# Patient Record
Sex: MALE | Race: BLACK OR AFRICAN AMERICAN | NOT HISPANIC OR LATINO | Employment: OTHER | URBAN - METROPOLITAN AREA
[De-identification: names, ages, dates, MRNs, and addresses within clinical notes are randomized per-mention and may not be internally consistent; named-entity substitution may affect disease eponyms.]

---

## 2017-01-18 ENCOUNTER — APPOINTMENT (OUTPATIENT)
Dept: LAB | Facility: CLINIC | Age: 67
End: 2017-01-18
Payer: MEDICARE

## 2017-01-18 ENCOUNTER — ALLSCRIPTS OFFICE VISIT (OUTPATIENT)
Dept: OTHER | Facility: OTHER | Age: 67
End: 2017-01-18

## 2017-01-18 ENCOUNTER — TRANSCRIBE ORDERS (OUTPATIENT)
Dept: LAB | Facility: CLINIC | Age: 67
End: 2017-01-18

## 2017-01-18 DIAGNOSIS — E11.9 TYPE 2 DIABETES MELLITUS WITHOUT COMPLICATIONS (HCC): ICD-10-CM

## 2017-01-18 DIAGNOSIS — Z72.0 TOBACCO USE: ICD-10-CM

## 2017-01-18 DIAGNOSIS — I70.0 ATHEROSCLEROSIS OF AORTA (HCC): ICD-10-CM

## 2017-01-18 LAB
ALBUMIN SERPL BCP-MCNC: 3.7 G/DL (ref 3.5–5)
ALP SERPL-CCNC: 56 U/L (ref 46–116)
ALT SERPL W P-5'-P-CCNC: 21 U/L (ref 12–78)
ANION GAP SERPL CALCULATED.3IONS-SCNC: 4 MMOL/L (ref 4–13)
AST SERPL W P-5'-P-CCNC: 13 U/L (ref 5–45)
BACTERIA UR QL AUTO: NORMAL /HPF
BASOPHILS # BLD AUTO: 0.01 THOUSANDS/ΜL (ref 0–0.1)
BASOPHILS NFR BLD AUTO: 0 % (ref 0–1)
BILIRUB SERPL-MCNC: 0.47 MG/DL (ref 0.2–1)
BILIRUB UR QL STRIP: NEGATIVE
BUN SERPL-MCNC: 11 MG/DL (ref 5–25)
CALCIUM SERPL-MCNC: 9.7 MG/DL (ref 8.3–10.1)
CHLORIDE SERPL-SCNC: 102 MMOL/L (ref 100–108)
CLARITY UR: CLEAR
CO2 SERPL-SCNC: 30 MMOL/L (ref 21–32)
COLOR UR: YELLOW
CREAT SERPL-MCNC: 0.72 MG/DL (ref 0.6–1.3)
CREAT UR-MCNC: 71.4 MG/DL
EOSINOPHIL # BLD AUTO: 0.1 THOUSAND/ΜL (ref 0–0.61)
EOSINOPHIL NFR BLD AUTO: 2 % (ref 0–6)
ERYTHROCYTE [DISTWIDTH] IN BLOOD BY AUTOMATED COUNT: 16.2 % (ref 11.6–15.1)
EST. AVERAGE GLUCOSE BLD GHB EST-MCNC: 134 MG/DL
GFR SERPL CREATININE-BSD FRML MDRD: >60 ML/MIN/1.73SQ M
GLUCOSE SERPL-MCNC: 116 MG/DL (ref 65–140)
GLUCOSE UR STRIP-MCNC: NEGATIVE MG/DL
HBA1C MFR BLD: 6.3 % (ref 4.2–6.3)
HCT VFR BLD AUTO: 40.7 % (ref 36.5–49.3)
HGB BLD-MCNC: 13.2 G/DL (ref 12–17)
HGB UR QL STRIP.AUTO: NEGATIVE
KETONES UR STRIP-MCNC: NEGATIVE MG/DL
LDLC SERPL DIRECT ASSAY-MCNC: 102 MG/DL (ref 0–100)
LEUKOCYTE ESTERASE UR QL STRIP: NEGATIVE
LYMPHOCYTES # BLD AUTO: 2.46 THOUSANDS/ΜL (ref 0.6–4.47)
LYMPHOCYTES NFR BLD AUTO: 40 % (ref 14–44)
MCH RBC QN AUTO: 26.8 PG (ref 26.8–34.3)
MCHC RBC AUTO-ENTMCNC: 32.4 G/DL (ref 31.4–37.4)
MCV RBC AUTO: 83 FL (ref 82–98)
MICROALBUMIN UR-MCNC: 145 MG/L (ref 0–20)
MICROALBUMIN/CREAT 24H UR: 203 MG/G CREATININE (ref 0–30)
MONOCYTES # BLD AUTO: 0.53 THOUSAND/ΜL (ref 0.17–1.22)
MONOCYTES NFR BLD AUTO: 9 % (ref 4–12)
NEUTROPHILS # BLD AUTO: 3.08 THOUSANDS/ΜL (ref 1.85–7.62)
NEUTS SEG NFR BLD AUTO: 49 % (ref 43–75)
NITRITE UR QL STRIP: NEGATIVE
NON-SQ EPI CELLS URNS QL MICRO: NORMAL /HPF
NRBC BLD AUTO-RTO: 0 /100 WBCS
PH UR STRIP.AUTO: 6 [PH] (ref 4.5–8)
PLATELET # BLD AUTO: 363 THOUSANDS/UL (ref 149–390)
PMV BLD AUTO: 9.1 FL (ref 8.9–12.7)
POTASSIUM SERPL-SCNC: 3.7 MMOL/L (ref 3.5–5.3)
PROT SERPL-MCNC: 7.5 G/DL (ref 6.4–8.2)
PROT UR STRIP-MCNC: ABNORMAL MG/DL
RBC # BLD AUTO: 4.93 MILLION/UL (ref 3.88–5.62)
RBC #/AREA URNS AUTO: NORMAL /HPF
SODIUM SERPL-SCNC: 136 MMOL/L (ref 136–145)
SP GR UR STRIP.AUTO: 1.01 (ref 1–1.03)
TRIGL SERPL-MCNC: 73 MG/DL
TSH SERPL DL<=0.05 MIU/L-ACNC: 0.98 UIU/ML (ref 0.36–3.74)
UROBILINOGEN UR QL STRIP.AUTO: 0.2 E.U./DL
WBC # BLD AUTO: 6.19 THOUSAND/UL (ref 4.31–10.16)
WBC #/AREA URNS AUTO: NORMAL /HPF

## 2017-01-18 PROCEDURE — 85025 COMPLETE CBC W/AUTO DIFF WBC: CPT

## 2017-01-18 PROCEDURE — 84478 ASSAY OF TRIGLYCERIDES: CPT

## 2017-01-18 PROCEDURE — 82043 UR ALBUMIN QUANTITATIVE: CPT

## 2017-01-18 PROCEDURE — 82570 ASSAY OF URINE CREATININE: CPT

## 2017-01-18 PROCEDURE — 84443 ASSAY THYROID STIM HORMONE: CPT

## 2017-01-18 PROCEDURE — 83721 ASSAY OF BLOOD LIPOPROTEIN: CPT

## 2017-01-18 PROCEDURE — 80053 COMPREHEN METABOLIC PANEL: CPT

## 2017-01-18 PROCEDURE — 81001 URINALYSIS AUTO W/SCOPE: CPT

## 2017-01-18 PROCEDURE — 36415 COLL VENOUS BLD VENIPUNCTURE: CPT

## 2017-01-18 PROCEDURE — 83036 HEMOGLOBIN GLYCOSYLATED A1C: CPT

## 2017-01-24 ENCOUNTER — HOSPITAL ENCOUNTER (OUTPATIENT)
Dept: ULTRASOUND IMAGING | Facility: HOSPITAL | Age: 67
Discharge: HOME | End: 2017-01-24
Payer: MEDICARE

## 2017-01-24 ENCOUNTER — HOSPITAL ENCOUNTER (OUTPATIENT)
Dept: CT IMAGING | Facility: HOSPITAL | Age: 67
Discharge: HOME | End: 2017-01-24
Payer: COMMERCIAL

## 2017-01-24 DIAGNOSIS — I70.0 ATHEROSCLEROSIS OF AORTA (HCC): ICD-10-CM

## 2017-01-24 DIAGNOSIS — Z72.0 TOBACCO USE: ICD-10-CM

## 2017-01-24 PROCEDURE — 76775 US EXAM ABDO BACK WALL LIM: CPT

## 2017-02-02 ENCOUNTER — HOSPITAL ENCOUNTER (OUTPATIENT)
Dept: NON INVASIVE DIAGNOSTICS | Facility: HOSPITAL | Age: 67
Discharge: HOME | End: 2017-02-02
Payer: MEDICARE

## 2017-02-02 DIAGNOSIS — I70.0 ATHEROSCLEROSIS OF AORTA (HCC): ICD-10-CM

## 2017-02-02 PROCEDURE — 93306 TTE W/DOPPLER COMPLETE: CPT

## 2017-02-06 ENCOUNTER — GENERIC CONVERSION - ENCOUNTER (OUTPATIENT)
Dept: OTHER | Facility: OTHER | Age: 67
End: 2017-02-06

## 2017-06-13 ENCOUNTER — ALLSCRIPTS OFFICE VISIT (OUTPATIENT)
Dept: OTHER | Facility: OTHER | Age: 67
End: 2017-06-13

## 2017-07-24 ENCOUNTER — APPOINTMENT (OUTPATIENT)
Dept: LAB | Facility: CLINIC | Age: 67
End: 2017-07-24
Payer: MEDICARE

## 2017-07-24 ENCOUNTER — ALLSCRIPTS OFFICE VISIT (OUTPATIENT)
Dept: OTHER | Facility: OTHER | Age: 67
End: 2017-07-24

## 2017-07-24 DIAGNOSIS — E11.9 TYPE 2 DIABETES MELLITUS WITHOUT COMPLICATIONS (HCC): ICD-10-CM

## 2017-07-24 DIAGNOSIS — I25.10 ATHEROSCLEROTIC HEART DISEASE OF NATIVE CORONARY ARTERY WITHOUT ANGINA PECTORIS: ICD-10-CM

## 2017-07-24 DIAGNOSIS — Z01.810 ENCOUNTER FOR PREPROCEDURAL CARDIOVASCULAR EXAMINATION: ICD-10-CM

## 2017-07-24 DIAGNOSIS — K40.90 UNILATERAL INGUINAL HERNIA WITHOUT OBSTRUCTION OR GANGRENE: ICD-10-CM

## 2017-07-24 LAB
ANION GAP SERPL CALCULATED.3IONS-SCNC: 6 MMOL/L (ref 4–13)
BUN SERPL-MCNC: 16 MG/DL (ref 5–25)
CALCIUM SERPL-MCNC: 9.9 MG/DL (ref 8.3–10.1)
CHLORIDE SERPL-SCNC: 102 MMOL/L (ref 100–108)
CO2 SERPL-SCNC: 28 MMOL/L (ref 21–32)
CREAT SERPL-MCNC: 0.76 MG/DL (ref 0.6–1.3)
EST. AVERAGE GLUCOSE BLD GHB EST-MCNC: 140 MG/DL
GFR SERPL CREATININE-BSD FRML MDRD: 108 ML/MIN/1.73SQ M
GLUCOSE P FAST SERPL-MCNC: 139 MG/DL (ref 65–99)
HBA1C MFR BLD: 6.5 % (ref 4.2–6.3)
POTASSIUM SERPL-SCNC: 3.6 MMOL/L (ref 3.5–5.3)
SODIUM SERPL-SCNC: 136 MMOL/L (ref 136–145)

## 2017-07-24 PROCEDURE — 83036 HEMOGLOBIN GLYCOSYLATED A1C: CPT

## 2017-07-24 PROCEDURE — 36415 COLL VENOUS BLD VENIPUNCTURE: CPT

## 2017-07-24 PROCEDURE — 80048 BASIC METABOLIC PNL TOTAL CA: CPT

## 2017-08-01 ENCOUNTER — OFFICE VISIT (OUTPATIENT)
Dept: LAB | Facility: HOSPITAL | Age: 67
End: 2017-08-01
Attending: SURGERY
Payer: MEDICARE

## 2017-08-01 ENCOUNTER — APPOINTMENT (OUTPATIENT)
Dept: LAB | Facility: HOSPITAL | Age: 67
End: 2017-08-01
Attending: SURGERY
Payer: MEDICARE

## 2017-08-01 ENCOUNTER — TRANSCRIBE ORDERS (OUTPATIENT)
Dept: ADMINISTRATIVE | Facility: HOSPITAL | Age: 67
End: 2017-08-01

## 2017-08-01 ENCOUNTER — ALLSCRIPTS OFFICE VISIT (OUTPATIENT)
Dept: OTHER | Facility: OTHER | Age: 67
End: 2017-08-01

## 2017-08-01 DIAGNOSIS — K40.90 INGUINAL HERNIA WITHOUT OBSTRUCTION OR GANGRENE, RECURRENCE NOT SPECIFIED, UNSPECIFIED LATERALITY: ICD-10-CM

## 2017-08-01 DIAGNOSIS — K40.90 INGUINAL HERNIA WITHOUT OBSTRUCTION OR GANGRENE, RECURRENCE NOT SPECIFIED, UNSPECIFIED LATERALITY: Primary | ICD-10-CM

## 2017-08-01 DIAGNOSIS — K40.90 UNILATERAL INGUINAL HERNIA WITHOUT OBSTRUCTION OR GANGRENE: ICD-10-CM

## 2017-08-01 LAB
ERYTHROCYTE [DISTWIDTH] IN BLOOD BY AUTOMATED COUNT: 16.9 % (ref 11.6–15.1)
HCT VFR BLD AUTO: 38.9 % (ref 36.5–49.3)
HGB BLD-MCNC: 12.6 G/DL (ref 12–17)
MCH RBC QN AUTO: 26.8 PG (ref 26.8–34.3)
MCHC RBC AUTO-ENTMCNC: 32.4 G/DL (ref 31.4–37.4)
MCV RBC AUTO: 83 FL (ref 82–98)
PLATELET # BLD AUTO: 331 THOUSANDS/UL (ref 149–390)
PMV BLD AUTO: 8.9 FL (ref 8.9–12.7)
RBC # BLD AUTO: 4.71 MILLION/UL (ref 3.88–5.62)
WBC # BLD AUTO: 4.73 THOUSAND/UL (ref 4.31–10.16)

## 2017-08-01 PROCEDURE — 93005 ELECTROCARDIOGRAM TRACING: CPT

## 2017-08-01 PROCEDURE — 36415 COLL VENOUS BLD VENIPUNCTURE: CPT

## 2017-08-01 PROCEDURE — 85027 COMPLETE CBC AUTOMATED: CPT

## 2017-08-01 RX ORDER — ASPIRIN 81 MG/1
81 TABLET, CHEWABLE ORAL DAILY
COMMUNITY
End: 2018-01-21 | Stop reason: ALTCHOICE

## 2017-08-01 RX ORDER — CLONIDINE HYDROCHLORIDE 0.3 MG/1
0.3 TABLET ORAL 2 TIMES DAILY
COMMUNITY
End: 2018-01-21 | Stop reason: SDUPTHER

## 2017-08-01 RX ORDER — HYDROCHLOROTHIAZIDE 25 MG/1
25 TABLET ORAL DAILY
COMMUNITY
End: 2018-01-21 | Stop reason: SDUPTHER

## 2017-08-01 RX ORDER — LISINOPRIL 40 MG/1
40 TABLET ORAL DAILY
COMMUNITY
End: 2018-01-21 | Stop reason: SDUPTHER

## 2017-08-01 RX ORDER — ATENOLOL 50 MG/1
50 TABLET ORAL DAILY
COMMUNITY
End: 2018-01-21 | Stop reason: SDUPTHER

## 2017-08-01 RX ORDER — ATORVASTATIN CALCIUM 80 MG/1
80 TABLET, FILM COATED ORAL DAILY
COMMUNITY
End: 2018-01-21 | Stop reason: SDUPTHER

## 2017-08-02 LAB
ATRIAL RATE: 63 BPM
P AXIS: 58 DEGREES
PR INTERVAL: 168 MS
QRS AXIS: 50 DEGREES
QRSD INTERVAL: 98 MS
QT INTERVAL: 416 MS
QTC INTERVAL: 425 MS
T WAVE AXIS: 176 DEGREES
VENTRICULAR RATE: 63 BPM

## 2017-08-07 ENCOUNTER — HOSPITAL ENCOUNTER (OUTPATIENT)
Dept: NON INVASIVE DIAGNOSTICS | Facility: CLINIC | Age: 67
Discharge: HOME | End: 2017-08-07
Payer: MEDICARE

## 2017-08-07 ENCOUNTER — GENERIC CONVERSION - ENCOUNTER (OUTPATIENT)
Dept: OTHER | Facility: OTHER | Age: 67
End: 2017-08-07

## 2017-08-07 DIAGNOSIS — I25.10 ATHEROSCLEROTIC HEART DISEASE OF NATIVE CORONARY ARTERY WITHOUT ANGINA PECTORIS: ICD-10-CM

## 2017-08-07 DIAGNOSIS — Z01.810 ENCOUNTER FOR PREPROCEDURAL CARDIOVASCULAR EXAMINATION: ICD-10-CM

## 2017-08-07 LAB
ARRHY DURING EX: NORMAL
CHEST PAIN STATEMENT: NORMAL
MAX DIASTOLIC BP: 70 MMHG
MAX HEART RATE: 94 BPM
MAX PREDICTED HEART RATE: 151 BPM
MAX. SYSTOLIC BP: 154 MMHG
PROTOCOL NAME: NORMAL
REASON FOR TERMINATION: NORMAL
TARGET HR FORMULA: NORMAL
TEST INDICATION: NORMAL
TIME IN EXERCISE PHASE: 180 S

## 2017-08-07 PROCEDURE — A9502 TC99M TETROFOSMIN: HCPCS

## 2017-08-07 PROCEDURE — 78452 HT MUSCLE IMAGE SPECT MULT: CPT

## 2017-08-07 PROCEDURE — 93017 CV STRESS TEST TRACING ONLY: CPT

## 2017-08-07 RX ADMIN — REGADENOSON 0.4 MG: 0.08 INJECTION, SOLUTION INTRAVENOUS at 09:19

## 2017-08-14 ENCOUNTER — ALLSCRIPTS OFFICE VISIT (OUTPATIENT)
Dept: OTHER | Facility: OTHER | Age: 67
End: 2017-08-14

## 2017-08-17 ENCOUNTER — ANESTHESIA EVENT (OUTPATIENT)
Dept: PERIOP | Facility: HOSPITAL | Age: 67
End: 2017-08-17
Payer: MEDICARE

## 2017-08-18 ENCOUNTER — HOSPITAL ENCOUNTER (OUTPATIENT)
Facility: HOSPITAL | Age: 67
Setting detail: OUTPATIENT SURGERY
Discharge: HOME | End: 2017-08-18
Attending: SURGERY | Admitting: SURGERY
Payer: MEDICARE

## 2017-08-18 ENCOUNTER — ANESTHESIA (OUTPATIENT)
Dept: PERIOP | Facility: HOSPITAL | Age: 67
End: 2017-08-18
Payer: MEDICARE

## 2017-08-18 VITALS
RESPIRATION RATE: 20 BRPM | BODY MASS INDEX: 29.7 KG/M2 | HEIGHT: 68 IN | DIASTOLIC BLOOD PRESSURE: 65 MMHG | OXYGEN SATURATION: 96 % | HEART RATE: 70 BPM | TEMPERATURE: 98.3 F | WEIGHT: 196 LBS | SYSTOLIC BLOOD PRESSURE: 137 MMHG

## 2017-08-18 PROBLEM — K40.20 NON-RECURRENT BILATERAL INGUINAL HERNIA WITHOUT OBSTRUCTION OR GANGRENE: Chronic | Status: ACTIVE | Noted: 2017-08-18

## 2017-08-18 LAB
GLUCOSE SERPL-MCNC: 108 MG/DL (ref 65–140)
GLUCOSE SERPL-MCNC: 162 MG/DL (ref 65–140)

## 2017-08-18 PROCEDURE — 82948 REAGENT STRIP/BLOOD GLUCOSE: CPT

## 2017-08-18 PROCEDURE — C1781 MESH (IMPLANTABLE): HCPCS | Performed by: SURGERY

## 2017-08-18 DEVICE — BARD SOFT MESH
Type: IMPLANTABLE DEVICE | Site: INGUINAL | Status: FUNCTIONAL
Brand: BARD SOFT MESH

## 2017-08-18 RX ORDER — ONDANSETRON 2 MG/ML
4 INJECTION INTRAMUSCULAR; INTRAVENOUS EVERY 6 HOURS PRN
Status: CANCELLED | OUTPATIENT
Start: 2017-08-18

## 2017-08-18 RX ORDER — LIDOCAINE HYDROCHLORIDE 10 MG/ML
INJECTION, SOLUTION INFILTRATION; PERINEURAL AS NEEDED
Status: DISCONTINUED | OUTPATIENT
Start: 2017-08-18 | End: 2017-08-18 | Stop reason: SURG

## 2017-08-18 RX ORDER — CHLORAL HYDRATE 500 MG
CAPSULE ORAL 3 TIMES DAILY
COMMUNITY
End: 2021-09-03 | Stop reason: HOSPADM

## 2017-08-18 RX ORDER — METOPROLOL TARTRATE 5 MG/5ML
INJECTION INTRAVENOUS AS NEEDED
Status: DISCONTINUED | OUTPATIENT
Start: 2017-08-18 | End: 2017-08-18 | Stop reason: SURG

## 2017-08-18 RX ORDER — PROPOFOL 10 MG/ML
INJECTION, EMULSION INTRAVENOUS AS NEEDED
Status: DISCONTINUED | OUTPATIENT
Start: 2017-08-18 | End: 2017-08-18 | Stop reason: SURG

## 2017-08-18 RX ORDER — MAGNESIUM HYDROXIDE 1200 MG/15ML
LIQUID ORAL AS NEEDED
Status: DISCONTINUED | OUTPATIENT
Start: 2017-08-18 | End: 2017-08-18 | Stop reason: HOSPADM

## 2017-08-18 RX ORDER — FENTANYL CITRATE/PF 50 MCG/ML
25 SYRINGE (ML) INJECTION
Status: DISCONTINUED | OUTPATIENT
Start: 2017-08-18 | End: 2017-08-18 | Stop reason: HOSPADM

## 2017-08-18 RX ORDER — OXYCODONE HYDROCHLORIDE AND ACETAMINOPHEN 5; 325 MG/1; MG/1
1 TABLET ORAL EVERY 4 HOURS PRN
Qty: 30 TABLET | Refills: 0 | Status: SHIPPED | OUTPATIENT
Start: 2017-08-18 | End: 2017-08-28

## 2017-08-18 RX ORDER — DEXMEDETOMIDINE HYDROCHLORIDE 100 UG/ML
INJECTION, SOLUTION INTRAVENOUS AS NEEDED
Status: DISCONTINUED | OUTPATIENT
Start: 2017-08-18 | End: 2017-08-18 | Stop reason: SURG

## 2017-08-18 RX ORDER — SODIUM CHLORIDE, SODIUM LACTATE, POTASSIUM CHLORIDE, CALCIUM CHLORIDE 600; 310; 30; 20 MG/100ML; MG/100ML; MG/100ML; MG/100ML
125 INJECTION, SOLUTION INTRAVENOUS CONTINUOUS
Status: DISCONTINUED | OUTPATIENT
Start: 2017-08-18 | End: 2017-08-18 | Stop reason: HOSPADM

## 2017-08-18 RX ORDER — ONDANSETRON 2 MG/ML
4 INJECTION INTRAMUSCULAR; INTRAVENOUS ONCE AS NEEDED
Status: DISCONTINUED | OUTPATIENT
Start: 2017-08-18 | End: 2017-08-18 | Stop reason: HOSPADM

## 2017-08-18 RX ORDER — MORPHINE SULFATE 2 MG/ML
2 INJECTION, SOLUTION INTRAMUSCULAR; INTRAVENOUS
Status: CANCELLED | OUTPATIENT
Start: 2017-08-18

## 2017-08-18 RX ORDER — LABETALOL HYDROCHLORIDE 5 MG/ML
10 INJECTION, SOLUTION INTRAVENOUS AS NEEDED
Status: DISCONTINUED | OUTPATIENT
Start: 2017-08-18 | End: 2017-08-18 | Stop reason: HOSPADM

## 2017-08-18 RX ORDER — OXYCODONE HYDROCHLORIDE AND ACETAMINOPHEN 5; 325 MG/1; MG/1
1 TABLET ORAL EVERY 4 HOURS PRN
Status: CANCELLED | OUTPATIENT
Start: 2017-08-18

## 2017-08-18 RX ORDER — FENTANYL CITRATE 50 UG/ML
INJECTION, SOLUTION INTRAMUSCULAR; INTRAVENOUS AS NEEDED
Status: DISCONTINUED | OUTPATIENT
Start: 2017-08-18 | End: 2017-08-18 | Stop reason: SURG

## 2017-08-18 RX ORDER — KETOROLAC TROMETHAMINE 30 MG/ML
INJECTION, SOLUTION INTRAMUSCULAR; INTRAVENOUS AS NEEDED
Status: DISCONTINUED | OUTPATIENT
Start: 2017-08-18 | End: 2017-08-18 | Stop reason: SURG

## 2017-08-18 RX ORDER — ROCURONIUM BROMIDE 10 MG/ML
INJECTION, SOLUTION INTRAVENOUS AS NEEDED
Status: DISCONTINUED | OUTPATIENT
Start: 2017-08-18 | End: 2017-08-18 | Stop reason: SURG

## 2017-08-18 RX ORDER — HYDRALAZINE HYDROCHLORIDE 20 MG/ML
10 INJECTION INTRAMUSCULAR; INTRAVENOUS AS NEEDED
Status: COMPLETED | OUTPATIENT
Start: 2017-08-18 | End: 2017-08-18

## 2017-08-18 RX ORDER — MIDAZOLAM HYDROCHLORIDE 1 MG/ML
INJECTION INTRAMUSCULAR; INTRAVENOUS AS NEEDED
Status: DISCONTINUED | OUTPATIENT
Start: 2017-08-18 | End: 2017-08-18 | Stop reason: SURG

## 2017-08-18 RX ORDER — GLYCOPYRROLATE 0.2 MG/ML
INJECTION INTRAMUSCULAR; INTRAVENOUS AS NEEDED
Status: DISCONTINUED | OUTPATIENT
Start: 2017-08-18 | End: 2017-08-18 | Stop reason: SURG

## 2017-08-18 RX ORDER — SODIUM CHLORIDE, SODIUM LACTATE, POTASSIUM CHLORIDE, CALCIUM CHLORIDE 600; 310; 30; 20 MG/100ML; MG/100ML; MG/100ML; MG/100ML
100 INJECTION, SOLUTION INTRAVENOUS
Status: DISCONTINUED | OUTPATIENT
Start: 2017-08-18 | End: 2017-08-18 | Stop reason: HOSPADM

## 2017-08-18 RX ORDER — ONDANSETRON 2 MG/ML
INJECTION INTRAMUSCULAR; INTRAVENOUS AS NEEDED
Status: DISCONTINUED | OUTPATIENT
Start: 2017-08-18 | End: 2017-08-18 | Stop reason: SURG

## 2017-08-18 RX ORDER — BUPIVACAINE HYDROCHLORIDE 2.5 MG/ML
INJECTION, SOLUTION EPIDURAL; INFILTRATION; INTRACAUDAL AS NEEDED
Status: DISCONTINUED | OUTPATIENT
Start: 2017-08-18 | End: 2017-08-18 | Stop reason: HOSPADM

## 2017-08-18 RX ADMIN — FENTANYL CITRATE 50 MCG: 50 INJECTION, SOLUTION INTRAMUSCULAR; INTRAVENOUS at 08:15

## 2017-08-18 RX ADMIN — DEXAMETHASONE SODIUM PHOSPHATE 10 MG: 10 INJECTION INTRAMUSCULAR; INTRAVENOUS at 08:02

## 2017-08-18 RX ADMIN — SODIUM CHLORIDE, SODIUM LACTATE, POTASSIUM CHLORIDE, AND CALCIUM CHLORIDE: .6; .31; .03; .02 INJECTION, SOLUTION INTRAVENOUS at 07:46

## 2017-08-18 RX ADMIN — GLYCOPYRROLATE 0.2 MG: 0.2 INJECTION, SOLUTION INTRAMUSCULAR; INTRAVENOUS at 09:10

## 2017-08-18 RX ADMIN — ROCURONIUM BROMIDE 40 MG: 10 INJECTION, SOLUTION INTRAVENOUS at 08:02

## 2017-08-18 RX ADMIN — PROPOFOL 50 MG: 10 INJECTION, EMULSION INTRAVENOUS at 08:05

## 2017-08-18 RX ADMIN — DEXMEDETOMIDINE 8 MCG: 100 INJECTION, SOLUTION, CONCENTRATE INTRAVENOUS at 08:22

## 2017-08-18 RX ADMIN — HYDROMORPHONE HYDROCHLORIDE 0.5 MG: 1 INJECTION, SOLUTION INTRAMUSCULAR; INTRAVENOUS; SUBCUTANEOUS at 09:15

## 2017-08-18 RX ADMIN — ONDANSETRON 4 MG: 2 INJECTION INTRAMUSCULAR; INTRAVENOUS at 08:02

## 2017-08-18 RX ADMIN — KETOROLAC TROMETHAMINE 15 MG: 30 INJECTION, SOLUTION INTRAMUSCULAR at 08:40

## 2017-08-18 RX ADMIN — CEFAZOLIN SODIUM 2000 MG: 2 SOLUTION INTRAVENOUS at 08:00

## 2017-08-18 RX ADMIN — METOPROLOL TARTRATE 2.5 MG: 5 INJECTION, SOLUTION INTRAVENOUS at 08:07

## 2017-08-18 RX ADMIN — MIDAZOLAM HYDROCHLORIDE 1 MG: 1 INJECTION, SOLUTION INTRAMUSCULAR; INTRAVENOUS at 07:50

## 2017-08-18 RX ADMIN — FENTANYL CITRATE 50 MCG: 50 INJECTION, SOLUTION INTRAMUSCULAR; INTRAVENOUS at 08:02

## 2017-08-18 RX ADMIN — PROPOFOL 150 MG: 10 INJECTION, EMULSION INTRAVENOUS at 08:02

## 2017-08-18 RX ADMIN — ENOXAPARIN SODIUM 40 MG: 40 INJECTION SUBCUTANEOUS at 07:00

## 2017-08-18 RX ADMIN — LIDOCAINE HYDROCHLORIDE 50 MG: 10 INJECTION, SOLUTION INFILTRATION; PERINEURAL at 08:02

## 2017-08-18 RX ADMIN — NEOSTIGMINE METHYLSULFATE 3 MG: 1 INJECTION, SOLUTION INTRAMUSCULAR; INTRAVENOUS; SUBCUTANEOUS at 09:10

## 2017-08-18 RX ADMIN — HYDRALAZINE HYDROCHLORIDE 10 MG: 20 INJECTION INTRAMUSCULAR; INTRAVENOUS at 09:53

## 2017-08-18 RX ADMIN — SODIUM CHLORIDE, SODIUM LACTATE, POTASSIUM CHLORIDE, AND CALCIUM CHLORIDE: .6; .31; .03; .02 INJECTION, SOLUTION INTRAVENOUS at 08:20

## 2017-08-18 RX ADMIN — DEXMEDETOMIDINE 8 MCG: 100 INJECTION, SOLUTION, CONCENTRATE INTRAVENOUS at 08:50

## 2017-08-18 RX ADMIN — METOPROLOL TARTRATE 2.5 MG: 5 INJECTION, SOLUTION INTRAVENOUS at 08:13

## 2017-08-18 RX ADMIN — MIDAZOLAM HYDROCHLORIDE 1 MG: 1 INJECTION, SOLUTION INTRAMUSCULAR; INTRAVENOUS at 08:00

## 2017-09-01 ENCOUNTER — ALLSCRIPTS OFFICE VISIT (OUTPATIENT)
Dept: OTHER | Facility: OTHER | Age: 67
End: 2017-09-01

## 2017-10-03 ENCOUNTER — ALLSCRIPTS OFFICE VISIT (OUTPATIENT)
Dept: OTHER | Facility: OTHER | Age: 67
End: 2017-10-03

## 2017-10-04 NOTE — PROGRESS NOTES
Assessment  1  Postoperative state (V45 89) (Z98 890)    Plan  Postoperative state    · Follow-up PRN Evaluation and Treatment  Follow-up  Status: Complete  Done:  33SKL4384 08:58AM   Ordered; For: Postoperative state; Ordered By: Kenzie Du Performed:  Due: 89UYH9967    Discussion/Summary  Discussion Summary:   The patient did well after laparoscopic bilateral inguinal hernia repair with mesh  He is discharged from my care and I will be glad to see him if any problem arises in the future  Chief Complaint  Chief Complaint Free Text Note Form: Recheck hernia      History of Present Illness  HPI: I had the pleasure of seeing Toro Pettit in the office today for follow-up after laparoscopic bilateral inguinal hernia repair with mesh  He offers no complaints at this time  Active Problems  1  Abdominal aortic aneurysm (AAA) without rupture (441 4) (I71 4)   2  Aortic sclerosis   3  Aortic valve disorder (424 1) (I35 9)   4  AV block (426 10) (I44 30)   5  Bilateral recurrent inguinal hernia without obstruction or gangrene (550 93) (K40 21)   6  Cardiomyopathy, hypertrophic (425 18) (I42 2)   7  Coronary artery disease (414 00) (I25 10)   8  DM type 2 (diabetes mellitus, type 2) (250 00) (E11 9)   9  Dyspnea (786 09) (R06 00)   10  Generalized osteoarthritis (715 00) (M15 9)   11  History of adenomatous polyp of colon (V12 72) (Z86 010)   12  Hyperlipidemia (272 4) (E78 5)   13  Hypertension (401 9) (I10)   14  Obesity (278 00) (E66 9)   15  Organic impotence (607 84) (N52 9)   16  Postoperative state (V45 89) (Z98 890)   17  Preoperative cardiovascular examination (V72 81) (Z01 810)   18  Right inguinal hernia (550 90) (K40 90)   19  Screening for genitourinary condition (V81 6) (Z13 89)   20  Special screening examination for neoplasm of prostate (V76 44) (Z12 5)   21  Tobacco use (305 1) (Z72 0)    Past Medical History  1  History of Aortic regurgitation (424 1) (I35 1)   2   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)   3  History of Colon, diverticulosis (562 10) (K57 30)   4  History of DJD (degenerative joint disease) (715 90) (M19 90)   5  History of constipation (V12 79) (Z87 19)   6  History of sebaceous cyst (V13 3) (Z87 2)   7  History of shortness of breath (V13 89) (Z87 898)   8  History of urinary frequency (V13 09) (Z87 898)   9  Hyperlipidemia (272 4) (E78 5)   10  Hypertension (401 9) (I10)   11  History of Nocturia (788 43) (R35 1)   12  Obesity (278 00) (E66 9)   13  History of Polyposis Coli Of The Large Intestine (V12 72)   14  History of Type 2 diabetes mellitus (250 00) (E11 9)    Surgical History  1  History of Complete Colonoscopy   2  History of Hernia Repair    Family History  Mother    1  Family history of Family Health Status Of Mother -    2  Family history of Heart Disease (V17 49)  Father    3  Family history of Accidental Poisoning   4  Family history of Family Health Status Of Father -     Social History   · Being A Social Drinker   · Cigarette smoker (305 1) (F17 210)   · Current every day smoker (305 1) (F17 200)   · Educational Level - Completed Master's Degree   · Occupation:   · Smoking Cigarettes - Heavy (20-25 / Day)   · Tobacco use (305 1) (Z72 0)    Current Meds   1  Aspirin 81 MG Oral Tablet Chewable; CHEW AND SWALLOW 1 TABLET DAILY; Therapy: 33Rqd4446 to Recorded   2  Atenolol 50 MG Oral Tablet; Take 1 tablet daily; Therapy: 47Fob9313 to (Last Rx:13Hhs2599)  Requested for: 88Zke0818 Ordered   3  Atorvastatin Calcium 80 MG Oral Tablet; Take 1 tablet daily; Therapy: 49Wtd9701 to (Last Rx:03Rha0077)  Requested for: 68Kxx9350 Ordered   4  CloNIDine HCl - 0 3 MG Oral Tablet; TAKE 1 TABLET THREE TIMES A DAY; Therapy: 91LXE1998 to (Last Dick Grills)  Requested for: 19Fdt5871 Ordered   5  HydroCHLOROthiazide 25 MG Oral Tablet; Take 1 tablet daily; Therapy: 27Fxa7293 to (Last Efrain Knife)  Requested for: 18Xsf2349 Ordered   6   Lisinopril 40 MG Oral Tablet; Take 1 tablet daily; Therapy: 25Apr2014 to (Last Rx:25Mar2017)  Requested for: 25Mar2017 Ordered   7  MetFORMIN HCl  MG Oral Tablet Extended Release 24 Hour; TAKE 2 TABLETS   BEFORE THE EVENING MEAL; Therapy: 08Apr2014 to (Last Rx:28Mar2017)  Requested for: 28Mar2017 Ordered   8  Omega 3 1000 MG Oral Capsule; TAKE 1 CAPSULE 3 times daily; Therapy: 25Apr2014 to Recorded    Allergies  1  No Known Drug Allergies    Vitals  Vital Signs    Recorded: 09NQD4268 08:41AM   Temperature 64 2 F, Oral   Systolic 156, LUE, Sitting   Diastolic 72, LUE, Sitting   Height 5 ft 7 in   Weight 196 lb    BMI Calculated 30 7   BSA Calculated 2     Physical Exam    Abdomen The abdomen is soft, nondistended and nontender  Surgical wounds are well-healed  There is no evidence of recurrence of the hernias  Health Management  History of adenomatous polyp of colon   COLONOSCOPY; every 5 years; Last 59DVB1261; Next Due: 19Twu8035; Active  Health Maintenance   DIGITAL RECTAL EXAM; every 2 years; Next Due: 95LXQ5840; Overdue    Future Appointments    Date/Time Provider Specialty Site   01/25/2018 08:00 AM TADEO Wilkinson   Internal Medicine [facility] Replaced by [facility]     Signatures   Electronically signed by : Jaylon Mackay MD; Oct  3 2017  8:59AM EST                       (Author)

## 2017-10-21 ENCOUNTER — GENERIC CONVERSION - ENCOUNTER (OUTPATIENT)
Dept: OTHER | Facility: OTHER | Age: 67
End: 2017-10-21

## 2018-01-02 ENCOUNTER — ALLSCRIPTS OFFICE VISIT (OUTPATIENT)
Dept: OTHER | Facility: OTHER | Age: 68
End: 2018-01-02

## 2018-01-03 NOTE — PROGRESS NOTES
Assessment   Assessed    1  Cardiomyopathy, hypertrophic (425 18) (I42 2)   2  Coronary artery disease (414 00) (I25 10)   3  Hypertension (401 9) (I10)   4  Hyperlipidemia (272 4) (E78 5)   5  Obesity (278 00) (E66 9)   6  Tobacco use (305 1) (Z72 0)    Discussion/Summary   Cardiology Discussion Summary Free Text Note Form [facility]:    Patient with multiple medical problems who seems to be doing reasonably well from cardiac standpoint  Previous studies reviewed with patient  Medications reviewed and possible side effects discussed  concepts of cardiovascular disease , signs and symptoms of heart disease  Dietary and risk factor modification reinforced  All questions answered  Safety measures reviewed  Patient advised to report any problems prompting medical attention  Symptoms to watch out from cardiac standpoint discussed at length with patient  Patient counseled to quit smoking to prevent future cardiovascular events  Previous cardiovascular studies were reviewed with the patient  Prescriptions reviewed and refilled  Patient will continue to follow up with primary care physician  Goals and Barriers: The patient has the current Goals: Quit smoking  The patent has the current Barriers: None  Patient's Capacity to Self-Care: Patient is able to Self-Care  Patient Education: Educational resources provided: Please see discussion summary  Medication SE Review and Pt Understands Tx: Possible side effects of new medications were reviewed with the patient/guardian today  Counseling Documentation With Imm: The patient was counseled regarding diagnostic results,-- instructions for management,-- risk factor reductions,-- impressions,-- risks and benefits of treatment options,-- importance of compliance with treatment  Transitional Care: Follow up with PCP/Referring Provider        Chief Complaint   Chief Complaint Chronic Condition [facility]: Patient is here today for follow up of chronic conditions described in HPI       History of Present Illness   Cardiology [facility] Free Text Note Form [facility]: Patient presents for follow-up visit  Patient denies any history of chest pain  No shortness of breath out of the ordinary  He has lost some weight  He also states that he has cut down smoking  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[employer]  He states that he has been compliant with all his present medications  No history of dizziness lightheadedness  No history of palpitations or syncope  He had room hernia surgery recently      Review of Systems   Cardiology Male ROS:         Cardiac: as noted in HPI  Skin: No complaints of nonhealing sores or skin rash  Genitourinary: No complaints of recurrent urinary tract infections, frequent urination at night, difficult urination, blood in urine, kidney stones, loss of bladder control, no kidney or prostate problems, no erectile dysfunction  Psychological: anxiety  General: as noted in HPI--   Lost weight  Respiratory: shortness of breath  HEENT: No complaints of serious problems, hearing problems, nose problems, throat problems, or snoring  Gastrointestinal: No complaints of liver problems, nausea, vomiting, heartburn, constipation, bloody stools, diarrhea, problems swallowing, adbominal pain, or rectal bleeding  Hematologic: No complaints of bleeding disorders, anemia, blood clots, or excessive brusing  Neurological: No complaints of numbness, tingling, dizziness, weakness, seizures, headaches, syncope or fainting, AM fatigue, daytime sleepiness, no witnessed apnea episodes  Musculoskeletal: arthritis    ROS Reviewed:    ROS reviewed  Active Problems   Problems    1  Abdominal aortic aneurysm (AAA) without rupture (441 4) (I71 4)   2  Aortic sclerosis   3  Aortic valve disorder (424 1) (I35 9)   4  AV block (426 10) (I44 30)   5  Bilateral recurrent inguinal hernia without obstruction or gangrene (550 93) (K40 21)   6   Cardiomyopathy, hypertrophic (425 18) (I42 2)   7  Coronary artery disease (414 00) (I25 10)   8  DM type 2 (diabetes mellitus, type 2) (250 00) (E11 9)   9  Dyspnea (786 09) (R06 00)   10  Generalized osteoarthritis (715 00) (M15 9)   11  History of adenomatous polyp of colon (V12 72) (Z86 010)   12  Hyperlipidemia (272 4) (E78 5)   13  Hypertension (401 9) (I10)   14  Obesity (278 00) (E66 9)   15  Organic impotence (607 84) (N52 9)   16  Postoperative state (V45 89) (Z98 890)   17  Preoperative cardiovascular examination (V72 81) (Z01 810)   18  Right inguinal hernia (550 90) (K40 90)   19  Screening for genitourinary condition (V81 6) (Z13 89)   20  Special screening examination for neoplasm of prostate (V76 44) (Z12 5)   21  Tobacco use (305 1) (Z72 0)    Past Medical History   Problems    1  History of Aortic regurgitation (424 1) (I35 1)   2  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)   3  History of Colon, diverticulosis (562 10) (K57 30)   4  History of DJD (degenerative joint disease) (715 90) (M19 90)   5  History of constipation (V12 79) (Z87 19)   6  History of sebaceous cyst (V13 3) (Z87 2)   7  History of shortness of breath (V13 89) (Z87 898)   8  History of urinary frequency (V13 09) (Z87 898)   9  Hyperlipidemia (272 4) (E78 5)   10  Hypertension (401 9) (I10)   11  History of Nocturia (788 43) (R35 1)   12  Obesity (278 00) (E66 9)   13  History of Polyposis Coli Of The Large Intestine (V12 72)   14  History of Type 2 diabetes mellitus (250 00) (E11 9)  Active Problems And Past Medical History Reviewed: The active problems and past medical history were reviewed and updated today  Surgical History   Problems    1  History of Complete Colonoscopy   2  History of Hernia Repair  Surgical History Reviewed: The surgical history was reviewed and updated today  Family History   Mother    1  Family history of Family Health Status Of Mother -    2   Family history of Heart Disease (V17 49)  Father    3  Family history of Accidental Poisoning   4  Family history of Family Health Status Of Father -   Family History Reviewed: The family history was reviewed and updated today  Social History   Problems    · Being A Social Drinker   · Cigarette smoker (305 1) (F17 210)   · Current every day smoker (305 1) (F17 200)   · Educational Level - Completed Master's Degree   · Occupation:   · Smoking Cigarettes - Heavy (20-25 / Day)   · Tobacco use (305 1) (Z72 0)  Social History Reviewed: The social history was reviewed and updated today  Current Meds    1  Aspirin 81 MG Oral Tablet Chewable; CHEW AND SWALLOW 1 TABLET DAILY; Therapy: 2014 to Recorded   2  Atenolol 50 MG Oral Tablet; Take 1 tablet daily; Therapy: 2014 to (Last Rx:37Ydy9169)  Requested for: 02Uhw1282 Ordered   3  Atorvastatin Calcium 80 MG Oral Tablet; Take 1 tablet daily; Therapy: 2014 to (Last Rx:31Qto3290)  Requested for: 98Uap2092 Ordered   4  CloNIDine HCl - 0 3 MG Oral Tablet; TAKE 1 TABLET THREE TIMES A DAY; Therapy: 14YXO4130 to (Last Cristhian Gallardo)  Requested for: 90Epu8692 Ordered   5  HydroCHLOROthiazide 25 MG Oral Tablet; Take 1 tablet daily; Therapy: 2014 to (Last 550 3308)  Requested for: [phone] Ordered   6  Lisinopril 40 MG Oral Tablet; Take 1 tablet daily; Therapy: 2014 to (Last Rx:2017)  Requested for: 2017 Ordered   7  MetFORMIN HCl  MG Oral Tablet Extended Release 24 Hour; TAKE 2 TABLETS     BEFORE THE EVENING MEAL; Therapy: 2014 to (Last Rx:2017)  Requested for: 2017 Ordered   8  Omega 3 1000 MG Oral Capsule; TAKE 1 CAPSULE 3 times daily; Therapy: 2014 to Recorded  Medication List Reviewed: The medication list was reviewed and updated today  Allergies   Medication    1   No Known Drug Allergies    Vitals   Vital Signs    Recorded: 43TMF1895 08:18AM   Heart Rate 60   Systolic 550   Diastolic 78 Height 5 ft 7 in   Weight 191 lb 2 08 oz   BMI Calculated 29 93   BSA Calculated 1 98   O2 Saturation 99     Physical Exam        Constitutional      General appearance: No acute distress, well appearing and well nourished  Eyes      Conjunctiva and Sclera examination: Conjunctiva pink, sclera anicteric  Ears, Nose, Mouth, and Throat - Oropharynx: Clear, nares are clear, mucous membranes are moist       Neck      Neck and thyroid: Normal, supple, trachea midline, no thyromegaly  Pulmonary      Auscultation of lungs: Abnormal  -- Decreased breath sounds  Cardiovascular      Auscultation of heart: Abnormal  -- 2/6 systolic ejection murmur right sternal border  Carotid pulses: Normal, 2+ bilaterally  Peripheral vascular exam: Normal pulses throughout, no tenderness, erythema or swelling  Pedal pulses: Normal, 2+ bilaterally  Examination of extremities for edema and/or varicosities: Normal        Abdomen      Abdomen: Non-tender and no distention  Liver and spleen: No hepatomegaly or splenomegaly  Musculoskeletal Gait and station: Normal gait  -- Digits and nails: Normal without clubbing or cyanosis  -- Inspection/palpation of joints, bones, and muscles: Normal, ROM normal        Skin - Skin and subcutaneous tissue: Normal without rashes or lesions  Skin is warm and well perfused, normal turgor  Neurologic - Cranial nerves: II - XII intact  -- Speech: Normal        Psychiatric - Orientation to person, place, and time: Normal -- Mood and affect: Normal       Health Management   History of adenomatous polyp of colon   COLONOSCOPY; every 5 years; Last 05JAY9522; Next Due: 33Ibn0359; Active  Health Maintenance   DIGITAL RECTAL EXAM; every 2 years; Next Due: 95WNQ4489; Overdue    Future Appointments      Date/Time Provider Specialty Site   01/25/2018 08:00 AM TADEO Mills   [facility]     Signatures    Electronically signed by : TADEO Delgadillo ; Jan 2 2018  3:39PM EST                       (Author)

## 2018-01-12 VITALS
BODY MASS INDEX: 31.08 KG/M2 | WEIGHT: 198 LBS | SYSTOLIC BLOOD PRESSURE: 136 MMHG | TEMPERATURE: 98.6 F | DIASTOLIC BLOOD PRESSURE: 78 MMHG | HEIGHT: 67 IN

## 2018-01-13 VITALS
WEIGHT: 192.25 LBS | HEART RATE: 65 BPM | OXYGEN SATURATION: 96 % | HEIGHT: 67 IN | SYSTOLIC BLOOD PRESSURE: 142 MMHG | DIASTOLIC BLOOD PRESSURE: 76 MMHG | BODY MASS INDEX: 30.17 KG/M2

## 2018-01-13 NOTE — MISCELLANEOUS
Message  Return to work or school:   Jorgitoanibal Medrano is under my professional care  He was seen in my office on 10/03/2017       Patient was discharged from my care, can return to work after physical    Everrett Yolanda        Signatures   Electronically signed by : Jigna Garcia, ; Oct  3 2017 11:59AM EST                       (Author)

## 2018-01-13 NOTE — MISCELLANEOUS
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

## 2018-01-14 VITALS
HEIGHT: 67 IN | BODY MASS INDEX: 30.92 KG/M2 | WEIGHT: 197 LBS | DIASTOLIC BLOOD PRESSURE: 78 MMHG | SYSTOLIC BLOOD PRESSURE: 136 MMHG | HEART RATE: 60 BPM

## 2018-01-14 VITALS
HEIGHT: 67 IN | DIASTOLIC BLOOD PRESSURE: 76 MMHG | HEART RATE: 72 BPM | BODY MASS INDEX: 31.23 KG/M2 | SYSTOLIC BLOOD PRESSURE: 134 MMHG | RESPIRATION RATE: 12 BRPM | WEIGHT: 199 LBS

## 2018-01-14 VITALS
TEMPERATURE: 98.9 F | DIASTOLIC BLOOD PRESSURE: 82 MMHG | WEIGHT: 194 LBS | HEIGHT: 67 IN | BODY MASS INDEX: 30.45 KG/M2 | SYSTOLIC BLOOD PRESSURE: 140 MMHG

## 2018-01-15 VITALS
WEIGHT: 196 LBS | TEMPERATURE: 98.1 F | HEIGHT: 67 IN | BODY MASS INDEX: 30.76 KG/M2 | SYSTOLIC BLOOD PRESSURE: 124 MMHG | DIASTOLIC BLOOD PRESSURE: 72 MMHG

## 2018-01-15 VITALS
OXYGEN SATURATION: 94 % | WEIGHT: 198 LBS | HEART RATE: 79 BPM | BODY MASS INDEX: 31.08 KG/M2 | HEIGHT: 67 IN | DIASTOLIC BLOOD PRESSURE: 74 MMHG | SYSTOLIC BLOOD PRESSURE: 140 MMHG

## 2018-01-16 NOTE — RESULT NOTES
Verified Results  NM MYOCARDIAL PERFUSION SPECT (RX STRESS AND/OR REST) 88Lbc8478 07:32AM [place] Order Number: [id number]   Performing Comments: ***PLEASE DO NEXT WEEK  SURGERY SCHEDULED FOR 2017***   - Patient Instructions: To schedule this appointment, please contact Central Scheduling at [phone]  Test Name Result Flag Reference   NM MYOCARDIAL PERFUSION SPECT (RX STRESS AND/OR REST) (Report)     [address]   [phone]     Rest/Stress Gated SPECT Myocardial Perfusion Imaging After Regadenoson     Patient: Jeannette Jackson   MR number: [id number]   Account number: [de-identified]   : 1950   Age: 69 years   Gender: Male   Status: Outpatient   Location: [facility]   Height: 67 in   Weight: 198 lb   BP: 120/ 62 mmHg     Allergies: NO KNOWN ALLERGIES     Diagnosis: I25 10 - Atherosclerotic heart disease of native coronary artery without angina pectoris, Z01 810 - Encounter for preprocedural cardiovascular examination     Primary Physician: Caity Dunlap MD   Interpreting Physician: Shay Briggs MD   Referring Physician: Shay Briggs MD   Technician: Niurka Rizvi BS, BA, AART(N)   Group: [facility]   Other: Fredonia Gowers, MS, CCT     INDICATIONS: CAd, Pre-OP     HISTORY: The patient is a 69year old  male  Chest pain status: no chest pain  Coronary artery disease risk factors: dyslipidemia, hypertension, smoking, family history of premature coronary artery disease, and diabetes   mellitus  Cardiovascular history: coronary artery disease and aortic valve disease  Co-morbidity: obesity  Medications: a beta blocker, an ACE inhibitor/ARB, a diuretic, aspirin, a lipid lowering agent, and diabetic medications  REST ECG: Normal sinus rhythm  Sinus bradycardia  Voltage criteria for left ventricular hypertrophy were present with strain pattern  Secondary ST/T wave abnormalities were present  PROCEDURE: The study was performed at [address] [place] [address]  A regadenoson infusion pharmacologic stress test was performed  Gated SPECT myocardial perfusion imaging was performed after stress and at rest  Systolic blood pressure   was 120 mmHg, at the start of the study  Diastolic blood pressure was 62 mmHg, at the start of the study  The heart rate was 54 bpm, at the start of the study  Regadenoson protocol:   HR bpm SBP mmHg DBP mmHg Rhythm/conduct   Baseline 54 120 62 rare PVC's   Immediate 90 142 64 --   1 min 78 -- -- --   2 min 73 154 70 --   No medications or fluids given  STRESS SUMMARY: Duration of pharmacologic stress was 3 min  Maximal heart rate during stress was 90 bpm  The rate-pressure product for the peak heart rate and blood pressure was 04543  There was no chest pain during stress  The stress test   was terminated due to protocol completion  There were no stress arrhythmias or conduction abnormalities  The stress ECG was non-diagnostic due to resting ST/T wave abnormality  MYOCARDIAL PERFUSION IMAGING:   The image quality was good  The left ventricle was mildly dilated  The TID ratio was 1 17  PERFUSION DEFECTS:   - There were no perfusion defects  GATED SPECT:   Left ventricular ejection fraction was within normal limits by visual estimate  LVEF 50 to 55%  There was no left ventricular regional abnormality  SUMMARY:   - Stress results: There was no chest pain during stress  - ECG conclusions: The stress ECG was non-diagnostic due to resting ST/T wave abnormality    - Perfusion imaging: There were no perfusion defects    - Gated SPECT: Left ventricular ejection fraction was within normal limits by visual estimate  LVEF 50 to 55%  There was no left ventricular regional abnormality  IMPRESSIONS: Normal study  Myocardial perfusion imaging was normal at rest and with stress   Left ventricular systolic function was normal      Prepared and signed by     Carmen Auguste MD   Signed 08/07/2017 12:58:30

## 2018-01-21 ENCOUNTER — APPOINTMENT (EMERGENCY)
Dept: RADIOLOGY | Facility: HOSPITAL | Age: 68
DRG: 377 | End: 2018-01-21
Payer: MEDICARE

## 2018-01-21 ENCOUNTER — HOSPITAL ENCOUNTER (INPATIENT)
Facility: HOSPITAL | Age: 68
LOS: 4 days | Discharge: HOME | DRG: 377 | End: 2018-01-25
Attending: EMERGENCY MEDICINE | Admitting: INTERNAL MEDICINE
Payer: MEDICARE

## 2018-01-21 DIAGNOSIS — K92.2 GI BLEED: Primary | ICD-10-CM

## 2018-01-21 DIAGNOSIS — L03.114 CELLULITIS OF HAND, LEFT: ICD-10-CM

## 2018-01-21 DIAGNOSIS — I21.A1 NON-ST ELEVATION MYOCARDIAL INFARCTION (NSTEMI), TYPE 2: ICD-10-CM

## 2018-01-21 PROBLEM — I21.4 NSTEMI (NON-ST ELEVATED MYOCARDIAL INFARCTION) (HCC): Status: ACTIVE | Noted: 2018-01-21

## 2018-01-21 PROBLEM — IMO0002 DIABETES TYPE 2, UNCONTROLLED: Status: ACTIVE | Noted: 2018-01-21

## 2018-01-21 PROBLEM — I10 HTN (HYPERTENSION): Status: ACTIVE | Noted: 2018-01-21

## 2018-01-21 PROBLEM — E78.5 HYPERLIPIDEMIA: Status: ACTIVE | Noted: 2018-01-21

## 2018-01-21 LAB
ABO GROUP BLD: NORMAL
ALBUMIN SERPL BCP-MCNC: 3 G/DL (ref 3.5–5)
ALP SERPL-CCNC: 60 U/L (ref 46–116)
ALT SERPL W P-5'-P-CCNC: 18 U/L (ref 12–78)
ANION GAP SERPL CALCULATED.3IONS-SCNC: 9 MMOL/L (ref 4–13)
APTT PPP: 25 SECONDS (ref 23–35)
AST SERPL W P-5'-P-CCNC: 15 U/L (ref 5–45)
ATRIAL RATE: 119 BPM
BASOPHILS # BLD AUTO: 0.02 THOUSANDS/ΜL (ref 0–0.1)
BASOPHILS NFR BLD AUTO: 0 % (ref 0–1)
BILIRUB DIRECT SERPL-MCNC: 0.15 MG/DL (ref 0–0.2)
BILIRUB SERPL-MCNC: 0.4 MG/DL (ref 0.2–1)
BLD GP AB SCN SERPL QL: NEGATIVE
BUN SERPL-MCNC: 30 MG/DL (ref 5–25)
CALCIUM SERPL-MCNC: 9 MG/DL (ref 8.3–10.1)
CHLORIDE SERPL-SCNC: 95 MMOL/L (ref 100–108)
CO2 SERPL-SCNC: 28 MMOL/L (ref 21–32)
CREAT SERPL-MCNC: 0.92 MG/DL (ref 0.6–1.3)
EOSINOPHIL # BLD AUTO: 0.01 THOUSAND/ΜL (ref 0–0.61)
EOSINOPHIL NFR BLD AUTO: 0 % (ref 0–6)
ERYTHROCYTE [DISTWIDTH] IN BLOOD BY AUTOMATED COUNT: 17.2 % (ref 11.6–15.1)
GFR SERPL CREATININE-BSD FRML MDRD: 98 ML/MIN/1.73SQ M
GLUCOSE SERPL-MCNC: 181 MG/DL (ref 65–140)
HCT VFR BLD AUTO: 18.5 % (ref 36.5–49.3)
HCT VFR BLD AUTO: 21.6 % (ref 36.5–49.3)
HCT VFR BLD AUTO: 22.7 % (ref 36.5–49.3)
HCT VFR BLD AUTO: 25.6 % (ref 36.5–49.3)
HGB BLD-MCNC: 5.9 G/DL (ref 12–17)
HGB BLD-MCNC: 6.9 G/DL (ref 12–17)
HGB BLD-MCNC: 7.4 G/DL (ref 12–17)
HGB BLD-MCNC: 8.4 G/DL (ref 12–17)
INR PPP: 0.97 (ref 0.86–1.16)
LYMPHOCYTES # BLD AUTO: 0.72 THOUSANDS/ΜL (ref 0.6–4.47)
LYMPHOCYTES NFR BLD AUTO: 7 % (ref 14–44)
MAGNESIUM SERPL-MCNC: 1.9 MG/DL (ref 1.6–2.6)
MCH RBC QN AUTO: 26.5 PG (ref 26.8–34.3)
MCHC RBC AUTO-ENTMCNC: 31.9 G/DL (ref 31.4–37.4)
MCV RBC AUTO: 83 FL (ref 82–98)
MONOCYTES # BLD AUTO: 0.45 THOUSAND/ΜL (ref 0.17–1.22)
MONOCYTES NFR BLD AUTO: 4 % (ref 4–12)
NEUTROPHILS # BLD AUTO: 9.1 THOUSANDS/ΜL (ref 1.85–7.62)
NEUTS SEG NFR BLD AUTO: 88 % (ref 43–75)
NRBC BLD AUTO-RTO: 0 /100 WBCS
P AXIS: 72 DEGREES
PLATELET # BLD AUTO: 334 THOUSANDS/UL (ref 149–390)
PMV BLD AUTO: 9.4 FL (ref 8.9–12.7)
POTASSIUM SERPL-SCNC: 3.4 MMOL/L (ref 3.5–5.3)
PR INTERVAL: 132 MS
PROT SERPL-MCNC: 6.5 G/DL (ref 6.4–8.2)
PROTHROMBIN TIME: 13.1 SECONDS (ref 12.1–14.4)
QRS AXIS: 73 DEGREES
QRSD INTERVAL: 104 MS
QT INTERVAL: 340 MS
QTC INTERVAL: 478 MS
RBC # BLD AUTO: 2.6 MILLION/UL (ref 3.88–5.62)
RH BLD: POSITIVE
SODIUM SERPL-SCNC: 132 MMOL/L (ref 136–145)
SPECIMEN EXPIRATION DATE: NORMAL
SPECIMEN SOURCE: NORMAL
T WAVE AXIS: -87 DEGREES
TROPONIN I BLD-MCNC: 0.06 NG/ML (ref 0–0.08)
TROPONIN I SERPL-MCNC: 0.2 NG/ML
TSH SERPL DL<=0.05 MIU/L-ACNC: 0.41 UIU/ML (ref 0.36–3.74)
VENTRICULAR RATE: 119 BPM
WBC # BLD AUTO: 10.35 THOUSAND/UL (ref 4.31–10.16)

## 2018-01-21 PROCEDURE — 80048 BASIC METABOLIC PNL TOTAL CA: CPT | Performed by: PHYSICIAN ASSISTANT

## 2018-01-21 PROCEDURE — 30233N1 TRANSFUSION OF NONAUTOLOGOUS RED BLOOD CELLS INTO PERIPHERAL VEIN, PERCUTANEOUS APPROACH: ICD-10-PCS | Performed by: FAMILY MEDICINE

## 2018-01-21 PROCEDURE — 86850 RBC ANTIBODY SCREEN: CPT | Performed by: EMERGENCY MEDICINE

## 2018-01-21 PROCEDURE — 71045 X-RAY EXAM CHEST 1 VIEW: CPT

## 2018-01-21 PROCEDURE — 85018 HEMOGLOBIN: CPT

## 2018-01-21 PROCEDURE — 96365 THER/PROPH/DIAG IV INF INIT: CPT

## 2018-01-21 PROCEDURE — 36415 COLL VENOUS BLD VENIPUNCTURE: CPT | Performed by: PHYSICIAN ASSISTANT

## 2018-01-21 PROCEDURE — C9113 INJ PANTOPRAZOLE SODIUM, VIA: HCPCS | Performed by: PHYSICIAN ASSISTANT

## 2018-01-21 PROCEDURE — 93005 ELECTROCARDIOGRAM TRACING: CPT

## 2018-01-21 PROCEDURE — 86920 COMPATIBILITY TEST SPIN: CPT

## 2018-01-21 PROCEDURE — 86901 BLOOD TYPING SEROLOGIC RH(D): CPT | Performed by: EMERGENCY MEDICINE

## 2018-01-21 PROCEDURE — 96360 HYDRATION IV INFUSION INIT: CPT

## 2018-01-21 PROCEDURE — 85014 HEMATOCRIT: CPT

## 2018-01-21 PROCEDURE — 85014 HEMATOCRIT: CPT | Performed by: PHYSICIAN ASSISTANT

## 2018-01-21 PROCEDURE — 85730 THROMBOPLASTIN TIME PARTIAL: CPT | Performed by: PHYSICIAN ASSISTANT

## 2018-01-21 PROCEDURE — 80076 HEPATIC FUNCTION PANEL: CPT | Performed by: PHYSICIAN ASSISTANT

## 2018-01-21 PROCEDURE — 85610 PROTHROMBIN TIME: CPT | Performed by: PHYSICIAN ASSISTANT

## 2018-01-21 PROCEDURE — 86900 BLOOD TYPING SEROLOGIC ABO: CPT | Performed by: EMERGENCY MEDICINE

## 2018-01-21 PROCEDURE — 84443 ASSAY THYROID STIM HORMONE: CPT | Performed by: PHYSICIAN ASSISTANT

## 2018-01-21 PROCEDURE — 84484 ASSAY OF TROPONIN QUANT: CPT

## 2018-01-21 PROCEDURE — 83735 ASSAY OF MAGNESIUM: CPT | Performed by: PHYSICIAN ASSISTANT

## 2018-01-21 PROCEDURE — 85018 HEMOGLOBIN: CPT | Performed by: PHYSICIAN ASSISTANT

## 2018-01-21 PROCEDURE — 85025 COMPLETE CBC W/AUTO DIFF WBC: CPT | Performed by: PHYSICIAN ASSISTANT

## 2018-01-21 PROCEDURE — 84484 ASSAY OF TROPONIN QUANT: CPT | Performed by: PHYSICIAN ASSISTANT

## 2018-01-21 PROCEDURE — P9016 RBC LEUKOCYTES REDUCED: HCPCS

## 2018-01-21 RX ORDER — ATORVASTATIN CALCIUM 80 MG/1
1 TABLET, FILM COATED ORAL DAILY
Status: ON HOLD | COMMUNITY
Start: 2014-04-25 | End: 2018-07-21 | Stop reason: SDUPTHER

## 2018-01-21 RX ORDER — LISINOPRIL 40 MG/1
1 TABLET ORAL DAILY
COMMUNITY
Start: 2014-04-25 | End: 2018-01-26 | Stop reason: SDUPTHER

## 2018-01-21 RX ORDER — METFORMIN HYDROCHLORIDE 500 MG/1
TABLET, EXTENDED RELEASE ORAL
COMMUNITY
Start: 2014-04-08 | End: 2018-01-26 | Stop reason: SDUPTHER

## 2018-01-21 RX ORDER — ATENOLOL 50 MG/1
1 TABLET ORAL DAILY
COMMUNITY
Start: 2014-04-25 | End: 2018-01-26 | Stop reason: SDUPTHER

## 2018-01-21 RX ORDER — ASPIRIN 81 MG/1
242 TABLET, CHEWABLE ORAL ONCE
Status: COMPLETED | OUTPATIENT
Start: 2018-01-21 | End: 2018-01-21

## 2018-01-21 RX ORDER — HYDROCHLOROTHIAZIDE 25 MG/1
1 TABLET ORAL DAILY
COMMUNITY
Start: 2014-04-25 | End: 2018-01-25 | Stop reason: HOSPADM

## 2018-01-21 RX ORDER — ASPIRIN 81 MG/1
1 TABLET, CHEWABLE ORAL DAILY
COMMUNITY
Start: 2014-04-25 | End: 2021-09-03 | Stop reason: HOSPADM

## 2018-01-21 RX ORDER — CLONIDINE HYDROCHLORIDE 0.3 MG/1
1 TABLET ORAL 3 TIMES DAILY
COMMUNITY
Start: 2014-06-16 | End: 2018-09-16 | Stop reason: SDUPTHER

## 2018-01-21 RX ORDER — ASPIRIN 81 MG/1
1 TABLET, CHEWABLE ORAL 3 TIMES DAILY
COMMUNITY
Start: 2014-04-25 | End: 2018-01-21 | Stop reason: SDUPTHER

## 2018-01-21 RX ORDER — ACETAMINOPHEN 325 MG/1
650 TABLET ORAL EVERY 6 HOURS PRN
Status: DISCONTINUED | OUTPATIENT
Start: 2018-01-21 | End: 2018-01-25 | Stop reason: HOSPADM

## 2018-01-21 RX ADMIN — SODIUM CHLORIDE 1000 ML: 0.9 INJECTION, SOLUTION INTRAVENOUS at 09:03

## 2018-01-21 RX ADMIN — SODIUM CHLORIDE 8 MG/HR: 9 INJECTION, SOLUTION INTRAVENOUS at 10:10

## 2018-01-21 RX ADMIN — ASPIRIN 81 MG 242 MG: 81 TABLET ORAL at 08:45

## 2018-01-21 RX ADMIN — SODIUM CHLORIDE 1000 ML: 0.9 INJECTION, SOLUTION INTRAVENOUS at 08:44

## 2018-01-21 RX ADMIN — SODIUM CHLORIDE 8 MG/HR: 9 INJECTION, SOLUTION INTRAVENOUS at 18:43

## 2018-01-21 RX ADMIN — SODIUM CHLORIDE 80 MG: 9 INJECTION, SOLUTION INTRAVENOUS at 09:48

## 2018-01-21 NOTE — ASSESSMENT & PLAN NOTE
Patient reports about a weeks worth of belching , no appetite , hot and cold sweats  And palpitations  He reported no black stools and no vomiting of blood  patient takes 81 mg aspirin daily    · Agree with protonix drip  · Holding aspirin  · NPO  · GI Consult  · Transfuse PRBCS

## 2018-01-21 NOTE — CONSULTS
Consultation - [facility]) [facility]  Nadeen Hutchinson 69 y o  male MRN: [id number]  Unit/Bed#: ED 17 Encounter: [id number]        Inpatient consult to gastroenterology  Consult performed by: Toro Sims  Consult ordered by: Quynh Carmen          Reason for Consult / Principal Problem: symptomatic anemia      ASSESSMENT AND PLAN:    68-year-old male with history of hypertension, diabetes, dyslipidemia presenting with    1  Symptomatic anemia  -this can be due to upper GI bleed or lower GI bleed, given his BUN is slightly elevated upper GI bleed suspected with differential being PUD, esophagitis, gastritis vs malignancy given his weight loss   -will need to give packed red blood cell transfusion as he is having some demand ischemia, also IV fluid hydration as well as Protonix drip with a bolus  -he can have clear liquid diet today and will plan EGD tomorrow  -can continue ASA    2  Type II NSTEMI  -likely due to demand ischemia      3  Colon polyps  -last colonoscopy 3 years ago  -if EGD negative for a source of his anemia may need colonoscopy      _____________________________________________________________________    HPI:  69year old male with history of HTN, DM and maintained on metformin, as well as dyslipidemia  Came to the ER due to feeling fatigued as well as decreased appetite  Denies any nausea, vomiting, diarrhea, or abdominal pain  Also felt heart palpitations and hot sweats  Multiple episodes of pre syncope but has not had syncope  He was on his way to [place] to [facility] but came here as he felt like he was going to have syncope  He reports he has never had this before  Denies any melena or hematochezia  But he admits to not really looking at the stool  He reports some constipation that he thinks is due to not eating as much recently  Denies ever having CAD or prior CVA  He does report chronic GERD since his hernia repair surgery in August   Denies any NSAIDs  He reports his last colonoscopy was about 3 years ago and tells me they took out polyps  He was given a 5 year follow up  He doesn't drink alcohol  He is current smoker and has smoked since age 25  Here he was found to have anemia with a hemoglobin of 6 9 which was normocytic in nature  REVIEW OF SYSTEMS:    CONSTITUTIONAL: He reports weight loss of 30 lbs over the past years   HEENT: No earache or tinnitus  Denies hearing loss or visual disturbances  Wears glasses  CARDIOVASCULAR: positive for palpitations  RESPIRATORY: Denies any cough, hemoptysis, shortness of breath or dyspnea on exertion  GASTROINTESTINAL: As noted in the History of Present Illness  GENITOURINARY: No problems with urination  Denies any hematuria or dysuria  NEUROLOGIC: No dizziness or vertigo, denies headaches  MUSCULOSKELETAL: Denies any muscle or joint pain  SKIN: Denies skin rashes or itching  ENDOCRINE: Denies excessive thirst  Denies intolerance to heat or cold  PSYCHOSOCIAL: Denies depression or anxiety  Denies any recent memory loss         Historical Information   Past Medical History:   Diagnosis Date    Diabetes mellitus ([address] )     Heart murmur     Hyperlipidemia     Hypertension      Past Surgical History:   Procedure Laterality Date    ACHILLES TENDON SURGERY Left     COLONOSCOPY      HERNIA REPAIR      HERNIA REPAIR Bilateral 8/18/2017    Procedure: LAPAROSCOPIC INGUINAL HERNIA REPAIR WITH MESH;  Surgeon: Jhonny Barlow MD;  Location: [facility];  Service: General    TIBIA FRACTURE SURGERY Left      Social History   History   Alcohol Use    Yes     Comment: rare     History   Drug Use No     History   Smoking Status    Current Every Day Smoker    Packs/day: 1 00   Smokeless Tobacco    Current User     Family History   Problem Relation Age of Onset    Rheumatic fever Father        Meds/Allergies       (Not in a hospital admission)  Current Facility-Administered Medications   Medication Dose Route Frequency    pantoprazole (PROTONIX) 80 mg in sodium chloride 0 9 % 100 mL infusion  8 mg/hr Intravenous Continuous       No Known Allergies    Objective     Blood pressure 129/74, pulse (!) 108, temperature 99 °F (37 2 °C), temperature source Oral, resp  rate (!) 24, height 5' 8" (1 727 m), weight 82 6 kg (182 lb 1 6 oz), SpO2 100 %      No intake or output data in the 24 hours ending 01/21/18 1040      PHYSICAL EXAM:      General Appearance:   Alert, cooperative, no distress   HEENT:   Normocephalic, atraumatic, anicteric      Neck:  Supple, symmetrical, trachea midline   Lungs:   Clear to auscultation bilaterally; no rales, rhonchi or wheezing; respirations unlabored    Heart[de-identified]   Tachycardic   Abdomen:   Soft, non-tender, non-distended; normal bowel sounds; no masses, no organomegaly    Genitalia:   Deferred    Rectal:   Deferred    Extremities:  No cyanosis, clubbing or edema    Pulses:  2+ and symmetric all extremities    Skin:  No jaundice, rashes, or lesions    Lymph nodes:  No palpable cervical lymphadenopathy        Lab Results:   Admission on 01/21/2018   Component Date Value    Ventricular Rate 01/21/2018 119     Atrial Rate 01/21/2018 119     WV Interval 01/21/2018 132     QRSD Interval 01/21/2018 104     QT Interval 01/21/2018 340     QTC Interval 01/21/2018 478     P Axis 01/21/2018 72     QRS Axis 01/21/2018 73     T Wave [place] 01/21/2018 -87     WBC 01/21/2018 10 35*    RBC 01/21/2018 2 60*    Hemoglobin 01/21/2018 6 9*    Hematocrit 01/21/2018 21 6*    MCV 01/21/2018 83     MCH 01/21/2018 26 5*    MCHC 01/21/2018 31 9     RDW 01/21/2018 17 2*    MPV 01/21/2018 9 4     Platelets 73/35/2822 334     nRBC 01/21/2018 0     Neutrophils Relative 01/21/2018 88*    Lymphocytes Relative 01/21/2018 7*    Monocytes Relative 01/21/2018 4     Eosinophils Relative 01/21/2018 0     Basophils Relative 01/21/2018 0     Neutrophils Absolute 01/21/2018 9 10*    Lymphocytes Absolute 01/21/2018 0 72     Monocytes Absolute 01/21/2018 0 45     Eosinophils Absolute 01/21/2018 0 01     Basophils Absolute 01/21/2018 0 02     Protime 01/21/2018 13 1     INR 01/21/2018 0 97     PTT 01/21/2018 25     Sodium 01/21/2018 132*    Potassium 01/21/2018 3 4*    Chloride 01/21/2018 95*    CO2 01/21/2018 28     Anion Gap 01/21/2018 9     BUN 01/21/2018 30*    Creatinine 01/21/2018 0 92     Glucose 01/21/2018 181*    Calcium 01/21/2018 9 0     eGFR 01/21/2018 98     Total Bilirubin 01/21/2018 0 40     Bilirubin, Direct 01/21/2018 0 15     Alkaline Phosphatase 01/21/2018 60     AST 01/21/2018 15     ALT 01/21/2018 18     Total Protein 01/21/2018 6 5     Albumin 01/21/2018 3 0*    Troponin I 01/21/2018 0 20*    Magnesium 01/21/2018 1 9     POC Troponin I 01/21/2018 0 06     Specimen Type 01/21/2018 VENOUS     Hemoglobin 01/21/2018 5 9*    Hematocrit 01/21/2018 18 5*       Imaging Studies: I have personally reviewed pertinent imaging studies    US abdominal aorta:  Mild aneursymal dilation of distal aorta  CT:  Mild emphysema, coronary artery calcifications

## 2018-01-21 NOTE — ED PROVIDER NOTES
History  Chief Complaint   Patient presents with    Palpitations     Patient c/o  chest palpitations, weakness in the legs, chills, and loss of appetite  Patient denies chest pain  68-year-old male patient here for palpitations  Onset was yesterday afternoon  Began spontaneously with no precipitating factor that he can recall  Began at rest  Feels like his heart is pounding and racing  Has been breaking out in sweats intermittently over the past 1 day  Feels intermittently nauseous  No chest pain or tightness  No SOB  Feels lightheaded  Multiple near syncopal episodes with this  More severe this morning  No recent travels, trauma, or surgeries  No h/o PE or DVT  H/o HTN, HLD, DM  Does not take any anticoagulants or antiplatelets  No prior cardiac workup  No new medications  Has not missed any meds  Takes 81mg ASA daily               History provided by:  Patient   used: No    Palpitations   Palpitations quality:  Fast  Onset quality:  Sudden  Duration:  20 hours  Timing:  Constant  Progression:  Unchanged  Chronicity:  New  Context: not anxiety, not appetite suppressants, not blood loss, not bronchodilators, not caffeine, not dehydration, not exercise, not hyperventilation, not illicit drugs, not nicotine and not stimulant use    Relieved by:  Nothing  Worsened by:  Nothing  Ineffective treatments:  None tried  Associated symptoms: near-syncope    Associated symptoms: no back pain, no chest pain, no chest pressure, no cough, no diaphoresis, no dizziness, no hemoptysis, no leg pain, no lower extremity edema, no malaise/fatigue, no nausea, no numbness, no orthopnea, no PND, no shortness of breath, no syncope, no vomiting and no weakness    Risk factors: diabetes mellitus    Risk factors: no heart disease, no hx of atrial fibrillation, no hx of DVT, no hx of PE, no hx of thyroid disease, no hypercoagulable state, no hyperthyroidism, no OTC sinus medications and no stress        Prior to Admission Medications   Prescriptions Last Dose Informant Patient Reported? Taking? Omega-3 1000 MG CAPS   Yes No   Sig: Take by mouth   aspirin 81 mg chewable tablet   Yes Yes   Sig: Chew 1 tablet daily   atenolol (TENORMIN) 50 mg tablet   Yes Yes   Sig: Take 1 tablet by mouth daily   atorvastatin (LIPITOR) 80 mg tablet   Yes Yes   Sig: Take 1 tablet by mouth daily   cloNIDine (CATAPRES) 0 3 mg tablet   Yes Yes   Sig: Take 1 tablet by mouth 3 (three) times a day   hydrochlorothiazide (HYDRODIURIL) 25 mg tablet   Yes Yes   Sig: Take 1 tablet by mouth daily   lisinopril (ZESTRIL) 40 mg tablet   Yes Yes   Sig: Take 1 tablet by mouth daily   metFORMIN (GLUCOPHAGE-XR) 500 mg 24 hr tablet   Yes Yes   Sig: Take by mouth      Facility-Administered Medications: None       Past Medical History:   Diagnosis Date    Diabetes mellitus ([address] )     Heart murmur     Hyperlipidemia     Hypertension        Past Surgical History:   Procedure Laterality Date    ACHILLES TENDON SURGERY Left     COLONOSCOPY      HERNIA REPAIR      HERNIA REPAIR Bilateral 8/18/2017    Procedure: LAPAROSCOPIC INGUINAL HERNIA REPAIR WITH MESH;  Surgeon: Carmen Coleman MD;  Location: [facility] OR;  Service: General    TIBIA FRACTURE SURGERY Left        Family History   Problem Relation Age of Onset    Rheumatic fever Father      I have reviewed and agree with the history as documented  Social History   Substance Use Topics    Smoking status: Current Every Day Smoker     Packs/day: 1 00    Smokeless tobacco: Current User    Alcohol use Yes      Comment: rare        Review of Systems   Constitutional: Positive for chills  Negative for activity change, appetite change, diaphoresis, fatigue, fever, malaise/fatigue and unexpected weight change  HENT: Negative for congestion, rhinorrhea, sinus pressure, sore throat and trouble swallowing  Eyes: Negative for photophobia and visual disturbance     Respiratory: Negative for apnea, cough, hemoptysis, choking, chest tightness, shortness of breath, wheezing and stridor  Cardiovascular: Positive for palpitations and near-syncope  Negative for chest pain, orthopnea, leg swelling, syncope and PND  Gastrointestinal: Negative for abdominal distention, abdominal pain, blood in stool, constipation, diarrhea, nausea and vomiting  Genitourinary: Negative for decreased urine volume, difficulty urinating, dysuria, enuresis, flank pain, frequency, hematuria and urgency  Musculoskeletal: Negative for arthralgias, back pain, myalgias, neck pain and neck stiffness  Skin: Negative for color change, pallor, rash and wound  Allergic/Immunologic: Negative  Neurological: Positive for light-headedness  Negative for dizziness, tremors, syncope, weakness, numbness and headaches  Near-syncope   Hematological: Negative  Psychiatric/Behavioral: Negative  All other systems reviewed and are negative  Physical Exam  ED Triage Vitals [01/21/18 0820]   Temperature Pulse Respirations Blood Pressure SpO2   99 °F (37 2 °C) (!) 118 15 148/78 100 %      Temp Source Heart Rate Source Patient Position - Orthostatic VS BP Location FiO2 (%)   Oral Monitor Lying Right arm --      Pain Score       --           Orthostatic Vital Signs  Vitals:    01/21/18 1247 01/21/18 1401 01/21/18 1430 01/21/18 1445   BP: 140/83 161/79 141/74 137/71   Pulse: (!) 110 (!) 109 (!) 113 (!) 111   Patient Position - Orthostatic VS:           Physical Exam   Constitutional: He is oriented to person, place, and time  He appears well-developed and well-nourished  Non-toxic appearance  He does not have a sickly appearance  He does not appear ill  No distress  HENT:   Head: Normocephalic and atraumatic  Eyes: EOM and lids are normal  Pupils are equal, round, and reactive to light  Neck: Normal range of motion  Neck supple     Cardiovascular: Regular rhythm, S1 normal, S2 normal, normal heart sounds, intact distal pulses and normal pulses  Exam reveals no gallop, no distant heart sounds, no friction rub and no decreased pulses  No murmur heard  Pulses:       Radial pulses are 2+ on the right side, and 2+ on the left side  tachycardic   Pulmonary/Chest: Effort normal and breath sounds normal  No accessory muscle usage  No apnea, no tachypnea and no bradypnea  No respiratory distress  He has no decreased breath sounds  He has no wheezes  He has no rhonchi  He has no rales  Abdominal: Soft  Normal appearance and bowel sounds are normal  He exhibits no distension and no mass  There is no tenderness  There is no rigidity, no rebound and no guarding  No hernia  Genitourinary:   Genitourinary Comments: Heme positive stool  Stool appeared dark and melanotic   Musculoskeletal: Normal range of motion  He exhibits no edema, tenderness or deformity  Neurological: He is alert and oriented to person, place, and time  No cranial nerve deficit  GCS eye subscore is 4  GCS verbal subscore is 5  GCS motor subscore is 6  GCS 15  AAOx3  Ambulating in department without difficulty  CN II-XII grossly intact  No focal neuro deficits  Skin: Skin is warm, dry and intact  No rash noted  He is not diaphoretic  No erythema  There is pallor  Psychiatric: His speech is normal    Nursing note and vitals reviewed        ED Medications  Medications   pantoprazole (PROTONIX) 80 mg in sodium chloride 0 9 % 100 mL infusion (8 mg/hr Intravenous New Bag 1/21/18 1010)   acetaminophen (TYLENOL) tablet 650 mg (not administered)   sodium chloride 0 9 % bolus 1,000 mL (0 mL Intravenous Stopped 1/21/18 0946)   sodium chloride 0 9 % bolus 1,000 mL (0 mL Intravenous Stopped 1/21/18 1009)   aspirin chewable tablet 242 mg (242 mg Oral Given 1/21/18 0845)   pantoprazole (PROTONIX) 80 mg in sodium chloride 0 9 % 100 mL IVPB (0 mg Intravenous Stopped 1/21/18 1008)       Diagnostic Studies  Results Reviewed     Procedure Component Value Units Date/Time    Hemoglobin and hematocrit, blood [[id number]]     Lab Status:  No result Specimen:  Blood     Hemoglobin and hematocrit, blood [[id number]]  (Abnormal) Collected:  01/21/18 1425    Lab Status:  Final result Specimen:  Blood from Arm, Left Updated:  01/21/18 1430     Hemoglobin 7 4 (L) g/dL      Hematocrit 22 7 (L) %     TSH [[id number]]  (Normal) Collected:  01/21/18 0844    Lab Status:  Final result Specimen:  Blood from Arm, Right Updated:  01/21/18 1112     TSH 3RD GENERATON 0 406 uIU/mL     Narrative:         Patients undergoing fluorescein dye angiography may retain small amounts of fluorescein in the body for 48-72 hours post procedure  Samples containing fluorescein can produce falsely depressed TSH values  If the patient had this procedure,a specimen should be resubmitted post fluorescein clearance  Hemoglobin and hematocrit, blood [[id number]]  (Abnormal) Collected:  01/21/18 0929    Lab Status:  Final result Specimen:  Blood from Arm, Right Updated:  01/21/18 0944     Hemoglobin 5 9 (LL) g/dL      Hematocrit 18 5 (L) %     Troponin I [[id number]]  (Abnormal) Collected:  01/21/18 0844    Lab Status:  Final result Specimen:  Blood from Arm, Right Updated:  01/21/18 0911     Troponin I 0 20 (H) ng/mL     Narrative:         [employer] Chemistry analyzer 99% cutoff is > 0 04 ng/mL in network labs    o cTnI 99% cutoff is useful only when applied to patients in the clinical setting of myocardial ischemia  o cTnI 99% cutoff should be interpreted in the context of clinical history, ECG findings and possibly cardiac imaging to establish correct diagnosis  o cTnI 99% cutoff may be suggestive but clearly not indicative of a coronary event without the clinical setting of myocardial ischemia      Basic metabolic panel [[id number]]  (Abnormal) Collected:  01/21/18 0844    Lab Status:  Final result Specimen:  Blood from Arm, Right Updated:  01/21/18 0905     Sodium 132 (L) mmol/L      Potassium 3 4 (L) mmol/L      Chloride 95 (L) mmol/L      CO2 28 mmol/L      Anion Gap 9 mmol/L      BUN 30 (H) mg/dL      Creatinine 0 92 mg/dL      Glucose 181 (H) mg/dL      Calcium 9 0 mg/dL      eGFR 98 ml/min/1 73sq m     Narrative:         National Kidney Disease Education Program recommendations are as follows:  GFR calculation is accurate only with a steady state creatinine  Chronic Kidney disease less than 60 ml/min/1 73 sq  meters  Kidney failure less than 15 ml/min/1 73 sq  meters  Hepatic function panel [[id number]]  (Abnormal) Collected:  01/21/18 0844    Lab Status:  Final result Specimen:  Blood from Arm, Right Updated:  01/21/18 0905     Total Bilirubin 0 40 mg/dL      Bilirubin, Direct 0 15 mg/dL      Alkaline Phosphatase 60 U/L      AST 15 U/L      ALT 18 U/L      Total Protein 6 5 g/dL      Albumin 3 0 (L) g/dL     Magnesium [[id number]]  (Normal) Collected:  01/21/18 0844    Lab Status:  Final result Specimen:  Blood from Arm, Right Updated:  01/21/18 0905     Magnesium 1 9 mg/dL     Protime-INR [[id number]]  (Normal) Collected:  01/21/18 0844    Lab Status:  Final result Specimen:  Blood from Arm, Right Updated:  01/21/18 0902     Protime 13 1 seconds      INR 0 97    APTT [[id number]]  (Normal) Collected:  01/21/18 0844    Lab Status:  Final result Specimen:  Blood from Arm, Right Updated:  01/21/18 0902     PTT 25 seconds     Narrative:          Therapeutic Heparin Range = 60-90 seconds    CBC and differential [[id number]]  (Abnormal) Collected:  01/21/18 0844    Lab Status:  Final result Specimen:  Blood from Arm, Right Updated:  01/21/18 0856     WBC 10 35 (H) Thousand/uL      RBC 2 60 (L) Million/uL      Hemoglobin 6 9 (LL) g/dL      Hematocrit 21 6 (L) %      MCV 83 fL      MCH 26 5 (L) pg      MCHC 31 9 g/dL      RDW 17 2 (H) %      MPV 9 4 fL      Platelets 270 Thousands/uL      nRBC 0 /100 WBCs      Neutrophils Relative 88 (H) %      Lymphocytes Relative 7 (L) %      Monocytes Relative 4 %      Eosinophils Relative 0 %      Basophils Relative 0 % Neutrophils Absolute 9 10 (H) Thousands/µL      Lymphocytes Absolute 0 72 Thousands/µL      Monocytes Absolute 0 45 Thousand/µL      Eosinophils Absolute 0 01 Thousand/µL      Basophils Absolute 0 02 Thousands/µL     POCT troponin [[id number]]  (Normal) Collected:  01/21/18 0840    Lab Status:  Final result Updated:  01/21/18 0853     POC Troponin I 0 06 ng/ml      Specimen Type VENOUS    Narrative:         [employer] i-Stat handheld analyzer 99% cutoff is > 0 08ng/mL in network Emergency Departments    o cTnI 99% cutoff is useful only when applied to patients in the clinical setting of myocardial ischemia  o cTnI 99% cutoff should be interpreted in the context of clinical history, ECG findings and possibly cardiac imaging to establish correct diagnosis  o cTnI 99% cutoff may be suggestive but clearly not indicative of a coronary event without the clinical setting of myocardial ischemia  XR chest 1 view portable   ED Interpretation by Romero Cooper PA-C (01/21 2886)   No acute pulmonary abnormalities  Final Result by Sudeep Fenton MD (01/21 8938)      No active pulmonary disease           Workstation performed: [id number]                    Procedures  ECG 12 Lead Documentation  Date/Time: 1/21/2018 8:42 AM  Performed by: Lynn Ugalde by: Juan Carlos Logan     Indications / Diagnosis:  Palpitations  ECG reviewed by me, the ED Provider: yes    Patient location:  ED  Previous ECG:     Previous ECG:  Compared to current    Comparison ECG info:  August 1 2017    Similarity:  Changes noted    Comparison to cardiac monitor: Yes    Interpretation:     Interpretation: abnormal    Quality:     Tracing quality:  Limited by artifact  Rate:     ECG rate:  119    ECG rate assessment: tachycardic    Rhythm:     Rhythm: sinus tachycardia    Ectopy:     Ectopy: PAC    QRS:     QRS axis:  Normal    QRS intervals:  Normal  Conduction:     Conduction: normal    ST segments:     ST segments:  Normal  T waves:     T waves: inverted      Inverted:  V6, V5, V4, II, III, aVF and V3    CriticalCare Time  Performed by: Lyndsey Lowe  Authorized by: Danika Lobato     Critical care provider statement:     Critical care time (minutes):  37    Critical care was necessary to treat or prevent imminent or life-threatening deterioration of the following conditions:  Circulatory failure and cardiac failure    Critical care was time spent personally by me on the following activities:  Examination of patient, evaluation of patient's response to treatment, discussions with primary provider, discussions with consultants, development of treatment plan with patient or surrogate, obtaining history from patient or surrogate, blood draw for specimens, ordering and performing treatments and interventions, ordering and review of laboratory studies, re-evaluation of patient's condition and review of old charts  Comments:      Critical care time for acute blood-loss anemia secondary to GI bleed requiring transfusion of blood products  Phone Contacts  ED Phone Contact    ED Course  ED Course as of Jan 21 1558   Juany Due Jan 21, 2018   1156 ASA was given prior to Hgb for concern of ischemia given his EKG findings and medical history    0930 In regards to low Hgb, He does note increased "gas" over the past 1 month  When asked, he does state his stool is intermittently dark depending on what he eats  Heme positive stool  Appears dark/melanotic     0930 Spoke with cardiology and they are aware of findings  Agree, likely type II NSTEMI     8869 Paged GI     1000 Discussed with GI, Dr Anabel Buerger   Will see patient at bedside                                MDM  Number of Diagnoses or Management Options  GI bleed: new and requires workup  Non-ST elevation myocardial infarction (NSTEMI), type 2 [facility]): new and requires workup  Diagnosis management comments: DDx including but not limited to: metabolic abnormality, cardiac arrhythmia, ACS, MI, thyroid disease, PE, acute blood loss anemia, anxiety, adverse reaction  Plan: Cardiac workup  Fluids  dispo pending  Amount and/or Complexity of Data Reviewed  Clinical lab tests: ordered and reviewed  Tests in the radiology section of CPT®: ordered and reviewed  Review and summarize past medical records: yes  Discuss the patient with other providers: yes    Risk of Complications, Morbidity, and/or Mortality  Presenting problems: moderate  Management options: moderate  General comments: 52-year-old male with palpitations  EKG shows sinus tachycardia  He was given fluid resuscitation and tachycardia improved  Found to have Hgb 6 9  Heme (+) stool  Consent obtained by myself and ED attending for blood transfusion  Hgb improved to 7 4 post transfusion  Persistent tachycardia  Second unit added  Spoke with cardiology who agrees with likely type 2 NSTEMI  Spoke with GI who saw pt at bedside  Will take pt for endoscopy tomorrow  No emergent endoscopy unless change in clinical course  Pt agrees with plan  Wife updated  Stable at time of admission       Patient Progress  Patient progress: stable    CritCare Time    Disposition  Final diagnoses:   Non-ST elevation myocardial infarction (NSTEMI), type 2 ([facility] [address] )   GI bleed     Time reflects when diagnosis was documented in both MDM as applicable and the Disposition within this note     Time User Action Codes Description Comment    1/21/2018  9:37 AM Kiki August L Add [I21 4] Non-ST elevation myocardial infarction (NSTEMI), type 2 ([facility] [address] )     1/21/2018  9:38 AM Kiki August L Add [K92 2] GI bleed     1/21/2018  9:38 AM Tor Mais Modify [I21 4] Non-ST elevation myocardial infarction (NSTEMI), type 2 ([facility] [address] )     1/21/2018  9:38 AM Tor Mais Modify [K92 2] GI bleed       ED Disposition     ED Disposition Condition Comment    Admit  Case was discussed with Dr Reena Sherman and the patient's admission status was agreed to be Admission Status: inpatient status to the service of Dr Nell Gosselin  Follow-up Information    None       Patient's Medications   Discharge Prescriptions    No medications on file     No discharge procedures on file      ED Provider  Electronically Signed by           Riya De Leon PA-C  01/21/18 9364

## 2018-01-21 NOTE — H&P
H&P- Jonathan Grajeda 1950, 69 y o  male MRN: [id number]    Unit/Bed#: ED 17 Encounter: [id number]    Primary Care Provider: Kamar Banerjee MD   Date and time admitted to hospital: 1/21/2018  8:12 AM        Diabetes type 2, uncontrolled ([facility] [address] )   Assessment & Plan    Hold metformin for no use , SSI  · Check hgb A1c  · SSI        HTN (hypertension)   Assessment & Plan    Patient on Tenormin, clonidine, hctz, and ace  Hold for now will use IV meds        NSTEMI (non-ST elevated myocardial infarction) [facility])   Assessment & Plan    Likely secondary to acute blood loss anemia  · Transfuse  · Cardiology consult        Hyperlipidemia   Assessment & Plan    Resume statin when taking PO        * GI bleed   Assessment & Plan    Patient reports about a weeks worth of belching , no appetite , hot and cold sweats  And palpitations  He reported no black stools and no vomiting of blood  patient takes 81 mg aspirin daily    · Agree with protonix drip  · Holding aspirin  · NPO  · GI Consult  · Transfuse PRBCS              VTE Prophylaxis: Pharmacologic VTE Prophylaxis contraindicated due to gi bleed  / sequential compression device   Code Status: Full  POLST: POLST form is not discussed and not completed at this time  Anticipated Length of Stay:  Patient will be admitted on an Inpatient basis with an anticipated length of stay of  greater 2 midnights  Justification for Hospital Stay: needs workup for gi bleed and cardiac ischemia workup    Total Time for Visit, including Counseling / Coordination of Care: 1 hour  Chief Complaint:   Palpitations    History of Present Illness:    Jonathan Grajeda is a 69 y o  male who presents with several days of escalating palpitations  Patient reports that over the last week that he has had palpitations and hot and cold sweats  He has had no appetite and he has been belching a lot  He reported no abdominal pain and no recent medication changes    In the emergency room the patient was found to be tachycardic with heme-positive stool and a hemoglobin of 6 9  He was noted to have elevated troponin consistent with non ST elevation myocardial infarction  Patient was transfused unit packed red blood cells and started on a Protonix drip  GI and Cardiology were consulted  Within estimate for further evaluation of the above  Review of Systems:    Review of Systems   Constitutional: Negative for diaphoresis, fatigue, fever (reports hot then cold) and unexpected weight change  Appetite change: decreased  HENT: Negative for dental problem, ear discharge, ear pain, facial swelling, hearing loss, mouth sores, nosebleeds and rhinorrhea  Eyes: Negative for pain, discharge, redness and visual disturbance  Respiratory: Negative for cough, chest tightness, shortness of breath and wheezing  Cardiovascular: Positive for palpitations  Gastrointestinal: Negative for abdominal distention, abdominal pain, anal bleeding, blood in stool, constipation, diarrhea and vomiting  Endocrine: Negative for cold intolerance, heat intolerance, polydipsia, polyphagia and polyuria  Genitourinary: Negative for dysuria, frequency, hematuria and urgency  Musculoskeletal: Negative for arthralgias and back pain  Skin: Negative for rash and wound  Neurological: Negative for dizziness, weakness, light-headedness, numbness and headaches  Hematological: Negative for adenopathy  Does not bruise/bleed easily  Psychiatric/Behavioral: Negative for confusion and dysphoric mood         Past Medical and Surgical History:     Past Medical History:   Diagnosis Date    Diabetes mellitus ([address] )     Heart murmur     Hyperlipidemia     Hypertension        Past Surgical History:   Procedure Laterality Date    ACHILLES TENDON SURGERY Left     COLONOSCOPY      HERNIA REPAIR      HERNIA REPAIR Bilateral 8/18/2017    Procedure: LAPAROSCOPIC INGUINAL HERNIA REPAIR WITH MESH;  Surgeon: Caio Mosley MD; Location: [facility];  Service: General    TIBIA FRACTURE SURGERY Left        Meds/Allergies:    Prior to Admission medications    Medication Sig Start Date End Date Taking? Authorizing Provider   aspirin 81 mg chewable tablet Chew 1 tablet daily 4/25/14  Yes Historical Provider, MD   atenolol (TENORMIN) 50 mg tablet Take 1 tablet by mouth daily 4/25/14  Yes Historical Provider, MD   atorvastatin (LIPITOR) 80 mg tablet Take 1 tablet by mouth daily 4/25/14  Yes Historical Provider, MD   cloNIDine (CATAPRES) 0 3 mg tablet Take 1 tablet by mouth 3 (three) times a day 6/16/14  Yes Historical Provider, MD   hydrochlorothiazide (HYDRODIURIL) 25 mg tablet Take 1 tablet by mouth daily 4/25/14  Yes Historical Provider, MD   lisinopril (ZESTRIL) 40 mg tablet Take 1 tablet by mouth daily 4/25/14  Yes Historical Provider, MD   metFORMIN (GLUCOPHAGE-XR) 500 mg 24 hr tablet Take by mouth 4/8/14  Yes Historical Provider, MD   aspirin 81 mg chewable tablet Chew 1 capsule 3 (three) times a day 4/25/14 1/21/18 Yes Historical Provider, MD   [place]-3 1000 MG CAPS Take by mouth    Historical Provider, MD   aspirin 81 mg chewable tablet Chew 81 mg daily  1/21/18  Historical Provider, MD   atenolol (TENORMIN) 50 mg tablet Take 50 mg by mouth daily  1/21/18  Historical Provider, MD   atorvastatin (LIPITOR) 80 mg tablet Take 80 mg by mouth daily  1/21/18  Historical Provider, MD   cloNIDine (CATAPRES) 0 3 mg tablet Take 0 3 mg by mouth 2 (two) times a day  1/21/18  Historical Provider, MD   hydrochlorothiazide (HYDRODIURIL) 25 mg tablet Take 25 mg by mouth daily  1/21/18  Historical Provider, MD   lisinopril (ZESTRIL) 40 mg tablet Take 40 mg by mouth daily  1/21/18  Historical Provider, MD   metFORMIN (GLUCOPHAGE) 500 mg tablet Take 500 mg by mouth 2 (two) times a day with meals  1/21/18  Historical Provider, MD     I have reviewed home medications with patient personally      Allergies: No Known Allergies    Social History:     Marital Status: /Civil Union   Occupation:  Has had multiple jobs over the years 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  Patient Pre-hospital Living Situation:  Lives with his spouse Stefan Hernández  Patient Pre-hospital Level of Mobility:  NO RESTRICTIONS  Patient Pre-hospital Diet Restrictions:  NO RESTRICTION  Substance Use History:   History   Alcohol Use    Yes     Comment: rare     History   Smoking Status    Current Every Day Smoker    Packs/day: 1 00   Smokeless Tobacco    Current User     History   Drug Use No     Family History:    Family History   Problem Relation Age of Onset    Rheumatic fever Father        Physical Exam:     Vitals:   Blood Pressure: 137/71 (01/21/18 1445)  Pulse: (!) 111 (01/21/18 1445)  Temperature: 99 °F (37 2 °C) (01/21/18 1401)  Temp Source: Oral (01/21/18 1401)  Respirations: (!) 25 (01/21/18 1445)  Height: 5' 8" (172 7 cm) (01/21/18 0820)  Weight - Scale: 82 6 kg (182 lb 1 6 oz) (01/21/18 0820)  SpO2: 99 % (01/21/18 1445)    Physical Exam   Constitutional: He is oriented to person, place, and time  He appears well-developed  No distress  HENT:   Head: Normocephalic and atraumatic  Right Ear: External ear normal    Left Ear: External ear normal    Nose: Nose normal    Mouth/Throat: Oropharynx is clear and moist  No oropharyngeal exudate  Eyes: Conjunctivae and EOM are normal  Pupils are equal, round, and reactive to light  Right eye exhibits no discharge  Left eye exhibits no discharge  No scleral icterus  Neck: Normal range of motion  Neck supple  No JVD present  No thyromegaly present  Cardiovascular: Normal rate, regular rhythm and intact distal pulses  Exam reveals no gallop and no friction rub  Murmur: 1-2/6 lianna  Pulmonary/Chest: Breath sounds normal  No respiratory distress  He has no wheezes  He has no rales  Abdominal: Soft  Bowel sounds are normal  He exhibits no distension   There is no tenderness  There is no rebound and no guarding  Musculoskeletal: Normal range of motion  He exhibits no edema or deformity  Lymphadenopathy:     He has no cervical adenopathy  Neurological: He is alert and oriented to person, place, and time  He has normal reflexes  No cranial nerve deficit  He exhibits normal muscle tone  Skin: Skin is warm and dry  No rash noted  He is not diaphoretic  No erythema  Psychiatric: He has a normal mood and affect  Vitals reviewed  Additional Data:     Lab Results: I have personally reviewed pertinent reports  Results from last 7 days  Lab Units 18  1425  18  0844   WBC Thousand/uL  --   --  10 35*   HEMOGLOBIN g/dL 7 4*  < > 6 9*   HEMATOCRIT % 22 7*  < > 21 6*   PLATELETS Thousands/uL  --   --  334   NEUTROS PCT %  --   --  88*   LYMPHS PCT %  --   --  7*   MONOS PCT %  --   --  4   EOS PCT %  --   --  0   < > = values in this interval not displayed  Results from last 7 days  Lab Units 18  0844   SODIUM mmol/L 132*   POTASSIUM mmol/L 3 4*   CHLORIDE mmol/L 95*   CO2 mmol/L 28   BUN mg/dL 30*   CREATININE mg/dL 0 92   CALCIUM mg/dL 9 0   TOTAL PROTEIN g/dL 6 5   BILIRUBIN TOTAL mg/dL 0 40   ALK PHOS U/L 60   ALT U/L 18   AST U/L 15   GLUCOSE RANDOM mg/dL 181*       Results from last 7 days  Lab Units 18  0844   INR  0 97       Imaging: I have personally reviewed pertinent reports  Xr Chest 1 View Portable    Result Date: 2018  Narrative: CHEST INDICATION:  Dyspnea COMPARISON:  None VIEWS:   AP frontal;  1 image FINDINGS:     Cardiomediastinal silhouette appears unremarkable  The lungs are clear  No pneumothorax or pleural effusion  Visualized osseous structures appear within normal limits for the patient's age  Impression: No active pulmonary disease   Workstation performed: [id number]       EKG, Pathology, and Other Studies Reviewed on Admission:   · EK sinus tachycardia, left ventricular hypertrophy noted ST depressions in the inferior leads,    Allscripts / Epic Records Reviewed: Yes     Negrita Medrano MD Pager : [phone]    ** Please Note: This note has been constructed using a voice recognition system   **

## 2018-01-22 ENCOUNTER — APPOINTMENT (INPATIENT)
Dept: NON INVASIVE DIAGNOSTICS | Facility: HOSPITAL | Age: 68
DRG: 377 | End: 2018-01-22
Payer: MEDICARE

## 2018-01-22 LAB
ABO GROUP BLD BPU: NORMAL
ABO GROUP BLD BPU: NORMAL
ANION GAP SERPL CALCULATED.3IONS-SCNC: 11 MMOL/L (ref 4–13)
BPU ID: NORMAL
BPU ID: NORMAL
BUN SERPL-MCNC: 12 MG/DL (ref 5–25)
CALCIUM SERPL-MCNC: 8.9 MG/DL (ref 8.3–10.1)
CHLORIDE SERPL-SCNC: 105 MMOL/L (ref 100–108)
CO2 SERPL-SCNC: 26 MMOL/L (ref 21–32)
CREAT SERPL-MCNC: 0.66 MG/DL (ref 0.6–1.3)
CROSSMATCH: NORMAL
CROSSMATCH: NORMAL
ERYTHROCYTE [DISTWIDTH] IN BLOOD BY AUTOMATED COUNT: 16.8 % (ref 11.6–15.1)
GFR SERPL CREATININE-BSD FRML MDRD: 114 ML/MIN/1.73SQ M
GLUCOSE SERPL-MCNC: 110 MG/DL (ref 65–140)
HCT VFR BLD AUTO: 24.8 % (ref 36.5–49.3)
HCT VFR BLD AUTO: 26 % (ref 36.5–49.3)
HGB BLD-MCNC: 8.2 G/DL (ref 12–17)
HGB BLD-MCNC: 8.3 G/DL (ref 12–17)
MCH RBC QN AUTO: 27.2 PG (ref 26.8–34.3)
MCHC RBC AUTO-ENTMCNC: 33.1 G/DL (ref 31.4–37.4)
MCV RBC AUTO: 82 FL (ref 82–98)
PLATELET # BLD AUTO: 302 THOUSANDS/UL (ref 149–390)
PMV BLD AUTO: 8.8 FL (ref 8.9–12.7)
POTASSIUM SERPL-SCNC: 3.2 MMOL/L (ref 3.5–5.3)
RBC # BLD AUTO: 3.01 MILLION/UL (ref 3.88–5.62)
SODIUM SERPL-SCNC: 142 MMOL/L (ref 136–145)
TROPONIN I SERPL-MCNC: 3.68 NG/ML
TROPONIN I SERPL-MCNC: 4.5 NG/ML
TROPONIN I SERPL-MCNC: 4.8 NG/ML
TROPONIN I SERPL-MCNC: 5.04 NG/ML
UNIT DISPENSE STATUS: NORMAL
UNIT DISPENSE STATUS: NORMAL
UNIT PRODUCT CODE: NORMAL
UNIT PRODUCT CODE: NORMAL
UNIT RH: NORMAL
UNIT RH: NORMAL
WBC # BLD AUTO: 9.32 THOUSAND/UL (ref 4.31–10.16)

## 2018-01-22 PROCEDURE — C9113 INJ PANTOPRAZOLE SODIUM, VIA: HCPCS | Performed by: PHYSICIAN ASSISTANT

## 2018-01-22 PROCEDURE — 84484 ASSAY OF TROPONIN QUANT: CPT | Performed by: PHYSICIAN ASSISTANT

## 2018-01-22 PROCEDURE — 85027 COMPLETE CBC AUTOMATED: CPT | Performed by: PHYSICIAN ASSISTANT

## 2018-01-22 PROCEDURE — 85018 HEMOGLOBIN: CPT | Performed by: PHYSICIAN ASSISTANT

## 2018-01-22 PROCEDURE — 93306 TTE W/DOPPLER COMPLETE: CPT

## 2018-01-22 PROCEDURE — 80048 BASIC METABOLIC PNL TOTAL CA: CPT | Performed by: PHYSICIAN ASSISTANT

## 2018-01-22 PROCEDURE — 36415 COLL VENOUS BLD VENIPUNCTURE: CPT | Performed by: PHYSICIAN ASSISTANT

## 2018-01-22 PROCEDURE — 99285 EMERGENCY DEPT VISIT HI MDM: CPT

## 2018-01-22 PROCEDURE — 85014 HEMATOCRIT: CPT | Performed by: PHYSICIAN ASSISTANT

## 2018-01-22 RX ORDER — ATENOLOL 50 MG/1
50 TABLET ORAL DAILY
Status: DISCONTINUED | OUTPATIENT
Start: 2018-01-22 | End: 2018-01-25 | Stop reason: HOSPADM

## 2018-01-22 RX ORDER — CLONIDINE HYDROCHLORIDE 0.1 MG/1
0.3 TABLET ORAL EVERY 8 HOURS SCHEDULED
Status: DISCONTINUED | OUTPATIENT
Start: 2018-01-22 | End: 2018-01-25 | Stop reason: HOSPADM

## 2018-01-22 RX ADMIN — SODIUM CHLORIDE 8 MG/HR: 9 INJECTION, SOLUTION INTRAVENOUS at 08:15

## 2018-01-22 RX ADMIN — CLONIDINE HYDROCHLORIDE 0.3 MG: 0.1 TABLET ORAL at 15:52

## 2018-01-22 RX ADMIN — ATENOLOL 50 MG: 50 TABLET ORAL at 13:23

## 2018-01-22 RX ADMIN — SODIUM CHLORIDE 8 MG/HR: 9 INJECTION, SOLUTION INTRAVENOUS at 22:01

## 2018-01-22 NOTE — SOCIAL WORK
Spoke with patient in his room in the ER  Patient lives with his wife in a house with a full set of stairs which he uses without trouble  He is independent of ADL's and ambulation  He has no DME nor has used homecare  He ahs never used a STR  He purchases his meds at [facility] in [facility] and can afford allof his medications  There is no history of mental illness or substance abuse  His wife is his POA  Pt drives and wife will drive him home at [place]  CM reviewed discharge planning process including the following: identifying help at home, patient preference for discharge planning needs, pharmacy preference, and availability of treatment team to discuss questions or concerns patient and/or family may have regarding understanding medications and recognizing signs and symptoms once discharged  CM also encouraged patient to follow up with all recommended appointments after discharge  Patient advised of importance for patient and family to participate in managing patients medical well being

## 2018-01-22 NOTE — PLAN OF CARE
Problem: DISCHARGE PLANNING - CARE MANAGEMENT  Goal: Discharge to post-acute care or home with appropriate resources  INTERVENTIONS:  - Conduct assessment to determine patient/family and health care team treatment goals, and need for post-acute services based on payer coverage, community resources, and patient preferences, and barriers to discharge  - Address psychosocial, clinical, and financial barriers to discharge as identified in assessment in conjunction with the patient/family and health care team  - Arrange appropriate level of post-acute services according to patients   needs and preference and payer coverage in collaboration with the physician and health care team  - Communicate with and update the patient/family, physician, and health care team regarding progress on the discharge plan  - Arrange appropriate transportation to post-acute venues  Outcome: Progressing  Spoke with patient in his room in the ER  Patient lives with his wife in a house with a full set of stairs which he uses without trouble  He is independent of ADL's and ambulation  He has no DME nor has used homecare  He ahs never used a STR  He purchases his meds at [place] in [facility] and can afford allof his medications  There is no history of mental illness or substance abuse  His wife is his POA  Pt drives and wife will drive him home at [place]  CM reviewed discharge planning process including the following: identifying help at home, patient preference for discharge planning needs, pharmacy preference, and availability of treatment team to discuss questions or concerns patient and/or family may have regarding understanding medications and recognizing signs and symptoms once discharged  CM also encouraged patient to follow up with all recommended appointments after discharge  Patient advised of importance for patient and family to participate in managing patients medical well being

## 2018-01-22 NOTE — CONSULTS
Consultation - Cardiology Team One  Curtis Higgins 69 y o  male MRN: [id number]  Unit/Bed#: ED 17 Encounter: [id number]    Inpatient consult to Cardiology  Consult performed by: Veronika Mcclellan ordered by: Maura Burns          Physician Requesting Consult: Rosalva De La Torre MD  Reason for Consult / Principal Problem: nstemi    HPI: Cardiologist Dr Flaco Narayan is a 69y o  year old male who has a history of hypertrophic cardiomyopathy, cad, htn, hpl, tobacco abuse  Pt came to the ED with a 3 days history of palpitations  He states that they started on Friday and never really got better  While at [employer] yesterday the palpitations were very strong and therefore he came to the ED to get checked out  He also admits to feeling weak and feeling like his legs were going to give out anytime  He denies feeling short of breath or having chest pain  He admits to dark black stools at home but thought it was related to the food he was eating  He also notes a lot of acid reflux symptoms lately       REVIEW OF SYSTEMS:  Constitutional: +weak Denies fever or chills   Eyes:  Denies change in visual acuity   HENT:  Denies nasal congestion or sore throat   Respiratory:  Denies cough or shortness of breath   Cardiovascular: +palpitations Denies chest pain or edema   GI:  Denies abdominal pain, nausea, vomiting, bloody stools or diarrhea   :  Denies dysuria, frequency, difficulty in micturition and nocturia  Musculoskeletal:  Denies back pain or joint pain   Neurologic:  Denies headache, focal weakness or sensory changes   Endocrine:  Denies polyuria or polydipsia   Lymphatic:  Denies swollen glands   Psychiatric:  Denies depression or anxiety     Historical Information   Past Medical History:   Diagnosis Date    Diabetes mellitus ([address] )     Heart murmur     Hyperlipidemia     Hypertension      Past Surgical History:   Procedure Laterality Date    ACHILLES TENDON SURGERY Left     COLONOSCOPY      HERNIA REPAIR      HERNIA REPAIR Bilateral 8/18/2017    Procedure: LAPAROSCOPIC INGUINAL HERNIA REPAIR WITH MESH;  Surgeon: Alex Perkins MD;  Location: [facility];  Service: General    TIBIA FRACTURE SURGERY Left      History   Alcohol Use    Yes     Comment: rare     History   Drug Use No     History   Smoking Status    Current Every Day Smoker    Packs/day: 1 00   Smokeless Tobacco    Current User       Family History:   Family History   Problem Relation Age of Onset    Rheumatic fever Father        MEDS & ALLERGIES:  all current active meds have been reviewed and current meds: Current Facility-Administered Medications   Medication Dose Route Frequency    acetaminophen (TYLENOL) tablet 650 mg  650 mg Oral Q6H PRN    pantoprazole (PROTONIX) 80 mg in sodium chloride 0 9 % 100 mL infusion  8 mg/hr Intravenous Continuous       pantoprozole (PROTONIX) infusion (Continuous) 8 mg/hr Last Rate: 8 mg/hr (01/22/18 0815)     No Known Allergies    OBJECTIVE:  Vitals:   Vitals:    01/22/18 0705   BP: 158/79   Pulse: (!) 122   Resp: 16   Temp:    SpO2: 100%     Body mass index is 27 69 kg/m²  Systolic (68AVE), CQI:288 , Min:111 , CNO:055     Diastolic (70ZXC), ROJ:90, Min:59, Max:87      Intake/Output Summary (Last 24 hours) at 01/22/18 0859  Last data filed at 01/22/18 0815   Gross per 24 hour   Intake           1062 5 ml   Output             1000 ml   Net             62 5 ml     Weight (last 2 days)     Date/Time   Weight    01/21/18 0820  82 6 (182 1)            Invasive Devices     Peripheral Intravenous Line            Peripheral IV 01/21/18 Right Antecubital 1 day    Peripheral IV 01/21/18 Left Hand less than 1 day          Airway            ETT  Cuffed;Oral 8 mm 157 days                PHYSICAL EXAMS:  General: +pallor Patient is not in acute distress, laying in the bed comfortably, awake, alert responding to commands  Head: Normocephalic, Atraumatic     HEENT: White sclera, pink conjunctiva, PERRLA,pharynx benign  Neck:  Supple, no neck vein distention, carotids+2/+2 no bruits, thyromegaly, adenopathy  Respiratory: clear to P/A  Cardiovascular:  PMI normal, S1-S2 [id number],9/9 systolic murmur noted at RUSB Regular rhythm  GI:  Abdomen soft nontender   No hepatosplenomegaly, adenopathy, ascites,or rebound tenderness  Extremities: No edema, normal pulses, no calf tenderness, no joint deformities, no venous disease   Integument:  No skin rashes or ulceration  Lymphatic:  No cervical or inguinal lymphadenopathy  Neurologic:  Patient is awake alert, responding to command, well-oriented to time and place and person moving all extremities    LABORATORY RESULTS:    Results from last 7 days  Lab Units 01/21/18  0844   TROPONIN I ng/mL 0 20*     CBC with diff:   Results from last 7 days  Lab Units 01/21/18  1917 01/21/18  1425 01/21/18  0929 01/21/18  0844   WBC Thousand/uL  --   --   --  10 35*   HEMOGLOBIN g/dL 8 4* 7 4* 5 9* 6 9*   HEMATOCRIT % 25 6* 22 7* 18 5* 21 6*   MCV fL  --   --   --  83   PLATELETS Thousands/uL  --   --   --  334   MCH pg  --   --   --  26 5*   MCHC g/dL  --   --   --  31 9   RDW %  --   --   --  17 2*   MPV fL  --   --   --  9 4   NRBC AUTO /100 WBCs  --   --   --  0       CMP:  Results from last 7 days  Lab Units 01/21/18  0844   SODIUM mmol/L 132*   POTASSIUM mmol/L 3 4*   CHLORIDE mmol/L 95*   CO2 mmol/L 28   ANION GAP mmol/L 9   BUN mg/dL 30*   CREATININE mg/dL 0 92   GLUCOSE RANDOM mg/dL 181*   CALCIUM mg/dL 9 0   AST U/L 15   ALT U/L 18   ALK PHOS U/L 60   TOTAL PROTEIN g/dL 6 5   ALBUMIN g/dL 3 0*   BILIRUBIN TOTAL mg/dL 0 40   EGFR ml/min/1 73sq m 98       BMP:  Results from last 7 days  Lab Units 01/21/18  0844   SODIUM mmol/L 132*   POTASSIUM mmol/L 3 4*   CHLORIDE mmol/L 95*   CO2 mmol/L 28   BUN mg/dL 30*   CREATININE mg/dL 0 92   GLUCOSE RANDOM mg/dL 181*   CALCIUM mg/dL 9 0              Results from last 7 days  Lab Units 01/21/18  0844   MAGNESIUM mg/dL 1 9 Results from last 7 days  Lab Units 18  0844   TSH 3RD GENERATON uIU/mL 0 406       Results from last 7 days  Lab Units 18  0844   INR  0 97       Lipid Profile:   Lab Results   Component Value Date    CHOL 125 2016    CHOL 136 2015    CHOL 135 2015     Lab Results   Component Value Date    HDL 47 2016    HDL 43 2015    HDL 48 2015     Lab Results   Component Value Date    LDLCALC 64 2016    LDLCALC 82 2015    LDLCALC 78 2015     Lab Results   Component Value Date    TRIG 73 2017    TRIG 68 2016    TRIG 57 2015       Cardiac testing:   Results for orders placed during the hospital encounter of 17   Echo complete with contrast if indicated    Narrative [address] [place] [id number]  [phone]    Transthoracic Echocardiogram  2D, M-mode, and Color Doppler    Study date:  2017    Patient: Reji Franz  MR number: [id number]  Account number: [de-identified]  : 1950  Age: 74 years  Gender: Male  Status: Outpatient  Location: Echo lab  Height: 67 in  Weight: 199 lb  BP: 134/ 76 mmHg    Indications: Atherosclerosis of Aorta  Diagnoses: I70 0 - Atherosclerosis of aorta    Sonographer:  Olivo RCS  Interpreting Physician:  Morgan Castillo MD  Referring Physician:  Ann Marie Crespo MD  Group:  [facility]    SUMMARY    LEFT VENTRICLE:  The cavity was small  Ejection fraction was estimated to be 70 %  There were no regional wall motion abnormalities  There was moderate assymetrical hypertrophy of the septum  LVOT gradient at rest is 65 mm Hg (peak)  When compared to previous echo in Dec 2015, the resting LVOT gradient has increased  No clear cut ROCCO is seen  LEFT ATRIUM:  The atrium was mildly dilated  MITRAL VALVE:  There was mild annular calcification  There was mild regurgitation      AORTIC VALVE:  There was mild regurgitation  AORTA:  The root exhibited mild dilatation  4 cm  HISTORY: PRIOR HISTORY: Shortness of breath  CAD  Hypertrophic cardiomyopathy  Risk factors: hypertension, diabetes, and a history of current cigarette use (within the last month)  History of mild regurgitation of the aortic valve  PROCEDURE: The procedure was performed in the echo lab  This was a routine study  The transthoracic approach was used  The study included complete 2D imaging, M-mode, and color Doppler  The heart rate was 58 bpm, at the start of the study  Images were obtained from the parasternal, apical, subcostal, and suprasternal notch acoustic windows  Image quality was adequate  LEFT VENTRICLE: The cavity was small  Ejection fraction was estimated to be 70 %  There were no regional wall motion abnormalities  There was moderate assymetrical hypertrophy of the septum  LVOT gradient at rest is 65 mm Hg (peak)  When  compared to previous echo in Dec 2015, the resting LVOT gradient has increased  No clear cut ROCCO is seen  DOPPLER: There was an increased relative contribution of atrial contraction to ventricular filling  RIGHT VENTRICLE: The size was normal  Systolic function was normal  Wall thickness was normal     LEFT ATRIUM: The atrium was mildly dilated  RIGHT ATRIUM: Size was normal     MITRAL VALVE: There was mild annular calcification  DOPPLER: There was mild regurgitation  AORTIC VALVE: Leaflets exhibited mild calcification  The valve was not well visualized  DOPPLER: There was mild regurgitation  TRICUSPID VALVE: The valve structure was normal  There was normal leaflet separation  DOPPLER: The transtricuspid velocity was within the normal range  There was no evidence for stenosis  There was no regurgitation  PULMONIC VALVE: Leaflets exhibited normal thickness, no calcification, and normal cuspal separation  DOPPLER: The transpulmonic velocity was within the normal range   There was no regurgitation  PERICARDIUM: There was no pericardial effusion  The pericardium was normal in appearance  AORTA: The root exhibited mild dilatation  4 cm  SYSTEM MEASUREMENT TABLES    2D  %FS: 49 7 %  Ao Diam: 3 7 cm  EDV(Teich): 72 7 ml  EF(Teich): 81 5 %  ESV(Teich): 13 5 ml  IVSd: 2 2 cm  LA Area: 16 3 cm2  LA Diam: 4 1 cm  LVEDV MOD A4C: 211 2 ml  LVEF MOD A4C: 81 5 %  LVESV MOD A4C: 39 1 ml  LVIDd: 4 1 cm  LVIDs: 2 cm  LVLd A4C: 9 8 cm  LVLs A4C: 7 7 cm  LVOT Diam: 2 2 cm  LVPWd: 1 5 cm  RA Area: 13 cm2  RVIDd: 3 2 cm  SV MOD A4C: 172 1 ml  SV(Teich): 59 2 ml    CW  AR Dec Montmorency: 2 4 m/s2  AR Dec Time: 1670 1 ms  AR PHT: 484 3 ms  AR Vmax: 4 m/s  AR maxP 7 mmHg    MM  TAPSE: 2 5 cm    PW  E': 0 m/s  E/E': 18 7  LVOT Env  Ti: 285 4 ms  LVOT VTI: 45 8 cm  LVOT Vmax: 1 8 m/s  LVOT Vmean: 1 6 m/s  LVOT maxP 4 mmHg  LVOT meanPG: 10 5 mmHg  LVSV Dopp: 172 7 ml  MV A Shadi: 0 9 m/s  MV Dec Montmorency: 2 4 m/s2  MV DecT: 313 6 ms  MV E Shadi: 0 8 m/s  MV E/A Ratio: 0 9  MV PHT: 90 9 ms  MVA By PHT: 2 4 cm2    [employer] Accredited Echocardiography Laboratory    Prepared and electronically signed by    Lorne Romano MD  Signed 2017 10:06:06       No results found for this or any previous visit  No procedure found  No results found for this or any previous visit  Imaging:   I have personally reviewed pertinent reports  EKG reviewed personally:  SR    Telemetry reviewed personally:   SR    Assessment/Plan:  1  NSTEMI type 2; likely secondary to Anemia/GI bleed; trop 0 06/0 20; continue to trend trop  restart home meds -- atenolol and clonidine  Check echo to assess lvef  Monitor tele  Monitor labs    2  Anemia/GI bleed -- H & H on admission 5 9/18 5, now 8  6 s/p blood transfusion  Monitor labs  Pt to have EGD/colon later today  Mgmt per slim and Gi      3  Hx of Hypertrophic cardiomyopathy -- continue all meds  Check echo  Monitor tele    4   Hx of CAD -- continue all meds  Last stress test 8/2017; no ischemia  No need for repeat stress test      Code Status: Level 1 - Full Code    Counseling / Coordination of Care  Total floor / unit time spent today 35 minutes  Greater than 50% of total time was spent with the patient and / or family counseling and / or coordination of care  A description of the counseling / coordination of care: Review of history, current assessment, development of a plan      Altagracia Schmitz PA-C  1/22/2018,8:59 AM

## 2018-01-22 NOTE — PROGRESS NOTES
GI Progress Note - Curtis Higgins 69 y o  male MRN: [id number]    Unit/Bed#: ED 17 Encounter: [id number]    Subjective: Mr Mara Lopez has not had a BM in over 24 hours  He denies abdominal pain, nausea, or vomiting  Objective:     Vitals: Blood pressure 147/78, pulse 90, temperature 99 °F (37 2 °C), temperature source Oral, resp  rate 20, height 5' 8" (1 727 m), weight 82 6 kg (182 lb 1 6 oz), SpO2 100 %  ,Body mass index is 27 69 kg/m²  Intake/Output Summary (Last 24 hours) at 01/22/18 1404  Last data filed at 01/22/18 0815   Gross per 24 hour   Intake            562 5 ml   Output             1000 ml   Net           -437 5 ml       Physical Exam:     General Appearance: Alert, oriented x3, no acute distress  Lungs: CTA bilaterally, no respiratory distress  Heart: RRR, (+) mild systolic murmur noted  Abdomen: Non-distended, soft, BS active, NTTP  Extremities: No cyanosis or edema    Invasive Devices     Peripheral Intravenous Line            Peripheral IV 01/21/18 Left Hand 1 day    Peripheral IV 01/21/18 Right Antecubital 1 day          Airway            ETT  Cuffed;Oral 8 mm 157 days                Lab Results:    Results from last 7 days  Lab Units 01/22/18  1118  01/21/18  0844   WBC Thousand/uL 9 32  --  10 35*   HEMOGLOBIN g/dL 8 2*  < > 6 9*   HEMATOCRIT % 24 8*  < > 21 6*   PLATELETS Thousands/uL 302  --  334   NEUTROS PCT %  --   --  88*   LYMPHS PCT %  --   --  7*   MONOS PCT %  --   --  4   EOS PCT %  --   --  0   < > = values in this interval not displayed      Results from last 7 days  Lab Units 01/22/18  1118 01/21/18  0844   SODIUM mmol/L 142 132*   POTASSIUM mmol/L 3 2* 3 4*   CHLORIDE mmol/L 105 95*   CO2 mmol/L 26 28   BUN mg/dL 12 30*   CREATININE mg/dL 0 66 0 92   CALCIUM mg/dL 8 9 9 0   TOTAL PROTEIN g/dL  --  6 5   BILIRUBIN TOTAL mg/dL  --  0 40   ALK PHOS U/L  --  60   ALT U/L  --  18   AST U/L  --  15   GLUCOSE RANDOM mg/dL 110 181*       Results from last 7 days  Lab Units 01/21/18  0844   INR  0 97           Imaging Studies: I have personally reviewed pertinent imaging studies  Xr Chest 1 View Portable  Result Date: 1/21/2018  Impression: No active pulmonary disease  Assessment and Plan:     Symptomatic Anemia  Melena  - EGD postponed due to elevated troponin of 5 04 this AM; discussed with cardiology, continue to trend troponin/management per cardiology  - Nonulcerogenic diet for now, NPO after midnight tentatively for EGD if troponin is trending down  - Differential includes PUD v esophagitis/gastritis v AVM v malignancy given his reported 30 lb weight loss over the past year  - Keep Hb above 8 0, continue to trend Hb closely  - Cardiology following due to NSTEMI  - Continue PPI drip, can transition to BID dosing if Hb remains relatively stable      The patient will be seen by Dr Sandra Aguilar

## 2018-01-22 NOTE — PHYSICIAN ADVISOR
This patient is a 69 y o  y/o male who is admitted to the hospital for Palpitations (Patient c/o  chest palpitations, weakness in the legs, chills, and loss of appetite  Patient denies chest pain )       The patient presented to the ED on 1/21/18 at 0810 and was admitted to the hospital on 1/21/2018 0812  History of Present Illness includes: Theresa Goldstein is a 69 y o  male who presents with several days of escalating palpitations  Patient reports that over the last week that he has had palpitations and hot and cold sweats  He has had no appetite and he has been belching a lot  He reported no abdominal pain and no recent medication changes  In the emergency room the patient was found to be tachycardic with heme-positive stool and a hemoglobin of 6 9  He was noted to have elevated troponin consistent with non ST elevation myocardial infarction  Patient was transfused unit packed red blood cells and started on a Protonix drip  GI and Cardiology were consulted  Within estimate for further evaluation of the above  Vital signs in the ER are as follows ED Triage Vitals   Temperature Pulse Respirations Blood Pressure SpO2   01/21/18 0820 01/21/18 0820 01/21/18 0820 01/21/18 0820 01/21/18 0820   99 °F (37 2 °C) (!) 118 15 148/78 100 %      Temp Source Heart Rate Source Patient Position - Orthostatic VS BP Location FiO2 (%)   01/21/18 0820 01/21/18 0820 01/21/18 0820 01/21/18 0820 --   Oral Monitor Lying Right arm       Pain Score       01/21/18 1835       No Pain             Treatment in the ER included: basic labs, imaging, IVF and IV PPI  CXR: no acute disease  EKG: sinus tach    The patient is admitted as INPATIENT  and has remained hospitalized for 1 day(s)  Last vital signs were Blood pressure 147/78, pulse 90, temperature 99 °F (37 2 °C), temperature source Oral, resp  rate 20, height 5' 8" (1 727 m), weight 82 6 kg (182 lb 1 6 oz), SpO2 100 %       Treatment includes: tele, trend cardiac enzymes, cardiology and GI consult, IV PPI gtt, NPO, IVF and transfuse PRBCs  Results include:       0  Lab Value Date/Time   TROPONINI 5 04 (H) 01/22/2018 1118   TROPONINI 0 20 (H) 01/21/2018 0844                 Results from last 7 days  Lab Units 01/22/18  1118 01/21/18  1917 01/21/18  1425  01/21/18  0844   WBC Thousand/uL 9 32  --   --   --  10 35*   HEMOGLOBIN g/dL 8 2* 8 4* 7 4*  < > 6 9*   HEMATOCRIT % 24 8* 25 6* 22 7*  < > 21 6*   PLATELETS Thousands/uL 302  --   --   --  334   < > = values in this interval not displayed  Results from last 7 days  Lab Units 01/22/18  1118 01/21/18  0844   SODIUM mmol/L 142 132*   POTASSIUM mmol/L 3 2* 3 4*   CHLORIDE mmol/L 105 95*   CO2 mmol/L 26 28   BUN mg/dL 12 30*   CREATININE mg/dL 0 66 0 92   GLUCOSE RANDOM mg/dL 110 181*   CALCIUM mg/dL 8 9 9 0       Recent Cultures: The patient is appropriate for  Inpatient Admission  The rationale is as follows: The patient is a 70 y/o male who presented with palpitations and found to have anemia/GI bleed and elevated troponin  He required  tele, trend cardiac enzymes, cardiology and GI consult, IV PPI gtt, NPO, IVF and transfuse PRBCs  Plan for EGD when stable from cardiac standpoint  There is documentation by the admitting physician that the patient would require greater than two midnight stay based on medical necessity  Hospitalization is necessary to prevent further deterioration of his clinical condition  After review of the medical chart, labs, imaging, and notes - I agree that the patient meets criteria for INPATIENT ADMISSION

## 2018-01-22 NOTE — CASE MANAGEMENT
Initial Clinical Review    Admission: Date/Time/Statement: 1/21/18 @ 1031     Orders Placed This Encounter   Procedures    Inpatient Admission (expected length of stay for this patient is greater than two midnights)     Standing Status:   Standing     Number of Occurrences:   1     Order Specific Question:   Admitting Physician     Answer:   Aziza Cano [[id number]]     Order Specific Question:   Level of Care     Answer:   Med Surg [16]     Order Specific Question:   Estimated length of stay     Answer:   More than 2 Midnights     Order Specific Question:   Certification     Answer:   I certify that inpatient services are medically necessary for this patient for a duration of greater than two midnights  See H&P and MD Progress Notes for additional information about the patient's course of treatment  ED: Date/Time/Mode of Arrival:   ED Arrival Information     Expected Arrival Acuity Means of Arrival Escorted By Service Admission Type    - 1/21/2018 08:10 Urgent Walk-In Family Member General Medicine Urgent    Arrival Complaint    chest pain          Chief Complaint:   Chief Complaint   Patient presents with    Palpitations     Patient c/o  chest palpitations, weakness in the legs, chills, and loss of appetite  Patient denies chest pain  History of Illness: Hussein Piper is a 69 y o  male who presents with several days of escalating palpitations  Patient reports that over the last week that he has had palpitations and hot and cold sweats  He has had no appetite and he has been belching a lot  He reported no abdominal pain and no recent medication changes  In the emergency room the patient was found to be tachycardic with heme-positive stool and a hemoglobin of 6 9  He was noted to have elevated troponin consistent with non ST elevation myocardial infarction  Patient was transfused unit packed red blood cells and started on a Protonix drip  GI and Cardiology were consulted    Within estimate for further evaluation of the above        ED Vital Signs:   ED Triage Vitals   Temperature Pulse Respirations Blood Pressure SpO2   01/21/18 0820 01/21/18 0820 01/21/18 0820 01/21/18 0820 01/21/18 0820   99 °F (37 2 °C) (!) 118 15 148/78 100 %      Temp Source Heart Rate Source Patient Position - Orthostatic VS BP Location FiO2 (%)   01/21/18 0820 01/21/18 0820 01/21/18 0820 01/21/18 0820 --   Oral Monitor Lying Right arm       Pain Score       01/21/18 1835       No Pain        Wt Readings from Last 1 Encounters:   01/21/18 82 6 kg (182 lb 1 6 oz)       Vital Signs (abnormal): remained tachy 100-120's until 1/21 @2030  Then one HR result 89  1/22 @0705, , then back down to 88, 90    Abnormal Labs/Diagnostic Test Results:   1/21: na 132  k 3 4   Cl 95   Bun 30   Gluc 181   Alb 3   Trop   20  Wbc 10 35     hgb 6 9, 5 9, 7 4, 8 4    hct 21 6, 18 5, 22 7, 25 6     EKG: Sinus tachycardia with Premature atrial complexes with Aberrant conduction  Left ventricular hypertrophy with repolarization abnormality  CXR: no active dz    1/22: k 3 2   Trop 5 04   hgb 8 2   hct 24 8       ED Treatment:   Medication Administration from 01/21/2018 0810 to 01/22/2018 1305       Date/Time Order Dose Route Action Action by Comments     01/21/2018 0903 sodium chloride 0 9 % bolus 1,000 mL 1,000 mL Intravenous [address] [facility]      01/21/2018 0844 sodium chloride 0 9 % bolus 1,000 mL 1,000 mL Intravenous [address] [facility]      01/21/2018 0845 aspirin chewable tablet 242 mg 242 mg Oral Given Janice Castañeda RN      01/21/2018 0948 pantoprazole (PROTONIX) 80 mg in sodium chloride 0 9 % 100 mL IVPB 80 mg Intravenous New Bag Janice Castañeda RN           Past Medical/Surgical History:    Active Ambulatory Problems     Diagnosis Date Noted    Non-recurrent bilateral inguinal hernia without obstruction or gangrene 08/18/2017     Resolved Ambulatory Problems     Diagnosis Date Noted    No Resolved Ambulatory Problems Past Medical History:   Diagnosis Date    Diabetes mellitus ([facility] [address] )     Heart murmur     Hyperlipidemia     Hypertension        Admitting Diagnosis: Palpitations [R00 2]    Age/Sex: 69 y o  male    Assessment/Plan:   Diabetes type 2, uncontrolled (HCC)   Assessment & Plan     Hold metformin for no use , SSI  · Check hgb A1c  · SSI       HTN (hypertension)   Assessment & Plan     Patient on Tenormin, clonidine, hctz, and ace  Hold for now will use IV meds       NSTEMI (non-ST elevated myocardial infarction) [facility])   Assessment & Plan     Likely secondary to acute blood loss anemia  · Transfuse  · Cardiology consult       Hyperlipidemia   Assessment & Plan     Resume statin when taking PO       * GI bleed   Assessment & Plan     Patient reports about a weeks worth of belching , no appetite , hot and cold sweats  And palpitations  He reported no black stools and no vomiting of blood  patient takes 81 mg aspirin daily     · Agree with protonix drip  · Holding aspirin  · NPO  · GI Consult  · Transfuse PRBCS                VTE Prophylaxis: Pharmacologic VTE Prophylaxis contraindicated due to gi bleed  / sequential compression device   Code Status: Full  POLST: POLST form is not discussed and not completed at this time      Anticipated Length of Stay:  Patient will be admitted on an Inpatient basis with an anticipated length of stay of  greater 2 midnights     Justification for Hospital Stay: needs workup for gi bleed and cardiac ischemia workup      Admission Orders:  Scheduled Meds:   atenolol 50 mg Oral Daily   cloNIDine 0 3 mg Oral Q8H [address]     Continuous Infusions:   pantoprozole (PROTONIX) infusion (Continuous) 8 mg/hr Last Rate: 8 mg/hr (01/22/18 0815)     PRN Meds:   acetaminophen     Up w/assist  NPO  Echo  Cons GI re: gi bleed  Tele  SCD's  Transfuse 2u prbc's    PER GI 1/21:  ASSESSMENT AND PLAN:    69year old male with history of HTN, DM and maintained on metformin, as well as dyslipidemia    Came to the ER due to feeling fatigued as well as decreased appetite  Denies any nausea, vomiting, diarrhea, or abdominal pain  Also felt heart palpitations and hot sweats  Multiple episodes of pre syncope but has not had syncope  He was on his way to [facility] but came here as he felt like he was going to have syncope  He reports he has never had this before  Denies any melena or hematochezia  But he admits to not really looking at the stool  He reports some constipation that he thinks is due to not eating as much recently     Schmidt Gregory ever having CAD or prior CVA  He does report chronic GERD since his hernia repair surgery in August   Denies any NSAIDs  He reports his last colonoscopy was about 3 years ago and tells me they took out polyps  He was given a 5 year follow up     [facility] doesn't drink alcohol  He is current smoker and has smoked since age 25     Morgan Prows he was found to have anemia with a hemoglobin of 6 9 which was normocytic in nature  78-year-old male with history of hypertension, diabetes, dyslipidemia presenting with   1   Symptomatic anemia  -this can be due to upper GI bleed or lower GI bleed, given his BUN is slightly elevated upper GI bleed suspected with differential being PUD, esophagitis, gastritis vs malignancy given his weight loss   -will need to give packed red blood cell transfusion as he is having some demand ischemia, also IV fluid hydration as well as Protonix drip with a bolus  -he can have clear liquid diet today and will plan EGD tomorrow  -can continue ASA   2  Type II NSTEMI  -likely due to demand ischemia     3  Colon polyps  -last colonoscopy 3 years ago  -if EGD negative for a source of his anemia may need colonoscopy    PER CARDIO 1/22:  1  NSTEMI type 2; likely secondary to Anemia/GI bleed; trop 0 06/0 20; continue to trend trop  restart home meds -- atenolol and clonidine  Check echo to assess lvef  Monitor tele  Monitor labs   2   Anemia/GI bleed -- H & H on admission 5 9/18 5, now 8 4/25 6 s/p blood transfusion  Monitor labs  Pt to have EGD/colon later today  Mgmt per slim and Gi     3  Hx of Hypertrophic cardiomyopathy -- continue all meds  Check echo  Monitor tele   4  Hx of CAD -- continue all meds  Last stress test 8/2017; no ischemia   No need for repeat stress test

## 2018-01-23 ENCOUNTER — ANESTHESIA (INPATIENT)
Dept: PERIOP | Facility: HOSPITAL | Age: 68
DRG: 377 | End: 2018-01-23
Payer: MEDICARE

## 2018-01-23 ENCOUNTER — ANESTHESIA EVENT (INPATIENT)
Dept: PERIOP | Facility: HOSPITAL | Age: 68
DRG: 377 | End: 2018-01-23
Payer: MEDICARE

## 2018-01-23 VITALS
DIASTOLIC BLOOD PRESSURE: 78 MMHG | SYSTOLIC BLOOD PRESSURE: 130 MMHG | OXYGEN SATURATION: 99 % | WEIGHT: 191.13 LBS | HEIGHT: 67 IN | BODY MASS INDEX: 30 KG/M2 | HEART RATE: 60 BPM

## 2018-01-23 LAB
ERYTHROCYTE [DISTWIDTH] IN BLOOD BY AUTOMATED COUNT: 17 % (ref 11.6–15.1)
GLUCOSE SERPL-MCNC: 113 MG/DL (ref 65–140)
HCT VFR BLD AUTO: 23.8 % (ref 36.5–49.3)
HGB BLD-MCNC: 7.8 G/DL (ref 12–17)
HIV 1+2 AB+HIV1 P24 AG SERPL QL IA: NORMAL
HIV1 P24 AG SER QL: NORMAL
MCH RBC QN AUTO: 27.5 PG (ref 26.8–34.3)
MCHC RBC AUTO-ENTMCNC: 32.8 G/DL (ref 31.4–37.4)
MCV RBC AUTO: 84 FL (ref 82–98)
PLATELET # BLD AUTO: 291 THOUSANDS/UL (ref 149–390)
PMV BLD AUTO: 8.5 FL (ref 8.9–12.7)
RBC # BLD AUTO: 2.84 MILLION/UL (ref 3.88–5.62)
TROPONIN I SERPL-MCNC: 2.15 NG/ML
TROPONIN I SERPL-MCNC: 2.57 NG/ML
TROPONIN I SERPL-MCNC: 2.99 NG/ML
TROPONIN I SERPL-MCNC: 3.29 NG/ML
TROPONIN I SERPL-MCNC: 3.31 NG/ML
WBC # BLD AUTO: 8.39 THOUSAND/UL (ref 4.31–10.16)

## 2018-01-23 PROCEDURE — 88313 SPECIAL STAINS GROUP 2: CPT | Performed by: INTERNAL MEDICINE

## 2018-01-23 PROCEDURE — 88305 TISSUE EXAM BY PATHOLOGIST: CPT | Performed by: INTERNAL MEDICINE

## 2018-01-23 PROCEDURE — 85027 COMPLETE CBC AUTOMATED: CPT | Performed by: PHYSICIAN ASSISTANT

## 2018-01-23 PROCEDURE — 87806 HIV AG W/HIV1&2 ANTB W/OPTIC: CPT | Performed by: FAMILY MEDICINE

## 2018-01-23 PROCEDURE — 0DB78ZX EXCISION OF STOMACH, PYLORUS, VIA NATURAL OR ARTIFICIAL OPENING ENDOSCOPIC, DIAGNOSTIC: ICD-10-PCS | Performed by: INTERNAL MEDICINE

## 2018-01-23 PROCEDURE — 84484 ASSAY OF TROPONIN QUANT: CPT | Performed by: PHYSICIAN ASSISTANT

## 2018-01-23 PROCEDURE — 82948 REAGENT STRIP/BLOOD GLUCOSE: CPT

## 2018-01-23 PROCEDURE — C9113 INJ PANTOPRAZOLE SODIUM, VIA: HCPCS | Performed by: PHYSICIAN ASSISTANT

## 2018-01-23 PROCEDURE — 88342 IMHCHEM/IMCYTCHM 1ST ANTB: CPT | Performed by: INTERNAL MEDICINE

## 2018-01-23 PROCEDURE — 0DB68ZX EXCISION OF STOMACH, VIA NATURAL OR ARTIFICIAL OPENING ENDOSCOPIC, DIAGNOSTIC: ICD-10-PCS | Performed by: INTERNAL MEDICINE

## 2018-01-23 PROCEDURE — P9021 RED BLOOD CELLS UNIT: HCPCS

## 2018-01-23 RX ORDER — ESMOLOL HYDROCHLORIDE 10 MG/ML
INJECTION INTRAVENOUS AS NEEDED
Status: DISCONTINUED | OUTPATIENT
Start: 2018-01-23 | End: 2018-01-23 | Stop reason: SURG

## 2018-01-23 RX ORDER — PROPOFOL 10 MG/ML
INJECTION, EMULSION INTRAVENOUS AS NEEDED
Status: DISCONTINUED | OUTPATIENT
Start: 2018-01-23 | End: 2018-01-23 | Stop reason: SURG

## 2018-01-23 RX ORDER — LIDOCAINE HYDROCHLORIDE 10 MG/ML
INJECTION, SOLUTION INFILTRATION; PERINEURAL AS NEEDED
Status: DISCONTINUED | OUTPATIENT
Start: 2018-01-23 | End: 2018-01-23 | Stop reason: SURG

## 2018-01-23 RX ORDER — SODIUM CHLORIDE, SODIUM LACTATE, POTASSIUM CHLORIDE, CALCIUM CHLORIDE 600; 310; 30; 20 MG/100ML; MG/100ML; MG/100ML; MG/100ML
INJECTION, SOLUTION INTRAVENOUS CONTINUOUS PRN
Status: DISCONTINUED | OUTPATIENT
Start: 2018-01-23 | End: 2018-01-23 | Stop reason: SURG

## 2018-01-23 RX ADMIN — LIDOCAINE HYDROCHLORIDE 50 MG: 10 INJECTION, SOLUTION INFILTRATION; PERINEURAL at 14:33

## 2018-01-23 RX ADMIN — CLONIDINE HYDROCHLORIDE 0.3 MG: 0.1 TABLET ORAL at 06:03

## 2018-01-23 RX ADMIN — CLONIDINE HYDROCHLORIDE 0.3 MG: 0.1 TABLET ORAL at 15:21

## 2018-01-23 RX ADMIN — SODIUM CHLORIDE, SODIUM LACTATE, POTASSIUM CHLORIDE, AND CALCIUM CHLORIDE: .6; .31; .03; .02 INJECTION, SOLUTION INTRAVENOUS at 14:02

## 2018-01-23 RX ADMIN — CLONIDINE HYDROCHLORIDE 0.3 MG: 0.1 TABLET ORAL at 22:04

## 2018-01-23 RX ADMIN — PROPOFOL 50 MG: 10 INJECTION, EMULSION INTRAVENOUS at 14:36

## 2018-01-23 RX ADMIN — SODIUM CHLORIDE 8 MG/HR: 9 INJECTION, SOLUTION INTRAVENOUS at 19:56

## 2018-01-23 RX ADMIN — PROPOFOL 100 MG: 10 INJECTION, EMULSION INTRAVENOUS at 14:33

## 2018-01-23 RX ADMIN — ESMOLOL HYDROCHLORIDE 10 MG: 10 INJECTION, SOLUTION INTRAVENOUS at 14:30

## 2018-01-23 RX ADMIN — SODIUM CHLORIDE 8 MG/HR: 9 INJECTION, SOLUTION INTRAVENOUS at 08:40

## 2018-01-23 NOTE — ANESTHESIA PREPROCEDURE EVALUATION
Review of Systems/Medical History  Patient summary reviewed  Chart reviewed  No history of anesthetic complications     Cardiovascular  EKG reviewed, Exercise tolerance: poor,  Hyperlipidemia, Hypertension controlled, Valvular heart disease , aortic regurgitation, Valve replacement , Past MI (NSTEMI type 2 this admission, related to demand ischemia in setting of anemia (eval by cardiology 1/22)) 0-3 months, CAD (did not get atenolol this AM 2/2 borderline BP), , MORGAN,   Comment: HGB upon admission= 6 9, 2 units PRBCs transfused  HGB this AM= 7 8/23 8  1 unit PRBC transfused today  Total of 3 units PRBCs transfused this admission  Trop peaked at 4 50 on 1/22/18 at 17:27- down to 2 57 at 9:23 today    TTE 1/22/18:  LVEF 60%, no RWMA, grade 1 diastolic dysfunction, mild MR, mild AI      ,  Pulmonary  COPD mild- PRN medicaiton , No recent URI ,   Comment: Mild emphysema per ct scan 1/2017       GI/Hepatic    GI bleeding ,   Comment: Possible GI bleed- amenia     Negative  ROS        Endo/Other  Diabetes type 2 Oral agent,      GYN       Hematology  Anemia (has received 3 units PRBCs this admission, 1 unit this AM) acute blood loss anemia,     Musculoskeletal  Osteoarthritis (DJD),        Neurology  Negative neurology ROS   No diabetic neuropathy,    Psychology   Negative psychology ROS              Physical Exam    Airway    Mallampati score: II  TM Distance: >3 FB  Neck ROM: full     Dental   No notable dental hx     Cardiovascular  Rhythm: regular, Rate: normal, Murmur,     Pulmonary  Pulmonary exam normal Breath sounds clear to auscultation,     Other Findings        Anesthesia Plan  ASA Score- 3     Anesthesia Type- IV sedation with anesthesia with ASA Monitors  Additional Monitors:   Airway Plan:     Comment: I discussed the risks and benefits of IV sedation anesthesia including the possibility of the need to convert to general anesthesia and the potential risk of awareness    The patient was given the opportunity to ask questions, which were answered        Plan Factors-    Induction- intravenous  Postoperative Plan-     Informed Consent- Anesthetic plan and risks discussed with patient and spouse  Lab Results   Component Value Date    WBC 8 39 01/23/2018    HGB 7 8 (L) 01/23/2018    HCT 23 8 (L) 01/23/2018    MCV 84 01/23/2018     01/23/2018     Lab Results   Component Value Date    GLUCOSE 110 01/22/2018    CALCIUM 8 9 01/22/2018     01/22/2018    K 3 2 (L) 01/22/2018    CO2 26 01/22/2018     01/22/2018    BUN 12 01/22/2018    CREATININE 0 66 01/22/2018     Lab Results   Component Value Date    ALT 18 01/21/2018    AST 15 01/21/2018    ALKPHOS 60 01/21/2018    BILITOT 0 40 01/21/2018     Lab Results   Component Value Date    HGBA1C 6 5 (H) 07/24/2017     Lab Results   Component Value Date    INR 0 97 01/21/2018    INR 0 94 01/08/2015    PROTIME 13 1 01/21/2018    PROTIME 12 4 01/08/2015 01/21/18 0852 XR chest 1 view portable    Impression:     No active pulmonary disease

## 2018-01-23 NOTE — PROGRESS NOTES
[facility] Internal Medicine Progress Note  Patient: Robin Olvera 69 y o  male   MRN: [id number]  PCP: Min Solares MD  Unit/Bed#: -01 Encounter: [id number]  Date Of Visit: 18    Assessment:    Principal Problem:    GI bleed  Active Problems:    Hyperlipidemia    [facility]non[facility] )    HTN (hypertension)    Diabetes type 2, uncontrolled ([facility] )      Plan:    · Probable upper GI bleed:  Patient is going for an EGD today  Hemoglobin ideally should be greater than 8  Will write for 1 unit of packed red blood cells  Communicated with GI  · Elevated troponin:  This is likely a type 2 non STEMI secondary to probable upper GI bleed:  Cardiology consult is appreciated  Echocardiogram shows grade 1 diastolic dysfunction  · Diabetes mellitus type 2:  Blood sugars appear acceptable  Continue to monitor  · Hyperlipidemia:  Patient is on a statin      VTE Pharmacologic Prophylaxis:   Pharmacologic: Pharmacologic VTE Prophylaxis contraindicated due to Potential GI bleed  Mechanical VTE Prophylaxis in Place: Yes    Patient Centered Rounds: I have performed bedside rounds with nursing staff today  Discussions with Specialists or Other Care Team Provider: GI    Education and Discussions with Family / Patient:  Patient    Time Spent for Care: 20 minutes  More than 50% of total time spent on counseling and coordination of care as described above  Current Length of Stay: 2 day(s)    Current Patient Status: Inpatient   Certification Statement: The patient will continue to require additional inpatient hospital stay due to Needing an EGD with elevated troponin needing monitoring and also a blood transfuse    Discharge Plan:  Hopeful discharge tomorrow    Code Status: Level 1 - Full Code      Subjective:   Patient seen  And examined  He has no complaints today  Denies any chest pain or shortness of breath      Objective:     Vitals:   Temp (24hrs), Av 3 °F (36 8 °C), Min:97 5 °F (36 4 °C), Max:98 9 °F (37 2 °C)    HR:  [64-90] 74  Resp:  [16-21] 18  BP: ()/(52-79) 99/52  SpO2:  [98 %-100 %] 98 %  Body mass index is 27 69 kg/m²  Input and Output Summary (last 24 hours):     No intake or output data in the 24 hours ending 01/23/18 0911    Physical Exam:     General Appearance:    Alert, cooperative, no distress, appears stated age                               Lungs:     Clear to auscultation bilaterally, respirations unlabored       Heart:    Regular rate and rhythm, S1 and S2 normal, no murmur, rub    or gallop   Abdomen:     Soft, non-tender, bowel sounds active all four quadrants,     no masses, no organomegaly           Extremities:   Extremities normal, atraumatic, no cyanosis or edema                       Additional Data:     Labs:      Results from last 7 days  Lab Units 01/23/18  0625  01/21/18  0844   WBC Thousand/uL 8 39  < > 10 35*   HEMOGLOBIN g/dL 7 8*  < > 6 9*   HEMATOCRIT % 23 8*  < > 21 6*   PLATELETS Thousands/uL 291  < > 334   NEUTROS PCT %  --   --  88*   LYMPHS PCT %  --   --  7*   MONOS PCT %  --   --  4   EOS PCT %  --   --  0   < > = values in this interval not displayed  Results from last 7 days  Lab Units 01/22/18  1118 01/21/18  0844   SODIUM mmol/L 142 132*   POTASSIUM mmol/L 3 2* 3 4*   CHLORIDE mmol/L 105 95*   CO2 mmol/L 26 28   BUN mg/dL 12 30*   CREATININE mg/dL 0 66 0 92   CALCIUM mg/dL 8 9 9 0   TOTAL PROTEIN g/dL  --  6 5   BILIRUBIN TOTAL mg/dL  --  0 40   ALK PHOS U/L  --  60   ALT U/L  --  18   AST U/L  --  15   GLUCOSE RANDOM mg/dL 110 181*       Results from last 7 days  Lab Units 01/21/18  0844   INR  0 97       * I Have Reviewed All Lab Data Listed Above  * Additional Pertinent Lab Tests Reviewed:  All [place] Admission Reviewed        Recent Cultures (last 7 days):           Last 24 Hours Medication List:     atenolol 50 mg Oral Daily   cloNIDine 0 3 mg Oral [facility]        Today, Patient Was Seen By: Ilia Grimes MD    ** Please Note: Dragon 360 Dictation voice to text software may have been used in the creation of this document   **

## 2018-01-23 NOTE — PROGRESS NOTES
Progress Note - Sakina Pepper 1950, 69 y o  male MRN: [id number]    Unit/Bed#: -01 Encounter: [id number]    Primary Care Provider: Lupillo Guerin MD   Date and time admitted to hospital: 1/21/2018  8:12 AM        Diabetes type 2, uncontrolled ([facility] [address] )   Assessment & Plan    Hold metformin for no use , SSI  · Check hgb A1c  · SSI        HTN (hypertension)   Assessment & Plan    Patient on Tenormin, clonidine, hctz, and ace  Hold for now will use IV meds  Periods of low bp noted  Caridiology resumed tenormin and clonidine  Monitor  NSTEMI (non-ST elevated myocardial infarction) [facility])   Assessment & Plan    Likely secondary to acute blood loss anemia  · Transfuse  · Cardiology consulted  Trending troponins  Can't heparinize  Hyperlipidemia   Assessment & Plan    Resume statin when taking PO        * GI bleed   Assessment & Plan    Patient reports about a weeks worth of belching , no appetite , hot and cold sweats  And palpitations  He reported no black stools and no vomiting of blood  patient takes 81 mg aspirin daily    · Agree with protonix drip  · Holding aspirin  · NPO  · GI Consult  · Transfuse PRBCS  · Hemoglobin holding stable in Mid 8's, on protonix drip  · EGD held due to increasing troponins, possible for am             VTE Pharmacologic Prophylaxis:   Pharmacologic: Pharmacologic VTE Prophylaxis contraindicated due to gi bleed  Mechanical: Mechanical VTE prophylaxis in place  Patient Centered Rounds: I have evaluated patient without nursing staff present due to not able, will update  Discussions with Specialists or Other Care Team Provider: gi  Education and Discussions with Family / Patient: spouse not present  Time Spent for Care: 20 minutes  More than 50% of total time spent on counseling and coordination of care as described above      Current Length of Stay: 2 day(s)  Current Patient Status: Inpatient   Certification Statement: The patient will continue to require additional inpatient hospital stay due to nstemi, needs egd    Discharge Plan: tbd  Code Status: Level 1 - Full Code    Subjective:   Patient states feeling much better  No chest pain, n, V    Objective:   Vitals:   Temp (24hrs), Av 4 °F (36 9 °C), Min:98 °F (36 7 °C), Max:98 9 °F (37 2 °C)    HR:  [] 78  Resp:  [16-21] 16  BP: ()/(56-79) 122/68  SpO2:  [98 %-100 %] 98 %  Body mass index is 27 69 kg/m²  Input and Output Summary (last 24 hours): Intake/Output Summary (Last 24 hours) at 18 4088  Last data filed at 18 0815   Gross per 24 hour   Intake                0 ml   Output             1000 ml   Net            -1000 ml       Physical Exam:     Physical Exam   Constitutional: He is oriented to person, place, and time  He appears well-developed  No distress  HENT:   Head: Normocephalic and atraumatic  Right Ear: External ear normal    Left Ear: External ear normal    Nose: Nose normal    Mouth/Throat: Oropharynx is clear and moist  No oropharyngeal exudate  Eyes: Conjunctivae and EOM are normal  Pupils are equal, round, and reactive to light  Right eye exhibits no discharge  Left eye exhibits no discharge  No scleral icterus  Neck: Normal range of motion  Neck supple  No JVD present  No thyromegaly present  Cardiovascular: Normal rate, regular rhythm and intact distal pulses  Exam reveals no gallop and no friction rub  Murmur (2/6 lianna) heard  Pulmonary/Chest: Breath sounds normal  No respiratory distress  He has no wheezes  He has no rales  Abdominal: Soft  Bowel sounds are normal  He exhibits no distension  There is no tenderness  There is no rebound and no guarding  Musculoskeletal: Normal range of motion  He exhibits no edema or deformity  Lymphadenopathy:     He has no cervical adenopathy  Neurological: He is alert and oriented to person, place, and time  He has normal reflexes  No cranial nerve deficit  He exhibits normal muscle tone     Skin: Skin is warm and dry  No rash noted  He is not diaphoretic  No erythema  Psychiatric: He has a normal mood and affect  Vitals reviewed  Additional Data:   Labs:    Results from last 7 days  Lab Units 01/22/18  1722 01/22/18  1118  01/21/18  0844   WBC Thousand/uL  --  9 32  --  10 35*   HEMOGLOBIN g/dL 8 3* 8 2*  < > 6 9*   HEMATOCRIT % 26 0* 24 8*  < > 21 6*   PLATELETS Thousands/uL  --  302  --  334   NEUTROS PCT %  --   --   --  88*   LYMPHS PCT %  --   --   --  7*   MONOS PCT %  --   --   --  4   EOS PCT %  --   --   --  0   < > = values in this interval not displayed  Results from last 7 days  Lab Units 01/22/18  1118 01/21/18  0844   SODIUM mmol/L 142 132*   POTASSIUM mmol/L 3 2* 3 4*   CHLORIDE mmol/L 105 95*   CO2 mmol/L 26 28   BUN mg/dL 12 30*   CREATININE mg/dL 0 66 0 92   CALCIUM mg/dL 8 9 9 0   TOTAL PROTEIN g/dL  --  6 5   BILIRUBIN TOTAL mg/dL  --  0 40   ALK PHOS U/L  --  60   ALT U/L  --  18   AST U/L  --  15   GLUCOSE RANDOM mg/dL 110 181*       Results from last 7 days  Lab Units 01/21/18  0844   INR  0 97       * I Have Reviewed All Lab Data Listed Above  * Additional Pertinent Lab Tests Reviewed: All Labs Within Last 24 Hours Reviewed    Imaging:    Imaging Reports Reviewed Today Include: none    Cultures:   Blood Culture: No results found for: BLOODCX  Urine Culture: No results found for: URINECX  Sputum Culture: No components found for: SPUTUMCX  Wound Culture: No results found for: WOUNDCULT    Last 24 Hours Medication List:     atenolol 50 mg Oral Daily   cloNIDine 0 3 mg Oral Q8H [facility]        Today, Patient Was Seen By: Genaro Schaffer MD    ** Please Note: Dragon 360 Dictation voice to text software may have been used in the creation of this document   **

## 2018-01-23 NOTE — PROGRESS NOTES
General Cardiology   Progress Note   Artur Helton 69 y o  male MRN: [id number]  Unit/Bed#: -01 Encounter: [id number]      SUBJECTIVE:   No significant events overnight  Patient denies any chest pain  Patient has severe anemia  Patient for EGD  Patient getting blood transfusion  REVIEW OF SYSTEMS:  Constitutional:  Denies fever or chills   Eyes:  Denies change in visual acuity   HENT:  Denies nasal congestion or sore throat   Respiratory:  No dyspnea  Cardiovascular:  Denies chest pain or edema     :  Denies dysuria, frequency, difficulty in micturition and nocturia  Musculoskeletal:  Denies back pain or joint pain   Neurologic:  Denies headache, focal weakness or sensory changes   Endocrine:  Denies polyuria or polydipsia   Lymphatic:  Denies swollen glands   Psychiatric:  Denies depression or anxiety     OBJECTIVE:   Vitals:  Vitals:    01/23/18 1015   BP: 100/58   Pulse: 75   Resp: 18   Temp: 98 8 °F (37 1 °C)   SpO2: 99%     Body mass index is 27 69 kg/m²  Systolic (23OSM), EZP:423 , Min:86 , LYL:220     Diastolic (56EYW), EOM:35, Min:52, Max:79    No intake or output data in the 24 hours ending 01/23/18 1024  Weight (last 2 days)     Date/Time   Weight    01/22/18 1642  82 6 (182 1)    01/21/18 0820  82 6 (182 1)                  PHYSICAL EXAMS:  General:  Patient is not in acute distress, laying in the bed comfortably, awake, alert responding to commands  Head: Normocephalic, Atraumatic  HEENT:  Both pupils normal-size atraumatic, normocephalic, nonicteric pallor  Neck:  JVP not raised  Trachea central  Respiratory:  Bronchovascular breathing all over the chest without any accompaniment  Cardiovascular:  Regular   Systolic murmur In the left sternal border  GI:  Abdomen soft nontender   Liver and spleen normal size  Lymphatic:  No cervical or inguinal lymphadenopathy  Neurologic:  Patient is awake alert, responding to command, well-oriented to time and place and person moving LABORATORY RESULTS:    Results from last 7 days  Lab Units 01/23/18  0938 01/23/18  0625 01/23/18  0340   TROPONIN I ng/mL 2 57* 2 99* 3 31*       CBC with diff:   Results from last 7 days  Lab Units 01/23/18  0625 01/22/18  1722 01/22/18  1118  01/21/18  0844   WBC Thousand/uL 8 39  --  9 32  --  10 35*   HEMOGLOBIN g/dL 7 8* 8 3* 8 2*  < > 6 9*   HEMATOCRIT % 23 8* 26 0* 24 8*  < > 21 6*   MCV fL 84  --  82  --  83   PLATELETS Thousands/uL 291  --  302  --  334   MCH pg 27 5  --  27 2  --  26 5*   MCHC g/dL 32 8  --  33 1  --  31 9   RDW % 17 0*  --  16 8*  --  17 2*   MPV fL 8 5*  --  8 8*  --  9 4   NRBC AUTO /100 WBCs  --   --   --   --  0   < > = values in this interval not displayed      CMP:  Results from last 7 days  Lab Units 01/22/18  1118 01/21/18  0844   SODIUM mmol/L 142 132*   POTASSIUM mmol/L 3 2* 3 4*   CHLORIDE mmol/L 105 95*   CO2 mmol/L 26 28   ANION GAP mmol/L 11 9   BUN mg/dL 12 30*   CREATININE mg/dL 0 66 0 92   GLUCOSE RANDOM mg/dL 110 181*   CALCIUM mg/dL 8 9 9 0   AST U/L  --  15   ALT U/L  --  18   ALK PHOS U/L  --  60   TOTAL PROTEIN g/dL  --  6 5   ALBUMIN g/dL  --  3 0*   BILIRUBIN TOTAL mg/dL  --  0 40   EGFR ml/min/1 73sq m 114 98       BMP:  Results from last 7 days  Lab Units 01/22/18  1118 01/21/18  0844   SODIUM mmol/L 142 132*   POTASSIUM mmol/L 3 2* 3 4*   CHLORIDE mmol/L 105 95*   CO2 mmol/L 26 28   BUN mg/dL 12 30*   CREATININE mg/dL 0 66 0 92   GLUCOSE RANDOM mg/dL 110 181*   CALCIUM mg/dL 8 9 9 0              Results from last 7 days  Lab Units 01/21/18  0844   MAGNESIUM mg/dL 1 9           Results from last 7 days  Lab Units 01/21/18  0844   TSH 3RD GENERATON uIU/mL 0 406       Results from last 7 days  Lab Units 01/21/18  0844   INR  0 97       Lipid Profile:   Lab Results   Component Value Date    CHOL 125 03/17/2016    CHOL 136 06/12/2015    CHOL 135 02/19/2015     Lab Results   Component Value Date    HDL 47 03/17/2016    HDL 43 06/12/2015    HDL 48 02/19/2015 Lab Results   Component Value Date    LDLCALC 64 2016    LDLCALC 82 2015    LDLCALC 78 2015     Lab Results   Component Value Date    TRIG 73 2017    TRIG 68 2016    TRIG 57 2015       Cardiac testing:  Results for orders placed during the hospital encounter of 18   Echo complete with contrast if indicated    Narrative [facility] [address]  [phone]    Transthoracic Echocardiogram  2D, M-mode, Doppler, and Color Doppler    Study date:  2018    Patient: Laureen Dozier  MR number: [id number]  Account number: [de-identified]  : 1950  Age: 69 years  Gender: Male  Status: Inpatient  Location: Emergency room  Height: 68 in  Weight: 181 9 lb  BP: 158/ 79 mmHg    Indications: Cardiomyopathy, Assess left ventricular function  Diagnoses: I42 9 - Cardiomyopathy, unspecified    Sonographer:  Lizeth Moreland  Interpreting Physician:  Ashanti Mary MD  Referring Physician:  Janina Hill PA-C  Group:  [facility]    SUMMARY    PROCEDURE INFORMATION:  This was a technically difficult study  Echocardiographic views were limited due to low windows, decreased penetration, and lung interference  LEFT VENTRICLE:  The cavity was small  Systolic function was normal  Ejection fraction was estimated to be 60 %  There were no regional wall motion abnormalities  There was moderate concentric hypertrophy  Doppler parameters were consistent with abnormal left ventricular relaxation (grade 1 diastolic dysfunction)  VENTRICULAR SEPTUM:  There was moderate asymmetrical hypertrophy of the septum  RIGHT VENTRICLE:  The size was normal   Systolic function was normal     LEFT ATRIUM:  The atrium was mildly dilated  MITRAL VALVE:  There was mild annular calcification  There was mild regurgitation  AORTIC VALVE:  There was mild regurgitation      TRICUSPID VALVE:  There was trace regurgitation  Estimated peak PA pressure was 42 mmHg  HISTORY: PRIOR HISTORY: NSTEMI, murmur, palpitations, Risk factors: hypertension, diabetes, and hypercholesterolemia  PROCEDURE: The procedure was performed in the emergency room  This was a routine study  The transthoracic approach was used  The study included complete 2D imaging, M-mode, complete spectral Doppler, and color Doppler  The heart rate was  90 bpm, at the start of the study  Images were obtained from the parasternal, apical, subcostal, and suprasternal notch acoustic windows  Echocardiographic views were limited due to low windows, decreased penetration, and lung  interference  This was a technically difficult study  LEFT VENTRICLE: The cavity was small  Systolic function was normal  Ejection fraction was estimated to be 60 %  There were no regional wall motion abnormalities  There was moderate concentric hypertrophy  No evidence of apical thrombus  DOPPLER: Doppler parameters were consistent with abnormal left ventricular relaxation (grade 1 diastolic dysfunction)  VENTRICULAR SEPTUM: There was moderate asymmetrical hypertrophy of the septum  RIGHT VENTRICLE: The size was normal  Systolic function was normal  Wall thickness was normal     LEFT ATRIUM: The atrium was mildly dilated  RIGHT ATRIUM: Size was normal     MITRAL VALVE: There was mild annular calcification  There was normal leaflet separation  DOPPLER: The transmitral velocity was within the normal range  There was no evidence for stenosis  There was mild regurgitation  AORTIC VALVE: The valve was trileaflet  Leaflets exhibited normal thickness and normal cuspal separation  DOPPLER: Transaortic velocity was within the normal range  There was no evidence for stenosis  There was mild regurgitation  TRICUSPID VALVE: The valve structure was normal  There was normal leaflet separation  DOPPLER: The transtricuspid velocity was within the normal range   There was no evidence for stenosis  There was trace regurgitation  Estimated peak PA  pressure was 42 mmHg  PULMONIC VALVE: Leaflets exhibited normal thickness, no calcification, and normal cuspal separation  DOPPLER: The transpulmonic velocity was within the normal range  There was no significant regurgitation  PERICARDIUM: There was no pericardial effusion  The pericardium was normal in appearance  AORTA: The root exhibited normal size  SYSTEMIC VEINS: IVC: The inferior vena cava was normal in size  Respirophasic changes were normal     SYSTEM MEASUREMENT TABLES    2D  LA Area: 20 5 cm2  LVEDV MOD A4C: 248 9 ml  LVEF MOD A4C: 37 2 %  LVESV MOD A4C: 156 3 ml  LVLd A4C: 10 2 cm  LVLs A4C: 9 7 cm  RA Area: 15 8 cm2  RVIDd: 3 3 cm  SV MOD A4C: 92 7 ml    CW  AR Dec George: 2 8 m/s2  AR Dec Time: 1670 9 ms  AR PHT: 484 6 ms  AR Vmax: 4 7 m/s  AR maxP 8 mmHg  TR Vmax: 3 3 m/s  TR maxP 5 mmHg    MM  TAPSE: 2 4 cm    PW  E': 0 1 m/s  E/E': 13 7  MV A Shadi: 0 9 m/s  MV Dec George: 3 7 m/s2  MV DecT: 217 6 ms  MV E Shadi: 0 8 m/s  MV E/A Ratio: 0 8  MV PHT: 63 1 ms  MVA By PHT: 3 5 cm2    [employer] Accredited Echocardiography Laboratory    Prepared and electronically signed by    Ugo Reddy MD  Signed 2018 15:33:20         Meds/Allergies   all current active meds have been reviewed  Prescriptions Prior to Admission   Medication    aspirin 81 mg chewable tablet    atenolol (TENORMIN) 50 mg tablet    atorvastatin (LIPITOR) 80 mg tablet    cloNIDine (CATAPRES) 0 3 mg tablet    hydrochlorothiazide (HYDRODIURIL) 25 mg tablet    lisinopril (ZESTRIL) 40 mg tablet    metFORMIN (GLUCOPHAGE-XR) 500 mg 24 hr tablet    Omega-3 1000 MG CAPS       pantoprozole (PROTONIX) infusion (Continuous) 8 mg/hr Last Rate: 8 mg/hr (18 0840)       ASSESSMENT & PLAN     Patient with known history of hypertension, hypertrophic cardiomyopathy with severe anemia secondary to likely GI bleed    Positive troponins likely secondary to type 2 non STEMI from GI bleed  Patient has no cardiovascular symptoms presently  Will continue to monitor closely  Patient will need GI evaluation as well as blood transfusion as needed  Patient counseled about the hazards of smoking  Previous cardiovascular studies done as an outpatient reviewed with patient and family  Reilly Card MD  1/23/2018,10:24 AM    Portions of the record may have been created with voice recognition software   Occasional wrong word or "sound a like" substitutions may have occurred due to the inherent limitations of voice recognition software   Read the chart carefully and recognize, using context, where substitutions have occurred

## 2018-01-23 NOTE — ASSESSMENT & PLAN NOTE
Likely secondary to acute blood loss anemia  · Transfuse  · Cardiology consulted  Trending troponins  Can't heparinize

## 2018-01-23 NOTE — OP NOTE
**** GI/ENDOSCOPY REPORT ****     PATIENT NAME: Castillo SNOWDEN - VISIT ID:  Patient ID: [id number]   YOB: 1950     INTRODUCTION: Esophagogastroduodenoscopy - A 69 male patient presents for   an inpatient Esophagogastroduodenoscopy at [facility]  INDICATIONS: Low hematocrit anemia  Recent melena  CONSENT: The benefits, risks, and alternatives to the procedure were   discussed and informed consent was obtained from the patient  PREPARATION:  EKG, pulse, pulse oximetry and blood pressure were monitored   throughout the procedure  ASA Classification: Class 3 - Patient has severe   systemic disturbance that may or may not be related to the disorder   requiring surgery  MEDICATIONS: Anesthesia administered by anesthesiologist      PROCEDURE:  The endoscope was passed without difficulty through the mouth   under direct visualization and advanced to the 2nd portion of the   duodenum  The scope was withdrawn and the mucosa was carefully examined  FINDINGS:  Oral cavity: Within the oral cavity, tongue appeared to be   black - suggestive of hairy leukoplakia  Esophagus: The esophagus appeared   to be normal   Stomach: There was a single large, cratered ulcer with   black spot which measured 1 5x2 cm in size, which did not cause an   obstruction in the incisura  It was not bleeding and showed no bleeding   stigmata  Biopsies were taken from the edge of the ulcer  Gastritis was   found in the antrum and fundus  A biopsy was taken  On retroflexed view,   the stomach appeared to be normal   Duodenum: There was evidence of   duodenitis in the duodenal bulb  The 1st portion of the duodenum and 2nd   portion of the duodenum appeared to be normal      COMPLICATIONS: There were no complications  IMPRESSIONS: Normal esophagus  A large cratered ulcer found in the   incisura  Biopsy taken  Gastritis found in the antrum and fundus  Biopsy   taken  Normal stomach    Duodenitis in the duodenal bulb  Normal 1st   portion of the duodenum and 2nd portion of the duodenum  Within the oral   cavity, 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  RECOMMENDATIONS: Return to floor  Avoid all non-steroidal   anti-inflammatory drugs (NSAID's) including but not limited to Ibuprofen,   Advil, Motrin, and Nuprin  Anti-reflux measures: Raise the head of the   bed 4 to 6 inches  Avoid smoking  Avoid excess coffee, tea or other   caffeinated beverages  Avoid garments that fit tightly through the   abdomen  Avoid eating before bed  Start ulcer free diet  Follow-up on   the results of the biopsy specimens in 2 weeks  EGD recommended in a 2-3   month  Protonix 40mg BID  Resume anticoagulation while monitoring H/H  Follow up with ENT to evaluate the tongue  ESTIMATED BLOOD LOSS: Insignificant  PATHOLOGY SPECIMENS: Biopsy taken  Associated finding: Ulcer  Biopsy   taken  Associated finding: Gastritis  Yes     PROCEDURE CODES: 63866 - EGD flexible; with biopsy     ICD-9 Codes: 280 0 Iron deficiency anemia secondary to blood loss   (chronic) 578 1 Blood in stool 531 90 Gastric ulcer, unspecified as acute   or chronic, without mention of hemorrhage or perforation, without mention   of obstruc 535 50 Unspecified gastritides and gastroduodenitis, without   mention of hemorrhage 535 60 Duodenitis, without mention of hemorrhage     ICD-10 Codes: D64 9 Anemia, unspecified K92 1 Melena K25 Gastric ulcer K29   Gastritis and duodenitis K29 Gastritis and duodenitis     PERFORMED BY: TADEO López  on 01/23/2018  Version 1, electronically signed by TADEO Dominguez  on 01/23/2018   at 14:53

## 2018-01-23 NOTE — ASSESSMENT & PLAN NOTE
Patient on Tenormin, clonidine, hctz, and ace  Hold for now will use IV meds  Periods of low bp noted  Caridiology resumed tenormin and clonidine  Monitor

## 2018-01-23 NOTE — ANESTHESIA POSTPROCEDURE EVALUATION
Post-Op Assessment Note      CV Status:  Stable    Mental Status:  Alert and awake    Hydration Status:  Euvolemic    PONV Controlled:  Controlled    Airway Patency:  Patent    Post Op Vitals Reviewed: Yes          Staff: CRNA           BP 96/50 (01/23/18 1450)    Temp 98 3 °F (36 8 °C) (01/23/18 1450)    Pulse 78 (01/23/18 1450)   Resp 12 (01/23/18 1450)    SpO2 98 % (01/23/18 1450)

## 2018-01-23 NOTE — ASSESSMENT & PLAN NOTE
Patient reports about a weeks worth of belching , no appetite , hot and cold sweats  And palpitations  He reported no black stools and no vomiting of blood  patient takes 81 mg aspirin daily    · Agree with protonix drip  · Holding aspirin  · NPO  · GI Consult  · Transfuse PRBCS  · Hemoglobin holding stable in Mid 8's, on protonix drip  · EGD held due to increasing troponins, possible for am

## 2018-01-24 PROBLEM — I21.A1 NON-ST ELEVATION MYOCARDIAL INFARCTION (NSTEMI), TYPE 2: Status: ACTIVE | Noted: 2018-01-21

## 2018-01-24 PROBLEM — L03.114 CELLULITIS OF HAND, LEFT: Status: ACTIVE | Noted: 2018-01-24

## 2018-01-24 LAB
ABO GROUP BLD BPU: NORMAL
ANION GAP SERPL CALCULATED.3IONS-SCNC: 9 MMOL/L (ref 4–13)
BPU ID: NORMAL
BUN SERPL-MCNC: 13 MG/DL (ref 5–25)
CALCIUM SERPL-MCNC: 8.7 MG/DL (ref 8.3–10.1)
CHLORIDE SERPL-SCNC: 105 MMOL/L (ref 100–108)
CO2 SERPL-SCNC: 27 MMOL/L (ref 21–32)
CREAT SERPL-MCNC: 0.81 MG/DL (ref 0.6–1.3)
CROSSMATCH: NORMAL
ERYTHROCYTE [DISTWIDTH] IN BLOOD BY AUTOMATED COUNT: 16.8 % (ref 11.6–15.1)
GFR SERPL CREATININE-BSD FRML MDRD: 105 ML/MIN/1.73SQ M
GLUCOSE SERPL-MCNC: 174 MG/DL (ref 65–140)
HCT VFR BLD AUTO: 26.3 % (ref 36.5–49.3)
HGB BLD-MCNC: 8.7 G/DL (ref 12–17)
MCH RBC QN AUTO: 28.1 PG (ref 26.8–34.3)
MCHC RBC AUTO-ENTMCNC: 33.1 G/DL (ref 31.4–37.4)
MCV RBC AUTO: 85 FL (ref 82–98)
PLATELET # BLD AUTO: 357 THOUSANDS/UL (ref 149–390)
POTASSIUM SERPL-SCNC: 3.7 MMOL/L (ref 3.5–5.3)
RBC # BLD AUTO: 3.1 MILLION/UL (ref 3.88–5.62)
SODIUM SERPL-SCNC: 141 MMOL/L (ref 136–145)
UNIT DISPENSE STATUS: NORMAL
UNIT PRODUCT CODE: NORMAL
UNIT RH: NORMAL
WBC # BLD AUTO: 9.31 THOUSAND/UL (ref 4.31–10.16)

## 2018-01-24 PROCEDURE — 80048 BASIC METABOLIC PNL TOTAL CA: CPT | Performed by: PHYSICIAN ASSISTANT

## 2018-01-24 PROCEDURE — 99232 SBSQ HOSP IP/OBS MODERATE 35: CPT | Performed by: FAMILY MEDICINE

## 2018-01-24 PROCEDURE — 99232 SBSQ HOSP IP/OBS MODERATE 35: CPT | Performed by: INTERNAL MEDICINE

## 2018-01-24 PROCEDURE — C9113 INJ PANTOPRAZOLE SODIUM, VIA: HCPCS | Performed by: PHYSICIAN ASSISTANT

## 2018-01-24 PROCEDURE — 85027 COMPLETE CBC AUTOMATED: CPT | Performed by: PHYSICIAN ASSISTANT

## 2018-01-24 RX ORDER — PANTOPRAZOLE SODIUM 40 MG/1
40 TABLET, DELAYED RELEASE ORAL
Status: DISCONTINUED | OUTPATIENT
Start: 2018-01-24 | End: 2018-01-25 | Stop reason: HOSPADM

## 2018-01-24 RX ADMIN — CLONIDINE HYDROCHLORIDE 0.3 MG: 0.1 TABLET ORAL at 06:06

## 2018-01-24 RX ADMIN — PANTOPRAZOLE SODIUM 40 MG: 40 TABLET, DELAYED RELEASE ORAL at 09:47

## 2018-01-24 RX ADMIN — CEFAZOLIN SODIUM 1000 MG: 1 SOLUTION INTRAVENOUS at 12:07

## 2018-01-24 RX ADMIN — CLONIDINE HYDROCHLORIDE 0.3 MG: 0.1 TABLET ORAL at 21:12

## 2018-01-24 RX ADMIN — CEFAZOLIN SODIUM 1000 MG: 1 SOLUTION INTRAVENOUS at 19:33

## 2018-01-24 RX ADMIN — ATENOLOL 50 MG: 50 TABLET ORAL at 09:46

## 2018-01-24 RX ADMIN — SODIUM CHLORIDE 8 MG/HR: 9 INJECTION, SOLUTION INTRAVENOUS at 06:03

## 2018-01-24 RX ADMIN — PANTOPRAZOLE SODIUM 40 MG: 40 TABLET, DELAYED RELEASE ORAL at 16:12

## 2018-01-24 RX ADMIN — CLONIDINE HYDROCHLORIDE 0.3 MG: 0.1 TABLET ORAL at 13:53

## 2018-01-24 NOTE — PROGRESS NOTES
GI Progress Note - Renea Harrison 69 y o  male MRN: [id number]    Unit/Bed#: -01 Encounter: [id number]    Subjective: He feels well today, no abdominal pain/nausea/vomiting  No further BMs  Tolerating regular diet  Ambulated the halls today without chest pain or SOB  Objective:     Vitals: Blood pressure 124/67, pulse 65, temperature 97 9 °F (36 6 °C), temperature source Oral, resp  rate 20, height 5' 8" (1 727 m), weight 82 6 kg (182 lb 1 6 oz), SpO2 98 %  ,Body mass index is 27 69 kg/m²  Intake/Output Summary (Last 24 hours) at 01/24/18 1439  Last data filed at 01/24/18 0800   Gross per 24 hour   Intake              610 ml   Output                0 ml   Net              610 ml       Physical Exam:     General Appearance: Alert, oriented x3, no acute distress  Lungs: Clear to auscultation bilaterally, no respiratory distress  Heart: RRR, no murmur  Abdomen: Non-distended, soft, BS active, NTTP  Extremities: No cyanosis or edema    Invasive Devices     Peripheral Intravenous Line            Peripheral IV 01/24/18 Right Forearm less than 1 day          Airway            ETT  Cuffed;Oral 8 mm 159 days                Lab Results:    Results from last 7 days  Lab Units 01/24/18  0859  01/21/18  0844   WBC Thousand/uL 9 31  < > 10 35*   HEMOGLOBIN g/dL 8 7*  < > 6 9*   HEMATOCRIT % 26 3*  < > 21 6*   PLATELETS Thousands/uL 357  < > 334   NEUTROS PCT %  --   --  88*   LYMPHS PCT %  --   --  7*   MONOS PCT %  --   --  4   EOS PCT %  --   --  0   < > = values in this interval not displayed      Results from last 7 days  Lab Units 01/24/18  0859  01/21/18  0844   SODIUM mmol/L 141  < > 132*   POTASSIUM mmol/L 3 7  < > 3 4*   CHLORIDE mmol/L 105  < > 95*   CO2 mmol/L 27  < > 28   BUN mg/dL 13  < > 30*   CREATININE mg/dL 0 81  < > 0 92   CALCIUM mg/dL 8 7  < > 9 0   TOTAL PROTEIN g/dL  --   --  6 5   BILIRUBIN TOTAL mg/dL  --   --  0 40   ALK PHOS U/L  --   --  60   ALT U/L  --   --  18   AST U/L  --   --  15 GLUCOSE RANDOM mg/dL 174*  < > 181*   < > = values in this interval not displayed  Results from last 7 days  Lab Units 01/21/18  0844   INR  0 97           Imaging Studies: I have personally reviewed pertinent imaging studies  Xr Chest 1 View Portable  Result Date: 1/21/2018  Impression: No active pulmonary disease  Assessment and Plan:     Symptomatic Anemia  Melena  - S/P EGD yesterday with a normal esophagus, single large cratered ulcer measuring 1 5 x 2 cm in the incisura with black spot, nonbleeding  - F/U biopsies in 1-2 weeks  - Continue nonulcerogenic diet as tolerated  - Continue protonix 40 mg BID for at least 2-3 months  - Will arrange 1 month follow up and will schedule repeat EGD in 2-3 months  - Hb has remained stable, continue to monitor for any further signs of bleeding      The patient will be seen by Dr Sterling Jamil  Pt is stable for discharge from GI standpoint   GI will sign off, call with questions

## 2018-01-24 NOTE — PLAN OF CARE
CARDIOVASCULAR - ADULT     Maintains optimal cardiac output and hemodynamic stability Progressing     Absence of cardiac dysrhythmias or at baseline rhythm Progressing        DISCHARGE PLANNING - CARE MANAGEMENT     Discharge to post-acute care or home with appropriate resources Progressing        HEMATOLOGIC - ADULT     Maintains hematologic stability Progressing        Potential for Falls     Patient will remain free of falls Progressing

## 2018-01-24 NOTE — PROGRESS NOTES
General Cardiology   Progress Note   Kati Ovalles 69 y o  male MRN: [id number]  Unit/Bed#: -01 Encounter: [id number]      SUBJECTIVE:   No significant events overnight  Results of endoscopy reviewed with patient  Patient appears to have cellulitis of the left hand  Patient is being treated with IV antibiotics  REVIEW OF SYSTEMS:  Constitutional:  Denies fever or chills   Eyes:  Denies change in visual acuity   HENT:  Denies nasal congestion or sore throat   Respiratory:  Denies cough or shortness of breath   Cardiovascular:  Denies chest pain or edema     :  Denies dysuria, frequency, difficulty in micturition and nocturia  Musculoskeletal:  Denies back pain or joint pain   Neurologic:  Denies headache, focal weakness or sensory changes   Endocrine:  Denies polyuria or polydipsia   Lymphatic:  Denies swollen glands   Psychiatric:  Denies depression or anxiety     OBJECTIVE:   Vitals:  Vitals:    01/24/18 0700   BP: 115/68   Pulse: 86   Resp: 18   Temp: 98 5 °F (36 9 °C)   SpO2: 98%     Body mass index is 27 69 kg/m²  Systolic (14KCW), LHK:620 , Min:96 , GYC:250     Diastolic (07CAX), EQU:87, Min:50, Max:74      Intake/Output Summary (Last 24 hours) at 01/24/18 1117  Last data filed at 01/23/18 1446   Gross per 24 hour   Intake              600 ml   Output                0 ml   Net              600 ml     Weight (last 2 days)     Date/Time   Weight    01/22/18 1642  82 6 (182 1)                  PHYSICAL EXAMS:  General:  Patient is not in acute distress, laying in the bed comfortably, awake, alert responding to commands  Head: Normocephalic, Atraumatic  HEENT:  Both pupils normal-size atraumatic, normocephalic, nonicteric  pallor  Neck:  JVP not raised  Trachea central  Respiratory:  Bronchovascular breathing all over the chest without any accompaniment  Cardiovascular:  Regular rate and rhythm with a 2/6 systolic murmur in the right sternal border  GI:  Abdomen soft nontender   Liver and spleen normal size  Lymphatic:  No cervical or inguinal lymphadenopathy  Neurologic:  Patient is awake alert, responding to command, well-oriented to time and place and person moving     LABORATORY RESULTS:    Results from last 7 days  Lab Units 01/23/18  1243 01/23/18  0938 01/23/18  0625   TROPONIN I ng/mL 2 15* 2 57* 2 99*       CBC with diff:   Results from last 7 days  Lab Units 01/24/18  0859 01/23/18  0625 01/22/18  1722 01/22/18  1118  01/21/18  0844   WBC Thousand/uL 9 31 8 39  --  9 32  --  10 35*   HEMOGLOBIN g/dL 8 7* 7 8* 8 3* 8 2*  < > 6 9*   HEMATOCRIT % 26 3* 23 8* 26 0* 24 8*  < > 21 6*   MCV fL 85 84  --  82  --  83   PLATELETS Thousands/uL 357 291  --  302  --  334   MCH pg 28 1 27 5  --  27 2  --  26 5*   MCHC g/dL 33 1 32 8  --  33 1  --  31 9   RDW % 16 8* 17 0*  --  16 8*  --  17 2*   MPV fL  --  8 5*  --  8 8*  --  9 4   NRBC AUTO /100 WBCs  --   --   --   --   --  0   < > = values in this interval not displayed      CMP:  Results from last 7 days  Lab Units 01/24/18  0859 01/22/18  1118 01/21/18  0844   SODIUM mmol/L 141 142 132*   POTASSIUM mmol/L 3 7 3 2* 3 4*   CHLORIDE mmol/L 105 105 95*   CO2 mmol/L 27 26 28   ANION GAP mmol/L 9 11 9   BUN mg/dL 13 12 30*   CREATININE mg/dL 0 81 0 66 0 92   GLUCOSE RANDOM mg/dL 174* 110 181*   CALCIUM mg/dL 8 7 8 9 9 0   AST U/L  --   --  15   ALT U/L  --   --  18   ALK PHOS U/L  --   --  60   TOTAL PROTEIN g/dL  --   --  6 5   BILIRUBIN TOTAL mg/dL  --   --  0 40   EGFR ml/min/1 73sq m 105 114 98       BMP:  Results from last 7 days  Lab Units 01/24/18  0859 01/22/18  1118 01/21/18  0844   SODIUM mmol/L 141 142 132*   POTASSIUM mmol/L 3 7 3 2* 3 4*   CHLORIDE mmol/L 105 105 95*   CO2 mmol/L 27 26 28   BUN mg/dL 13 12 30*   CREATININE mg/dL 0 81 0 66 0 92   GLUCOSE RANDOM mg/dL 174* 110 181*   CALCIUM mg/dL 8 7 8 9 9 0              Results from last 7 days  Lab Units 01/21/18  0844   MAGNESIUM mg/dL 1 9           Results from last 7 days  Lab Units 18  0844   TSH 3RD GENERATON uIU/mL 0 406       Results from last 7 days  Lab Units 18  0844   INR  0 97       Lipid Profile:   Lab Results   Component Value Date    CHOL 125 2016    CHOL 136 2015    CHOL 135 2015     Lab Results   Component Value Date    HDL 47 2016    HDL 43 2015    HDL 48 2015     Lab Results   Component Value Date    LDLCALC 64 2016    LDLCALC 82 2015    LDLCALC 78 2015     Lab Results   Component Value Date    TRIG 73 2017    TRIG 68 2016    TRIG 57 2015       Cardiac testing:  Results for orders placed during the hospital encounter of 18   Echo complete with contrast if indicated    Narrative [address] [place] [address]  [phone]    Transthoracic Echocardiogram  2D, M-mode, Doppler, and Color Doppler    Study date:  2018    Patient: Jody Trevizo  MR number: [id number]  Account number: [de-identified]  : 1950  Age: 69 years  Gender: Male  Status: Inpatient  Location: Emergency room  Height: 68 in  Weight: 181 9 lb  BP: 158/ 79 mmHg    Indications: Cardiomyopathy, Assess left ventricular function  Diagnoses: I42 9 - Cardiomyopathy, unspecified    Sonographer:  Lizeth Vasquez  Interpreting Physician:  Lisa Maldonado MD  Referring Physician:  Xochitl Santamaria PA-C  Group:  [facility]    SUMMARY    PROCEDURE INFORMATION:  This was a technically difficult study  Echocardiographic views were limited due to low windows, decreased penetration, and lung interference  LEFT VENTRICLE:  The cavity was small  Systolic function was normal  Ejection fraction was estimated to be 60 %  There were no regional wall motion abnormalities  There was moderate concentric hypertrophy  Doppler parameters were consistent with abnormal left ventricular relaxation (grade 1 diastolic dysfunction)      VENTRICULAR SEPTUM:  There was moderate asymmetrical hypertrophy of the septum  RIGHT VENTRICLE:  The size was normal   Systolic function was normal     LEFT ATRIUM:  The atrium was mildly dilated  MITRAL VALVE:  There was mild annular calcification  There was mild regurgitation  AORTIC VALVE:  There was mild regurgitation  TRICUSPID VALVE:  There was trace regurgitation  Estimated peak PA pressure was 42 mmHg  HISTORY: PRIOR HISTORY: NSTEMI, murmur, palpitations, Risk factors: hypertension, diabetes, and hypercholesterolemia  PROCEDURE: The procedure was performed in the emergency room  This was a routine study  The transthoracic approach was used  The study included complete 2D imaging, M-mode, complete spectral Doppler, and color Doppler  The heart rate was  90 bpm, at the start of the study  Images were obtained from the parasternal, apical, subcostal, and suprasternal notch acoustic windows  Echocardiographic views were limited due to low windows, decreased penetration, and lung  interference  This was a technically difficult study  LEFT VENTRICLE: The cavity was small  Systolic function was normal  Ejection fraction was estimated to be 60 %  There were no regional wall motion abnormalities  There was moderate concentric hypertrophy  No evidence of apical thrombus  DOPPLER: Doppler parameters were consistent with abnormal left ventricular relaxation (grade 1 diastolic dysfunction)  VENTRICULAR SEPTUM: There was moderate asymmetrical hypertrophy of the septum  RIGHT VENTRICLE: The size was normal  Systolic function was normal  Wall thickness was normal     LEFT ATRIUM: The atrium was mildly dilated  RIGHT ATRIUM: Size was normal     MITRAL VALVE: There was mild annular calcification  There was normal leaflet separation  DOPPLER: The transmitral velocity was within the normal range  There was no evidence for stenosis  There was mild regurgitation  AORTIC VALVE: The valve was trileaflet   Leaflets exhibited normal thickness and normal cuspal separation  DOPPLER: Transaortic velocity was within the normal range  There was no evidence for stenosis  There was mild regurgitation  TRICUSPID VALVE: The valve structure was normal  There was normal leaflet separation  DOPPLER: The transtricuspid velocity was within the normal range  There was no evidence for stenosis  There was trace regurgitation  Estimated peak PA  pressure was 42 mmHg  PULMONIC VALVE: Leaflets exhibited normal thickness, no calcification, and normal cuspal separation  DOPPLER: The transpulmonic velocity was within the normal range  There was no significant regurgitation  PERICARDIUM: There was no pericardial effusion  The pericardium was normal in appearance  AORTA: The root exhibited normal size  SYSTEMIC VEINS: IVC: The inferior vena cava was normal in size  Respirophasic changes were normal     SYSTEM MEASUREMENT TABLES    2D  LA Area: 20 5 cm2  LVEDV MOD A4C: 248 9 ml  LVEF MOD A4C: 37 2 %  LVESV MOD A4C: 156 3 ml  LVLd A4C: 10 2 cm  LVLs A4C: 9 7 cm  RA Area: 15 8 cm2  RVIDd: 3 3 cm  SV MOD A4C: 92 7 ml    CW  AR Dec [place]: 2 8 m/s2  AR Dec Time: 1670 9 ms  AR PHT: 484 6 ms  AR Vmax: 4 7 m/s  AR maxP 8 mmHg  TR Vmax: 3 3 m/s  TR maxP 5 mmHg    MM  TAPSE: 2 4 cm    PW  E': 0 1 m/s  E/E': 13 7  MV A Shadi: 0 9 m/s  MV Dec [place]: 3 7 m/s2  MV DecT: 217 6 ms  MV E Shadi: 0 8 m/s  MV E/A Ratio: 0 8  MV PHT: 63 1 ms  MVA By PHT: 3 5 cm2    [employer] Accredited Echocardiography Laboratory    Prepared and electronically signed by    Loc Gifford MD  Signed 2018 15:33:20       No results found for this or any previous visit  No results found for this or any previous visit  No procedure found  No results found for this or any previous visit      Meds/Allergies   all current active meds have been reviewed  Prescriptions Prior to Admission   Medication    aspirin 81 mg chewable tablet    atenolol (TENORMIN) 50 mg tablet    atorvastatin (LIPITOR) 80 mg tablet    cloNIDine (CATAPRES) 0 3 mg tablet    hydrochlorothiazide (HYDRODIURIL) 25 mg tablet    lisinopril (ZESTRIL) 40 mg tablet    metFORMIN (GLUCOPHAGE-XR) 500 mg 24 hr tablet    Omega-3 1000 MG CAPS          ASSESSMENT & PLAN   Patient with known history of hypertension, hypertrophic cardiomyopathy with history of moderate coronary artery disease by cardiac catheterization in the past with the significant GI bleed likely secondary to gastric ulcer  Patient had blood transfusion  Positive troponins likely secondary to type 2 non STEMI from demand ischemia  Patient has no cardiovascular symptoms presently  Patient counseled to quit smoking to prevent future cardiovascular events  Patient will probably be discharged tomorrow  On antibiotics for cellulitis in the left hand  Medications reviewed  Discussed with patient family as well as hospitalist service  Tia Greenberg MD  1/24/2018,11:17 AM    Portions of the record may have been created with voice recognition software   Occasional wrong word or "sound a like" substitutions may have occurred due to the inherent limitations of voice recognition software   Read the chart carefully and recognize, using context, where substitutions have occurred

## 2018-01-24 NOTE — PLAN OF CARE
Problem: Potential for Falls  Goal: Patient will remain free of falls  INTERVENTIONS:  - Assess patient frequently for physical needs  -  Identify cognitive and physical deficits and behaviors that affect risk of falls  -  [place] fall precautions as indicated by assessment   - Educate patient/family on patient safety including physical limitations  - Instruct patient to call for assistance with activity based on assessment  - Modify environment to reduce risk of injury  - Consider OT/PT consult to assist with strengthening/mobility   Outcome: Progressing      Problem: CARDIOVASCULAR - ADULT  Goal: Maintains optimal cardiac output and hemodynamic stability  INTERVENTIONS:  - Monitor I/O, vital signs and rhythm  - Monitor for S/S and trends of decreased cardiac output i e  bleeding, hypotension  - Administer and titrate ordered vasoactive medications to optimize hemodynamic stability  - Assess quality of pulses, skin color and temperature  - Assess for signs of decreased coronary artery perfusion - ex   Angina  - Instruct patient to report change in severity of symptoms   Outcome: Progressing    Goal: Absence of cardiac dysrhythmias or at baseline rhythm  INTERVENTIONS:  - Continuous cardiac monitoring, monitor vital signs, obtain 12 lead EKG if indicated  - Administer antiarrhythmic and heart rate control medications as ordered  - Monitor electrolytes and administer replacement therapy as ordered   Outcome: Progressing      Problem: HEMATOLOGIC - ADULT  Goal: Maintains hematologic stability  INTERVENTIONS  - Assess for signs and symptoms of bleeding or hemorrhage  - Monitor labs  - Administer supportive blood products/factors as ordered and appropriate   Outcome: Progressing

## 2018-01-24 NOTE — PROGRESS NOTES
[facility] Internal Medicine Progress Note  Patient: Kati Ovalles 69 y o  male   MRN: [id number]  PCP: Colletta Lolling, MD  Unit/Bed#: -01 Encounter: [id number]  Date Of Visit: 01/24/18    Assessment:    Principal Problem:    GI bleed  Active Problems:    Hyperlipidemia    Non-ST elevation myocardial infarction (NSTEMI), type 2 ([facility] )    HTN (hypertension)    Diabetes type 2, uncontrolled ([facility] )    Cellulitis of hand, left      Plan:    · GI bleed: This could be secondary to ulcer that is found  Patient was transfused 1 unit of PRBCs  From that standpoint he is stable  Hemoglobin has remained stable  · Left wrist cellulitis:  The patient did have an IV in that arm which may have infiltrated  It is red and warm  I will put him on 24 hours of IV antibiotics and put some compresses on the wrist   · Non STEMI type 2 secondary to GI bleed  Echocardiogram was reviewed  He does have some hypertrophic cardiomyopathy  However nothing further from the cardiology standpoint to be done at this time  · Diabetes mellitus type 2:  Continue sliding scale for now  · Hyperlipidemia:  Continue statin      VTE Pharmacologic Prophylaxis:   Pharmacologic: Pharmacologic VTE Prophylaxis contraindicated due to GI bleed  Mechanical VTE Prophylaxis in Place: Yes    Patient Centered Rounds: I have performed bedside rounds with nursing staff today  Discussions with Specialists or Other Care Team Provider:  Discussed with Cardiology    Education and Discussions with Family / Patient:  Wife at the bedside    Time Spent for Care: 20 minutes  More than 50% of total time spent on counseling and coordination of care as described above      Current Length of Stay: 3 day(s)    Current Patient Status: Inpatient   Certification Statement: The patient will continue to require additional inpatient hospital stay due to Having a hand cellulitis needing IV antibiotics    Discharge Plan:  Anticipate discharge tomorrow    Code Status: Level 1 - Full Code      Subjective:   Patient seen examined  Complaining of wrist pain and swelling  Objective:     Vitals:   Temp (24hrs), Av 2 °F (36 8 °C), Min:97 5 °F (36 4 °C), Max:98 5 °F (36 9 °C)    HR:  [63-86] 86  Resp:  [12-18] 18  BP: ()/(50-74) 115/68  SpO2:  [97 %-99 %] 98 %  Body mass index is 27 69 kg/m²  Input and Output Summary (last 24 hours): Intake/Output Summary (Last 24 hours) at 18 1127  Last data filed at 18 1446   Gross per 24 hour   Intake              600 ml   Output                0 ml   Net              600 ml       Physical Exam:     General Appearance:    Alert, cooperative, no distress, appears stated age                               Lungs:     Clear to auscultation bilaterally, respirations unlabored       Heart:    Regular rate and rhythm, S1 and S2 normal, no murmur, rub    or gallop   Abdomen:     Soft, non-tender, bowel sounds active all four quadrants,     no masses, no organomegaly           Extremities:   Extremities normal, atraumatic, no cyanosis or edema   Pulses:   2+ and symmetric all extremities   Skin: Redness and erythema of the left wrist with warm to touch               Additional Data:     Labs:      Results from last 7 days  Lab Units 18  0859  18  0844   WBC Thousand/uL 9 31  < > 10 35*   HEMOGLOBIN g/dL 8 7*  < > 6 9*   HEMATOCRIT % 26 3*  < > 21 6*   PLATELETS Thousands/uL 357  < > 334   NEUTROS PCT %  --   --  88*   LYMPHS PCT %  --   --  7*   MONOS PCT %  --   --  4   EOS PCT %  --   --  0   < > = values in this interval not displayed      Results from last 7 days  Lab Units 18  0859  18  0844   SODIUM mmol/L 141  < > 132*   POTASSIUM mmol/L 3 7  < > 3 4*   CHLORIDE mmol/L 105  < > 95*   CO2 mmol/L 27  < > 28   BUN mg/dL 13  < > 30*   CREATININE mg/dL 0 81  < > 0 92   CALCIUM mg/dL 8 7  < > 9 0   TOTAL PROTEIN g/dL  --   --  6 5   BILIRUBIN TOTAL mg/dL  --   --  0 40   ALK PHOS U/L  --   --  60   ALT U/L --   --  18   AST U/L  --   --  15   GLUCOSE RANDOM mg/dL 174*  < > 181*   < > = values in this interval not displayed  Results from last 7 days  Lab Units 01/21/18  0844   INR  0 97       * I Have Reviewed All Lab Data Listed Above  * Additional Pertinent Lab Tests Reviewed: [facility] Admission Reviewed        Recent Cultures (last 7 days):           Last 24 Hours Medication List:     atenolol 50 mg Oral Daily   cefazolin 1,000 mg Intravenous Q8H   cloNIDine 0 3 mg Oral Q8H [facility]   pantoprazole 40 mg Oral BID AC        Today, Patient Was Seen By: Simon Escobar MD    ** Please Note: Dragon 360 Dictation voice to text software may have been used in the creation of this document   **

## 2018-01-25 VITALS
OXYGEN SATURATION: 99 % | HEART RATE: 59 BPM | BODY MASS INDEX: 27.6 KG/M2 | RESPIRATION RATE: 18 BRPM | DIASTOLIC BLOOD PRESSURE: 70 MMHG | TEMPERATURE: 97.6 F | HEIGHT: 68 IN | SYSTOLIC BLOOD PRESSURE: 152 MMHG | WEIGHT: 182.1 LBS

## 2018-01-25 PROBLEM — K92.2 GI BLEED: Status: RESOLVED | Noted: 2018-01-21 | Resolved: 2018-01-25

## 2018-01-25 PROCEDURE — 99238 HOSP IP/OBS DSCHRG MGMT 30/<: CPT | Performed by: FAMILY MEDICINE

## 2018-01-25 RX ORDER — PANTOPRAZOLE SODIUM 40 MG/1
40 TABLET, DELAYED RELEASE ORAL
Qty: 60 TABLET | Refills: 0 | Status: ON HOLD | OUTPATIENT
Start: 2018-01-25 | End: 2018-05-14 | Stop reason: SDUPTHER

## 2018-01-25 RX ORDER — CEPHALEXIN 500 MG/1
500 CAPSULE ORAL 4 TIMES DAILY
Qty: 24 CAPSULE | Refills: 0 | Status: SHIPPED | OUTPATIENT
Start: 2018-01-25 | End: 2018-02-02

## 2018-01-25 RX ADMIN — ATENOLOL 50 MG: 50 TABLET ORAL at 10:33

## 2018-01-25 RX ADMIN — CLONIDINE HYDROCHLORIDE 0.3 MG: 0.1 TABLET ORAL at 06:13

## 2018-01-25 RX ADMIN — CEFAZOLIN SODIUM 1000 MG: 1 SOLUTION INTRAVENOUS at 03:36

## 2018-01-25 RX ADMIN — PANTOPRAZOLE SODIUM 40 MG: 40 TABLET, DELAYED RELEASE ORAL at 06:13

## 2018-01-25 NOTE — PROGRESS NOTES
General Cardiology   Progress Note   Shereen Beverly 69 y o  male MRN: [id number]  Unit/Bed#: -01 Encounter: [id number]      SUBJECTIVE:   No significant events overnight  Patient denies any chest pain or shortness of breath  Cellulitis in the left and appears to have improved  Patient is on antibiotics  REVIEW OF SYSTEMS:  Constitutional:  Denies fever or chills   Eyes:  Denies change in visual acuity   HENT:  Denies nasal congestion or sore throat   Respiratory:  Denies cough or shortness of breath   Cardiovascular:  Denies chest pain or edema     :  Denies dysuria, frequency, difficulty in micturition and nocturia  Musculoskeletal:  Denies back pain or joint pain   Neurologic:  Denies headache, focal weakness or sensory changes   Endocrine:  Denies polyuria or polydipsia   Lymphatic:  Denies swollen glands   Psychiatric:  Denies depression or anxiety     OBJECTIVE:   Vitals:  Vitals:    01/25/18 0700   BP: 130/70   Pulse: 62   Resp: 17   Temp: 98 1 °F (36 7 °C)   SpO2: 99%     Body mass index is 27 69 kg/m²  Systolic (78HLE), YAK:305 , Min:112 , UQP:040     Diastolic (56RWQ), AQN:64, Min:61, Max:70      Intake/Output Summary (Last 24 hours) at 01/25/18 0948  Last data filed at 01/25/18 7919   Gross per 24 hour   Intake             1180 ml   Output                0 ml   Net             1180 ml     Weight (last 2 days)     None              PHYSICAL EXAMS:  General:  Patient is not in acute distress, laying in the bed comfortably, awake, alert responding to commands  Head: Normocephalic, Atraumatic  HEENT:  Both pupils normal-size atraumatic, normocephalic, nonicteric  Pallor  Neck:  JVP not raised  Trachea central  Respiratory:  Bronchovascular breathing all over the chest without any accompaniment  Cardiovascular:  2/6 systolic murmur in the right sternal border  GI:  Abdomen soft nontender   Liver and spleen normal size  Lymphatic:  No cervical or inguinal lymphadenopathy  Neurologic:  Patient is awake alert, responding to command, well-oriented to time and place and person moving     LABORATORY RESULTS:    Results from last 7 days  Lab Units 01/23/18  1243 01/23/18  0938 01/23/18  0625   TROPONIN I ng/mL 2 15* 2 57* 2 99*       CBC with diff:   Results from last 7 days  Lab Units 01/24/18  0859 01/23/18  0625 01/22/18  1722 01/22/18  1118  01/21/18  0844   WBC Thousand/uL 9 31 8 39  --  9 32  --  10 35*   HEMOGLOBIN g/dL 8 7* 7 8* 8 3* 8 2*  < > 6 9*   HEMATOCRIT % 26 3* 23 8* 26 0* 24 8*  < > 21 6*   MCV fL 85 84  --  82  --  83   PLATELETS Thousands/uL 357 291  --  302  --  334   MCH pg 28 1 27 5  --  27 2  --  26 5*   MCHC g/dL 33 1 32 8  --  33 1  --  31 9   RDW % 16 8* 17 0*  --  16 8*  --  17 2*   MPV fL  --  8 5*  --  8 8*  --  9 4   NRBC AUTO /100 WBCs  --   --   --   --   --  0   < > = values in this interval not displayed      CMP:  Results from last 7 days  Lab Units 01/24/18  0859 01/22/18  1118 01/21/18  0844   SODIUM mmol/L 141 142 132*   POTASSIUM mmol/L 3 7 3 2* 3 4*   CHLORIDE mmol/L 105 105 95*   CO2 mmol/L 27 26 28   ANION GAP mmol/L 9 11 9   BUN mg/dL 13 12 30*   CREATININE mg/dL 0 81 0 66 0 92   GLUCOSE RANDOM mg/dL 174* 110 181*   CALCIUM mg/dL 8 7 8 9 9 0   AST U/L  --   --  15   ALT U/L  --   --  18   ALK PHOS U/L  --   --  60   TOTAL PROTEIN g/dL  --   --  6 5   BILIRUBIN TOTAL mg/dL  --   --  0 40   EGFR ml/min/1 73sq m 105 114 98       BMP:  Results from last 7 days  Lab Units 01/24/18  0859 01/22/18  1118 01/21/18  0844   SODIUM mmol/L 141 142 132*   POTASSIUM mmol/L 3 7 3 2* 3 4*   CHLORIDE mmol/L 105 105 95*   CO2 mmol/L 27 26 28   BUN mg/dL 13 12 30*   CREATININE mg/dL 0 81 0 66 0 92   GLUCOSE RANDOM mg/dL 174* 110 181*   CALCIUM mg/dL 8 7 8 9 9 0              Results from last 7 days  Lab Units 01/21/18  0844   MAGNESIUM mg/dL 1 9           Results from last 7 days  Lab Units 01/21/18  0844   TSH 3RD GENERATON uIU/mL 0 406       Results from last 7 days  Lab Units 18  0844   INR  0 97       Lipid Profile:   Lab Results   Component Value Date    CHOL 125 2016    CHOL 136 2015    CHOL 135 2015     Lab Results   Component Value Date    HDL 47 2016    HDL 43 2015    HDL 48 2015     Lab Results   Component Value Date    LDLCALC 64 2016    LDLCALC 82 2015    LDLCALC 78 2015     Lab Results   Component Value Date    TRIG 73 2017    TRIG 68 2016    TRIG 57 2015       Cardiac testing:  Results for orders placed during the hospital encounter of 18   Echo complete with contrast if indicated    Narrative [address] [place] [address]  [phone]    Transthoracic Echocardiogram  2D, M-mode, Doppler, and Color Doppler    Study date:  2018    Patient: Imani Blunt  MR number: [id number]  Account number: [de-identified]  : 1950  Age: 69 years  Gender: Male  Status: Inpatient  Location: Emergency room  Height: 68 in  Weight: 181 9 lb  BP: 158/ 79 mmHg    Indications: Cardiomyopathy, Assess left ventricular function  Diagnoses: I42 9 - Cardiomyopathy, unspecified    Sonographer:  Lizeth Palacio  Interpreting Physician:  Nadine Crowder MD  Referring Physician:  Kaci Aponte PA-C  Group:  [facility]    SUMMARY    PROCEDURE INFORMATION:  This was a technically difficult study  Echocardiographic views were limited due to low windows, decreased penetration, and lung interference  LEFT VENTRICLE:  The cavity was small  Systolic function was normal  Ejection fraction was estimated to be 60 %  There were no regional wall motion abnormalities  There was moderate concentric hypertrophy  Doppler parameters were consistent with abnormal left ventricular relaxation (grade 1 diastolic dysfunction)  VENTRICULAR SEPTUM:  There was moderate asymmetrical hypertrophy of the septum      RIGHT VENTRICLE:  The size was normal   Systolic function was normal     LEFT ATRIUM:  The atrium was mildly dilated  MITRAL VALVE:  There was mild annular calcification  There was mild regurgitation  AORTIC VALVE:  There was mild regurgitation  TRICUSPID VALVE:  There was trace regurgitation  Estimated peak PA pressure was 42 mmHg  HISTORY: PRIOR HISTORY: NSTEMI, murmur, palpitations, Risk factors: hypertension, diabetes, and hypercholesterolemia  PROCEDURE: The procedure was performed in the emergency room  This was a routine study  The transthoracic approach was used  The study included complete 2D imaging, M-mode, complete spectral Doppler, and color Doppler  The heart rate was  90 bpm, at the start of the study  Images were obtained from the parasternal, apical, subcostal, and suprasternal notch acoustic windows  Echocardiographic views were limited due to low windows, decreased penetration, and lung  interference  This was a technically difficult study  LEFT VENTRICLE: The cavity was small  Systolic function was normal  Ejection fraction was estimated to be 60 %  There were no regional wall motion abnormalities  There was moderate concentric hypertrophy  No evidence of apical thrombus  DOPPLER: Doppler parameters were consistent with abnormal left ventricular relaxation (grade 1 diastolic dysfunction)  VENTRICULAR SEPTUM: There was moderate asymmetrical hypertrophy of the septum  RIGHT VENTRICLE: The size was normal  Systolic function was normal  Wall thickness was normal     LEFT ATRIUM: The atrium was mildly dilated  RIGHT ATRIUM: Size was normal     MITRAL VALVE: There was mild annular calcification  There was normal leaflet separation  DOPPLER: The transmitral velocity was within the normal range  There was no evidence for stenosis  There was mild regurgitation  AORTIC VALVE: The valve was trileaflet  Leaflets exhibited normal thickness and normal cuspal separation   DOPPLER: Transaortic velocity was within the normal range  There was no evidence for stenosis  There was mild regurgitation  TRICUSPID VALVE: The valve structure was normal  There was normal leaflet separation  DOPPLER: The transtricuspid velocity was within the normal range  There was no evidence for stenosis  There was trace regurgitation  Estimated peak PA  pressure was 42 mmHg  PULMONIC VALVE: Leaflets exhibited normal thickness, no calcification, and normal cuspal separation  DOPPLER: The transpulmonic velocity was within the normal range  There was no significant regurgitation  PERICARDIUM: There was no pericardial effusion  The pericardium was normal in appearance  AORTA: The root exhibited normal size  SYSTEMIC VEINS: IVC: The inferior vena cava was normal in size  Respirophasic changes were normal     SYSTEM MEASUREMENT TABLES    2D  LA Area: 20 5 cm2  LVEDV MOD A4C: 248 9 ml  LVEF MOD A4C: 37 2 %  LVESV MOD A4C: 156 3 ml  LVLd A4C: 10 2 cm  LVLs A4C: 9 7 cm  RA Area: 15 8 cm2  RVIDd: 3 3 cm  SV MOD A4C: 92 7 ml    CW  AR Dec Monroe: 2 8 m/s2  AR Dec Time: 1670 9 ms  AR PHT: 484 6 ms  AR Vmax: 4 7 m/s  AR maxP 8 mmHg  TR Vmax: 3 3 m/s  TR maxP 5 mmHg    MM  TAPSE: 2 4 cm    PW  E': 0 1 m/s  E/E': 13 7  MV A Shadi: 0 9 m/s  MV Dec Monroe: 3 7 m/s2  MV DecT: 217 6 ms  MV E Shadi: 0 8 m/s  MV E/A Ratio: 0 8  MV PHT: 63 1 ms  MVA By PHT: 3 5 cm2    [facility] Accredited [facility]chocardi[facility]    Prepared and electronically signed by    Lisa Maldonado MD  Signed 2018 15:33:20       No results found for this or any previous visit  No results found for this or any previous visit  No procedure found  No results found for this or any previous visit      Meds/Allergies   all current active meds have been reviewed  Prescriptions Prior to Admission   Medication    aspirin 81 mg chewable tablet    atenolol (TENORMIN) 50 mg tablet    atorvastatin (LIPITOR) 80 mg tablet    cloNIDine (CATAPRES) 0 3 mg tablet    hydrochlorothiazide (HYDRODIURIL) 25 mg tablet    lisinopril (ZESTRIL) 40 mg tablet    metFORMIN (GLUCOPHAGE-XR) 500 mg 24 hr tablet    Omega-3 1000 MG CAPS          ASSESSMENT & PLAN   Patient with known history of hypertension, hypertrophic cardiomyopathy and a history of moderate LAD disease as well as diabetes with the admission for GI bleed  Patient did have elevated troponins likely secondary to type 2 non STEMI from GI bleed  Patient does not have any symptoms of chest pain or shortness of breath  Continue medical management for his hypertension as well as coronary artery disease  Patient is strongly counseled to quit smoking to prevent future cardiovascular events  Patient will likely be discharged today with follow-up me in the next 2 to 3 weeks  Patient will also need  appointment with primary care physician in the next week for so  Jerzy Romero MD  1/25/2018,9:48 AM    Portions of the record may have been created with voice recognition software   Occasional wrong word or "sound a like" substitutions may have occurred due to the inherent limitations of voice recognition software   Read the chart carefully and recognize, using context, where substitutions have occurred

## 2018-01-25 NOTE — DISCHARGE INSTRUCTIONS
Diet for Stomach Ulcers and Gastritis   WHAT YOU NEED TO KNOW:   A diet for stomach ulcers and gastritis is a meal plan that limits foods that irritate your stomach  Certain foods may worsen symptoms such as stomach pain, bloating, heartburn, or indigestion  DISCHARGE INSTRUCTIONS:   Foods to limit or avoid:  You may need to avoid acidic, spicy, or high-fat foods  Not all foods affect everyone the same way  You will need to learn which foods worsen your symptoms and limit those foods  The following are some foods that may worsen ulcer or gastritis symptoms:  · Beverages:      ¨ Whole milk and chocolate milk    ¨ Hot cocoa and cola    ¨ Any beverage with caffeine    ¨ Regular and decaffeinated coffee    ¨ Peppermint and spearmint tea    ¨ Green and black tea, with or without caffeine    ¨ Orange and grapefruit juices    ¨ Drinks that contain alcohol    · Spices and seasonings:      ¨ Black and red pepper    ¨ Chili powder    ¨ Mustard seed and nutmeg    · Other foods:      ¨ Dairy foods made from whole milk or cream    ¨ Chocolate    ¨ Spicy or strongly flavored cheeses, such as jalapeno or black pepper    ¨ Highly seasoned, high-fat meats, such as sausage, salami, brothers, ham, and cold cuts    ¨ Hot chiles and peppers    ¨ Tomato products, such as tomato paste, tomato sauce, or tomato juice  Foods to include:  Eat a variety of healthy foods from all the food groups  Eat fruits, vegetables, whole grains, and fat-free or low-fat dairy foods  Whole grains include whole-wheat breads, cereals, pasta, and brown rice  Choose lean meats, poultry (chicken and turkey), fish, beans, eggs, and nuts  A healthy meal plan is low in unhealthy fats, salt, and added sugar  Healthy fats include olive oil and canola oil  Ask your dietitian for more information about a healthy diet  Other helpful guidelines:   · Do not eat right before bedtime  Stop eating at least 2 hours before bedtime  · Eat small, frequent meals    Your stomach may tolerate small, frequent meals better than large meals  © 2017 [employer]  Information is for End User's use only and may not be sold, redistributed or otherwise used for commercial purposes  All illustrations and images included in CareNotes® are the copyrighted property of [employer]  or [employer]  The above information is an  only  It is not intended as medical advice for individual conditions or treatments  Talk to your doctor, nurse or pharmacist before following any medical regimen to see if it is safe and effective for you

## 2018-01-25 NOTE — DISCHARGE SUMMARY
Discharge Summary - [facility]    Patient Information: Hussein Piper 69 y o  male MRN: [id number]  Unit/Bed#: -01 Encounter: [id number]    Discharging Physician / Practitioner: Darrius Husain MD  PCP: Kalani Ugalde MD  Admission Date: 1/21/2018  Discharge Date: 01/25/18    Disposition:     Home    Reason for Admission:  GI bleed    Discharge Diagnoses:     Principal Problem (Resolved):    GI bleed  Active Problems:    Hyperlipidemia    Non-ST elevation myocardial infarction (NSTEMI), type 2 ([facility] [address] )    HTN (hypertension)    Diabetes type 2, uncontrolled ([facility] )    Cellulitis of hand, left      Consultations During Hospital Stay:  · Gastroenterology  · [facility] Cardiology    Procedures Performed:     EGD:Normal esophagus  A large cratered ulcer found in the incisura  Biopsy taken  Gastritis found in the antrum and fundus  Biopsy taken  Normal stomach  Duodenitis in the duodenal bulb  Normal 1st portion of the duodenum and 2nd portion of the duodenum  Within the oral cavity, 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     2D echocardiogram:  Echocardiogram shows an ejection fraction of 60% with no regional wall motion abnormalities  There is some grade 1 diastolic dysfunction noted  Significant Findings / Test Results:     · See above    Incidental Findings:   · None    Test Results Pending at Discharge (will require follow up):   · H pylori     Outpatient Tests Requested:  · Repeat EGD in 8 weeks    Complications:  Hand cellulitis secondary to IV infiltration    Hospital Course:     Hussein Piper is a 69 y o  male patient who originally presented to the hospital on 1/21/2018 due to the GI bleed  History of presenting illness:Dany Castañedaasuncion Perez is a 69 y o  male who presents with several days of escalating palpitations  Patient reports that over the last week that he has had palpitations and hot and cold sweats    He has had no appetite and he has been belching a lot  He reported no abdominal pain and no recent medication changes  In the emergency room the patient was found to be tachycardic with heme-positive stool and a hemoglobin of 6 9  He was noted to have elevated troponin consistent with non ST elevation myocardial infarction  Patient was transfused unit packed red blood cells and started on a Protonix drip  GI and Cardiology were consulted  Hospital course: The patient was admitted for the above reasons  While he was here he did have any active bleeding but his hemoglobin was below 8 and secondary to his cardiac issues the patient was transfused 1 unit of packed red blood cells and did better  The patient was taken for an EGD after it was cleared by Cardiology and the patient was found to have the above findings and needs to be on Protonix twice a day for least 8 weeks and have a repeat EGD done as an outpatient  He had no active bleeding seen  He also had a black tongue which 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 seen for which an HIV was negative  The patient could follow up with GI in that regard  The patient also had an elevated troponin for which cardiology did evaluate the patient and also ordered the echocardiogram as above  There was nothing from the cardiac standpoint to be done  The patient likely had a non STEMI type 2 secondary to GI bleed  The patient sees Dr Marcial Vaughn as an outpatient and will follow up with him  He is a 1 seeing the patient here in the hospital as well  While the patient was here as IV infiltrated and went up with redness and erythema of the left upper extremity  He has good capillary refill and everything else was fine neurovascularly  It has improved today  I did put him on 24 hours of Ancef here and will switch him over to Keflex as an outpatient  Patient is otherwise doing okay and medically stable for discharge   He can resume his anti thrombotic    Condition at Discharge: good       Discharge Day Visit / Exam:     Subjective:  Patient seen examined  He feels significantly better  Vitals: Blood Pressure: 130/70 (01/25/18 0700)  Pulse: 62 (01/25/18 0700)  Temperature: 98 1 °F (36 7 °C) (01/25/18 0700)  Temp Source: Oral (01/25/18 0700)  Respirations: 17 (01/25/18 0700)  Height: 5' 8" (172 7 cm) (01/22/18 1642)  Weight - Scale: 82 6 kg (182 lb 1 6 oz) (01/22/18 1642)  SpO2: 99 % (01/25/18 0700)  Exam:   Physical Exam  (   General Appearance:    Alert, cooperative, no distress, appears stated age                               Lungs:     Clear to auscultation bilaterally, respirations unlabored       Heart:    Regular rate and rhythm, S1 and S2 normal, no murmur, rub    or gallop   Abdomen:     Soft, non-tender, bowel sounds active all four quadrants,     no masses, no organomegaly           Extremities:   Extremities normal, atraumatic, no cyanosis or edema       Skin: Redness and erythema of the left upper extremity with good capillary refill and pulses intact             Discussion with Family:  Patient and the wife at the bedside    Discharge instructions/Information to patient and family:   See after visit summary for information provided to patient and family  Provisions for Follow-Up Care:  See after visit summary for information related to follow-up care and any pertinent home health orders  Planned Readmission:  None anticipated     Discharge Statement:  I spent 28 minutes discharging the patient  This time was spent on the day of discharge  I had direct contact with the patient on the day of discharge  Greater than 50% of the total time was spent examining patient, answering all patient questions, arranging and discussing plan of care with patient as well as directly providing post-discharge instructions  Additional time then spent on discharge activities  Discharge Medications:  See after visit summary for reconciled discharge medications provided to patient and family        ** Please Note: This note has been constructed using a voice recognition system **

## 2018-01-26 ENCOUNTER — TELEPHONE (OUTPATIENT)
Dept: INTERNAL MEDICINE CLINIC | Facility: CLINIC | Age: 68
End: 2018-01-26

## 2018-01-26 ENCOUNTER — TRANSITIONAL CARE MANAGEMENT (OUTPATIENT)
Dept: INTERNAL MEDICINE CLINIC | Facility: CLINIC | Age: 68
End: 2018-01-26

## 2018-01-26 DIAGNOSIS — E11.69 TYPE 2 DIABETES MELLITUS WITH OTHER SPECIFIED COMPLICATION, WITHOUT LONG-TERM CURRENT USE OF INSULIN (HCC): ICD-10-CM

## 2018-01-26 DIAGNOSIS — I10 HYPERTENSION: Primary | ICD-10-CM

## 2018-01-26 DIAGNOSIS — E11.69 TYPE 2 DIABETES MELLITUS WITH OTHER SPECIFIED COMPLICATION, WITHOUT LONG-TERM CURRENT USE OF INSULIN (HCC): Primary | ICD-10-CM

## 2018-01-26 RX ORDER — METFORMIN HYDROCHLORIDE 500 MG/1
TABLET, EXTENDED RELEASE ORAL
Qty: 180 TABLET | Refills: 2 | Status: SHIPPED | OUTPATIENT
Start: 2018-01-26 | End: 2018-01-26 | Stop reason: SDUPTHER

## 2018-01-26 RX ORDER — LISINOPRIL 40 MG/1
TABLET ORAL
Qty: 90 TABLET | Refills: 2 | Status: SHIPPED | OUTPATIENT
Start: 2018-01-26 | End: 2018-01-26 | Stop reason: SDUPTHER

## 2018-01-29 RX ORDER — ATENOLOL 50 MG/1
50 TABLET ORAL DAILY
Qty: 30 TABLET | Refills: 11 | OUTPATIENT
Start: 2018-01-29 | End: 2018-02-08 | Stop reason: SDUPTHER

## 2018-02-08 ENCOUNTER — OFFICE VISIT (OUTPATIENT)
Dept: INTERNAL MEDICINE CLINIC | Facility: CLINIC | Age: 68
End: 2018-02-08
Payer: MEDICARE

## 2018-02-08 ENCOUNTER — OFFICE VISIT (OUTPATIENT)
Dept: GASTROENTEROLOGY | Facility: CLINIC | Age: 68
End: 2018-02-08
Payer: MEDICARE

## 2018-02-08 ENCOUNTER — APPOINTMENT (OUTPATIENT)
Dept: LAB | Facility: CLINIC | Age: 68
End: 2018-02-08
Payer: MEDICARE

## 2018-02-08 VITALS
SYSTOLIC BLOOD PRESSURE: 120 MMHG | WEIGHT: 190.25 LBS | BODY MASS INDEX: 28.93 KG/M2 | DIASTOLIC BLOOD PRESSURE: 74 MMHG | HEART RATE: 65 BPM

## 2018-02-08 VITALS
OXYGEN SATURATION: 99 % | SYSTOLIC BLOOD PRESSURE: 140 MMHG | DIASTOLIC BLOOD PRESSURE: 76 MMHG | HEIGHT: 68 IN | BODY MASS INDEX: 28.79 KG/M2 | HEART RATE: 72 BPM | WEIGHT: 190 LBS

## 2018-02-08 DIAGNOSIS — E78.2 MIXED HYPERLIPIDEMIA: Primary | ICD-10-CM

## 2018-02-08 DIAGNOSIS — K25.0 ACUTE GASTRIC ULCER WITH HEMORRHAGE: ICD-10-CM

## 2018-02-08 DIAGNOSIS — B96.81 H PYLORI ULCER: ICD-10-CM

## 2018-02-08 DIAGNOSIS — I10 HYPERTENSION, UNSPECIFIED TYPE: ICD-10-CM

## 2018-02-08 DIAGNOSIS — D62 ACUTE BLOOD LOSS ANEMIA: ICD-10-CM

## 2018-02-08 DIAGNOSIS — K27.9 H PYLORI ULCER: ICD-10-CM

## 2018-02-08 DIAGNOSIS — D62 ACUTE BLOOD LOSS ANEMIA: Primary | ICD-10-CM

## 2018-02-08 PROBLEM — I21.A1 NON-ST ELEVATION MYOCARDIAL INFARCTION (NSTEMI), TYPE 2: Status: RESOLVED | Noted: 2018-01-21 | Resolved: 2018-02-08

## 2018-02-08 PROBLEM — E11.9 DIABETES MELLITUS (HCC): Status: ACTIVE | Noted: 2018-02-08

## 2018-02-08 PROBLEM — L03.114 CELLULITIS OF HAND, LEFT: Status: RESOLVED | Noted: 2018-01-24 | Resolved: 2018-02-08

## 2018-02-08 LAB
ERYTHROCYTE [DISTWIDTH] IN BLOOD BY AUTOMATED COUNT: 16.9 % (ref 11.6–15.1)
HCT VFR BLD AUTO: 30.5 % (ref 36.5–49.3)
HGB BLD-MCNC: 9.4 G/DL (ref 12–17)
MCH RBC QN AUTO: 26 PG (ref 26.8–34.3)
MCHC RBC AUTO-ENTMCNC: 30.8 G/DL (ref 31.4–37.4)
MCV RBC AUTO: 85 FL (ref 82–98)
PLATELET # BLD AUTO: 637 THOUSANDS/UL (ref 149–390)
PMV BLD AUTO: 8.4 FL (ref 8.9–12.7)
RBC # BLD AUTO: 3.61 MILLION/UL (ref 3.88–5.62)
WBC # BLD AUTO: 8.16 THOUSAND/UL (ref 4.31–10.16)

## 2018-02-08 PROCEDURE — 36415 COLL VENOUS BLD VENIPUNCTURE: CPT

## 2018-02-08 PROCEDURE — 99213 OFFICE O/P EST LOW 20 MIN: CPT | Performed by: PHYSICIAN ASSISTANT

## 2018-02-08 PROCEDURE — 85027 COMPLETE CBC AUTOMATED: CPT

## 2018-02-08 PROCEDURE — 99495 TRANSJ CARE MGMT MOD F2F 14D: CPT | Performed by: INTERNAL MEDICINE

## 2018-02-08 RX ORDER — ATENOLOL 50 MG/1
50 TABLET ORAL DAILY
Qty: 90 TABLET | Refills: 3 | OUTPATIENT
Start: 2018-02-08 | End: 2018-02-26 | Stop reason: SDUPTHER

## 2018-02-08 RX ORDER — CLARITHROMYCIN 500 MG/1
500 TABLET, COATED ORAL EVERY 12 HOURS SCHEDULED
Qty: 28 TABLET | Refills: 0 | Status: SHIPPED | OUTPATIENT
Start: 2018-02-08 | End: 2018-02-22

## 2018-02-08 RX ORDER — AMOXICILLIN 500 MG/1
1000 CAPSULE ORAL EVERY 12 HOURS SCHEDULED
Qty: 56 CAPSULE | Refills: 0 | Status: SHIPPED | OUTPATIENT
Start: 2018-02-08 | End: 2018-02-22

## 2018-02-08 RX ORDER — HYDROCHLOROTHIAZIDE 25 MG/1
25 TABLET ORAL DAILY
COMMUNITY
End: 2018-09-08 | Stop reason: SDUPTHER

## 2018-02-08 NOTE — PATIENT INSTRUCTIONS
Stable following acute upper GI bleed secondary to peptic ulcer disease  He will follow with GI today for biopsy report  Continues to take PPI  Other parameters under control  Type 2 non STEMI secondary to demand ischemia with no sequelae at this time  Continue a Tylenol, aspirin

## 2018-02-08 NOTE — LETTER
February 8, 2018     Jordy Plata MD  [address] [place] [address]    Patient: Curtis Higgins   YOB: 1950   Date of Visit: 2/8/2018       Dear Dr Anahi Weber: Thank you for referring Nasra Berman to me for evaluation  Below are my notes for this consultation  If you have questions, please do not hesitate to call me  I look forward to following your patient along with you  Sincerely,        Bailey Fontenot PA-C        CC: No Recipients  Cj Danielsumada  2/8/2018 10:02 AM  Sign at close encounter  [facility] - Outpatient Consultation  Curtis Higgins 69 y o  male MRN: [id number]  Encounter: [id number]          ASSESSMENT AND PLAN:      1  Acute Gastric Ulcer with Hemorrhage due to H  Pylori  - Biopsies from around the gastric ulcer showed focal intestinal metaplasia and chronic active gastritis without dysplasia or carcinoma identified; H  Pylori organisms identified  - Will do triple therapy with amoxicillin, clarithromycin, and protonix for 14 days total; pt knows to continue protonix 40 mg BID for 3 months total pending repeat EGD  - Schedule for repeat EGD in 2-3 months to ensure healing of ulcer and rebiopsy for H  Pylori to confirm eradication  - Hold ASA 7 days prior to procedure unless otherwise instructed by cardiology, NPO after midnight the day prior      2  Acute Blood Loss Anemia  - Recheck CBC; last known Hb was 8 7 and patient denies any further GI bleeding      3  Colon cancer screening-  - Last colonoscopy 3 years ago, his recall is for 5 years so he will be due in 2020  - No alarm symptoms or lower GI issues  ______________________________________________________________________    HPI:  Mr Mara Lopez is a 68 yo M with a PMH of HTN, DM2, HLD, presenting for hospital follow up due to an upper GI bleed from a large gastric ulcer   He had acute blood loss anemia due to the ulcer with an initial Hb of 6 9 as well as a NSTEMI type 2  He was discharged on 1/24 with last Hb of 8 7  Overall he feels well  He is having normal brown stools  He denies any hematochezia, melena, abdominal pain, nausea, or vomiting  He is gaining weight  He has a good appetite  His last colonoscopy was 3 years ago with either Dr Alexander Riley or Dr Doc Yepez  He is due in 2020 for a 5 year recall  He denies any alarm symptoms      REVIEW OF SYSTEMS:    CONSTITUTIONAL: Denies any fever, chills, rigors, and weight loss  HEENT: No earache or tinnitus  Denies hearing loss or visual disturbances  CARDIOVASCULAR: No chest pain or palpitations  RESPIRATORY: Denies any cough, hemoptysis, shortness of breath or dyspnea on exertion  GASTROINTESTINAL: As noted in the History of Present Illness  GENITOURINARY: No problems with urination  Denies any hematuria or dysuria  NEUROLOGIC: No dizziness or vertigo, denies headaches  MUSCULOSKELETAL: Denies any muscle or joint pain  SKIN: Denies skin rashes or itching  ENDOCRINE: Denies excessive thirst  Denies intolerance to heat or cold  PSYCHOSOCIAL: Denies depression or anxiety  Denies any recent memory loss  Historical Information   Past Medical History:   Diagnosis Date    Benign neoplasm of large intestine     Diabetes mellitus (HCC)     Heart murmur     History of aortic regurgitation     History of constipation     History of degenerative joint disease     History of nocturia     History of obesity     History of sebaceous cyst     History of shortness of breath     History of urinary frequency     Hyperlipidemia     Polyposis coli     of the large intestine     Past Surgical History:   Procedure Laterality Date    ACHILLES TENDON SURGERY Left     COLONOSCOPY      hyperplastic polyp    ESOPHAGOGASTRODUODENOSCOPY N/A 1/23/2018    Procedure: ESOPHAGOGASTRODUODENOSCOPY (EGD); Surgeon: Yury Tan MD;  Location[facility];   Service: Gastroenterology    HERNIA REPAIR      HERNIA REPAIR Bilateral 8/18/2017    Procedure: LAPAROSCOPIC INGUINAL HERNIA REPAIR WITH MESH;  Surgeon: Lilo Greene MD;  [facility];  Service: General    TIBIA FRACTURE SURGERY Left      Social History   History   Alcohol Use    Yes     Comment: rare     History   Drug Use No     History   Smoking Status    Current Every Day Smoker    Packs/day: 1 00    Types: Cigarettes   Smokeless Tobacco    Current User     Family History   Problem Relation Age of Onset    Rheumatic fever Father     Other Father      accidental poisoning    Heart disease Mother        Meds/Allergies       Current Outpatient Prescriptions:     aspirin 81 mg chewable tablet    atenolol (TENORMIN) 50 mg tablet    atorvastatin (LIPITOR) 80 mg tablet    cloNIDine (CATAPRES) 0 3 mg tablet    hydrochlorothiazide (HYDRODIURIL) 25 mg tablet    lisinopril (ZESTRIL) 40 mg tablet    metFORMIN (GLUCOPHAGE-XR) 500 mg 24 hr tablet    Omega-3 1000 MG CAPS    pantoprazole (PROTONIX) 40 mg tablet    No Known Allergies        Objective     Blood pressure 120/74, pulse 65, weight 86 3 kg (190 lb 4 oz)  PHYSICAL EXAM:      General Appearance:   Alert, oriented x3, no acute distress   HEENT:   Normocephalic, atraumatic, anicteric      Neck:  Supple, symmetrical, trachea midline   Lungs:   Clear to auscultation bilaterally, no respiratory distress   Heart[de-identified]   RRR, no murmur   Abdomen:   Non-distended, soft, BS active, NTTP   Rectal:   Deferred    Extremities:  No cyanosis, clubbing or edema    Pulses:  2+ and symmetric    Skin:  No jaundice, rashes, or lesions    Lymph nodes:  No palpable cervical lymphadenopathy        Lab Results:   No visits with results within 1 Day(s) from this visit     Latest known visit with results is:   Admission on 01/21/2018, Discharged on 01/25/2018   Component Date Value    Ventricular Rate 01/21/2018 119     Atrial Rate 01/21/2018 119     HI Interval 01/21/2018 132     QRSD Interval 01/21/2018 104     QT Interval 01/21/2018 340     QTC Interval 01/21/2018 478     P Axis 01/21/2018 72     QRS Axis 01/21/2018 73     T Wave [place] 01/21/2018 -87     WBC 01/21/2018 10 35*    RBC 01/21/2018 2 60*    Hemoglobin 01/21/2018 6 9*    Hematocrit 01/21/2018 21 6*    MCV 01/21/2018 83     MCH 01/21/2018 26 5*    MCHC 01/21/2018 31 9     RDW 01/21/2018 17 2*    MPV 01/21/2018 9 4     Platelets 87/33/9373 334     nRBC 01/21/2018 0     Neutrophils Relative 01/21/2018 88*    Lymphocytes Relative 01/21/2018 7*    Monocytes Relative 01/21/2018 4     Eosinophils Relative 01/21/2018 0     Basophils Relative 01/21/2018 0     Neutrophils Absolute 01/21/2018 9 10*    Lymphocytes Absolute 01/21/2018 0 72     Monocytes Absolute 01/21/2018 0 45     Eosinophils Absolute 01/21/2018 0 01     Basophils Absolute 01/21/2018 0 02     Protime 01/21/2018 13 1     INR 01/21/2018 0 97     PTT 01/21/2018 25     Sodium 01/21/2018 132*    Potassium 01/21/2018 3 4*    Chloride 01/21/2018 95*    CO2 01/21/2018 28     Anion Gap 01/21/2018 9     BUN 01/21/2018 30*    Creatinine 01/21/2018 0 92     Glucose 01/21/2018 181*    Calcium 01/21/2018 9 0     eGFR 01/21/2018 98     Total Bilirubin 01/21/2018 0 40     Bilirubin, Direct 01/21/2018 0 15     Alkaline Phosphatase 01/21/2018 60     AST 01/21/2018 15     ALT 01/21/2018 18     Total Protein 01/21/2018 6 5     Albumin 01/21/2018 3 0*    Troponin I 01/21/2018 0 20*    Magnesium 01/21/2018 1 9     TSH 3RD GENERATON 01/21/2018 0 406     POC Troponin I 01/21/2018 0 06     Specimen Type 01/21/2018 VENOUS     Hemoglobin 01/21/2018 5 9*    Hematocrit 01/21/2018 18 5*    Unit Product Code 01/22/2018 [id number]     Unit Number 01/22/2018 [id number]     Unit ABO 01/22/2018 O     Unit RH 01/22/2018 NEG     Crossmatch 01/22/2018 Compatible     Unit Dispense Status 01/22/2018 Presumed Trans     ABO Grouping 01/21/2018 O     Rh Factor 01/21/2018 Positive     Antibody Screen 01/21/2018 Negative     Specimen Expiration Date 01/21/2018 86351741     Hemoglobin 01/21/2018 7 4*    Hematocrit 01/21/2018 22 7*    Hemoglobin 01/21/2018 8 4*    Hematocrit 01/21/2018 25 6*    Unit Product Code 01/22/2018 [id number]     Unit Number 01/22/2018 [id number]     Unit ABO 01/22/2018 O     Unit RH 01/22/2018 NEG     Crossmatch 01/22/2018 Compatible     Unit Dispense Status 01/22/2018 Presumed Trans     WBC 01/22/2018 9 32     RBC 01/22/2018 3 01*    Hemoglobin 01/22/2018 8 2*    Hematocrit 01/22/2018 24 8*    MCV 01/22/2018 82     MCH 01/22/2018 27 2     MCHC 01/22/2018 33 1     RDW 01/22/2018 16 8*    Platelets 68/51/4998 302     MPV 01/22/2018 8 8*    Sodium 01/22/2018 142     Potassium 01/22/2018 3 2*    Chloride 01/22/2018 105     CO2 01/22/2018 26     Anion Gap 01/22/2018 11     BUN 01/22/2018 12     Creatinine 01/22/2018 0 66     Glucose 01/22/2018 110     Calcium 01/22/2018 8 9     eGFR 01/22/2018 114     Troponin I 01/22/2018 5 04*    Troponin I 01/22/2018 4 80*    Troponin I 01/22/2018 4 50*    Hemoglobin 01/22/2018 8 3*    Hematocrit 01/22/2018 26 0*    Troponin I 01/22/2018 3 68*    Troponin I 01/23/2018 3 29*    Troponin I 01/23/2018 3 31*    Troponin I 01/23/2018 2 99*    WBC 01/23/2018 8 39     RBC 01/23/2018 2 84*    Hemoglobin 01/23/2018 7 8*    Hematocrit 01/23/2018 23 8*    MCV 01/23/2018 84     MCH 01/23/2018 27 5     MCHC 01/23/2018 32 8     RDW 01/23/2018 17 0*    Platelets 14/82/8119 291     MPV 01/23/2018 8 5*    Troponin I 01/23/2018 2 57*    Troponin I 01/23/2018 2 15*    Unit Product Code 01/24/2018 [id number]     Unit Number 01/24/2018 [id number]     Unit ABO 01/24/2018 O     Unit RH 01/24/2018 POS     Crossmatch 01/24/2018 Compatible     Unit Dispense Status 01/24/2018 Presumed Trans     POC Glucose 01/23/2018 113     Case Report 01/23/2018                      Value:Surgical Pathology Report Case: [id number]                                   Authorizing Provider:  Xavi Hill MD       Collected:           01/23/2018 1438              Ordering Location:     [address] [place] [address] Received:            01/23/2018 1520                                     Operating Room                                                               Pathologist:           Sveta Crawford MD                                                        Specimens:   A) - Stomach, edge of stomach ulcer                                                                 B) - Stomach, stomach bx                                                                   Final Diagnosis 01/23/2018                      Value: This result contains rich text formatting which cannot be displayed here   Additional Information 01/23/2018                      Value: This result contains rich text formatting which cannot be displayed here  Rosetta Hendrix Gross Description 01/23/2018                      Value: This result contains rich text formatting which cannot be displayed here      Clinical Information 01/23/2018                      Value:Cold bx    Rapid HIV 1 AND 2 01/23/2018 Non-Reactive     HIV-1 P24 Ag Screen 01/23/2018 Non-Reactive     WBC 01/24/2018 9 31     RBC 01/24/2018 3 10*    Hemoglobin 01/24/2018 8 7*    Hematocrit 01/24/2018 26 3*    MCV 01/24/2018 85     MCH 01/24/2018 28 1     MCHC 01/24/2018 33 1     RDW 01/24/2018 16 8*    Platelets 25/29/9428 357     Sodium 01/24/2018 141     Potassium 01/24/2018 3 7     Chloride 01/24/2018 105     CO2 01/24/2018 27     Anion Gap 01/24/2018 9     BUN 01/24/2018 13     Creatinine 01/24/2018 0 81     Glucose 01/24/2018 174*    Calcium 01/24/2018 8 7     eGFR 01/24/2018 105

## 2018-02-08 NOTE — PROGRESS NOTES
[facility] - Outpatient Consultation  Jennifer Gregory 69 y o  male MRN: [id number]  Encounter: [id number]          ASSESSMENT AND PLAN:      1  Acute Gastric Ulcer with Hemorrhage due to H  Pylori  - Biopsies from around the gastric ulcer showed focal intestinal metaplasia and chronic active gastritis without dysplasia or carcinoma identified; H  Pylori organisms identified  - Will do triple therapy with amoxicillin, clarithromycin, and protonix for 14 days total; pt knows to continue protonix 40 mg BID for 3 months total pending repeat EGD  - Schedule for repeat EGD in 2-3 months to ensure healing of ulcer and rebiopsy for H  Pylori to confirm eradication  - Hold ASA 7 days prior to procedure unless otherwise instructed by cardiology, NPO after midnight the day prior      2  Acute Blood Loss Anemia  - Recheck CBC; last known Hb was 8 7 and patient denies any further GI bleeding      3  Colon cancer screening-  - Last colonoscopy 3 years ago, his recall is for 5 years so he will be due in 2020  - No alarm symptoms or lower GI issues  ______________________________________________________________________    HPI:  Mr Elodia Last is a 68 yo M with a PMH of HTN, DM2, HLD, presenting for hospital follow up due to an upper GI bleed from a large gastric ulcer  He had acute blood loss anemia due to the ulcer with an initial Hb of 6 9 as well as a NSTEMI type 2  He was discharged on 1/24 with last Hb of 8 7  Overall he feels well  He is having normal brown stools  He denies any hematochezia, melena, abdominal pain, nausea, or vomiting  He is gaining weight  He has a good appetite  His last colonoscopy was 3 years ago with either Dr Nisha Hoyt or [facility]  He is due in 2020 for a 5 year recall  He denies any alarm symptoms      REVIEW OF SYSTEMS:    CONSTITUTIONAL: Denies any fever, chills, rigors, and weight loss  HEENT: No earache or tinnitus   Denies hearing loss or visual disturbances  CARDIOVASCULAR: No chest pain or palpitations  RESPIRATORY: Denies any cough, hemoptysis, shortness of breath or dyspnea on exertion  GASTROINTESTINAL: As noted in the History of Present Illness  GENITOURINARY: No problems with urination  Denies any hematuria or dysuria  NEUROLOGIC: No dizziness or vertigo, denies headaches  MUSCULOSKELETAL: Denies any muscle or joint pain  SKIN: Denies skin rashes or itching  ENDOCRINE: Denies excessive thirst  Denies intolerance to heat or cold  PSYCHOSOCIAL: Denies depression or anxiety  Denies any recent memory loss  Historical Information   Past Medical History:   Diagnosis Date    Benign neoplasm of large intestine     Diabetes mellitus (HCC)     Heart murmur     History of aortic regurgitation     History of constipation     History of degenerative joint disease     History of nocturia     History of obesity     History of sebaceous cyst     History of shortness of breath     History of urinary frequency     Hyperlipidemia     Polyposis coli     of the large intestine     Past Surgical History:   Procedure Laterality Date    ACHILLES TENDON SURGERY Left     COLONOSCOPY      hyperplastic polyp    ESOPHAGOGASTRODUODENOSCOPY N/A 1/23/2018    Procedure: ESOPHAGOGASTRODUODENOSCOPY (EGD); Surgeon: Arleth Petersen MD;  [facility];   Service: Gastroenterology    HERNIA REPAIR      HERNIA REPAIR Bilateral 8/18/2017    Procedure: LAPAROSCOPIC INGUINAL HERNIA REPAIR WITH MESH;  Surgeon: Flor Fraser MD;  [facility];  Service: General    TIBIA FRACTURE SURGERY Left      Social History   History   Alcohol Use    Yes     Comment: rare     History   Drug Use No     History   Smoking Status    Current Every Day Smoker    Packs/day: 1 00    Types: Cigarettes   Smokeless Tobacco    Current User     Family History   Problem Relation Age of Onset    Rheumatic fever Father     Other Father      accidental poisoning    Heart disease Mother        Meds/Allergies       Current Outpatient Prescriptions:     aspirin 81 mg chewable tablet    atenolol (TENORMIN) 50 mg tablet    atorvastatin (LIPITOR) 80 mg tablet    cloNIDine (CATAPRES) 0 3 mg tablet    hydrochlorothiazide (HYDRODIURIL) 25 mg tablet    lisinopril (ZESTRIL) 40 mg tablet    metFORMIN (GLUCOPHAGE-XR) 500 mg 24 hr tablet    Omega-3 1000 MG CAPS    pantoprazole (PROTONIX) 40 mg tablet    No Known Allergies        Objective     Blood pressure 120/74, pulse 65, weight 86 3 kg (190 lb 4 oz)  PHYSICAL EXAM:      General Appearance:   Alert, oriented x3, no acute distress   HEENT:   Normocephalic, atraumatic, anicteric      Neck:  Supple, symmetrical, trachea midline   Lungs:   Clear to auscultation bilaterally, no respiratory distress   Heart[de-identified]   RRR, no murmur   Abdomen:   Non-distended, soft, BS active, NTTP   Rectal:   Deferred    Extremities:  No cyanosis, clubbing or edema    Pulses:  2+ and symmetric    Skin:  No jaundice, rashes, or lesions    Lymph nodes:  No palpable cervical lymphadenopathy        Lab Results:   No visits with results within 1 Day(s) from this visit     Latest known visit with results is:   Admission on 01/21/2018, Discharged on 01/25/2018   Component Date Value    Ventricular Rate 01/21/2018 119     Atrial Rate 01/21/2018 119     AK Interval 01/21/2018 132     QRSD Interval 01/21/2018 104     QT Interval 01/21/2018 340     QTC Interval 01/21/2018 478     P Axis 01/21/2018 72     QRS Axis 01/21/2018 73     T Wave [place] 01/21/2018 -87     WBC 01/21/2018 10 35*    RBC 01/21/2018 2 60*    Hemoglobin 01/21/2018 6 9*    Hematocrit 01/21/2018 21 6*    MCV 01/21/2018 83     MCH 01/21/2018 26 5*    MCHC 01/21/2018 31 9     RDW 01/21/2018 17 2*    MPV 01/21/2018 9 4     Platelets 45/76/5779 334     nRBC 01/21/2018 0     Neutrophils Relative 01/21/2018 88*    Lymphocytes Relative 01/21/2018 7*    Monocytes Relative 01/21/2018 4     Eosinophils Relative 01/21/2018 0     Basophils Relative 01/21/2018 0     Neutrophils Absolute 01/21/2018 9 10*    Lymphocytes Absolute 01/21/2018 0 72     Monocytes Absolute 01/21/2018 0 45     Eosinophils Absolute 01/21/2018 0 01     Basophils Absolute 01/21/2018 0 02     Protime 01/21/2018 13 1     INR 01/21/2018 0 97     PTT 01/21/2018 25     Sodium 01/21/2018 132*    Potassium 01/21/2018 3 4*    Chloride 01/21/2018 95*    CO2 01/21/2018 28     Anion Gap 01/21/2018 9     BUN 01/21/2018 30*    Creatinine 01/21/2018 0 92     Glucose 01/21/2018 181*    Calcium 01/21/2018 9 0     eGFR 01/21/2018 98     Total Bilirubin 01/21/2018 0 40     Bilirubin, Direct 01/21/2018 0 15     Alkaline Phosphatase 01/21/2018 60     AST 01/21/2018 15     ALT 01/21/2018 18     Total Protein 01/21/2018 6 5     Albumin 01/21/2018 3 0*    Troponin I 01/21/2018 0 20*    Magnesium 01/21/2018 1 9     TSH 3RD GENERATON 01/21/2018 0 406     POC Troponin I 01/21/2018 0 06     Specimen Type 01/21/2018 VENOUS     Hemoglobin 01/21/2018 5 9*    Hematocrit 01/21/2018 18 5*    Unit Product Code 01/22/2018 [id number]     Unit Number 01/22/2018 [id number]     Unit ABO 01/22/2018 O     Unit RH 01/22/2018 NEG     Crossmatch 01/22/2018 Compatible     Unit Dispense Status 01/22/2018 Presumed Trans     ABO Grouping 01/21/2018 O     Rh Factor 01/21/2018 Positive     Antibody Screen 01/21/2018 Negative     Specimen Expiration Date 01/21/2018 [id number]     Hemoglobin 01/21/2018 7 4*    Hematocrit 01/21/2018 22 7*    Hemoglobin 01/21/2018 8 4*    Hematocrit 01/21/2018 25 6*    Unit Product Code 01/22/2018 [id number]     Unit Number 01/22/2018 [id number]     Unit ABO 01/22/2018 O     Unit RH 01/22/2018 NEG     Crossmatch 01/22/2018 Compatible     Unit Dispense Status 01/22/2018 Presumed Trans     WBC 01/22/2018 9 32     RBC 01/22/2018 3 01*    Hemoglobin 01/22/2018 8 2*    Hematocrit 01/22/2018 24 8*    MCV 01/22/2018 82     MCH 01/22/2018 27 2     MCHC 01/22/2018 33 1     RDW 01/22/2018 16 8*    Platelets 61/95/3993 302     MPV 01/22/2018 8 8*    Sodium 01/22/2018 142     Potassium 01/22/2018 3 2*    Chloride 01/22/2018 105     CO2 01/22/2018 26     Anion Gap 01/22/2018 11     BUN 01/22/2018 12     Creatinine 01/22/2018 0 66     Glucose 01/22/2018 110     Calcium 01/22/2018 8 9     eGFR 01/22/2018 114     Troponin I 01/22/2018 5 04*    Troponin I 01/22/2018 4 80*    Troponin I 01/22/2018 4 50*    Hemoglobin 01/22/2018 8 3*    Hematocrit 01/22/2018 26 0*    Troponin I 01/22/2018 3 68*    Troponin I 01/23/2018 3 29*    Troponin I 01/23/2018 3 31*    Troponin I 01/23/2018 2 99*    WBC 01/23/2018 8 39     RBC 01/23/2018 2 84*    Hemoglobin 01/23/2018 7 8*    Hematocrit 01/23/2018 23 8*    MCV 01/23/2018 84     MCH 01/23/2018 27 5     MCHC 01/23/2018 32 8     RDW 01/23/2018 17 0*    Platelets 28/22/4167 291     MPV 01/23/2018 8 5*    Troponin I 01/23/2018 2 57*    Troponin I 01/23/2018 2 15*    Unit Product Code 01/24/2018 [id number]     Unit Number 01/24/2018 [id number]     Unit ABO 01/24/2018 O     Unit RH 01/24/2018 POS     Crossmatch 01/24/2018 Compatible     Unit Dispense Status 01/24/2018 Presumed Trans     POC Glucose 01/23/2018 113     Case Report 01/23/2018                      Value:Surgical Pathology Report                         Case: [id number]                                   Authorizing Provider:  Pam Patel MD       Collected:           01/23/2018 1438              Ordering Location[facility]     [facility] Received:            01/23/2018 [address]                                     Operating Room                                                               Pathologist:           Trniity Cordoba MD                                                        Specimens:   A) - Stomach, edge of stomach ulcer B) - Stomach, stomach bx                                                                   Final Diagnosis 01/23/2018                      Value: This result contains rich text formatting which cannot be displayed here   Additional Information 01/23/2018                      Value: This result contains rich text formatting which cannot be displayed here  [facility] Gross Description 01/23/2018                      Value: This result contains rich text formatting which cannot be displayed here      Clinical Information 01/23/2018                      Value:Cold bx    Rapid HIV 1 AND 2 01/23/2018 Non-Reactive     HIV-1 P24 Ag Screen 01/23/2018 Non-Reactive     WBC 01/24/2018 9 31     RBC 01/24/2018 3 10*    Hemoglobin 01/24/2018 8 7*    Hematocrit 01/24/2018 26 3*    MCV 01/24/2018 85     MCH 01/24/2018 28 1     MCHC 01/24/2018 33 1     RDW 01/24/2018 16 8*    Platelets 41/29/8100 357     Sodium 01/24/2018 141     Potassium 01/24/2018 3 7     Chloride 01/24/2018 105     CO2 01/24/2018 27     Anion Gap 01/24/2018 9     BUN 01/24/2018 13     Creatinine 01/24/2018 0 81     Glucose 01/24/2018 174*    Calcium 01/24/2018 8 7     eGFR 01/24/2018 105

## 2018-02-08 NOTE — PROGRESS NOTES
Assessment/Plan:    No problem-specific Assessment & Plan notes found for this encounter  Diagnoses and all orders for this visit:    Mixed hyperlipidemia    Hypertension, unspecified type  -     atenolol (TENORMIN) 50 mg tablet; Take 1 tablet (50 mg total) by mouth daily          Subjective:      Patient ID: Ila Kirkland is a 69 y o  male  Admitted to the hospital with very low hemoglobin and hematocrit  Hemoglobin 5 9  Required transfusion  Diagnostic E EGD:     EGD:Normal esophagus   A large cratered ulcer found in the incisura  Biopsy taken   Gastritis found in the antrum and fundus  Biopsy taken   Normal stomach   Duodenitis in the duodenal bulb   Normal 1st portion of the duodenum and 2nd portion of the duodenum   Within the oral cavity, 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     Discharge diagnoses:     Principal Problem (Resolved):    GI bleed  Active Problems:    Hyperlipidemia    Non-ST elevation myocardial infarction (NSTEMI), type 2 (HCC)    HTN (hypertension)    Diabetes type 2, uncontrolled (HCC)    Cellulitis of hand, left    Presents for follow-up  Please note hemoglobin A1c was 6 5% so I do not agree with the discharge diagnosis of diabetes type 2 uncontrolled        The following portions of the patient's history were reviewed and updated as appropriate: allergies, current medications, past family history, past medical history, past social history, past surgical history and problem list     Review of Systems   Constitutional: Negative for activity change, appetite change and fever  HENT: Negative for sinus pressure  Eyes: Negative for pain and visual disturbance  Respiratory: Negative for cough  Cardiovascular: Negative for chest pain and palpitations  Gastrointestinal: Negative for abdominal pain, blood in stool, constipation, diarrhea, nausea and vomiting  Endocrine: Negative for polydipsia and polyphagia     Genitourinary: Negative for difficulty urinating  Musculoskeletal: Positive for arthralgias  Negative for joint swelling  Skin: Negative for color change  Allergic/Immunologic: Negative for immunocompromised state  Neurological: Negative for dizziness, seizures, syncope and headaches  Hematological: Negative for adenopathy  Does not bruise/bleed easily  Psychiatric/Behavioral: Negative for agitation, confusion and suicidal ideas  Objective:     Physical Exam   Constitutional: He is oriented to person, place, and time  He appears well-developed and well-nourished  HENT:   Head: Normocephalic and atraumatic  Eyes: Conjunctivae are normal  Pupils are equal, round, and reactive to light  Neck: Normal range of motion  No thyromegaly present  Cardiovascular: Normal rate, regular rhythm and normal heart sounds  No murmur heard  Pulmonary/Chest: Effort normal  No respiratory distress  He has no wheezes  He has no rales  Abdominal: Soft  There is no tenderness  Genitourinary: Penis normal  Right testis shows no mass and no tenderness  Left testis shows no mass and no tenderness  Musculoskeletal: Normal range of motion  He exhibits no deformity  Lymphadenopathy:     He has no cervical adenopathy  Neurological: He is alert and oriented to person, place, and time  Skin: Skin is warm and dry  Psychiatric: He has a normal mood and affect   His behavior is normal  Judgment and thought content normal

## 2018-02-12 ENCOUNTER — OFFICE VISIT (OUTPATIENT)
Dept: CARDIOLOGY CLINIC | Facility: CLINIC | Age: 68
End: 2018-02-12
Payer: MEDICARE

## 2018-02-12 VITALS
WEIGHT: 192 LBS | DIASTOLIC BLOOD PRESSURE: 64 MMHG | OXYGEN SATURATION: 100 % | BODY MASS INDEX: 29.1 KG/M2 | HEIGHT: 68 IN | SYSTOLIC BLOOD PRESSURE: 142 MMHG | HEART RATE: 74 BPM

## 2018-02-12 DIAGNOSIS — D62 ACUTE BLOOD LOSS ANEMIA: Primary | ICD-10-CM

## 2018-02-12 DIAGNOSIS — Z72.0 TOBACCO ABUSE: ICD-10-CM

## 2018-02-12 DIAGNOSIS — I42.2 HYPERTROPHIC CARDIOMYOPATHY (HCC): Primary | ICD-10-CM

## 2018-02-12 DIAGNOSIS — I25.10 CORONARY ARTERY DISEASE INVOLVING NATIVE HEART WITHOUT ANGINA PECTORIS, UNSPECIFIED VESSEL OR LESION TYPE: ICD-10-CM

## 2018-02-12 DIAGNOSIS — I10 ESSENTIAL HYPERTENSION: ICD-10-CM

## 2018-02-12 PROCEDURE — 99214 OFFICE O/P EST MOD 30 MIN: CPT | Performed by: INTERNAL MEDICINE

## 2018-02-12 NOTE — PATIENT INSTRUCTIONS
Smoking cessation advised  Follow with PCP and gastroenterologist  Report any symptoms  Return in 4 months

## 2018-02-12 NOTE — PROGRESS NOTES
Cardiology Follow Up    Susana Odell  1950  [id number]  [address] [place] [address]    1  Hypertrophic cardiomyopathy ([facility] [address] )     2  Coronary artery disease involving native heart without angina pectoris, unspecified vessel or lesion type     3  Essential hypertension     4  Tobacco abuse          Chief Complaint   Patient presents with    Follow-up       Interval History:  Patient presents for follow-up visit after recent hospitalization  Patient had gone to the hospital for complaints of palpitations  He was noted to be quite anemic with hemoglobin at 5 9  Patient underwent EGD during the hospitalization and had a biopsy performed on large gastric ulcer thought to be the cause of his anemia  Patient states since being out of the hospital he has been feeling well denies any chest pain, chest pressure, chest heaviness, shortness of breath  He denies any dizziness, syncope, palpitations  Patient had lab work done a couple of days ago hemoglobin was 9 4  He is being followed closely by primary care physician and gastroenterologist   He is due for EGD in about 6 weeks       Patient Active Problem List   Diagnosis    Non-recurrent bilateral inguinal hernia without obstruction or gangrene    Hyperlipidemia    HTN (hypertension)    Diabetes mellitus ([facility] [address] )    Acute gastric ulcer with hemorrhage    H pylori ulcer    Acute blood loss anemia    Tobacco abuse     Past Medical History:   Diagnosis Date    Benign neoplasm of large intestine     Diabetes mellitus (HCC)     Heart murmur     History of aortic regurgitation     History of constipation     History of degenerative joint disease     History of nocturia     History of obesity     History of sebaceous cyst     History of shortness of breath     History of urinary frequency     Hyperlipidemia     Polyposis coli of the large intestine     Social History     Social History    Marital status: /Civil Union     Spouse name: N/A    Number of children: N/A    Years of education: N/A     Occupational History          Social History Main Topics    Smoking status: Current Every Day Smoker     Packs/day: 1 00     Types: Cigarettes    Smokeless tobacco: Current User    Alcohol use Yes      Comment: rare    Drug use: No    Sexual activity: Not Currently     Other Topics Concern    Not on file     Social History Narrative    No narrative on file      Family History   Problem Relation Age of Onset    Rheumatic fever Father     Other Father      accidental poisoning   [place] Heart disease Mother      Past Surgical History:   Procedure Laterality Date    ACHILLES TENDON SURGERY Left     COLONOSCOPY      hyperplastic polyp    ESOPHAGOGASTRODUODENOSCOPY N/A 1/23/2018    Procedure: ESOPHAGOGASTRODUODENOSCOPY (EGD); Surgeon: Johnny Sanchez MD;  Location: [facility];   Service: Gastroenterology    HERNIA REPAIR      HERNIA REPAIR Bilateral 8/18/2017    Procedure: LAPAROSCOPIC INGUINAL HERNIA REPAIR WITH MESH;  Surgeon: Niharika Alejandra MD;  Location: [facility];  Service: General    TIBIA FRACTURE SURGERY Left        Current Outpatient Prescriptions:     amoxicillin (AMOXIL) 500 mg capsule, Take 2 capsules (1,000 mg total) by mouth every 12 (twelve) hours for 14 days, Disp: 56 capsule, Rfl: 0    aspirin 81 mg chewable tablet, Chew 1 tablet daily, Disp: , Rfl:     atenolol (TENORMIN) 50 mg tablet, Take 1 tablet (50 mg total) by mouth daily, Disp: 90 tablet, Rfl: 3    atorvastatin (LIPITOR) 80 mg tablet, Take 1 tablet by mouth daily, Disp: , Rfl:     clarithromycin (BIAXIN) 500 mg tablet, Take 1 tablet (500 mg total) by mouth every 12 (twelve) hours for 14 days, Disp: 28 tablet, Rfl: 0    cloNIDine (CATAPRES) 0 3 mg tablet, Take 1 tablet by mouth 3 (three) times a day, Disp: , Rfl:     hydrochlorothiazide (HYDRODIURIL) 25 mg tablet, Take 25 mg by mouth daily, Disp: , Rfl:     lisinopril (ZESTRIL) 40 mg tablet, TAKE 1 TABLET DAILY, Disp: 90 tablet, Rfl: 2    metFORMIN (GLUCOPHAGE-XR) 500 mg 24 hr tablet, TAKE 2 TABLETS BEFORE THE EVENING MEAL, Disp: 180 tablet, Rfl: 2    Omega-3 1000 MG CAPS, Take by mouth, Disp: , Rfl:     pantoprazole (PROTONIX) 40 mg tablet, Take 1 tablet by mouth 2 (two) times a day before meals, Disp: 60 tablet, Rfl: 0  No Known Allergies      Imaging: Xr Chest 1 View Portable    Result Date: 1/21/2018  Narrative: CHEST INDICATION:  Dyspnea COMPARISON:  None VIEWS:   AP frontal;  1 image FINDINGS:     Cardiomediastinal silhouette appears unremarkable  The lungs are clear  No pneumothorax or pleural effusion  Visualized osseous structures appear within normal limits for the patient's age  Impression: No active pulmonary disease  Workstation performed: [id number]       Review of Systems:  Review of Systems   Constitutional: Negative  HENT: Negative  Respiratory: Negative  Cardiovascular: Negative  Musculoskeletal: Negative  Neurological: Negative  Hematological: Negative  All other systems reviewed and are negative  Physical Exam:  Physical Exam   Constitutional: He is oriented to person, place, and time  He appears well-developed and well-nourished  HENT:   Head: Normocephalic and atraumatic  Neck: Normal range of motion  Cardiovascular: Normal rate, regular rhythm and normal heart sounds  Pulmonary/Chest: Effort normal and breath sounds normal    Neurological: He is alert and oriented to person, place, and time  He has normal reflexes  Skin: Skin is warm and dry  Psychiatric: He has a normal mood and affect  His behavior is normal  Judgment and thought content normal    Nursing note and vitals reviewed        Discussion/Summary:  1- hypertrophic cardiomyopathy; ECHO done during recent hospitalization January 2018, EF 26%, grade 1 diastolic dysfunction, moderate concentric hypertrophy, moderate asymmetrical hypertrophy of the septum, mild MR, mild AR, PA pressure at 42mmhg  Continue all medications    2- CAD; stable  Continue all medications    3- hypertension; stable  Continue all medications    4- tobacco abuse; smoking cessation highly advised  Patient states he has cut down but has not quit yet

## 2018-02-14 RX ORDER — LISINOPRIL 40 MG/1
40 TABLET ORAL DAILY
Qty: 90 TABLET | Refills: 0 | Status: SHIPPED | OUTPATIENT
Start: 2018-02-14 | End: 2018-02-26 | Stop reason: SDUPTHER

## 2018-02-14 RX ORDER — METFORMIN HYDROCHLORIDE 500 MG/1
500 TABLET, EXTENDED RELEASE ORAL 2 TIMES DAILY WITH MEALS
Qty: 180 TABLET | Refills: 0 | Status: SHIPPED | OUTPATIENT
Start: 2018-02-14 | End: 2018-02-26 | Stop reason: SDUPTHER

## 2018-02-23 ENCOUNTER — TELEPHONE (OUTPATIENT)
Dept: INTERNAL MEDICINE CLINIC | Facility: CLINIC | Age: 68
End: 2018-02-23

## 2018-02-23 ENCOUNTER — TELEPHONE (OUTPATIENT)
Dept: CARDIOLOGY CLINIC | Facility: CLINIC | Age: 68
End: 2018-02-23

## 2018-02-23 NOTE — TELEPHONE ENCOUNTER
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

## 2018-02-23 NOTE — TELEPHONE ENCOUNTER
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

## 2018-02-26 ENCOUNTER — TELEPHONE (OUTPATIENT)
Dept: INTERNAL MEDICINE CLINIC | Facility: CLINIC | Age: 68
End: 2018-02-26

## 2018-02-26 ENCOUNTER — APPOINTMENT (OUTPATIENT)
Dept: LAB | Facility: CLINIC | Age: 68
End: 2018-02-26
Payer: MEDICARE

## 2018-02-26 DIAGNOSIS — E11.69 TYPE 2 DIABETES MELLITUS WITH OTHER SPECIFIED COMPLICATION, WITHOUT LONG-TERM CURRENT USE OF INSULIN (HCC): ICD-10-CM

## 2018-02-26 DIAGNOSIS — I10 HYPERTENSION, UNSPECIFIED TYPE: ICD-10-CM

## 2018-02-26 DIAGNOSIS — D62 ACUTE BLOOD LOSS ANEMIA: ICD-10-CM

## 2018-02-26 LAB
ERYTHROCYTE [DISTWIDTH] IN BLOOD BY AUTOMATED COUNT: 17.5 % (ref 11.6–15.1)
HCT VFR BLD AUTO: 30.5 % (ref 36.5–49.3)
HGB BLD-MCNC: 9.4 G/DL (ref 12–17)
MCH RBC QN AUTO: 24.4 PG (ref 26.8–34.3)
MCHC RBC AUTO-ENTMCNC: 30.8 G/DL (ref 31.4–37.4)
MCV RBC AUTO: 79 FL (ref 82–98)
PLATELET # BLD AUTO: 481 THOUSANDS/UL (ref 149–390)
PMV BLD AUTO: 8.5 FL (ref 8.9–12.7)
RBC # BLD AUTO: 3.85 MILLION/UL (ref 3.88–5.62)
WBC # BLD AUTO: 5.82 THOUSAND/UL (ref 4.31–10.16)

## 2018-02-26 PROCEDURE — 36415 COLL VENOUS BLD VENIPUNCTURE: CPT

## 2018-02-26 PROCEDURE — 85027 COMPLETE CBC AUTOMATED: CPT

## 2018-02-26 RX ORDER — LISINOPRIL 40 MG/1
40 TABLET ORAL DAILY
Qty: 90 TABLET | Refills: 0 | Status: CANCELLED | OUTPATIENT
Start: 2018-02-26

## 2018-02-26 RX ORDER — ATENOLOL 50 MG/1
50 TABLET ORAL DAILY
Qty: 90 TABLET | Refills: 0 | OUTPATIENT
Start: 2018-02-26 | End: 2018-04-10 | Stop reason: SDUPTHER

## 2018-02-26 RX ORDER — METFORMIN HYDROCHLORIDE 500 MG/1
500 TABLET, EXTENDED RELEASE ORAL 2 TIMES DAILY WITH MEALS
Qty: 180 TABLET | Refills: 0 | Status: SHIPPED | OUTPATIENT
Start: 2018-02-26 | End: 2018-07-13 | Stop reason: SDUPTHER

## 2018-02-26 RX ORDER — LISINOPRIL 40 MG/1
40 TABLET ORAL DAILY
Qty: 90 TABLET | Refills: 0 | Status: SHIPPED | OUTPATIENT
Start: 2018-02-26 | End: 2018-07-13 | Stop reason: SDUPTHER

## 2018-02-26 NOTE — TELEPHONE ENCOUNTER
Pt left papers in bin - today - from [facility]    alenolol tabs 50mg  Metformin hcl er tabs  500 mg  Lisinopril tabs 40 mg    [facility]    Any probs call pt

## 2018-03-07 NOTE — PROGRESS NOTES
History of Present Illness    Revaccination   Vaccine Information: Vaccine(s) Given (names): pneumovax [id number]  Unable to reach by phone  Action(s): Pt will be revaccinated  Appointment scheduled: [id number]   Pt called (attempt 1): [id number] sd  Other Information: due after 7/18/17  Revaccination Completed: [id number]  Active Problems    1  Abnormal stress test (794 39) (R94 39)   2  Aortic sclerosis   3  Aortic valve disorder (424 1) (I35 9)   4  AV block (426 10) (I44 30)   5  Coronary artery disease (414 00) (I25 10)   6  Dyspnea (786 09) (R06 00)   7  Generalized osteoarthritis (715 00) (M15 9)   8  Need for pneumococcal vaccination (V03 82) (Z23)   9  Need for revaccination (V05 9) (Z23)   10  Obesity (278 00) (E66 9)   11  Organic impotence (607 84) (N52 9)   12  Special screening examination for neoplasm of prostate (V76 44) (Z12 5)    Immunizations  Influenza --- Ariella Grow: never; Havery Sicilian: Temporarily Deferred: Pt refuses, As of: [id number], Defer for  1 Years   PCV --- Series1: 18-Jan-2017   PPSV --- Ariella Grow: 17-Mar-2016; [facility]   Tdap --- Ariella Grow: Unknown   Zoster --- Series1: 17-Mar-2016     Current Meds   1  Aspirin 81 MG Oral Tablet Chewable; CHEW AND SWALLOW 1 TABLET DAILY   2  Atenolol 50 MG Oral Tablet; Take 1 tablet daily   3  Atorvastatin Calcium 80 MG Oral Tablet; Take 1 tablet daily   4  CloNIDine HCl - 0 3 MG Oral Tablet; TAKE 1 TABLET THREE TIMES A DAY   5  HydroCHLOROthiazide 25 MG Oral Tablet; Take 1 tablet daily   6  Lisinopril 40 MG Oral Tablet; Take 1 tablet daily   7  MetFORMIN HCl  MG Oral Tablet Extended Release 24 Hour; TAKE 2 TABLETS   BEFORE THE EVENING MEAL   8  Omega 3 1000 MG Oral Capsule; TAKE 1 CAPSULE 3 times daily    Allergies    1   No Known Drug Allergies    Future Appointments    Date/Time Provider Specialty Site   08/31/2017 09:30 AM Alexandru Douglas MD General Surgery [facility]   01/25/2018 08:00 AM Idania TADEO Braga   Internal [address] [facility]     Signatures   Electronically signed by : TADEO Strong ; Aug  8 2017  7:16PM EST

## 2018-03-25 ENCOUNTER — ANESTHESIA EVENT (OUTPATIENT)
Dept: PERIOP | Facility: HOSPITAL | Age: 68
End: 2018-03-25
Payer: MEDICARE

## 2018-03-25 RX ORDER — SODIUM CHLORIDE, SODIUM LACTATE, POTASSIUM CHLORIDE, CALCIUM CHLORIDE 600; 310; 30; 20 MG/100ML; MG/100ML; MG/100ML; MG/100ML
125 INJECTION, SOLUTION INTRAVENOUS CONTINUOUS
Status: CANCELLED | OUTPATIENT
Start: 2018-03-25

## 2018-03-25 NOTE — ANESTHESIA PREPROCEDURE EVALUATION
Review of Systems/Medical History  Patient summary reviewed        Cardiovascular  EKG reviewed, Hyperlipidemia, Hypertension , Past MI (nstemi ii) 0-3 months,   Comment: LEFT VENTRICLE:  The cavity was small  Systolic function was normal  Ejection fraction was estimated to be 60 %  There were no regional wall motion abnormalities  There was moderate concentric hypertrophy  Doppler parameters were consistent with abnormal left ventricular relaxation (grade 1 diastolic dysfunction)      VENTRICULAR SEPTUM:  There was moderate asymmetrical hypertrophy of the septum      RIGHT VENTRICLE:  The size was normal   Systolic function was normal      LEFT ATRIUM:  The atrium was mildly dilated      MITRAL VALVE:  There was mild annular calcification  There was mild regurgitation      AORTIC VALVE:  There was mild regurgitation      TRICUSPID VALVE:  There was trace regurgitation  Estimated peak PA pressure was 42 mmHg    ,  Pulmonary  Smoker cigarette smoker  , Tobacco cessation counseling given ,        GI/Hepatic    GI bleeding (h/o) , PUD,        Negative  ROS        Endo/Other  Diabetes ,      GYN  Negative gynecology ROS          Hematology  Anemia ,     Musculoskeletal  Negative musculoskeletal ROS        Neurology  Negative neurology ROS      Psychology   Negative psychology ROS              Physical Exam    Airway    Mallampati score: II  TM Distance: >3 FB  Neck ROM: full     Dental       Cardiovascular  Cardiovascular exam normal    Pulmonary  Pulmonary exam normal     Other Findings        Anesthesia Plan  ASA Score- 3     Anesthesia Type- IV sedation with anesthesia with ASA Monitors  Additional Monitors:   Airway Plan:         Plan Factors-    Induction- intravenous  Postoperative Plan-     Informed Consent- Anesthetic plan and risks discussed with patient  I personally reviewed this patient with the CRNA  Discussed and agreed on the Anesthesia Plan with the CRNA  Enid Sherman

## 2018-03-26 ENCOUNTER — ANESTHESIA (OUTPATIENT)
Dept: PERIOP | Facility: HOSPITAL | Age: 68
End: 2018-03-26
Payer: MEDICARE

## 2018-03-26 ENCOUNTER — HOSPITAL ENCOUNTER (OUTPATIENT)
Facility: HOSPITAL | Age: 68
Setting detail: OUTPATIENT SURGERY
Discharge: HOME | End: 2018-03-26
Attending: INTERNAL MEDICINE | Admitting: INTERNAL MEDICINE
Payer: MEDICARE

## 2018-03-26 VITALS
DIASTOLIC BLOOD PRESSURE: 79 MMHG | HEART RATE: 66 BPM | TEMPERATURE: 97.1 F | WEIGHT: 192 LBS | OXYGEN SATURATION: 65 % | HEIGHT: 68 IN | SYSTOLIC BLOOD PRESSURE: 134 MMHG | RESPIRATION RATE: 20 BRPM | BODY MASS INDEX: 29.1 KG/M2

## 2018-03-26 DIAGNOSIS — K25.0 ACUTE GASTRIC ULCER WITH HEMORRHAGE: ICD-10-CM

## 2018-03-26 DIAGNOSIS — K27.9 H PYLORI ULCER: ICD-10-CM

## 2018-03-26 DIAGNOSIS — B96.81 H PYLORI ULCER: ICD-10-CM

## 2018-03-26 DIAGNOSIS — D62 ACUTE BLOOD LOSS ANEMIA: ICD-10-CM

## 2018-03-26 LAB — GLUCOSE SERPL-MCNC: 120 MG/DL (ref 65–140)

## 2018-03-26 PROCEDURE — 43239 EGD BIOPSY SINGLE/MULTIPLE: CPT | Performed by: INTERNAL MEDICINE

## 2018-03-26 PROCEDURE — 82948 REAGENT STRIP/BLOOD GLUCOSE: CPT

## 2018-03-26 PROCEDURE — 88305 TISSUE EXAM BY PATHOLOGIST: CPT | Performed by: PATHOLOGY

## 2018-03-26 PROCEDURE — 88342 IMHCHEM/IMCYTCHM 1ST ANTB: CPT | Performed by: PATHOLOGY

## 2018-03-26 PROCEDURE — 88313 SPECIAL STAINS GROUP 2: CPT | Performed by: PATHOLOGY

## 2018-03-26 RX ORDER — SODIUM CHLORIDE, SODIUM LACTATE, POTASSIUM CHLORIDE, CALCIUM CHLORIDE 600; 310; 30; 20 MG/100ML; MG/100ML; MG/100ML; MG/100ML
INJECTION, SOLUTION INTRAVENOUS CONTINUOUS PRN
Status: DISCONTINUED | OUTPATIENT
Start: 2018-03-26 | End: 2018-03-26 | Stop reason: SURG

## 2018-03-26 RX ORDER — PANTOPRAZOLE SODIUM 40 MG/1
40 TABLET, DELAYED RELEASE ORAL 2 TIMES DAILY
Qty: 60 TABLET | Refills: 3 | Status: SHIPPED | OUTPATIENT
Start: 2018-03-26 | End: 2018-10-08 | Stop reason: SDUPTHER

## 2018-03-26 RX ORDER — PROPOFOL 10 MG/ML
INJECTION, EMULSION INTRAVENOUS AS NEEDED
Status: DISCONTINUED | OUTPATIENT
Start: 2018-03-26 | End: 2018-03-26 | Stop reason: SURG

## 2018-03-26 RX ADMIN — PROPOFOL 30 MG: 10 INJECTION, EMULSION INTRAVENOUS at 09:00

## 2018-03-26 RX ADMIN — PROPOFOL 100 MG: 10 INJECTION, EMULSION INTRAVENOUS at 08:56

## 2018-03-26 RX ADMIN — SODIUM CHLORIDE, SODIUM LACTATE, POTASSIUM CHLORIDE, AND CALCIUM CHLORIDE: .6; .31; .03; .02 INJECTION, SOLUTION INTRAVENOUS at 08:10

## 2018-03-26 NOTE — OP NOTE
ESOPHAGOGASTRODUODENOSCOPY    PROCEDURE: EGD/ Biopsy    INDICATIONS: GI bleeding; Gastric ulcer on prior exam with IM    POST-OP DIAGNOSIS: See the impression below    SEDATION: Monitored anesthesia care, check anesthesia records    PHYSICAL EXAM:  Vitals:    03/26/18 0749   BP: 140/87   Pulse: 69   Resp: (!) 23   Temp: 98 3 °F (36 8 °C)   SpO2: 100%     Body mass index is 29 19 kg/m²  General: NAD  Heart: S1 & S2 normal, RRR  Lungs: CTA, No rales or rhonchi  Abdomen: Soft, nontender, nondistended, good bowel sounds    CONSENT:  Informed consent was obtained for the procedure, including sedation after explaining the risks and benefits of the procedure  Risks including but not limited to bleeding, perforation, infection, aspiration were discussed in detail  Also explained about less than 100% sensitivity with the exam and other alternatives  PREPARATION:   EKG tracing, pulse oximetry, blood pressure were monitored throughout the procedure  Patient was identified by myself both verbally and by visual inspection of ID band  DESCRIPTION:   Patient was placed in the left lateral decubitus position and was sedated with the above medication  The gastroscope was introduced in to the oropharynx and the esophagus was intubated under direct visualization  Scope was passed down the esophagus up to 2nd part of the duodenum  A careful inspection was made as the gastroscope was withdrawn, including a retroflexed view of the stomach; findings and interventions are described below  FINDINGS:    #1  Esophagus and GEJ- there was LA grade a distal reflux esophagitis and a minute hiatal hernia noted on retroflexion    #2  Stomach-  there was a mosaic pattern to the mucosa throughout  Likely consistent with intestinal metaplasia  A clean based 1 cm ulceration was noted on the angularis  It is about 85% healed from the prior exam   There was no bleeding stigmata    Multiple biopsies of the EG ulcer were obtained for pathologic examination    #3  Duodenum- the duodenal bulb and 2nd portion of the duodenum appeared normal         IMPRESSIONS:    The gastric ulceration is about 85% healed with no bleeding stigmata    RECOMMENDATIONS:   Continue PPI b i d  Follow-up pathology  I will schedule a follow-up exam for 8 weeks from now    COMPLICATIONS:  None; patient tolerated the procedure well    DISPOSITION: PACU  CONDITION: Stable    Coleta Gaucher, MD  3/26/2018,9:04 AM

## 2018-03-26 NOTE — ANESTHESIA POSTPROCEDURE EVALUATION
Post-Op Assessment Note      CV Status:  Stable    Mental Status:  Alert and awake    Hydration Status:  Stable    PONV Controlled:  None    Airway Patency:  Patent and adequate    Post Op Vitals Reviewed: Yes          Staff: Anesthesiologist, CRNA           BP   185/92   Temp      Pulse  68   Resp  18   SpO2   99%

## 2018-03-26 NOTE — DISCHARGE INSTRUCTIONS
IMPRESSIONS:    The gastric ulceration is about 85% healed with no bleeding stigmata     RECOMMENDATIONS:   Continue PPI b i d  Follow-up pathology  I will schedule a follow-up exam for 8 weeks from now       Upper Endoscopy   WHAT YOU NEED TO KNOW:   What do I need to know about an upper endoscopy? An upper endoscopy is also called an upper gastrointestinal (GI) endoscopy, or an esophagogastroduodenoscopy (EGD)  A scope (thin, flexible tube with a light and camera) is used to examine the walls of your upper intestines  The upper intestines include the esophagus, stomach, and duodenum (first part of the small intestine)  An upper endoscopy is used to look for problems, such as bleeding, polyps, ulcers, or infection  How do I prepare for an upper endoscopy? Your healthcare provider will talk to you about how to prepare for your procedure  You may need to not eat or drink anything except water for 6 to 12 hours before the procedure  He will tell you what medicines to take or not take on the day of your procedure  Arrange to have someone drive you home  What will happen during an upper endoscopy? · You will be given medicine through your IV to help you relax and make you drowsy  You will also be given medicine to numb your throat  You may need to wear a plastic mouthpiece to help hold your mouth open and protect your teeth and tongue  Your healthcare provider will gently insert the endoscope through your mouth and down into your throat  You may be asked to swallow once to help move the scope into your upper intestines  You may feel pressure in your throat but you should not feel pain  The endoscope does not restrict your breathing  · Your healthcare provider will watch the scope on a monitor  He will take pictures with the scope  He may gently inject air so he can see your digestive tract clearly  Your healthcare provider may take tissue samples and send them to the lab for tests   He may remove foreign objects, tumors, or polyps that may be blocking your upper intestines  Your healthcare provider may also insert tools with the scope to treat bleeding or place a stent (tube)  When the procedure is finished, the endoscope will be slowly removed  What will happen after an upper endoscopy? You may feel bloated, gassy, or have some abdominal discomfort  Your throat may be sore for 24 to 36 hours after the procedure  You may burp or pass gas from air that is still inside your body after your procedure  You may need to take short walks to help move the gas out  Eat small meals, if you feel bloated  Do not drive or make important decisions until the day after your procedure  What are the risks of an upper endoscopy? Your esophagus, stomach, or duodenum may be punctured or torn during the procedure  This is because of increased pressure as the scope and air are passing through  You may bleed more than expected or get an infection  You may have a slow or irregular heartbeat, or low blood pressure  This can cause sweating and fainting  Fluid may enter your lungs and you may have trouble breathing  These problems can be life-threatening  CARE AGREEMENT:   You have the right to help plan your care  Learn about your health condition and how it may be treated  Discuss treatment options with your caregivers to decide what care you want to receive  You always have the right to refuse treatment  The above information is an  only  It is not intended as medical advice for individual conditions or treatments  Talk to your doctor, nurse or pharmacist before following any medical regimen to see if it is safe and effective for you  © 2017 [employer]  Information is for End User's use only and may not be sold, redistributed or otherwise used for commercial purposes  All illustrations and images included in CareNotes® are the copyrighted property of A D A M , Inc  or [employer] Chavez

## 2018-04-10 ENCOUNTER — TELEPHONE (OUTPATIENT)
Dept: GASTROENTEROLOGY | Facility: CLINIC | Age: 68
End: 2018-04-10

## 2018-04-10 DIAGNOSIS — I10 HYPERTENSION, UNSPECIFIED TYPE: ICD-10-CM

## 2018-04-10 RX ORDER — ATENOLOL 50 MG/1
50 TABLET ORAL DAILY
Qty: 30 TABLET | Refills: 5 | Status: SHIPPED | OUTPATIENT
Start: 2018-04-10 | End: 2019-07-18 | Stop reason: SDUPTHER

## 2018-04-10 NOTE — TELEPHONE ENCOUNTER
PT IS CURRENTLY OUT OF ATENOLOL 50MG TABS   USUALLY GOES THRU MAIL ORDER - PT CANNOT WAIT THAT LONG - CAN RX BE CALLED INTO [facility] FOR 30 DAY    ANY PROBS CALL PT  [phone]

## 2018-04-10 NOTE — TELEPHONE ENCOUNTER
Usually the side effect profile is similar with all the PPIs so I'm not sure it will help to switch him  If it is really bothersome, he can try using imodium or if he really prefers, we can try omeprazole 40 mg BID  It may just be because he is at a high dose right now with trying to heal the ulcer and the loose stool may continue despite switching

## 2018-04-10 NOTE — TELEPHONE ENCOUNTER
Spoke with patient  H/Oacute gastric uler with hemorrhage due to h pylori, acute blood loss anemia    Patient states since he started the protonix BID on 3/26/17 he has been having 3-5 soft diarrhea muddy brown BM daily  No other medication changes have been made  Patient continues to eat and drink  Advised patient to try BRAT diet, increase fluids  Would you like to switch pt  PPI to omeprazole?

## 2018-04-20 PROBLEM — D50.0 IRON DEFICIENCY ANEMIA DUE TO CHRONIC BLOOD LOSS: Status: ACTIVE | Noted: 2018-04-20

## 2018-04-20 PROBLEM — R10.13 EPIGASTRIC PAIN: Status: ACTIVE | Noted: 2018-04-20

## 2018-04-20 PROBLEM — K25.0 ACUTE GASTRIC ULCER WITH HEMORRHAGE AND OBSTRUCTION: Status: ACTIVE | Noted: 2018-04-20

## 2018-04-26 LAB
LEFT EYE DIABETIC RETINOPATHY: NORMAL
RIGHT EYE DIABETIC RETINOPATHY: NORMAL

## 2018-05-13 ENCOUNTER — ANESTHESIA EVENT (OUTPATIENT)
Dept: PERIOP | Facility: HOSPITAL | Age: 68
End: 2018-05-13
Payer: MEDICARE

## 2018-05-14 ENCOUNTER — ANESTHESIA (OUTPATIENT)
Dept: PERIOP | Facility: HOSPITAL | Age: 68
End: 2018-05-14
Payer: MEDICARE

## 2018-05-14 ENCOUNTER — HOSPITAL ENCOUNTER (OUTPATIENT)
Facility: HOSPITAL | Age: 68
Setting detail: OUTPATIENT SURGERY
Discharge: HOME | End: 2018-05-14
Attending: INTERNAL MEDICINE | Admitting: INTERNAL MEDICINE
Payer: MEDICARE

## 2018-05-14 VITALS
TEMPERATURE: 97.5 F | HEIGHT: 68 IN | SYSTOLIC BLOOD PRESSURE: 139 MMHG | WEIGHT: 193.38 LBS | RESPIRATION RATE: 20 BRPM | BODY MASS INDEX: 29.31 KG/M2 | HEART RATE: 64 BPM | OXYGEN SATURATION: 99 % | DIASTOLIC BLOOD PRESSURE: 76 MMHG

## 2018-05-14 DIAGNOSIS — K25.0 ACUTE GASTRIC ULCER WITH HEMORRHAGE AND OBSTRUCTION: ICD-10-CM

## 2018-05-14 DIAGNOSIS — R10.13 EPIGASTRIC PAIN: ICD-10-CM

## 2018-05-14 DIAGNOSIS — D50.0 IRON DEFICIENCY ANEMIA DUE TO CHRONIC BLOOD LOSS: ICD-10-CM

## 2018-05-14 LAB — GLUCOSE SERPL-MCNC: 116 MG/DL (ref 65–140)

## 2018-05-14 PROCEDURE — 88313 SPECIAL STAINS GROUP 2: CPT | Performed by: PATHOLOGY

## 2018-05-14 PROCEDURE — 88342 IMHCHEM/IMCYTCHM 1ST ANTB: CPT | Performed by: PATHOLOGY

## 2018-05-14 PROCEDURE — 88305 TISSUE EXAM BY PATHOLOGIST: CPT | Performed by: PATHOLOGY

## 2018-05-14 PROCEDURE — 43239 EGD BIOPSY SINGLE/MULTIPLE: CPT | Performed by: INTERNAL MEDICINE

## 2018-05-14 PROCEDURE — 82948 REAGENT STRIP/BLOOD GLUCOSE: CPT

## 2018-05-14 PROCEDURE — 88341 IMHCHEM/IMCYTCHM EA ADD ANTB: CPT | Performed by: PATHOLOGY

## 2018-05-14 RX ORDER — SODIUM CHLORIDE, SODIUM LACTATE, POTASSIUM CHLORIDE, CALCIUM CHLORIDE 600; 310; 30; 20 MG/100ML; MG/100ML; MG/100ML; MG/100ML
75 INJECTION, SOLUTION INTRAVENOUS CONTINUOUS
Status: DISCONTINUED | OUTPATIENT
Start: 2018-05-14 | End: 2018-05-14 | Stop reason: HOSPADM

## 2018-05-14 RX ORDER — LIDOCAINE HYDROCHLORIDE 10 MG/ML
INJECTION, SOLUTION INFILTRATION; PERINEURAL AS NEEDED
Status: DISCONTINUED | OUTPATIENT
Start: 2018-05-14 | End: 2018-05-14 | Stop reason: SURG

## 2018-05-14 RX ORDER — PROPOFOL 10 MG/ML
INJECTION, EMULSION INTRAVENOUS AS NEEDED
Status: DISCONTINUED | OUTPATIENT
Start: 2018-05-14 | End: 2018-05-14 | Stop reason: SURG

## 2018-05-14 RX ADMIN — SODIUM CHLORIDE, SODIUM LACTATE, POTASSIUM CHLORIDE, AND CALCIUM CHLORIDE: .6; .31; .03; .02 INJECTION, SOLUTION INTRAVENOUS at 07:43

## 2018-05-14 RX ADMIN — SODIUM CHLORIDE, SODIUM LACTATE, POTASSIUM CHLORIDE, AND CALCIUM CHLORIDE 75 ML/HR: .6; .31; .03; .02 INJECTION, SOLUTION INTRAVENOUS at 07:02

## 2018-05-14 RX ADMIN — LIDOCAINE HYDROCHLORIDE 50 MG: 10 INJECTION, SOLUTION INFILTRATION; PERINEURAL at 07:48

## 2018-05-14 RX ADMIN — PROPOFOL 150 MG: 10 INJECTION, EMULSION INTRAVENOUS at 07:48

## 2018-05-14 NOTE — OP NOTE
ESOPHAGOGASTRODUODENOSCOPY    PROCEDURE: EGD/ Biopsy    INDICATIONS: Abdominal pain, Epigastric; Gastric ulcer on prior exam    POST-OP DIAGNOSIS: See the impression below    SEDATION: Monitored anesthesia care, check anesthesia records    PHYSICAL EXAM:  Vitals:    05/14/18 0656   BP: 166/77   Pulse: 63   Resp: 18   Temp: 98 5 °F (36 9 °C)   SpO2: 100%     Body mass index is 29 84 kg/m²  General: NAD  Heart: S1 & S2 normal, RRR  Lungs: CTA, No rales or rhonchi  Abdomen: Soft, nontender, nondistended, good bowel sounds    CONSENT:  Informed consent was obtained for the procedure, including sedation after explaining the risks and benefits of the procedure  Risks including but not limited to bleeding, perforation, infection, aspiration were discussed in detail  Also explained about less than 100% sensitivity with the exam and other alternatives  PREPARATION:   EKG tracing, pulse oximetry, blood pressure were monitored throughout the procedure  Patient was identified by myself both verbally and by visual inspection of ID band  DESCRIPTION:   Patient was placed in the left lateral decubitus position and was sedated with the above medication  The gastroscope was introduced in to the oropharynx and the esophagus was intubated under direct visualization  Scope was passed down the esophagus up to 2nd part of the duodenum  A careful inspection was made as the gastroscope was withdrawn, including a retroflexed view of the stomach; findings and interventions are described below  FINDINGS:    #1  Esophagus and GEJ- the esophageal body appeared normal  A small 2 cm salmon colored irregularity was noted the GE junction possible for Floyd's esophagus  It was biopsied extensively  #2  Stomach- the prior ulcer has healed 95%  A small scar was noted on the angularis  It was biopsied  Patchy irregularities with erythema were noted within the antrum consistent with intestinal metaplasia    Multiple biopsies were obtained    #3  Duodenum- the bulb and 2nd portion of the duodenum appeared normal         IMPRESSIONS:    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

## 2018-05-14 NOTE — DISCHARGE INSTRUCTIONS
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  Healed ulcer scar  Intestinal metaplasia nonerosive gastritis     RECOMMENDATIONS:   Continue Protonix daily  Follow-up biopsy  Surveillance per pathology   WE will call you tomorrow to see how you did            WHAT YOU NEED TO KNOW:   An upper endoscopy is also called an upper gastrointestinal (GI) endoscopy, or an esophagogastroduodenoscopy (EGD)  You may feel bloated, gassy, or have some abdominal discomfort after your procedure  Your throat may be sore for 24 to 36 hours  You may burp or pass gas from air that is still inside your body  DISCHARGE INSTRUCTIONS:   Call 911 if:   · You have sudden chest pain or trouble breathing  Seek care immediately if:   · You feel dizzy or faint  · You have trouble swallowing  · You have severe throat pain  · Your bowel movements are very dark or black  · Your abdomen is hard and firm and you have severe pain  · You vomit blood  Contact your healthcare provider if:   · You feel full or bloated and cannot burp or pass gas  · You have not had a bowel movement for 3 days after your procedure  · You have neck pain  · You have a fever or chills  · You have nausea or are vomiting  · You have a rash or hives  · You have questions or concerns about your endoscopy  Relieve a sore throat:  Suck on throat lozenges or crushed ice  Gargle with a small amount of warm salt water  Mix 1 teaspoon of salt and 1 cup of warm water to make salt water  Relieve gas and discomfort from bloating:  Lie on your right side with a heating pad on your abdomen  Take short walks to help pass gas  Eat small meals until bloating is relieved  Rest after your procedure:  Do not drive or make important decisions until the day after your procedure  Return to your normal activity as directed  You can usually return to work the day after your procedure    Follow up with your healthcare provider as directed:  Write down your questions so you remember to ask them during your visits  © 2017 [address] [employer] Information is for End User's use only and may not be sold, redistributed or otherwise used for commercial purposes  All illustrations and images included in CareNotes® are the copyrighted property of [employer] , [employer]  or [employer] Chavez  The above information is an  only  It is not intended as medical advice for individual conditions or treatments  Talk to your doctor, nurse or pharmacist before following any medical regimen to see if it is safe and effective for you  Floyd Esophagus   WHAT YOU NEED TO KNOW:   Floyd esophagus is a condition in which the cells that line your esophagus are damaged  The damage can cause abnormal changes in the cells  These abnormal changes increase your risk of esophageal cancer  DISCHARGE INSTRUCTIONS:   Medicines:   · Anti-reflux medicines  may be needed to help decrease the stomach acid that can irritate your esophagus and stomach  These medicines may include proton pump inhibitors (PPI) and histamine type-2 receptor (H2) blockers  You may also be given medicines to stop vomiting  · Take your medicine as directed  Contact your healthcare provider if you think your medicine is not helping or if you have side effects  Tell him if you are allergic to any medicine  Keep a list of the medicines, vitamins, and herbs you take  Include the amounts, and when and why you take them  Bring the list or the pill bottles to follow-up visits  Carry your medicine list with you in case of an emergency  Follow up with your healthcare provider as directed: Your healthcare provider may need to repeat your endoscopy and biopsy  These tests help look for early signs of esophageal cancer  Write down your questions so you remember to ask them during your visits  Nutrition:  Do not eat foods that make your symptoms worse   Examples are chocolate, garlic, onions, spicy or fatty foods, citrus fruits (oranges), and tomato-based foods (spaghetti sauce)  Do not drink alcohol, drinks that contain caffeine, or carbonated drinks, such as soda  Ask your healthcare provider if there are other foods and drinks you should not have  Maintain a healthy weight: If you are overweight, weight loss may help relieve symptoms  Ask your healthcare provider about safe ways to lose weight  Do not smoke: If you smoke, it is never too late to quit  Smoking may worsen acid reflux  Ask your healthcare provider for information if you need help quitting  Contact your healthcare provider if:   · Your symptoms do not improve with treatment  · You have questions or concerns about your condition or care  Seek care immediately or call 911 if:   · You have severe chest pain and shortness of breath  · Your bowel movements are black, bloody, or tarry  · Your vomit looks like coffee grounds or has blood in it  © 2017 [address]  Information is for End User's use only and may not be sold, redistributed or otherwise used for commercial purposes  All illustrations and images included in CareNotes® are the copyrighted property of [employer]  or [employer]  The above information is an  only  It is not intended as medical advice for individual conditions or treatments  Talk to your doctor, nurse or pharmacist before following any medical regimen to see if it is safe and effective for you

## 2018-05-14 NOTE — ANESTHESIA PREPROCEDURE EVALUATION
Review of Systems/Medical History  Patient summary reviewed  Chart reviewed  No history of anesthetic complications     Cardiovascular  EKG reviewed, Exercise tolerance: poor,  Hyperlipidemia, Hypertension controlled, Valvular heart disease , aortic regurgitation, Valve replacement , Past MI (NSTEMI type 2 1/2018 related to demand ischemia from anemia) 0-3 months, CAD (did not get atenolol this AM 2/2 borderline BP) , MORGAN,   Comment: TTE 1/22/18:  LVEF 60%, no RWMA, grade 1 diastolic dysfunction, mild MR, mild AI      ,  Pulmonary  Smoker cigarette smoker  , Tobacco cessation counseling given , COPD mild- PRN medicaiton , No recent URI ,   Comment: Mild emphysema per ct scan 1/2017       GI/Hepatic    GI bleeding , history of, PUD,   Comment: Confirmed NPO appropriate     Negative  ROS        Endo/Other  Diabetes type 2 Oral agent,      GYN       Hematology  Anemia (has received 3 units PRBCs this admission, 1 unit this AM) acute blood loss anemia,     Musculoskeletal  Osteoarthritis (DJD),        Neurology  Negative neurology ROS   No diabetic neuropathy,    Psychology   Negative psychology ROS              Physical Exam    Airway    Mallampati score: II  TM Distance: >3 FB  Neck ROM: full     Dental   No notable dental hx     Cardiovascular  Rhythm: regular, Rate: normal, Murmur,     Pulmonary  Pulmonary exam normal Breath sounds clear to auscultation,     Other Findings        Anesthesia Plan  ASA Score- 3     Anesthesia Type- IV sedation with anesthesia with ASA Monitors  Additional Monitors:   Airway Plan:     Comment: I discussed the risks and benefits of IV sedation anesthesia including the possibility of the need to convert to general anesthesia and the potential risk of awareness  The patient was given the opportunity to ask questions, which were answered        Plan Factors-    Induction- intravenous      Postoperative Plan-     Informed Consent- Anesthetic plan and risks discussed with patient and spouse  Lab Results   Component Value Date    WBC 5 82 02/26/2018    HGB 9 4 (L) 02/26/2018    HCT 30 5 (L) 02/26/2018    MCV 79 (L) 02/26/2018     (H) 02/26/2018     Lab Results   Component Value Date    GLUCOSE 174 (H) 01/24/2018    CALCIUM 8 7 01/24/2018     01/24/2018    K 3 7 01/24/2018    CO2 27 01/24/2018     01/24/2018    BUN 13 01/24/2018    CREATININE 0 81 01/24/2018 01/21/18 0852 XR chest 1 view portable    Impression:     No active pulmonary disease

## 2018-05-14 NOTE — ANESTHESIA POSTPROCEDURE EVALUATION
Post-Op Assessment Note      CV Status:  Stable    Mental Status:  Alert and awake    Hydration Status:  Euvolemic    PONV Controlled:  Controlled    Airway Patency:  Patent    Post Op Vitals Reviewed: Yes          Staff: CRNA           BP   173/87   Temp      Pulse  87   Resp   16   SpO2   99%

## 2018-05-22 ENCOUNTER — TELEPHONE (OUTPATIENT)
Dept: GASTROENTEROLOGY | Facility: CLINIC | Age: 68
End: 2018-05-22

## 2018-05-23 NOTE — TELEPHONE ENCOUNTER
His endoscopy showed the ulcer was healed and there was a scar from it  He has abnormal tissue from having the bacteria H  Pylori that we treated him for so we want him to have another EGD in 3 years to look at the tissue and biopsy it again

## 2018-06-14 RX ORDER — SODIUM FLUORIDE 1.1 G/100G
GEL ORAL
Refills: 5 | COMMUNITY
Start: 2018-05-18 | End: 2021-03-03 | Stop reason: HOSPADM

## 2018-06-15 ENCOUNTER — OFFICE VISIT (OUTPATIENT)
Dept: INTERNAL MEDICINE CLINIC | Facility: CLINIC | Age: 68
End: 2018-06-15
Payer: MEDICARE

## 2018-06-15 ENCOUNTER — APPOINTMENT (OUTPATIENT)
Dept: LAB | Facility: CLINIC | Age: 68
End: 2018-06-15
Payer: MEDICARE

## 2018-06-15 VITALS
SYSTOLIC BLOOD PRESSURE: 138 MMHG | WEIGHT: 194.2 LBS | HEART RATE: 68 BPM | HEIGHT: 68 IN | BODY MASS INDEX: 29.43 KG/M2 | DIASTOLIC BLOOD PRESSURE: 84 MMHG | OXYGEN SATURATION: 96 %

## 2018-06-15 DIAGNOSIS — K25.0 ACUTE GASTRIC ULCER WITH HEMORRHAGE: ICD-10-CM

## 2018-06-15 DIAGNOSIS — E11.8 TYPE 2 DIABETES MELLITUS WITH COMPLICATION, WITHOUT LONG-TERM CURRENT USE OF INSULIN (HCC): ICD-10-CM

## 2018-06-15 DIAGNOSIS — K25.0 ACUTE GASTRIC ULCER WITH HEMORRHAGE: Primary | ICD-10-CM

## 2018-06-15 DIAGNOSIS — E78.2 MIXED HYPERLIPIDEMIA: ICD-10-CM

## 2018-06-15 DIAGNOSIS — Z12.5 PROSTATE CANCER SCREENING: ICD-10-CM

## 2018-06-15 DIAGNOSIS — I10 ESSENTIAL HYPERTENSION: ICD-10-CM

## 2018-06-15 PROBLEM — D62 ACUTE BLOOD LOSS ANEMIA: Status: RESOLVED | Noted: 2018-02-08 | Resolved: 2018-06-15

## 2018-06-15 LAB
ALBUMIN SERPL BCP-MCNC: 3.7 G/DL (ref 3.5–5)
ALP SERPL-CCNC: 56 U/L (ref 46–116)
ALT SERPL W P-5'-P-CCNC: 18 U/L (ref 12–78)
ANION GAP SERPL CALCULATED.3IONS-SCNC: 6 MMOL/L (ref 4–13)
AST SERPL W P-5'-P-CCNC: 12 U/L (ref 5–45)
BACTERIA UR QL AUTO: NORMAL /HPF
BASOPHILS # BLD AUTO: 0.04 THOUSANDS/ΜL (ref 0–0.1)
BASOPHILS NFR BLD AUTO: 1 % (ref 0–1)
BILIRUB SERPL-MCNC: 0.46 MG/DL (ref 0.2–1)
BILIRUB UR QL STRIP: NEGATIVE
BUN SERPL-MCNC: 15 MG/DL (ref 5–25)
CALCIUM SERPL-MCNC: 9.6 MG/DL (ref 8.3–10.1)
CHLORIDE SERPL-SCNC: 103 MMOL/L (ref 100–108)
CHOLEST SERPL-MCNC: 145 MG/DL (ref 50–200)
CLARITY UR: CLEAR
CO2 SERPL-SCNC: 29 MMOL/L (ref 21–32)
COLOR UR: YELLOW
CREAT SERPL-MCNC: 0.76 MG/DL (ref 0.6–1.3)
CREAT UR-MCNC: 89.2 MG/DL
EOSINOPHIL # BLD AUTO: 0.13 THOUSAND/ΜL (ref 0–0.61)
EOSINOPHIL NFR BLD AUTO: 3 % (ref 0–6)
ERYTHROCYTE [DISTWIDTH] IN BLOOD BY AUTOMATED COUNT: 24 % (ref 11.6–15.1)
EST. AVERAGE GLUCOSE BLD GHB EST-MCNC: 140 MG/DL
GFR SERPL CREATININE-BSD FRML MDRD: 108 ML/MIN/1.73SQ M
GLUCOSE P FAST SERPL-MCNC: 107 MG/DL (ref 65–99)
GLUCOSE UR STRIP-MCNC: NEGATIVE MG/DL
HBA1C MFR BLD: 6.5 % (ref 4.2–6.3)
HCT VFR BLD AUTO: 35.4 % (ref 36.5–49.3)
HDLC SERPL-MCNC: 58 MG/DL (ref 40–60)
HGB BLD-MCNC: 10.5 G/DL (ref 12–17)
HGB UR QL STRIP.AUTO: NEGATIVE
HYALINE CASTS #/AREA URNS LPF: NORMAL /LPF
IMM GRANULOCYTES # BLD AUTO: 0.01 THOUSAND/UL (ref 0–0.2)
IMM GRANULOCYTES NFR BLD AUTO: 0 % (ref 0–2)
KETONES UR STRIP-MCNC: NEGATIVE MG/DL
LDLC SERPL CALC-MCNC: 78 MG/DL (ref 0–100)
LEUKOCYTE ESTERASE UR QL STRIP: NEGATIVE
LYMPHOCYTES # BLD AUTO: 1.78 THOUSANDS/ΜL (ref 0.6–4.47)
LYMPHOCYTES NFR BLD AUTO: 37 % (ref 14–44)
MCH RBC QN AUTO: 21.6 PG (ref 26.8–34.3)
MCHC RBC AUTO-ENTMCNC: 29.7 G/DL (ref 31.4–37.4)
MCV RBC AUTO: 73 FL (ref 82–98)
MICROALBUMIN UR-MCNC: 135 MG/L (ref 0–20)
MICROALBUMIN/CREAT 24H UR: 151 MG/G CREATININE (ref 0–30)
MONOCYTES # BLD AUTO: 0.53 THOUSAND/ΜL (ref 0.17–1.22)
MONOCYTES NFR BLD AUTO: 11 % (ref 4–12)
NEUTROPHILS # BLD AUTO: 2.37 THOUSANDS/ΜL (ref 1.85–7.62)
NEUTS SEG NFR BLD AUTO: 48 % (ref 43–75)
NITRITE UR QL STRIP: NEGATIVE
NON-SQ EPI CELLS URNS QL MICRO: NORMAL /HPF
NRBC BLD AUTO-RTO: 0 /100 WBCS
PH UR STRIP.AUTO: 6 [PH] (ref 4.5–8)
PLATELET # BLD AUTO: 509 THOUSANDS/UL (ref 149–390)
PMV BLD AUTO: 9 FL (ref 8.9–12.7)
POTASSIUM SERPL-SCNC: 3.7 MMOL/L (ref 3.5–5.3)
PROT SERPL-MCNC: 7.6 G/DL (ref 6.4–8.2)
PROT UR STRIP-MCNC: ABNORMAL MG/DL
PSA SERPL-MCNC: 0.5 NG/ML (ref 0–4)
RBC # BLD AUTO: 4.86 MILLION/UL (ref 3.88–5.62)
RBC #/AREA URNS AUTO: NORMAL /HPF
SODIUM SERPL-SCNC: 138 MMOL/L (ref 136–145)
SP GR UR STRIP.AUTO: 1.02 (ref 1–1.03)
TRIGL SERPL-MCNC: 43 MG/DL
TSH SERPL DL<=0.05 MIU/L-ACNC: 0.77 UIU/ML (ref 0.36–3.74)
UROBILINOGEN UR QL STRIP.AUTO: 0.2 E.U./DL
WBC # BLD AUTO: 4.86 THOUSAND/UL (ref 4.31–10.16)
WBC #/AREA URNS AUTO: NORMAL /HPF

## 2018-06-15 PROCEDURE — 85025 COMPLETE CBC W/AUTO DIFF WBC: CPT

## 2018-06-15 PROCEDURE — 81001 URINALYSIS AUTO W/SCOPE: CPT | Performed by: INTERNAL MEDICINE

## 2018-06-15 PROCEDURE — 99214 OFFICE O/P EST MOD 30 MIN: CPT | Performed by: INTERNAL MEDICINE

## 2018-06-15 PROCEDURE — G0103 PSA SCREENING: HCPCS

## 2018-06-15 PROCEDURE — 84443 ASSAY THYROID STIM HORMONE: CPT

## 2018-06-15 PROCEDURE — 80061 LIPID PANEL: CPT

## 2018-06-15 PROCEDURE — 80053 COMPREHEN METABOLIC PANEL: CPT

## 2018-06-15 PROCEDURE — 36415 COLL VENOUS BLD VENIPUNCTURE: CPT

## 2018-06-15 PROCEDURE — 83036 HEMOGLOBIN GLYCOSYLATED A1C: CPT

## 2018-06-15 PROCEDURE — 82043 UR ALBUMIN QUANTITATIVE: CPT | Performed by: INTERNAL MEDICINE

## 2018-06-15 PROCEDURE — 82570 ASSAY OF URINE CREATININE: CPT | Performed by: INTERNAL MEDICINE

## 2018-06-15 NOTE — PATIENT INSTRUCTIONS
Diagnoses at a level of Internal Medicine appear to be stable  Laboratory assessment required today  Follow-up in 6 months  Continue to follow with GI for gastric ulceration which appears to be in resolution  Influenza vaccine in the fall  Call if problems develop

## 2018-06-15 NOTE — PROGRESS NOTES
Diabetic Foot Exam    Patient's shoes and socks removed  Right Foot/Ankle   Right Foot Inspection  Skin Exam: skin intact                          Toe Exam: right toe deformity  Sensory       Monofilament testing: intact  Vascular    The right DP pulse is 1+  The right PT pulse is 0  Left Foot/Ankle  Left Foot Inspection  Skin Exam: skin intact                         Toe Exam: left toe deformity                   Sensory       Monofilament: intact  Vascular    The left DP pulse is 1+  The left PT pulse is 0  Assign Risk Category:  Deformity present;  No loss of protective sensation; Weak pulses       Risk: 1

## 2018-06-15 NOTE — PROGRESS NOTES
Assessment/Plan:       Diagnoses and all orders for this visit:    Acute gastric ulcer with hemorrhage    Type 2 diabetes mellitus with complication, without long-term current use of insulin ([address] )    Essential hypertension    Mixed hyperlipidemia    Other orders  -     DENTAGEL 1 1 % GEL; USE ONCE DAILY AFTER BRUSHING  BEFORE BEDTIME              Subjective:      Patient ID: Kota Bonilla is a 69 y o  male  [place] Walker Hypertensive cardiomyopathy with grade 1 diastolic dysfunction but without heart failure    For 6 months, the patient has been treated for gastric ulceration  Presented to the hospital in January with melena  Found to have a large gastric ulcer  Serial upper endoscopies done by Dr Toño Marcelino seem to document healing  No evidence for malignancy  The following portions of the patient's history were reviewed and updated as appropriate:   He has a past medical history of Benign neoplasm of large intestine; Diabetes mellitus ([address] ); Heart murmur; History of aortic regurgitation; History of constipation; History of degenerative joint disease; History of nocturia; History of obesity; History of sebaceous cyst; History of shortness of breath; History of urinary frequency; Hyperlipidemia; and Polyposis coli ,   does not have any pertinent problems on file  ,   has a past surgical history that includes Hernia repair; Tibia fracture surgery (Left); Achilles tendon surgery (Left); Colonoscopy; Hernia repair (Bilateral, 8/18/2017); Esophagogastroduodenoscopy (N/A, 1/23/2018); Esophagogastroduodenoscopy; pr esophagogastroduodenoscopy transoral diagnostic (N/A, 3/26/2018); and pr esophagogastroduodenoscopy transoral diagnostic (N/A, 5/14/2018)  ,  family history includes Heart disease in his mother; Other in his father; Rheumatic fever in his father  ,   reports that he has been smoking Cigarettes  He has a 35 00 pack-year smoking history   He has never used smokeless tobacco  He reports that he drinks alcohol  He reports that he does not use drugs  ,  has No Known Allergies     Current Outpatient Prescriptions   Medication Sig Dispense Refill    aspirin 81 mg chewable tablet Chew 1 tablet daily      atenolol (TENORMIN) 50 mg tablet Take 1 tablet (50 mg total) by mouth daily 30 tablet 5    atorvastatin (LIPITOR) 80 mg tablet Take 1 tablet by mouth daily      cloNIDine (CATAPRES) 0 3 mg tablet Take 1 tablet by mouth 3 (three) times a day      DENTAGEL 1 1 % GEL USE ONCE DAILY AFTER BRUSHING  BEFORE BEDTIME  5    hydrochlorothiazide (HYDRODIURIL) 25 mg tablet Take 25 mg by mouth daily      lisinopril (ZESTRIL) 40 mg tablet Take 1 tablet (40 mg total) by mouth daily 90 tablet 0    metFORMIN (GLUCOPHAGE-XR) 500 mg 24 hr tablet Take 1 tablet (500 mg total) by mouth 2 (two) times a day with meals 180 tablet 0    Omega-3 1000 MG CAPS Take by mouth      pantoprazole (PROTONIX) 40 mg tablet Take 1 tablet (40 mg total) by mouth 2 (two) times a day 60 tablet 3     No current facility-administered medications for this visit  Review of Systems   Constitutional: Negative for chills and fever  HENT: Negative for sore throat and trouble swallowing  Eyes: Negative for pain  Respiratory: Negative for cough and shortness of breath  Cardiovascular: Negative for chest pain and palpitations  Gastrointestinal: Negative for abdominal pain, blood in stool, diarrhea, nausea and vomiting  Endocrine: Negative for cold intolerance and heat intolerance  Genitourinary: Negative for dysuria, frequency and testicular pain  Musculoskeletal: Negative for joint swelling  Allergic/Immunologic: Negative for immunocompromised state  Neurological: Negative for dizziness, syncope and headaches  Hematological: Negative for adenopathy  Does not bruise/bleed easily  Psychiatric/Behavioral: Negative for dysphoric mood  The patient is not nervous/anxious            Objective:  Vitals:    06/15/18 0742   BP: 138/84 Pulse: 68   SpO2: 96%      Physical Exam   Constitutional: He is oriented to person, place, and time  He appears well-developed and well-nourished  A 42-year-old male seated quietly in no distress  Appears stated age  HENT:   Head: Normocephalic and atraumatic  Eyes: Pupils are equal, round, and reactive to light  Neck: Normal range of motion  Neck supple  No tracheal deviation present  No thyromegaly present  Cardiovascular: Normal rate, regular rhythm and normal heart sounds  Exam reveals no gallop  No murmur heard  Pulmonary/Chest: Effort normal  No respiratory distress  He has no wheezes  He has no rales  Abdominal: Soft  Bowel sounds are normal  There is no tenderness  Musculoskeletal: Normal range of motion  He exhibits deformity  He exhibits no tenderness  Mild osteoarthrosis deformities  Flat feet  Neurological: He is alert and oriented to person, place, and time  He has normal reflexes  Coordination normal    Skin: Skin is warm and dry  Mycosis of toenails noted  Psychiatric: He has a normal mood and affect

## 2018-07-13 DIAGNOSIS — E11.69 TYPE 2 DIABETES MELLITUS WITH OTHER SPECIFIED COMPLICATION, WITHOUT LONG-TERM CURRENT USE OF INSULIN (HCC): ICD-10-CM

## 2018-07-13 RX ORDER — METFORMIN HYDROCHLORIDE 500 MG/1
TABLET, EXTENDED RELEASE ORAL
Qty: 180 TABLET | Refills: 0 | Status: SHIPPED | OUTPATIENT
Start: 2018-07-13 | End: 2018-10-11 | Stop reason: SDUPTHER

## 2018-07-13 RX ORDER — LISINOPRIL 40 MG/1
TABLET ORAL
Qty: 90 TABLET | Refills: 0 | Status: SHIPPED | OUTPATIENT
Start: 2018-07-13 | End: 2018-10-11 | Stop reason: SDUPTHER

## 2018-07-16 ENCOUNTER — OFFICE VISIT (OUTPATIENT)
Dept: CARDIOLOGY CLINIC | Facility: CLINIC | Age: 68
End: 2018-07-16
Payer: MEDICARE

## 2018-07-16 ENCOUNTER — TELEPHONE (OUTPATIENT)
Dept: CARDIOLOGY CLINIC | Facility: CLINIC | Age: 68
End: 2018-07-16

## 2018-07-16 ENCOUNTER — APPOINTMENT (OUTPATIENT)
Dept: LAB | Facility: CLINIC | Age: 68
End: 2018-07-16
Payer: MEDICARE

## 2018-07-16 VITALS
OXYGEN SATURATION: 98 % | HEART RATE: 82 BPM | HEIGHT: 68 IN | SYSTOLIC BLOOD PRESSURE: 138 MMHG | WEIGHT: 197.6 LBS | DIASTOLIC BLOOD PRESSURE: 82 MMHG | BODY MASS INDEX: 29.95 KG/M2

## 2018-07-16 DIAGNOSIS — I42.1 HOCM (HYPERTROPHIC OBSTRUCTIVE CARDIOMYOPATHY) (HCC): ICD-10-CM

## 2018-07-16 DIAGNOSIS — I25.10 CORONARY ARTERY DISEASE INVOLVING NATIVE CORONARY ARTERY OF NATIVE HEART, ANGINA PRESENCE UNSPECIFIED: ICD-10-CM

## 2018-07-16 DIAGNOSIS — I10 ESSENTIAL HYPERTENSION: Primary | ICD-10-CM

## 2018-07-16 PROBLEM — D50.0 IRON DEFICIENCY ANEMIA DUE TO CHRONIC BLOOD LOSS: Status: RESOLVED | Noted: 2018-04-20 | Resolved: 2018-07-16

## 2018-07-16 PROBLEM — K25.0 ACUTE GASTRIC ULCER WITH HEMORRHAGE: Status: RESOLVED | Noted: 2018-02-08 | Resolved: 2018-07-16

## 2018-07-16 PROBLEM — K25.0 ACUTE GASTRIC ULCER WITH HEMORRHAGE AND OBSTRUCTION: Status: RESOLVED | Noted: 2018-04-20 | Resolved: 2018-07-16

## 2018-07-16 LAB
ANION GAP SERPL CALCULATED.3IONS-SCNC: 4 MMOL/L (ref 4–13)
BASOPHILS # BLD AUTO: 0.04 THOUSANDS/ΜL (ref 0–0.1)
BASOPHILS NFR BLD AUTO: 1 % (ref 0–1)
BUN SERPL-MCNC: 13 MG/DL (ref 5–25)
CALCIUM SERPL-MCNC: 9.7 MG/DL (ref 8.3–10.1)
CHLORIDE SERPL-SCNC: 102 MMOL/L (ref 100–108)
CO2 SERPL-SCNC: 29 MMOL/L (ref 21–32)
CREAT SERPL-MCNC: 0.71 MG/DL (ref 0.6–1.3)
EOSINOPHIL # BLD AUTO: 0.19 THOUSAND/ΜL (ref 0–0.61)
EOSINOPHIL NFR BLD AUTO: 4 % (ref 0–6)
ERYTHROCYTE [DISTWIDTH] IN BLOOD BY AUTOMATED COUNT: 23.2 % (ref 11.6–15.1)
GFR SERPL CREATININE-BSD FRML MDRD: 110 ML/MIN/1.73SQ M
GLUCOSE SERPL-MCNC: 104 MG/DL (ref 65–140)
HCT VFR BLD AUTO: 35.2 % (ref 36.5–49.3)
HGB BLD-MCNC: 10.2 G/DL (ref 12–17)
IMM GRANULOCYTES # BLD AUTO: 0.01 THOUSAND/UL (ref 0–0.2)
IMM GRANULOCYTES NFR BLD AUTO: 0 % (ref 0–2)
INR PPP: 0.93 (ref 0.86–1.17)
LYMPHOCYTES # BLD AUTO: 1.97 THOUSANDS/ΜL (ref 0.6–4.47)
LYMPHOCYTES NFR BLD AUTO: 37 % (ref 14–44)
MCH RBC QN AUTO: 21.7 PG (ref 26.8–34.3)
MCHC RBC AUTO-ENTMCNC: 29 G/DL (ref 31.4–37.4)
MCV RBC AUTO: 75 FL (ref 82–98)
MONOCYTES # BLD AUTO: 0.56 THOUSAND/ΜL (ref 0.17–1.22)
MONOCYTES NFR BLD AUTO: 10 % (ref 4–12)
NEUTROPHILS # BLD AUTO: 2.59 THOUSANDS/ΜL (ref 1.85–7.62)
NEUTS SEG NFR BLD AUTO: 48 % (ref 43–75)
NRBC BLD AUTO-RTO: 0 /100 WBCS
PLATELET # BLD AUTO: 445 THOUSANDS/UL (ref 149–390)
PMV BLD AUTO: 9.3 FL (ref 8.9–12.7)
POTASSIUM SERPL-SCNC: 3.9 MMOL/L (ref 3.5–5.3)
PROTHROMBIN TIME: 12.6 SECONDS (ref 11.8–14.2)
RBC # BLD AUTO: 4.69 MILLION/UL (ref 3.88–5.62)
SODIUM SERPL-SCNC: 135 MMOL/L (ref 136–145)
WBC # BLD AUTO: 5.36 THOUSAND/UL (ref 4.31–10.16)

## 2018-07-16 PROCEDURE — 36415 COLL VENOUS BLD VENIPUNCTURE: CPT | Performed by: INTERNAL MEDICINE

## 2018-07-16 PROCEDURE — 99214 OFFICE O/P EST MOD 30 MIN: CPT | Performed by: INTERNAL MEDICINE

## 2018-07-16 PROCEDURE — 85610 PROTHROMBIN TIME: CPT | Performed by: INTERNAL MEDICINE

## 2018-07-16 PROCEDURE — 85025 COMPLETE CBC W/AUTO DIFF WBC: CPT | Performed by: INTERNAL MEDICINE

## 2018-07-16 PROCEDURE — 80048 BASIC METABOLIC PNL TOTAL CA: CPT | Performed by: INTERNAL MEDICINE

## 2018-07-16 NOTE — TELEPHONE ENCOUNTER
Nora Peoples, Dr Kacie Sharpe would like pt scheduled for cardiac cath next week in [facility] please, thank you-MS

## 2018-07-16 NOTE — PROGRESS NOTES
[facility] [place]  [address]  [id number]  Cardiology Follow Up    Wai Medellin  1950  [id number]      1  Essential hypertension     2  HOCM (hypertrophic obstructive cardiomyopathy) ([facility] [place] [facility] )     3  Coronary artery disease involving native coronary artery of native heart, angina presence unspecified  CBC and differential    Basic metabolic panel    Protime-INR    Cardiac catheterization       Chief Complaint   Patient presents with    Follow-up       Interval History:  Patient presents for follow-up visit  Patient does have history of coronary artery disease with 60% lad stenosis a few years ago  Patient recently had non ST elevation myocardial infarction which was thought to be secondary to type 2 non STEMI from GI bleed  Patient seems to have recovered from his GI bleed  Patient does have shortness of breath with exertion  Patient continues to smoke in spite of repeated counseling  Patient states that he has been compliant with all his present medications  Patient also has history of hypertrophic cardiomyopathy      Patient Active Problem List   Diagnosis    Non-recurrent bilateral inguinal hernia without obstruction or gangrene    Hyperlipidemia    HTN (hypertension)    Diabetes mellitus ([facility] [place] [facility] )    H pylori ulcer    Tobacco abuse    Epigastric pain    HOCM (hypertrophic obstructive cardiomyopathy) ([facility] [place] [facility] )    Coronary artery disease involving native coronary artery of native heart     Past Medical History:   Diagnosis Date    Benign neoplasm of large intestine     [facility]     Heart murmur     History of aortic regurgitation     History of constipation     History of degenerative joint disease     History of nocturia     History of obesity     History of sebaceous cyst     History of shortness of breath     History of urinary frequency     Hyperlipidemia     Polyposis coli     of the large intestine     Social History     Social History    Marital status: /Civil Union     Spouse name: N/A    Number of children: N/A    Years of education: N/A     Occupational History          Social History Main Topics    Smoking status: Current Every Day Smoker     Packs/day: 1 00     Years: 35 00     Types: Cigarettes    Smokeless tobacco: Never Used    Alcohol use Yes      Comment: rare    Drug use: No    Sexual activity: Not Currently     Other Topics Concern    Not on file     Social History Narrative    No narrative on file      Family History   Problem Relation Age of Onset    Rheumatic fever Father     Other Father         accidental poisoning   [place] Heart disease Mother      Past Surgical History:   Procedure Laterality Date    ACHILLES TENDON SURGERY Left     COLONOSCOPY      hyperplastic polyp    ESOPHAGOGASTRODUODENOSCOPY N/A 1/23/2018    Procedure: ESOPHAGOGASTRODUODENOSCOPY (EGD); Surgeon: Luis A Jaquez MD;  Location: [facility]; Service: Gastroenterology    ESOPHAGOGASTRODUODENOSCOPY      HERNIA REPAIR      HERNIA REPAIR Bilateral 8/18/2017    Procedure: LAPAROSCOPIC INGUINAL HERNIA REPAIR WITH MESH;  Surgeon: Shraddha Hernandez MD;  Location: [facility];  Service: General    KS ESOPHAGOGASTRODUODENOSCOPY TRANSORAL DIAGNOSTIC N/A 3/26/2018    Procedure: ESOPHAGOGASTRODUODENOSCOPY (EGD); Surgeon: Primo Ko MD;  [facility]; Service: Gastroenterology    KS ESOPHAGOGASTRODUODENOSCOPY TRANSORAL DIAGNOSTIC N/A 5/14/2018    Procedure: ESOPHAGOGASTRODUODENOSCOPY (EGD); Surgeon: Primo Ko MD;  [facility];   Service: Gastroenterology    TIBIA FRACTURE SURGERY Left        Current Outpatient Prescriptions:     aspirin 81 mg chewable tablet, Chew 1 tablet daily, Disp: , Rfl:     atenolol (TENORMIN) 50 mg tablet, Take 1 tablet (50 mg total) by mouth daily, Disp: 30 tablet, Rfl: 5    atorvastatin (LIPITOR) 80 mg tablet, Take 1 tablet by mouth daily, Disp: , Rfl:     cloNIDine (CATAPRES) 0 3 mg tablet, Take 1 tablet by mouth 3 (three) times a day, Disp: , Rfl:     DENTAGEL 1 1 % GEL, USE ONCE DAILY AFTER BRUSHING   BEFORE BEDTIME, Disp: , Rfl: 5    hydrochlorothiazide (HYDRODIURIL) 25 mg tablet, Take 25 mg by mouth daily, Disp: , Rfl:     lisinopril (ZESTRIL) 40 mg tablet, TAKE 1 TABLET DAILY, Disp: 90 tablet, Rfl: 0    metFORMIN (GLUCOPHAGE-XR) 500 mg 24 hr tablet, TAKE 1 TABLET TWICE A DAY WITH MEALS, Disp: 180 tablet, Rfl: 0    Omega-3 1000 MG CAPS, Take by mouth, Disp: , Rfl:     pantoprazole (PROTONIX) 40 mg tablet, Take 1 tablet (40 mg total) by mouth 2 (two) times a day, Disp: 60 tablet, Rfl: 3  No Known Allergies    Labs:  Appointment on 06/15/2018   Component Date Value    WBC 06/15/2018 4 86     RBC 06/15/2018 4 86     Hemoglobin 06/15/2018 10 5*    Hematocrit 06/15/2018 35 4*    MCV 06/15/2018 73*    MCH 06/15/2018 21 6*    MCHC 06/15/2018 29 7*    RDW 06/15/2018 24 0*    MPV 06/15/2018 9 0     Platelets 87/66/9218 509*    nRBC 06/15/2018 0     Neutrophils Relative 06/15/2018 48     Immat GRANS % 06/15/2018 0     Lymphocytes Relative 06/15/2018 37     Monocytes Relative 06/15/2018 11     Eosinophils Relative 06/15/2018 3     Basophils Relative 06/15/2018 1     Neutrophils Absolute 06/15/2018 2 37     Immature Grans Absolute 06/15/2018 0 01     Lymphocytes Absolute 06/15/2018 1 78     Monocytes Absolute 06/15/2018 0 53     Eosinophils Absolute 06/15/2018 0 13     Basophils Absolute 06/15/2018 0 04     Cholesterol 06/15/2018 145     Triglycerides 06/15/2018 43     HDL, Direct 06/15/2018 58     LDL Calculated 06/15/2018 78     PSA 06/15/2018 0 5     Sodium 06/15/2018 138     Potassium 06/15/2018 3 7     Chloride 06/15/2018 103     CO2 06/15/2018 29     Anion Gap 06/15/2018 6     BUN 06/15/2018 15     Creatinine 06/15/2018 0 76     Glucose, Fasting 06/15/2018 107*    Calcium 06/15/2018 9 6     AST 06/15/2018 12     ALT 06/15/2018 18     Alkaline Phosphatase 06/15/2018 56     Total Protein 06/15/2018 7 6     Albumin 06/15/2018 3 7     Total Bilirubin 06/15/2018 0 46     eGFR 06/15/2018 108     TSH 3RD GENERATON 06/15/2018 0 765     Hemoglobin A1C 06/15/2018 6 5*    EAG 06/15/2018 140    Office Visit on 06/15/2018   Component Date Value    Color, UA 06/15/2018 Yellow     Clarity, UA 06/15/2018 Clear     Specific Gravity, UA 06/15/2018 1 016     pH, UA 06/15/2018 6 0     Leukocytes, UA 06/15/2018 Negative     Nitrite, UA 06/15/2018 Negative     Protein, UA 06/15/2018 30 (1+)*    Glucose, UA 06/15/2018 Negative     Ketones, UA 06/15/2018 Negative     Urobilinogen, UA 06/15/2018 0 2     Bilirubin, UA 06/15/2018 Negative     Blood, UA 06/15/2018 Negative     Creatinine, Ur 06/15/2018 89 2     Microalbum  ,U,Random 06/15/2018 135 0*    Microalb Creat Ratio 06/15/2018 151*    RBC, UA 06/15/2018 None Seen     WBC, UA 06/15/2018 None Seen     Epithelial Cells 06/15/2018 None Seen     Bacteria, UA 06/15/2018 None Seen     Hyaline Casts, UA 06/15/2018 None Seen      Imaging: No results found      Review of Systems:  Review of Systems   REVIEW OF SYSTEMS:  Constitutional:  Denies fever or chills   Eyes:  Denies change in visual acuity   HENT:  Denies nasal congestion or sore throat   Respiratory:   shortness of breath   Cardiovascular:  Denies chest pain or edema   GI:  Denies abdominal pain, nausea, vomiting, bloody stools or diarrhea   :  Denies dysuria, frequency, difficulty in micturition and nocturia  Musculoskeletal:  Denies back pain or joint pain   Neurologic:  Denies headache, focal weakness or sensory changes   Endocrine:  Denies polyuria or polydipsia   Lymphatic:  Denies swollen glands   Psychiatric:  Denies depression or anxiety     Physical Exam:    /82   Pulse 82   Ht 5' 7 5" (1 715 m)   Wt 89 6 kg (197 lb 9 6 oz)   SpO2 98%   BMI 30 49 kg/m²     Physical Exam   PHYSICAL EXAM:  General:  Patient is not in acute distress   Head: Normocephalic, Atraumatic  HEENT:  Both pupils normal-size atraumatic, normocephalic, nonicteric  Neck:  JVP not raised  Trachea central  No carotid bruit  Respiratory:  normal breath sounds no crackles  no rhonchi  Cardiovascular:  Regular rate and rhythm no S3 2/6 systolic ejection murmur in the right sternal border   GI:  Abdomen soft nontender  No organomegaly  Lymphatic:  No cervical or inguinal lymphadenopathy  Neurologic:  Patient is awake alert, oriented   Grossly nonfocal    Discussion/Summary patient with known history of moderate coronary artery disease with 60% lad stenosis a few years ago with multiple risk factors for coronary artery disease with recent non ST elevation myocardial infarction  Patient also has symptoms of shortness of breath  Lengthy discussion with patient regarding options of cardiac catheterization now that patient has recovered from his GI bleed  Risks and benefits of cardiac catheterization and possible revascularization options including but not limited to risk of infection, bleeding, risk of myocardial infarction, stroke, and risk of death discussed at length with patient  Patient understands the risks and benefits and wishes to proceed  Cardiac catheterization will be scheduled   Preprocedure workup will be done  Further cardiac evaluation and management after the results of cardiac catheterization  Patient counseled to quit smoking to prevent future cardiovascular events  Follow-up in 4 to 6 weeks or earlier as needed

## 2018-07-21 DIAGNOSIS — E78.5 HYPERLIPIDEMIA, UNSPECIFIED HYPERLIPIDEMIA TYPE: Primary | ICD-10-CM

## 2018-07-23 ENCOUNTER — TELEPHONE (OUTPATIENT)
Dept: NON INVASIVE DIAGNOSTICS | Facility: HOSPITAL | Age: 68
End: 2018-07-23

## 2018-07-23 RX ORDER — SODIUM CHLORIDE 9 MG/ML
75 INJECTION, SOLUTION INTRAVENOUS CONTINUOUS
Status: CANCELLED | OUTPATIENT
Start: 2018-07-23 | End: 2019-07-23

## 2018-07-25 ENCOUNTER — HOSPITAL ENCOUNTER (OUTPATIENT)
Dept: INTERVENTIONAL RADIOLOGY/VASCULAR | Facility: HOSPITAL | Age: 68
Discharge: HOME | End: 2018-07-26
Attending: INTERNAL MEDICINE | Admitting: INTERNAL MEDICINE
Payer: MEDICARE

## 2018-07-25 DIAGNOSIS — Z98.61 CAD S/P PERCUTANEOUS CORONARY ANGIOPLASTY: Primary | ICD-10-CM

## 2018-07-25 DIAGNOSIS — I25.10 CAD S/P PERCUTANEOUS CORONARY ANGIOPLASTY: Primary | ICD-10-CM

## 2018-07-25 DIAGNOSIS — I25.10 CORONARY ARTERY DISEASE INVOLVING NATIVE CORONARY ARTERY OF NATIVE HEART, ANGINA PRESENCE UNSPECIFIED: ICD-10-CM

## 2018-07-25 LAB
ATRIAL RATE: 52 BPM
GLUCOSE SERPL-MCNC: 133 MG/DL (ref 65–140)
GLUCOSE SERPL-MCNC: 135 MG/DL (ref 65–140)
GLUCOSE SERPL-MCNC: 142 MG/DL (ref 65–140)
GLUCOSE SERPL-MCNC: 171 MG/DL (ref 65–140)
P AXIS: 60 DEGREES
PR INTERVAL: 162 MS
QRS AXIS: 65 DEGREES
QRSD INTERVAL: 106 MS
QT INTERVAL: 456 MS
QTC INTERVAL: 424 MS
T WAVE AXIS: 254 DEGREES
VENTRICULAR RATE: 52 BPM

## 2018-07-25 PROCEDURE — 82948 REAGENT STRIP/BLOOD GLUCOSE: CPT

## 2018-07-25 PROCEDURE — C1769 GUIDE WIRE: HCPCS | Performed by: INTERNAL MEDICINE

## 2018-07-25 PROCEDURE — C1894 INTRO/SHEATH, NON-LASER: HCPCS | Performed by: INTERNAL MEDICINE

## 2018-07-25 PROCEDURE — 99152 MOD SED SAME PHYS/QHP 5/>YRS: CPT | Performed by: INTERNAL MEDICINE

## 2018-07-25 PROCEDURE — 92928 PRQ TCAT PLMT NTRAC ST 1 LES: CPT | Performed by: INTERNAL MEDICINE

## 2018-07-25 PROCEDURE — 93458 L HRT ARTERY/VENTRICLE ANGIO: CPT | Performed by: INTERNAL MEDICINE

## 2018-07-25 PROCEDURE — C1725 CATH, TRANSLUMIN NON-LASER: HCPCS | Performed by: INTERNAL MEDICINE

## 2018-07-25 PROCEDURE — 93005 ELECTROCARDIOGRAM TRACING: CPT

## 2018-07-25 PROCEDURE — C1876 STENT, NON-COA/NON-COV W/DEL: HCPCS

## 2018-07-25 PROCEDURE — 93010 ELECTROCARDIOGRAM REPORT: CPT | Performed by: INTERNAL MEDICINE

## 2018-07-25 PROCEDURE — C1887 CATHETER, GUIDING: HCPCS | Performed by: INTERNAL MEDICINE

## 2018-07-25 PROCEDURE — 99153 MOD SED SAME PHYS/QHP EA: CPT | Performed by: INTERNAL MEDICINE

## 2018-07-25 RX ORDER — LISINOPRIL 20 MG/1
40 TABLET ORAL DAILY
Status: DISCONTINUED | OUTPATIENT
Start: 2018-07-25 | End: 2018-07-26 | Stop reason: HOSPADM

## 2018-07-25 RX ORDER — CHLORAL HYDRATE 500 MG
1000 CAPSULE ORAL DAILY
Status: DISCONTINUED | OUTPATIENT
Start: 2018-07-25 | End: 2018-07-26 | Stop reason: HOSPADM

## 2018-07-25 RX ORDER — CLOPIDOGREL BISULFATE 75 MG/1
TABLET ORAL CODE/TRAUMA/SEDATION MEDICATION
Status: COMPLETED | OUTPATIENT
Start: 2018-07-25 | End: 2018-07-25

## 2018-07-25 RX ORDER — NITROGLYCERIN 20 MG/100ML
INJECTION INTRAVENOUS CODE/TRAUMA/SEDATION MEDICATION
Status: COMPLETED | OUTPATIENT
Start: 2018-07-25 | End: 2018-07-25

## 2018-07-25 RX ORDER — PANTOPRAZOLE SODIUM 40 MG/1
40 TABLET, DELAYED RELEASE ORAL
Status: DISCONTINUED | OUTPATIENT
Start: 2018-07-25 | End: 2018-07-26 | Stop reason: HOSPADM

## 2018-07-25 RX ORDER — ATENOLOL 50 MG/1
50 TABLET ORAL DAILY
Status: DISCONTINUED | OUTPATIENT
Start: 2018-07-25 | End: 2018-07-26 | Stop reason: HOSPADM

## 2018-07-25 RX ORDER — ATORVASTATIN CALCIUM 40 MG/1
80 TABLET, FILM COATED ORAL DAILY
Status: DISCONTINUED | OUTPATIENT
Start: 2018-07-25 | End: 2018-07-26 | Stop reason: HOSPADM

## 2018-07-25 RX ORDER — ATORVASTATIN CALCIUM 80 MG/1
TABLET, FILM COATED ORAL
Qty: 90 TABLET | Refills: 3 | Status: SHIPPED | OUTPATIENT
Start: 2018-07-25 | End: 2019-10-07 | Stop reason: SDUPTHER

## 2018-07-25 RX ORDER — FENTANYL CITRATE 50 UG/ML
INJECTION, SOLUTION INTRAMUSCULAR; INTRAVENOUS CODE/TRAUMA/SEDATION MEDICATION
Status: COMPLETED | OUTPATIENT
Start: 2018-07-25 | End: 2018-07-25

## 2018-07-25 RX ORDER — HYDROCHLOROTHIAZIDE 25 MG/1
25 TABLET ORAL DAILY
Status: DISCONTINUED | OUTPATIENT
Start: 2018-07-25 | End: 2018-07-26 | Stop reason: HOSPADM

## 2018-07-25 RX ORDER — SODIUM CHLORIDE 9 MG/ML
75 INJECTION, SOLUTION INTRAVENOUS CONTINUOUS
Status: DISCONTINUED | OUTPATIENT
Start: 2018-07-25 | End: 2018-07-26 | Stop reason: HOSPADM

## 2018-07-25 RX ORDER — SODIUM CHLORIDE 9 MG/ML
75 INJECTION, SOLUTION INTRAVENOUS CONTINUOUS
Status: DISPENSED | OUTPATIENT
Start: 2018-07-25 | End: 2018-07-25

## 2018-07-25 RX ORDER — CLOPIDOGREL BISULFATE 75 MG/1
75 TABLET ORAL DAILY
Status: DISCONTINUED | OUTPATIENT
Start: 2018-07-26 | End: 2018-07-26 | Stop reason: HOSPADM

## 2018-07-25 RX ORDER — CLONIDINE HYDROCHLORIDE 0.1 MG/1
0.3 TABLET ORAL 3 TIMES DAILY
Status: DISCONTINUED | OUTPATIENT
Start: 2018-07-25 | End: 2018-07-26 | Stop reason: HOSPADM

## 2018-07-25 RX ORDER — ASPIRIN 81 MG/1
81 TABLET, CHEWABLE ORAL DAILY
Status: DISCONTINUED | OUTPATIENT
Start: 2018-07-25 | End: 2018-07-26 | Stop reason: HOSPADM

## 2018-07-25 RX ORDER — MIDAZOLAM HYDROCHLORIDE 1 MG/ML
INJECTION INTRAMUSCULAR; INTRAVENOUS CODE/TRAUMA/SEDATION MEDICATION
Status: COMPLETED | OUTPATIENT
Start: 2018-07-25 | End: 2018-07-25

## 2018-07-25 RX ORDER — VERAPAMIL HCL 2.5 MG/ML
AMPUL (ML) INTRAVENOUS CODE/TRAUMA/SEDATION MEDICATION
Status: COMPLETED | OUTPATIENT
Start: 2018-07-25 | End: 2018-07-25

## 2018-07-25 RX ADMIN — VERAPAMIL HYDROCHLORIDE 2.5 MG: 2.5 INJECTION, SOLUTION INTRAVENOUS at 08:59

## 2018-07-25 RX ADMIN — MIDAZOLAM HYDROCHLORIDE 1 MG: 1 INJECTION, SOLUTION INTRAMUSCULAR; INTRAVENOUS at 08:56

## 2018-07-25 RX ADMIN — NITROGLYCERIN 200 MCG: 20 INJECTION INTRAVENOUS at 08:59

## 2018-07-25 RX ADMIN — PANTOPRAZOLE SODIUM 40 MG: 40 TABLET, DELAYED RELEASE ORAL at 12:02

## 2018-07-25 RX ADMIN — SODIUM CHLORIDE 75 ML/HR: 0.9 INJECTION, SOLUTION INTRAVENOUS at 07:15

## 2018-07-25 RX ADMIN — Medication 1.76 MG/KG/HR: at 09:11

## 2018-07-25 RX ADMIN — CLONIDINE HYDROCHLORIDE 0.3 MG: 0.1 TABLET ORAL at 11:57

## 2018-07-25 RX ADMIN — Medication 1000 MG: at 11:57

## 2018-07-25 RX ADMIN — ATORVASTATIN CALCIUM 80 MG: 40 TABLET, FILM COATED ORAL at 11:56

## 2018-07-25 RX ADMIN — IOHEXOL 230 ML: 350 INJECTION, SOLUTION INTRAVENOUS at 09:39

## 2018-07-25 RX ADMIN — ASPIRIN 81 MG 81 MG: 81 TABLET ORAL at 11:57

## 2018-07-25 RX ADMIN — LISINOPRIL 40 MG: 20 TABLET ORAL at 11:56

## 2018-07-25 RX ADMIN — FENTANYL CITRATE 50 MCG: 50 INJECTION, SOLUTION INTRAMUSCULAR; INTRAVENOUS at 08:56

## 2018-07-25 RX ADMIN — CLONIDINE HYDROCHLORIDE 0.3 MG: 0.1 TABLET ORAL at 21:15

## 2018-07-25 RX ADMIN — CLOPIDOGREL 600 MG: 75 TABLET, FILM COATED ORAL at 09:38

## 2018-07-25 RX ADMIN — CLONIDINE HYDROCHLORIDE 0.3 MG: 0.1 TABLET ORAL at 16:35

## 2018-07-25 RX ADMIN — HYDROCHLOROTHIAZIDE 25 MG: 25 TABLET ORAL at 11:57

## 2018-07-25 RX ADMIN — ATENOLOL 50 MG: 50 TABLET ORAL at 11:58

## 2018-07-26 VITALS
RESPIRATION RATE: 18 BRPM | OXYGEN SATURATION: 100 % | DIASTOLIC BLOOD PRESSURE: 60 MMHG | WEIGHT: 196.87 LBS | TEMPERATURE: 97.8 F | SYSTOLIC BLOOD PRESSURE: 150 MMHG | HEART RATE: 54 BPM | HEIGHT: 68 IN | BODY MASS INDEX: 29.84 KG/M2

## 2018-07-26 LAB
ANION GAP SERPL CALCULATED.3IONS-SCNC: 6 MMOL/L (ref 4–13)
BUN SERPL-MCNC: 10 MG/DL (ref 5–25)
CALCIUM SERPL-MCNC: 7.8 MG/DL (ref 8.3–10.1)
CHLORIDE SERPL-SCNC: 109 MMOL/L (ref 100–108)
CO2 SERPL-SCNC: 28 MMOL/L (ref 21–32)
CREAT SERPL-MCNC: 0.57 MG/DL (ref 0.6–1.3)
ERYTHROCYTE [DISTWIDTH] IN BLOOD BY AUTOMATED COUNT: 22.4 % (ref 11.6–15.1)
GFR SERPL CREATININE-BSD FRML MDRD: 120 ML/MIN/1.73SQ M
GLUCOSE P FAST SERPL-MCNC: 108 MG/DL (ref 65–99)
GLUCOSE SERPL-MCNC: 108 MG/DL (ref 65–140)
GLUCOSE SERPL-MCNC: 130 MG/DL (ref 65–140)
HCT VFR BLD AUTO: 29.4 % (ref 36.5–49.3)
HGB BLD-MCNC: 8.8 G/DL (ref 12–17)
MCH RBC QN AUTO: 22.5 PG (ref 26.8–34.3)
MCHC RBC AUTO-ENTMCNC: 29.9 G/DL (ref 31.4–37.4)
MCV RBC AUTO: 75 FL (ref 82–98)
PLATELET # BLD AUTO: 307 THOUSANDS/UL (ref 149–390)
PMV BLD AUTO: 8.4 FL (ref 8.9–12.7)
POTASSIUM SERPL-SCNC: 3.6 MMOL/L (ref 3.5–5.3)
RBC # BLD AUTO: 3.91 MILLION/UL (ref 3.88–5.62)
SODIUM SERPL-SCNC: 143 MMOL/L (ref 136–145)
WBC # BLD AUTO: 4.15 THOUSAND/UL (ref 4.31–10.16)

## 2018-07-26 PROCEDURE — 80048 BASIC METABOLIC PNL TOTAL CA: CPT | Performed by: PHYSICIAN ASSISTANT

## 2018-07-26 PROCEDURE — 82948 REAGENT STRIP/BLOOD GLUCOSE: CPT

## 2018-07-26 PROCEDURE — 99217 PR OBSERVATION CARE DISCHARGE MANAGEMENT: CPT | Performed by: INTERNAL MEDICINE

## 2018-07-26 PROCEDURE — 85027 COMPLETE CBC AUTOMATED: CPT | Performed by: PHYSICIAN ASSISTANT

## 2018-07-26 RX ORDER — HYDROCHLOROTHIAZIDE 25 MG/1
25 TABLET ORAL DAILY
Status: CANCELLED | OUTPATIENT
Start: 2018-07-27

## 2018-07-26 RX ORDER — CLOPIDOGREL BISULFATE 75 MG/1
75 TABLET ORAL DAILY
Qty: 30 TABLET | Refills: 11 | Status: SHIPPED | OUTPATIENT
Start: 2018-07-26 | End: 2019-06-18 | Stop reason: SDUPTHER

## 2018-07-26 RX ADMIN — CLONIDINE HYDROCHLORIDE 0.3 MG: 0.1 TABLET ORAL at 09:30

## 2018-07-26 RX ADMIN — Medication 1000 MG: at 09:31

## 2018-07-26 RX ADMIN — HYDROCHLOROTHIAZIDE 25 MG: 25 TABLET ORAL at 09:31

## 2018-07-26 RX ADMIN — PANTOPRAZOLE SODIUM 40 MG: 40 TABLET, DELAYED RELEASE ORAL at 05:51

## 2018-07-26 RX ADMIN — ATENOLOL 50 MG: 50 TABLET ORAL at 09:31

## 2018-07-26 RX ADMIN — CLOPIDOGREL BISULFATE 75 MG: 75 TABLET ORAL at 09:31

## 2018-07-26 RX ADMIN — ATORVASTATIN CALCIUM 80 MG: 40 TABLET, FILM COATED ORAL at 09:30

## 2018-07-26 RX ADMIN — LISINOPRIL 40 MG: 20 TABLET ORAL at 09:31

## 2018-07-26 RX ADMIN — ASPIRIN 81 MG 81 MG: 81 TABLET ORAL at 09:31

## 2018-07-26 NOTE — CASE MANAGEMENT
Initial Clinical Review    Age/Sex: 77 y o  male    Surgery Date:   7/25    Procedure:   --  Left heart catheterization without ventriculogram   --  Left coronary angiography  --  Right coronary angiography  --  Outpatient  --  Mod Sedation Same Physician Initial 15min  --  Mod Sedation Same Physician Add 15min  --  Mod Sedation Same Physician Add 15min  --  Mod Sedation Same Physician Add 15min  --  Coronary Catheterization (w/ LHC)  --  Coronary Bare Metal Stent w/PTCA  --  Intervention on proximal LAD: stent  --  Intervention on mid LAD: stent  1ST LESION INTERVENTIONS:  A successful stent procedure was performed on the 90 % lesion in the proximal LAD  Following intervention there was an excellent angiographic appearance with a 0 % residual stenosis  A Ultra Rx 4 5 X 18mm bare-metal stent was placed across the lesion and deployed at a maximum inflation pressure of 20 kymberly      2ND LESION INTERVENTIONS:  A successful stent procedure was performed on the 80 % lesion in the mid LAD  Following intervention there was an excellent angiographic appearance with a 0 % residual stenosis  A Vision Rx 3 5 X 23mm bare-metal stent was placed across the lesion and deployed at a maximum inflation pressure of 18 kymberly      INDICATIONS:  --  Possible CAD: myocardial infarction without ST elevation (NSTEMI)     Anesthesia: general     ADMIT OUTPATIENT no charge bed  On  7/25  @  0915      Vital Signs: /60 (BP Location: Left arm)   Pulse (!) 54   Temp 97 8 °F (36 6 °C) (Oral)   Resp 18   Ht 5' 8" (1 727 m)   Wt 89 3 kg (196 lb 13 9 oz)   SpO2 100%   BMI 29 93 kg/m²     Diet:        Diet Orders            Start     Ordered    07/25/18 1021  Diet Cardiac; Cardiac TLC 2 3 GM NA  Diet effective now     Question Answer Comment   Diet Type Cardiac    Cardiac Cardiac TLC 2 3 GM NA    RD to adjust diet per protocol?  Yes        07/25/18 1020          Mobility:  Post procedural bed rest (4 hrs)  Then out of bed DVT Prophylaxis: asa/plavix     Pain Control:   Pain Medications             aspirin 81 mg chewable tablet Chew 1 tablet daily        7/26/2018  97 8  54   18    187/75  Tele - sinus elda  Denies pain

## 2018-07-26 NOTE — PLAN OF CARE

## 2018-07-26 NOTE — DISCHARGE SUMMARY
Discharge Summary - Jazz Strong 77 y o  male MRN: [id number]  Unit/Bed#: -01 Encounter: [id number]    Admission Date: 7/25/2018   Discharge Date: 7/26/2018    Disposition: Home    Discharge Diagnosis: CAD s/p PCI   Condition at Discharge: stable   Procedures: LHC with PCI   Discharge weight:   Vitals:    07/25/18 0708   Weight: 89 3 kg (196 lb 13 9 oz)       REVIEW OF SYSTEMS:  Constitutional:  Denies fever or chills   Eyes:  Denies change in visual acuity   HENT:  Denies nasal congestion or sore throat   Respiratory:  Denies cough or shortness of breath   Cardiovascular:  Denies chest pain or edema   GI:  Denies abdominal pain, nausea, vomiting, bloody stools or diarrhea   :  Denies dysuria, frequency, difficulty in micturition and nocturia  Musculoskeletal:  Denies back pain or joint pain   Neurologic:  Denies headache, focal weakness or sensory changes   Endocrine:  Denies polyuria or polydipsia   Lymphatic:  Denies swollen glands   Psychiatric:  Denies depression or anxiety     Physical Exam    HPI and Hospital Course:   Patient presented elective cardiac catheterization as part of evaluation recent NSTEMI in the setting of known CAD (pt had cardiac catheterization in the past with 60% lad stenosis)  Cardiac cath was preformed-- JOSE ROBERTO was placed to proximal LAD and mid LAD  Patient had bradycardia with heart rates in the 50s-60's this admission  He is asymptomatic  Blood pressures have also been borderline  Patient to be started on Plavix  Importance of medication compliance discussed, especially with regard to antiplatelet drugs  Continue all other medications  Patient recommended to monitor his blood pressure heart rate at possible  Signs and symptoms to watch out for reviewed  Patient to follow up with Dr Velia Lawton in 1-2 weeks       Discharge Medications:  See after visit summary for reconciled discharge medications provided to patient and family        Current Facility-Administered Medications   Medication Dose Route Frequency    aspirin chewable tablet 81 mg  81 mg Oral Daily    atenolol (TENORMIN) tablet 50 mg  50 mg Oral Daily    atorvastatin (LIPITOR) tablet 80 mg  80 mg Oral Daily    cloNIDine (CATAPRES) tablet 0 3 mg  0 3 mg Oral TID    clopidogrel (PLAVIX) tablet 75 mg  75 mg Oral Daily    fish oil capsule 1,000 mg  1,000 mg Oral Daily    hydrochlorothiazide (HYDRODIURIL) tablet 25 mg  25 mg Oral Daily    insulin lispro (HumaLOG) 100 units/mL subcutaneous injection 1-5 Units  1-5 Units Subcutaneous TID AC    lisinopril (ZESTRIL) tablet 40 mg  40 mg Oral Daily    nicotine (NICODERM CQ) 7 mg/24hr TD 24 hr patch 1 patch  1 patch Transdermal Daily    pantoprazole (PROTONIX) EC tablet 40 mg  40 mg Oral Early Morning    sodium chloride 0 9 % infusion  75 mL/hr Intravenous Continuous       Pertinent Labs/diagnostics:        CBC with diff:   Results from last 7 days  Lab Units 07/26/18  0447   WBC Thousand/uL 4 15*   HEMOGLOBIN g/dL 8 8*   HEMATOCRIT % 29 4*   MCV fL 75*   PLATELETS Thousands/uL 307   MCH pg 22 5*   MCHC g/dL 29 9*   RDW % 22 4*   MPV fL 8 4*       CMP:  Results from last 7 days  Lab Units 07/26/18  0447   SODIUM mmol/L 143   POTASSIUM mmol/L 3 6   CHLORIDE mmol/L 109*   CO2 mmol/L 28   ANION GAP mmol/L 6   BUN mg/dL 10   CREATININE mg/dL 0 57*   GLUCOSE RANDOM mg/dL 108   CALCIUM mg/dL 7 8*   EGFR ml/min/1 73sq m 120       Lipid Profile:   Lab Results   Component Value Date    CHOL 145 06/15/2018    CHOL 125 03/17/2016    CHOL 136 06/12/2015     Lab Results   Component Value Date    HDL 58 06/15/2018    HDL 47 03/17/2016    HDL 43 06/12/2015     Lab Results   Component Value Date    LDLCALC 78 06/15/2018    LDLCALC 64 03/17/2016    LDLCALC 82 06/12/2015     Lab Results   Component Value Date    TRIG 43 06/15/2018    TRIG 73 01/18/2017    TRIG 68 03/17/2016         Cardiac testing:   Results for orders placed during the hospital encounter of 01/21/18   Echo complete with contrast if indicated     PHYSICAL EXAMS:  General:  Patient is not in acute distress  Head: Normocephalic, Atraumatic  HEENT:  Both pupils normal-size atraumatic, normocephalic, nonicteric  Neck:  JVP not raised  Trachea central  Respiratory:  Bronchovascular breathing all over the chest without any accompaniment  Cardiovascular:  S1-S2 normal without any murmur rails or rub  GI:  Abdomen soft nontender  Musculoskeletal:  Right radial access site without evidence of active bleeding, hematoma, induration  Pulses and capillary refill intact  Integument:  No skin rashes or ulceration  Neurologic:  Patient is awake alert, responding to command, well-oriented to time and place and person moving all extremities ambulating well    Discharge instructions/Information to patient and family:   See after visit summary for information provided to patient and family  Provisions for Follow-Up Care:  See after visit summary for information related to follow-up care and any pertinent home health orders  Discharge Statement   I spent 30 minutes minutes discharging the patient  This time was spent on the day of discharge  I had direct contact with the patient on the day of discharge  Additional documentation is required if more than 30 minutes were spent on discharge       Ashvin Hernandez PA-C  7/26/2018,,9:17 AM

## 2018-07-26 NOTE — SOCIAL WORK
Met with pt at bedside  Pt alert  Pt accompanied by his wife and another family member  Pt reported that he lives in a two story home with his wife  He stated that there are about 14 steps in his home  He said that he is ADL independent  No DME needed  He said that he is retired, but works at [employer] in [place]  He reported no hx of VNA or SNF and feels neither are appropriate at this time  He said that he uses [facility] in [place]  He said that he uses [facility] for mail order  He reported that his wife is POA  He said that he sees his PCP regularly, usually every 3-6 months  He said that he drives and his wife drives him as well  He reported that wife will transport home  CM reviewed discharge planning process including the following: identifying help at home, patient preference for discharge planning needs, pharmacy preference, and availability of treatment team to discuss questions or concerns patient and/or family may have regarding understanding medications and recognizing signs and symptoms once discharged  CM also encouraged patient to follow up with all recommended appointments after discharge  Patient advised of importance for patient and family to participate in managing patients medical well being  CM name and role reviewed and Discharge Checklist provided

## 2018-07-26 NOTE — DISCHARGE INSTRUCTIONS
Coronary Artery Disease   AMBULATORY CARE:   Coronary artery disease (CAD)  is narrowing of the arteries to your heart caused by a buildup of plaque  Plaque is made up of cholesterol and other substances  The narrowing in your arteries decreases the amount of blood that can flow to your heart  This causes your heart to get less oxygen  You may not have any symptoms of CAD  It is important for you to manage CAD even if you feel well  CAD can lead to a heart attack if it is not managed  Common symptoms include the following:   · Chest pain (angina), causing burning, squeezing, or crushing tightness in your chest    · Pain that spreads to your neck, jaw, or shoulder blade    · Nausea, vomiting, sweating, fainting, and hands and feet that are cold to the touch  Call 911 for any of the following:   · You have any of the following signs of a heart attack:      ¨ Squeezing, pressure, or pain in your chest that lasts longer than 5 minutes or returns    ¨ Discomfort or pain in your back, neck, jaw, stomach, or arm     ¨ Trouble breathing    ¨ Nausea or vomiting    ¨ Lightheadedness or a sudden cold sweat, especially with chest pain or trouble breathing    Contact your healthcare provider if:   · You have chest pain that is more frequent, or you have chest pain at rest     · You have questions or concerns about your condition or care  Medicines used to treat CAD:   · Blood pressure medicines  are given to lower your blood pressure  ACE inhibitors help keep your blood vessels relaxed and open to help keep blood flowing into your heart  Beta-blockers keep your heart pumping strongly and regularly so it does not work too hard to get oxygen  · Cholesterol medicines  help lower blood cholesterol levels  · Nitrates , such as nitroglycerin, relax the arteries of your heart so it gets more oxygen  They help to relieve your chest pain  · Antiplatelets , such as aspirin, help prevent blood clots   Take your antiplatelet medicine exactly as directed  These medicines make it more likely for you to bleed or bruise  If you are told to take aspirin, do not take acetaminophen or ibuprofen instead  · Blood thinners  keep clots from forming in your blood  Clots may cause heart attacks, strokes, or death  This medicine makes it more likely for you to bleed or bruise  · Do not take certain medicines without asking your healthcare provider first   These include NSAIDs, herbal or vitamin supplements, or hormones (estrogen or progestin)  Procedures used to treat CAD:   · Angioplasty  may be done to open an artery blocked by plaque  A tube with a balloon on the end is threaded into the blocked artery  Once the tube is in the artery, the balloon is inflated  As the balloon inflates, it presses the plaque against the artery wall to open the artery  A stent may be placed in your artery to keep it open  · Coronary artery bypass graft surgery (CABG)  is open heart surgery  Healthcare providers take arteries or veins from other areas in your body and use them to bypass or go around the blocked arteries of your heart  Cardiac rehabilitation:  Your healthcare provider may recommend that you attend cardiac rehabilitation (rehab)  This is a program run by specialists who will help you safely strengthen your heart and reduce the risk for more heart disease  The plan includes exercise, relaxation, stress management, and heart-healthy nutrition  Healthcare providers will also check to make sure any medicines you are taking are working  Manage CAD to prevent a heart attack:   · Do not smoke  Nicotine and other chemicals in cigarettes and cigars can cause heart and lung damage  Ask your healthcare provider for information if you currently smoke and need help to quit  E-cigarettes or smokeless tobacco still contain nicotine  Talk to your healthcare provider before you use these products  · Exercise regularly    Exercise at least 30 minutes each day, on most days of the week  Exercise helps to lower high cholesterol and high blood pressure  It can also help you maintain a healthy weight  Ask your healthcare provider about the kind of exercise you should do and how to get started  · Maintain a healthy weight  If you are overweight, talk to your healthcare provider about how to lose weight  A weight loss of 10% can improve your heart health  · Eat heart-healthy foods  Include fresh fruits and vegetables in your meal plan  Choose low-fat foods, such as skim or 1% fat milk, low-fat cheese and yogurt, fish, chicken (without skin), and lean meats  Eat two 4-ounce servings of fish high in omega-3 fats each week, such as salmon, fresh tuna, and herring  Do not eat foods that are high in sodium, such as canned foods, potato chips, salty snacks, and cold cuts  Put less table salt on your food  · Limit or do not drink alcohol  A drink of alcohol is 12 ounces of beer, 5 ounces of wine, or 1½ ounces of liquor  · Manage other health conditions  Follow your healthcare provider's advice on how to manage other conditions that can affect your heart health  These include diabetes, high blood pressure, and high cholesterol  You may need to take medicines for these conditions and make other lifestyle changes  · Ask if you should have a flu vaccine  The flu can be dangerous for a person who has CAD  The flu vaccine is available every year in the fall  Follow up with your healthcare provider as directed: You may need to return for other tests  You may also be referred to a cardiac surgeon  Write down your questions so you remember to ask them during your visits  © 2017 [facility] Information is for End User's use only and may not be sold, redistributed or otherwise used for commercial purposes   All illustrations and images included in CareNotes® are the copyrighted property of [employer]  or [facility] [employer]  The above information is an  only  It is not intended as medical advice for individual conditions or treatments  Talk to your doctor, nurse or pharmacist before following any medical regimen to see if it is safe and effective for you  After Radial Heart Catheterization   WHAT YOU NEED TO KNOW:   What will happen after a radial heart catheterization? · You will be attached to a heart monitor until you are fully awake  A heart monitor is an EKG that stays on continuously to record your heart's electrical activity  Healthcare providers will monitor your vital signs and pulses in your arm  They will frequently check your pressure bandage for bleeding or swelling  · You may have a band wrapped tightly around your wrist  The band puts pressure on your wound and helps prevent bleeding  A healthcare provider can put air into the band or remove air from the band  A healthcare provider will gradually remove air from the band and decrease pressure on your wrist  The band may be removed in 2 hours or when your wound stops bleeding  · You will need to keep your wrist straight for 2 to 4 hours  Do not  push or pull with your arm  Arm movements can cause serious bleeding  After you are monitored for several hours, you may go home or may need to stay in the hospital overnight  What do I need to know before I go home? · Care for your wound as directed  Remove the pressure bandage in 24 hours or as directed  Mild bruising is normal and expected  A small bandage can be placed on your wound after you remove the pressure bandage  Do not put powders, lotions, or creams on your wound  They may cause your wound to get infected  Monitor your wound every day for signs of infection, such as redness, swelling, or pus  · Shower the day after your procedure or as directed  Remove your pressure bandage before you shower  Do not take baths or go in hot tubs or pools   Carefully wash the wound with soap and water  Pat the area dry  A small bandage can be placed on your wound after you shower  · Apply firm, steady pressure to your wound if it bleeds  Apply pressure with a clean gauze or towel for 5 to 10 minutes  Call 911 if bleeding becomes heavy or does not stop  · Drink liquids as directed  Liquids will help flush the contrast liquid from your body  Ask how much liquid to drink each day and which liquids are best for you  · Do not lift anything heavier than 5 pounds until directed by your healthcare provider  Heavy lifting can put stress on your wound and cause bleeding  Do not push or pull with the arm that was used for the procedure  Do not do vigorous activity for at least 48 hours  Vigorous activity may cause bleeding from your wound  Rest and do quiet activities  Take short walks around the house to prevent a blood clot  Ask your healthcare provider when you can return to your normal activities  · Do not drive or return to work until your healthcare provider says it is okay  Your healthcare provider may tell you to wait 48 hours before you drive to decrease your risk for bleeding  You may not be able to return to work for at least 2 days after your procedure if your job involves heavy lifting  What medicines may I need? You may need any of the following:  · Blood thinners    help prevent blood clots  Examples of blood thinners include heparin and warfarin  Clots can cause strokes, heart attacks, and death  The following are general safety guidelines to follow while you are taking a blood thinner:    ¨ Watch for bleeding and bruising while you take blood thinners  Watch for bleeding from your gums or nose  Watch for blood in your urine and bowel movements  Use a soft washcloth on your skin, and a soft toothbrush to brush your teeth  This can keep your skin and gums from bleeding  If you shave, use an electric shaver  Do not play contact sports       ¨ Tell your dentist and other healthcare providers that you take anticoagulants  Wear a bracelet or necklace that says you take this medicine  ¨ Do not start or stop any medicines unless your healthcare provider tells you to  Many medicines cannot be used with blood thinners  ¨ Tell your healthcare provider right away if you forget to take the medicine, or if you take too much  ¨ Warfarin  is a blood thinner that you may need to take  The following are things you should be aware of if you take warfarin  § Foods and medicines can affect the amount of warfarin in your blood  Do not make major changes to your diet while you take warfarin  Warfarin works best when you eat about the same amount of vitamin K every day  Vitamin K is found in green leafy vegetables and certain other foods  Ask for more information about what to eat when you are taking warfarin  § You will need to see your healthcare provider for follow-up visits when you are on warfarin  You will need regular blood tests  These tests are used to decide how much medicine you need  · Acetaminophen  helps decrease pain and fever  This medicine is available without a doctor's order  Ask how much medicine is safe to take, and how often to take it  Acetaminophen can cause liver damage if not taken correctly  · Take your medicine as directed  Contact your healthcare provider if you think your medicine is not helping or if you have side effects  Tell him or her if you are allergic to any medicine  Keep a list of the medicines, vitamins, and herbs you take  Include the amounts, and when and why you take them  Bring the list or the pill bottles to follow-up visits  Carry your medicine list with you in case of an emergency    Call 911 for any of the following:   · You have any of the following signs of a heart attack:      ¨ Squeezing, pressure, or pain in your chest that lasts longer than 5 minutes or returns    ¨ Discomfort or pain in your back, neck, jaw, stomach, or arm ¨ Trouble breathing    ¨ Nausea or vomiting    ¨ Lightheadedness or a sudden cold sweat, especially with chest pain or trouble breathing    · You have any of the following signs of a stroke:      ¨ Numbness or drooping on one side of your face     ¨ Weakness in an arm or leg    ¨ Confusion or difficulty speaking    ¨ Dizziness, a severe headache, or vision loss    · You feel lightheaded, short of breath, and have chest pain  · You cough up blood  · You have trouble breathing  · You cannot stop the bleeding from your wound even after you hold firm pressure for 10 minutes  When should I seek immediate care? · Blood soaks through your bandage  · Your stitches come apart  · Your hand or arm feels numb, cool, or looks pale  · Your wound gets swollen quickly  When should I contact my healthcare provider? · You have a fever or chills  · Your wound is red, swollen, or draining pus  · Your wound looks more bruised or you have new bruising on the side of your wrist      · You have nausea or are vomiting  · Your skin is itchy, swollen, or you have a rash  · You have questions or concerns about your condition or care  CARE AGREEMENT:   You have the right to help plan your care  Learn about your health condition and how it may be treated  Discuss treatment options with your caregivers to decide what care you want to receive  You always have the right to refuse treatment  The above information is an  only  It is not intended as medical advice for individual conditions or treatments  Talk to your doctor, nurse or pharmacist before following any medical regimen to see if it is safe and effective for you  © 2017 [address] [employer]  Information is for End User's use only and may not be sold, redistributed or otherwise used for commercial purposes   All illustrations and images included in CareNotes® are the copyrighted property of [employer]  or [facility] [place] [employer]

## 2018-07-30 ENCOUNTER — TELEPHONE (OUTPATIENT)
Dept: CARDIOLOGY CLINIC | Facility: CLINIC | Age: 68
End: 2018-07-30

## 2018-07-30 NOTE — TELEPHONE ENCOUNTER
PT CALLED & NEEDS A 1 WK HOS/FUP  PT JUST HAD TWO STENTS PUT IN & WAS [facility] 16 ON 7/27  PLEASE ADVISE WERE TO SCHEDULED

## 2018-07-30 NOTE — TELEPHONE ENCOUNTER
Can you put pt on cancellation list? If nothing becomes available before the week is over we'll have to send it to Dr West Aranda for him to advise where-ty-MS

## 2018-07-31 NOTE — TELEPHONE ENCOUNTER
DR MONSON HAS AN OPENING ON 8/01/18, CAN HE SEE PT FOR THE HOSP F/UP?  DR VIDES IS BOOKED SOLID EVEN IF HE GETS A CX

## 2018-08-08 ENCOUNTER — OFFICE VISIT (OUTPATIENT)
Dept: CARDIOLOGY CLINIC | Facility: CLINIC | Age: 68
End: 2018-08-08
Payer: MEDICARE

## 2018-08-08 VITALS
SYSTOLIC BLOOD PRESSURE: 148 MMHG | DIASTOLIC BLOOD PRESSURE: 80 MMHG | WEIGHT: 196 LBS | HEIGHT: 68 IN | OXYGEN SATURATION: 97 % | HEART RATE: 70 BPM | BODY MASS INDEX: 29.7 KG/M2

## 2018-08-08 DIAGNOSIS — I25.10 CORONARY ARTERY DISEASE INVOLVING NATIVE CORONARY ARTERY OF NATIVE HEART WITHOUT ANGINA PECTORIS: Primary | ICD-10-CM

## 2018-08-08 DIAGNOSIS — I10 ESSENTIAL HYPERTENSION: ICD-10-CM

## 2018-08-08 DIAGNOSIS — I42.1 HOCM (HYPERTROPHIC OBSTRUCTIVE CARDIOMYOPATHY) (HCC): ICD-10-CM

## 2018-08-08 DIAGNOSIS — E78.2 MIXED HYPERLIPIDEMIA: ICD-10-CM

## 2018-08-08 DIAGNOSIS — Z72.0 TOBACCO ABUSE: ICD-10-CM

## 2018-08-08 PROCEDURE — 99214 OFFICE O/P EST MOD 30 MIN: CPT | Performed by: INTERNAL MEDICINE

## 2018-08-08 NOTE — PROGRESS NOTES
[facility] [place]  [address] [facility]  [id number]  Cardiology Follow Up    Daily Kline  1950  [id number]      1  Coronary artery disease involving native coronary artery of native heart without angina pectoris     2  HOCM (hypertrophic obstructive cardiomyopathy) ([facility] )     3  Essential hypertension     4  Mixed hyperlipidemia     5  Tobacco abuse         Chief Complaint   Patient presents with    Follow-up     hospital f/u            Interval History:  Follow-up visit  Patient recently had cardiac catheterization and had significant LAD disease  Patient had 2 stents to LAD with bare metal stent  Patient feels better  Patient continues to smoke but has cut down  Patient denies any history of chest pain  No shortness of breath  No history of leg edema orthopnea PN D  No history of bleeding issues  He states that he has been compliant with all his present medications        Patient Active Problem List   Diagnosis    Non-recurrent bilateral inguinal hernia without obstruction or gangrene    Hyperlipidemia    HTN (hypertension)    Diabetes mellitus ([facility] )    H pylori ulcer    Tobacco abuse    Epigastric pain    HOCM (hypertrophic obstructive cardiomyopathy) ([facility] )    Coronary artery disease involving native coronary artery of native heart     Past Medical History:   Diagnosis Date    Benign neoplasm of large intestine     Diabetes mellitus ([facility] )     Heart murmur     History of aortic regurgitation     History of constipation     History of degenerative joint disease     History of nocturia     History of obesity     History of sebaceous cyst     History of shortness of breath     History of urinary frequency     Hyperlipidemia     Hypertension     Polyposis coli     of the large intestine    Ulcer of esophagus      Social History     Social History    Marital status: /Civil Union     Spouse name: N/A    Number of children: N/A    Years of education: N/A     Occupational History          Social History Main Topics    Smoking status: Current Every Day Smoker     Packs/day: 1 00     Years: 35 00     Types: Cigarettes    Smokeless tobacco: Never Used    Alcohol use Yes      Comment: rare    Drug use: No    Sexual activity: Not Currently     Other Topics Concern    Not on file     Social History Narrative    No narrative on file      Family History   Problem Relation Age of Onset    Rheumatic fever Father     Other Father         accidental poisoning   Kimberly [place] Heart disease Mother      Past Surgical History:   Procedure Laterality Date    ACHILLES TENDON SURGERY Left     COLONOSCOPY      hyperplastic polyp    ESOPHAGOGASTRODUODENOSCOPY N/A 1/23/2018    Procedure: ESOPHAGOGASTRODUODENOSCOPY (EGD); Surgeon: Good Vega MD;  [facility]; Service: Gastroenterology    ESOPHAGOGASTRODUODENOSCOPY      HERNIA REPAIR      HERNIA REPAIR Bilateral 8/18/2017    Procedure: LAPAROSCOPIC INGUINAL HERNIA REPAIR WITH MESH;  Surgeon: Jak Francois MD;  [facility];  Service: General    ME ESOPHAGOGASTRODUODENOSCOPY TRANSORAL DIAGNOSTIC N/A 3/26/2018    Procedure: ESOPHAGOGASTRODUODENOSCOPY (EGD); Surgeon: James Goldsmith MD;  [facility]; Service: Gastroenterology    ME ESOPHAGOGASTRODUODENOSCOPY TRANSORAL DIAGNOSTIC N/A 5/14/2018    Procedure: ESOPHAGOGASTRODUODENOSCOPY (EGD); Surgeon: James Goldsmith MD;  [facility];   Service: Gastroenterology    TIBIA FRACTURE SURGERY Left        Current Outpatient Prescriptions:     aspirin 81 mg chewable tablet, Chew 1 tablet daily, Disp: , Rfl:     atenolol (TENORMIN) 50 mg tablet, Take 1 tablet (50 mg total) by mouth daily, Disp: 30 tablet, Rfl: 5    atorvastatin (LIPITOR) 80 mg tablet, TAKE 1 TABLET DAILY, Disp: 90 tablet, Rfl: 3    cloNIDine (CATAPRES) 0 3 mg tablet, Take 1 tablet by mouth 3 (three) times a day, Disp: , Rfl:     clopidogrel (PLAVIX) 75 mg tablet, Take 1 tablet (75 mg total) by mouth daily, Disp: 30 tablet, Rfl: 11    DENTAGEL 1 1 % GEL, USE ONCE DAILY AFTER BRUSHING   BEFORE BEDTIME, Disp: , Rfl: 5    hydrochlorothiazide (HYDRODIURIL) 25 mg tablet, Take 25 mg by mouth daily, Disp: , Rfl:     lisinopril (ZESTRIL) 40 mg tablet, TAKE 1 TABLET DAILY, Disp: 90 tablet, Rfl: 0    metFORMIN (GLUCOPHAGE-XR) 500 mg 24 hr tablet, TAKE 1 TABLET TWICE A DAY WITH MEALS, Disp: 180 tablet, Rfl: 0    Omega-3 1000 MG CAPS, Take by mouth, Disp: , Rfl:     pantoprazole (PROTONIX) 40 mg tablet, Take 1 tablet (40 mg total) by mouth 2 (two) times a day, Disp: 60 tablet, Rfl: 3  No Known Allergies    Labs:  Admission on 07/25/2018, Discharged on 07/26/2018   Component Date Value    POC Glucose 07/25/2018 135     POC Glucose 07/25/2018 171*    POC Glucose 07/25/2018 133     Ventricular Rate 07/25/2018 52     Atrial Rate 07/25/2018 52     NY Interval 07/25/2018 162     QRSD Interval 07/25/2018 106     QT Interval 07/25/2018 456     QTC Interval 07/25/2018 424     P Axis 07/25/2018 60     QRS Axis 07/25/2018 65     T Wave Axis 07/25/2018 254     POC Glucose 07/25/2018 142*    WBC 07/26/2018 4 15*    RBC 07/26/2018 3 91     Hemoglobin 07/26/2018 8 8*    Hematocrit 07/26/2018 29 4*    MCV 07/26/2018 75*    MCH 07/26/2018 22 5*    MCHC 07/26/2018 29 9*    RDW 07/26/2018 22 4*    Platelets 72/02/5740 307     MPV 07/26/2018 8 4*    Sodium 07/26/2018 143     Potassium 07/26/2018 3 6     Chloride 07/26/2018 109*    CO2 07/26/2018 28     Anion Gap 07/26/2018 6     BUN 07/26/2018 10     Creatinine 07/26/2018 0 57*    Glucose 07/26/2018 108     Glucose, Fasting 07/26/2018 108*    Calcium 07/26/2018 7 8*    eGFR 07/26/2018 120     POC Glucose 07/26/2018 130    Office Visit on 07/16/2018   Component Date Value    WBC 07/16/2018 5 36     RBC 07/16/2018 4 69     Hemoglobin 07/16/2018 10 2*    Hematocrit 07/16/2018 35 2*    MCV 07/16/2018 75*    MCH 07/16/2018 21 7*    MCHC 07/16/2018 29 0*    RDW 07/16/2018 23 2*    MPV 07/16/2018 9 3     Platelets 93/07/1618 445*    nRBC 07/16/2018 0     Neutrophils Relative 07/16/2018 48     Immat GRANS % 07/16/2018 0     Lymphocytes Relative 07/16/2018 37     Monocytes Relative 07/16/2018 10     Eosinophils Relative 07/16/2018 4     Basophils Relative 07/16/2018 1     Neutrophils Absolute 07/16/2018 2 59     Immature Grans Absolute 07/16/2018 0 01     Lymphocytes Absolute 07/16/2018 1 97     Monocytes Absolute 07/16/2018 0 56     Eosinophils Absolute 07/16/2018 0 19     Basophils Absolute 07/16/2018 0 04     Sodium 07/16/2018 135*    Potassium 07/16/2018 3 9     Chloride 07/16/2018 102     CO2 07/16/2018 29     Anion Gap 07/16/2018 4     BUN 07/16/2018 13     Creatinine 07/16/2018 0 71     Glucose 07/16/2018 104     Calcium 07/16/2018 9 7     eGFR 07/16/2018 110     Protime 07/16/2018 12 6     INR 07/16/2018 0 93    Appointment on 06/15/2018   Component Date Value    WBC 06/15/2018 4 86     RBC 06/15/2018 4 86     Hemoglobin 06/15/2018 10 5*    Hematocrit 06/15/2018 35 4*    MCV 06/15/2018 73*    MCH 06/15/2018 21 6*    MCHC 06/15/2018 29 7*    RDW 06/15/2018 24 0*    MPV 06/15/2018 9 0     Platelets 98/42/6928 509*    nRBC 06/15/2018 0     Neutrophils Relative 06/15/2018 48     Immat GRANS % 06/15/2018 0     Lymphocytes Relative 06/15/2018 37     Monocytes Relative 06/15/2018 11     Eosinophils Relative 06/15/2018 3     Basophils Relative 06/15/2018 1     Neutrophils Absolute 06/15/2018 2 37     Immature Grans Absolute 06/15/2018 0 01     Lymphocytes Absolute 06/15/2018 1 78     Monocytes Absolute 06/15/2018 0 53     Eosinophils Absolute 06/15/2018 0 13     Basophils Absolute 06/15/2018 0 04     Cholesterol 06/15/2018 145     Triglycerides 06/15/2018 43     HDL, Direct 06/15/2018 58     LDL Calculated 06/15/2018 78     PSA 06/15/2018 0 5     Sodium 06/15/2018 138     Potassium 06/15/2018 3 7     Chloride 06/15/2018 103     CO2 06/15/2018 29     Anion Gap 06/15/2018 6     BUN 06/15/2018 15     Creatinine 06/15/2018 0 76     Glucose, Fasting 06/15/2018 107*    Calcium 06/15/2018 9 6     AST 06/15/2018 12     ALT 06/15/2018 18     Alkaline Phosphatase 06/15/2018 56     Total Protein 06/15/2018 7 6     Albumin 06/15/2018 3 7     Total Bilirubin 06/15/2018 0 46     eGFR 06/15/2018 108     TSH 3RD GENERATON 06/15/2018 0 765     Hemoglobin A1C 06/15/2018 6 5*    EAG 06/15/2018 140    Office Visit on 06/15/2018   Component Date Value    Color, UA 06/15/2018 Yellow     Clarity, UA 06/15/2018 Clear     Specific Gravity, UA 06/15/2018 1 016     pH, UA 06/15/2018 6 0     Leukocytes, UA 06/15/2018 Negative     Nitrite, UA 06/15/2018 Negative     Protein, UA 06/15/2018 30 (1+)*    Glucose, UA 06/15/2018 Negative     Ketones, UA 06/15/2018 Negative     Urobilinogen, UA 06/15/2018 0 2     Bilirubin, UA 06/15/2018 Negative     Blood, UA 06/15/2018 Negative     Creatinine, Ur 06/15/2018 89 2     Microalbum  ,U,Random 06/15/2018 135 0*    Microalb Creat Ratio 06/15/2018 151*    RBC, UA 06/15/2018 None Seen     WBC, UA 06/15/2018 None Seen     Epithelial Cells 06/15/2018 None Seen     Bacteria, UA 06/15/2018 None Seen     Hyaline Casts, UA 06/15/2018 None Seen      Imaging: Radiology Results    Result Date: 7/25/2018  Narrative: Ordered by an unspecified provider        Review of Systems:  Review of Systems   REVIEW OF SYSTEMS:  Constitutional:  Denies fever or chills   Eyes:  Denies change in visual acuity   HENT:  Denies nasal congestion or sore throat   Respiratory:  Denies cough or shortness of breath   Cardiovascular:  Denies chest pain or edema   GI:  Denies abdominal pain, nausea, vomiting, bloody stools or diarrhea   :  Denies dysuria, frequency, difficulty in micturition and nocturia  Musculoskeletal:  Denies back pain or joint pain   Neurologic:  Denies headache, focal weakness or sensory changes   Endocrine:  Denies polyuria or polydipsia   Lymphatic:  Denies swollen glands   Psychiatric:  Denies depression or anxiety     Physical Exam:    /80   Pulse 70   Ht 5' 8" (1 727 m)   Wt 88 9 kg (196 lb)   SpO2 97%   BMI 29 80 kg/m²     Physical Exam   PHYSICAL EXAM:  General:  Patient is not in acute distress   Head: Normocephalic, Atraumatic  HEENT:  Both pupils normal-size atraumatic, normocephalic, nonicteric  Neck:  JVP not raised  Trachea central  No carotid bruit  Respiratory:  normal breath sounds no crackles  no rhonchi  Cardiovascular:  Regular rate and rhythm no S3 2/6 systolic ejection murmur in the right sternal border   GI:  Abdomen soft nontender  No organomegaly  Lymphatic:  No cervical or inguinal lymphadenopathy  Neurologic:  Patient is awake alert, oriented   Grossly nonfocal    Discussion/Summary:  Patient with multiple medical problems who seems to be doing reasonably well from cardiac standpoint  Previous studies reviewed with patient  Medications reviewed and possible side effects discussed  concepts of cardiovascular disease , signs and symptoms of heart disease  Dietary and risk factor modification reinforced  All questions answered  Safety measures reviewed  Patient advised to report any problems prompting medical attention  Patient understands the risks and benefits of anti-platelet therapy to prevent stent thrombosis  Patient is strongly counseled to quit smoking to prevent future cardiovascular events  All his medications were reviewed  Importance of salt restriction reinforced  Patient report any bleeding issues  Follow-up in a few months or earlier as needed  Follow-up with primary care physician  Patient is agreeable with the plan of care

## 2018-09-08 DIAGNOSIS — I10 ESSENTIAL HYPERTENSION: Primary | ICD-10-CM

## 2018-09-10 RX ORDER — HYDROCHLOROTHIAZIDE 25 MG/1
TABLET ORAL
Qty: 90 TABLET | Refills: 0 | Status: SHIPPED | OUTPATIENT
Start: 2018-09-10 | End: 2018-12-17 | Stop reason: SDUPTHER

## 2018-09-16 DIAGNOSIS — I10 ESSENTIAL HYPERTENSION: Primary | ICD-10-CM

## 2018-09-17 RX ORDER — CLONIDINE HYDROCHLORIDE 0.3 MG/1
TABLET ORAL
Qty: 270 TABLET | Refills: 3 | Status: SHIPPED | OUTPATIENT
Start: 2018-09-17 | End: 2019-09-11 | Stop reason: SDUPTHER

## 2018-10-08 DIAGNOSIS — B96.81 H PYLORI ULCER: ICD-10-CM

## 2018-10-08 DIAGNOSIS — K27.9 H PYLORI ULCER: ICD-10-CM

## 2018-10-08 RX ORDER — PANTOPRAZOLE SODIUM 40 MG/1
40 TABLET, DELAYED RELEASE ORAL 2 TIMES DAILY
Qty: 180 TABLET | Refills: 2 | Status: SHIPPED | OUTPATIENT
Start: 2018-10-08 | End: 2019-06-18

## 2018-10-11 DIAGNOSIS — E11.69 TYPE 2 DIABETES MELLITUS WITH OTHER SPECIFIED COMPLICATION, WITHOUT LONG-TERM CURRENT USE OF INSULIN (HCC): ICD-10-CM

## 2018-10-11 RX ORDER — METFORMIN HYDROCHLORIDE 500 MG/1
TABLET, EXTENDED RELEASE ORAL
Qty: 180 TABLET | Refills: 0 | Status: SHIPPED | OUTPATIENT
Start: 2018-10-11 | End: 2019-03-04 | Stop reason: SDUPTHER

## 2018-10-11 RX ORDER — LISINOPRIL 40 MG/1
TABLET ORAL
Qty: 90 TABLET | Refills: 0 | Status: SHIPPED | OUTPATIENT
Start: 2018-10-11 | End: 2019-02-22 | Stop reason: SDUPTHER

## 2018-11-15 ENCOUNTER — TELEPHONE (OUTPATIENT)
Dept: INTERNAL MEDICINE CLINIC | Facility: CLINIC | Age: 68
End: 2018-11-15

## 2018-12-17 ENCOUNTER — OFFICE VISIT (OUTPATIENT)
Dept: INTERNAL MEDICINE CLINIC | Facility: CLINIC | Age: 68
End: 2018-12-17
Payer: MEDICARE

## 2018-12-17 ENCOUNTER — APPOINTMENT (OUTPATIENT)
Dept: LAB | Facility: CLINIC | Age: 68
End: 2018-12-17
Payer: MEDICARE

## 2018-12-17 VITALS
WEIGHT: 200.4 LBS | HEIGHT: 68 IN | HEART RATE: 60 BPM | BODY MASS INDEX: 30.37 KG/M2 | RESPIRATION RATE: 12 BRPM | SYSTOLIC BLOOD PRESSURE: 138 MMHG | DIASTOLIC BLOOD PRESSURE: 72 MMHG

## 2018-12-17 DIAGNOSIS — E11.8 TYPE 2 DIABETES MELLITUS WITH COMPLICATION, WITHOUT LONG-TERM CURRENT USE OF INSULIN (HCC): Primary | ICD-10-CM

## 2018-12-17 DIAGNOSIS — Z00.00 HEALTHCARE MAINTENANCE: ICD-10-CM

## 2018-12-17 DIAGNOSIS — K27.9 H PYLORI ULCER: ICD-10-CM

## 2018-12-17 DIAGNOSIS — B96.81 H PYLORI ULCER: ICD-10-CM

## 2018-12-17 DIAGNOSIS — I10 ESSENTIAL HYPERTENSION: ICD-10-CM

## 2018-12-17 DIAGNOSIS — E11.8 TYPE 2 DIABETES MELLITUS WITH COMPLICATION, WITHOUT LONG-TERM CURRENT USE OF INSULIN (HCC): ICD-10-CM

## 2018-12-17 LAB
ANION GAP SERPL CALCULATED.3IONS-SCNC: 6 MMOL/L (ref 4–13)
BACTERIA UR QL AUTO: NORMAL /HPF
BASOPHILS # BLD AUTO: 0.06 THOUSANDS/ΜL (ref 0–0.1)
BASOPHILS NFR BLD AUTO: 1 % (ref 0–1)
BILIRUB UR QL STRIP: NEGATIVE
BUN SERPL-MCNC: 14 MG/DL (ref 5–25)
CALCIUM SERPL-MCNC: 9.5 MG/DL (ref 8.3–10.1)
CHLORIDE SERPL-SCNC: 107 MMOL/L (ref 100–108)
CLARITY UR: CLEAR
CO2 SERPL-SCNC: 27 MMOL/L (ref 21–32)
COLOR UR: YELLOW
CREAT SERPL-MCNC: 0.66 MG/DL (ref 0.6–1.3)
EOSINOPHIL # BLD AUTO: 0.18 THOUSAND/ΜL (ref 0–0.61)
EOSINOPHIL NFR BLD AUTO: 4 % (ref 0–6)
ERYTHROCYTE [DISTWIDTH] IN BLOOD BY AUTOMATED COUNT: 18.8 % (ref 11.6–15.1)
EST. AVERAGE GLUCOSE BLD GHB EST-MCNC: 143 MG/DL
GFR SERPL CREATININE-BSD FRML MDRD: 113 ML/MIN/1.73SQ M
GLUCOSE P FAST SERPL-MCNC: 105 MG/DL (ref 65–99)
GLUCOSE UR STRIP-MCNC: NEGATIVE MG/DL
HBA1C MFR BLD: 6.6 % (ref 4.2–6.3)
HCT VFR BLD AUTO: 32 % (ref 36.5–49.3)
HGB BLD-MCNC: 9.2 G/DL (ref 12–17)
HGB UR QL STRIP.AUTO: NEGATIVE
HYALINE CASTS #/AREA URNS LPF: NORMAL /LPF
IMM GRANULOCYTES # BLD AUTO: 0.01 THOUSAND/UL (ref 0–0.2)
IMM GRANULOCYTES NFR BLD AUTO: 0 % (ref 0–2)
KETONES UR STRIP-MCNC: NEGATIVE MG/DL
LEUKOCYTE ESTERASE UR QL STRIP: NEGATIVE
LYMPHOCYTES # BLD AUTO: 1.37 THOUSANDS/ΜL (ref 0.6–4.47)
LYMPHOCYTES NFR BLD AUTO: 27 % (ref 14–44)
MCH RBC QN AUTO: 22.2 PG (ref 26.8–34.3)
MCHC RBC AUTO-ENTMCNC: 28.8 G/DL (ref 31.4–37.4)
MCV RBC AUTO: 77 FL (ref 82–98)
MONOCYTES # BLD AUTO: 0.55 THOUSAND/ΜL (ref 0.17–1.22)
MONOCYTES NFR BLD AUTO: 11 % (ref 4–12)
NEUTROPHILS # BLD AUTO: 2.95 THOUSANDS/ΜL (ref 1.85–7.62)
NEUTS SEG NFR BLD AUTO: 57 % (ref 43–75)
NITRITE UR QL STRIP: NEGATIVE
NON-SQ EPI CELLS URNS QL MICRO: NORMAL /HPF
NRBC BLD AUTO-RTO: 0 /100 WBCS
PH UR STRIP.AUTO: 6 [PH] (ref 4.5–8)
PLATELET # BLD AUTO: 487 THOUSANDS/UL (ref 149–390)
PMV BLD AUTO: 9.1 FL (ref 8.9–12.7)
POTASSIUM SERPL-SCNC: 3.6 MMOL/L (ref 3.5–5.3)
PROT UR STRIP-MCNC: ABNORMAL MG/DL
RBC # BLD AUTO: 4.14 MILLION/UL (ref 3.88–5.62)
RBC #/AREA URNS AUTO: NORMAL /HPF
SODIUM SERPL-SCNC: 140 MMOL/L (ref 136–145)
SP GR UR STRIP.AUTO: 1.02 (ref 1–1.03)
TSH SERPL DL<=0.05 MIU/L-ACNC: 0.79 UIU/ML (ref 0.36–3.74)
UROBILINOGEN UR QL STRIP.AUTO: 0.2 E.U./DL
WBC # BLD AUTO: 5.12 THOUSAND/UL (ref 4.31–10.16)
WBC #/AREA URNS AUTO: NORMAL /HPF

## 2018-12-17 PROCEDURE — 36415 COLL VENOUS BLD VENIPUNCTURE: CPT

## 2018-12-17 PROCEDURE — 83036 HEMOGLOBIN GLYCOSYLATED A1C: CPT

## 2018-12-17 PROCEDURE — 85025 COMPLETE CBC W/AUTO DIFF WBC: CPT

## 2018-12-17 PROCEDURE — 80048 BASIC METABOLIC PNL TOTAL CA: CPT

## 2018-12-17 PROCEDURE — G0439 PPPS, SUBSEQ VISIT: HCPCS | Performed by: INTERNAL MEDICINE

## 2018-12-17 PROCEDURE — 84443 ASSAY THYROID STIM HORMONE: CPT

## 2018-12-17 PROCEDURE — 81001 URINALYSIS AUTO W/SCOPE: CPT | Performed by: INTERNAL MEDICINE

## 2018-12-17 PROCEDURE — 99214 OFFICE O/P EST MOD 30 MIN: CPT | Performed by: INTERNAL MEDICINE

## 2018-12-17 RX ORDER — HYDROCHLOROTHIAZIDE 25 MG/1
25 TABLET ORAL DAILY
Qty: 90 TABLET | Refills: 3 | Status: SHIPPED | OUTPATIENT
Start: 2018-12-17 | End: 2019-11-27 | Stop reason: SDUPTHER

## 2018-12-17 NOTE — PATIENT INSTRUCTIONS
Patient is a 57-year-old [facility] [employer] male coming into the office today for routine follow-up and wellness visit  Patient has a history of active CAD and chest pain in January where he had 3 stents put in  He is following this with the cardiologist   Patient also has a history but H pylori ulcer which caused bleeding in January of this year  He was admitted to the hospital recovered fully and now follows with a gastroenterologist     He had no complaints upon this visit  I will see him again in 6 months

## 2018-12-17 NOTE — PROGRESS NOTES
Assessment and Plan:    Problem List Items Addressed This Visit     None        Health Maintenance Due   Topic Date Due    Hepatitis C Screening  1950    Medicare Annual Wellness Visit (AWV)  1950    DTaP,Tdap,and Td Vaccines (1 - Tdap) 06/27/1969    Lung Cancer Screening  01/24/2018    INFLUENZA VACCINE  07/01/2018    HEMOGLOBIN A1C  12/15/2018         HPI:  Adeola Donnelly is a 77 y o  male here for his Subsequent Wellness Visit  Patient Active Problem List   Diagnosis    Non-recurrent bilateral inguinal hernia without obstruction or gangrene    Hyperlipidemia    HTN (hypertension)    [facility] mellitus [facility]    H pylori ulcer    Tobacco abuse    Epigastric pain    [facility]    Coronary artery disease involving native coronary artery of native heart     Past Medical History:   Diagnosis Date    Benign neoplasm of large intestine     Diabetes mellitus ([facility] )     Heart murmur     History of aortic regurgitation     History of constipation     History of degenerative joint disease     History of nocturia     History of obesity     History of sebaceous cyst     History of shortness of breath     History of urinary frequency     Hyperlipidemia     Hypertension     Polyposis coli     of the large intestine    Ulcer of esophagus      Past Surgical History:   Procedure Laterality Date    ACHILLES TENDON SURGERY Left     COLONOSCOPY      hyperplastic polyp    ESOPHAGOGASTRODUODENOSCOPY N/A 1/23/2018    Procedure: ESOPHAGOGASTRODUODENOSCOPY (EGD); Surgeon: Johnny Sanchez MD;  [facility];   Service: Gastroenterology    ESOPHAGOGASTRODUODENOSCOPY      HERNIA REPAIR      HERNIA REPAIR Bilateral 8/18/2017    Procedure: LAPAROSCOPIC INGUINAL HERNIA REPAIR WITH MESH;  Surgeon: Niharika Alejandra MD;  [facility];  Service: General    OR ESOPHAGOGASTRODUODENOSCOPY TRANSORAL DIAGNOSTIC N/A 3/26/2018    Procedure: ESOPHAGOGASTRODUODENOSCOPY (EGD); Surgeon: Janelle Muñiz MD;  [facility]; Service: Gastroenterology    OR ESOPHAGOGASTRODUODENOSCOPY TRANSORAL DIAGNOSTIC N/A 5/14/2018    Procedure: ESOPHAGOGASTRODUODENOSCOPY (EGD); Surgeon: Janelle Muñiz MD;  [facility]; Service: Gastroenterology    TIBIA FRACTURE SURGERY Left      Family History   Problem Relation Age of Onset    Rheumatic fever Father     Other Father         accidental poisoning   Yesika Celso Heart disease Mother      History   Smoking Status    Current Every Day Smoker    Packs/day: 1 00    Years: 35 00    Types: Cigarettes   Smokeless Tobacco    Never Used     History   Alcohol Use    Yes     Comment: rare      History   Drug Use No       Current Outpatient Prescriptions   Medication Sig Dispense Refill    aspirin 81 mg chewable tablet Chew 1 tablet daily      atenolol (TENORMIN) 50 mg tablet Take 1 tablet (50 mg total) by mouth daily 30 tablet 5    atorvastatin (LIPITOR) 80 mg tablet TAKE 1 TABLET DAILY 90 tablet 3    cloNIDine (CATAPRES) 0 3 mg tablet TAKE 1 TABLET THREE TIMES A  tablet 3    clopidogrel (PLAVIX) 75 mg tablet Take 1 tablet (75 mg total) by mouth daily 30 tablet 11    DENTAGEL 1 1 % GEL USE ONCE DAILY AFTER BRUSHING  BEFORE BEDTIME  5    hydrochlorothiazide (HYDRODIURIL) 25 mg tablet TAKE 1 TABLET DAILY 90 tablet 0    lisinopril (ZESTRIL) 40 mg tablet TAKE 1 TABLET DAILY 90 tablet 0    metFORMIN (GLUCOPHAGE-XR) 500 mg 24 hr tablet TAKE 1 TABLET TWICE A DAY WITH MEALS 180 tablet 0    Omega-3 1000 MG CAPS Take by mouth      pantoprazole (PROTONIX) 40 mg tablet Take 1 tablet (40 mg total) by mouth 2 (two) times a day 180 tablet 2     No current facility-administered medications for this visit        No Known Allergies  Immunization History   Administered Date(s) Administered    Influenza TIV (IM) [birth year]    Pneumococcal Conjugate 13-Valent 01/18/2017    Pneumococcal Polysaccharide PPV23 1950, 03/17/2016, 07/24/2017    Tdap 1950    Zoster 03/17/2016       Patient Care Team:  Essence Ulloa MD as PCP - General    Medicare Screening Tests and Risk Assessments:  Juni Rader is here for his Subsequent Wellness visit  Last Medicare Wellness visit information reviewed, patient interviewed and updates made to the record today  Health Risk Assessment:  Patient rates overall health as very good  Patient feels that their physical health rating is Much better  Eyesight was rated as Same  Hearing was rated as Same  Patient feels that their emotional and mental health rating is Much better  Pain experienced by patient in the last 7 days has been None  Emotional/Mental Health:  Patient has been feeling nervous/anxious  PHQ-9 Depression Screening:    Frequency of the following problems over the past two weeks:      1  Little interest or pleasure in doing things: 0 - not at all      2  Feeling down, depressed, or hopeless: 0 - not at all  PHQ-2 Score: 0          Broken Bones/Falls: Fall Risk Assessment:    In the past year, patient has experienced: No history of falling in past year          Bladder/Bowel:  Patient has not leaked urine accidently in the last six months  Patient reports no loss of bowel control  Immunizations:  Patient has not had a flu vaccination within the last year  Home Safety:  Patient does not have trouble with stairs inside or outside of their home  Patient currently reports that there are no safety hazards present in home, working smoke alarms, working carbon monoxide detectors  Preventative Screenings:   colon cancer screen completed, cholesterol screen completed, glaucoma eye exam completed,     Nutrition:  Current diet: Regular and Limited junk food with servings of the following:    Medications:  Patient is currently taking over-the-counter supplements  Patient is able to manage medications      Lifestyle Choices:  Patient reports current tobacco use  Patient reports alcohol use  Alcohol use per week: 3-4 times a year  Patient drives a vehicle  Patient wears seat belt  Current level of exercise of physical activity described by patient as: walking , lawn or ground care           Activities of Daily Living:  Can get out of bed by his or her self, able to dress self, able to make own meals, able to do own shopping, able to bathe self, can do own laundry/housekeeping, can manage own money, pay bills and track expenses    Previous Hospitalizations:  Hospitalization or ED visit in past 12 months  Number of hospitalizations within the last year: 1-2        Preventative Screening/Counseling:      Cardiovascular:      General: Screening Current          Diabetes:      General: Screening Current          Colorectal Cancer:      General: Screening Current          Prostate Cancer:      General: Screening Current          Osteoporosis:      General: Screening Not Indicated          AAA:      General: Screening Not Indicated          Glaucoma:      General: Screening Not Indicated          HIV:      General: Screening Not Indicated          Hepatitis C:      General: Screening Not Indicated        Immunizations:  Patient reviewed and up to date

## 2018-12-17 NOTE — PROGRESS NOTES
Assessment/Plan:       Diagnoses and all orders for this visit:    Essential hypertension  -     hydrochlorothiazide (HYDRODIURIL) 25 mg tablet; Take 1 tablet (25 mg total) by mouth daily          There are no Patient Instructions on file for this visit  Subjective:      Patient ID: Joana Adamson is a 77 y o  male  Seen for metabolic syndrome  Doing fairly well  Hemoglobin A1c is 6 5%  Lipids are within guidelines  Blood pressure medication and increased and blood pressure is now acceptable  The patient feels fairly well  Most recent echocardiogram done Dec 2015 documented is concentric LVH with normal systolic function, and mild aortic regurgitation and mitral regurgitation  No change in 12 months  Cardiac catheterization documented noncritical CAD with 60% LAD lesion and a large vessel with good flow not requiring intervention  This also documented hypertrophic cardiomyopathy with obliteration of the cavity an ejection fraction of 80%  Aortic ultrasound done in 2010 documented diffuse plaque but no aneurysm  Active CAD that is followed by cardiology; patient   Had chest pain in January and had 3 stents placed  He had no complaints upon this visit  The following portions of the patient's history were reviewed and updated as appropriate:   He has a past medical history of Benign neoplasm of large intestine; Diabetes mellitus ([address] ); Heart murmur; History of aortic regurgitation; History of constipation; History of degenerative joint disease; History of nocturia; History of obesity; History of sebaceous cyst; History of shortness of breath; History of urinary frequency; Hyperlipidemia; Hypertension; Polyposis coli; and Ulcer of esophagus  ,   does not have any pertinent problems on file  ,   has a past surgical history that includes Hernia repair; Tibia fracture surgery (Left); Achilles tendon surgery (Left); Colonoscopy;  Hernia repair (Bilateral, 8/18/2017); Esophagogastroduodenoscopy (N/A, 1/23/2018); Esophagogastroduodenoscopy; pr esophagogastroduodenoscopy transoral diagnostic (N/A, 3/26/2018); and pr esophagogastroduodenoscopy transoral diagnostic (N/A, 5/14/2018)  ,  family history includes Heart disease in his mother; Other in his father; Rheumatic fever in his father  ,   reports that he has been smoking Cigarettes  He has a 35 00 pack-year smoking history  He has never used smokeless tobacco  He reports that he drinks alcohol  He reports that he does not use drugs  ,  has No Known Allergies     Current Outpatient Prescriptions   Medication Sig Dispense Refill    aspirin 81 mg chewable tablet Chew 1 tablet daily      atenolol (TENORMIN) 50 mg tablet Take 1 tablet (50 mg total) by mouth daily 30 tablet 5    atorvastatin (LIPITOR) 80 mg tablet TAKE 1 TABLET DAILY 90 tablet 3    cloNIDine (CATAPRES) 0 3 mg tablet TAKE 1 TABLET THREE TIMES A  tablet 3    clopidogrel (PLAVIX) 75 mg tablet Take 1 tablet (75 mg total) by mouth daily 30 tablet 11    DENTAGEL 1 1 % GEL USE ONCE DAILY AFTER BRUSHING  BEFORE BEDTIME  5    hydrochlorothiazide (HYDRODIURIL) 25 mg tablet TAKE 1 TABLET DAILY 90 tablet 0    lisinopril (ZESTRIL) 40 mg tablet TAKE 1 TABLET DAILY 90 tablet 0    metFORMIN (GLUCOPHAGE-XR) 500 mg 24 hr tablet TAKE 1 TABLET TWICE A DAY WITH MEALS 180 tablet 0    Omega-3 1000 MG CAPS Take by mouth      pantoprazole (PROTONIX) 40 mg tablet Take 1 tablet (40 mg total) by mouth 2 (two) times a day (Patient taking differently: Take 40 mg by mouth daily  ) 180 tablet 2     No current facility-administered medications for this visit  Review of Systems   Constitutional: Negative for fatigue and fever  HENT: Negative for congestion  Eyes: Negative for visual disturbance  Respiratory: Negative for cough, chest tightness, shortness of breath and wheezing  Cardiovascular: Negative for chest pain and palpitations     Gastrointestinal: Negative for abdominal distention, abdominal pain, constipation, diarrhea, nausea and vomiting  Genitourinary: Negative for difficulty urinating  Musculoskeletal: Negative for arthralgias and myalgias  Neurological: Negative for dizziness, light-headedness and headaches  Psychiatric/Behavioral: Negative for behavioral problems  Objective:  Vitals:    12/17/18 0801   BP: 138/72   Pulse: 60   Resp: 12      Physical Exam   Constitutional: He is oriented to person, place, and time  He appears well-developed and well-nourished  HENT:   Head: Normocephalic and atraumatic  Eyes: Pupils are equal, round, and reactive to light  Conjunctivae are normal    Neck: Normal range of motion  No thyromegaly present  Cardiovascular: Normal rate, regular rhythm and normal heart sounds  No murmur heard  Pulmonary/Chest: Effort normal  No respiratory distress  He has no wheezes  He has no rales  Abdominal: Soft  There is no tenderness  Genitourinary: Penis normal  Right testis shows no mass and no tenderness  Left testis shows no mass and no tenderness  Musculoskeletal: Normal range of motion  He exhibits no deformity  Lymphadenopathy:     He has no cervical adenopathy  Neurological: He is alert and oriented to person, place, and time  Skin: Skin is warm and dry  Psychiatric: He has a normal mood and affect   His behavior is normal  Judgment and thought content normal

## 2019-02-22 ENCOUNTER — OFFICE VISIT (OUTPATIENT)
Dept: CARDIOLOGY CLINIC | Facility: CLINIC | Age: 69
End: 2019-02-22
Payer: MEDICARE

## 2019-02-22 VITALS
HEART RATE: 58 BPM | BODY MASS INDEX: 30.83 KG/M2 | DIASTOLIC BLOOD PRESSURE: 80 MMHG | SYSTOLIC BLOOD PRESSURE: 130 MMHG | OXYGEN SATURATION: 99 % | HEIGHT: 68 IN | WEIGHT: 203.4 LBS

## 2019-02-22 DIAGNOSIS — E11.69 TYPE 2 DIABETES MELLITUS WITH OTHER SPECIFIED COMPLICATION, WITHOUT LONG-TERM CURRENT USE OF INSULIN (HCC): ICD-10-CM

## 2019-02-22 DIAGNOSIS — Z72.0 TOBACCO ABUSE: ICD-10-CM

## 2019-02-22 DIAGNOSIS — I25.10 CORONARY ARTERY DISEASE INVOLVING NATIVE CORONARY ARTERY OF NATIVE HEART WITHOUT ANGINA PECTORIS: Primary | ICD-10-CM

## 2019-02-22 DIAGNOSIS — E78.2 MIXED HYPERLIPIDEMIA: ICD-10-CM

## 2019-02-22 DIAGNOSIS — I42.1 HOCM (HYPERTROPHIC OBSTRUCTIVE CARDIOMYOPATHY) (HCC): ICD-10-CM

## 2019-02-22 PROBLEM — R10.13 EPIGASTRIC PAIN: Status: RESOLVED | Noted: 2018-04-20 | Resolved: 2019-02-22

## 2019-02-22 PROBLEM — K27.9 H PYLORI ULCER: Status: RESOLVED | Noted: 2018-02-08 | Resolved: 2019-02-22

## 2019-02-22 PROBLEM — B96.81 H PYLORI ULCER: Status: RESOLVED | Noted: 2018-02-08 | Resolved: 2019-02-22

## 2019-02-22 PROCEDURE — 99214 OFFICE O/P EST MOD 30 MIN: CPT | Performed by: INTERNAL MEDICINE

## 2019-02-22 RX ORDER — LISINOPRIL 40 MG/1
40 TABLET ORAL DAILY
Qty: 90 TABLET | Refills: 3 | Status: SHIPPED | OUTPATIENT
Start: 2019-02-22 | End: 2020-03-22

## 2019-02-22 NOTE — PROGRESS NOTES
[facility] [place]  [address]  [address]  Cardiology Follow Up    Heri Pitt  1950  [id number]      1  Coronary artery disease involving native coronary artery of native heart without angina pectoris     2  HOCM (hypertrophic obstructive cardiomyopathy) ([facility] )     3  Mixed hyperlipidemia     4  Tobacco abuse         Chief Complaint   Patient presents with    Follow-up       Interval History:  Patient presents for a follow-up visit  Patient has history of CAD status post stent LAD as well as history of hypertrophic cardiomyopathy and hypertension  Patient denies any chest pain  No shortness of breath out of the ordinary  No history of leg edema orthopnea PND  No history of presyncope syncope  No history of bleeding issues  Patient continues to smoke in spite of repeated counseling  Patient smokes at least 1 pack per day  He states that he has been compliant with all his present medications      Patient Active Problem List   Diagnosis    Non-recurrent bilateral inguinal hernia without obstruction or gangrene    Hyperlipidemia    HTN (hypertension)    [facility] mellitus [facility]    Tobacco abuse    [facility]M [facility]hypertrophic [facility] cardiomyopathy) ([facility] )    Coronary artery disease involving native coronary artery of native heart     Past Medical History:   Diagnosis Date    Benign neoplasm of large intestine     Diabetes mellitus ([facility] )     Heart murmur     History of aortic regurgitation     History of constipation     History of degenerative joint disease     History of nocturia     History of obesity     History of sebaceous cyst     History of shortness of breath     History of urinary frequency     Hyperlipidemia     Hypertension     Polyposis coli     of the large intestine    Ulcer of esophagus      Social History     Socioeconomic History    Marital status: /Civil Union     Spouse name: Not on file    Number of children: Not on file    Years of education: Not on file    Highest education level: Not on file   Occupational History    Occupation:    Social Needs    Financial resource strain: Not on file    Food insecurity:     Worry: Not on file     Inability: Not on file    Transportation needs:     Medical: Not on file     Non-medical: Not on file   Tobacco Use    Smoking status: Current Every Day Smoker     Packs/day: 1 00     Years: 35 00     Pack years: 35 00     Types: Cigarettes    Smokeless tobacco: Never Used   Substance and Sexual Activity    Alcohol use: Yes     Comment: rare    Drug use: No    Sexual activity: Not Currently   Lifestyle    Physical activity:     Days per week: Not on file     Minutes per session: Not on file    Stress: Not on file   Relationships    Social connections:     Talks on phone: Not on file     Gets together: Not on file     Attends Spiritism service: Not on file     Active member of club or organization: Not on file     Attends meetings of clubs or organizations: Not on file     Relationship status: Not on file    Intimate partner violence:     Fear of current or ex partner: Not on file     Emotionally abused: Not on file     Physically abused: Not on file     Forced sexual activity: Not on file   Other Topics Concern    Not on file   Social History Narrative    Not on file      Family History   Problem Relation Age of Onset    Rheumatic fever Father     Other Father         accidental poisoning    Heart disease Mother      Past Surgical History:   Procedure Laterality Date    ACHILLES TENDON SURGERY Left     COLONOSCOPY      hyperplastic polyp    ESOPHAGOGASTRODUODENOSCOPY N/A 1/23/2018    Procedure: ESOPHAGOGASTRODUODENOSCOPY (EGD); Surgeon: Celia Kelly MD;  Location: [facility];   Service: Gastroenterology    ESOPHAGOGASTRODUODENOSCOPY      HERNIA REPAIR      HERNIA REPAIR Bilateral 8/18/2017    Procedure: LAPAROSCOPIC INGUINAL HERNIA REPAIR WITH MESH; Surgeon: Quinton Cohen MD;  Location: [facility];  Service: General    NV ESOPHAGOGASTRODUODENOSCOPY TRANSORAL DIAGNOSTIC N/A 3/26/2018    Procedure: ESOPHAGOGASTRODUODENOSCOPY (EGD); Surgeon: Mary Rawls MD;  Location: [facility]; Service: Gastroenterology    NV ESOPHAGOGASTRODUODENOSCOPY TRANSORAL DIAGNOSTIC N/A 5/14/2018    Procedure: ESOPHAGOGASTRODUODENOSCOPY (EGD); Surgeon: Mary Rawls MD;  Location: [facility]; Service: Gastroenterology    TIBIA FRACTURE SURGERY Left        Current Outpatient Medications:     aspirin 81 mg chewable tablet, Chew 1 tablet daily, Disp: , Rfl:     atenolol (TENORMIN) 50 mg tablet, Take 1 tablet (50 mg total) by mouth daily, Disp: 30 tablet, Rfl: 5    atorvastatin (LIPITOR) 80 mg tablet, TAKE 1 TABLET DAILY, Disp: 90 tablet, Rfl: 3    cloNIDine (CATAPRES) 0 3 mg tablet, TAKE 1 TABLET THREE TIMES A DAY, Disp: 270 tablet, Rfl: 3    clopidogrel (PLAVIX) 75 mg tablet, Take 1 tablet (75 mg total) by mouth daily, Disp: 30 tablet, Rfl: 11    DENTAGEL 1 1 % GEL, USE ONCE DAILY AFTER BRUSHING  BEFORE BEDTIME, Disp: , Rfl: 5    hydrochlorothiazide (HYDRODIURIL) 25 mg tablet, Take 1 tablet (25 mg total) by mouth daily, Disp: 90 tablet, Rfl: 3    metFORMIN (GLUCOPHAGE-XR) 500 mg 24 hr tablet, TAKE 1 TABLET TWICE A DAY WITH MEALS, Disp: 180 tablet, Rfl: 0    Omega-3 1000 MG CAPS, Take by mouth 3 (three) times a day , Disp: , Rfl:     pantoprazole (PROTONIX) 40 mg tablet, Take 1 tablet (40 mg total) by mouth 2 (two) times a day (Patient taking differently: Take 40 mg by mouth daily  ), Disp: 180 tablet, Rfl: 2    lisinopril (ZESTRIL) 40 mg tablet, Take 1 tablet (40 mg total) by mouth daily, Disp: 90 tablet, Rfl: 3  No Known Allergies    Labs:  No visits with results within 2 Month(s) from this visit     Latest known visit with results is:   Appointment on 12/17/2018   Component Date Value    WBC 12/17/2018 5 12     RBC 12/17/2018 4 14     Hemoglobin 12/17/2018 9 2*    Hematocrit 12/17/2018 32 0*    MCV 12/17/2018 77*    MCH 12/17/2018 22 2*    MCHC 12/17/2018 28 8*    RDW 12/17/2018 18 8*    MPV 12/17/2018 9 1     Platelets 59/64/5896 487*    nRBC 12/17/2018 0     Neutrophils Relative 12/17/2018 57     Immat GRANS % 12/17/2018 0     Lymphocytes Relative 12/17/2018 27     Monocytes Relative 12/17/2018 11     Eosinophils Relative 12/17/2018 4     Basophils Relative 12/17/2018 1     Neutrophils Absolute 12/17/2018 2 95     Immature Grans Absolute 12/17/2018 0 01     Lymphocytes Absolute 12/17/2018 1 37     Monocytes Absolute 12/17/2018 0 55     Eosinophils Absolute 12/17/2018 0 18     Basophils Absolute 12/17/2018 0 06     Sodium 12/17/2018 140     Potassium 12/17/2018 3 6     Chloride 12/17/2018 107     CO2 12/17/2018 27     ANION GAP 12/17/2018 6     BUN 12/17/2018 14     Creatinine 12/17/2018 0 66     Glucose, Fasting 12/17/2018 105*    Calcium 12/17/2018 9 5     eGFR 12/17/2018 113     Hemoglobin A1C 12/17/2018 6 6*    EAG 12/17/2018 143     TSH 3RD GENERATON 12/17/2018 0 786      Imaging: No results found      Review of Systems:  Review of Systems   REVIEW OF SYSTEMS:  Constitutional:  Denies fever or chills   Eyes:  Denies change in visual acuity   HENT:  Denies nasal congestion or sore throat   Respiratory:   shortness of breath   Cardiovascular:  Denies chest pain or edema   GI:  Denies abdominal pain, nausea, vomiting, bloody stools or diarrhea   :  Denies dysuria, frequency, difficulty in micturition and nocturia  Musculoskeletal:  Denies back pain or joint pain   Neurologic:  Denies headache, focal weakness or sensory changes   Endocrine:  Denies polyuria or polydipsia   Lymphatic:  Denies swollen glands   Psychiatric:  Denies depression or anxiety     Physical Exam:    /80   Pulse 58   Ht 5' 8" (1 727 m)   Wt 92 3 kg (203 lb 6 4 oz)   SpO2 99%   BMI 30 93 kg/m²     Physical Exam   PHYSICAL EXAM:  General: Patient is not in acute distress   Head: Normocephalic, Atraumatic  HEENT:  Both pupils normal-size atraumatic, normocephalic, nonicteric  Neck:  JVP not raised  Trachea central  No carotid bruit  Respiratory:  normal breath sounds no crackles  no rhonchi  Cardiovascular:  Regular rate and rhythm no S3  Short systolic murmur in the right sternal border  GI:  Abdomen soft nontender  No organomegaly  Lymphatic:  No cervical or inguinal lymphadenopathy  Neurologic:  Patient is awake alert, oriented   Grossly nonfocal    Discussion/Summary:  Patient with multiple medical problems who seems to be doing reasonably well from cardiac standpoint  Previous studies reviewed with patient  Medications reviewed and possible side effects discussed  concepts of cardiovascular disease , signs and symptoms of heart disease  Dietary and risk factor modification reinforced  All questions answered  Safety measures reviewed  Patient advised to report any problems prompting medical attention  Patient understands the risks and benefits of anti-platelet therapy to prevent stent thrombosis  Patient report any bleeding issues  Patient is strongly counseled to quit smoking to prevent future cardiovascular events  Previous cardiac catheterization and intervention results reviewed  Medications reviewed  Importance of salt restriction reinforced  Patient is agreeable with the plan of care  Follow-up with primary care physician  Followup in 6 months or earlier as needed

## 2019-03-04 DIAGNOSIS — E11.69 TYPE 2 DIABETES MELLITUS WITH OTHER SPECIFIED COMPLICATION, WITHOUT LONG-TERM CURRENT USE OF INSULIN (HCC): ICD-10-CM

## 2019-03-04 RX ORDER — METFORMIN HYDROCHLORIDE 500 MG/1
500 TABLET, EXTENDED RELEASE ORAL 2 TIMES DAILY WITH MEALS
Qty: 180 TABLET | Refills: 0 | Status: SHIPPED | OUTPATIENT
Start: 2019-03-04 | End: 2019-10-31 | Stop reason: SDUPTHER

## 2019-05-23 DIAGNOSIS — E11.69 TYPE 2 DIABETES MELLITUS WITH OTHER SPECIFIED COMPLICATION, WITHOUT LONG-TERM CURRENT USE OF INSULIN (HCC): ICD-10-CM

## 2019-05-24 RX ORDER — METFORMIN HYDROCHLORIDE 500 MG/1
TABLET, EXTENDED RELEASE ORAL
Qty: 180 TABLET | Refills: 0 | Status: SHIPPED | OUTPATIENT
Start: 2019-05-24 | End: 2019-06-18

## 2019-06-18 ENCOUNTER — APPOINTMENT (OUTPATIENT)
Dept: LAB | Facility: CLINIC | Age: 69
End: 2019-06-18
Payer: MEDICARE

## 2019-06-18 ENCOUNTER — OFFICE VISIT (OUTPATIENT)
Dept: INTERNAL MEDICINE CLINIC | Facility: CLINIC | Age: 69
End: 2019-06-18
Payer: MEDICARE

## 2019-06-18 VITALS
BODY MASS INDEX: 30.52 KG/M2 | DIASTOLIC BLOOD PRESSURE: 72 MMHG | SYSTOLIC BLOOD PRESSURE: 138 MMHG | HEIGHT: 68 IN | WEIGHT: 201.4 LBS | HEART RATE: 90 BPM | OXYGEN SATURATION: 97 %

## 2019-06-18 DIAGNOSIS — I25.119 ATHEROSCLEROSIS OF NATIVE CORONARY ARTERY OF NATIVE HEART WITH ANGINA PECTORIS (HCC): ICD-10-CM

## 2019-06-18 DIAGNOSIS — Z12.5 PROSTATE CANCER SCREENING: ICD-10-CM

## 2019-06-18 DIAGNOSIS — I70.0 ATHEROSCLEROSIS OF AORTA (HCC): ICD-10-CM

## 2019-06-18 DIAGNOSIS — Z98.61 CAD S/P PERCUTANEOUS CORONARY ANGIOPLASTY: ICD-10-CM

## 2019-06-18 DIAGNOSIS — E11.8 TYPE 2 DIABETES MELLITUS WITH COMPLICATION, WITHOUT LONG-TERM CURRENT USE OF INSULIN (HCC): ICD-10-CM

## 2019-06-18 DIAGNOSIS — I25.10 CAD S/P PERCUTANEOUS CORONARY ANGIOPLASTY: ICD-10-CM

## 2019-06-18 DIAGNOSIS — E11.8 TYPE 2 DIABETES MELLITUS WITH COMPLICATION, WITHOUT LONG-TERM CURRENT USE OF INSULIN (HCC): Primary | ICD-10-CM

## 2019-06-18 DIAGNOSIS — K27.9 H PYLORI ULCER: ICD-10-CM

## 2019-06-18 DIAGNOSIS — B96.81 H PYLORI ULCER: ICD-10-CM

## 2019-06-18 LAB
ALBUMIN SERPL BCP-MCNC: 3.7 G/DL (ref 3.5–5)
ALP SERPL-CCNC: 59 U/L (ref 46–116)
ALT SERPL W P-5'-P-CCNC: 23 U/L (ref 12–78)
ANION GAP SERPL CALCULATED.3IONS-SCNC: 4 MMOL/L (ref 4–13)
AST SERPL W P-5'-P-CCNC: 14 U/L (ref 5–45)
BASOPHILS # BLD AUTO: 0.05 THOUSANDS/ΜL (ref 0–0.1)
BASOPHILS NFR BLD AUTO: 1 % (ref 0–1)
BILIRUB SERPL-MCNC: 0.41 MG/DL (ref 0.2–1)
BILIRUB UR QL STRIP: NEGATIVE
BUN SERPL-MCNC: 17 MG/DL (ref 5–25)
CALCIUM SERPL-MCNC: 9.8 MG/DL (ref 8.3–10.1)
CHLORIDE SERPL-SCNC: 102 MMOL/L (ref 100–108)
CHOLEST SERPL-MCNC: 132 MG/DL (ref 50–200)
CLARITY UR: CLEAR
CO2 SERPL-SCNC: 28 MMOL/L (ref 21–32)
COLOR UR: YELLOW
CREAT SERPL-MCNC: 0.77 MG/DL (ref 0.6–1.3)
CREAT UR-MCNC: 39.9 MG/DL
EOSINOPHIL # BLD AUTO: 0.29 THOUSAND/ΜL (ref 0–0.61)
EOSINOPHIL NFR BLD AUTO: 5 % (ref 0–6)
ERYTHROCYTE [DISTWIDTH] IN BLOOD BY AUTOMATED COUNT: 21.8 % (ref 11.6–15.1)
EST. AVERAGE GLUCOSE BLD GHB EST-MCNC: 143 MG/DL
GFR SERPL CREATININE-BSD FRML MDRD: 106 ML/MIN/1.73SQ M
GLUCOSE P FAST SERPL-MCNC: 118 MG/DL (ref 65–99)
GLUCOSE UR STRIP-MCNC: NEGATIVE MG/DL
HBA1C MFR BLD: 6.6 % (ref 4.2–6.3)
HCT VFR BLD AUTO: 34.5 % (ref 36.5–49.3)
HDLC SERPL-MCNC: 44 MG/DL (ref 40–60)
HGB BLD-MCNC: 10.4 G/DL (ref 12–17)
HGB UR QL STRIP.AUTO: NEGATIVE
IMM GRANULOCYTES # BLD AUTO: 0.01 THOUSAND/UL (ref 0–0.2)
IMM GRANULOCYTES NFR BLD AUTO: 0 % (ref 0–2)
KETONES UR STRIP-MCNC: NEGATIVE MG/DL
LDLC SERPL CALC-MCNC: 74 MG/DL (ref 0–100)
LEUKOCYTE ESTERASE UR QL STRIP: NEGATIVE
LYMPHOCYTES # BLD AUTO: 1.74 THOUSANDS/ΜL (ref 0.6–4.47)
LYMPHOCYTES NFR BLD AUTO: 31 % (ref 14–44)
MCH RBC QN AUTO: 22.9 PG (ref 26.8–34.3)
MCHC RBC AUTO-ENTMCNC: 30.1 G/DL (ref 31.4–37.4)
MCV RBC AUTO: 76 FL (ref 82–98)
MICROALBUMIN UR-MCNC: 54.5 MG/L (ref 0–20)
MICROALBUMIN/CREAT 24H UR: 137 MG/G CREATININE (ref 0–30)
MONOCYTES # BLD AUTO: 0.48 THOUSAND/ΜL (ref 0.17–1.22)
MONOCYTES NFR BLD AUTO: 9 % (ref 4–12)
NEUTROPHILS # BLD AUTO: 2.99 THOUSANDS/ΜL (ref 1.85–7.62)
NEUTS SEG NFR BLD AUTO: 54 % (ref 43–75)
NITRITE UR QL STRIP: NEGATIVE
NRBC BLD AUTO-RTO: 0 /100 WBCS
PH UR STRIP.AUTO: 6 [PH]
PLATELET # BLD AUTO: 500 THOUSANDS/UL (ref 149–390)
PMV BLD AUTO: 8.8 FL (ref 8.9–12.7)
POTASSIUM SERPL-SCNC: 3.8 MMOL/L (ref 3.5–5.3)
PROT SERPL-MCNC: 7.7 G/DL (ref 6.4–8.2)
PROT UR STRIP-MCNC: NEGATIVE MG/DL
PSA SERPL-MCNC: 0.6 NG/ML (ref 0–4)
RBC # BLD AUTO: 4.55 MILLION/UL (ref 3.88–5.62)
SODIUM SERPL-SCNC: 134 MMOL/L (ref 136–145)
SP GR UR STRIP.AUTO: 1.01 (ref 1–1.03)
TRIGL SERPL-MCNC: 69 MG/DL
TSH SERPL DL<=0.05 MIU/L-ACNC: 0.71 UIU/ML (ref 0.36–3.74)
UROBILINOGEN UR QL STRIP.AUTO: 0.2 E.U./DL
WBC # BLD AUTO: 5.56 THOUSAND/UL (ref 4.31–10.16)

## 2019-06-18 PROCEDURE — 36415 COLL VENOUS BLD VENIPUNCTURE: CPT

## 2019-06-18 PROCEDURE — 99214 OFFICE O/P EST MOD 30 MIN: CPT | Performed by: INTERNAL MEDICINE

## 2019-06-18 PROCEDURE — 83036 HEMOGLOBIN GLYCOSYLATED A1C: CPT

## 2019-06-18 PROCEDURE — 92250 FUNDUS PHOTOGRAPHY W/I&R: CPT | Performed by: INTERNAL MEDICINE

## 2019-06-18 PROCEDURE — 82570 ASSAY OF URINE CREATININE: CPT | Performed by: INTERNAL MEDICINE

## 2019-06-18 PROCEDURE — 80061 LIPID PANEL: CPT

## 2019-06-18 PROCEDURE — 85025 COMPLETE CBC W/AUTO DIFF WBC: CPT

## 2019-06-18 PROCEDURE — G0103 PSA SCREENING: HCPCS

## 2019-06-18 PROCEDURE — 84443 ASSAY THYROID STIM HORMONE: CPT

## 2019-06-18 PROCEDURE — 81003 URINALYSIS AUTO W/O SCOPE: CPT | Performed by: INTERNAL MEDICINE

## 2019-06-18 PROCEDURE — 80053 COMPREHEN METABOLIC PANEL: CPT

## 2019-06-18 PROCEDURE — 82043 UR ALBUMIN QUANTITATIVE: CPT | Performed by: INTERNAL MEDICINE

## 2019-06-18 RX ORDER — CLOPIDOGREL BISULFATE 75 MG/1
75 TABLET ORAL DAILY
Qty: 30 TABLET | Refills: 11 | Status: SHIPPED | OUTPATIENT
Start: 2019-06-18 | End: 2020-06-19 | Stop reason: SDUPTHER

## 2019-06-19 LAB
LEFT EYE DIABETIC RETINOPATHY: NORMAL
LEFT EYE IMAGE QUALITY: NORMAL
LEFT EYE MACULAR EDEMA: NORMAL
LEFT EYE OTHER RETINOPATHY: NORMAL
RIGHT EYE DIABETIC RETINOPATHY: NORMAL
RIGHT EYE IMAGE QUALITY: NORMAL
RIGHT EYE MACULAR EDEMA: NORMAL
RIGHT EYE OTHER RETINOPATHY: NORMAL
SEVERITY (EYE EXAM): NORMAL

## 2019-06-28 ENCOUNTER — HOSPITAL ENCOUNTER (OUTPATIENT)
Dept: NON INVASIVE DIAGNOSTICS | Facility: CLINIC | Age: 69
Discharge: HOME | End: 2019-06-28
Payer: MEDICARE

## 2019-06-28 DIAGNOSIS — I25.119 ATHEROSCLEROSIS OF NATIVE CORONARY ARTERY OF NATIVE HEART WITH ANGINA PECTORIS (HCC): ICD-10-CM

## 2019-06-28 DIAGNOSIS — E11.8 TYPE 2 DIABETES MELLITUS WITH COMPLICATION, WITHOUT LONG-TERM CURRENT USE OF INSULIN (HCC): ICD-10-CM

## 2019-06-28 DIAGNOSIS — Z98.61 CAD S/P PERCUTANEOUS CORONARY ANGIOPLASTY: ICD-10-CM

## 2019-06-28 DIAGNOSIS — I25.10 CAD S/P PERCUTANEOUS CORONARY ANGIOPLASTY: ICD-10-CM

## 2019-06-28 PROCEDURE — 93306 TTE W/DOPPLER COMPLETE: CPT

## 2019-07-01 PROCEDURE — 93306 TTE W/DOPPLER COMPLETE: CPT | Performed by: INTERNAL MEDICINE

## 2019-07-18 DIAGNOSIS — I10 HYPERTENSION, UNSPECIFIED TYPE: ICD-10-CM

## 2019-07-18 RX ORDER — ATENOLOL 50 MG/1
TABLET ORAL
Qty: 90 TABLET | Refills: 4 | Status: SHIPPED | OUTPATIENT
Start: 2019-07-18 | End: 2020-10-08

## 2019-07-31 DIAGNOSIS — E11.69 TYPE 2 DIABETES MELLITUS WITH OTHER SPECIFIED COMPLICATION, WITHOUT LONG-TERM CURRENT USE OF INSULIN (HCC): ICD-10-CM

## 2019-07-31 RX ORDER — METFORMIN HYDROCHLORIDE 500 MG/1
TABLET, EXTENDED RELEASE ORAL
Qty: 180 TABLET | Refills: 0 | Status: SHIPPED | OUTPATIENT
Start: 2019-07-31 | End: 2019-09-03 | Stop reason: SDUPTHER

## 2019-09-03 ENCOUNTER — OFFICE VISIT (OUTPATIENT)
Dept: CARDIOLOGY CLINIC | Facility: CLINIC | Age: 69
End: 2019-09-03
Payer: MEDICARE

## 2019-09-03 VITALS
BODY MASS INDEX: 30.89 KG/M2 | SYSTOLIC BLOOD PRESSURE: 138 MMHG | HEART RATE: 73 BPM | DIASTOLIC BLOOD PRESSURE: 80 MMHG | HEIGHT: 68 IN | WEIGHT: 203.8 LBS | OXYGEN SATURATION: 99 %

## 2019-09-03 DIAGNOSIS — I25.10 CORONARY ARTERY DISEASE INVOLVING NATIVE CORONARY ARTERY OF NATIVE HEART WITHOUT ANGINA PECTORIS: Primary | ICD-10-CM

## 2019-09-03 DIAGNOSIS — I42.1 HOCM (HYPERTROPHIC OBSTRUCTIVE CARDIOMYOPATHY) (HCC): ICD-10-CM

## 2019-09-03 DIAGNOSIS — E78.2 MIXED HYPERLIPIDEMIA: ICD-10-CM

## 2019-09-03 DIAGNOSIS — I10 ESSENTIAL HYPERTENSION: ICD-10-CM

## 2019-09-03 DIAGNOSIS — Z72.0 TOBACCO ABUSE: ICD-10-CM

## 2019-09-03 PROCEDURE — 99214 OFFICE O/P EST MOD 30 MIN: CPT | Performed by: INTERNAL MEDICINE

## 2019-09-03 NOTE — PROGRESS NOTES
[facility] [address] [place] [facility] [id number]  Cardiology Follow Up    Bk Roberts  1950  [id number]      1  Coronary artery disease involving native coronary artery of native heart without angina pectoris     2  HOCM (hypertrophic obstructive cardiomyopathy) ([place] [address] )     3  Mixed hyperlipidemia     4  Tobacco abuse     5  Essential hypertension         Chief Complaint   Patient presents with    Follow-up       Interval History:   Patient presents for follow-up visit  Patient denies any history of chest pain  Patient does have some shortness of breath which is stable  Patient continues to smoke at least 1 pack per day  No history of leg edema orthopnea PND  He states that he has been compliant all his present medications  No bleeding issues      Patient Active Problem List   Diagnosis    Non-recurrent bilateral inguinal hernia without obstruction or gangrene    Hyperlipidemia    HTN (hypertension)    Diabetes mellitus ([place] [address] )    Tobacco abuse    HOCM (hypertrophic obstructive cardiomyopathy) ([place] [address] )    Coronary artery disease involving native coronary artery of native heart    Atherosclerosis of aorta ([place] [address] )    Atherosclerosis of native coronary artery of native heart with angina pectoris ([place] [address] )     Past Medical History:   Diagnosis Date    Benign neoplasm of large intestine     Diabetes mellitus ([facility] )     Heart murmur     History of aortic regurgitation     History of constipation     History of degenerative joint disease     History of nocturia     History of obesity     History of sebaceous cyst     History of shortness of breath     History of urinary frequency     Hyperlipidemia     Hypertension     Polyposis coli     of the large intestine    Ulcer of esophagus      Social History     Socioeconomic History    Marital status: /Civil Union     Spouse name: Not on file    Number of children: Not on file    Years of education: Not on file    Highest education level: Not on file   Occupational History    Occupation:    Social Needs    Financial resource strain: Not on file    Food insecurity:     Worry: Not on file     Inability: Not on file   [address] needs:     Medical: Not on file     Non-medical: Not on file   Tobacco Use    Smoking status: Current Every Day Smoker     Packs/day: 1 00     Years: 35 00     Pack years: 35 00     Types: Cigarettes    Smokeless tobacco: Never Used   Substance and Sexual Activity    Alcohol use: Yes     Comment: rare    Drug use: No    Sexual activity: Not Currently   Lifestyle    Physical activity:     Days per week: Not on file     Minutes per session: Not on file    Stress: Not on file   Relationships    Social connections:     Talks on phone: Not on file     Gets together: Not on file     Attends Holiness service: Not on file     Active member of club or organization: Not on file     Attends meetings of clubs or organizations: Not on file     Relationship status: Not on file    Intimate partner violence:     Fear of current or ex partner: Not on file     Emotionally abused: Not on file     Physically abused: Not on file     Forced sexual activity: Not on file   Other Topics Concern    Not on file   Social History Narrative    Not on file      Family History   Problem Relation Age of Onset    Rheumatic fever Father     Other Father         accidental poisoning    Heart disease Mother      Past Surgical History:   Procedure Laterality Date    ACHILLES TENDON SURGERY Left     COLONOSCOPY      hyperplastic polyp    ESOPHAGOGASTRODUODENOSCOPY N/A 1/23/2018    Procedure: ESOPHAGOGASTRODUODENOSCOPY (EGD); Surgeon: Lena Ely MD;  [facility];   Service: Gastroenterology    ESOPHAGOGASTRODUODENOSCOPY      HERNIA REPAIR      HERNIA REPAIR Bilateral 8/18/2017    Procedure: LAPAROSCOPIC INGUINAL HERNIA REPAIR WITH MESH;  Surgeon: Sarahi Pierre MD;  [facility];  Service: General    NE ESOPHAGOGASTRODUODENOSCOPY TRANSORAL DIAGNOSTIC N/A 3/26/2018    Procedure: ESOPHAGOGASTRODUODENOSCOPY (EGD); Surgeon: Brandon Beck MD;  [facility]; Service: Gastroenterology    NE ESOPHAGOGASTRODUODENOSCOPY TRANSORAL DIAGNOSTIC N/A 5/14/2018    Procedure: ESOPHAGOGASTRODUODENOSCOPY (EGD); Surgeon: Brandon Beck MD;  [facility]; Service: Gastroenterology    TIBIA FRACTURE SURGERY Left        Current Outpatient Medications:     aspirin 81 mg chewable tablet, Chew 1 tablet daily, Disp: , Rfl:     atenolol (TENORMIN) 50 mg tablet, TAKE 1 TABLET DAILY, Disp: 90 tablet, Rfl: 4    atorvastatin (LIPITOR) 80 mg tablet, TAKE 1 TABLET DAILY, Disp: 90 tablet, Rfl: 3    cloNIDine (CATAPRES) 0 3 mg tablet, TAKE 1 TABLET THREE TIMES A DAY, Disp: 270 tablet, Rfl: 3    clopidogrel (PLAVIX) 75 mg tablet, Take 1 tablet (75 mg total) by mouth daily, Disp: 30 tablet, Rfl: 11    DENTAGEL 1 1 % GEL, USE ONCE DAILY AFTER BRUSHING  BEFORE BEDTIME, Disp: , Rfl: 5    hydrochlorothiazide (HYDRODIURIL) 25 mg tablet, Take 1 tablet (25 mg total) by mouth daily, Disp: 90 tablet, Rfl: 3    lisinopril (ZESTRIL) 40 mg tablet, Take 1 tablet (40 mg total) by mouth daily, Disp: 90 tablet, Rfl: 3    metFORMIN (GLUCOPHAGE-XR) 500 mg 24 hr tablet, Take 1 tablet (500 mg total) by mouth 2 (two) times a day with meals, Disp: 180 tablet, Rfl: 0    metFORMIN (GLUCOPHAGE-XR) 500 mg 24 hr tablet, TAKE 1 TABLET TWICE A DAY WITH MEALS, Disp: 180 tablet, Rfl: 0    Omega-3 1000 MG CAPS, Take by mouth 3 (three) times a day , Disp: , Rfl:   No Known Allergies    Labs:  No visits with results within 2 Month(s) from this visit     Latest known visit with results is:   Appointment on 06/18/2019   Component Date Value    WBC 06/18/2019 5 56     RBC 06/18/2019 4 55     Hemoglobin 06/18/2019 10 4*    Hematocrit 06/18/2019 34 5*    MCV 06/18/2019 76*    MCH 06/18/2019 22 9*    [facility] 06/18/2019 30 1*    RDW 06/18/2019 21 8*    MPV 06/18/2019 8 8*    Platelets 65/00/0376 500*    nRBC 06/18/2019 0     Neutrophils Relative 06/18/2019 54     Immat GRANS % 06/18/2019 0     Lymphocytes Relative 06/18/2019 31     Monocytes Relative 06/18/2019 9     Eosinophils Relative 06/18/2019 5     Basophils Relative 06/18/2019 1     Neutrophils Absolute 06/18/2019 2 99     Immature Grans Absolute 06/18/2019 0 01     Lymphocytes Absolute 06/18/2019 1 74     Monocytes Absolute 06/18/2019 0 48     Eosinophils Absolute 06/18/2019 0 29     Basophils Absolute 06/18/2019 0 05     Hemoglobin A1C 06/18/2019 6 6*    EAG 06/18/2019 143     Cholesterol 06/18/2019 132     Triglycerides 06/18/2019 69     HDL, Direct 06/18/2019 44     LDL Calculated 06/18/2019 74     Sodium 06/18/2019 134*    Potassium 06/18/2019 3 8     Chloride 06/18/2019 102     CO2 06/18/2019 28     ANION GAP 06/18/2019 4     BUN 06/18/2019 17     Creatinine 06/18/2019 0 77     Glucose, Fasting 06/18/2019 118*    Calcium 06/18/2019 9 8     AST 06/18/2019 14     ALT 06/18/2019 23     Alkaline Phosphatase 06/18/2019 59     Total Protein 06/18/2019 7 7     Albumin 06/18/2019 3 7     Total Bilirubin 06/18/2019 0 41     eGFR 06/18/2019 106     TSH 3RD GENERATON 06/18/2019 0 706     PSA 06/18/2019 0 6      Imaging: No results found      Review of Systems:  Review of Systems     REVIEW OF SYSTEMS:  Constitutional:  Denies fever or chills   Eyes:  Denies change in visual acuity   HENT:  Denies nasal congestion or sore throat   Respiratory:   shortness of breath   Cardiovascular:  Denies chest pain or edema   GI:  Denies abdominal pain, nausea, vomiting, bloody stools or diarrhea   :  Denies dysuria, frequency, difficulty in micturition and nocturia  Musculoskeletal:  Denies back pain or joint pain   Neurologic:  Denies headache, focal weakness or sensory changes   Endocrine:  Denies polyuria or polydipsia   Lymphatic:  Denies swollen glands   Psychiatric:  Denies depression or anxiety     Physical Exam:    /80   Pulse 73   Ht 5' 8" (1 727 m)   Wt 92 4 kg (203 lb 12 8 oz)   SpO2 99%   BMI 30 99 kg/m²     Physical Exam   PHYSICAL EXAM:  General:  Patient is not in acute distress   Head: Normocephalic, Atraumatic  HEENT:  Both pupils normal-size atraumatic, normocephalic, nonicteric  Neck:  JVP not raised  Trachea central  No carotid bruit  Respiratory:  normal breath sounds no crackles  no rhonchi  Cardiovascular:  Regular rate and rhythm no S3   2/6 systolic ejection murmur in the right sternal border  GI:  Abdomen soft nontender  No organomegaly  Lymphatic:  No cervical or inguinal lymphadenopathy  Neurologic:  Patient is awake alert, oriented   Grossly nonfocal   extremities reveal no edema      Discussion/Summary:  Patient with multiple medical problems who seems to be doing reasonably well from cardiac standpoint  Previous studies reviewed with patient  Medications reviewed and possible side effects discussed  concepts of cardiovascular disease , signs and symptoms of heart disease  Dietary and risk factor modification reinforced  All questions answered  Safety measures reviewed  Patient advised to report any problems prompting medical attention  patient understands the risks and benefits of anti-platelet therapy  Patient report any bleeding issues  Patient counseled about hazards of smoking to prevent future cardiovascular events  Patient has had history of hypertrophic cardiomyopathy for at least 2 decades and has done well without any issues  Follow-up with primary care physician

## 2019-09-11 DIAGNOSIS — I10 ESSENTIAL HYPERTENSION: ICD-10-CM

## 2019-09-11 RX ORDER — CLONIDINE HYDROCHLORIDE 0.3 MG/1
TABLET ORAL
Qty: 270 TABLET | Refills: 4 | Status: SHIPPED | OUTPATIENT
Start: 2019-09-11 | End: 2020-12-04 | Stop reason: SDUPTHER

## 2019-10-07 DIAGNOSIS — E78.5 HYPERLIPIDEMIA, UNSPECIFIED HYPERLIPIDEMIA TYPE: ICD-10-CM

## 2019-10-08 RX ORDER — ATORVASTATIN CALCIUM 80 MG/1
TABLET, FILM COATED ORAL
Qty: 90 TABLET | Refills: 4 | Status: SHIPPED | OUTPATIENT
Start: 2019-10-08 | End: 2020-12-08

## 2019-10-29 DIAGNOSIS — E11.69 TYPE 2 DIABETES MELLITUS WITH OTHER SPECIFIED COMPLICATION, WITHOUT LONG-TERM CURRENT USE OF INSULIN (HCC): ICD-10-CM

## 2019-10-31 DIAGNOSIS — E11.69 TYPE 2 DIABETES MELLITUS WITH OTHER SPECIFIED COMPLICATION, WITHOUT LONG-TERM CURRENT USE OF INSULIN (HCC): ICD-10-CM

## 2019-10-31 RX ORDER — METFORMIN HYDROCHLORIDE 500 MG/1
TABLET, EXTENDED RELEASE ORAL
Qty: 180 TABLET | Refills: 4 | OUTPATIENT
Start: 2019-10-31

## 2019-10-31 RX ORDER — METFORMIN HYDROCHLORIDE 500 MG/1
500 TABLET, EXTENDED RELEASE ORAL 2 TIMES DAILY WITH MEALS
Qty: 180 TABLET | Refills: 3 | Status: SHIPPED | OUTPATIENT
Start: 2019-10-31 | End: 2019-11-06 | Stop reason: SDUPTHER

## 2019-11-06 DIAGNOSIS — E11.69 TYPE 2 DIABETES MELLITUS WITH OTHER SPECIFIED COMPLICATION, WITHOUT LONG-TERM CURRENT USE OF INSULIN (HCC): ICD-10-CM

## 2019-11-06 RX ORDER — METFORMIN HYDROCHLORIDE 500 MG/1
500 TABLET, EXTENDED RELEASE ORAL 2 TIMES DAILY WITH MEALS
Qty: 180 TABLET | Refills: 3 | Status: SHIPPED | OUTPATIENT
Start: 2019-11-06 | End: 2019-11-07 | Stop reason: SDUPTHER

## 2019-11-07 DIAGNOSIS — E11.69 TYPE 2 DIABETES MELLITUS WITH OTHER SPECIFIED COMPLICATION, WITHOUT LONG-TERM CURRENT USE OF INSULIN (HCC): ICD-10-CM

## 2019-11-07 RX ORDER — METFORMIN HYDROCHLORIDE 500 MG/1
500 TABLET, EXTENDED RELEASE ORAL 2 TIMES DAILY WITH MEALS
Qty: 180 TABLET | Refills: 3 | Status: SHIPPED | OUTPATIENT
Start: 2019-11-07 | End: 2020-09-22

## 2019-11-07 NOTE — TELEPHONE ENCOUNTER
Pt's metformin was sent to [facility], did not  rx, [facility], please advise, pt would like a call back upon completion

## 2019-11-27 DIAGNOSIS — I10 ESSENTIAL HYPERTENSION: ICD-10-CM

## 2019-11-27 RX ORDER — HYDROCHLOROTHIAZIDE 25 MG/1
TABLET ORAL
Qty: 90 TABLET | Refills: 4 | Status: SHIPPED | OUTPATIENT
Start: 2019-11-27 | End: 2020-12-31

## 2019-12-20 ENCOUNTER — OFFICE VISIT (OUTPATIENT)
Dept: INTERNAL MEDICINE CLINIC | Facility: CLINIC | Age: 69
End: 2019-12-20
Payer: MEDICARE

## 2019-12-20 ENCOUNTER — APPOINTMENT (OUTPATIENT)
Dept: LAB | Facility: CLINIC | Age: 69
End: 2019-12-20
Payer: MEDICARE

## 2019-12-20 ENCOUNTER — TELEPHONE (OUTPATIENT)
Dept: INTERNAL MEDICINE CLINIC | Facility: CLINIC | Age: 69
End: 2019-12-20

## 2019-12-20 VITALS
SYSTOLIC BLOOD PRESSURE: 134 MMHG | HEIGHT: 68 IN | WEIGHT: 197.4 LBS | BODY MASS INDEX: 29.92 KG/M2 | DIASTOLIC BLOOD PRESSURE: 80 MMHG | HEART RATE: 74 BPM | OXYGEN SATURATION: 99 %

## 2019-12-20 DIAGNOSIS — E11.9 TYPE 2 DIABETES MELLITUS WITHOUT COMPLICATION, WITHOUT LONG-TERM CURRENT USE OF INSULIN (HCC): Primary | ICD-10-CM

## 2019-12-20 DIAGNOSIS — E11.9 TYPE 2 DIABETES MELLITUS WITHOUT COMPLICATION, WITHOUT LONG-TERM CURRENT USE OF INSULIN (HCC): ICD-10-CM

## 2019-12-20 DIAGNOSIS — E78.2 MIXED HYPERLIPIDEMIA: ICD-10-CM

## 2019-12-20 DIAGNOSIS — Z72.0 TOBACCO ABUSE: ICD-10-CM

## 2019-12-20 DIAGNOSIS — I10 ESSENTIAL HYPERTENSION: ICD-10-CM

## 2019-12-20 DIAGNOSIS — I70.0 ATHEROSCLEROSIS OF AORTA (HCC): ICD-10-CM

## 2019-12-20 DIAGNOSIS — I25.119 ATHEROSCLEROSIS OF NATIVE CORONARY ARTERY OF NATIVE HEART WITH ANGINA PECTORIS (HCC): ICD-10-CM

## 2019-12-20 LAB
ANION GAP SERPL CALCULATED.3IONS-SCNC: 4 MMOL/L (ref 4–13)
BUN SERPL-MCNC: 12 MG/DL (ref 5–25)
CALCIUM SERPL-MCNC: 9.8 MG/DL (ref 8.3–10.1)
CHLORIDE SERPL-SCNC: 104 MMOL/L (ref 100–108)
CO2 SERPL-SCNC: 30 MMOL/L (ref 21–32)
CREAT SERPL-MCNC: 0.73 MG/DL (ref 0.6–1.3)
CREAT UR-MCNC: 89.6 MG/DL
EST. AVERAGE GLUCOSE BLD GHB EST-MCNC: 117 MG/DL
GFR SERPL CREATININE-BSD FRML MDRD: 108 ML/MIN/1.73SQ M
GLUCOSE SERPL-MCNC: 115 MG/DL (ref 65–140)
HBA1C MFR BLD: 5.7 % (ref 4.2–6.3)
MICROALBUMIN UR-MCNC: 122 MG/L (ref 0–20)
MICROALBUMIN/CREAT 24H UR: 136 MG/G CREATININE (ref 0–30)
POTASSIUM SERPL-SCNC: 4 MMOL/L (ref 3.5–5.3)
SODIUM SERPL-SCNC: 138 MMOL/L (ref 136–145)

## 2019-12-20 PROCEDURE — 82570 ASSAY OF URINE CREATININE: CPT | Performed by: INTERNAL MEDICINE

## 2019-12-20 PROCEDURE — 99214 OFFICE O/P EST MOD 30 MIN: CPT | Performed by: INTERNAL MEDICINE

## 2019-12-20 PROCEDURE — 82043 UR ALBUMIN QUANTITATIVE: CPT | Performed by: INTERNAL MEDICINE

## 2019-12-20 PROCEDURE — 80048 BASIC METABOLIC PNL TOTAL CA: CPT

## 2019-12-20 PROCEDURE — 83036 HEMOGLOBIN GLYCOSYLATED A1C: CPT

## 2019-12-20 PROCEDURE — 36415 COLL VENOUS BLD VENIPUNCTURE: CPT

## 2019-12-20 PROCEDURE — G0439 PPPS, SUBSEQ VISIT: HCPCS | Performed by: INTERNAL MEDICINE

## 2019-12-20 NOTE — TELEPHONE ENCOUNTER
Dr Irvin Linares referred patient to [facility] orthopedics  Dr Drew Arellano  Patient was notified

## 2019-12-20 NOTE — PROGRESS NOTES
Assessment and Plan:     Problem List Items Addressed This Visit        Endocrine    Diabetes mellitus ([place] [address] ) - Primary          Tobacco Cessation Counseling: Tobacco cessation counseling was provided  The patient is sincerely urged to quit consumption of tobacco  He is not ready to quit tobacco  Medication options and side effects of medication discussed  Patient refused medication  Preventive health issues were discussed with patient, and age appropriate screening tests were ordered as noted in patient's After Visit Summary  Personalized health advice and appropriate referrals for health education or preventive services given if needed, as noted in patient's After Visit Summary       History of Present Illness:     Patient presents for Medicare Annual Wellness visit    Patient Care Team:  Donald Martins MD as PCP - General     Problem List:     Patient Active Problem List   Diagnosis    Non-recurrent bilateral inguinal hernia without obstruction or gangrene    Hyperlipidemia    HTN (hypertension)    Diabetes mellitus ([place] [address] )    Tobacco abuse    HOCM (hypertrophic obstructive cardiomyopathy) ([place] [address] )    Coronary artery disease involving native coronary artery of native heart    Atherosclerosis of aorta ([place] [address] )    Atherosclerosis of native coronary artery of native heart with angina pectoris [facility])      Past Medical and Surgical History:     Past Medical History:   Diagnosis Date    Benign neoplasm of large intestine     Diabetes mellitus ([place] [address] )     Heart murmur     History of aortic regurgitation     History of constipation     History of degenerative joint disease     History of nocturia     History of obesity     History of sebaceous cyst     History of shortness of breath     History of urinary frequency     Hyperlipidemia     Polyposis coli     of the large intestine    Ulcer of esophagus      Past Surgical History:   Procedure Laterality Date    ACHILLES TENDON SURGERY Left     COLONOSCOPY hyperplastic polyp    ESOPHAGOGASTRODUODENOSCOPY N/A 1/23/2018    Procedure: ESOPHAGOGASTRODUODENOSCOPY (EGD); Surgeon: Amalia Brady MD;  [facility]; Service: Gastroenterology    ESOPHAGOGASTRODUODENOSCOPY      HERNIA REPAIR      HERNIA REPAIR Bilateral 8/18/2017    Procedure: LAPAROSCOPIC INGUINAL HERNIA REPAIR WITH MESH;  Surgeon: Josef Sheldon MD;  [facility];  Service: General    PA ESOPHAGOGASTRODUODENOSCOPY TRANSORAL DIAGNOSTIC N/A 3/26/2018    Procedure: ESOPHAGOGASTRODUODENOSCOPY (EGD); Surgeon: Jelly Lauren MD;  [facility]; Service: Gastroenterology    PA ESOPHAGOGASTRODUODENOSCOPY TRANSORAL DIAGNOSTIC N/A 5/14/2018    Procedure: ESOPHAGOGASTRODUODENOSCOPY (EGD); Surgeon: Jelly Lauren MD;  [facility];   Service: Gastroenterology    TIBIA FRACTURE SURGERY Left       Family History:     Family History   Problem Relation Age of Onset    Rheumatic fever Father     Other Father         accidental poisoning   Roanna Kugel Heart disease Mother       Social History:     Social History     Socioeconomic History    Marital status: /Civil Union     Spouse name: None    Number of children: None    Years of education: None    Highest education level: None   Occupational History    Occupation:    Social Needs    Financial resource strain: None    Food insecurity:     Worry: None     Inability: None    Transportation needs:     Medical: None     Non-medical: None   Tobacco Use    Smoking status: Current Every Day Smoker     Packs/day: 1 00     Years: 35 00     Pack years: 35 00     Types: Cigarettes    Smokeless tobacco: Never Used   Substance and Sexual Activity    Alcohol use: Yes     Comment: rare    Drug use: No    Sexual activity: Not Currently   Lifestyle    Physical activity:     Days per week: 0 days     Minutes per session: 0 min    Stress: None   Relationships    Social connections:     Talks on phone: None Gets together: None     Attends Hindu service: None     Active member of club or organization: None     Attends meetings of clubs or organizations: None     Relationship status: None    Intimate partner violence:     Fear of current or ex partner: None     Emotionally abused: None     Physically abused: None     Forced sexual activity: None   Other Topics Concern    None   Social History Narrative    None       Medications and Allergies:     Current Outpatient Medications   Medication Sig Dispense Refill    aspirin 81 mg chewable tablet Chew 1 tablet daily      atenolol (TENORMIN) 50 mg tablet TAKE 1 TABLET DAILY 90 tablet 4    atorvastatin (LIPITOR) 80 mg tablet TAKE 1 TABLET DAILY 90 tablet 4    cloNIDine (CATAPRES) 0 3 mg tablet TAKE 1 TABLET THREE TIMES A  tablet 4    clopidogrel (PLAVIX) 75 mg tablet Take 1 tablet (75 mg total) by mouth daily 30 tablet 11    hydrochlorothiazide (HYDRODIURIL) 25 mg tablet TAKE 1 TABLET DAILY 90 tablet 4    lisinopril (ZESTRIL) 40 mg tablet Take 1 tablet (40 mg total) by mouth daily 90 tablet 3    metFORMIN (GLUCOPHAGE-XR) 500 mg 24 hr tablet Take 1 tablet (500 mg total) by mouth 2 (two) times a day with meals 180 tablet 3    Omega-3 1000 MG CAPS Take by mouth 3 (three) times a day       DENTAGEL 1 1 % GEL USE ONCE DAILY AFTER BRUSHING  BEFORE BEDTIME  5     No current facility-administered medications for this visit        No Known Allergies   Immunizations:     Immunization History   Administered Date(s) Administered    Influenza TIV (IM) [birth year]    Pneumococcal Conjugate 13-Valent 01/18/2017    Pneumococcal Polysaccharide PPV23 1950, 03/17/2016, 07/24/2017    Tdap 1950    Zoster 03/17/2016    Zoster Vaccine Recombinant 03/17/2016      Health Maintenance:         Topic Date Due    Hepatitis C Screening  1950    CRC Screening: Colonoscopy  11/11/2020         Topic Date Due    DTaP,Tdap,and Td Vaccines (1 - Tdap) 06/27/1959    Hepatitis B Vaccine (1 of 3 - Risk 3-dose series) 06/27/1967    Influenza Vaccine  07/01/2019      Medicare Health Risk Assessment:     /80   Pulse 74   Ht 5' 8" (1 727 m)   Wt 89 5 kg (197 lb 6 4 oz)   SpO2 99%   BMI 30 01 kg/m²      Erin Hartman is here for his Subsequent Wellness visit  Health Risk Assessment:   Patient rates overall health as very good  Patient feels that their physical health rating is slightly better  Eyesight was rated as same  Hearing was rated as same  Patient feels that their emotional and mental health rating is same  Pain experienced in the last 7 days has been none  Patient states that he has experienced no weight loss or gain in last 6 months  Depression Screening:   PHQ-2 Score: 0      Fall Risk Screening: In the past year, patient has experienced: no history of falling in past year      Home Safety:  Patient does not have trouble with stairs inside or outside of their home  Patient has working smoke alarms and has working carbon monoxide detector  Home safety hazards include: none  Nutrition:   Current diet is Diabetic  Medications:   Patient is not currently taking any over-the-counter supplements  Patient is able to manage medications  Activities of Daily Living (ADLs)/Instrumental Activities of Daily Living (IADLs):   Walk and transfer into and out of bed and chair?: Yes  Dress and groom yourself?: Yes    Bathe or shower yourself?: Yes    Feed yourself? Yes  Do your laundry/housekeeping?: Yes  Manage your money, pay your bills and track your expenses?: Yes  Make your own meals?: Yes    Do your own shopping?: Yes    Previous Hospitalizations:   Any hospitalizations or ED visits within the last 12 months?: No      Advance Care Planning:   Living will: Yes    Durable POA for healthcare:  Yes    Advanced directive: Yes    Advanced directive counseling given: Yes    Five wishes given: No    Patient declined ACP directive: No    End of Life Decisions reviewed with patient: Yes    Provider agrees with end of life decisions: Yes      Cognitive Screening:   Provider or family/friend/caregiver concerned regarding cognition?: No    PREVENTIVE SCREENINGS      Cardiovascular Screening:    General: Screening Not Indicated and History Lipid Disorder      Diabetes Screening:     General: Screening Not Indicated and History Diabetes      Colorectal Cancer Screening:     General: Screening Current      Prostate Cancer Screening:    General: Screening Current      Abdominal Aortic Aneurysm (AAA) Screening:    Risk factors include: age between 73-67 yo and tobacco use        General: Screening Not Indicated and History AAA      Lung Cancer Screening:     General: Screening Current    Due for: Low Dose CT (LDCT)      Princess Eliud MD

## 2019-12-20 NOTE — TELEPHONE ENCOUNTER
This message is for Dr Al Galeas:    Ceferino Mitchell had an ECHO done; ordered by Dr Zamzam Yancey- done in June of this yr  Results are in the pt's chart  Do you want the pt to have an ECHO done again? There might be a medical reason why you are ordering this    Please let me know

## 2019-12-20 NOTE — PROGRESS NOTES
Tobacco Cessation Counseling: Tobacco cessation counseling was provided  The patient is sincerely urged to quit consumption of tobacco  He is not ready to quit tobacco  Medication options and side effects of medication discussed  Patient refused medication  Assessment/Plan:       Diagnoses and all orders for this visit:    Type 2 diabetes mellitus without complication, without long-term current use of insulin (HCC)  -     Basic metabolic panel; Future  -     Microalbumin / creatinine urine ratio  -     Hemoglobin A1C; Future    Atherosclerosis of aorta (HCC)  -     US abdominal aorta; Future    Atherosclerosis of native coronary artery of native heart with angina pectoris ([address] )  -     Echo complete with contrast if indicated; Future    Essential hypertension    Mixed hyperlipidemia    Tobacco abuse  -     CT lung screening program; Future                Subjective:      Patient ID: Kingsley Huang is a 68 y o  male  Follow-up visit  A 68year-old who feels well  He continues to smoke between 1/2 to 1 packs a day and does not want to consider taking medication  CAD history   Hypertensive diabetic and dyslipidemic   Physically active with no limitations  PSA already done  Colonoscopy done November 2015 and should be done again in 1 year        ectatic aorta with atherosclerosis followed by periodic ultrasound      The following portions of the patient's history were reviewed and updated as appropriate:   He has a past medical history of Benign neoplasm of large intestine, Diabetes mellitus ([address] ), Heart murmur, History of aortic regurgitation, History of constipation, History of degenerative joint disease, History of nocturia, History of obesity, History of sebaceous cyst, History of shortness of breath, History of urinary frequency, Hyperlipidemia, Polyposis coli, and Ulcer of esophagus  ,  does not have any pertinent problems on file  ,   has a past surgical history that includes Hernia repair; Tibia fracture surgery (Left); Achilles tendon surgery (Left); Colonoscopy; Hernia repair (Bilateral, 8/18/2017); Esophagogastroduodenoscopy (N/A, 1/23/2018); Esophagogastroduodenoscopy; pr esophagogastroduodenoscopy transoral diagnostic (N/A, 3/26/2018); and pr esophagogastroduodenoscopy transoral diagnostic (N/A, 5/14/2018)  ,  family history includes Heart disease in his mother; Other in his father; Rheumatic fever in his father  ,   reports that he has been smoking cigarettes  He has a 35 00 pack-year smoking history  He has never used smokeless tobacco  He reports that he drinks alcohol  He reports that he does not use drugs  ,  has No Known Allergies     Current Outpatient Medications   Medication Sig Dispense Refill    aspirin 81 mg chewable tablet Chew 1 tablet daily      atenolol (TENORMIN) 50 mg tablet TAKE 1 TABLET DAILY 90 tablet 4    atorvastatin (LIPITOR) 80 mg tablet TAKE 1 TABLET DAILY 90 tablet 4    cloNIDine (CATAPRES) 0 3 mg tablet TAKE 1 TABLET THREE TIMES A  tablet 4    clopidogrel (PLAVIX) 75 mg tablet Take 1 tablet (75 mg total) by mouth daily 30 tablet 11    hydrochlorothiazide (HYDRODIURIL) 25 mg tablet TAKE 1 TABLET DAILY 90 tablet 4    lisinopril (ZESTRIL) 40 mg tablet Take 1 tablet (40 mg total) by mouth daily 90 tablet 3    metFORMIN (GLUCOPHAGE-XR) 500 mg 24 hr tablet Take 1 tablet (500 mg total) by mouth 2 (two) times a day with meals 180 tablet 3    Omega-3 1000 MG CAPS Take by mouth 3 (three) times a day       DENTAGEL 1 1 % GEL USE ONCE DAILY AFTER BRUSHING  BEFORE BEDTIME  5     No current facility-administered medications for this visit  Review of Systems   Constitutional: Negative for chills and fever  HENT: Negative for sore throat and trouble swallowing  Eyes: Negative for pain  Respiratory: Negative for cough and shortness of breath  Cardiovascular: Negative for chest pain and palpitations     Gastrointestinal: Negative for abdominal pain, blood in stool, diarrhea, nausea and vomiting  Endocrine: Negative for cold intolerance and heat intolerance  Genitourinary: Negative for dysuria, frequency and testicular pain  Musculoskeletal: Negative for joint swelling  Allergic/Immunologic: Negative for immunocompromised state  Neurological: Negative for dizziness, syncope and headaches  Hematological: Negative for adenopathy  Does not bruise/bleed easily  Psychiatric/Behavioral: Negative for dysphoric mood  The patient is not nervous/anxious  Objective:  Vitals:    12/20/19 0748   BP: 134/80   Pulse: 74   SpO2: 99%      Physical Exam   Constitutional: He is oriented to person, place, and time  He appears well-developed and well-nourished  HENT:   Head: Normocephalic and atraumatic  Eyes: Pupils are equal, round, and reactive to light  Conjunctivae are normal    Neck: Normal range of motion  No thyromegaly present  Cardiovascular: Normal rate and regular rhythm  Murmur heard  Systolic murmur is present with a grade of 3/6  Pulses:       Dorsalis pedis pulses are 1+ on the right side, and 1+ on the left side  Posterior tibial pulses are 0 on the right side, and 0 on the left side  A fairly loud holosystolic heart murmur heard best in the right sternal border 2nd intercostal space radiating across to the left sternal border but not into the neck  Pulmonary/Chest: Effort normal  No respiratory distress  He has no wheezes  He has no rales  Abdominal: Soft  There is no tenderness  Genitourinary: Penis normal  Right testis shows no mass and no tenderness  Left testis shows no mass and no tenderness  Musculoskeletal: Normal range of motion  He exhibits deformity  Mildly flat feet  Minimal arthritic deformities small joints  Very very early Charcot ankle left foot  Lymphadenopathy:     He has no cervical adenopathy  Neurological: He is alert and oriented to person, place, and time  Skin: Skin is warm and dry     In particular very dry skin of the feet  Ichthyosis noted  Severe nail deformity with onychomycosis and onychogryphosis  However, this is his baseline   Psychiatric: He has a normal mood and affect  His behavior is normal  Judgment and thought content normal          Patient Instructions    Patient with the above history and physical   Laboratory testing should be done including A1c  CT screen of the lung  Ultrasound of the aorta   Echocardiogram    Follow-up in 6 months     Smoking cessation recommended

## 2019-12-20 NOTE — PROGRESS NOTES
Diabetic Foot Exam    Patient's shoes and socks removed  Right Foot/Ankle   Right Foot Inspection  Skin Exam: dry skin                          Toe Exam: right toe deformity  Sensory       Monofilament testing: intact  Vascular    The right DP pulse is 1+  The right PT pulse is 0  Left Foot/Ankle  Left Foot Inspection  Skin Exam: dry skin                         Toe Exam: left toe deformity                   Sensory       Monofilament: intact  Vascular    The left DP pulse is 1+  The left PT pulse is 0  Assign Risk Category:  Deformity present;  No loss of protective sensation; Weak pulses       Risk: 2

## 2019-12-20 NOTE — PATIENT INSTRUCTIONS
Patient with the above history and physical   Laboratory testing should be done including A1c  CT screen of the lung  Ultrasound of the aorta   Echocardiogram    Follow-up in 6 months     Smoking cessation recommended

## 2020-01-02 ENCOUNTER — HOSPITAL ENCOUNTER (OUTPATIENT)
Dept: CT IMAGING | Facility: HOSPITAL | Age: 70
Discharge: HOME | End: 2020-01-02
Attending: INTERNAL MEDICINE
Payer: COMMERCIAL

## 2020-01-02 ENCOUNTER — HOSPITAL ENCOUNTER (OUTPATIENT)
Dept: ULTRASOUND IMAGING | Facility: HOSPITAL | Age: 70
Discharge: HOME | End: 2020-01-02
Attending: INTERNAL MEDICINE
Payer: COMMERCIAL

## 2020-01-02 ENCOUNTER — TELEPHONE (OUTPATIENT)
Dept: INTERNAL MEDICINE CLINIC | Facility: CLINIC | Age: 70
End: 2020-01-02

## 2020-01-02 DIAGNOSIS — Z72.0 TOBACCO ABUSE: ICD-10-CM

## 2020-01-02 DIAGNOSIS — I70.0 ATHEROSCLEROSIS OF AORTA (HCC): ICD-10-CM

## 2020-01-02 PROCEDURE — 76775 US EXAM ABDO BACK WALL LIM: CPT

## 2020-01-02 PROCEDURE — G0297 LDCT FOR LUNG CA SCREEN: HCPCS

## 2020-01-06 ENCOUNTER — TELEPHONE (OUTPATIENT)
Dept: CARDIOLOGY CLINIC | Facility: CLINIC | Age: 70
End: 2020-01-06

## 2020-01-06 NOTE — TELEPHONE ENCOUNTER
Form faxed to [address]  Also spoke with patient and advised holding instructions  Verbally understands

## 2020-01-06 NOTE — TELEPHONE ENCOUNTER
Pt stopped in the office and needs clearance to have surgery to remove a cyst on his wrist  He was last seen in September  Can pt be cleared form that appt or does he need to make another one?  Please fax letter to Dr Gisel Shelby at [address] if pt is cleared - [phone]

## 2020-01-06 NOTE — TELEPHONE ENCOUNTER
Form filled out and signed and given to Harpreet Galeano  Patient can stop aspirin and Plavix for a maximum of 5 days before surgery and restart as soon as possible after surgery

## 2020-01-16 ENCOUNTER — LAB REQUISITION (OUTPATIENT)
Dept: LAB | Facility: HOSPITAL | Age: 70
End: 2020-01-16
Payer: MEDICARE

## 2020-01-16 DIAGNOSIS — L72.0 EPIDERMAL CYST: ICD-10-CM

## 2020-01-16 PROCEDURE — 88304 TISSUE EXAM BY PATHOLOGIST: CPT | Performed by: PATHOLOGY

## 2020-02-25 ENCOUNTER — TELEPHONE (OUTPATIENT)
Dept: INTERNAL MEDICINE CLINIC | Facility: CLINIC | Age: 70
End: 2020-02-25

## 2020-02-25 DIAGNOSIS — D64.9 ANEMIA, UNSPECIFIED TYPE: Primary | ICD-10-CM

## 2020-02-25 NOTE — TELEPHONE ENCOUNTER
Pt will be having surgery with Dr Briana Caal on 3/19/2020 for a cyst removal on his wrist  He had pre-surgical blood work done through [facility]  Dr Briana Caal stated that his platelets were abnormal  They would like Dr Joseline Shields to look over the blood work to see if it is ok to proceed with surgery  [address] will also be faxing us a form for the clearance  Please call pt after reviewing lab results     [phone]

## 2020-02-25 NOTE — TELEPHONE ENCOUNTER
He needs to reduce some laboratory testing and see me after that    SM Keren Choudhury  : 1940 [id number] [facility]  [address] [place] [address].  [phone]   [phone]        OUTPATIENT PHYSICAL THERAPY:Daily Note 2018         ICD-10: Treatment Diagnosis: Muscle weakness (generalized) (M62.81)Other abnormalities of gait and mobility (R26.89)  Precautions/Allergies:   Adhesive tape-silicones; Azopt [brinzolamide]; Other medication; Statins-hmg-coa reductase inhibitors; and Sulfa (sulfonamide antibiotics)   Fall Risk Score: 7 (? 5 = High Risk)  MD Orders: eval and treat vestibular dysfunction, unsteady gait from neuropathy MEDICAL/REFERRING DIAGNOSIS:  Unsteadiness on feet [R26.81]  Dizziness and giddiness [R42]   DATE OF ONSET: years  REFERRING PHYSICIAN: Sandi Severino MD  RETURN PHYSICIAN APPOINTMENT: 10/8/18     INITIAL ASSESSMENT:  Ms. Anthony Rangel presents with weakness and balance loss from various sources inc neuropathy. She will benefit from skilled PT to increase strength and balance and address vestibular issues as indicated to improve pt's safety with gait. PROBLEM LIST (Impacting functional limitations):  1. Decreased Strength  2. Decreased ADL/Functional Activities  3. Decreased Transfer Abilities  4. Decreased Ambulation Ability/Technique  5. Decreased Balance  6. Decreased Activity Tolerance  7. Decreased Flexibility/Joint Mobility  8. Decreased [place] with Home Exercise Program INTERVENTIONS PLANNED:  1. Balance Exercise  2. Home Exercise Program (HEP)  3. Therapeutic Activites  4. Therapeutic Exercise/Strengthening    TREATMENT PLAN:  Effective Dates: 2018 TO 2018 (90 days). Frequency/Duration: 2 times a week for 90 Days  GOALS: (Goals have been discussed and agreed upon with patient.)  Short-Term Functional Goals: Time Frame: 2 weeks  1. Pt to report compliance with HEP. - ongoing  2.  Pt to increase DF to 5 degrees bilaterally for improved gait mechanics. - MET  Discharge Goals: Time Frame: 12 weeks  1. Pt to increase hip abd strength to 4/5 for more stability in gait. - ongoing  2. Pt to demonstrate > 4 sec SLS for reciprocal gait on stairs. - ongoing  3. Pt to use assistive device if needed for safe ambulation. - MET              HISTORY:   History of Present Injury/Illness (Reason for Referral):  Pt has hearing loss in left ear and was told to get hearing aids. She did not and saw another specialist who said she needed treatment on her ear before she got hearing aids. She has had that and is being treated for a mastoid infection. She has diabetic neuropathy. She is going to have a NCV 10/8. She tripped over her 's oxygen line and fell about 3 months ago. She loses her balance a lot. She does not want to use a walker. She takes 2 meds that cause dizziness. She walks her dog in her yard and walks to get the mail but no exercise. She was told her vertigo was meniere's years ago but other doctors have contradicted that. She had vestibular rehab without any symptom changes. Past Medical History/Comorbidities:   Ms. Trenton Ch  has a past medical history of Asthma; Axonal polyneuropathy (8/16/2018); Cardiomyopathy, ischemic (1/14/2016); Chronic vertigo; DM2 (diabetes mellitus, type 2) ([address] GERD (gastroesophageal reflux disease); Hearing loss; History of AAA (abdominal aortic aneurysm) repair (2002, 1/14/2016); History of bilateral cataract extraction; History of coronary artery disease (2006, 2007); History of gallbladder disease; History of hernia repair; History of seborrheic keratosis; Hyperlipidemia; Hypertension; Lacunar infarction ([address] (8/16/2018); Meniere's disease; Menopause; Morbid obesity ([address] OA (osteoarthritis); Orthostatic hypotension (1/14/2016); Osteopenia with high risk of fracture (2014); Post-menopausal osteoporosis; Postmenopausal osteoporosis; Unspecified sleep apnea; Unsteady gait (8/16/2018); and Visit for dental examination.   Ms. Trenton Ch  has a past surgical history that includes hx open cholecystectomy (1979); hx orthopaedic; hx cataract removal (3/10); hx hernia repair; pr cardiac surg procedure unlist; hx hysterectomy (1997); hx oophorectomy (1982); and pr breast surgery procedure unlisted (mid 2000's). Social History/Living Environment:     lives in 2 story home with  but doesn't need to go upstairs  Prior Level of Function/Work/Activity:  retired  Dominant Side:         RIGHT  Current Medications:    Current Outpatient Prescriptions:     pitavastatin calcium (LIVALO) 4 mg tab tablet, Take 1 Tab by mouth daily. , Disp: 90 Tab, Rfl: 3    potassium chloride (KLOR-CON) 10 mEq tablet, Take 1 Tab by mouth daily. , Disp: 90 Tab, Rfl: 3    triamterene-hydroCHLOROthiazide (MAXZIDE) 37.5-25 mg per tablet, Take 1 Tab by mouth daily. , Disp: 90 Tab, Rfl: 3    losartan (COZAAR) 100 mg tablet, Take 1 Tab by mouth daily. , Disp: 90 Tab, Rfl: 3    clopidogrel (PLAVIX) 75 mg tab, Take 1 Tab by mouth daily. , Disp: 90 Tab, Rfl: 3    metoprolol succinate (TOPROL XL) 100 mg tablet, Take 1 Tab by mouth nightly., Disp: 90 Tab, Rfl: 3    nitroglycerin (NITROSTAT) 0.4 mg SL tablet, 1 Tab by SubLINGual route every five (5) minutes as needed. , Disp: 25 Tab, Rfl: 6    fluticasone (FLONASE) 50 mcg/actuation nasal spray, 2 Sprays by Both Nostrils route daily for 90 days. , Disp: 3 Bottle, Rfl: 3    albuterol (PROAIR HFA) 90 mcg/actuation inhaler, Take 2 Puffs by inhalation four (4) times daily as needed for up to 90 days. , Disp: 3 Inhaler, Rfl: 3    Cholecalciferol, Vitamin D3, (VITAMIN D3) 1,000 unit cap, Take 2,000 Units by mouth daily. , Disp: , Rfl:     aspirin (ASPIRIN) 325 mg tablet, Take 325 mg by mouth every morning., Disp: , Rfl:    Date Last Reviewed:  9/28/2018   EXAMINATION:   Observation/Orthostatic Postural Assessment:          Pt has antalgic gait with right glut med lurch. The left shoe is worn across the heel more than the right.   Palpation: Mild tenderness at left greater trochanter  ROM:     5 degrees DF bilaterally 9/21/18      Strength:     Right hip abd, ext, PF 3-/5, quad 5/5  Left hip abd, ext 3+/5, PF 3-/5, quad 5/5  9/21/18               Neurological Screen:        unremarkable  Functional Mobility:         Gait/Ambulation:  Antalgic level surfaces        Transfers:  Uses UE's to assist        Stairs:  Step-to pattern and uses UE's to pull up  Balance:         < 1 sec B 9/21/18   Body Structures Involved:  1. Nerves  2. Muscles Body Functions Affected:  1. Sensory/Pain  2. Neuromusculoskeletal  3. Movement Related Activities and Participation Affected:  1. General Tasks and Demands  2. Mobility  3. Self Care  4. Domestic Life  5. Community, Social and Civic Life   CLINICAL PRESENTATION:   CLINICAL DECISION MAKING:   Outcome Measure: Tool Used: Diaz Balance Scale  Score:  Initial: 39/56 Most Recent: 43/56 (Date: 9/21/18 )   Interpretation of Score: Each section is scored on a 0-4 scale, 0 representing the patients inability to perform the task and 4 representing independence. The scores of each section are added together for a total score of 56. The higher the patients score, the more independent the patient is. Any score below 45 indicates increased risk for falls. Score 56 55-45 44-34 33-23 22-12 11-1 0   Modifier CH CI CJ CK CL CM CN     ? Mobility - Walking and Moving Around:     - CURRENT STATUS: CJ - 20%-39% impaired, limited or restricted    - GOAL STATUS: CI - 1%-19% impaired, limited or restricted    - D/C STATUS:  ---------------To be determined---------------    Medical Necessity:   · Patient demonstrates fair rehab potential due to higher previous functional level. Reason for Services/Other Comments:  · Patient continues to require present interventions due to patient's inability to ambulate safely all surfaces.    TREATMENT:   (In addition to Assessment/Re-Assessment sessions the following treatments were rendered)  THERAPEUTIC ACTIVITY: ( see below for minutes): Therapeutic activities per grid below to improve mobility. Required moderate verbal and manual cues to promote motor control of bilateral, lower extremity(s). THERAPEUTIC EXERCISE: (see below for minutes):  Exercises per grid below to improve strength. Required moderate verbal and manual cues to promote proper body alignment, promote proper body posture and promote proper body mechanics. Progressed resistance, range, repetitions and complexity of movement as indicated.     Date: 8/29/18 8/31/18  Visit 2 9/5/18  Visit 3 9/7/18  Visit 4 9/12/18  Visit 5 9/14/18  Visit 6 9/19/18  Visit 7 9/21/18  Visit 8  PN 9/26/18  Visit 9 9/28/18  Visit 10     Modalities:                                                            Therapeutic Exercise: 15 mins 25 mins 20 mins 20 mins 20 mins 20 mins 25 mins 25 mins 20 mins 20 mins     LAQ B 20 reps 30 reps   30 reps 30 reps   30 reps  5 lb 30 reps  5 lb       Slant board 3 x 10 sec 3 x 10 sec 5 x 10 sec 5 x 10 sec 5 x 10 sec 5 x 10 sec 5 x 10 sec 5 x 10 sec 5 x 10 sec 5 x 10 sec     Clams B 20 reps 30 reps 30 reps 30 reps 30 reps 30 reps 2 x 20 reps 2 x 20 reps 30 reps X 30     S/L hip abd B With assist  20 reps With assist  30 reps 30 reps  With assist 30 reps  With assist 30 reps  With assist 30 reps  With assist 2 x 20 reps  With assist 2 x 20 reps  With assist 30 reps  assist X 30 assist      Glut sets  Supine, standing  5 mins             Calf raises  30 reps 30 reps 30 reps 30 reps 30 reps 30 reps 30 reps 30 reps X 30     Standing right hip diagonal   30 reps 30 reps 30 reps 30 reps 30 reps 30 reps 30 reps X 30     Standing hp ext          X 30                    Proprioceptive Activities:                                                            Manual Therapy:                                             Therapeutic Activities: 15 mins 15 mins 25 mins 25 mins 25 mins 25 mins 20 mins 20 mins 25 mins 25 mins     Pt education, postural education, HEP 5 mins              bridging 20 reps 20 reps 30 reps 30 reps 30 reps 30 reps 30 reps 30 reps 30 reps X 30     SLS holding pelvis level B With assist  5 reps With assist  5 reps With assist  5 reps With assist  5 reps With assist  5 reps With assist  5 reps 5 reps  With assist  X 5 with assist X 5   assist X 5 assist     Sit to stand  15 reps  With assist 20 reps  With assist      20 reps  assist Squats   X 20     Stepping with left foot holding pelvis level with UE support   X 15 reps X 30 reps 30 reps 30 reps 30 reps 30 reps 30 reps X 30     Foam standing eyes open and closed   4 mins 6 mins 5 mins 5 mins 5 mins 5 mins 5 mins 5 mins     Step ups lat         3 inches  X 10 each      perturbations          3 mins                    HEP: see hand out  [employer] Portal  Treatment/Session Assessment:  Gait much more antalgic today. More hip abd control on right when doing WB ex's with cues. Continue as indicated. · Pain/ Symptoms: Initial:  0/10 Post Session:  0/10  ·   Compliance with Program/Exercises: Will assess as treatment progresses. · Recommendations/Intent for next treatment session: \"Next visit will focus on advancements to more challenging activities\".   Total Treatment Duration:  PT Patient Time In/Time Out  Time In: 0930  Time Out: 1015     Janice Lopez, PT

## 2020-02-26 ENCOUNTER — APPOINTMENT (OUTPATIENT)
Dept: LAB | Facility: CLINIC | Age: 70
End: 2020-02-26
Payer: MEDICARE

## 2020-02-26 DIAGNOSIS — D64.9 ANEMIA, UNSPECIFIED TYPE: ICD-10-CM

## 2020-02-26 LAB
ANION GAP SERPL CALCULATED.3IONS-SCNC: 5 MMOL/L (ref 4–13)
BASOPHILS # BLD AUTO: 0.03 THOUSANDS/ΜL (ref 0–0.1)
BASOPHILS NFR BLD AUTO: 1 % (ref 0–1)
BUN SERPL-MCNC: 11 MG/DL (ref 5–25)
CALCIUM SERPL-MCNC: 9.5 MG/DL (ref 8.3–10.1)
CHLORIDE SERPL-SCNC: 105 MMOL/L (ref 100–108)
CO2 SERPL-SCNC: 28 MMOL/L (ref 21–32)
CREAT SERPL-MCNC: 0.72 MG/DL (ref 0.6–1.3)
EOSINOPHIL # BLD AUTO: 0.15 THOUSAND/ΜL (ref 0–0.61)
EOSINOPHIL NFR BLD AUTO: 3 % (ref 0–6)
ERYTHROCYTE [DISTWIDTH] IN BLOOD BY AUTOMATED COUNT: 19.4 % (ref 11.6–15.1)
FOLATE SERPL-MCNC: 7.1 NG/ML (ref 3.1–17.5)
GFR SERPL CREATININE-BSD FRML MDRD: 108 ML/MIN/1.73SQ M
GLUCOSE SERPL-MCNC: 134 MG/DL (ref 65–140)
HCT VFR BLD AUTO: 34 % (ref 36.5–49.3)
HEMOCCULT STL QL IA: NEGATIVE
HGB BLD-MCNC: 10.3 G/DL (ref 12–17)
IMM GRANULOCYTES # BLD AUTO: 0.02 THOUSAND/UL (ref 0–0.2)
IMM GRANULOCYTES NFR BLD AUTO: 0 % (ref 0–2)
IRON SATN MFR SERPL: 6 %
IRON SERPL-MCNC: 31 UG/DL (ref 65–175)
LYMPHOCYTES # BLD AUTO: 1.95 THOUSANDS/ΜL (ref 0.6–4.47)
LYMPHOCYTES NFR BLD AUTO: 34 % (ref 14–44)
MCH RBC QN AUTO: 24.2 PG (ref 26.8–34.3)
MCHC RBC AUTO-ENTMCNC: 30.3 G/DL (ref 31.4–37.4)
MCV RBC AUTO: 80 FL (ref 82–98)
MONOCYTES # BLD AUTO: 0.51 THOUSAND/ΜL (ref 0.17–1.22)
MONOCYTES NFR BLD AUTO: 9 % (ref 4–12)
NEUTROPHILS # BLD AUTO: 3.09 THOUSANDS/ΜL (ref 1.85–7.62)
NEUTS SEG NFR BLD AUTO: 53 % (ref 43–75)
NRBC BLD AUTO-RTO: 0 /100 WBCS
PLATELET # BLD AUTO: 465 THOUSANDS/UL (ref 149–390)
PMV BLD AUTO: 9 FL (ref 8.9–12.7)
POTASSIUM SERPL-SCNC: 3.5 MMOL/L (ref 3.5–5.3)
RBC # BLD AUTO: 4.25 MILLION/UL (ref 3.88–5.62)
RETICS # AUTO: NORMAL 10*3/UL (ref 14356–105094)
RETICS # CALC: 1.63 % (ref 0.37–1.87)
SODIUM SERPL-SCNC: 138 MMOL/L (ref 136–145)
TIBC SERPL-MCNC: 487 UG/DL (ref 250–450)
TRANSFERRIN SERPL-MCNC: 374 MG/DL (ref 200–400)
VIT B12 SERPL-MCNC: 349 PG/ML (ref 100–900)
WBC # BLD AUTO: 5.75 THOUSAND/UL (ref 4.31–10.16)

## 2020-02-26 PROCEDURE — 82746 ASSAY OF FOLIC ACID SERUM: CPT

## 2020-02-26 PROCEDURE — 82607 VITAMIN B-12: CPT

## 2020-02-26 PROCEDURE — 83540 ASSAY OF IRON: CPT

## 2020-02-26 PROCEDURE — 83550 IRON BINDING TEST: CPT

## 2020-02-26 PROCEDURE — 36415 COLL VENOUS BLD VENIPUNCTURE: CPT

## 2020-02-26 PROCEDURE — 85045 AUTOMATED RETICULOCYTE COUNT: CPT

## 2020-02-26 PROCEDURE — 80048 BASIC METABOLIC PNL TOTAL CA: CPT

## 2020-02-26 PROCEDURE — 84466 ASSAY OF TRANSFERRIN: CPT

## 2020-02-26 PROCEDURE — 85025 COMPLETE CBC W/AUTO DIFF WBC: CPT

## 2020-02-26 PROCEDURE — G0328 FECAL BLOOD SCRN IMMUNOASSAY: HCPCS

## 2020-03-09 ENCOUNTER — OFFICE VISIT (OUTPATIENT)
Dept: INTERNAL MEDICINE CLINIC | Facility: CLINIC | Age: 70
End: 2020-03-09
Payer: MEDICARE

## 2020-03-09 DIAGNOSIS — E11.69 ANEMIA ASSOCIATED WITH DIABETES MELLITUS (HCC): Primary | ICD-10-CM

## 2020-03-09 DIAGNOSIS — D63.8 ANEMIA ASSOCIATED WITH DIABETES MELLITUS (HCC): Primary | ICD-10-CM

## 2020-03-09 DIAGNOSIS — E11.9 TYPE 2 DIABETES MELLITUS WITHOUT COMPLICATION, WITHOUT LONG-TERM CURRENT USE OF INSULIN (HCC): ICD-10-CM

## 2020-03-09 PROCEDURE — 3078F DIAST BP <80 MM HG: CPT | Performed by: INTERNAL MEDICINE

## 2020-03-09 PROCEDURE — 3074F SYST BP LT 130 MM HG: CPT | Performed by: INTERNAL MEDICINE

## 2020-03-09 PROCEDURE — 99213 OFFICE O/P EST LOW 20 MIN: CPT | Performed by: INTERNAL MEDICINE

## 2020-03-09 PROCEDURE — 4040F PNEUMOC VAC/ADMIN/RCVD: CPT | Performed by: INTERNAL MEDICINE

## 2020-03-09 PROCEDURE — 4004F PT TOBACCO SCREEN RCVD TLK: CPT | Performed by: INTERNAL MEDICINE

## 2020-03-09 PROCEDURE — 2022F DILAT RTA XM EVC RTNOPTHY: CPT | Performed by: INTERNAL MEDICINE

## 2020-03-10 ENCOUNTER — TELEPHONE (OUTPATIENT)
Dept: SURGICAL ONCOLOGY | Facility: CLINIC | Age: 70
End: 2020-03-10

## 2020-03-10 VITALS
SYSTOLIC BLOOD PRESSURE: 110 MMHG | OXYGEN SATURATION: 97 % | HEIGHT: 68 IN | WEIGHT: 199 LBS | DIASTOLIC BLOOD PRESSURE: 70 MMHG | HEART RATE: 71 BPM | BODY MASS INDEX: 30.16 KG/M2

## 2020-03-10 NOTE — TELEPHONE ENCOUNTER
New Patient Encounter    New Patient Intake Form   Patient Details:  Sumaya Solo  1950  [id number]    Background Information:  [address] starts by opening a telephone encounter and gathering the following information   Who is calling to schedule? If not self, relationship to patient? self   Referring Provider Jorge Gonzalez   What is the diagnosis? anemia   Is this diagnosis confirmed? Yes   When was the diagnosis? 3/2020   Is there a confirmed diagnosis from a biopsy/tissue reviewed by pathology? Is patient aware of diagnosis? Yes   Is there a personal history of cancer and what kind? Is there a family history of cancer and what kind? Reason for visit? New Diagnosis   Have you had any imaging or labs done? If so: when, where? Yes     Are records in EPIC? yes   Was the patient told to bring a disk? no   Does the patient smoke or Vape? no   If yes, how many packs or cartridges per day? Scheduling Information:   Preferred [place]:  [facility] Provider? Are there any dates/time the patient cannot be seen? Miscellaneous:    After completing the above information, please route to Financial Counselor and the appropriate Nurse Navigator for review

## 2020-03-17 ENCOUNTER — TELEPHONE (OUTPATIENT)
Dept: INTERNAL MEDICINE CLINIC | Facility: CLINIC | Age: 70
End: 2020-03-17

## 2020-03-22 DIAGNOSIS — E11.69 TYPE 2 DIABETES MELLITUS WITH OTHER SPECIFIED COMPLICATION, WITHOUT LONG-TERM CURRENT USE OF INSULIN (HCC): ICD-10-CM

## 2020-03-22 RX ORDER — LISINOPRIL 40 MG/1
TABLET ORAL
Qty: 90 TABLET | Refills: 3 | Status: SHIPPED | OUTPATIENT
Start: 2020-03-22 | End: 2020-06-19 | Stop reason: SDUPTHER

## 2020-04-13 ENCOUNTER — APPOINTMENT (OUTPATIENT)
Dept: LAB | Facility: HOSPITAL | Age: 70
End: 2020-04-13
Attending: INTERNAL MEDICINE
Payer: MEDICARE

## 2020-04-13 ENCOUNTER — CONSULT (OUTPATIENT)
Dept: HEMATOLOGY ONCOLOGY | Facility: CLINIC | Age: 70
End: 2020-04-13
Payer: MEDICARE

## 2020-04-13 VITALS
BODY MASS INDEX: 30.81 KG/M2 | SYSTOLIC BLOOD PRESSURE: 134 MMHG | HEIGHT: 69 IN | WEIGHT: 208 LBS | DIASTOLIC BLOOD PRESSURE: 76 MMHG | RESPIRATION RATE: 18 BRPM | TEMPERATURE: 98.5 F | OXYGEN SATURATION: 97 % | HEART RATE: 57 BPM

## 2020-04-13 DIAGNOSIS — D50.0 IRON DEFICIENCY ANEMIA DUE TO CHRONIC BLOOD LOSS: Primary | ICD-10-CM

## 2020-04-13 DIAGNOSIS — D63.8 ANEMIA ASSOCIATED WITH DIABETES MELLITUS (HCC): ICD-10-CM

## 2020-04-13 DIAGNOSIS — E11.69 ANEMIA ASSOCIATED WITH DIABETES MELLITUS (HCC): ICD-10-CM

## 2020-04-13 DIAGNOSIS — D75.839 THROMBOCYTOSIS: ICD-10-CM

## 2020-04-13 PROCEDURE — 36415 COLL VENOUS BLD VENIPUNCTURE: CPT

## 2020-04-13 PROCEDURE — 83516 IMMUNOASSAY NONANTIBODY: CPT

## 2020-04-13 PROCEDURE — 99205 OFFICE O/P NEW HI 60 MIN: CPT | Performed by: INTERNAL MEDICINE

## 2020-04-13 PROCEDURE — 86255 FLUORESCENT ANTIBODY SCREEN: CPT

## 2020-04-13 PROCEDURE — 82784 ASSAY IGA/IGD/IGG/IGM EACH: CPT

## 2020-04-14 LAB
ENDOMYSIUM IGA SER QL: NEGATIVE
GLIADIN PEPTIDE IGA SER-ACNC: 8 UNITS (ref 0–19)
GLIADIN PEPTIDE IGG SER-ACNC: 2 UNITS (ref 0–19)
IGA SERPL-MCNC: 289 MG/DL (ref 61–437)
TTG IGA SER-ACNC: <2 U/ML (ref 0–3)
TTG IGG SER-ACNC: 2 U/ML (ref 0–5)

## 2020-04-15 ENCOUNTER — APPOINTMENT (OUTPATIENT)
Dept: LAB | Facility: HOSPITAL | Age: 70
End: 2020-04-15
Attending: INTERNAL MEDICINE
Payer: MEDICARE

## 2020-04-27 ENCOUNTER — OFFICE VISIT (OUTPATIENT)
Dept: CARDIOLOGY CLINIC | Facility: CLINIC | Age: 70
End: 2020-04-27
Payer: MEDICARE

## 2020-04-27 VITALS
BODY MASS INDEX: 29.77 KG/M2 | DIASTOLIC BLOOD PRESSURE: 80 MMHG | HEIGHT: 69 IN | HEART RATE: 68 BPM | SYSTOLIC BLOOD PRESSURE: 136 MMHG | OXYGEN SATURATION: 99 % | WEIGHT: 201 LBS

## 2020-04-27 DIAGNOSIS — I10 ESSENTIAL HYPERTENSION: ICD-10-CM

## 2020-04-27 DIAGNOSIS — Z72.0 TOBACCO ABUSE: ICD-10-CM

## 2020-04-27 DIAGNOSIS — I25.10 CORONARY ARTERY DISEASE INVOLVING NATIVE CORONARY ARTERY OF NATIVE HEART WITHOUT ANGINA PECTORIS: ICD-10-CM

## 2020-04-27 DIAGNOSIS — I42.1 HOCM (HYPERTROPHIC OBSTRUCTIVE CARDIOMYOPATHY) (HCC): Primary | ICD-10-CM

## 2020-04-27 DIAGNOSIS — E78.2 MIXED HYPERLIPIDEMIA: ICD-10-CM

## 2020-04-27 PROCEDURE — 3079F DIAST BP 80-89 MM HG: CPT | Performed by: INTERNAL MEDICINE

## 2020-04-27 PROCEDURE — 3008F BODY MASS INDEX DOCD: CPT | Performed by: INTERNAL MEDICINE

## 2020-04-27 PROCEDURE — 4040F PNEUMOC VAC/ADMIN/RCVD: CPT | Performed by: INTERNAL MEDICINE

## 2020-04-27 PROCEDURE — 1160F RVW MEDS BY RX/DR IN RCRD: CPT | Performed by: INTERNAL MEDICINE

## 2020-04-27 PROCEDURE — 4004F PT TOBACCO SCREEN RCVD TLK: CPT | Performed by: INTERNAL MEDICINE

## 2020-04-27 PROCEDURE — 99214 OFFICE O/P EST MOD 30 MIN: CPT | Performed by: INTERNAL MEDICINE

## 2020-04-27 PROCEDURE — 3075F SYST BP GE 130 - 139MM HG: CPT | Performed by: INTERNAL MEDICINE

## 2020-04-27 PROCEDURE — 2022F DILAT RTA XM EVC RTNOPTHY: CPT | Performed by: INTERNAL MEDICINE

## 2020-05-04 ENCOUNTER — OFFICE VISIT (OUTPATIENT)
Dept: HEMATOLOGY ONCOLOGY | Facility: CLINIC | Age: 70
End: 2020-05-04
Payer: MEDICARE

## 2020-05-04 VITALS
HEART RATE: 52 BPM | WEIGHT: 200.5 LBS | TEMPERATURE: 98.4 F | RESPIRATION RATE: 18 BRPM | SYSTOLIC BLOOD PRESSURE: 134 MMHG | DIASTOLIC BLOOD PRESSURE: 72 MMHG | HEIGHT: 69 IN | BODY MASS INDEX: 29.7 KG/M2 | OXYGEN SATURATION: 98 %

## 2020-05-04 DIAGNOSIS — D75.839 THROMBOCYTOSIS: ICD-10-CM

## 2020-05-04 DIAGNOSIS — D50.0 IRON DEFICIENCY ANEMIA DUE TO CHRONIC BLOOD LOSS: Primary | ICD-10-CM

## 2020-05-04 PROCEDURE — 4040F PNEUMOC VAC/ADMIN/RCVD: CPT | Performed by: INTERNAL MEDICINE

## 2020-05-04 PROCEDURE — 4004F PT TOBACCO SCREEN RCVD TLK: CPT | Performed by: INTERNAL MEDICINE

## 2020-05-04 PROCEDURE — 1160F RVW MEDS BY RX/DR IN RCRD: CPT | Performed by: INTERNAL MEDICINE

## 2020-05-04 PROCEDURE — 3008F BODY MASS INDEX DOCD: CPT | Performed by: INTERNAL MEDICINE

## 2020-05-04 PROCEDURE — 3078F DIAST BP <80 MM HG: CPT | Performed by: INTERNAL MEDICINE

## 2020-05-04 PROCEDURE — 2022F DILAT RTA XM EVC RTNOPTHY: CPT | Performed by: INTERNAL MEDICINE

## 2020-05-04 PROCEDURE — 3075F SYST BP GE 130 - 139MM HG: CPT | Performed by: INTERNAL MEDICINE

## 2020-05-04 PROCEDURE — 99214 OFFICE O/P EST MOD 30 MIN: CPT | Performed by: INTERNAL MEDICINE

## 2020-06-15 ENCOUNTER — TELEPHONE (OUTPATIENT)
Dept: INTERNAL MEDICINE CLINIC | Facility: CLINIC | Age: 70
End: 2020-06-15

## 2020-06-17 ENCOUNTER — TELEPHONE (OUTPATIENT)
Dept: CARDIOLOGY CLINIC | Facility: CLINIC | Age: 70
End: 2020-06-17

## 2020-06-18 ENCOUNTER — LAB REQUISITION (OUTPATIENT)
Dept: LAB | Facility: HOSPITAL | Age: 70
End: 2020-06-18
Payer: MEDICARE

## 2020-06-18 DIAGNOSIS — R22.31 LOCALIZED SWELLING, MASS AND LUMP, RIGHT UPPER LIMB: ICD-10-CM

## 2020-06-18 DIAGNOSIS — M67.431 GANGLION, RIGHT WRIST: ICD-10-CM

## 2020-06-18 PROCEDURE — 88304 TISSUE EXAM BY PATHOLOGIST: CPT | Performed by: PATHOLOGY

## 2020-06-19 DIAGNOSIS — E11.69 TYPE 2 DIABETES MELLITUS WITH OTHER SPECIFIED COMPLICATION, WITHOUT LONG-TERM CURRENT USE OF INSULIN (HCC): ICD-10-CM

## 2020-06-19 DIAGNOSIS — I25.10 CAD S/P PERCUTANEOUS CORONARY ANGIOPLASTY: ICD-10-CM

## 2020-06-19 DIAGNOSIS — Z98.61 CAD S/P PERCUTANEOUS CORONARY ANGIOPLASTY: ICD-10-CM

## 2020-06-19 RX ORDER — CLOPIDOGREL BISULFATE 75 MG/1
75 TABLET ORAL DAILY
Qty: 90 TABLET | Refills: 3 | Status: SHIPPED | OUTPATIENT
Start: 2020-06-19 | End: 2020-07-10

## 2020-06-19 RX ORDER — LISINOPRIL 40 MG/1
40 TABLET ORAL DAILY
Qty: 90 TABLET | Refills: 3 | Status: SHIPPED | OUTPATIENT
Start: 2020-06-19 | End: 2020-10-20 | Stop reason: SDUPTHER

## 2020-06-22 ENCOUNTER — TELEPHONE (OUTPATIENT)
Dept: INTERNAL MEDICINE CLINIC | Facility: CLINIC | Age: 70
End: 2020-06-22

## 2020-06-22 ENCOUNTER — OFFICE VISIT (OUTPATIENT)
Dept: INTERNAL MEDICINE CLINIC | Facility: CLINIC | Age: 70
End: 2020-06-22
Payer: MEDICARE

## 2020-06-22 VITALS
SYSTOLIC BLOOD PRESSURE: 142 MMHG | OXYGEN SATURATION: 96 % | WEIGHT: 198.8 LBS | BODY MASS INDEX: 29.79 KG/M2 | DIASTOLIC BLOOD PRESSURE: 98 MMHG | HEART RATE: 76 BPM | TEMPERATURE: 97.6 F

## 2020-06-22 DIAGNOSIS — E78.2 MIXED HYPERLIPIDEMIA: ICD-10-CM

## 2020-06-22 DIAGNOSIS — I10 ESSENTIAL HYPERTENSION: ICD-10-CM

## 2020-06-22 DIAGNOSIS — I25.119 ATHEROSCLEROSIS OF NATIVE CORONARY ARTERY OF NATIVE HEART WITH ANGINA PECTORIS (HCC): ICD-10-CM

## 2020-06-22 DIAGNOSIS — E11.9 TYPE 2 DIABETES MELLITUS WITHOUT COMPLICATION, WITHOUT LONG-TERM CURRENT USE OF INSULIN (HCC): Primary | ICD-10-CM

## 2020-06-22 DIAGNOSIS — Z11.59 ENCOUNTER FOR HEPATITIS C SCREENING TEST FOR LOW RISK PATIENT: ICD-10-CM

## 2020-06-22 PROCEDURE — 3077F SYST BP >= 140 MM HG: CPT | Performed by: INTERNAL MEDICINE

## 2020-06-22 PROCEDURE — 3080F DIAST BP >= 90 MM HG: CPT | Performed by: INTERNAL MEDICINE

## 2020-06-22 PROCEDURE — 99214 OFFICE O/P EST MOD 30 MIN: CPT | Performed by: INTERNAL MEDICINE

## 2020-06-22 PROCEDURE — 92250 FUNDUS PHOTOGRAPHY W/I&R: CPT | Performed by: INTERNAL MEDICINE

## 2020-06-22 PROCEDURE — 4040F PNEUMOC VAC/ADMIN/RCVD: CPT | Performed by: INTERNAL MEDICINE

## 2020-06-22 PROCEDURE — 4004F PT TOBACCO SCREEN RCVD TLK: CPT | Performed by: INTERNAL MEDICINE

## 2020-06-22 PROCEDURE — 2022F DILAT RTA XM EVC RTNOPTHY: CPT | Performed by: INTERNAL MEDICINE

## 2020-06-22 PROCEDURE — 1160F RVW MEDS BY RX/DR IN RCRD: CPT | Performed by: INTERNAL MEDICINE

## 2020-06-24 ENCOUNTER — APPOINTMENT (OUTPATIENT)
Dept: LAB | Facility: CLINIC | Age: 70
End: 2020-06-24
Payer: MEDICARE

## 2020-06-24 DIAGNOSIS — D50.0 IRON DEFICIENCY ANEMIA DUE TO CHRONIC BLOOD LOSS: ICD-10-CM

## 2020-06-24 DIAGNOSIS — E11.9 TYPE 2 DIABETES MELLITUS WITHOUT COMPLICATION, WITHOUT LONG-TERM CURRENT USE OF INSULIN (HCC): ICD-10-CM

## 2020-06-24 DIAGNOSIS — Z11.59 ENCOUNTER FOR HEPATITIS C SCREENING TEST FOR LOW RISK PATIENT: ICD-10-CM

## 2020-06-24 LAB
ALBUMIN SERPL BCP-MCNC: 3.7 G/DL (ref 3.5–5)
ALP SERPL-CCNC: 68 U/L (ref 46–116)
ALT SERPL W P-5'-P-CCNC: 25 U/L (ref 12–78)
ANION GAP SERPL CALCULATED.3IONS-SCNC: 3 MMOL/L (ref 4–13)
AST SERPL W P-5'-P-CCNC: 11 U/L (ref 5–45)
BILIRUB SERPL-MCNC: 0.62 MG/DL (ref 0.2–1)
BUN SERPL-MCNC: 11 MG/DL (ref 5–25)
CALCIUM ALBUM COR SERPL-MCNC: 10.9 MG/DL (ref 8.3–10.1)
CALCIUM SERPL-MCNC: 10.7 MG/DL (ref 8.3–10.1)
CHLORIDE SERPL-SCNC: 102 MMOL/L (ref 100–108)
CO2 SERPL-SCNC: 30 MMOL/L (ref 21–32)
CREAT SERPL-MCNC: 0.72 MG/DL (ref 0.6–1.3)
ERYTHROCYTE [DISTWIDTH] IN BLOOD BY AUTOMATED COUNT: 21.2 % (ref 11.6–15.1)
EST. AVERAGE GLUCOSE BLD GHB EST-MCNC: 128 MG/DL
FERRITIN SERPL-MCNC: 19 NG/ML (ref 8–388)
GFR SERPL CREATININE-BSD FRML MDRD: 108 ML/MIN/1.73SQ M
GLUCOSE P FAST SERPL-MCNC: 101 MG/DL (ref 65–99)
HBA1C MFR BLD: 6.1 %
HCT VFR BLD AUTO: 43.8 % (ref 36.5–49.3)
HCV AB SER QL: NORMAL
HGB BLD-MCNC: 13.5 G/DL (ref 12–17)
IRON SATN MFR SERPL: 14 %
IRON SERPL-MCNC: 58 UG/DL (ref 65–175)
MCH RBC QN AUTO: 25.7 PG (ref 26.8–34.3)
MCHC RBC AUTO-ENTMCNC: 30.8 G/DL (ref 31.4–37.4)
MCV RBC AUTO: 83 FL (ref 82–98)
PLATELET # BLD AUTO: 368 THOUSANDS/UL (ref 149–390)
PMV BLD AUTO: 9 FL (ref 8.9–12.7)
POTASSIUM SERPL-SCNC: 4 MMOL/L (ref 3.5–5.3)
PROT SERPL-MCNC: 7.6 G/DL (ref 6.4–8.2)
RBC # BLD AUTO: 5.26 MILLION/UL (ref 3.88–5.62)
SODIUM SERPL-SCNC: 135 MMOL/L (ref 136–145)
TIBC SERPL-MCNC: 414 UG/DL (ref 250–450)
WBC # BLD AUTO: 5.45 THOUSAND/UL (ref 4.31–10.16)

## 2020-06-24 PROCEDURE — 83550 IRON BINDING TEST: CPT

## 2020-06-24 PROCEDURE — 36415 COLL VENOUS BLD VENIPUNCTURE: CPT

## 2020-06-24 PROCEDURE — 83540 ASSAY OF IRON: CPT

## 2020-06-24 PROCEDURE — 85027 COMPLETE CBC AUTOMATED: CPT

## 2020-06-24 PROCEDURE — 82728 ASSAY OF FERRITIN: CPT

## 2020-06-24 PROCEDURE — 80053 COMPREHEN METABOLIC PANEL: CPT

## 2020-06-24 PROCEDURE — 86803 HEPATITIS C AB TEST: CPT

## 2020-06-24 PROCEDURE — 83036 HEMOGLOBIN GLYCOSYLATED A1C: CPT

## 2020-07-01 ENCOUNTER — TELEPHONE (OUTPATIENT)
Dept: HEMATOLOGY ONCOLOGY | Facility: CLINIC | Age: 70
End: 2020-07-01

## 2020-07-06 ENCOUNTER — OFFICE VISIT (OUTPATIENT)
Dept: HEMATOLOGY ONCOLOGY | Facility: CLINIC | Age: 70
End: 2020-07-06
Payer: MEDICARE

## 2020-07-06 VITALS
SYSTOLIC BLOOD PRESSURE: 136 MMHG | RESPIRATION RATE: 17 BRPM | HEIGHT: 69 IN | WEIGHT: 200 LBS | OXYGEN SATURATION: 97 % | BODY MASS INDEX: 29.62 KG/M2 | DIASTOLIC BLOOD PRESSURE: 74 MMHG | TEMPERATURE: 98.7 F | HEART RATE: 70 BPM

## 2020-07-06 DIAGNOSIS — D50.0 IRON DEFICIENCY ANEMIA DUE TO CHRONIC BLOOD LOSS: Primary | ICD-10-CM

## 2020-07-06 PROCEDURE — 3078F DIAST BP <80 MM HG: CPT | Performed by: INTERNAL MEDICINE

## 2020-07-06 PROCEDURE — 4040F PNEUMOC VAC/ADMIN/RCVD: CPT | Performed by: INTERNAL MEDICINE

## 2020-07-06 PROCEDURE — 3044F HG A1C LEVEL LT 7.0%: CPT | Performed by: INTERNAL MEDICINE

## 2020-07-06 PROCEDURE — 2022F DILAT RTA XM EVC RTNOPTHY: CPT | Performed by: INTERNAL MEDICINE

## 2020-07-06 PROCEDURE — 1160F RVW MEDS BY RX/DR IN RCRD: CPT | Performed by: INTERNAL MEDICINE

## 2020-07-06 PROCEDURE — 3008F BODY MASS INDEX DOCD: CPT | Performed by: INTERNAL MEDICINE

## 2020-07-06 PROCEDURE — 4004F PT TOBACCO SCREEN RCVD TLK: CPT | Performed by: INTERNAL MEDICINE

## 2020-07-06 PROCEDURE — 3075F SYST BP GE 130 - 139MM HG: CPT | Performed by: INTERNAL MEDICINE

## 2020-07-06 PROCEDURE — 99213 OFFICE O/P EST LOW 20 MIN: CPT | Performed by: INTERNAL MEDICINE

## 2020-07-06 RX ORDER — FERROUS SULFATE 325(65) MG
325 TABLET ORAL
COMMUNITY
End: 2022-06-13

## 2020-07-06 NOTE — PROGRESS NOTES
[facility] [place]  [address] [facility] [id number] [phone]    Leah GUERRERO,[birth year], [id number]  07/06/20    Discussion:   In summary this is a 53-year-old male history of iron deficiency anemia  Previous GI workup was negative though did not include capsule endoscopy  Fortunately his anemia has been mild  He has been on oral iron since early May 2020  Iron studies and hemoglobin have normalized  He had been asymptomatic at that time and remains so  We reviewed that the etiology of his iron deficiency is unclear  I suspect low-grade GI bleed  I asked him to continue oral iron indefinitely as a maintenance therapy  If his hemoglobin becomes abnormal at sometime in the future or other blood count abnormalities occur, would be happy to see him in return visit but I have not set up a follow-up appointment at this time  I discussed the above with the patient  The patient  voiced understanding and agreement   ______________________________________________________________________    No chief complaint on file  HPI:   No history exists  Interval History:  Clinically stable  ECOG-  0 - Asymptomatic    Review of Systems   Constitutional: Negative for chills and fever  HENT: Negative for nosebleeds  Eyes: Negative for discharge  Respiratory: Negative for cough and shortness of breath  Cardiovascular: Negative for chest pain  Gastrointestinal: Negative for abdominal pain, constipation and diarrhea  Endocrine: Negative for polydipsia  Genitourinary: Negative for hematuria  Musculoskeletal: Negative for arthralgias  Skin: Negative for color change  Allergic/Immunologic: Negative for immunocompromised state  Neurological: Negative for dizziness and headaches  Hematological: Negative for adenopathy  Psychiatric/Behavioral: Negative for agitation         Past Medical History:   Diagnosis Date    Benign neoplasm of large intestine     Diabetes mellitus (HCC)     Heart murmur     History of aortic regurgitation     History of constipation     History of degenerative joint disease     History of nocturia     History of obesity     History of sebaceous cyst     History of shortness of breath     History of urinary frequency     Hyperlipidemia     Polyposis coli     of the large intestine    Ulcer of esophagus      Patient Active Problem List   Diagnosis    Non-recurrent bilateral inguinal hernia without obstruction or gangrene    Hyperlipidemia    HTN (hypertension)    Diabetes mellitus (HCC)    Tobacco abuse    Iron deficiency anemia due to chronic blood loss    HOCM (hypertrophic obstructive cardiomyopathy) (HCC)    Coronary artery disease involving native coronary artery of native heart    Atherosclerosis of aorta (HCC)    Atherosclerosis of native coronary artery of native heart with angina pectoris (HCC)    Anemia associated with diabetes mellitus (HCC)    Thrombocytosis (HCC)       Current Outpatient Medications:     aspirin 81 mg chewable tablet, Chew 1 tablet daily, Disp: , Rfl:     atenolol (TENORMIN) 50 mg tablet, TAKE 1 TABLET DAILY, Disp: 90 tablet, Rfl: 4    atorvastatin (LIPITOR) 80 mg tablet, TAKE 1 TABLET DAILY, Disp: 90 tablet, Rfl: 4    cloNIDine (CATAPRES) 0 3 mg tablet, TAKE 1 TABLET THREE TIMES A DAY, Disp: 270 tablet, Rfl: 4    clopidogrel (PLAVIX) 75 mg tablet, Take 1 tablet (75 mg total) by mouth daily, Disp: 90 tablet, Rfl: 3    DENTAGEL 1 1 % GEL, , Disp: , Rfl: 5    hydrochlorothiazide (HYDRODIURIL) 25 mg tablet, TAKE 1 TABLET DAILY, Disp: 90 tablet, Rfl: 4    lisinopril (ZESTRIL) 40 mg tablet, Take 1 tablet (40 mg total) by mouth daily, Disp: 90 tablet, Rfl: 3    metFORMIN (GLUCOPHAGE-XR) 500 mg 24 hr tablet, Take 1 tablet (500 mg total) by mouth 2 (two) times a day with meals, Disp: 180 tablet, Rfl: 3    Omega-3 1000 MG CAPS, Take by mouth 3 (three) times a day , Disp: , Rfl:   No Known Allergies  Past Surgical History:   Procedure Laterality Date    ACHILLES TENDON SURGERY Left     COLONOSCOPY      hyperplastic polyp    CYST REMOVAL      ESOPHAGOGASTRODUODENOSCOPY N/A 1/23/2018    Procedure: ESOPHAGOGASTRODUODENOSCOPY (EGD); Surgeon: Beryle Homme, MD;  Location: [facility]; Service: Gastroenterology    ESOPHAGOGASTRODUODENOSCOPY      HERNIA REPAIR      HERNIA REPAIR Bilateral 8/18/2017    Procedure: LAPAROSCOPIC INGUINAL HERNIA REPAIR WITH MESH;  Surgeon: Violeta Rojas MD;  Location: [facility];  Service: General    MS ESOPHAGOGASTRODUODENOSCOPY TRANSORAL DIAGNOSTIC N/A 3/26/2018    Procedure: ESOPHAGOGASTRODUODENOSCOPY (EGD); Surgeon: Matt Garcia MD;  [facility]: [facility]; Service: Gastroenterology    MS ESOPHAGOGASTRODUODENOSCOPY TRANSORAL DIAGNOSTIC N/A 5/14/2018    Procedure: ESOPHAGOGASTRODUODENOSCOPY (EGD); Surgeon: Matt Garcia MD;  [facility]; Service: Gastroenterology    TIBIA FRACTURE SURGERY Left      Social History     Objective: There were no vitals filed for this visit  Physical Exam   Constitutional: He is oriented to person, place, and time  He appears well-developed  HENT:   Head: Normocephalic and atraumatic  Eyes: Pupils are equal, round, and reactive to light  Neck: Neck supple  Cardiovascular: Normal rate and regular rhythm  No murmur heard  Pulmonary/Chest: Breath sounds normal  He has no wheezes  He has no rales  Abdominal: Soft  There is no tenderness  Musculoskeletal: Normal range of motion  He exhibits no edema or tenderness  Lymphadenopathy:     He has no cervical adenopathy  Neurological: He is alert and oriented to person, place, and time  He has normal reflexes  No cranial nerve deficit  Skin: No rash noted  No erythema  Psychiatric: He has a normal mood and affect  His behavior is normal          Labs: I personally reviewed the labs and imaging pertinent to this patient care

## 2020-07-10 DIAGNOSIS — I25.10 CAD S/P PERCUTANEOUS CORONARY ANGIOPLASTY: ICD-10-CM

## 2020-07-10 DIAGNOSIS — Z98.61 CAD S/P PERCUTANEOUS CORONARY ANGIOPLASTY: ICD-10-CM

## 2020-07-10 RX ORDER — CLOPIDOGREL BISULFATE 75 MG/1
TABLET ORAL
Qty: 90 TABLET | Refills: 3 | Status: SHIPPED | OUTPATIENT
Start: 2020-07-10 | End: 2020-09-24 | Stop reason: SDUPTHER

## 2020-07-13 ENCOUNTER — HOSPITAL ENCOUNTER (OUTPATIENT)
Dept: NON INVASIVE DIAGNOSTICS | Facility: CLINIC | Age: 70
Discharge: HOME | End: 2020-07-13
Payer: MEDICARE

## 2020-07-13 DIAGNOSIS — I25.119 ATHEROSCLEROSIS OF NATIVE CORONARY ARTERY OF NATIVE HEART WITH ANGINA PECTORIS (HCC): ICD-10-CM

## 2020-07-13 PROCEDURE — 93306 TTE W/DOPPLER COMPLETE: CPT | Performed by: INTERNAL MEDICINE

## 2020-07-13 PROCEDURE — 93306 TTE W/DOPPLER COMPLETE: CPT

## 2020-07-14 ENCOUNTER — TELEPHONE (OUTPATIENT)
Dept: INTERNAL MEDICINE CLINIC | Facility: CLINIC | Age: 70
End: 2020-07-14

## 2020-09-20 DIAGNOSIS — E11.69 TYPE 2 DIABETES MELLITUS WITH OTHER SPECIFIED COMPLICATION, WITHOUT LONG-TERM CURRENT USE OF INSULIN (HCC): ICD-10-CM

## 2020-09-22 RX ORDER — METFORMIN HYDROCHLORIDE 500 MG/1
TABLET, EXTENDED RELEASE ORAL
Qty: 180 TABLET | Refills: 3 | Status: SHIPPED | OUTPATIENT
Start: 2020-09-22 | End: 2021-10-07 | Stop reason: SDUPTHER

## 2020-09-24 DIAGNOSIS — Z98.61 CAD S/P PERCUTANEOUS CORONARY ANGIOPLASTY: ICD-10-CM

## 2020-09-24 DIAGNOSIS — I25.10 CAD S/P PERCUTANEOUS CORONARY ANGIOPLASTY: ICD-10-CM

## 2020-09-25 RX ORDER — CLOPIDOGREL BISULFATE 75 MG/1
75 TABLET ORAL DAILY
Qty: 90 TABLET | Refills: 3 | Status: SHIPPED | OUTPATIENT
Start: 2020-09-25 | End: 2021-04-21 | Stop reason: ALTCHOICE

## 2020-10-08 DIAGNOSIS — I10 HYPERTENSION, UNSPECIFIED TYPE: ICD-10-CM

## 2020-10-08 RX ORDER — ATENOLOL 50 MG/1
TABLET ORAL
Qty: 90 TABLET | Refills: 3 | Status: SHIPPED | OUTPATIENT
Start: 2020-10-08 | End: 2021-11-26 | Stop reason: SDUPTHER

## 2020-10-14 DIAGNOSIS — E11.69 TYPE 2 DIABETES MELLITUS WITH OTHER SPECIFIED COMPLICATION, WITHOUT LONG-TERM CURRENT USE OF INSULIN (HCC): ICD-10-CM

## 2020-10-20 RX ORDER — LISINOPRIL 40 MG/1
40 TABLET ORAL DAILY
Qty: 90 TABLET | Refills: 3 | Status: SHIPPED | OUTPATIENT
Start: 2020-10-20 | End: 2021-11-01

## 2020-11-06 ENCOUNTER — TELEPHONE (OUTPATIENT)
Dept: GASTROENTEROLOGY | Facility: CLINIC | Age: 70
End: 2020-11-06

## 2020-11-06 ENCOUNTER — DOCUMENTATION (OUTPATIENT)
Dept: GASTROENTEROLOGY | Facility: CLINIC | Age: 70
End: 2020-11-06

## 2020-11-09 ENCOUNTER — TELEPHONE (OUTPATIENT)
Dept: OTHER | Facility: OTHER | Age: 70
End: 2020-11-09

## 2020-11-10 ENCOUNTER — TELEPHONE (OUTPATIENT)
Dept: GASTROENTEROLOGY | Facility: CLINIC | Age: 70
End: 2020-11-10

## 2020-11-10 RX ORDER — TRAMADOL HYDROCHLORIDE 50 MG/1
50 TABLET ORAL AS NEEDED
COMMUNITY
End: 2021-03-03 | Stop reason: HOSPADM

## 2020-11-11 ENCOUNTER — PREP FOR PROCEDURE (OUTPATIENT)
Dept: GASTROENTEROLOGY | Facility: CLINIC | Age: 70
End: 2020-11-11

## 2020-11-11 ENCOUNTER — OFFICE VISIT (OUTPATIENT)
Dept: GASTROENTEROLOGY | Facility: CLINIC | Age: 70
End: 2020-11-11
Payer: MEDICARE

## 2020-11-11 VITALS
HEART RATE: 72 BPM | TEMPERATURE: 98.7 F | HEIGHT: 69 IN | BODY MASS INDEX: 28.91 KG/M2 | DIASTOLIC BLOOD PRESSURE: 78 MMHG | SYSTOLIC BLOOD PRESSURE: 122 MMHG | WEIGHT: 195.2 LBS

## 2020-11-11 DIAGNOSIS — Z01.812 ENCOUNTER FOR PREOPERATIVE SCREENING LABORATORY TESTING FOR COVID-19 VIRUS: ICD-10-CM

## 2020-11-11 DIAGNOSIS — Z20.822 ENCOUNTER FOR PREOPERATIVE SCREENING LABORATORY TESTING FOR COVID-19 VIRUS: ICD-10-CM

## 2020-11-11 DIAGNOSIS — Z86.010 HISTORY OF COLON POLYPS: Primary | ICD-10-CM

## 2020-11-11 PROCEDURE — 99213 OFFICE O/P EST LOW 20 MIN: CPT | Performed by: PHYSICIAN ASSISTANT

## 2020-11-25 DIAGNOSIS — Z20.822 ENCOUNTER FOR PREOPERATIVE SCREENING LABORATORY TESTING FOR COVID-19 VIRUS: ICD-10-CM

## 2020-11-25 DIAGNOSIS — Z01.812 ENCOUNTER FOR PREOPERATIVE SCREENING LABORATORY TESTING FOR COVID-19 VIRUS: ICD-10-CM

## 2020-11-25 PROCEDURE — U0003 INFECTIOUS AGENT DETECTION BY NUCLEIC ACID (DNA OR RNA); SEVERE ACUTE RESPIRATORY SYNDROME CORONAVIRUS 2 (SARS-COV-2) (CORONAVIRUS DISEASE [COVID-19]), AMPLIFIED PROBE TECHNIQUE, MAKING USE OF HIGH THROUGHPUT TECHNOLOGIES AS DESCRIBED BY CMS-2020-01-R: HCPCS | Performed by: INTERNAL MEDICINE

## 2020-11-26 LAB — SARS-COV-2 RNA SPEC QL NAA+PROBE: NOT DETECTED

## 2020-12-02 ENCOUNTER — ANESTHESIA EVENT (OUTPATIENT)
Dept: GASTROENTEROLOGY | Facility: HOSPITAL | Age: 70
End: 2020-12-02
Payer: MEDICARE

## 2020-12-02 ENCOUNTER — ANESTHESIA (OUTPATIENT)
Dept: GASTROENTEROLOGY | Facility: HOSPITAL | Age: 70
End: 2020-12-02
Payer: MEDICARE

## 2020-12-02 ENCOUNTER — HOSPITAL ENCOUNTER (OUTPATIENT)
Dept: GASTROENTEROLOGY | Facility: HOSPITAL | Age: 70
Setting detail: OUTPATIENT SURGERY
Discharge: HOME | End: 2020-12-02
Attending: INTERNAL MEDICINE
Payer: MEDICARE

## 2020-12-02 ENCOUNTER — HOSPITAL ENCOUNTER (OUTPATIENT)
Dept: GASTROENTEROLOGY | Facility: HOSPITAL | Age: 70
Setting detail: OUTPATIENT SURGERY
End: 2020-12-02
Attending: INTERNAL MEDICINE | Admitting: INTERNAL MEDICINE
Payer: MEDICARE

## 2020-12-02 VITALS — HEART RATE: 67 BPM

## 2020-12-02 VITALS
RESPIRATION RATE: 20 BRPM | OXYGEN SATURATION: 97 % | HEART RATE: 58 BPM | TEMPERATURE: 97.8 F | HEIGHT: 68 IN | SYSTOLIC BLOOD PRESSURE: 151 MMHG | DIASTOLIC BLOOD PRESSURE: 83 MMHG | WEIGHT: 192.02 LBS | BODY MASS INDEX: 29.1 KG/M2

## 2020-12-02 DIAGNOSIS — Z86.010 HISTORY OF COLON POLYPS: ICD-10-CM

## 2020-12-02 LAB — GLUCOSE SERPL-MCNC: 106 MG/DL (ref 65–140)

## 2020-12-02 PROCEDURE — 82948 REAGENT STRIP/BLOOD GLUCOSE: CPT

## 2020-12-02 PROCEDURE — G0121 COLON CA SCRN NOT HI RSK IND: HCPCS | Performed by: INTERNAL MEDICINE

## 2020-12-02 RX ORDER — PROPOFOL 10 MG/ML
INJECTION, EMULSION INTRAVENOUS AS NEEDED
Status: DISCONTINUED | OUTPATIENT
Start: 2020-12-02 | End: 2020-12-02

## 2020-12-02 RX ORDER — LIDOCAINE HYDROCHLORIDE 10 MG/ML
INJECTION, SOLUTION EPIDURAL; INFILTRATION; INTRACAUDAL; PERINEURAL AS NEEDED
Status: DISCONTINUED | OUTPATIENT
Start: 2020-12-02 | End: 2020-12-02

## 2020-12-02 RX ORDER — SODIUM CHLORIDE, SODIUM LACTATE, POTASSIUM CHLORIDE, CALCIUM CHLORIDE 600; 310; 30; 20 MG/100ML; MG/100ML; MG/100ML; MG/100ML
100 INJECTION, SOLUTION INTRAVENOUS CONTINUOUS
Status: DISCONTINUED | OUTPATIENT
Start: 2020-12-02 | End: 2020-12-06 | Stop reason: HOSPADM

## 2020-12-02 RX ORDER — SODIUM CHLORIDE, SODIUM LACTATE, POTASSIUM CHLORIDE, CALCIUM CHLORIDE 600; 310; 30; 20 MG/100ML; MG/100ML; MG/100ML; MG/100ML
125 INJECTION, SOLUTION INTRAVENOUS CONTINUOUS
Status: CANCELLED | OUTPATIENT
Start: 2020-12-02

## 2020-12-02 RX ADMIN — SODIUM CHLORIDE, SODIUM LACTATE, POTASSIUM CHLORIDE, AND CALCIUM CHLORIDE 100 ML/HR: .6; .31; .03; .02 INJECTION, SOLUTION INTRAVENOUS at 08:12

## 2020-12-02 RX ADMIN — PROPOFOL 30 MG: 10 INJECTION, EMULSION INTRAVENOUS at 08:41

## 2020-12-02 RX ADMIN — PROPOFOL 30 MG: 10 INJECTION, EMULSION INTRAVENOUS at 08:39

## 2020-12-02 RX ADMIN — LIDOCAINE HYDROCHLORIDE 50 MG: 10 INJECTION, SOLUTION EPIDURAL; INFILTRATION; INTRACAUDAL; PERINEURAL at 08:37

## 2020-12-02 RX ADMIN — PROPOFOL 120 MG: 10 INJECTION, EMULSION INTRAVENOUS at 08:37

## 2020-12-04 DIAGNOSIS — I10 ESSENTIAL HYPERTENSION: ICD-10-CM

## 2020-12-07 RX ORDER — CLONIDINE HYDROCHLORIDE 0.3 MG/1
0.3 TABLET ORAL 3 TIMES DAILY
Qty: 270 TABLET | Refills: 4 | Status: SHIPPED | OUTPATIENT
Start: 2020-12-07 | End: 2021-02-17

## 2020-12-08 DIAGNOSIS — E78.5 HYPERLIPIDEMIA, UNSPECIFIED HYPERLIPIDEMIA TYPE: ICD-10-CM

## 2020-12-08 RX ORDER — ATORVASTATIN CALCIUM 80 MG/1
TABLET, FILM COATED ORAL
Qty: 90 TABLET | Refills: 3 | Status: SHIPPED | OUTPATIENT
Start: 2020-12-08 | End: 2022-01-05 | Stop reason: SDUPTHER

## 2020-12-28 ENCOUNTER — TELEPHONE (OUTPATIENT)
Dept: INTERNAL MEDICINE CLINIC | Facility: CLINIC | Age: 70
End: 2020-12-28

## 2020-12-28 ENCOUNTER — APPOINTMENT (OUTPATIENT)
Dept: LAB | Facility: CLINIC | Age: 70
End: 2020-12-28
Payer: MEDICARE

## 2020-12-28 ENCOUNTER — OFFICE VISIT (OUTPATIENT)
Dept: INTERNAL MEDICINE CLINIC | Facility: CLINIC | Age: 70
End: 2020-12-28
Payer: MEDICARE

## 2020-12-28 VITALS
OXYGEN SATURATION: 98 % | HEIGHT: 67 IN | WEIGHT: 192.6 LBS | BODY MASS INDEX: 30.23 KG/M2 | HEART RATE: 71 BPM | SYSTOLIC BLOOD PRESSURE: 122 MMHG | TEMPERATURE: 96.7 F | DIASTOLIC BLOOD PRESSURE: 74 MMHG

## 2020-12-28 DIAGNOSIS — E83.52 HYPERCALCEMIA: ICD-10-CM

## 2020-12-28 DIAGNOSIS — Z12.5 PROSTATE CANCER SCREENING: ICD-10-CM

## 2020-12-28 DIAGNOSIS — E11.69 TYPE 2 DIABETES MELLITUS WITH OTHER SPECIFIED COMPLICATION, WITHOUT LONG-TERM CURRENT USE OF INSULIN (HCC): ICD-10-CM

## 2020-12-28 LAB
ANION GAP SERPL CALCULATED.3IONS-SCNC: 1 MMOL/L (ref 4–13)
BUN SERPL-MCNC: 15 MG/DL (ref 5–25)
CA-I BLD-SCNC: 1.29 MMOL/L (ref 1.12–1.32)
CALCIUM SERPL-MCNC: 10.2 MG/DL (ref 8.3–10.1)
CHLORIDE SERPL-SCNC: 107 MMOL/L (ref 100–108)
CO2 SERPL-SCNC: 31 MMOL/L (ref 21–32)
CREAT SERPL-MCNC: 0.75 MG/DL (ref 0.6–1.3)
EST. AVERAGE GLUCOSE BLD GHB EST-MCNC: 126 MG/DL
GFR SERPL CREATININE-BSD FRML MDRD: 106 ML/MIN/1.73SQ M
GLUCOSE P FAST SERPL-MCNC: 109 MG/DL (ref 65–99)
HBA1C MFR BLD: 6 %
IGA SERPL-MCNC: 241 MG/DL (ref 70–400)
IGG SERPL-MCNC: 1030 MG/DL (ref 700–1600)
IGM SERPL-MCNC: 109 MG/DL (ref 40–230)
LEFT EYE DIABETIC RETINOPATHY: ABNORMAL
LEFT EYE IMAGE QUALITY: ABNORMAL
LEFT EYE MACULAR EDEMA: ABNORMAL
LEFT EYE OTHER RETINOPATHY: ABNORMAL
POTASSIUM SERPL-SCNC: 4.4 MMOL/L (ref 3.5–5.3)
PSA SERPL-MCNC: 0.8 NG/ML (ref 0–4)
PTH-INTACT SERPL-MCNC: 120.8 PG/ML (ref 18.4–80.1)
RIGHT EYE DIABETIC RETINOPATHY: ABNORMAL
RIGHT EYE IMAGE QUALITY: ABNORMAL
RIGHT EYE MACULAR EDEMA: ABNORMAL
RIGHT EYE OTHER RETINOPATHY: ABNORMAL
SEVERITY (EYE EXAM): ABNORMAL
SODIUM SERPL-SCNC: 139 MMOL/L (ref 136–145)

## 2020-12-28 PROCEDURE — 82330 ASSAY OF CALCIUM: CPT

## 2020-12-28 PROCEDURE — 36415 COLL VENOUS BLD VENIPUNCTURE: CPT

## 2020-12-28 PROCEDURE — 82397 CHEMILUMINESCENT ASSAY: CPT

## 2020-12-28 PROCEDURE — 92250 FUNDUS PHOTOGRAPHY W/I&R: CPT | Performed by: INTERNAL MEDICINE

## 2020-12-28 PROCEDURE — 82784 ASSAY IGA/IGD/IGG/IGM EACH: CPT

## 2020-12-28 PROCEDURE — 84165 PROTEIN E-PHORESIS SERUM: CPT

## 2020-12-28 PROCEDURE — 84165 PROTEIN E-PHORESIS SERUM: CPT | Performed by: PATHOLOGY

## 2020-12-28 PROCEDURE — 99214 OFFICE O/P EST MOD 30 MIN: CPT | Performed by: INTERNAL MEDICINE

## 2020-12-28 PROCEDURE — G0103 PSA SCREENING: HCPCS

## 2020-12-28 PROCEDURE — G0439 PPPS, SUBSEQ VISIT: HCPCS | Performed by: INTERNAL MEDICINE

## 2020-12-28 PROCEDURE — 83970 ASSAY OF PARATHORMONE: CPT

## 2020-12-28 PROCEDURE — 80048 BASIC METABOLIC PNL TOTAL CA: CPT

## 2020-12-28 PROCEDURE — 83036 HEMOGLOBIN GLYCOSYLATED A1C: CPT

## 2020-12-29 ENCOUNTER — TELEPHONE (OUTPATIENT)
Dept: INTERNAL MEDICINE CLINIC | Facility: CLINIC | Age: 70
End: 2020-12-29

## 2020-12-29 LAB
ALBUMIN SERPL ELPH-MCNC: 4.17 G/DL (ref 3.5–5)
ALBUMIN SERPL ELPH-MCNC: 57.9 % (ref 52–65)
ALPHA1 GLOB SERPL ELPH-MCNC: 0.48 G/DL (ref 0.1–0.4)
ALPHA1 GLOB SERPL ELPH-MCNC: 6.6 % (ref 2.5–5)
ALPHA2 GLOB SERPL ELPH-MCNC: 0.71 G/DL (ref 0.4–1.2)
ALPHA2 GLOB SERPL ELPH-MCNC: 9.9 % (ref 7–13)
BETA GLOB ABNORMAL SERPL ELPH-MCNC: 0.49 G/DL (ref 0.4–0.8)
BETA1 GLOB SERPL ELPH-MCNC: 6.8 % (ref 5–13)
BETA2 GLOB SERPL ELPH-MCNC: 4.5 % (ref 2–8)
BETA2+GAMMA GLOB SERPL ELPH-MCNC: 0.32 G/DL (ref 0.2–0.5)
GAMMA GLOB ABNORMAL SERPL ELPH-MCNC: 1.03 G/DL (ref 0.5–1.6)
GAMMA GLOB SERPL ELPH-MCNC: 14.3 % (ref 12–22)
IGG/ALB SER: 1.38 {RATIO} (ref 1.1–1.8)
PROT PATTERN SERPL ELPH-IMP: ABNORMAL
PROT SERPL-MCNC: 7.2 G/DL (ref 6.4–8.2)

## 2020-12-31 ENCOUNTER — TELEMEDICINE (OUTPATIENT)
Dept: INTERNAL MEDICINE CLINIC | Facility: CLINIC | Age: 70
End: 2020-12-31
Payer: MEDICARE

## 2020-12-31 ENCOUNTER — TELEPHONE (OUTPATIENT)
Dept: INTERNAL MEDICINE CLINIC | Facility: CLINIC | Age: 70
End: 2020-12-31

## 2020-12-31 DIAGNOSIS — E83.52 HYPERCALCEMIA: Primary | ICD-10-CM

## 2020-12-31 DIAGNOSIS — I10 ESSENTIAL HYPERTENSION: ICD-10-CM

## 2020-12-31 PROCEDURE — 99441 PR PHYS/QHP TELEPHONE EVALUATION 5-10 MIN: CPT | Performed by: INTERNAL MEDICINE

## 2020-12-31 RX ORDER — TORSEMIDE 10 MG/1
10 TABLET ORAL DAILY
Qty: 30 TABLET | Refills: 2 | Status: SHIPPED | OUTPATIENT
Start: 2020-12-31 | End: 2021-02-25

## 2021-01-08 LAB — PTH RELATED PROT SERPL-SCNC: <2 PMOL/L

## 2021-01-19 LAB
LEFT EYE DIABETIC RETINOPATHY: NORMAL
LEFT EYE DIABETIC RETINOPATHY: NORMAL
RIGHT EYE DIABETIC RETINOPATHY: NORMAL
RIGHT EYE DIABETIC RETINOPATHY: NORMAL
SEVERITY (EYE EXAM): NORMAL

## 2021-01-20 ENCOUNTER — OFFICE VISIT (OUTPATIENT)
Dept: INTERNAL MEDICINE CLINIC | Facility: CLINIC | Age: 71
End: 2021-01-20
Payer: MEDICARE

## 2021-01-20 ENCOUNTER — TELEPHONE (OUTPATIENT)
Dept: ADMINISTRATIVE | Facility: OTHER | Age: 71
End: 2021-01-20

## 2021-01-20 ENCOUNTER — APPOINTMENT (OUTPATIENT)
Dept: LAB | Facility: CLINIC | Age: 71
End: 2021-01-20
Payer: MEDICARE

## 2021-01-20 ENCOUNTER — TELEPHONE (OUTPATIENT)
Dept: INTERNAL MEDICINE CLINIC | Facility: CLINIC | Age: 71
End: 2021-01-20

## 2021-01-20 VITALS
HEART RATE: 69 BPM | OXYGEN SATURATION: 98 % | HEIGHT: 67 IN | TEMPERATURE: 98.3 F | BODY MASS INDEX: 30.57 KG/M2 | SYSTOLIC BLOOD PRESSURE: 132 MMHG | DIASTOLIC BLOOD PRESSURE: 84 MMHG | WEIGHT: 194.8 LBS

## 2021-01-20 DIAGNOSIS — E11.69 TYPE 2 DIABETES MELLITUS WITH OTHER SPECIFIED COMPLICATION, WITHOUT LONG-TERM CURRENT USE OF INSULIN (HCC): ICD-10-CM

## 2021-01-20 DIAGNOSIS — E83.52 HYPERCALCEMIA: Primary | ICD-10-CM

## 2021-01-20 DIAGNOSIS — I25.119 ATHEROSCLEROSIS OF NATIVE CORONARY ARTERY OF NATIVE HEART WITH ANGINA PECTORIS (HCC): ICD-10-CM

## 2021-01-20 DIAGNOSIS — E83.52 HYPERCALCEMIA: ICD-10-CM

## 2021-01-20 LAB
ANION GAP SERPL CALCULATED.3IONS-SCNC: 3 MMOL/L (ref 4–13)
BUN SERPL-MCNC: 13 MG/DL (ref 5–25)
CA-I BLD-SCNC: 1.27 MMOL/L (ref 1.12–1.32)
CALCIUM SERPL-MCNC: 10.2 MG/DL (ref 8.3–10.1)
CHLORIDE SERPL-SCNC: 108 MMOL/L (ref 100–108)
CO2 SERPL-SCNC: 31 MMOL/L (ref 21–32)
CREAT SERPL-MCNC: 0.75 MG/DL (ref 0.6–1.3)
GFR SERPL CREATININE-BSD FRML MDRD: 106 ML/MIN/1.73SQ M
GLUCOSE P FAST SERPL-MCNC: 122 MG/DL (ref 65–99)
IGA SERPL-MCNC: 245 MG/DL (ref 70–400)
IGG SERPL-MCNC: 1010 MG/DL (ref 700–1600)
IGM SERPL-MCNC: 113 MG/DL (ref 40–230)
POTASSIUM SERPL-SCNC: 4.1 MMOL/L (ref 3.5–5.3)
PTH-INTACT SERPL-MCNC: 134.8 PG/ML (ref 18.4–80.1)
SODIUM SERPL-SCNC: 142 MMOL/L (ref 136–145)

## 2021-01-20 PROCEDURE — 82784 ASSAY IGA/IGD/IGG/IGM EACH: CPT

## 2021-01-20 PROCEDURE — 99214 OFFICE O/P EST MOD 30 MIN: CPT | Performed by: INTERNAL MEDICINE

## 2021-01-20 PROCEDURE — 82397 CHEMILUMINESCENT ASSAY: CPT

## 2021-01-20 PROCEDURE — 80048 BASIC METABOLIC PNL TOTAL CA: CPT

## 2021-01-20 PROCEDURE — 84165 PROTEIN E-PHORESIS SERUM: CPT | Performed by: PATHOLOGY

## 2021-01-20 PROCEDURE — 36415 COLL VENOUS BLD VENIPUNCTURE: CPT

## 2021-01-20 PROCEDURE — 82330 ASSAY OF CALCIUM: CPT

## 2021-01-20 PROCEDURE — 84165 PROTEIN E-PHORESIS SERUM: CPT

## 2021-01-20 PROCEDURE — 83970 ASSAY OF PARATHORMONE: CPT

## 2021-01-20 RX ORDER — SODIUM FLUORIDE 0.9 MG/ML
MOUTHWASH DENTAL
COMMUNITY
Start: 2020-12-29

## 2021-01-20 NOTE — LETTER
Diabetic Eye Exam Form    Date Requested: 21  Patient: Leonor Salcido  Patient : 1950   Referring Provider: Adan Patrick MD    Dilated Retinal Exam, Optomap-Iris Exam, or Fundus Photography Done         Yes ([place] one above)         No     Date of Diabetic Eye Exam ______________________________  Left Eye      Exam did show retinopathy    Exam did not show retinopathy         Mild       Moderate       None       Proliferative       Severe     Right Eye     Exam did show retinopathy    Exam did not show retinopathy         Mild       Moderate       None       Proliferative       Severe     Comments __________________________________________________________    Practice Providing Exam ______________________________________________    Exam Performed By (print name) _______________________________________      Provider Signature ___________________________________________________      These reports are needed for  compliance    Please fax this completed form and a copy of the Diabetic Eye Exam report to our office located at [place] [address] as soon as possible to [phone] attention Maryellen: Phone [phone]    We thank you for your assistance in treating our mutual patient

## 2021-01-20 NOTE — PROGRESS NOTES
Assessment/Plan:       Diagnoses and all orders for this visit:    Hypercalcemia  -     Basic metabolic panel; Future  -     Calcium, ionized; Future  -     IgG, IgA, IgM; Future  -     Protein electrophoresis, serum; Future  -     PTH, intact; Future  -     PTH-related peptide; Future    Type 2 diabetes mellitus with other specified complication, without long-term current use of insulin (HCC)    Atherosclerosis of native coronary artery of native heart with angina pectoris ([address] )    Other orders  -     PreviDent 0 2 % SOLN; RINSE WITH 10ML FOR 1 MINUTE THEN SPIT OUT AT BEDTIME DO NOT SWALLOW  Subjective:      Patient ID: Fuad Palacios is a 65 y o  male  Metabolic syndrome  Doing well  Guidelines have been met    hypercalcemia was noted  Suspicion for PTH secreting adenoma  However, I discontinued hydrochlorothiazide and substituted torsemide  Blood pressure is acceptable  Now, he needs to recheck those metabolic parameters  The patient feels fairly well  Echocardiogram done Dec 2015 documented is concentric LVH with normal systolic function, and mild aortic regurgitation and mitral regurgitation  No change in 12 months  Echocardiogram in 2017 about the same  Now most recent echo shows some progression with moderate aortic stenosis  A murmur is pretty apparent in the right sternal border 2nd intercostal space but he has no symptoms referable to this  Cardiac catheterization 7/25/2018  documented noncritical CAD with 60% LAD lesion and a large vessel with good flow not requiring intervention  This also documented hypertrophic cardiomyopathy with obliteration of the cavity an ejection fraction of 80%  Aortic ultrasound done in 2010 documented diffuse plaque but no aneurysm  CAD that is followed by cardiology;   Had chest pain in January2018  and had 3 stents placed  He had no complaints upon this visit  Doing quite well    No further cardiac symptoms for the past  3 years right eye cataract surgery is planned  The patient has no contraindications to proceeding      The following portions of the patient's history were reviewed and updated as appropriate:   He has a past medical history of Benign neoplasm of large intestine, Colon polyp, Diabetes mellitus ([facility] ), Heart murmur, History of aortic regurgitation, History of constipation, History of degenerative joint disease, History of nocturia, History of obesity, History of sebaceous cyst, History of shortness of breath, History of urinary frequency, Hyperlipidemia, Hypertension, Myocardial infarction ([facility] [address] ) (2018), Polyposis coli, and Ulcer of esophagus  ,  does not have any pertinent problems on file  ,   has a past surgical history that includes Hernia repair; Tibia fracture surgery (Left); Achilles tendon surgery (Left); Colonoscopy; Hernia repair (Bilateral, 8/18/2017); Esophagogastroduodenoscopy (N/A, 1/23/2018); Esophagogastroduodenoscopy; pr esophagogastroduodenoscopy transoral diagnostic (N/A, 3/26/2018); pr esophagogastroduodenoscopy transoral diagnostic (N/A, 5/14/2018); Cyst Removal; and Coronary angioplasty with stent  ,  family history includes Heart disease in his mother; Other in his father; Rheumatic fever in his father  ,   reports that he has been smoking cigarettes  He has a 35 00 pack-year smoking history  He has never used smokeless tobacco  He reports current alcohol use  He reports that he does not use drugs  ,  has No Known Allergies     Current Outpatient Medications   Medication Sig Dispense Refill    aspirin 81 mg chewable tablet Chew 1 tablet daily      atenolol (TENORMIN) 50 mg tablet TAKE 1 TABLET DAILY 90 tablet 3    atorvastatin (LIPITOR) 80 mg tablet TAKE 1 TABLET DAILY 90 tablet 3    cloNIDine (CATAPRES) 0 3 mg tablet Take 1 tablet (0 3 mg total) by mouth 3 (three) times a day 270 tablet 4    clopidogrel (PLAVIX) 75 mg tablet Take 1 tablet (75 mg total) by mouth daily 90 tablet 3    DENTAGEL 1 1 % GEL 5    ferrous sulfate 325 (65 Fe) mg tablet Take 325 mg by mouth daily with breakfast      lisinopril (ZESTRIL) 40 mg tablet Take 1 tablet (40 mg total) by mouth daily 90 tablet 3    metFORMIN (GLUCOPHAGE-XR) 500 mg 24 hr tablet TAKE 1 TABLET TWICE A DAY WITH MEALS 180 tablet 3    Omega-3 1000 MG CAPS Take by mouth 3 (three) times a day       PreviDent 0 2 % SOLN RINSE WITH 10ML FOR 1 MINUTE THEN SPIT OUT AT BEDTIME DO NOT SWALLOW   torsemide (DEMADEX) 10 mg tablet Take 1 tablet (10 mg total) by mouth daily 30 tablet 2    traMADol (ULTRAM) 50 mg tablet Take 50 mg by mouth as needed        No current facility-administered medications for this visit  Review of Systems   Eyes: Positive for visual disturbance  Musculoskeletal: Positive for arthralgias  All other systems reviewed and are negative  Objective:  Vitals:    01/20/21 0827   BP: 132/84   Pulse: 69   Temp: 98 3 °F (36 8 °C)   SpO2: 98%      Physical Exam  Constitutional:       Appearance: He is well-developed and normal weight  Comments: An alert male patient who appears to be the stated age   HENT:      Head: Normocephalic and atraumatic  Eyes:      Pupils: Pupils are equal, round, and reactive to light  Neck:      Musculoskeletal: Normal range of motion and neck supple  Thyroid: No thyromegaly  Trachea: No tracheal deviation  Cardiovascular:      Rate and Rhythm: Normal rate and regular rhythm  Heart sounds: Normal heart sounds  No murmur  No gallop  Pulmonary:      Effort: Pulmonary effort is normal  No respiratory distress  Breath sounds: Normal breath sounds  No wheezing or rales  Abdominal:      General: Bowel sounds are normal       Palpations: Abdomen is soft  Tenderness: There is no abdominal tenderness  Musculoskeletal: Normal range of motion  General: No tenderness or deformity  Skin:     General: Skin is warm and dry     Neurological:      General: No focal deficit present  Mental Status: He is alert and oriented to person, place, and time  Coordination: Coordination normal       Deep Tendon Reflexes: Reflexes are normal and symmetric  Patient Instructions    Blood pressure control continues to be good off hydrochlorothiazide with substitution of torsemide  Will recheck calcium and hypercalcemia parameters with differential diagnosis of hydrochlorothiazide side effect versus parathyroid secreting adenoma  good blood sugar control with hemoglobin A1c 6  Cataract surgery planned  The patient has no contraindications and it should proceed    There is no indication for provocative pre-surgical cardiac testing

## 2021-01-20 NOTE — TELEPHONE ENCOUNTER
----- Message from Patricia Osborn, [address] [place] sent at 1/20/2021  8:35 AM EST -----  [facility]: [facility]  01/20/21 8:36 AM    Hello, our patient Akiko Gross has had Diabetic Eye Exam completed/performed   Please assist in updating the patient chart by making an External outreach to [facility] eye [facility] facility located in [facility] The date of service is per pt states this was yesterday w/ dr Rufino Aviles P#[phone]    Thank you,  Patricia Osborn MA  [facility]  [address]

## 2021-01-20 NOTE — TELEPHONE ENCOUNTER
----- Message from Jacklyn Granados MD sent at 1/20/2021  2:30 PM EST -----  Schedule a virtual visit with me next Monday or Tuesday

## 2021-01-20 NOTE — PATIENT INSTRUCTIONS
Blood pressure control continues to be good off hydrochlorothiazide with substitution of torsemide  Will recheck calcium and hypercalcemia parameters with differential diagnosis of hydrochlorothiazide side effect versus parathyroid secreting adenoma  good blood sugar control with hemoglobin A1c 6  Cataract surgery planned  The patient has no contraindications and it should proceed    There is no indication for provocative pre-surgical cardiac testing

## 2021-01-20 NOTE — TELEPHONE ENCOUNTER
Upon review of the In Basket request and the patient's chart, initial outreach has been made via fax, please see Contacts section for details       Thank you  Charmayne Bathe, MA

## 2021-01-21 ENCOUNTER — TELEPHONE (OUTPATIENT)
Dept: INTERNAL MEDICINE CLINIC | Facility: CLINIC | Age: 71
End: 2021-01-21

## 2021-01-21 ENCOUNTER — TELEPHONE (OUTPATIENT)
Dept: FAMILY MEDICINE CLINIC | Facility: HOSPITAL | Age: 71
End: 2021-01-21

## 2021-01-21 DIAGNOSIS — E21.3 HYPERPARATHYROIDISM (HCC): ICD-10-CM

## 2021-01-21 DIAGNOSIS — E83.52 HYPERCALCEMIA: Primary | ICD-10-CM

## 2021-01-21 LAB
ALBUMIN SERPL ELPH-MCNC: 3.96 G/DL (ref 3.5–5)
ALBUMIN SERPL ELPH-MCNC: 56.6 % (ref 52–65)
ALPHA1 GLOB SERPL ELPH-MCNC: 0.46 G/DL (ref 0.1–0.4)
ALPHA1 GLOB SERPL ELPH-MCNC: 6.5 % (ref 2.5–5)
ALPHA2 GLOB SERPL ELPH-MCNC: 0.76 G/DL (ref 0.4–1.2)
ALPHA2 GLOB SERPL ELPH-MCNC: 10.9 % (ref 7–13)
BETA GLOB ABNORMAL SERPL ELPH-MCNC: 0.5 G/DL (ref 0.4–0.8)
BETA1 GLOB SERPL ELPH-MCNC: 7.1 % (ref 5–13)
BETA2 GLOB SERPL ELPH-MCNC: 4.8 % (ref 2–8)
BETA2+GAMMA GLOB SERPL ELPH-MCNC: 0.34 G/DL (ref 0.2–0.5)
GAMMA GLOB ABNORMAL SERPL ELPH-MCNC: 0.99 G/DL (ref 0.5–1.6)
GAMMA GLOB SERPL ELPH-MCNC: 14.1 % (ref 12–22)
IGG/ALB SER: 1.3 {RATIO} (ref 1.1–1.8)
PROT PATTERN SERPL ELPH-IMP: ABNORMAL
PROT SERPL-MCNC: 7 G/DL (ref 6.4–8.2)

## 2021-01-21 NOTE — TELEPHONE ENCOUNTER
----- Message from Arielle Watts MD sent at 1/21/2021  7:32 AM EST -----  Continued high calcium  Most likely the result of the overactive parathyroid gland that we discussed  I would like him to go for a CT scan to try to determine which of the 4 parathyroid glands in the neck is the 1 causing the problem  This is by no means a crisis but should be  Dealt with I will put in the order for the parathyroid imaging  Please assist him in getting this set up

## 2021-01-21 NOTE — TELEPHONE ENCOUNTER
Upon review of the In Basket request we were able to locate, review, and update the patient chart as requested for Diabetic Eye Exam     Any additional questions or concerns should be emailed to the Practice Liaisons via [email or web address] email, please do not reply via In Basket      Thank you  Kostas Montenegro MA

## 2021-01-21 NOTE — TELEPHONE ENCOUNTER
PT LEFT PAPERWORK IN BIN - TODAY - [facility]    [facility]T [facility]    PLEASE FILL OUT AND FAX WHEN COMPLETED  [phone]    ANY PROBS - CALL -PT

## 2021-01-26 ENCOUNTER — OFFICE VISIT (OUTPATIENT)
Dept: INTERNAL MEDICINE CLINIC | Facility: CLINIC | Age: 71
End: 2021-01-26
Payer: MEDICARE

## 2021-01-26 VITALS
HEIGHT: 67 IN | DIASTOLIC BLOOD PRESSURE: 70 MMHG | WEIGHT: 196 LBS | BODY MASS INDEX: 30.76 KG/M2 | SYSTOLIC BLOOD PRESSURE: 142 MMHG | HEART RATE: 51 BPM | OXYGEN SATURATION: 98 % | TEMPERATURE: 97.9 F

## 2021-01-26 DIAGNOSIS — I10 ESSENTIAL HYPERTENSION: ICD-10-CM

## 2021-01-26 DIAGNOSIS — E11.9 TYPE 2 DIABETES MELLITUS WITHOUT COMPLICATION, WITHOUT LONG-TERM CURRENT USE OF INSULIN (HCC): Primary | ICD-10-CM

## 2021-01-26 DIAGNOSIS — E83.52 HYPERCALCEMIA: ICD-10-CM

## 2021-01-26 DIAGNOSIS — I25.10 ATHEROSCLEROSIS OF NATIVE CORONARY ARTERY OF NATIVE HEART WITHOUT ANGINA PECTORIS: ICD-10-CM

## 2021-01-26 DIAGNOSIS — E78.2 MIXED HYPERLIPIDEMIA: ICD-10-CM

## 2021-01-26 PROCEDURE — 99214 OFFICE O/P EST MOD 30 MIN: CPT | Performed by: INTERNAL MEDICINE

## 2021-01-26 NOTE — PATIENT INSTRUCTIONS
Hypertension, diabetes, dyslipidemia  Treated  CAD history- no symptoms   Cataract surgery should proceed as planned  No indication for provocative testing     Elevated calcium and PTH  Suspicion high for parathyroid secreting adenoma  Workup in progress  CT coming up  Visit with me in 6 weeks

## 2021-01-26 NOTE — PROGRESS NOTES
Assessment/Plan:       Diagnoses and all orders for this visit:    Type 2 diabetes mellitus without complication, without long-term current use of insulin (HCC)    Atherosclerosis of native coronary artery of native heart without angina pectoris    Essential hypertension    Mixed hyperlipidemia    Hypercalcemia                Subjective:      Patient ID: Na Chaudhary is a 65 y o  male  Metabolic syndrome  Doing well  Guidelines have been met  blood pressure treated  However, hydrochlorothiazide has been discontinued due to hypercalcemia  Having said that blood pressure still acceptable  Diabetes treated to target  Hyperlipidemia treated to target  Hypercalcemia noted:  Patient has elevated PTH and elevated calcium  Hydrochlorothiazide discontinuation did not affect to this at all and suspicion is quite high for parathyroid adenoma  Workup is in progress  The patient feels fairly well  Echocardiogram done Dec 2015 documented is concentric LVH with normal systolic function, and mild aortic regurgitation and mitral regurgitation  No change in 12 months  Echocardiogram in 2017 about the same  Now most recent echo shows some progression with moderate aortic stenosis  A murmur is pretty apparent in the right sternal border 2nd intercostal space but he has no symptoms referable to this  Cardiac catheterization 7/25/2018  documented noncritical CAD with 60% LAD lesion and a large vessel with good flow not requiring intervention  This also documented hypertrophic cardiomyopathy with obliteration of the cavity an ejection fraction of 80%  Aortic ultrasound done in 2010 documented diffuse plaque but no aneurysm  CAD that is followed by cardiology;   Had chest pain in January2018  and had 3 stents placed  He had no complaints upon this visit  Doing quite well  No further cardiac symptoms for the past  3 years     right eye cataract surgery is planned    The patient has no contraindications to proceeding      The following portions of the patient's history were reviewed and updated as appropriate:   He has a past medical history of Benign neoplasm of large intestine, Colon polyp, Diabetes mellitus ([facility] [address] ), Heart murmur, History of aortic regurgitation, History of constipation, History of degenerative joint disease, History of nocturia, History of obesity, History of sebaceous cyst, History of shortness of breath, History of urinary frequency, Hyperlipidemia, Hypertension, Myocardial infarction ([facility] [address] ) (2018), Polyposis coli, and Ulcer of esophagus  ,  does not have any pertinent problems on file  ,   has a past surgical history that includes Hernia repair; Tibia fracture surgery (Left); Achilles tendon surgery (Left); Colonoscopy; Hernia repair (Bilateral, 8/18/2017); Esophagogastroduodenoscopy (N/A, 1/23/2018); Esophagogastroduodenoscopy; pr esophagogastroduodenoscopy transoral diagnostic (N/A, 3/26/2018); pr esophagogastroduodenoscopy transoral diagnostic (N/A, 5/14/2018); Cyst Removal; and Coronary angioplasty with stent  ,  family history includes Heart disease in his mother; Other in his father; Rheumatic fever in his father  ,   reports that he has been smoking cigarettes  He has a 35 00 pack-year smoking history  He has never used smokeless tobacco  He reports current alcohol use  He reports that he does not use drugs  ,  has No Known Allergies     Current Outpatient Medications   Medication Sig Dispense Refill    aspirin 81 mg chewable tablet Chew 1 tablet daily      atenolol (TENORMIN) 50 mg tablet TAKE 1 TABLET DAILY 90 tablet 3    atorvastatin (LIPITOR) 80 mg tablet TAKE 1 TABLET DAILY 90 tablet 3    cloNIDine (CATAPRES) 0 3 mg tablet Take 1 tablet (0 3 mg total) by mouth 3 (three) times a day 270 tablet 4    clopidogrel (PLAVIX) 75 mg tablet Take 1 tablet (75 mg total) by mouth daily 90 tablet 3    ferrous sulfate 325 (65 Fe) mg tablet Take 325 mg by mouth daily with breakfast      lisinopril (ZESTRIL) 40 mg tablet Take 1 tablet (40 mg total) by mouth daily 90 tablet 3    metFORMIN (GLUCOPHAGE-XR) 500 mg 24 hr tablet TAKE 1 TABLET TWICE A DAY WITH MEALS 180 tablet 3    Omega-3 1000 MG CAPS Take by mouth 3 (three) times a day       PreviDent 0 2 % SOLN RINSE WITH 10ML FOR 1 MINUTE THEN SPIT OUT AT BEDTIME DO NOT SWALLOW   torsemide (DEMADEX) 10 mg tablet Take 1 tablet (10 mg total) by mouth daily 30 tablet 2    DENTAGEL 1 1 % GEL   5    traMADol (ULTRAM) 50 mg tablet Take 50 mg by mouth as needed        No current facility-administered medications for this visit  Review of Systems   Constitutional: Negative for chills and fever  HENT: Negative for sore throat and trouble swallowing  Eyes: Negative for pain  Respiratory: Negative for cough and shortness of breath  Cardiovascular: Negative for chest pain and palpitations  Gastrointestinal: Negative for abdominal pain, blood in stool, diarrhea, nausea and vomiting  Endocrine: Negative for cold intolerance and heat intolerance  Genitourinary: Negative for dysuria, frequency and testicular pain  Musculoskeletal: Negative for joint swelling  Allergic/Immunologic: Negative for immunocompromised state  Neurological: Negative for dizziness, syncope and headaches  Hematological: Negative for adenopathy  Does not bruise/bleed easily  Psychiatric/Behavioral: Negative for dysphoric mood  The patient is not nervous/anxious  Objective:  Vitals:    01/26/21 1308   BP: 142/70   Pulse: (!) 51   Temp: 97 9 °F (36 6 °C)   SpO2: 98%      Physical Exam  Constitutional:       Appearance: Normal appearance  Comments:   Moderately overweight male patient who appears to be the stated age   Eyes:      General: No scleral icterus  Cardiovascular:      Rate and Rhythm: Normal rate and regular rhythm  Heart sounds: Murmur present  Systolic murmur present with a grade of 3/6        Comments:  7-1/1 systolic murmur right sternal border 2nd intercostal space, decrescendo, without radiation  Abdominal:      General: Abdomen is flat  Musculoskeletal: Normal range of motion  General: No swelling or tenderness  Right lower leg: No edema  Left lower leg: No edema  Skin:     General: Skin is warm and dry  Neurological:      General: No focal deficit present  Mental Status: He is alert  Psychiatric:         Mood and Affect: Mood normal          Judgment: Judgment normal            Patient Instructions    Hypertension, diabetes, dyslipidemia  Treated  CAD history- no symptoms   Cataract surgery should proceed as planned  No indication for provocative testing     Elevated calcium and PTH  Suspicion high for parathyroid secreting adenoma  Workup in progress  CT coming up  Visit with me in 6 weeks

## 2021-01-28 LAB — PTH RELATED PROT SERPL-SCNC: <2 PMOL/L

## 2021-01-29 ENCOUNTER — HOSPITAL ENCOUNTER (OUTPATIENT)
Dept: CT IMAGING | Facility: CLINIC | Age: 71
Discharge: HOME | End: 2021-01-29
Payer: MEDICARE

## 2021-01-29 DIAGNOSIS — E83.52 HYPERCALCEMIA: ICD-10-CM

## 2021-01-29 DIAGNOSIS — E21.3 HYPERPARATHYROIDISM (HCC): ICD-10-CM

## 2021-01-29 PROCEDURE — 70492 CT SFT TSUE NCK W/O & W/DYE: CPT

## 2021-01-29 PROCEDURE — G1004 CDSM NDSC: HCPCS

## 2021-01-29 RX ADMIN — IOHEXOL 100 ML: 350 INJECTION, SOLUTION INTRAVENOUS at 09:38

## 2021-02-03 ENCOUNTER — TELEPHONE (OUTPATIENT)
Dept: HEMATOLOGY ONCOLOGY | Facility: CLINIC | Age: 71
End: 2021-02-03

## 2021-02-03 ENCOUNTER — TELEPHONE (OUTPATIENT)
Dept: INTERNAL MEDICINE CLINIC | Facility: CLINIC | Age: 71
End: 2021-02-03

## 2021-02-03 DIAGNOSIS — D35.1 PARATHYROID ADENOMA: ICD-10-CM

## 2021-02-03 DIAGNOSIS — E21.3 HYPERPARATHYROIDISM (HCC): Primary | ICD-10-CM

## 2021-02-03 NOTE — TELEPHONE ENCOUNTER
New Patient Encounter    New Patient Intake Form   Patient Details:  Raul Hu  1950  [id number]    Background Information:  [address] starts by opening a telephone encounter and gathering the following information   Who is calling to schedule? If not self, relationship to patient? self   Referring Provider Dr Simon Helms   What is the diagnosis? Hyperparathyroidism   Is this diagnosis confirmed? Yes   When was the diagnosis? 2/1/21   Is there a confirmed diagnosis from a biopsy/tissue reviewed by pathology? Were outside slides requested? NA   Is patient aware of diagnosis? Yes   Is there a personal history and what kind? no   Is there a family history and what kind? no   Reason for visit? New Diagnosis   Have you had any imaging or labs done? If so: when, where? yes  CT 1/29/21   Are records in [address] [place] [address]? yes   If patient has a prior history of breast cancer were old records obtained? NA   Was the patient told to bring a disk? no   Does the patient smoke or Vape? yes   If yes, how many packs or cartridges per day? 1pk/day   Scheduling Information:   Preferred [place]:  [facility]     Are there any dates/time the patient cannot be seen? Miscellaneous:    After completing the above information, please route to Financial Counselor and the appropriate Nurse Navigator for review

## 2021-02-03 NOTE — TELEPHONE ENCOUNTER
Left pt a message to call us back to schedule an app[facility] [place] [facility] [facility] [place] [facility] [place] [facility] [place]

## 2021-02-17 DIAGNOSIS — I10 ESSENTIAL HYPERTENSION: ICD-10-CM

## 2021-02-17 RX ORDER — CLONIDINE HYDROCHLORIDE 0.3 MG/1
TABLET ORAL
Qty: 332 TABLET | Refills: 2 | Status: SHIPPED | OUTPATIENT
Start: 2021-02-17 | End: 2021-04-13 | Stop reason: HOSPADM

## 2021-02-19 DIAGNOSIS — I10 ESSENTIAL HYPERTENSION: ICD-10-CM

## 2021-02-19 RX ORDER — HYDROCHLOROTHIAZIDE 25 MG/1
TABLET ORAL
Qty: 90 TABLET | Refills: 3 | Status: SHIPPED | OUTPATIENT
Start: 2021-02-19 | End: 2021-03-03

## 2021-02-25 DIAGNOSIS — I10 ESSENTIAL HYPERTENSION: ICD-10-CM

## 2021-02-25 RX ORDER — TORSEMIDE 10 MG/1
TABLET ORAL
Qty: 90 TABLET | Refills: 3 | Status: SHIPPED | OUTPATIENT
Start: 2021-02-25 | End: 2021-08-30

## 2021-03-03 ENCOUNTER — CONSULT (OUTPATIENT)
Dept: SURGICAL ONCOLOGY | Facility: CLINIC | Age: 71
End: 2021-03-03
Payer: MEDICARE

## 2021-03-03 VITALS
DIASTOLIC BLOOD PRESSURE: 84 MMHG | TEMPERATURE: 97.7 F | HEIGHT: 67 IN | SYSTOLIC BLOOD PRESSURE: 152 MMHG | BODY MASS INDEX: 30.61 KG/M2 | RESPIRATION RATE: 16 BRPM | HEART RATE: 64 BPM | WEIGHT: 195 LBS

## 2021-03-03 DIAGNOSIS — E21.3 HYPERPARATHYROIDISM (HCC): Primary | ICD-10-CM

## 2021-03-03 DIAGNOSIS — D35.1 PARATHYROID ADENOMA: ICD-10-CM

## 2021-03-03 PROCEDURE — 99204 OFFICE O/P NEW MOD 45 MIN: CPT | Performed by: SURGERY

## 2021-03-03 RX ORDER — TRAMADOL HYDROCHLORIDE 50 MG/1
50 TABLET ORAL EVERY 6 HOURS PRN
Qty: 12 TABLET | Refills: 0 | Status: SHIPPED | OUTPATIENT
Start: 2021-03-03 | End: 2021-03-09

## 2021-03-03 RX ORDER — PREDNISOLONE ACETATE 10 MG/ML
SUSPENSION/ DROPS OPHTHALMIC
COMMUNITY
Start: 2021-02-05 | End: 2021-08-30

## 2021-03-03 NOTE — PROGRESS NOTES
Surgical Oncology Follow Up       [facility]COL[facility] [place]  [facility] [address]    Nery Reynolds  1950  [id number]  [facility] SURGICAL ONCOLOGY [place]  [place]aez [address]    Chief Complaint   Patient presents with    Hyperparathyroidism     Pt here for consult for hyperparathyroidism  He reports he takes Plavix for history of MI and stent placement 2-3 years ago  Assessment/Plan:    No problem-specific Assessment & Plan notes found for this encounter  Diagnoses and all orders for this visit:    Hyperparathyroidism [facility])  -     Ambulatory referral to General Surgery    Parathyroid adenoma  -     Ambulatory referral to General Surgery    Other orders  -     prednisoLONE acetate (PRED FORTE) 1 % ophthalmic suspension; INSTILL 1 DROP INTO THE RIGHT EYE FOUR TIMES DAILY AFTER SURGERY        Advance Care Planning/Advance Directives:  Discussed disease status, cancer treatment plans and/or cancer treatment goals with the patient  Oncology History    No history exists  History of Present Illness:   Patient is a 68-year-old man recently diagnosed with primary hyperparathyroidism  This was noted after his blood pressure medications were adjusted and he was noted to have persistent hypercalcemia  PTH levels were drawn confirming diagnosis, with CT scan ordered after which revealed a right-sided target  He has been referred for evaluation and treatment  He feels well  No issues with mood, memory, or concentration  No achiness or fatigue  No belly pain  Review of Systems   Constitutional: Negative  Negative for fatigue  HENT: Negative  Eyes: Negative  Respiratory: Negative  Cardiovascular: Negative  Gastrointestinal: Negative  Negative for abdominal pain  Endocrine: Negative  Genitourinary: Negative  Musculoskeletal: Negative  Skin: Negative  Allergic/Immunologic: Negative  Neurological: Negative  Hematological: Negative  Psychiatric/Behavioral: Negative  Negative for decreased concentration  The patient is not nervous/anxious  All other systems reviewed and are negative  Patient Active Problem List   Diagnosis    Non-recurrent bilateral inguinal hernia without obstruction or gangrene    Hyperlipidemia    HTN (hypertension)    [facility]    Tobacco abuse    Iron deficiency anemia due to chronic blood loss    HOCM (hypertrophic obstructive cardiomyopathy) ([place] [address] )    Coronary artery disease involving native coronary artery of native heart    Atherosclerosis of aorta ([facility] )    Atherosclerosis of native coronary artery of native heart without angina pectoris    Anemia associated with diabetes mellitus ([facility] )    Thrombocytosis (HCC)    Hypercalcemia    Hyperparathyroidism ([place] [address] )     Past Medical History:   Diagnosis Date    Benign neoplasm of large intestine     Bleeding gastric ulcer 2018    Colon polyp     Diabetes mellitus (HCC)     Heart murmur     History of aortic regurgitation     History of constipation     History of degenerative joint disease     History of nocturia     History of obesity     History of sebaceous cyst     History of shortness of breath     History of urinary frequency     Hyperlipidemia     Hypertension     Myocardial infarction ([place] [address] ) 2018 january, 3 stents    Polyposis coli     of the large intestine    Ulcer of esophagus      Past Surgical History:   Procedure Laterality Date    ACHILLES TENDON SURGERY Left 1973    CATARACT EXTRACTION Right 02/18/2021    COLONOSCOPY      hyperplastic polyp    CORONARY ANGIOPLASTY WITH STENT PLACEMENT      3 stents: 2 placed on Jan 2018, 1 on July 2018    Lexie Slipper CYST REMOVAL  2019, 2020    left and right wrists    ESOPHAGOGASTRODUODENOSCOPY N/A 1/23/2018    Procedure: ESOPHAGOGASTRODUODENOSCOPY (EGD);   Surgeon: Angela Philip MD;  Location: [facility]; Service: Gastroenterology    ESOPHAGOGASTRODUODENOSCOPY      HERNIA REPAIR      HERNIA REPAIR Bilateral 8/18/2017    Procedure: LAPAROSCOPIC INGUINAL HERNIA REPAIR WITH MESH;  Surgeon: Cuate Grewal MD;  [facility]: [facility];  Service: General    NH ESOPHAGOGASTRODUODENOSCOPY TRANSORAL DIAGNOSTIC N/A 3/26/2018    Procedure: ESOPHAGOGASTRODUODENOSCOPY (EGD); Surgeon: Osa Eisenmenger, MD;  [facility]; Service: Gastroenterology    NH ESOPHAGOGASTRODUODENOSCOPY TRANSORAL DIAGNOSTIC N/A 5/14/2018    Procedure: ESOPHAGOGASTRODUODENOSCOPY (EGD); Surgeon: Osa Eisenmenger, MD;  [facility];   Service: Gastroenterology    TIBIA FRACTURE SURGERY Left 1962     Family History   Problem Relation Age of Onset    Rheumatic fever Father     Other Father         accidental poisoning   Jeral Hose Heart disease Mother      Social History     Socioeconomic History    Marital status: /Civil Union     Spouse name: Not on file    Number of children: Not on file    Years of education: Not on file    Highest education level: Not on file   Occupational History    Occupation:    Social Needs    Financial resource strain: Not on file    Food insecurity     Worry: Not on file     Inability: Not on file   HealthEquity needs     Medical: Not on file     Non-medical: Not on file   Tobacco Use    Smoking status: Current Every Day Smoker     Packs/day: 1 00     Years: 35 00     Pack years: 35 00     Types: Cigarettes    Smokeless tobacco: Never Used   Substance and Sexual Activity    Alcohol use: Yes     Frequency: Monthly or less     Drinks per session: 1 or 2     Binge frequency: Never     Comment: rare    Drug use: No    Sexual activity: Not Currently   Lifestyle    Physical activity     Days per week: 0 days     Minutes per session: 0 min    Stress: Not at all   Relationships    Social connections     Talks on phone: Not on file     Gets together: Not on file     Attends Hinduism service: Not on file     Active member of club or organization: Not on file     Attends meetings of clubs or organizations: Not on file     Relationship status: Not on file    Intimate partner violence     Fear of current or ex partner: Not on file     Emotionally abused: Not on file     Physically abused: Not on file     Forced sexual activity: Not on file   Other Topics Concern    Not on file   Social History Narrative    Not on file       Current Outpatient Medications:     aspirin 81 mg chewable tablet, Chew 1 tablet daily, Disp: , Rfl:     atenolol (TENORMIN) 50 mg tablet, TAKE 1 TABLET DAILY, Disp: 90 tablet, Rfl: 3    atorvastatin (LIPITOR) 80 mg tablet, TAKE 1 TABLET DAILY, Disp: 90 tablet, Rfl: 3    cloNIDine (CATAPRES) 0 3 mg tablet, TAKE 1 TABLET(0 3 MG) BY MOUTH THREE TIMES DAILY, Disp: 332 tablet, Rfl: 2    clopidogrel (PLAVIX) 75 mg tablet, Take 1 tablet (75 mg total) by mouth daily, Disp: 90 tablet, Rfl: 3    ferrous sulfate 325 (65 Fe) mg tablet, Take 325 mg by mouth daily with breakfast, Disp: , Rfl:     lisinopril (ZESTRIL) 40 mg tablet, Take 1 tablet (40 mg total) by mouth daily, Disp: 90 tablet, Rfl: 3    metFORMIN (GLUCOPHAGE-XR) 500 mg 24 hr tablet, TAKE 1 TABLET TWICE A DAY WITH MEALS, Disp: 180 tablet, Rfl: 3    Omega-3 1000 MG CAPS, Take by mouth 3 (three) times a day , Disp: , Rfl:     prednisoLONE acetate (PRED FORTE) 1 % ophthalmic suspension, INSTILL 1 DROP INTO THE RIGHT EYE FOUR TIMES DAILY AFTER SURGERY, Disp: , Rfl:     PreviDent 0 2 % SOLN, RINSE WITH 10ML FOR 1 MINUTE THEN SPIT OUT AT BEDTIME DO NOT SWALLOW , Disp: , Rfl:     torsemide (DEMADEX) 10 mg tablet, TAKE 1 TABLET(10 MG) BY MOUTH DAILY, Disp: 90 tablet, Rfl: 3  No Known Allergies  Vitals:    03/03/21 1031   BP: 152/84   Pulse: 64   Resp: 16   Temp: 97 7 °F (36 5 °C)       Physical Exam  Constitutional:       Appearance: Normal appearance     HENT:      Head: Normocephalic and atraumatic  Nose: Nose normal    Eyes:      General: No scleral icterus  Extraocular Movements: Extraocular movements intact  Pupils: Pupils are equal, round, and reactive to light  Neck:      Musculoskeletal: Normal range of motion and neck supple  Cardiovascular:      Rate and Rhythm: Normal rate and regular rhythm  Pulses: Normal pulses  Heart sounds: Normal heart sounds  Pulmonary:      Breath sounds: Normal breath sounds  Abdominal:      General: Abdomen is flat  Palpations: Abdomen is soft  Musculoskeletal: Normal range of motion  Skin:     General: Skin is warm and dry  Neurological:      General: No focal deficit present  Mental Status: He is alert and oriented to person, place, and time  Psychiatric:         Mood and Affect: Mood normal          Behavior: Behavior normal          Thought Content: Thought content normal          Judgment: Judgment normal          Pathology:  none    Labs:  CBC, Coags, BMP, Mg, Phos     Lab Results   Component Value Date     8 (H) 01/20/2021    CALCIUM 10 2 (H) 01/20/2021       Imaging  CT  NECK  WITHOUT AND WITH CONTRAST FROM ANTONIO TO SKULL BASE     INDICATION: Hyperparathyroidism  There is no sestamibi for comparison      COMPARISON:  None      TECHNIQUE:This examination, like all CT scans performed in the [facility], was performed utilizing techniques to minimize radiation dose exposure, including the use of iterative reconstruction and automated exposure control      Both noncontrast, contrast, and post contrast delayed images were performed according to standard parathyroid protocol (4D technique) Coronal and sagittal reconstructions were performed   3D reconstructions were performed on an independent workstation,   and are supplied for review      100 mL of iohexol (OMNIPAQUE) was injected intravenously without immediate consequence      Radiation dose: 1264 mGy-cm    FINDINGS:     SERIAL NONCONTRAST, POST CONTRAST AND DELAYS: There is a 1 06 x 0 66 x 0 91 cm lesion sitting posterior to the inferior aspect of the right thyroid lobe  This lesion meets the CT enhancement criteria suspicious for parathyroid adenoma  The lesion is   best seen on series 401 images 48 through 51      VASCULAR STRUCTURES:  Bovine branching anatomy of the great vessels is noted without origin stenosis  The great vessels are noted to be tortuous  The carotid arteries are also noted to be tortuous with some calcific plaque at the origins of both internal carotid arteries  There is no significant stenosis by Nascet criteria      VISUALIZED PARANASAL SINUSES:  Mucosal thickening is noted in several of the ethmoid air cells      NASAL CAVITY AND NASOPHARYNX:  Normal      SUPRAHYOID NECK:  Normal oropharynx, oral cavity, parapharyngeal and retropharyngeal spaces      INFRAHYOID NECK:  Normal oropharynx, oral cavity, parapharyngeal and retropharyngeal spaces      THYROID GLAND:  Normal      LYMPH NODES:  No pathologic or enlarged adenopathy      MEDIASTINUM:  Unremarkable      BONY STRUCTURES  Diffuse osteopenia is noted      LUNG APICES:  Unremarkable      IMPRESSION:  1  1 06 x 0 66 x 0 91 cm lesion identified sitting posterior to the inferior aspect of the right thyroid lobe which is suspicious for parathyroid adenoma  Please see above for details  2   Mucosal thickening noted in several of the ethmoid air cells  3   Diffuse osteopenia noted  I reviewed the above laboratory and imaging data  Discussion/Summary:  49-year-old man, primary hyperparathyroidism, suspected right-sided target based on CT scan  He has a amenable to and a candidate for right parathyroidectomy, possible 4 gland exploration, with intraoperative PTH monitoring  Rationale for this along with risks and benefits of surgery including infection, bleeding, need for possible additional surgery, discussed with him    All questions answered and consents signed at this visit

## 2021-03-03 NOTE — H&P (VIEW-ONLY)
Surgical Oncology Follow Up       [facility] SURGICAL ONCOLOGY [facility] [address]    Leonor Padgettrini  1950  [id number]  [facility] SURGICAL ONCOLOGY [place]  [place] [id number]    Chief Complaint   Patient presents with    Hyperparathyroidism     Pt here for consult for hyperparathyroidism  He reports he takes Plavix for history of MI and stent placement 2-3 years ago  Assessment/Plan:    No problem-specific Assessment & Plan notes found for this encounter  Diagnoses and all orders for this visit:    Hyperparathyroidism [facility])  -     Ambulatory referral to General Surgery    Parathyroid adenoma  -     Ambulatory referral to General Surgery    Other orders  -     prednisoLONE acetate (PRED FORTE) 1 % ophthalmic suspension; INSTILL 1 DROP INTO THE RIGHT EYE FOUR TIMES DAILY AFTER SURGERY        Advance Care Planning/Advance Directives:  Discussed disease status, cancer treatment plans and/or cancer treatment goals with the patient  Oncology History    No history exists  History of Present Illness:   Patient is a 15-year-old man recently diagnosed with primary hyperparathyroidism  This was noted after his blood pressure medications were adjusted and he was noted to have persistent hypercalcemia  PTH levels were drawn confirming diagnosis, with CT scan ordered after which revealed a right-sided target  He has been referred for evaluation and treatment  He feels well  No issues with mood, memory, or concentration  No achiness or fatigue  No belly pain  Review of Systems   Constitutional: Negative  Negative for fatigue  HENT: Negative  Eyes: Negative  Respiratory: Negative  Cardiovascular: Negative  Gastrointestinal: Negative  Negative for abdominal pain  Endocrine: Negative  Genitourinary: Negative  Musculoskeletal: Negative  Skin: Negative  Allergic/Immunologic: Negative  Neurological: Negative  Hematological: Negative  Psychiatric/Behavioral: Negative  Negative for decreased concentration  The patient is not nervous/anxious  All other systems reviewed and are negative  Patient Active Problem List   Diagnosis    Non-recurrent bilateral inguinal hernia without obstruction or gangrene    Hyperlipidemia    HTN (hypertension)    Diabetes mellitus ([facility] )    Tobacco abuse    Iron deficiency anemia due to chronic blood loss    [facility]    Coronary artery disease involving native coronary artery of native heart    Atherosclerosis of aorta ([facility] )    Atherosclerosis of native coronary artery of native heart without angina pectoris    Anemia associated with diabetes mellitus ([facility]    Thrombocytosis (HCC)    Hypercalcemia    Hyperparathyroidism ([facility] )     Past Medical History:   Diagnosis Date    Benign neoplasm of large intestine     Bleeding gastric ulcer 2018    Colon polyp     Diabetes mellitus (HCC)     Heart murmur     History of aortic regurgitation     History of constipation     History of degenerative joint disease     History of nocturia     History of obesity     History of sebaceous cyst     History of shortness of breath     History of urinary frequency     Hyperlipidemia     Hypertension     My[facility] infarction [facility] 2018 january, 3 stents    Polyposis coli     of the large intestine    Ulcer of esophagus      Past Surgical History:   Procedure Laterality Date    ACHILLES TENDON SURGERY Left 1973    CATARACT EXTRACTION Right 02/18/2021    COLONOSCOPY      hyperplastic polyp    CORONARY ANGIOPLASTY WITH STENT PLACEMENT      3 stents: 2 placed on Jan 2018, 1 on July 2018    [facility] CYST REMOVAL  2019, 2020    left and right wrists    ESOPHAGOGASTRODUODENOSCOPY N/A 1/23/2018    Procedure: ESOPHAGOGASTRODUODENOSCOPY (EGD);   Surgeon: Angela Philip MD;  Location: [facility]; Service: Gastroenterology    ESOPHAGOGASTRODUODENOSCOPY      HERNIA REPAIR      HERNIA REPAIR Bilateral 8/18/2017    Procedure: LAPAROSCOPIC INGUINAL HERNIA REPAIR WITH MESH;  Surgeon: Cuate Grewal MD;  Location: [facility];  Service: General    [place] ESOPHAGOGASTRODUODENOSCOPY TRANSORAL DIAGNOSTIC N/A 3/26/2018    Procedure: ESOPHAGOGASTRODUODENOSCOPY (EGD); Surgeon: Osa Eisenmenger, MD;  [facility]: [facility]; Service: Gastroenterology    [place] ESOPHAGOGASTRODUODENOSCOPY TRANSORAL DIAGNOSTIC N/A 5/14/2018    Procedure: ESOPHAGOGASTRODUODENOSCOPY (EGD); Surgeon: Osa Eisenmenger, MD;  [facility];   Service: Gastroenterology    TIBIA FRACTURE SURGERY Left 1962     Family History   Problem Relation Age of Onset    Rheumatic fever Father     Other Father         accidental poisoning   Jeral Hose Heart disease Mother      Social History     Socioeconomic History    Marital status: /Civil Union     Spouse name: Not on file    Number of children: Not on file    Years of education: Not on file    Highest education level: Not on file   Occupational History    Occupation:    Social Needs    Financial resource strain: Not on file    Food insecurity     Worry: Not on file     Inability: Not on file   JDF needs     Medical: Not on file     Non-medical: Not on file   Tobacco Use    Smoking status: Current Every Day Smoker     Packs/day: 1 00     Years: 35 00     Pack years: 35 00     Types: Cigarettes    Smokeless tobacco: Never Used   Substance and Sexual Activity    Alcohol use: Yes     Frequency: Monthly or less     Drinks per session: 1 or 2     Binge frequency: Never     Comment: rare    Drug use: No    Sexual activity: Not Currently   Lifestyle    Physical activity     Days per week: 0 days     Minutes per session: 0 min    Stress: Not at all   Relationships    Social connections     Talks on phone: Not on file     Gets together: Not on file     Attends Mandaeism service: Not on file     Active member of club or organization: Not on file     Attends meetings of clubs or organizations: Not on file     Relationship status: Not on file    Intimate partner violence     Fear of current or ex partner: Not on file     Emotionally abused: Not on file     Physically abused: Not on file     Forced sexual activity: Not on file   Other Topics Concern    Not on file   Social History Narrative    Not on file       Current Outpatient Medications:     aspirin 81 mg chewable tablet, Chew 1 tablet daily, Disp: , Rfl:     atenolol (TENORMIN) 50 mg tablet, TAKE 1 TABLET DAILY, Disp: 90 tablet, Rfl: 3    atorvastatin (LIPITOR) 80 mg tablet, TAKE 1 TABLET DAILY, Disp: 90 tablet, Rfl: 3    cloNIDine (CATAPRES) 0 3 mg tablet, TAKE 1 TABLET(0 3 MG) BY MOUTH THREE TIMES DAILY, Disp: 332 tablet, Rfl: 2    clopidogrel (PLAVIX) 75 mg tablet, Take 1 tablet (75 mg total) by mouth daily, Disp: 90 tablet, Rfl: 3    ferrous sulfate 325 (65 Fe) mg tablet, Take 325 mg by mouth daily with breakfast, Disp: , Rfl:     lisinopril (ZESTRIL) 40 mg tablet, Take 1 tablet (40 mg total) by mouth daily, Disp: 90 tablet, Rfl: 3    metFORMIN (GLUCOPHAGE-XR) 500 mg 24 hr tablet, TAKE 1 TABLET TWICE A DAY WITH MEALS, Disp: 180 tablet, Rfl: 3    Omega-3 1000 MG CAPS, Take by mouth 3 (three) times a day , Disp: , Rfl:     prednisoLONE acetate (PRED FORTE) 1 % ophthalmic suspension, INSTILL 1 DROP INTO THE RIGHT EYE FOUR TIMES DAILY AFTER SURGERY, Disp: , Rfl:     PreviDent 0 2 % SOLN, RINSE WITH 10ML FOR 1 MINUTE THEN SPIT OUT AT BEDTIME DO NOT SWALLOW , Disp: , Rfl:     torsemide (DEMADEX) 10 mg tablet, TAKE 1 TABLET(10 MG) BY MOUTH DAILY, Disp: 90 tablet, Rfl: 3  No Known Allergies  Vitals:    03/03/21 1031   BP: 152/84   Pulse: 64   Resp: 16   Temp: 97 7 °F (36 5 °C)       Physical Exam  Constitutional:       Appearance: Normal appearance     HENT:      Head: Normocephalic and atraumatic  Nose: Nose normal    Eyes:      General: No scleral icterus  Extraocular Movements: Extraocular movements intact  Pupils: Pupils are equal, round, and reactive to light  Neck:      Musculoskeletal: Normal range of motion and neck supple  Cardiovascular:      Rate and Rhythm: Normal rate and regular rhythm  Pulses: Normal pulses  Heart sounds: Normal heart sounds  Pulmonary:      Breath sounds: Normal breath sounds  Abdominal:      General: Abdomen is flat  Palpations: Abdomen is soft  Musculoskeletal: Normal range of motion  Skin:     General: Skin is warm and dry  Neurological:      General: No focal deficit present  Mental Status: He is alert and oriented to person, place, and time  Psychiatric:         Mood and Affect: Mood normal          Behavior: Behavior normal          Thought Content: Thought content normal          Judgment: Judgment normal          Pathology:  none    Labs:  CBC, Coags, BMP, Mg, Phos     Lab Results   Component Value Date     8 (H) 01/20/2021    CALCIUM 10 2 (H) 01/20/2021       Imaging  CT  NECK  WITHOUT AND WITH CONTRAST FROM ANTONIO TO SKULL BASE     INDICATION: Hyperparathyroidism  There is no sestamibi for comparison      COMPARISON:  None      TECHNIQUE:This examination, like all CT scans performed in the [facility], was performed utilizing techniques to minimize radiation dose exposure, including the use of iterative reconstruction and automated exposure control      Both noncontrast, contrast, and post contrast delayed images were performed according to standard parathyroid protocol (4D technique) Coronal and sagittal reconstructions were performed   3D reconstructions were performed on an independent workstation,   and are supplied for review      100 mL of iohexol (OMNIPAQUE) was injected intravenously without immediate consequence      Radiation dose: 1264 mGy-cm    FINDINGS:     SERIAL NONCONTRAST, POST CONTRAST AND DELAYS: There is a 1 06 x 0 66 x 0 91 cm lesion sitting posterior to the inferior aspect of the right thyroid lobe  This lesion meets the CT enhancement criteria suspicious for parathyroid adenoma  The lesion is   best seen on series 401 images 48 through 51      VASCULAR STRUCTURES:  Bovine branching anatomy of the great vessels is noted without origin stenosis  The great vessels are noted to be tortuous  The carotid arteries are also noted to be tortuous with some calcific plaque at the origins of both internal carotid arteries  There is no significant stenosis by Nascet criteria      VISUALIZED PARANASAL SINUSES:  Mucosal thickening is noted in several of the ethmoid air cells      NASAL CAVITY AND NASOPHARYNX:  Normal      SUPRAHYOID NECK:  Normal oropharynx, oral cavity, parapharyngeal and retropharyngeal spaces      INFRAHYOID NECK:  Normal oropharynx, oral cavity, parapharyngeal and retropharyngeal spaces      THYROID GLAND:  Normal      LYMPH NODES:  No pathologic or enlarged adenopathy      MEDIASTINUM:  Unremarkable      BONY STRUCTURES  Diffuse osteopenia is noted      LUNG APICES:  Unremarkable      IMPRESSION:  1  1 06 x 0 66 x 0 91 cm lesion identified sitting posterior to the inferior aspect of the right thyroid lobe which is suspicious for parathyroid adenoma  Please see above for details  2   Mucosal thickening noted in several of the ethmoid air cells  3   Diffuse osteopenia noted  I reviewed the above laboratory and imaging data  Discussion/Summary:  64-year-old man, primary hyperparathyroidism, suspected right-sided target based on CT scan  He has a amenable to and a candidate for right parathyroidectomy, possible 4 gland exploration, with intraoperative PTH monitoring  Rationale for this along with risks and benefits of surgery including infection, bleeding, need for possible additional surgery, discussed with him    All questions answered and consents signed at this visit

## 2021-03-03 NOTE — LETTER
March 3, 2021     Alcon Deng MD  [address] [address]    Patient: Kevyn Murphy   YOB: 1950   Date of Visit: 3/3/2021       Dear Dr Naseem Otero: Thank you for referring Jethro Delarosa to me for evaluation  Below are my notes for this consultation  If you have questions, please do not hesitate to call me  I look forward to following your patient along with you  Sincerely,        Tonya Lesch, MD        CC: No Recipients  Tonya Lesch, MD  3/3/2021 11:16 AM  Sign when Signing Visit               Surgical Oncology Follow Up       [facility] [address]    Kevyn Murphy  1950  [id number]  [facility]  CANCER [facility] SURGICAL ONCOLOGY [address] [address]    Chief Complaint   Patient presents with    Hyperparathyroidism     Pt here for consult for hyperparathyroidism  He reports he takes Plavix for history of MI and stent placement 2-3 years ago  Assessment/Plan:    No problem-specific Assessment & Plan notes found for this encounter  Diagnoses and all orders for this visit:    Hyperparathyroidism [facility])  -     Ambulatory referral to General Surgery    Parathyroid adenoma  -     Ambulatory referral to General Surgery    Other orders  -     prednisoLONE acetate (PRED FORTE) 1 % ophthalmic suspension; INSTILL 1 DROP INTO THE RIGHT EYE FOUR TIMES DAILY AFTER SURGERY        Advance Care Planning/Advance Directives:  Discussed disease status, cancer treatment plans and/or cancer treatment goals with the patient  Oncology History    No history exists  History of Present Illness:   Patient is a 68-year-old man recently diagnosed with primary hyperparathyroidism  This was noted after his blood pressure medications were adjusted and he was noted to have persistent hypercalcemia    PTH levels were drawn Patient discharged by CHARITY Reece. Patient provided with discharge instructions Rx and instructions on follow up care. Patient out of ED ambulatory accompanied by self. confirming diagnosis, with CT scan ordered after which revealed a right-sided target  He has been referred for evaluation and treatment  He feels well  No issues with mood, memory, or concentration  No achiness or fatigue  No belly pain  Review of Systems   Constitutional: Negative  Negative for fatigue  HENT: Negative  Eyes: Negative  Respiratory: Negative  Cardiovascular: Negative  Gastrointestinal: Negative  Negative for abdominal pain  Endocrine: Negative  Genitourinary: Negative  Musculoskeletal: Negative  Skin: Negative  Allergic/Immunologic: Negative  Neurological: Negative  Hematological: Negative  Psychiatric/Behavioral: Negative  Negative for decreased concentration  The patient is not nervous/anxious  All other systems reviewed and are negative          Patient Active Problem List   Diagnosis    Non-recurrent bilateral inguinal hernia without obstruction or gangrene    Hyperlipidemia    HTN (hypertension)    Diabetes mellitus ([facility] [address] )    Tobacco abuse    Iron deficiency anemia due to chronic blood loss    HOCM (hypertrophic obstructive cardiomyopathy) ([facility] [address] )    Coronary artery disease involving native coronary artery of native heart    Atherosclerosis of aorta ([facility] [address] )    Atherosclerosis of native coronary artery of native heart without angina pectoris    Anemia associated with diabetes mellitus ([facility] [address] )    Thrombocytosis (HCC)    Hypercalcemia    Hyperparathyroidism ([facility] [address] )     Past Medical History:   Diagnosis Date    Benign neoplasm of large intestine     Bleeding gastric ulcer 2018    Colon polyp     Diabetes mellitus (HCC)     Heart murmur     History of aortic regurgitation     History of constipation     History of degenerative joint disease     History of nocturia     History of obesity     History of sebaceous cyst     History of shortness of breath     History of urinary frequency     Hyperlipidemia     Hypertension     Myocardial infarction [facility]) 2018 january, 3 stents    Polyposis coli     of the large intestine    Ulcer of esophagus      Past Surgical History:   Procedure Laterality Date    ACHILLES TENDON SURGERY Left 1973    CATARACT EXTRACTION Right 02/18/2021    COLONOSCOPY      hyperplastic polyp    CORONARY ANGIOPLASTY WITH STENT PLACEMENT      3 stents: 2 placed on Jan 2018, 1 on July 2018    Camila Dawson CYST REMOVAL  2019, 2020    left and right wrists    ESOPHAGOGASTRODUODENOSCOPY N/A 1/23/2018    Procedure: ESOPHAGOGASTRODUODENOSCOPY (EGD); Surgeon: Kenneth Arcos MD;  Location: [facility]; Service: Gastroenterology    ESOPHAGOGASTRODUODENOSCOPY      HERNIA REPAIR      HERNIA REPAIR Bilateral 8/18/2017    Procedure: LAPAROSCOPIC INGUINAL HERNIA REPAIR WITH MESH;  Surgeon: Mary Cisse MD;  Location: [facility];  Service: General    NV ESOPHAGOGASTRODUODENOSCOPY TRANSORAL DIAGNOSTIC N/A 3/26/2018    Procedure: ESOPHAGOGASTRODUODENOSCOPY (EGD); Surgeon: Andree Keyes MD;  [facility]: [facility]; Service: Gastroenterology    NV ESOPHAGOGASTRODUODENOSCOPY TRANSORAL DIAGNOSTIC N/A 5/14/2018    Procedure: ESOPHAGOGASTRODUODENOSCOPY (EGD); Surgeon: Andree Keyes MD;  [facility];   Service: Gastroenterology    TIBIA FRACTURE SURGERY Left 1962     Family History   Problem Relation Age of Onset    Rheumatic fever Father     Other Father         accidental poisoning   Camila Dawson Heart disease Mother      Social History     Socioeconomic History    Marital status: /Civil Union     Spouse name: Not on file    Number of children: Not on file    Years of education: Not on file    Highest education level: Not on file   Occupational History    Occupation:    Social Needs    Financial resource strain: Not on file    Food insecurity     Worry: Not on file     Inability: Not on file   [employer] needs     Medical: Not on file     Non-medical: Not on file   Tobacco Use    Smoking status: Current Every Day Smoker     Packs/day: 1 00     Years: 35 00     Pack years: 35 00     Types: Cigarettes    Smokeless tobacco: Never Used   Substance and Sexual Activity    Alcohol use: Yes     Frequency: Monthly or less     Drinks per session: 1 or 2     Binge frequency: Never     Comment: rare    Drug use: No    Sexual activity: Not Currently   Lifestyle    Physical activity     Days per week: 0 days     Minutes per session: 0 min    Stress: Not at all   Relationships    Social connections     Talks on phone: Not on file     Gets together: Not on file     Attends [employer]ism service: Not on file     Active member of club or organization: Not on file     Attends meetings of clubs or organizations: Not on file     Relationship status: Not on file    Intimate partner violence     Fear of current or ex partner: Not on file     Emotionally abused: Not on file     Physically abused: Not on file     Forced sexual activity: Not on file   Other Topics Concern    Not on file   Social History Narrative    Not on file       Current Outpatient Medications:     aspirin 81 mg chewable tablet, Chew 1 tablet daily, Disp: , Rfl:     atenolol (TENORMIN) 50 mg tablet, TAKE 1 TABLET DAILY, Disp: 90 tablet, Rfl: 3    atorvastatin (LIPITOR) 80 mg tablet, TAKE 1 TABLET DAILY, Disp: 90 tablet, Rfl: 3    cloNIDine (CATAPRES) 0 3 mg tablet, TAKE 1 TABLET(0 3 MG) BY MOUTH THREE TIMES DAILY, Disp: 332 tablet, Rfl: 2    clopidogrel (PLAVIX) 75 mg tablet, Take 1 tablet (75 mg total) by mouth daily, Disp: 90 tablet, Rfl: 3    ferrous sulfate 325 (65 Fe) mg tablet, Take 325 mg by mouth daily with breakfast, Disp: , Rfl:     lisinopril (ZESTRIL) 40 mg tablet, Take 1 tablet (40 mg total) by mouth daily, Disp: 90 tablet, Rfl: 3    metFORMIN (GLUCOPHAGE-XR) 500 mg 24 hr tablet, TAKE 1 TABLET TWICE A DAY WITH MEALS, Disp: 180 tablet, Rfl: 3    Omega-3 1000 MG CAPS, Take by mouth 3 (three) times a day , Disp: , Rfl:    prednisoLONE acetate (PRED FORTE) 1 % ophthalmic suspension, INSTILL 1 DROP INTO THE RIGHT EYE FOUR TIMES DAILY AFTER SURGERY, Disp: , Rfl:     PreviDent 0 2 % SOLN, RINSE WITH 10ML FOR 1 MINUTE THEN SPIT OUT AT BEDTIME DO NOT SWALLOW , Disp: , Rfl:     torsemide (DEMADEX) 10 mg tablet, TAKE 1 TABLET(10 MG) BY MOUTH DAILY, Disp: 90 tablet, Rfl: 3  No Known Allergies  Vitals:    03/03/21 1031   BP: 152/84   Pulse: 64   Resp: 16   Temp: 97 7 °F (36 5 °C)       Physical Exam  Constitutional:       Appearance: Normal appearance  HENT:      Head: Normocephalic and atraumatic  Nose: Nose normal    Eyes:      General: No scleral icterus  Extraocular Movements: Extraocular movements intact  Pupils: Pupils are equal, round, and reactive to light  Neck:      Musculoskeletal: Normal range of motion and neck supple  Cardiovascular:      Rate and Rhythm: Normal rate and regular rhythm  Pulses: Normal pulses  Heart sounds: Normal heart sounds  Pulmonary:      Breath sounds: Normal breath sounds  Abdominal:      General: Abdomen is flat  Palpations: Abdomen is soft  Musculoskeletal: Normal range of motion  Skin:     General: Skin is warm and dry  Neurological:      General: No focal deficit present  Mental Status: He is alert and oriented to person, place, and time  Psychiatric:         Mood and Affect: Mood normal          Behavior: Behavior normal          Thought Content: Thought content normal          Judgment: Judgment normal          Pathology:  none    Labs:  CBC, Coags, BMP, Mg, Phos     Lab Results   Component Value Date     8 (H) 01/20/2021    CALCIUM 10 2 (H) 01/20/2021       Imaging  CT  NECK  WITHOUT AND WITH CONTRAST FROM ANTONIO TO SKULL BASE     INDICATION: Hyperparathyroidism   There is no sestamibi for comparison      COMPARISON:  None      TECHNIQUE:This examination, like all CT scans performed in the [facility], was performed utilizing techniques to minimize radiation dose exposure, including the use of iterative reconstruction and automated exposure control      Both noncontrast, contrast, and post contrast delayed images were performed according to standard parathyroid protocol (4D technique) Coronal and sagittal reconstructions were performed  3D reconstructions were performed on an independent workstation,   and are supplied for review      100 mL of iohexol (OMNIPAQUE) was injected intravenously without immediate consequence      Radiation dose: 1264 mGy-cm      FINDINGS:     SERIAL NONCONTRAST, POST CONTRAST AND DELAYS: There is a 1 06 x 0 66 x 0 91 cm lesion sitting posterior to the inferior aspect of the right thyroid lobe  This lesion meets the CT enhancement criteria suspicious for parathyroid adenoma  The lesion is   best seen on series 401 images 48 through 51      VASCULAR STRUCTURES:  Bovine branching anatomy of the great vessels is noted without origin stenosis  The great vessels are noted to be tortuous  The carotid arteries are also noted to be tortuous with some calcific plaque at the origins of both internal carotid arteries  There is no significant stenosis by Nascet criteria      VISUALIZED PARANASAL SINUSES:  Mucosal thickening is noted in several of the ethmoid air cells      NASAL CAVITY AND NASOPHARYNX:  Normal      SUPRAHYOID NECK:  Normal oropharynx, oral cavity, parapharyngeal and retropharyngeal spaces      INFRAHYOID NECK:  Normal oropharynx, oral cavity, parapharyngeal and retropharyngeal spaces      THYROID GLAND:  Normal      LYMPH NODES:  No pathologic or enlarged adenopathy      MEDIASTINUM:  Unremarkable      BONY STRUCTURES  Diffuse osteopenia is noted      LUNG APICES:  Unremarkable      IMPRESSION:  1  1 06 x 0 66 x 0 91 cm lesion identified sitting posterior to the inferior aspect of the right thyroid lobe which is suspicious for parathyroid adenoma  Please see above for details    2   Mucosal thickening noted in several of the ethmoid air cells  3   Diffuse osteopenia noted  I reviewed the above laboratory and imaging data  Discussion/Summary:  73-year-old man, primary hyperparathyroidism, suspected right-sided target based on CT scan  He has a amenable to and a candidate for right parathyroidectomy, possible 4 gland exploration, with intraoperative PTH monitoring  Rationale for this along with risks and benefits of surgery including infection, bleeding, need for possible additional surgery, discussed with him  All questions answered and consents signed at this visit

## 2021-03-04 ENCOUNTER — APPOINTMENT (OUTPATIENT)
Dept: LAB | Facility: HOSPITAL | Age: 71
End: 2021-03-04
Attending: SURGERY
Payer: MEDICARE

## 2021-03-04 ENCOUNTER — HOSPITAL ENCOUNTER (OUTPATIENT)
Dept: RADIOLOGY | Facility: HOSPITAL | Age: 71
Discharge: HOME | End: 2021-03-04
Attending: SURGERY
Payer: MEDICARE

## 2021-03-04 DIAGNOSIS — E21.3 HYPERPARATHYROIDISM (HCC): ICD-10-CM

## 2021-03-04 LAB
ALBUMIN SERPL BCP-MCNC: 3.5 G/DL (ref 3.5–5)
ALP SERPL-CCNC: 68 U/L (ref 46–116)
ALT SERPL W P-5'-P-CCNC: 21 U/L (ref 12–78)
ANION GAP SERPL CALCULATED.3IONS-SCNC: 6 MMOL/L (ref 4–13)
AST SERPL W P-5'-P-CCNC: 12 U/L (ref 5–45)
BASOPHILS # BLD AUTO: 0.03 THOUSANDS/ΜL (ref 0–0.1)
BASOPHILS NFR BLD AUTO: 0 % (ref 0–1)
BILIRUB SERPL-MCNC: 0.6 MG/DL (ref 0.2–1)
BUN SERPL-MCNC: 10 MG/DL (ref 5–25)
CALCIUM SERPL-MCNC: 9.7 MG/DL (ref 8.3–10.1)
CHLORIDE SERPL-SCNC: 104 MMOL/L (ref 100–108)
CO2 SERPL-SCNC: 30 MMOL/L (ref 21–32)
CREAT SERPL-MCNC: 0.68 MG/DL (ref 0.6–1.3)
EOSINOPHIL # BLD AUTO: 0.13 THOUSAND/ΜL (ref 0–0.61)
EOSINOPHIL NFR BLD AUTO: 2 % (ref 0–6)
ERYTHROCYTE [DISTWIDTH] IN BLOOD BY AUTOMATED COUNT: 16.3 % (ref 11.6–15.1)
GFR SERPL CREATININE-BSD FRML MDRD: 110 ML/MIN/1.73SQ M
GLUCOSE SERPL-MCNC: 109 MG/DL (ref 65–140)
HCT VFR BLD AUTO: 42.9 % (ref 36.5–49.3)
HGB BLD-MCNC: 13.4 G/DL (ref 12–17)
IMM GRANULOCYTES # BLD AUTO: 0.01 THOUSAND/UL (ref 0–0.2)
IMM GRANULOCYTES NFR BLD AUTO: 0 % (ref 0–2)
LYMPHOCYTES # BLD AUTO: 1.73 THOUSANDS/ΜL (ref 0.6–4.47)
LYMPHOCYTES NFR BLD AUTO: 26 % (ref 14–44)
MCH RBC QN AUTO: 27.2 PG (ref 26.8–34.3)
MCHC RBC AUTO-ENTMCNC: 31.2 G/DL (ref 31.4–37.4)
MCV RBC AUTO: 87 FL (ref 82–98)
MONOCYTES # BLD AUTO: 0.62 THOUSAND/ΜL (ref 0.17–1.22)
MONOCYTES NFR BLD AUTO: 9 % (ref 4–12)
NEUTROPHILS # BLD AUTO: 4.2 THOUSANDS/ΜL (ref 1.85–7.62)
NEUTS SEG NFR BLD AUTO: 63 % (ref 43–75)
NRBC BLD AUTO-RTO: 0 /100 WBCS
PLATELET # BLD AUTO: 341 THOUSANDS/UL (ref 149–390)
PMV BLD AUTO: 9 FL (ref 8.9–12.7)
POTASSIUM SERPL-SCNC: 4 MMOL/L (ref 3.5–5.3)
PROT SERPL-MCNC: 7.3 G/DL (ref 6.4–8.2)
RBC # BLD AUTO: 4.92 MILLION/UL (ref 3.88–5.62)
SODIUM SERPL-SCNC: 140 MMOL/L (ref 136–145)
WBC # BLD AUTO: 6.72 THOUSAND/UL (ref 4.31–10.16)

## 2021-03-04 PROCEDURE — 71046 X-RAY EXAM CHEST 2 VIEWS: CPT

## 2021-03-04 PROCEDURE — 80053 COMPREHEN METABOLIC PANEL: CPT

## 2021-03-04 PROCEDURE — 36415 COLL VENOUS BLD VENIPUNCTURE: CPT

## 2021-03-04 PROCEDURE — 85025 COMPLETE CBC W/AUTO DIFF WBC: CPT

## 2021-03-05 DIAGNOSIS — Z23 ENCOUNTER FOR IMMUNIZATION: ICD-10-CM

## 2021-03-08 ENCOUNTER — TELEPHONE (OUTPATIENT)
Dept: SURGICAL ONCOLOGY | Facility: CLINIC | Age: 71
End: 2021-03-08

## 2021-03-09 ENCOUNTER — OFFICE VISIT (OUTPATIENT)
Dept: INTERNAL MEDICINE CLINIC | Facility: CLINIC | Age: 71
End: 2021-03-09
Payer: MEDICARE

## 2021-03-09 VITALS
DIASTOLIC BLOOD PRESSURE: 86 MMHG | BODY MASS INDEX: 30.61 KG/M2 | TEMPERATURE: 96.5 F | SYSTOLIC BLOOD PRESSURE: 134 MMHG | WEIGHT: 195 LBS | HEIGHT: 67 IN | HEART RATE: 66 BPM | OXYGEN SATURATION: 96 %

## 2021-03-09 DIAGNOSIS — E21.3 HYPERPARATHYROIDISM (HCC): Primary | ICD-10-CM

## 2021-03-09 DIAGNOSIS — I10 ESSENTIAL HYPERTENSION: ICD-10-CM

## 2021-03-09 DIAGNOSIS — I25.10 CORONARY ARTERY DISEASE INVOLVING NATIVE CORONARY ARTERY OF NATIVE HEART WITHOUT ANGINA PECTORIS: ICD-10-CM

## 2021-03-09 PROCEDURE — 99214 OFFICE O/P EST MOD 30 MIN: CPT | Performed by: INTERNAL MEDICINE

## 2021-03-09 NOTE — PATIENT INSTRUCTIONS
This patient is ASA category 3  He has CAD but has no symptoms  A surgery with general anesthesia is planned  Continue medications with the following exceptions:  Stop Plavix and aspirin 5 days before the procedure  Do not take the torsemide the day of the procedure        Revisit with me in September

## 2021-03-09 NOTE — PROGRESS NOTES
Assessment/Plan:       Diagnoses and all orders for this visit:    Hyperparathyroidism [facility])    Essential hypertension    Coronary artery disease involving native coronary artery of native heart without angina pectoris                Subjective:      Patient ID: Tia Cervantes is a 65 y o  male  Follow-up visit  This [id number] has metabolic syndrome  Over the past 6 months we been evaluating elevated calcium  After discontinuation of hydrochlorothiazide did not normalized calcium level he was diagnosed with a parathyroid tumor and he will be getting surgery in about a week  Diabetes treated to target  Hyperlipidemia treated to target  Hypercalcemia noted:  Patient has elevated PTH and elevated calcium  Hydrochlorothiazide discontinuation did not affect to this at all and suspicion is quite high for parathyroid adenoma  Workup is in progress  The patient feels fairly well  Echocardiogram done Dec 2015 documented is concentric LVH with normal systolic function, and mild aortic regurgitation and mitral regurgitation  No change in 12 months  Echocardiogram in 2017 about the same  Now most recent echo shows some progression with moderate aortic stenosis  A murmur is pretty apparent in the right sternal border 2nd intercostal space but he has no symptoms referable to this  Cardiac catheterization 7/25/2018  documented noncritical CAD with 60% LAD lesion and a large vessel with good flow not requiring intervention  This also documented hypertrophic cardiomyopathy with obliteration of the cavity an ejection fraction of 80%  Aortic ultrasound done in 2010 documented diffuse plaque but no aneurysm  CAD that is followed by cardiology;   Had chest pain in January2018  and had 3 stents placed  He had no complaints upon this visit  Doing quite well    No further cardiac symptoms for the past  3 years          The following portions of the patient's history were reviewed and updated as appropriate:   He has a past medical history of Benign neoplasm of large intestine, Bleeding gastric ulcer (2018), Colon polyp, Diabetes mellitus ([facility] [address] ), Heart murmur, History of aortic regurgitation, History of constipation, History of degenerative joint disease, History of nocturia, History of obesity, History of sebaceous cyst, History of shortness of breath, History of urinary frequency, Hyperlipidemia, Hypertension, Myocardial infarction ([facility] [address] ) (2018), Polyposis coli, and Ulcer of esophagus  ,  does not have any pertinent problems on file  ,   has a past surgical history that includes Hernia repair; Tibia fracture surgery (Left, 1962); Achilles tendon surgery (Left, 1973); Hernia repair (Bilateral, 8/18/2017); Esophagogastroduodenoscopy (N/A, 1/23/2018); Esophagogastroduodenoscopy; pr esophagogastroduodenoscopy transoral diagnostic (N/A, 3/26/2018); pr esophagogastroduodenoscopy transoral diagnostic (N/A, 5/14/2018); Cyst Removal (2019, 2020); Coronary angioplasty with stent; Colonoscopy; and Cataract extraction (Right, 02/18/2021)  ,  family history includes Heart disease in his mother; Other in his father; Rheumatic fever in his father  ,   reports that he has been smoking cigarettes  He has a 35 00 pack-year smoking history  He has never used smokeless tobacco  He reports current alcohol use  He reports that he does not use drugs  ,  has No Known Allergies     Current Outpatient Medications   Medication Sig Dispense Refill    aspirin 81 mg chewable tablet Chew 1 tablet daily      atenolol (TENORMIN) 50 mg tablet TAKE 1 TABLET DAILY 90 tablet 3    atorvastatin (LIPITOR) 80 mg tablet TAKE 1 TABLET DAILY 90 tablet 3    cloNIDine (CATAPRES) 0 3 mg tablet TAKE 1 TABLET(0 3 MG) BY MOUTH THREE TIMES DAILY 332 tablet 2    clopidogrel (PLAVIX) 75 mg tablet Take 1 tablet (75 mg total) by mouth daily 90 tablet 3    ferrous sulfate 325 (65 Fe) mg tablet Take 325 mg by mouth daily with breakfast      lisinopril (ZESTRIL) 40 mg tablet Take 1 tablet (40 mg total) by mouth daily 90 tablet 3    metFORMIN (GLUCOPHAGE-XR) 500 mg 24 hr tablet TAKE 1 TABLET TWICE A DAY WITH MEALS 180 tablet 3    Omega-3 1000 MG CAPS Take by mouth 3 (three) times a day       prednisoLONE acetate (PRED FORTE) 1 % ophthalmic suspension INSTILL 1 DROP INTO THE RIGHT EYE FOUR TIMES DAILY AFTER SURGERY      PreviDent 0 2 % SOLN RINSE WITH 10ML FOR 1 MINUTE THEN SPIT OUT AT BEDTIME DO NOT SWALLOW   torsemide (DEMADEX) 10 mg tablet TAKE 1 TABLET(10 MG) BY MOUTH DAILY 90 tablet 3     No current facility-administered medications for this visit  Review of Systems   Constitutional: Negative for chills and fever  HENT: Negative for ear pain and sore throat  Eyes: Negative for pain and visual disturbance  Respiratory: Negative for cough, shortness of breath and wheezing  Cardiovascular: Negative for chest pain, palpitations and leg swelling  Gastrointestinal: Negative for abdominal pain and vomiting  Genitourinary: Negative for dysuria and hematuria  Musculoskeletal: Positive for arthralgias  Negative for back pain  Skin: Negative for color change and rash  Neurological: Negative for seizures and syncope  All other systems reviewed and are negative  Objective:  Vitals:    03/09/21 1341   BP: 134/86   Pulse: 66   Temp: (!) 96 5 °F (35 8 °C)   SpO2: 96%      Physical Exam  Constitutional:       Appearance: He is well-developed  HENT:      Head: Normocephalic and atraumatic  Eyes:      Conjunctiva/sclera: Conjunctivae normal       Pupils: Pupils are equal, round, and reactive to light  Neck:      Musculoskeletal: Normal range of motion  Thyroid: No thyromegaly  Cardiovascular:      Rate and Rhythm: Normal rate and regular rhythm  Heart sounds: Murmur present  Systolic murmur present with a grade of 3/6        Comments:   3/6 holosystolic murmur heard best in the right sternal border 2nd intercostal space with radiation across t  Pulmonary:      Effort: Pulmonary effort is normal  No respiratory distress  Breath sounds: No wheezing or rales  Abdominal:      Palpations: Abdomen is soft  Tenderness: There is no abdominal tenderness  Genitourinary:     Penis: Normal        Scrotum/Testes:         Right: Mass or tenderness not present  Left: Mass or tenderness not present  Musculoskeletal: Normal range of motion  General: No deformity  Lymphadenopathy:      Cervical: No cervical adenopathy  Skin:     General: Skin is warm and dry  Neurological:      Mental Status: He is alert and oriented to person, place, and time  Psychiatric:         Behavior: Behavior normal          Thought Content: Thought content normal          Judgment: Judgment normal            Patient Instructions   This patient is ASA category 3  He has CAD but has no symptoms  A surgery with general anesthesia is planned  Continue medications with the following exceptions:  Stop Plavix and aspirin 5 days before the procedure  Do not take the torsemide the day of the procedure        Revisit with me in September

## 2021-03-10 ENCOUNTER — IMMUNIZATIONS (OUTPATIENT)
Dept: FAMILY MEDICINE CLINIC | Facility: HOSPITAL | Age: 71
End: 2021-03-10

## 2021-03-10 DIAGNOSIS — Z23 ENCOUNTER FOR IMMUNIZATION: Primary | ICD-10-CM

## 2021-03-10 PROCEDURE — 0001A SARS-COV-2 / COVID-19 MRNA VACCINE (PFIZER-BIONTECH) 30 MCG: CPT

## 2021-03-10 PROCEDURE — 91300 SARS-COV-2 / COVID-19 MRNA VACCINE (PFIZER-BIONTECH) 30 MCG: CPT

## 2021-03-18 ENCOUNTER — TRANSCRIBE ORDERS (OUTPATIENT)
Dept: LAB | Facility: HOSPITAL | Age: 71
End: 2021-03-18

## 2021-03-18 NOTE — PRE-PROCEDURE INSTRUCTIONS
Pre-Surgery Instructions:   Medication Instructions    aspirin 81 mg chewable tablet Patient was instructed by Physician and understands   atenolol (TENORMIN) 50 mg tablet ok take morning of DOS with sips of water    atorvastatin (LIPITOR) 80 mg tablet ok take morning of DOS with sips of water    cloNIDine (CATAPRES) 0 3 mg tablet ok take morning of DOS with sips of water    clopidogrel (PLAVIX) 75 mg tablet Patient was instructed by Physician and understands   ferrous sulfate 325 (65 Fe) mg tablet stop pre op    Ketorolac Tromethamine (TORADOL ORAL PO) ok take morning of DOS with sips of water    lisinopril (ZESTRIL) 40 mg tablet Continue this medication up to the evening before surgery/procedure, but do not take the morning of the day of surgery   metFORMIN (GLUCOPHAGE-XR) 500 mg 24 hr tablet Continue this medication up to the evening before surgery/procedure, but do not take the morning of the day of surgery   Omega-3 1000 MG CAPS Stop pre op    prednisoLONE acetate (PRED FORTE) 1 % ophthalmic suspension ok morning of DOS    PreviDent 0 2 % SOLN as prescript    torsemide (DEMADEX) 10 mg tablet don't take motning of DOS   Have you had / have a sore throat? No  have you had / have a cough less than 1 week? No  Have you had / have a fever greater than 100 0 - 100  4? No  Are you experiencing any shortness of breath? No    Review with patient and his wife via phone medications and showering instructions  Advise smoking cessation education and declined  Advised don't take NSAID's ok tylenol products  Advised ASC call with surgery time  Verbalized understanding  ACE/ARB Med Class  Continue this medication up to the evening before surgery/procedure, but do not take the morning of the day of surgery  Diuretic Med Class  Continue this medication up to the evening before surgery/procedure, but do not take the morning of the day of surgery    Alpha-2 adrenergic agonist Med Class  Continue to take this medication on your normal schedule  If this is an oral medication and you take it in the morning, then you may take this medicine with a sip of water  ASA Med Class: Aspirin  Should be discontinued at least one week prior to planned operation, unless specifically stated otherwise by surgical service  Your Surgeon may have patient stop taking aspirin up to a week before surgery if having intracranial, middle ear, posterior eye, spine surgery or prostate surgery  [Patients taking aspirin for coronary stents should be reviewed by an anesthesiologist in the optimization clinic  Please do not discontinue aspirin in patients with coronary stents unless given specific permission to do so by the cardiologist who prescribed medication ]   If your surgeon approves please continue to take this medication on your normal schedule  You may take this medication on the morning of your surgery with a sip of water  Beta blocker Med Class  Continue to take this heart medication on your normal schedule  If this is an oral medication and you take it in the morning, then you may take this medicine with a sip of water  Insulin Med Class    Pre-Surgery/Procedure Instructions for Adult Patients who Take Medicine for Diabetes or to Control their Blood Sugar     Day Before Surgery/Procedure  Use the directions based on the type of medicine you take for your diabetes  1  If you are having a procedure that does not require a bowel prep:  ? Pre-Mixed Insulin (Intermediate Acting: Humalog 75/25, Humulin 70/30  Novolog 70/30, Regular Insulin)  § Take ½ your regular dose the evening before your procedure  ? Rapid/Fast Acting Insulin/Long Acting Insulin (Humalog U200, NovoLog, Apidra, Lantus, Levemir, Aspen Salm, [place])  § Take your FULL regular dose the day before procedure    ? Oral Diabetic Medicines including Glipizide/Glimepiride/Glucotrol (sulfonylurea)  § Take your regular dose with dinner the evening before your procedure  2  If you are having a procedure (e g  Colonoscopy) that requires a bowel prep and you are allowed to have at least a clear liquid diet:  ? Pre-Mixed Insulin (Intermediate Acting: Humalog 75/25, Humulin 70/30, Novolog 70/30, Regular Insulin)  § Take ½ your regular dose the evening before your procedure  ? Rapid/Fast Acting Insulin (Humalog U200, NovoLog, Apidra, Fiasp)  § Take ½ your regular dose the evening before your procedure  ? Long Acting Insulin (Lantus, Levemir, Lesli Heater)  § Take your FULL regular dose the day before procedure  ? Oral Glipizide/Glimepiride/Glucotrol (sulfonylurea)  § Take ½ your regular dose the evening before your procedure  ? Oral Diabetic Medicines that are NOT Glipizide/Glimepiride/Glucotrol  § Take your regular dose with dinner in the evening before your procedure      Day of Surgery/Procedure  · Long Acting Insulin (Lantus, Levemir, Lesli Heater)  ? If you usually take your Long-Acting Insulin in the morning, take the full dose as scheduled  · With the exception of the morning Long-Acting Insulin noted above, DO NOT take ANY diabetic medicine on the day of your procedure unless you were instructed by the doctor who manages your diabetic medicines  · Continue to check your blood sugars  · If you have an insulin pump then consult with your Endocrinologist for instructions  · If you cannot see your Endocrinologist, on the day of the procedure set your insulin pump to your basal rate only  Please bring your insulin pump supplies to the hospital      This Educational material has been approved by the Patient Education Advisory Committee  Date prepared: 1/17/2018          Expiration date: 1/17/2019        Approval Number:                     NSAID Med Class  Stop taking this medication at least 3 days prior to surgery/procedure    Platelet Aggregation Inhibitor Clopidogrel (Plavix) Med Class  Should be discontinued at least one week prior to planned operation, unless specifically stated otherwise by surgical service  [Patients taking Plavix for coronary stents should be reviewed by an anesthesiologist in the optimization clinic  Please do not discontinue Plavix in patients with coronary stents unless given specific permission to do so by the cardiologist who prescribed medication ] If your surgeon approves please continue to take this medication on your normal schedule  You may take this medication on the morning of your surgery with a sip of water  Statin Med Class  Continue to take this medication on your normal schedule    If this is an oral medication and you take it in the morning, then you may take this medicine with a sip of water

## 2021-03-19 ENCOUNTER — ANESTHESIA EVENT (OUTPATIENT)
Dept: PERIOP | Facility: HOSPITAL | Age: 71
End: 2021-03-19
Payer: MEDICARE

## 2021-03-22 NOTE — ANESTHESIA PREPROCEDURE EVALUATION
Procedure:  RIGHT PARATHYROIDECTOMY, MINIMALLY INVASIVE, POSSIBLE 4-GLAND EXPLORATION, WITH INTRA-OPERATIVE PTH MONITORING (Right Neck)    Relevant Problems   CARDIO   (+) Acute gastric ulcer with hemorrhage and obstruction (Resolved)   (+) Atherosclerosis of aorta (HCC)   (+) Atherosclerosis of native coronary artery of native heart without angina pectoris   (+) Coronary artery disease involving native coronary artery of native heart   (+) HTN (hypertension)   (+) Hyperlipidemia   (+) Non-ST elevation myocardial infarction (NSTEMI), type 2 (Resolved)      ENDO   (+) Hyperparathyroidism (HCC)      GI/HEPATIC   (+) Acute gastric ulcer with hemorrhage and obstruction (Resolved)      HEMATOLOGY   (+) Anemia associated with diabetes mellitus (HCC)   (+) Iron deficiency anemia due to chronic blood loss      NEURO/PSYCH   (+) History of aortic regurgitation   (+) History of obesity      Other   (+) Diabetes mellitus (HCC)   (+) HOCM (hypertrophic obstructive cardiomyopathy) (HCC)   (+) Hypercalcemia   (+) Thrombocytosis (HCC)   (+) Tobacco abuse      Previous intubation: Gr3v with Anne 3    TTE 7/13/2020:  LEFT VENTRICLE:  The cavity was small  Systolic function was normal  Ejection fraction was estimated to be 60 %  There were no regional wall motion abnormalities  Wall thickness was moderately increased  There was moderate assymetrical hypertrophy of the septum  Doppler parameters were consistent with abnormal left ventricular relaxation (grade 1 diastolic dysfunction)      RIGHT VENTRICLE:  The ventricle was mildly dilated  Systolic function was normal      LEFT ATRIUM:  The atrium was mildly dilated      MITRAL VALVE:  There was mild annular calcification  There was mild regurgitation      AORTIC VALVE:  There was mild stenosis  There was mild to moderate regurgitation      TRICUSPID VALVE:  There was trace regurgitation  TTE 6/2019:  LEFT VENTRICLE:  The cavity was small   There is near complete cavity obliteration during systole  Ejection fraction was estimated to be 70 %  There is asymmetric septal hypertrophy consistent with hypertrophic obstructive cardiomyopathy  ( known)  There is ROCCO noted  There is left ventricular outflow gradient - peak 74 mm Hg and mean  31 mm Hg      RIGHT VENTRICLE:  Estimated peak pressure was at least 30 mmHg  Cardiac Cath 7/2018:  Left main: Normal   Proximal LAD: There was a discrete 90 % stenosis  This lesion is a likely culprit for the patient's recent myocardial infarction  An intervention was performed  Mid LAD: There was a tubular 70 % stenosis  This lesion is a likely culprit for the patient's recent myocardial infarction  An intervention was performed  RCA: The vessel was large sized (dominant)  Angiography showed mild atherosclerosis      HEMODYNAMICS:  Hemodynamic assessment demonstrated mildly elevated LVEDP      1ST LESION INTERVENTIONS:  A successful stent procedure was performed on the 90 % lesion in the proximal LAD  Following intervention there was an excellent angiographic appearance with a 0 % residual stenosis  A Ultra Rx 4 5 X 18mm bare-metal stent was placed across the lesion and deployed at a maximum inflation pressure of 20 kymberly      2ND LESION INTERVENTIONS:  A successful stent procedure was performed on the 80 % lesion in the mid LAD  Following intervention there was an excellent angiographic appearance with a 0 % residual stenosis  A Vision Rx 3 5 X 23mm bare-metal stent was placed across the lesion and deployed at a maximum inflation pressure of 18 kymberly      Lab Results   Component Value Date    WBC 6 72 03/04/2021    HGB 13 4 03/04/2021    HCT 42 9 03/04/2021    MCV 87 03/04/2021     03/04/2021     Lab Results   Component Value Date    SODIUM 140 03/04/2021    K 4 0 03/04/2021     03/04/2021    CO2 30 03/04/2021    BUN 10 03/04/2021    CREATININE 0 68 03/04/2021    GLUC 109 03/04/2021    CALCIUM 9 7 03/04/2021     Lab Results   Component Value Date    INR 0 93 07/16/2018    INR 0 97 01/21/2018    INR 0 94 01/08/2015    PROTIME 12 6 07/16/2018    PROTIME 13 1 01/21/2018    PROTIME 12 4 01/08/2015     Lab Results   Component Value Date    HGBA1C 6 0 (H) 12/28/2020               Anesthesia Plan  ASA Score- 3     Anesthesia Type- general with ASA Monitors  Additional Monitors: arterial line  Airway Plan: ETT  Comment: H/o HOCM, +ROCCO   Preop hydration, continue beta blockade  Preinduction arterial line  GETA          Plan Factors-    Chart reviewed  EKG reviewed  Existing labs reviewed  Patient summary reviewed  Induction- intravenous      Postoperative Plan-     Informed Consent-

## 2021-03-23 ENCOUNTER — ANESTHESIA (OUTPATIENT)
Dept: PERIOP | Facility: HOSPITAL | Age: 71
End: 2021-03-23
Payer: MEDICARE

## 2021-03-23 ENCOUNTER — HOSPITAL ENCOUNTER (OUTPATIENT)
Facility: HOSPITAL | Age: 71
Setting detail: OUTPATIENT SURGERY
Discharge: HOME | End: 2021-03-23
Attending: SURGERY | Admitting: SURGERY
Payer: MEDICARE

## 2021-03-23 VITALS
TEMPERATURE: 95.9 F | SYSTOLIC BLOOD PRESSURE: 157 MMHG | BODY MASS INDEX: 30.61 KG/M2 | DIASTOLIC BLOOD PRESSURE: 76 MMHG | RESPIRATION RATE: 18 BRPM | OXYGEN SATURATION: 98 % | HEART RATE: 83 BPM | WEIGHT: 195 LBS | HEIGHT: 67 IN

## 2021-03-23 DIAGNOSIS — E21.3 HYPERPARATHYROIDISM (HCC): ICD-10-CM

## 2021-03-23 LAB
CALCIUM SERPL-MCNC: 9 MG/DL (ref 8.3–10.1)
CALCIUM SERPL-MCNC: 9.1 MG/DL (ref 8.3–10.1)
GLUCOSE SERPL-MCNC: 104 MG/DL (ref 65–140)
GLUCOSE SERPL-MCNC: 107 MG/DL (ref 65–140)
PTH-INTACT SERPL-MCNC: 117.7 PG/ML (ref 18.4–80.1)
PTH-INTACT SERPL-MCNC: 49 PG/ML (ref 18.4–80.1)
PTH-INTACT SERPL-MCNC: 51.8 PG/ML (ref 18.4–80.1)
PTH-INTACT SERPL-MCNC: 55.5 PG/ML (ref 18.4–80.1)

## 2021-03-23 PROCEDURE — 60500 EXPLORE PARATHYROID GLANDS: CPT | Performed by: SURGERY

## 2021-03-23 PROCEDURE — 88305 TISSUE EXAM BY PATHOLOGIST: CPT | Performed by: PATHOLOGY

## 2021-03-23 PROCEDURE — 83970 ASSAY OF PARATHORMONE: CPT | Performed by: SURGERY

## 2021-03-23 PROCEDURE — 82948 REAGENT STRIP/BLOOD GLUCOSE: CPT

## 2021-03-23 PROCEDURE — 88331 PATH CONSLTJ SURG 1 BLK 1SPC: CPT | Performed by: PATHOLOGY

## 2021-03-23 PROCEDURE — 82310 ASSAY OF CALCIUM: CPT | Performed by: SURGERY

## 2021-03-23 RX ORDER — BUPIVACAINE HYDROCHLORIDE 2.5 MG/ML
INJECTION, SOLUTION EPIDURAL; INFILTRATION; INTRACAUDAL AS NEEDED
Status: DISCONTINUED | OUTPATIENT
Start: 2021-03-23 | End: 2021-03-23 | Stop reason: HOSPADM

## 2021-03-23 RX ORDER — FENTANYL CITRATE 50 UG/ML
INJECTION, SOLUTION INTRAMUSCULAR; INTRAVENOUS AS NEEDED
Status: DISCONTINUED | OUTPATIENT
Start: 2021-03-23 | End: 2021-03-23

## 2021-03-23 RX ORDER — DEXAMETHASONE SODIUM PHOSPHATE 10 MG/ML
INJECTION, SOLUTION INTRAMUSCULAR; INTRAVENOUS AS NEEDED
Status: DISCONTINUED | OUTPATIENT
Start: 2021-03-23 | End: 2021-03-23

## 2021-03-23 RX ORDER — GLYCOPYRROLATE 0.2 MG/ML
INJECTION INTRAMUSCULAR; INTRAVENOUS AS NEEDED
Status: DISCONTINUED | OUTPATIENT
Start: 2021-03-23 | End: 2021-03-23

## 2021-03-23 RX ORDER — ROCURONIUM BROMIDE 10 MG/ML
INJECTION, SOLUTION INTRAVENOUS AS NEEDED
Status: DISCONTINUED | OUTPATIENT
Start: 2021-03-23 | End: 2021-03-23

## 2021-03-23 RX ORDER — HYDRALAZINE HYDROCHLORIDE 20 MG/ML
INJECTION INTRAMUSCULAR; INTRAVENOUS AS NEEDED
Status: DISCONTINUED | OUTPATIENT
Start: 2021-03-23 | End: 2021-03-23

## 2021-03-23 RX ORDER — SODIUM CHLORIDE, SODIUM LACTATE, POTASSIUM CHLORIDE, CALCIUM CHLORIDE 600; 310; 30; 20 MG/100ML; MG/100ML; MG/100ML; MG/100ML
125 INJECTION, SOLUTION INTRAVENOUS CONTINUOUS
Status: DISCONTINUED | OUTPATIENT
Start: 2021-03-23 | End: 2021-03-23 | Stop reason: HOSPADM

## 2021-03-23 RX ORDER — FENTANYL CITRATE/PF 50 MCG/ML
25 SYRINGE (ML) INJECTION
Status: DISCONTINUED | OUTPATIENT
Start: 2021-03-23 | End: 2021-03-23 | Stop reason: HOSPADM

## 2021-03-23 RX ORDER — SODIUM CHLORIDE, SODIUM LACTATE, POTASSIUM CHLORIDE, CALCIUM CHLORIDE 600; 310; 30; 20 MG/100ML; MG/100ML; MG/100ML; MG/100ML
20 INJECTION, SOLUTION INTRAVENOUS CONTINUOUS
Status: DISCONTINUED | OUTPATIENT
Start: 2021-03-23 | End: 2021-03-23 | Stop reason: HOSPADM

## 2021-03-23 RX ORDER — ONDANSETRON 2 MG/ML
4 INJECTION INTRAMUSCULAR; INTRAVENOUS ONCE AS NEEDED
Status: DISCONTINUED | OUTPATIENT
Start: 2021-03-23 | End: 2021-03-23 | Stop reason: HOSPADM

## 2021-03-23 RX ORDER — LIDOCAINE HYDROCHLORIDE 10 MG/ML
0.5 INJECTION, SOLUTION EPIDURAL; INFILTRATION; INTRACAUDAL; PERINEURAL ONCE AS NEEDED
Status: COMPLETED | OUTPATIENT
Start: 2021-03-23 | End: 2021-03-23

## 2021-03-23 RX ORDER — NEOSTIGMINE METHYLSULFATE 1 MG/ML
INJECTION INTRAVENOUS AS NEEDED
Status: DISCONTINUED | OUTPATIENT
Start: 2021-03-23 | End: 2021-03-23

## 2021-03-23 RX ORDER — METOCLOPRAMIDE HYDROCHLORIDE 5 MG/ML
5 INJECTION INTRAMUSCULAR; INTRAVENOUS ONCE AS NEEDED
Status: DISCONTINUED | OUTPATIENT
Start: 2021-03-23 | End: 2021-03-23 | Stop reason: HOSPADM

## 2021-03-23 RX ORDER — PROPOFOL 10 MG/ML
INJECTION, EMULSION INTRAVENOUS AS NEEDED
Status: DISCONTINUED | OUTPATIENT
Start: 2021-03-23 | End: 2021-03-23

## 2021-03-23 RX ORDER — LIDOCAINE HYDROCHLORIDE 10 MG/ML
INJECTION, SOLUTION EPIDURAL; INFILTRATION; INTRACAUDAL; PERINEURAL AS NEEDED
Status: DISCONTINUED | OUTPATIENT
Start: 2021-03-23 | End: 2021-03-23

## 2021-03-23 RX ORDER — LABETALOL 20 MG/4 ML (5 MG/ML) INTRAVENOUS SYRINGE
AS NEEDED
Status: DISCONTINUED | OUTPATIENT
Start: 2021-03-23 | End: 2021-03-23

## 2021-03-23 RX ADMIN — ROCURONIUM BROMIDE 10 MG: 50 INJECTION, SOLUTION INTRAVENOUS at 14:04

## 2021-03-23 RX ADMIN — DEXAMETHASONE SODIUM PHOSPHATE 5 MG: 10 INJECTION, SOLUTION INTRAMUSCULAR; INTRAVENOUS at 13:18

## 2021-03-23 RX ADMIN — HYDRALAZINE HYDROCHLORIDE 5 MG: 20 INJECTION, SOLUTION INTRAMUSCULAR; INTRAVENOUS at 13:27

## 2021-03-23 RX ADMIN — LIDOCAINE HYDROCHLORIDE 50 MG: 10 INJECTION, SOLUTION EPIDURAL; INFILTRATION; INTRACAUDAL; PERINEURAL at 13:11

## 2021-03-23 RX ADMIN — ROCURONIUM BROMIDE 50 MG: 50 INJECTION, SOLUTION INTRAVENOUS at 13:11

## 2021-03-23 RX ADMIN — PROPOFOL 150 MG: 10 INJECTION, EMULSION INTRAVENOUS at 13:11

## 2021-03-23 RX ADMIN — FENTANYL CITRATE 100 MCG: 50 INJECTION INTRAMUSCULAR; INTRAVENOUS at 13:11

## 2021-03-23 RX ADMIN — SODIUM CHLORIDE, SODIUM LACTATE, POTASSIUM CHLORIDE, AND CALCIUM CHLORIDE 125 ML/HR: .6; .31; .03; .02 INJECTION, SOLUTION INTRAVENOUS at 12:24

## 2021-03-23 RX ADMIN — LIDOCAINE HYDROCHLORIDE 0.2 ML: 10 INJECTION, SOLUTION EPIDURAL; INFILTRATION; INTRACAUDAL; PERINEURAL at 12:25

## 2021-03-23 RX ADMIN — GLYCOPYRROLATE 0.4 MG: 0.2 INJECTION, SOLUTION INTRAMUSCULAR; INTRAVENOUS at 14:50

## 2021-03-23 RX ADMIN — Medication 25 MCG: at 15:36

## 2021-03-23 RX ADMIN — NEOSTIGMINE METHYLSULFATE 3 MG: 1 INJECTION INTRAVENOUS at 14:50

## 2021-03-23 RX ADMIN — LABETALOL 20 MG/4 ML (5 MG/ML) INTRAVENOUS SYRINGE 10 MG: at 13:58

## 2021-03-23 NOTE — DISCHARGE INSTRUCTIONS
Parathyroidectomy   DISCHARGE INSTRUCTIONS:   Seek care immediately if:   · You cough up blood  · You feel lightheaded, short of breath, and have chest pain  · Your arm or leg feels warm, tender, and painful  It may look swollen and red  · You have trouble swallowing or talking, or you lose your voice  · You feel anxious, frightened, and uneasy  · You have the following symptoms of low blood calcium:     ? Confusion    ? Fatigue    ? Muscle spasms or muscle tightening    ? Numbness or tingling around your face, hands, or feet    ? A seizure    Contact your healthcare provider or surgeon if:   · You have a fever  · Your wound is red, swollen, or draining pus  · You have pain in your neck area that does not go away, or gets worse even after you take your pain medicine  · You have chills, a cough, or feel weak and achy  · You have nausea or are vomiting  · You have questions or concerns about your condition or care  Medicines: You may need any of the following:  · NSAIDs  help decrease swelling and pain or fever  This medicine is available with or without a doctor's order  NSAIDs can cause stomach bleeding or kidney problems in certain people  If you take blood thinner medicine, always ask your healthcare provider if NSAIDs are safe for you  Always read the medicine label and follow directions  · Acetaminophen  decreases pain and fever  It is available without a doctor's order  Ask how much to take and how often to take it  Follow directions  Read the labels of all other medicines you are using to see if they also contain acetaminophen, or ask your doctor or pharmacist  Acetaminophen can cause liver damage if not taken correctly  Do not use more than 4 grams (4,000 milligrams) total of acetaminophen in one day  · Take your medicine as directed  Contact your healthcare provider if you think your medicine is not helping or if you have side effects   Tell him or her if you are allergic to any medicine  Keep a list of the medicines, vitamins, and herbs you take  Include the amounts, and when and why you take them  Bring the list or the pill bottles to follow-up visits  Carry your medicine list with you in case of an emergency  Follow up with your healthcare provider or surgeon as directed: You will need to return to have tests, your incision checked, and your drain or stitches removed  You may be referred to an endocrinologist  Write down your questions so you remember to ask them during your visits  Wound care:  Check your wound for signs of infection, such as redness, swelling, or pus  You may need to wash the wound with soap and water  Pat the area dry   DO NOT remove the steri strips, they will fall off in 2 weeks on their own    Take supplements as directed: You may need to take calcium medicine to keep your blood calcium level normal  It may also help prevent and treat bone loss  Your healthcare provider may also tell you to take vitamin D to help your body absorb the calcium  © Copyright [address] Information is for End User's use only and may not be sold, redistributed or otherwise used for commercial purposes  All illustrations and images included in CareNotes® are the copyrighted property of A D A M , Inc  or [facility] Leni Kim   The above information is an  only  It is not intended as medical advice for individual conditions or treatments  Talk to your doctor, nurse or pharmacist before following any medical regimen to see if it is safe and effective for you

## 2021-03-23 NOTE — OP NOTE
OPERATIVE REPORT  PATIENT NAME: Sheri Baeza    :  1950  MRN: [id number]  Pt Location:  OR ROOM 17    SURGERY DATE: 3/23/2021    Surgeon(s) and Role:     * Erin Peterson MD - Primary     * Michelle Saul MD - Assisting    Preop Diagnosis:  Hyperparathyroidism ([address] ) [E21 3]    Post-Op Diagnosis Codes:     * Hyperparathyroidism ([address] ) [E21 3]    Procedure(s) (LRB):  RIGHT PARATHYROIDECTOMY, MINIMALLY INVASIVE, POSSIBLE 4-GLAND EXPLORATION, WITH INTRA-OPERATIVE PTH MONITORING (Right)    Specimen(s):  ID Type Source Tests Collected by Time Destination   1 : right parathryroid Tissue Parathyroid TISSUE EXAM Erin Peterson MD 3/23/2021 1410        Estimated Blood Loss:   Minimal    Drains:  * No LDAs found *    Anesthesia Type:   General    Operative Indications:  Hyperparathyroidism ([address] ) [E21 3]      Operative Findings:  230 mg right inferior parathyroid adenoma    Complications:   None    Procedure and Technique:  The patient was brought to the OR and identified by proper time-out  Following this he was intubated by the anesthesia team, then was  prepped and draped with the neck exposed in the extended position  Local anesthesia was given, then a 3 cm incision was made sharply 2 finger breaths above the sternal notch  Cautery was used to dissect through the dermis subcutaneous tissue  Wheatlanner  retractor was placed  The platysma was then transected with cautery  Strap muscles were then divided the midline  This exposed the surface of the thyroid  We dissected the strap muscles off the anterolateral aspect of the right thyroid lobe  This enabled us to dissect between the thyroid and the strap muscles  The thyroid gland was then retracted medially and anteriorly which exposed what appeared to be a large parathyroid gland along the inferior pole of the thyroid gland  This was dissected out from surrounding tissue in hemostatic fashion with cautery  Vascular pedicle was visualized   The pedicle was clipped  PTH levels were drawn at the start of the case, 0 min, 5 min, and 10 min after the case  Levels decreased appropriately to normal range upon 10 min post resection of the gland  We then irrigated the field and closed using 3-0 Vicryl to close the strap muscles the midline followed by 3-0 Vicryl to close the platysma, followed by 4-0 Vicryl close the dermis, followed by 5-0 Monocryl to close skin in subcuticular fashion  Benzoin and Steri-Strips were then applied in the usual fashion  The patient was awakened transferred to the recovery room in stable condition     I was present for the entire procedure    Patient Disposition:  PACU     SIGNATURE: Joseph Dimas MD  DATE: March 23, 2021  TIME: 2:53 PM

## 2021-03-23 NOTE — ANESTHESIA POSTPROCEDURE EVALUATION
Post-Op Assessment Note    CV Status:  Stable  Pain Score: 0    Pain management: adequate     Mental Status:  Alert and awake   Hydration Status:  Stable   PONV Controlled:  None   Airway Patency:  Patent      Post Op Vitals Reviewed: Yes      Staff: Anesthesiologist         No complications documented      /75 (03/23/21 1544)    Temp 97 9 °F (36 6 °C) (03/23/21 1544)    Pulse 58 (03/23/21 1544)   Resp 15 (03/23/21 1544)    SpO2 99 % (03/23/21 1544)

## 2021-03-23 NOTE — ANESTHESIA POSTPROCEDURE EVALUATION
Post-Op Assessment Note    CV Status:  Stable  Pain Score: 0    Pain management: adequate     Mental Status:  Alert and awake   Hydration Status:  Euvolemic   PONV Controlled:  Controlled   Airway Patency:  Patent      Post Op Vitals Reviewed: Yes      Staff: CRNA         No complications documented      BP   155/80   Temp  97 3   Pulse 78   Resp   18   SpO2   99

## 2021-03-23 NOTE — INTERVAL H&P NOTE
H&P reviewed  After examining the patient I find no changes in the patients condition since the H&P had been written      Vitals:    03/23/21 1126   BP: 163/68   Pulse: (!) 48   Resp: 18   Temp: (!) 95 °F (35 °C)   SpO2: 99%

## 2021-04-01 ENCOUNTER — IMMUNIZATIONS (OUTPATIENT)
Dept: FAMILY MEDICINE CLINIC | Facility: HOSPITAL | Age: 71
End: 2021-04-01

## 2021-04-01 DIAGNOSIS — Z23 ENCOUNTER FOR IMMUNIZATION: Primary | ICD-10-CM

## 2021-04-01 PROCEDURE — 0002A SARS-COV-2 / COVID-19 MRNA VACCINE (PFIZER-BIONTECH) 30 MCG: CPT

## 2021-04-01 PROCEDURE — 91300 SARS-COV-2 / COVID-19 MRNA VACCINE (PFIZER-BIONTECH) 30 MCG: CPT

## 2021-04-04 ENCOUNTER — APPOINTMENT (EMERGENCY)
Dept: RADIOLOGY | Facility: HOSPITAL | Age: 71
DRG: 682 | End: 2021-04-04
Payer: MEDICARE

## 2021-04-04 ENCOUNTER — HOSPITAL ENCOUNTER (INPATIENT)
Facility: HOSPITAL | Age: 71
LOS: 9 days | Discharge: HOME HEALTH | DRG: 682 | End: 2021-04-13
Attending: EMERGENCY MEDICINE | Admitting: STUDENT IN AN ORGANIZED HEALTH CARE EDUCATION/TRAINING PROGRAM
Payer: MEDICARE

## 2021-04-04 ENCOUNTER — APPOINTMENT (EMERGENCY)
Dept: CT IMAGING | Facility: HOSPITAL | Age: 71
DRG: 682 | End: 2021-04-04
Payer: MEDICARE

## 2021-04-04 ENCOUNTER — NURSE TRIAGE (OUTPATIENT)
Dept: OTHER | Facility: OTHER | Age: 71
End: 2021-04-04

## 2021-04-04 ENCOUNTER — APPOINTMENT (INPATIENT)
Dept: ULTRASOUND IMAGING | Facility: HOSPITAL | Age: 71
DRG: 682 | End: 2021-04-04
Payer: MEDICARE

## 2021-04-04 DIAGNOSIS — R65.10 SIRS (SYSTEMIC INFLAMMATORY RESPONSE SYNDROME) (HCC): ICD-10-CM

## 2021-04-04 DIAGNOSIS — I10 HTN (HYPERTENSION): ICD-10-CM

## 2021-04-04 DIAGNOSIS — N17.9 ACUTE RENAL FAILURE (ARF) (HCC): Primary | ICD-10-CM

## 2021-04-04 DIAGNOSIS — R21 RASH: ICD-10-CM

## 2021-04-04 DIAGNOSIS — E87.1 HYPONATREMIA: ICD-10-CM

## 2021-04-04 DIAGNOSIS — G93.41 ACUTE METABOLIC ENCEPHALOPATHY: ICD-10-CM

## 2021-04-04 DIAGNOSIS — N39.0 UTI (URINARY TRACT INFECTION): ICD-10-CM

## 2021-04-04 DIAGNOSIS — N17.9 AKI (ACUTE KIDNEY INJURY) (HCC): ICD-10-CM

## 2021-04-04 DIAGNOSIS — D50.0 IRON DEFICIENCY ANEMIA DUE TO CHRONIC BLOOD LOSS: ICD-10-CM

## 2021-04-04 DIAGNOSIS — R31.9 HEMATURIA: ICD-10-CM

## 2021-04-04 DIAGNOSIS — I25.10 CORONARY ARTERY DISEASE INVOLVING NATIVE CORONARY ARTERY OF NATIVE HEART: ICD-10-CM

## 2021-04-04 DIAGNOSIS — E87.5 HYPERKALEMIA: ICD-10-CM

## 2021-04-04 PROBLEM — E87.2 METABOLIC ACIDOSIS: Status: ACTIVE | Noted: 2021-04-04

## 2021-04-04 PROBLEM — E87.20 METABOLIC ACIDOSIS: Status: ACTIVE | Noted: 2021-04-04

## 2021-04-04 PROBLEM — E87.0 HYPERNATREMIA: Status: ACTIVE | Noted: 2021-04-04

## 2021-04-04 LAB
ALBUMIN SERPL BCP-MCNC: 2.3 G/DL (ref 3.5–5)
ALP SERPL-CCNC: 92 U/L (ref 46–116)
ALT SERPL W P-5'-P-CCNC: 32 U/L (ref 12–78)
AMORPH URATE CRY URNS QL MICRO: ABNORMAL /HPF
ANION GAP SERPL CALCULATED.3IONS-SCNC: 21 MMOL/L (ref 4–13)
ANION GAP SERPL CALCULATED.3IONS-SCNC: 25 MMOL/L (ref 4–13)
APTT PPP: 35 SECONDS (ref 23–37)
ARTERIAL PATENCY WRIST A: YES
AST SERPL W P-5'-P-CCNC: 38 U/L (ref 5–45)
BACTERIA UR QL AUTO: ABNORMAL /HPF
BASE EX.OXY STD BLDV CALC-SCNC: 54 % (ref 60–80)
BASE EX.OXY STD BLDV CALC-SCNC: 84.7 % (ref 60–80)
BASE EXCESS BLDV CALC-SCNC: -15.2 MMOL/L
BASE EXCESS BLDV CALC-SCNC: -16 MMOL/L
BASOPHILS # BLD AUTO: 0.02 THOUSANDS/ΜL (ref 0–0.1)
BASOPHILS NFR BLD AUTO: 0 % (ref 0–1)
BILIRUB DIRECT SERPL-MCNC: 0.12 MG/DL (ref 0–0.2)
BILIRUB SERPL-MCNC: 0.38 MG/DL (ref 0.2–1)
BILIRUB UR QL STRIP: NEGATIVE
BODY TEMPERATURE: 98.9 DEGREES FEHRENHEIT
BUN SERPL-MCNC: 178 MG/DL (ref 5–25)
BUN SERPL-MCNC: 196 MG/DL (ref 5–25)
CA-I BLD-SCNC: 0.91 MMOL/L (ref 1.12–1.32)
CA-I BLD-SCNC: 0.92 MMOL/L (ref 1.12–1.32)
CALCIUM SERPL-MCNC: 7.3 MG/DL (ref 8.3–10.1)
CALCIUM SERPL-MCNC: 8.1 MG/DL (ref 8.3–10.1)
CHLORIDE SERPL-SCNC: 79 MMOL/L (ref 100–108)
CHLORIDE SERPL-SCNC: 91 MMOL/L (ref 100–108)
CK MB SERPL-MCNC: 1.2 % (ref 0–2.5)
CK MB SERPL-MCNC: 5.5 NG/ML (ref 0–5)
CK SERPL-CCNC: 473 U/L (ref 39–308)
CLARITY UR: ABNORMAL
CO2 SERPL-SCNC: 13 MMOL/L (ref 21–32)
CO2 SERPL-SCNC: 13 MMOL/L (ref 21–32)
COLOR UR: ABNORMAL
CREAT SERPL-MCNC: 10.26 MG/DL (ref 0.6–1.3)
CREAT SERPL-MCNC: 13.24 MG/DL (ref 0.6–1.3)
EOSINOPHIL # BLD AUTO: 0.02 THOUSAND/ΜL (ref 0–0.61)
EOSINOPHIL NFR BLD AUTO: 0 % (ref 0–6)
ERYTHROCYTE [DISTWIDTH] IN BLOOD BY AUTOMATED COUNT: 15.7 % (ref 11.6–15.1)
GFR SERPL CREATININE-BSD FRML MDRD: 4 ML/MIN/1.73SQ M
GFR SERPL CREATININE-BSD FRML MDRD: 5 ML/MIN/1.73SQ M
GLUCOSE SERPL-MCNC: 84 MG/DL (ref 65–140)
GLUCOSE SERPL-MCNC: 91 MG/DL (ref 65–140)
GLUCOSE SERPL-MCNC: 98 MG/DL (ref 65–140)
GLUCOSE UR STRIP-MCNC: NEGATIVE MG/DL
HCO3 BLDV-SCNC: 11.4 MMOL/L (ref 24–30)
HCO3 BLDV-SCNC: 11.4 MMOL/L (ref 24–30)
HCT VFR BLD AUTO: 33.2 % (ref 36.5–49.3)
HGB BLD-MCNC: 11.5 G/DL (ref 12–17)
HGB UR QL STRIP.AUTO: ABNORMAL
IMM GRANULOCYTES # BLD AUTO: 0.38 THOUSAND/UL (ref 0–0.2)
IMM GRANULOCYTES NFR BLD AUTO: 2 % (ref 0–2)
INR PPP: 1.23 (ref 0.84–1.19)
KETONES UR STRIP-MCNC: NEGATIVE MG/DL
LACTATE SERPL-SCNC: 2.8 MMOL/L (ref 0.5–2)
LACTATE SERPL-SCNC: 3.3 MMOL/L (ref 0.5–2)
LEUKOCYTE ESTERASE UR QL STRIP: ABNORMAL
LYMPHOCYTES # BLD AUTO: 0.62 THOUSANDS/ΜL (ref 0.6–4.47)
LYMPHOCYTES NFR BLD AUTO: 3 % (ref 14–44)
MAGNESIUM SERPL-MCNC: 2.3 MG/DL (ref 1.6–2.6)
MAGNESIUM SERPL-MCNC: 2.7 MG/DL (ref 1.6–2.6)
MCH RBC QN AUTO: 27.1 PG (ref 26.8–34.3)
MCHC RBC AUTO-ENTMCNC: 34.6 G/DL (ref 31.4–37.4)
MCV RBC AUTO: 78 FL (ref 82–98)
MONOCYTES # BLD AUTO: 0.8 THOUSAND/ΜL (ref 0.17–1.22)
MONOCYTES NFR BLD AUTO: 4 % (ref 4–12)
NEUTROPHILS # BLD AUTO: 17.8 THOUSANDS/ΜL (ref 1.85–7.62)
NEUTS SEG NFR BLD AUTO: 91 % (ref 43–75)
NITRITE UR QL STRIP: POSITIVE
NON VENT ROOM AIR: ABNORMAL %
NON-SQ EPI CELLS URNS QL MICRO: ABNORMAL /HPF
NRBC BLD AUTO-RTO: 0 /100 WBCS
O2 CT BLDV-SCNC: 12.3 ML/DL
O2 CT BLDV-SCNC: 8.7 ML/DL
OSMOLALITY UR: 326 MMOL/KG
PCO2 BLDV: 29.4 MM HG (ref 42–50)
PCO2 BLDV: 32.5 MM HG (ref 42–50)
PH BLDV: 7.16 [PH] (ref 7.3–7.4)
PH BLDV: 7.21 [PH] (ref 7.3–7.4)
PH UR STRIP.AUTO: 5.5 [PH]
PHOSPHATE SERPL-MCNC: 11.2 MG/DL (ref 2.3–4.1)
PHOSPHATE SERPL-MCNC: 8.9 MG/DL (ref 2.3–4.1)
PLATELET # BLD AUTO: 459 THOUSANDS/UL (ref 149–390)
PLATELET # BLD AUTO: 486 THOUSANDS/UL (ref 149–390)
PMV BLD AUTO: 8.7 FL (ref 8.9–12.7)
PMV BLD AUTO: 9.1 FL (ref 8.9–12.7)
PO2 BLDV: 35.6 MM HG (ref 35–45)
PO2 BLDV: 61.2 MM HG (ref 35–45)
POTASSIUM SERPL-SCNC: 4.8 MMOL/L (ref 3.5–5.3)
POTASSIUM SERPL-SCNC: 6.5 MMOL/L (ref 3.5–5.3)
PROT SERPL-MCNC: 7.3 G/DL (ref 6.4–8.2)
PROT UR STRIP-MCNC: ABNORMAL MG/DL
PROTHROMBIN TIME: 15.7 SECONDS (ref 11.6–14.5)
RBC # BLD AUTO: 4.24 MILLION/UL (ref 3.88–5.62)
RBC #/AREA URNS AUTO: ABNORMAL /HPF
SODIUM SERPL-SCNC: 117 MMOL/L (ref 136–145)
SODIUM SERPL-SCNC: 125 MMOL/L (ref 136–145)
SP GR UR STRIP.AUTO: 1.02 (ref 1–1.03)
TROPONIN I SERPL-MCNC: <0.02 NG/ML
TSH SERPL DL<=0.05 MIU/L-ACNC: 0.41 UIU/ML (ref 0.36–3.74)
UROBILINOGEN UR QL STRIP.AUTO: 0.2 E.U./DL
WBC # BLD AUTO: 19.64 THOUSAND/UL (ref 4.31–10.16)
WBC #/AREA URNS AUTO: ABNORMAL /HPF

## 2021-04-04 PROCEDURE — 84100 ASSAY OF PHOSPHORUS: CPT | Performed by: PHYSICIAN ASSISTANT

## 2021-04-04 PROCEDURE — 84300 ASSAY OF URINE SODIUM: CPT | Performed by: INTERNAL MEDICINE

## 2021-04-04 PROCEDURE — 82330 ASSAY OF CALCIUM: CPT | Performed by: PHYSICIAN ASSISTANT

## 2021-04-04 PROCEDURE — 80048 BASIC METABOLIC PNL TOTAL CA: CPT | Performed by: PHYSICIAN ASSISTANT

## 2021-04-04 PROCEDURE — 96374 THER/PROPH/DIAG INJ IV PUSH: CPT

## 2021-04-04 PROCEDURE — 83605 ASSAY OF LACTIC ACID: CPT | Performed by: PHYSICIAN ASSISTANT

## 2021-04-04 PROCEDURE — 82805 BLOOD GASES W/O2 SATURATION: CPT | Performed by: PHYSICIAN ASSISTANT

## 2021-04-04 PROCEDURE — 85049 AUTOMATED PLATELET COUNT: CPT | Performed by: PHYSICIAN ASSISTANT

## 2021-04-04 PROCEDURE — 85610 PROTHROMBIN TIME: CPT | Performed by: PHYSICIAN ASSISTANT

## 2021-04-04 PROCEDURE — 82570 ASSAY OF URINE CREATININE: CPT | Performed by: INTERNAL MEDICINE

## 2021-04-04 PROCEDURE — 70450 CT HEAD/BRAIN W/O DYE: CPT

## 2021-04-04 PROCEDURE — 1123F ACP DISCUSS/DSCN MKR DOCD: CPT | Performed by: PHYSICIAN ASSISTANT

## 2021-04-04 PROCEDURE — 96375 TX/PRO/DX INJ NEW DRUG ADDON: CPT

## 2021-04-04 PROCEDURE — 83605 ASSAY OF LACTIC ACID: CPT | Performed by: NURSE PRACTITIONER

## 2021-04-04 PROCEDURE — 80076 HEPATIC FUNCTION PANEL: CPT | Performed by: PHYSICIAN ASSISTANT

## 2021-04-04 PROCEDURE — 36415 COLL VENOUS BLD VENIPUNCTURE: CPT | Performed by: PHYSICIAN ASSISTANT

## 2021-04-04 PROCEDURE — 82436 ASSAY OF URINE CHLORIDE: CPT | Performed by: INTERNAL MEDICINE

## 2021-04-04 PROCEDURE — 83935 ASSAY OF URINE OSMOLALITY: CPT | Performed by: INTERNAL MEDICINE

## 2021-04-04 PROCEDURE — 99223 1ST HOSP IP/OBS HIGH 75: CPT | Performed by: INTERNAL MEDICINE

## 2021-04-04 PROCEDURE — 99291 CRITICAL CARE FIRST HOUR: CPT | Performed by: STUDENT IN AN ORGANIZED HEALTH CARE EDUCATION/TRAINING PROGRAM

## 2021-04-04 PROCEDURE — 81001 URINALYSIS AUTO W/SCOPE: CPT | Performed by: PHYSICIAN ASSISTANT

## 2021-04-04 PROCEDURE — 85025 COMPLETE CBC W/AUTO DIFF WBC: CPT | Performed by: PHYSICIAN ASSISTANT

## 2021-04-04 PROCEDURE — G1004 CDSM NDSC: HCPCS

## 2021-04-04 PROCEDURE — 87040 BLOOD CULTURE FOR BACTERIA: CPT | Performed by: PHYSICIAN ASSISTANT

## 2021-04-04 PROCEDURE — 94640 AIRWAY INHALATION TREATMENT: CPT

## 2021-04-04 PROCEDURE — 82553 CREATINE MB FRACTION: CPT | Performed by: PHYSICIAN ASSISTANT

## 2021-04-04 PROCEDURE — 82948 REAGENT STRIP/BLOOD GLUCOSE: CPT

## 2021-04-04 PROCEDURE — 80048 BASIC METABOLIC PNL TOTAL CA: CPT | Performed by: NURSE PRACTITIONER

## 2021-04-04 PROCEDURE — 93005 ELECTROCARDIOGRAM TRACING: CPT

## 2021-04-04 PROCEDURE — 94760 N-INVAS EAR/PLS OXIMETRY 1: CPT

## 2021-04-04 PROCEDURE — 0T9B70Z DRAINAGE OF BLADDER WITH DRAINAGE DEVICE, VIA NATURAL OR ARTIFICIAL OPENING: ICD-10-PCS | Performed by: INTERNAL MEDICINE

## 2021-04-04 PROCEDURE — 83735 ASSAY OF MAGNESIUM: CPT | Performed by: PHYSICIAN ASSISTANT

## 2021-04-04 PROCEDURE — 84484 ASSAY OF TROPONIN QUANT: CPT | Performed by: PHYSICIAN ASSISTANT

## 2021-04-04 PROCEDURE — 71045 X-RAY EXAM CHEST 1 VIEW: CPT

## 2021-04-04 PROCEDURE — 99291 CRITICAL CARE FIRST HOUR: CPT

## 2021-04-04 PROCEDURE — 84100 ASSAY OF PHOSPHORUS: CPT | Performed by: NURSE PRACTITIONER

## 2021-04-04 PROCEDURE — 99291 CRITICAL CARE FIRST HOUR: CPT | Performed by: PHYSICIAN ASSISTANT

## 2021-04-04 PROCEDURE — 85730 THROMBOPLASTIN TIME PARTIAL: CPT | Performed by: PHYSICIAN ASSISTANT

## 2021-04-04 PROCEDURE — 83735 ASSAY OF MAGNESIUM: CPT | Performed by: NURSE PRACTITIONER

## 2021-04-04 PROCEDURE — 82805 BLOOD GASES W/O2 SATURATION: CPT | Performed by: NURSE PRACTITIONER

## 2021-04-04 PROCEDURE — 82550 ASSAY OF CK (CPK): CPT | Performed by: PHYSICIAN ASSISTANT

## 2021-04-04 PROCEDURE — 82330 ASSAY OF CALCIUM: CPT | Performed by: NURSE PRACTITIONER

## 2021-04-04 PROCEDURE — 84443 ASSAY THYROID STIM HORMONE: CPT | Performed by: PHYSICIAN ASSISTANT

## 2021-04-04 PROCEDURE — 76770 US EXAM ABDO BACK WALL COMP: CPT

## 2021-04-04 RX ORDER — HEPARIN SODIUM 5000 [USP'U]/ML
5000 INJECTION, SOLUTION INTRAVENOUS; SUBCUTANEOUS EVERY 8 HOURS SCHEDULED
Status: DISCONTINUED | OUTPATIENT
Start: 2021-04-04 | End: 2021-04-05

## 2021-04-04 RX ORDER — SODIUM CHLORIDE 9 MG/ML
75 INJECTION, SOLUTION INTRAVENOUS CONTINUOUS
Status: DISCONTINUED | OUTPATIENT
Start: 2021-04-04 | End: 2021-04-05

## 2021-04-04 RX ORDER — ASPIRIN 81 MG/1
81 TABLET, CHEWABLE ORAL DAILY
Status: DISCONTINUED | OUTPATIENT
Start: 2021-04-05 | End: 2021-04-05

## 2021-04-04 RX ORDER — CALCIUM GLUCONATE 20 MG/ML
1 INJECTION, SOLUTION INTRAVENOUS ONCE
Status: COMPLETED | OUTPATIENT
Start: 2021-04-04 | End: 2021-04-04

## 2021-04-04 RX ORDER — CLOPIDOGREL BISULFATE 75 MG/1
75 TABLET ORAL DAILY
Status: DISCONTINUED | OUTPATIENT
Start: 2021-04-05 | End: 2021-04-05

## 2021-04-04 RX ORDER — CALCIUM GLUCONATE 20 MG/ML
2 INJECTION, SOLUTION INTRAVENOUS ONCE
Status: COMPLETED | OUTPATIENT
Start: 2021-04-04 | End: 2021-04-04

## 2021-04-04 RX ORDER — CHLORHEXIDINE GLUCONATE 0.12 MG/ML
15 RINSE ORAL EVERY 12 HOURS SCHEDULED
Status: DISCONTINUED | OUTPATIENT
Start: 2021-04-04 | End: 2021-04-13 | Stop reason: HOSPADM

## 2021-04-04 RX ORDER — ALBUTEROL SULFATE 2.5 MG/3ML
10 SOLUTION RESPIRATORY (INHALATION) ONCE
Status: COMPLETED | OUTPATIENT
Start: 2021-04-04 | End: 2021-04-04

## 2021-04-04 RX ORDER — DEXTROSE MONOHYDRATE 25 G/50ML
25 INJECTION, SOLUTION INTRAVENOUS ONCE
Status: COMPLETED | OUTPATIENT
Start: 2021-04-04 | End: 2021-04-04

## 2021-04-04 RX ORDER — ATORVASTATIN CALCIUM 40 MG/1
80 TABLET, FILM COATED ORAL DAILY
Status: DISCONTINUED | OUTPATIENT
Start: 2021-04-05 | End: 2021-04-13 | Stop reason: HOSPADM

## 2021-04-04 RX ADMIN — SODIUM CHLORIDE 125 ML/HR: 0.9 INJECTION, SOLUTION INTRAVENOUS at 20:50

## 2021-04-04 RX ADMIN — SODIUM BICARBONATE 125 ML/HR: 84 INJECTION, SOLUTION INTRAVENOUS at 20:59

## 2021-04-04 RX ADMIN — SODIUM CHLORIDE 1000 ML: 0.9 INJECTION, SOLUTION INTRAVENOUS at 16:21

## 2021-04-04 RX ADMIN — ALBUTEROL SULFATE 10 MG: 2.5 SOLUTION RESPIRATORY (INHALATION) at 15:39

## 2021-04-04 RX ADMIN — DEXTROSE MONOHYDRATE 25 ML: 500 INJECTION PARENTERAL at 16:21

## 2021-04-04 RX ADMIN — INSULIN HUMAN 10 UNITS: 100 INJECTION, SOLUTION PARENTERAL at 16:26

## 2021-04-04 RX ADMIN — CALCIUM GLUCONATE 1 G: 20 INJECTION, SOLUTION INTRAVENOUS at 16:23

## 2021-04-04 RX ADMIN — SODIUM BICARBONATE 50 MEQ: 84 INJECTION INTRAVENOUS at 16:30

## 2021-04-04 RX ADMIN — HEPARIN SODIUM 5000 UNITS: 5000 INJECTION INTRAVENOUS; SUBCUTANEOUS at 21:24

## 2021-04-04 RX ADMIN — SODIUM CHLORIDE 2000 ML: 0.9 INJECTION, SOLUTION INTRAVENOUS at 20:15

## 2021-04-04 RX ADMIN — CALCIUM GLUCONATE 2 G: 20 INJECTION, SOLUTION INTRAVENOUS at 20:15

## 2021-04-04 RX ADMIN — CEFTRIAXONE SODIUM 1000 MG: 10 INJECTION, POWDER, FOR SOLUTION INTRAVENOUS at 19:17

## 2021-04-04 RX ADMIN — CHLORHEXIDINE GLUCONATE 0.12% ORAL RINSE 15 ML: 1.2 LIQUID ORAL at 20:26

## 2021-04-04 RX ADMIN — CALCIUM GLUCONATE 3 G: 98 INJECTION, SOLUTION INTRAVENOUS at 23:49

## 2021-04-04 NOTE — ASSESSMENT & PLAN NOTE
· UA nitrate + with moderate bacteria  · Ceftriaxone 1 g Q 24 hour  · Follow-up culture data and sensitivities

## 2021-04-04 NOTE — TELEPHONE ENCOUNTER
Regarding: Fatigue   ----- Message from Marcela Wilkins RN sent at 4/4/2021 12:06 PM EDT -----  "My  is very fatigued  We had to call 911 this morning, but we didn't end up going to the ED  He just had surgery on 3/23 and his first vaccine on 3/27 so everything is working against him   I think he needs blood work and I would like to speak to someone about it "

## 2021-04-04 NOTE — ASSESSMENT & PLAN NOTE
· Received temporizing measures; no significant EKG change  · Continuous cardiac monitoring  · Gentle IV fluid hydration  · Appreciate nephrology   May require initiation CRRT in setting of profound renal dysfunction

## 2021-04-04 NOTE — ED NOTES
Multiple attempts made to establish 2nd IV  Unsuccessful at this time        Murray Odell, RN  04/04/21 8968

## 2021-04-04 NOTE — ASSESSMENT & PLAN NOTE
· S/p PCI w/ JOSE ROBERTO to LAD x3 (2018)  · Continue ASA/Brilinta  · Hold beta-blocker and ACE-inhibitor  · High-dose statin therapy atorvastatin 80 mg daily

## 2021-04-04 NOTE — TELEPHONE ENCOUNTER
TC to on-call provider, "Pt had parathyroidectomy on 3/23 on 3/25 he began to have fatigue, weakness, and nausea  Have escalated since now extreme fatigue limited movement due to weakness and balance issues  Continues nausea and lack of appetite  Pt just reporting now  Per surgical d/c instructions I see the fatigue/weakness as concerns for calcium levels and to seek medical advice  How would you like me to advise?"    ER for evaluation  Reason for Disposition   Patient sounds very sick or weak to the triager    Answer Assessment - Initial Assessment Questions  1  DESCRIPTION: "Describe how you are feeling "      Extreme weakness and fatigue  2  SEVERITY: "How bad is it?"  "Can you stand and walk?"    - MILD - Feels weak or tired, but does not interfere with work, school or normal activities    - [facility] to stand and walk; weakness interferes with work, school, or normal activities    - SEVERE - Unable to stand or walk      Moderate  3  ONSET:  "When did the weakness begin?"      3/25  4  CAUSE: "What do you think is causing the weakness?"      Thyroid? 5  MEDICINES: "Have you recently started a new medicine or had a change in the amount of a medicine?"      No changes in medications  6   OTHER SYMPTOMS: "Do you have any other symptoms?" (e g , chest pain, fever, cough, SOB, vomiting, diarrhea, bleeding, other areas of pain)      Extreme fatigue and weakness 3/25    Protocols used: WEAKNESS (GENERALIZED) AND FATIGUE-ADULT-

## 2021-04-04 NOTE — CONSULTS
Consultation - Nephrology   Jesús Uriarte 65 y o  male MRN: [id number]  Unit/Bed#: ED 10 Encounter: [id number]    Referring PHYSICIAN: Tommie López [place]:  Acute kidney injury    DATE OF CONSULTATION:  April 4, 2021    ADMISSION DIAGNOSIS: <principal problem not specified>     CHIEF COMPLAINT     Patient known history of diabetes and hypertension who also coronary artery disease who recently had parathyroidectomy because of parathyroid adenoma came to emergency room with overall not feeling well and was found to acute kidney injury    HPI     Patient no significant kidney history in the past   Does have long history of diabetes and hypertension    Patient was running hypercalcemia and was found to be have parathyroid adenoma got surgery 2 weeks ago    According to wife since surgery patient overall not doing well with decreasing appetite  Urine output also decreasing  Since last 24 hours he did not have any urine output    Patient denies any new change any medication  Denies taking any nonsteroidal pain killer    Patient is on ACE-inhibitor and diuretic at home which was continued to take    According to wife they will decrease intake as he did not have much appetite    Patient denies any nausea vomiting    Denies any diarrhea    He does feel weak    PAST MEDICAL HISTORY     Past Medical History:   Diagnosis Date    Benign neoplasm of large intestine     Bleeding gastric ulcer 2018    Colon polyp     Diabetes mellitus (HCC)     Heart murmur     History of aortic regurgitation     History of constipation     History of degenerative joint disease     History of nocturia     History of obesity     History of sebaceous cyst     History of shortness of breath     History of transfusion     History of urinary frequency     Hyperlipidemia     Hypertension     [facility]ocardial infarction [facility]) 2018    january, 3 stents    Polyposis coli     of the large intestine    Ulcer of esophagus        PAST SURGICAL HISTORY     Past Surgical History:   Procedure Laterality Date    ACHILLES TENDON SURGERY Left 1973    CATARACT EXTRACTION Right 02/18/2021    COLONOSCOPY      hyperplastic polyp    CORONARY ANGIOPLASTY WITH STENT PLACEMENT      3 stents: 2 placed on Jan 2018, 1 on July 2018    [place] CYST REMOVAL  2019, 2020    left and right wrists    ESOPHAGOGASTRODUODENOSCOPY N/A 1/23/2018    Procedure: ESOPHAGOGASTRODUODENOSCOPY (EGD); Surgeon: Ally Hogan MD;  Location: [facility]; Service: Gastroenterology    ESOPHAGOGASTRODUODENOSCOPY      HERNIA REPAIR      HERNIA REPAIR Bilateral 8/18/2017    Procedure: LAPAROSCOPIC INGUINAL HERNIA REPAIR WITH MESH;  Surgeon: Erasmo Robledo MD;  Location: [facility];  Service: General    OR ESOPHAGOGASTRODUODENOSCOPY TRANSORAL DIAGNOSTIC N/A 3/26/2018    Procedure: ESOPHAGOGASTRODUODENOSCOPY (EGD); Surgeon: William Howard MD;  [facility]: [facility]; Service: Gastroenterology    OR ESOPHAGOGASTRODUODENOSCOPY TRANSORAL DIAGNOSTIC N/A 5/14/2018    Procedure: ESOPHAGOGASTRODUODENOSCOPY (EGD); Surgeon: William Howard MD;  [facility];   Service: Gastroenterology    OR EXPLORE PARATHYROID GLANDS Right 3/23/2021    Procedure: RIGHT PARATHYROIDECTOMY, MINIMALLY INVASIVE, POSSIBLE 4-GLAND EXPLORATION, WITH INTRA-OPERATIVE PTH MONITORING;  Surgeon: Teo Perez MD;  Location:  MAIN OR;  Service: Surgical Oncology    TIBIA FRACTURE SURGERY Left 1962       ALLERGIES     No Known Allergies    SOCIAL HISTORY     Social History     Substance and Sexual Activity   Alcohol Use Yes    Frequency: Monthly or less    Drinks per session: 1 or 2    Binge frequency: Never    Comment: rare     Social History     Substance and Sexual Activity   Drug Use No     Social History     Tobacco Use   Smoking Status Current Every Day Smoker    Packs/day: 1 00    Years: 35 00    Pack years: 35 00    Types: Cigarettes   Smokeless Tobacco Never Used       FAMILY HISTORY     Family History   Problem Relation Age of Onset    Rheumatic fever Father     Other Father         accidental poisoning   Evert Tompkins Heart disease Mother        CURRENT MEDICATIONS       Current Facility-Administered Medications:     calcium gluconate 1 g in sodium chloride 0 9% 50 mL (premix), 1 g, Intravenous, Once, [employer], PA-afterBOT, Last Rate: 100 mL/hr at 04/04/21 1623, 1 g at 04/04/21 1623    ceftriaxone (ROCEPHIN) 1 g/50 mL in dextrose IVPB, 1,000 mg, Intravenous, Once, [employer], PA-C    sodium chloride 0 9 % bolus 1,000 mL, 1,000 mL, Intravenous, Once, [employer], PA-C, Last Rate: 1,000 mL/hr at 04/04/21 1621, 1,000 mL at 04/04/21 1621    Current Outpatient Medications:     aspirin 81 mg chewable tablet, Chew 1 tablet daily, Disp: , Rfl:     atenolol (TENORMIN) 50 mg tablet, TAKE 1 TABLET DAILY, Disp: 90 tablet, Rfl: 3    atorvastatin (LIPITOR) 80 mg tablet, TAKE 1 TABLET DAILY, Disp: 90 tablet, Rfl: 3    cloNIDine (CATAPRES) 0 3 mg tablet, TAKE 1 TABLET(0 3 MG) BY MOUTH THREE TIMES DAILY, Disp: 332 tablet, Rfl: 2    clopidogrel (PLAVIX) 75 mg tablet, Take 1 tablet (75 mg total) by mouth daily, Disp: 90 tablet, Rfl: 3    ferrous sulfate 325 (65 Fe) mg tablet, Take 325 mg by mouth daily with breakfast, Disp: , Rfl:     Ketorolac Tromethamine (TORADOL ORAL PO), Take by mouth every 4 (four) hours as needed, Disp: , Rfl:     lisinopril (ZESTRIL) 40 mg tablet, Take 1 tablet (40 mg total) by mouth daily, Disp: 90 tablet, Rfl: 3    metFORMIN (GLUCOPHAGE-XR) 500 mg 24 hr tablet, TAKE 1 TABLET TWICE A DAY WITH MEALS, Disp: 180 tablet, Rfl: 3    Omega-3 1000 MG CAPS, Take by mouth 3 (three) times a day , Disp: , Rfl:     prednisoLONE acetate (PRED FORTE) 1 % ophthalmic suspension, INSTILL 1 DROP INTO THE RIGHT EYE FOUR TIMES DAILY AFTER SURGERY, Disp: , Rfl:     PreviDent 0 2 % SOLN, RINSE WITH 10ML FOR 1 MINUTE THEN SPIT OUT AT BEDTIME DO NOT SWALLOW , Disp: , Rfl:     torsemide (DEMADEX) 10 mg tablet, TAKE 1 TABLET(10 MG) BY MOUTH DAILY, Disp: 90 tablet, Rfl: 3    REVIEW OF SYSTEMS     Review of Systems   Constitutional: Positive for fatigue  Negative for activity change  HENT: Negative for congestion and ear discharge  Eyes: Negative for photophobia and pain  Respiratory: Negative for apnea, choking and shortness of breath  Cardiovascular: Negative for chest pain, palpitations and leg swelling  Gastrointestinal: Positive for abdominal distention and nausea  Negative for blood in stool and vomiting  Endocrine: Negative for heat intolerance, polyphagia and polyuria  Genitourinary: Positive for decreased urine volume and difficulty urinating  Negative for flank pain and urgency  Musculoskeletal: Negative for neck pain and neck stiffness  Skin: Negative for color change and wound  Allergic/Immunologic: Negative for food allergies and immunocompromised state  Neurological: Positive for weakness and light-headedness  Negative for seizures and facial asymmetry  Hematological: Negative for adenopathy  Does not bruise/bleed easily  Psychiatric/Behavioral: Negative for self-injury and suicidal ideas         LAB RESULTS        Results from last 7 days   Lab Units 04/04/21  1441   WBC Thousand/uL 19 64*   HEMOGLOBIN g/dL 11 5*   HEMATOCRIT % 33 2*   PLATELETS Thousands/uL 486*   POTASSIUM mmol/L 6 5*   CHLORIDE mmol/L 79*   CO2 mmol/L 13*   BUN mg/dL 196*   CREATININE mg/dL 13 24*   EGFR ml/min/1 73sq m 4   CALCIUM mg/dL 8 1*   MAGNESIUM mg/dL 2 7*   PHOSPHORUS mg/dL 11 2*       I have personally reviewed the old medical records and patient's previously known baseline creatinine level is ~ 0 68    RADIOLOGY RESULTS     Results for orders placed during the hospital encounter of 01/21/18   XR chest 1 view portable    Narrative CHEST     INDICATION:  Dyspnea    COMPARISON:  None    VIEWS:   AP frontal;  1 image    FINDINGS:         Cardiomediastinal silhouette appears unremarkable  The lungs are clear  No pneumothorax or pleural effusion  Visualized osseous structures appear within normal limits for the patient's age  Impression No active pulmonary disease  Workstation performed: [id number]       Results for orders placed during the hospital encounter of 03/04/21   XR chest pa & lateral    Narrative CHEST     INDICATION:   E21 3: Hyperparathyroidism, unspecified  COMPARISON:  Chest radiograph from 1/21/2018  CT scan for parathyroid adenoma on 1/29/2021  Chest CT from 1/2/2020  EXAM PERFORMED/VIEWS:  XR CHEST PA & LATERAL  DUAL ENERGY SUBTRACTION  FINDINGS:    Cardiomediastinal silhouette normal  Coronary stent    Lungs clear  No effusion or pneumothorax  Mild degenerative disease in the spine  Impression No acute cardiopulmonary disease  Workstation performed: [id number]       No results found for this or any previous visit  No results found for this or any previous visit  No results found for this or any previous visit  No results found for this or any previous visit  OBJECTIVE     Current Weight: Weight - Scale: 96 3 kg (212 lb 4 9 oz)  Vitals:    04/04/21 1539   BP:    Pulse:    Resp:    Temp:    SpO2: 94%     No intake or output data in the 24 hours ending 04/04/21 1636    PHYSICAL EXAMINATION     Physical Exam  Constitutional:       General: He is not in acute distress  Appearance: He is well-developed  He is ill-appearing  HENT:      Head: Normocephalic and atraumatic  Mouth/Throat:      Mouth: Mucous membranes are dry  Eyes:      General: No scleral icterus  Conjunctiva/sclera: Conjunctivae normal       Pupils: Pupils are equal, round, and reactive to light  Neck:      Musculoskeletal: Neck supple  No neck rigidity  Vascular: No JVD  Cardiovascular:      Rate and Rhythm: Normal rate and regular rhythm  Heart sounds: Murmur present     Pulmonary:      Effort: Pulmonary effort is normal       Breath sounds: No wheezing  Abdominal:      General: Bowel sounds are normal  There is distension  Palpations: Abdomen is soft  Tenderness: There is no abdominal tenderness  Musculoskeletal: Normal range of motion  General: No swelling  Skin:     General: Skin is warm  Coloration: Skin is pale  Findings: No rash  Neurological:      Mental Status: He is alert and oriented to person, place, and time  Psychiatric:         Behavior: Behavior normal           PLAN / RECOMMENDATIONS      Acute kidney injury:  Possible volume depletion with inadequate intake along with ACE-inhibitor and diuretic  Possible urinary tension as his abdomen does feel distended  Patient does seems to mildly encephalopathy though seems quite clear him many times  Does not have any asterixis  I will give IV fluid  Will pass Butler catheter  Will monitor blood test very closely and if he does not get better patient will need dialysis    I discussed that at length with patient his wife plan ICU team   They are in agreement    Hyperkalemia:  Likely along with acute kidney injury and ACE-inhibitor  Being treated conservatively at this point will monitor    Hyponatremia:  Not sure about the etiology other than volume depletion  Will check urinary study as part of the workup and will treat with isotonic saline  If he get better with dialysis    Status post parathyroidectomy:  Because of parathyroid adenoma phosphorus is quite high calcium is on lower side    Hypertension:  Blood pressure is running high  I will hold of any antihypertensive medication  Will monitor very closely    Diabetes type 2: Will monitor glucose closely at this point    Everything discussed at length with the patient in ICU team   If dialysis required I will start him on CRRT    That will be advisable as regular dialysis may cause therapy change in volume as well as chemicals and may cause encephalopathy  Patient overall prognosis guarded and will monitor the patient with you    Thank you for the consultation to participate in patient's care  I have personally discussed my plan with the referring physician  Sofi Petty MD  Nephrology  4/4/2021        Portions of the record may have been created with voice recognition software  Occasional wrong word or "sound a like" substitutions may have occurred due to the inherent limitations of voice recognition software  Read the chart carefully and recognize, using context, where substitutions have occurred

## 2021-04-04 NOTE — ASSESSMENT & PLAN NOTE
· Baseline creatinine 0 6-0 7  · Etiology unclear at this time  Will pass Butler to rule out retention  · Gentle IV fluid hydration  · Urine electrolytes pending  · Check renal ultrasound  · Hold all nephrotoxic medications  · Avoid hypotension  · Strict I&Os  · Appreciate nephrology recommendations/consultation

## 2021-04-04 NOTE — H&P
[facility]  H&P Note - Artur Helton 1950, 65 y o  male MRN: [id number]  Unit/Bed#: ED 10 Encounter: [id number]  Primary Care Provider: George Yanes MD   Date and time admitted to hospital: 4/4/2021  1:56 PM    * CIERA (acute kidney injury) ([facility] [address] )  Assessment & Plan  · Baseline creatinine 0 6-0 7  · Etiology unclear at this time  Will pass Butler to rule out retention  · Gentle IV fluid hydration  · Urine electrolytes pending  · Check renal ultrasound  · Hold all nephrotoxic medications  · Avoid hypotension  · Strict I&Os  · Appreciate nephrology recommendations/consultation  Metabolic acidosis  Assessment & Plan  · Suspect 2/2 to CIERA at as noted above  Etiology unclear at this time  · Trial IV fluid hydration  · Q4hr bmp, mg,phos, ical    Hyperkalemia  Assessment & Plan  · Received temporizing measures; no significant EKG change  · Continuous cardiac monitoring  · Gentle IV fluid hydration  · Appreciate nephrology  May require initiation CRRT in setting of profound renal dysfunction    Hyponatremia  Assessment & Plan  · Suspect hypovolemic hyponatremia  Urine electrolytes pending  · No noted neurologic symptoms  Slow correction 8-10 mil equivalents/24 hours  · Continue gentle IV fluid hydration  · Will require close monitoring if requiring iHd  · Na+  Q4hrs  · Appreciate nephrology consultation     UTI (urinary tract infection)  Assessment & Plan  · UA nitrate + with moderate bacteria  · Ceftriaxone 1 g Q 24 hour  · Follow-up culture data and sensitivities    Hyperparathyroidism [facility])  Assessment & Plan  · S/p right parathyroidectomy 3/23/21 with Dr Ulisses Cruz    Coronary artery disease involving native coronary artery of native heart  Assessment & Plan  · S/p PCI w/ JOSE ROBERTO to LAD x3 (2018)  · Continue ASA/Brilinta  · Hold beta-blocker and ACE-inhibitor  · High-dose statin therapy atorvastatin 80 mg daily      History of aortic regurgitation  Assessment & Plan  · Close monitoring of fluid status  · Hold hydrochlorothiazide  HTN (hypertension)  Assessment & Plan  · Hold home antihypertensives  · Goal SBP< 140    Hyperlipidemia  Assessment & Plan  · Continue home statin; atorvastatin 80mg daily     Tobacco abuse  Assessment & Plan  · Support recent cessation, encourage continued cessation  · Nicoderm patch at patient rquest    Type 2 diabetes mellitus ([facility] [address] )  Assessment & Plan  Lab Results   Component Value Date    HGBA1C 6 0 (H) 12/28/2020       No results for input(s): POCGLU in the last 72 hours  Blood Sugar Average: Last 72 hrs:     · Hold oral glycemic regimen  · Sliding scale insulin as needed  -------------------------------------------------------------------------------------------------------------  Chief Complaint:  Generalized malaise    History of Present Illness   HX and PE limited by: N/A  Micki Caal is a 65 y o  male who presented to [facility] ED for evaluation of generalized malaise and fatigue  Patient has significant past medical history of hypertension, hyperlipidemia,HOCM, aortic regurgitation, DM II, tobacco abuse, and iron deficiency anemia  He recently underwent workup for hypercalcemia resulting in right hyperparathyroidectomy 3/23/21 with Dr Corey Canavan  Procedure went without noted complication he was discharged  In the days following he experienced severe diarrhea and decreased p o  Intake, decreased urinary output  He reported increased lethargy and generalized malaise prompting ED evaluation  Initial laboratory workup significant for sodium of 117, potassium 6 5, and , creatinine of 13 4 from baseline (0 6-0 7)  EKG did not reveal EKG changes  Patient received temporizing renal temporizing measures for hyperkalemia  He remained neurologically intact  Nephrology consulted and present bedside    Patient will be admitted to step-down level 1 for further medical management and evaluation    History obtained from chart review and the patient   -------------------------------------------------------------------------------------------------------------  Dispo: Admit to Stepdown Level 1    Code Status: Level 2 - DNAR: but accepts endotracheal intubation  --------------------------------------------------------------------------------------------------------------  Review of Systems   Constitutional: Positive for activity change, appetite change and fatigue  HENT: Negative  Respiratory: Negative  Cardiovascular: Negative  Gastrointestinal: Positive for diarrhea  Genitourinary: Positive for decreased urine volume  Musculoskeletal: Negative  Neurological: Positive for weakness and light-headedness  Physical Exam  Constitutional:       Comments: Patient is pleasant, and conversational   HENT:      Head: Normocephalic  Right Ear: Tympanic membrane normal       Left Ear: Tympanic membrane normal       Mouth/Throat:      Comments: Dry mucous membranes  Eyes:      Pupils: Pupils are equal, round, and reactive to light  Neck:      Musculoskeletal: Normal range of motion  Cardiovascular:      Rate and Rhythm: Normal rate and regular rhythm  Pulmonary:      Effort: Pulmonary effort is normal       Breath sounds: Normal breath sounds  Abdominal:      General: Abdomen is flat  Bowel sounds are normal  There is distension  Tenderness: There is no abdominal tenderness  Musculoskeletal:      Right lower leg: No edema  Left lower leg: No edema  Skin:     General: Skin is dry  Neurological:      General: No focal deficit present  Mental Status: He is oriented to person, place, and time         --------------------------------------------------------------------------------------------------------------  Vitals:   Vitals:    04/04/21 1408 04/04/21 1539   BP: (!) 178/79    BP Location: Right arm    Pulse: (!) 53    Resp: 22    Temp: 97 6 °F (36 4 °C)    TempSrc: Oral    SpO2: 100% 94%   Weight: 96 3 kg (212 lb 4 9 oz)    Height: 5' 7" (1 702 m)      Temp  Min: 97 6 °F (36 4 °C)  Max: 97 6 °F (36 4 °C)  IBW: 66 1 kg  Height: 5' 7" (170 2 cm)  Body mass index is 33 25 kg/m²  Laboratory and Diagnostics:  Results from last 7 days   Lab Units 04/04/21  1441   WBC Thousand/uL 19 64*   HEMOGLOBIN g/dL 11 5*   HEMATOCRIT % 33 2*   PLATELETS Thousands/uL 486*   NEUTROS PCT % 91*   MONOS PCT % 4     Results from last 7 days   Lab Units 04/04/21  1441   SODIUM mmol/L 117*   POTASSIUM mmol/L 6 5*   CHLORIDE mmol/L 79*   CO2 mmol/L 13*   ANION GAP mmol/L 25*   BUN mg/dL 196*   CREATININE mg/dL 13 24*   CALCIUM mg/dL 8 1*   GLUCOSE RANDOM mg/dL 98   ALT U/L 32   AST U/L 38   ALK PHOS U/L 92   ALBUMIN g/dL 2 3*   TOTAL BILIRUBIN mg/dL 0 38     Results from last 7 days   Lab Units 04/04/21  1441   MAGNESIUM mg/dL 2 7*   PHOSPHORUS mg/dL 11 2*      Results from last 7 days   Lab Units 04/04/21  1441   INR  1 23*   PTT seconds 35      Results from last 7 days   Lab Units 04/04/21  1441   TROPONIN I ng/mL <0 02     Results from last 7 days   Lab Units 04/04/21  1441   LACTIC ACID mmol/L 2 8*     ABG:    VBG:          Micro:        EKG: NSR  Imaging: I have personally reviewed pertinent reports          Historical Information   Past Medical History:   Diagnosis Date    Benign neoplasm of large intestine     Bleeding gastric ulcer 2018    Colon polyp     Diabetes mellitus (HCC)     Heart murmur     History of aortic regurgitation     History of constipation     History of degenerative joint disease     History of nocturia     History of obesity     History of sebaceous cyst     History of shortness of breath     History of transfusion     History of urinary frequency     Hyperlipidemia     Hypertension     Myocardial infarction [facility]) 2018    january, 3 stents    Polyposis coli     of the large intestine    Ulcer of esophagus      Past Surgical History:   Procedure Laterality Date    ACHILLES TENDON SURGERY Left 1973  CATARACT EXTRACTION Right 02/18/2021    COLONOSCOPY      hyperplastic polyp    CORONARY ANGIOPLASTY WITH STENT PLACEMENT      3 stents: 2 placed on Jan 2018, 1 on July 2018    [facility] CYST REMOVAL  2019, 2020    left and right wrists    ESOPHAGOGASTRODUODENOSCOPY N/A 1/23/2018    Procedure: ESOPHAGOGASTRODUODENOSCOPY (EGD); Surgeon: Princess Villegas MD;  Location: [facility]; Service: Gastroenterology    ESOPHAGOGASTRODUODENOSCOPY      HERNIA REPAIR      HERNIA REPAIR Bilateral 8/18/2017    Procedure: LAPAROSCOPIC INGUINAL HERNIA REPAIR WITH MESH;  Surgeon: Aminah Dotson MD;  Location: [facility];  Service: General    ID ESOPHAGOGASTRODUODENOSCOPY TRANSORAL DIAGNOSTIC N/A 3/26/2018    Procedure: ESOPHAGOGASTRODUODENOSCOPY (EGD); Surgeon: Anoop Johnson MD;  Location: [facility]; Service: Gastroenterology    ID ESOPHAGOGASTRODUODENOSCOPY TRANSORAL DIAGNOSTIC N/A 5/14/2018    Procedure: ESOPHAGOGASTRODUODENOSCOPY (EGD); Surgeon: Anoop Johnson MD;  Location: [facility];   Service: Gastroenterology    ID EXPLORE PARATHYROID GLANDS Right 3/23/2021    Procedure: RIGHT PARATHYROIDECTOMY, MINIMALLY INVASIVE, POSSIBLE 4-GLAND EXPLORATION, WITH INTRA-OPERATIVE PTH MONITORING;  Surgeon: Jasmin Patel MD;  Location:  MAIN OR;  Service: Surgical Oncology    TIBIA FRACTURE SURGERY Left 1962     Social History   Social History     Substance and Sexual Activity   Alcohol Use Yes    Frequency: Monthly or less    Drinks per session: 1 or 2    Binge frequency: Never    Comment: rare     Social History     Substance and Sexual Activity   Drug Use No     Social History     Tobacco Use   Smoking Status Current Every Day Smoker    Packs/day: 1 00    Years: 35 00    Pack years: 35 00    Types: Cigarettes   Smokeless Tobacco Never Used     Exercise History: no assistance with ADLs  Family History:   Family History   Problem Relation Age of Onset    Rheumatic fever Father     Other Father accidental poisoning    Heart disease Mother          Medications:  Current Facility-Administered Medications   Medication Dose Route Frequency    [START ON 4/5/2021] aspirin chewable tablet 81 mg  81 mg Oral Daily    [START ON 4/5/2021] atorvastatin (LIPITOR) tablet 80 mg  80 mg Oral Daily    ceftriaxone (ROCEPHIN) 1 g/50 mL in dextrose IVPB  1,000 mg Intravenous Once    [START ON 4/5/2021] ceftriaxone (ROCEPHIN) 1 g/50 mL in dextrose IVPB  1,000 mg Intravenous Q24H    chlorhexidine (PERIDEX) 0 12 % oral rinse 15 mL  15 mL Swish & Spit Q12H [address]    [START ON 4/5/2021] clopidogrel (PLAVIX) tablet 75 mg  75 mg Oral Daily    heparin (porcine) subcutaneous injection 5,000 Units  5,000 Units Subcutaneous Q8H [address]    insulin lispro (HumaLOG) 100 units/mL subcutaneous injection 1-6 Units  1-6 Units Subcutaneous Q6H [address]    sodium chloride 0 9 % infusion  75 mL/hr Intravenous Continuous     Home medications:  Prior to Admission Medications   Prescriptions Last Dose Informant Patient Reported? Taking? Ketorolac Tromethamine (TORADOL ORAL PO)   Yes No   Sig: Take by mouth every 4 (four) hours as needed   Omega-3 1000 MG CAPS  Self Yes No   Sig: Take by mouth 3 (three) times a day    PreviDent 0 2 % SOLN  Self Yes No   Sig: RINSE WITH 10ML FOR 1 MINUTE THEN SPIT OUT AT BEDTIME DO NOT SWALLOW     aspirin 81 mg chewable tablet  Self Yes No   Sig: Chew 1 tablet daily   atenolol (TENORMIN) 50 mg tablet  Self No No   Sig: TAKE 1 TABLET DAILY   atorvastatin (LIPITOR) 80 mg tablet  Self No No   Sig: TAKE 1 TABLET DAILY   cloNIDine (CATAPRES) 0 3 mg tablet  Self No No   Sig: TAKE 1 TABLET(0 3 MG) BY MOUTH THREE TIMES DAILY   clopidogrel (PLAVIX) 75 mg tablet  Self No No   Sig: Take 1 tablet (75 mg total) by mouth daily   ferrous sulfate 325 (65 Fe) mg tablet  Self Yes No   Sig: Take 325 mg by mouth daily with breakfast   lisinopril (ZESTRIL) 40 mg tablet  Self No No   Sig: Take 1 tablet (40 mg total) by mouth daily metFORMIN (GLUCOPHAGE-XR) 500 mg 24 hr tablet  Self No No   Sig: TAKE 1 TABLET TWICE A DAY WITH MEALS   prednisoLONE acetate (PRED FORTE) 1 % ophthalmic suspension  Self Yes No   Sig: INSTILL 1 DROP INTO THE RIGHT EYE FOUR TIMES DAILY AFTER SURGERY   torsemide (DEMADEX) 10 mg tablet  Self No No   Sig: TAKE 1 TABLET(10 MG) BY MOUTH DAILY      Facility-Administered Medications: None     Allergies:  No Known Allergies    ------------------------------------------------------------------------------------------------------------  Advance Directive and Living Will:      Power of :    POLST:    ------------------------------------------------------------------------------------------------------------  Anticipated Length of Stay is > 2 midnights    Care Time Delivered:   Upon my evaluation, this patient had a high probability of imminent or life-threatening deterioration due to CIERA, hyperkalemia, hyponatremia, which required my direct attention, intervention, and personal management  I have personally provided 15 minutes (1630 to 1800) of critical care time, exclusive of procedures, teaching, family meetings, and any prior time recorded by providers other than myself  Liza Dumont PA-C        Portions of the record may have been created with voice recognition software  Occasional wrong word or "sound a like" substitutions may have occurred due to the inherent limitations of voice recognition software    Read the chart carefully and recognize, using context, where substitutions have occurred

## 2021-04-04 NOTE — ASSESSMENT & PLAN NOTE
· Suspect hypovolemic hyponatremia  Urine electrolytes pending  · No noted neurologic symptoms  Slow correction 8-10 mil equivalents/24 hours  · Continue gentle IV fluid hydration    · Will require close monitoring if requiring iHd  · Na+  Q4hrs  · Appreciate nephrology consultation

## 2021-04-04 NOTE — ASSESSMENT & PLAN NOTE
· Suspect 2/2 to CIERA at as noted above   Etiology unclear at this time  · Trial IV fluid hydration  · Q4hr bmp, mg,phos, ical

## 2021-04-04 NOTE — ED PROVIDER NOTES
History  Chief Complaint   Patient presents with    Fatigue     Patient presents to ED via EMS with co fatigue since recieving his 2nd covid shot on 31       68-year-old male with a history of recent parathyroidectomy on 03/23/2021, coronary artery disease, hypertension, hyperlipidemia, and type 2 diabetes presenting via EMS for evaluation of fatigue and generalized weakness has been present for the past 1 5 weeks  Patient underwent parathyroidectomy for an adenoma on 3/23/21 and initially felt well postoperatively  He was discharged to home on the same day  Patient's wife reports that he ate mushroom soup the next day and has been having trouble with PO intake since that time  Patient has been progressively more weak and fatigued  Wife states that he has been sleeping excessively the past few days  He reports that he had his second COVID vaccine on 4/1/21 so is unsure if this is related although admits to having symptoms prior to this  Patient has been trying to drink tea and Gatorade without much improvement  He has not urinated in the past 1-2 days  Patient denies any fevers, chills, vomiting, diarrhea, abdominal pain, headache, dizziness, visual changes  No recent falls         History provided by:  Patient, medical records, spouse and EMS personnel   used: No    Fatigue  Severity:  Moderate  Onset quality:  Gradual  Duration:  1 week  Timing:  Constant  Progression:  Worsening  Chronicity:  New  Relieved by:  Nothing  Worsened by:  Nothing  Ineffective treatments:  Drinking fluids and rest  Associated symptoms: anorexia and shortness of breath    Associated symptoms: no abdominal pain, no arthralgias, no chest pain, no cough, no diarrhea, no dizziness, no drooling, no numbness in extremities, no falls, no fever, no headaches, no loss of consciousness, no seizures, no sensory-motor deficit, no stroke symptoms, no syncope, no vision change and no vomiting        Prior to Admission Medications Prescriptions Last Dose Informant Patient Reported? Taking? Ketorolac Tromethamine (TORADOL ORAL PO)   Yes No   Sig: Take by mouth every 4 (four) hours as needed   Omega-3 1000 MG CAPS  Self Yes No   Sig: Take by mouth 3 (three) times a day    PreviDent 0 2 % SOLN  Self Yes No   Sig: RINSE WITH 10ML FOR 1 MINUTE THEN SPIT OUT AT BEDTIME DO NOT SWALLOW     aspirin 81 mg chewable tablet  Self Yes No   Sig: Chew 1 tablet daily   atenolol (TENORMIN) 50 mg tablet  Self No No   Sig: TAKE 1 TABLET DAILY   atorvastatin (LIPITOR) 80 mg tablet  Self No No   Sig: TAKE 1 TABLET DAILY   cloNIDine (CATAPRES) 0 3 mg tablet  Self No No   Sig: TAKE 1 TABLET(0 3 MG) BY MOUTH THREE TIMES DAILY   clopidogrel (PLAVIX) 75 mg tablet  Self No No   Sig: Take 1 tablet (75 mg total) by mouth daily   ferrous sulfate 325 (65 Fe) mg tablet  Self Yes No   Sig: Take 325 mg by mouth daily with breakfast   lisinopril (ZESTRIL) 40 mg tablet  Self No No   Sig: Take 1 tablet (40 mg total) by mouth daily   metFORMIN (GLUCOPHAGE-XR) 500 mg 24 hr tablet  Self No No   Sig: TAKE 1 TABLET TWICE A DAY WITH MEALS   prednisoLONE acetate (PRED FORTE) 1 % ophthalmic suspension  Self Yes No   Sig: INSTILL 1 DROP INTO THE RIGHT EYE FOUR TIMES DAILY AFTER SURGERY   torsemide (DEMADEX) 10 mg tablet  Self No No   Sig: TAKE 1 TABLET(10 MG) BY MOUTH DAILY      Facility-Administered Medications: None       Past Medical History:   Diagnosis Date    Benign neoplasm of large intestine     Bleeding gastric ulcer 2018    Colon polyp     Diabetes mellitus (HCC)     Heart murmur     History of aortic regurgitation     History of constipation     History of degenerative joint disease     History of nocturia     History of obesity     History of sebaceous cyst     History of shortness of breath     History of transfusion     History of urinary frequency     Hyperlipidemia     Hypertension     Myocardial infarction [facility]) 2018    january, 3 stents    Polyposis coli     of the large intestine    Ulcer of esophagus        Past Surgical History:   Procedure Laterality Date    ACHILLES TENDON SURGERY Left 1973    CATARACT EXTRACTION Right 02/18/2021    COLONOSCOPY      hyperplastic polyp    CORONARY ANGIOPLASTY WITH STENT PLACEMENT      3 stents: 2 placed on Jan 2018, 1 on July 2018    Mary Rhody CYST REMOVAL  2019, 2020    left and right wrists    ESOPHAGOGASTRODUODENOSCOPY N/A 1/23/2018    Procedure: ESOPHAGOGASTRODUODENOSCOPY (EGD); Surgeon: Man Duran MD;  Location: [facility]; Service: Gastroenterology    ESOPHAGOGASTRODUODENOSCOPY      HERNIA REPAIR      HERNIA REPAIR Bilateral 8/18/2017    Procedure: LAPAROSCOPIC INGUINAL HERNIA REPAIR WITH MESH;  Surgeon: Quinton Cohen MD;  [facility]: [facility];  Service: General    NV ESOPHAGOGASTRODUODENOSCOPY TRANSORAL DIAGNOSTIC N/A 3/26/2018    Procedure: ESOPHAGOGASTRODUODENOSCOPY (EGD); Surgeon: Mary Rawls MD;  [facility]; Service: Gastroenterology    NV ESOPHAGOGASTRODUODENOSCOPY TRANSORAL DIAGNOSTIC N/A 5/14/2018    Procedure: ESOPHAGOGASTRODUODENOSCOPY (EGD); Surgeon: Mary Rawls MD;  [facility]; Service: Gastroenterology    NV EXPLORE PARATHYROID GLANDS Right 3/23/2021    Procedure: RIGHT PARATHYROIDECTOMY, MINIMALLY INVASIVE, POSSIBLE 4-GLAND EXPLORATION, WITH INTRA-OPERATIVE PTH MONITORING;  Surgeon: Nadine Gardner MD;  Location:  MAIN OR;  Service: Surgical Oncology    TIBIA FRACTURE SURGERY Left 1962       Family History   Problem Relation Age of Onset    Rheumatic fever Father     Other Father         accidental poisoning   Mary Rhody Heart disease Mother      I have reviewed and agree with the history as documented      E-Cigarette/Vaping    E-Cigarette Use Never User      E-Cigarette/Vaping Substances    Nicotine No     THC No     CBD No     Flavoring No     Other No     Unknown No      Social History     Tobacco Use    Smoking status: Current Every Day Smoker     Packs/day: 1 00     Years: 35 00     Pack years: 35 00     Types: Cigarettes    Smokeless tobacco: Never Used   Substance Use Topics    Alcohol use: Yes     Frequency: Monthly or less     Drinks per session: 1 or 2     Binge frequency: Never     Comment: rare    Drug use: No       Review of Systems   Constitutional: Positive for activity change, appetite change and fatigue  Negative for fever  HENT: Negative for congestion and drooling  Eyes: Negative for discharge and redness  Respiratory: Positive for shortness of breath  Negative for cough  Cardiovascular: Negative for chest pain, leg swelling and syncope  Gastrointestinal: Positive for anorexia  Negative for abdominal pain, diarrhea and vomiting  Genitourinary: Positive for decreased urine volume and difficulty urinating  Musculoskeletal: Negative for arthralgias, falls and neck pain  Skin: Negative for color change and rash  Neurological: Positive for weakness  Negative for dizziness, seizures, loss of consciousness and headaches  Psychiatric/Behavioral: Negative for confusion  The patient is not nervous/anxious  All other systems reviewed and are negative  Physical Exam  Physical Exam  Vitals signs and nursing note reviewed  Constitutional:       General: He is not in acute distress  Appearance: He is well-developed  He is ill-appearing  He is not diaphoretic  Comments: Ill appearing male, no acute distress  Appears washed out and fatigued   HENT:      Head: Normocephalic and atraumatic  Right Ear: External ear normal       Left Ear: External ear normal       Mouth/Throat:      Mouth: Mucous membranes are dry  Comments: Dry mucous membranes  Eyes:      General: No scleral icterus  Right eye: No discharge  Left eye: No discharge  Conjunctiva/sclera: Conjunctivae normal    Neck:      Musculoskeletal: Normal range of motion and neck supple     Cardiovascular:      Rate and Rhythm: Normal rate and regular rhythm  Heart sounds: Normal heart sounds  No murmur  Pulmonary:      Effort: Pulmonary effort is normal  No respiratory distress  Breath sounds: Normal breath sounds  No stridor  No wheezing or rales  Abdominal:      General: Bowel sounds are normal  There is no distension  Palpations: Abdomen is soft  Tenderness: There is no abdominal tenderness  There is no guarding  Musculoskeletal: Normal range of motion  General: No deformity  Skin:     General: Skin is warm and dry  Findings: Rash present  Comments: Rash noted to right axilla, trunk, and groin  Grouped clusters of erythema noted  No vesicular lesions present  Rash is not dermatomal  Non-tender, no drainage  Neurological:      Mental Status: He is alert  He is not disoriented  GCS: GCS eye subscore is 4  GCS verbal subscore is 4  GCS motor subscore is 6  Comments: Patient is slow to respond and repetitive  GCS is 14 for mild confusion   No focal neurologic deficits   Psychiatric:         Mood and Affect: Mood normal          Behavior: Behavior normal          Vital Signs  ED Triage Vitals [04/04/21 1408]   Temperature Pulse Respirations Blood Pressure SpO2   97 6 °F (36 4 °C) (!) 53 22 (!) 178/79 100 %      Temp Source Heart Rate Source Patient Position - Orthostatic VS BP Location FiO2 (%)   Oral Monitor Sitting Right arm --      Pain Score       --           Vitals:    04/04/21 1408 04/04/21 1615   BP: (!) 178/79    Pulse: (!) 53 (!) 48   Patient Position - Orthostatic VS: Sitting          Visual Acuity      ED Medications  Medications   sodium chloride 0 9 % bolus 1,000 mL (1,000 mL Intravenous New Bag 4/4/21 1621)   sodium bicarbonate 8 4 % injection 50 mEq (has no administration in time range)   calcium gluconate 1 g in sodium chloride 0 9% 50 mL (premix) (1 g Intravenous New Bag 4/4/21 1623)   ceftriaxone (ROCEPHIN) 1 g/50 mL in dextrose IVPB (has no administration in time range)   insulin regular (HumuLIN R,NovoLIN R) injection 10 Units (10 Units Intravenous Given 4/4/21 1626)   dextrose 50 % IV solution 25 mL (25 mL Intravenous Given 4/4/21 1621)   albuterol inhalation solution 10 mg (10 mg Nebulization Given 4/4/21 1539)       Diagnostic Studies  Results Reviewed     Procedure Component Value Units Date/Time    CBC and differential [[id number]]  (Abnormal) Collected: 04/04/21 1441    Lab Status: Final result Specimen: Blood from Arm, Right Updated: 04/04/21 1530     WBC 19 64 Thousand/uL      RBC 4 24 Million/uL      Hemoglobin 11 5 g/dL      Hematocrit 33 2 %      MCV 78 fL      MCH 27 1 pg      MCHC 34 6 g/dL      RDW 15 7 %      MPV 8 7 fL      Platelets 345 Thousands/uL      nRBC 0 /100 WBCs      Neutrophils Relative 91 %      Immat GRANS % 2 %      Lymphocytes Relative 3 %      Monocytes Relative 4 %      Eosinophils Relative 0 %      Basophils Relative 0 %      Neutrophils Absolute 17 80 Thousands/µL      Immature Grans Absolute 0 38 Thousand/uL      Lymphocytes Absolute 0 62 Thousands/µL      Monocytes Absolute 0 80 Thousand/µL      Eosinophils Absolute 0 02 Thousand/µL      Basophils Absolute 0 02 Thousands/µL     Lactic acid [[id number]]  (Abnormal) Collected: 04/04/21 1441    Lab Status: Final result Specimen: Blood from Arm, Right Updated: 04/04/21 1525     LACTIC ACID 2 8 mmol/L     Narrative:      Result may be elevated if tourniquet was used during collection      Lactic acid 2 Hours [[id number]]     Lab Status: No result Specimen: Blood     Basic metabolic panel [[id number]]  (Abnormal) Collected: 04/04/21 1441    Lab Status: Final result Specimen: Blood from Arm, Right Updated: 04/04/21 1524     Sodium 117 mmol/L      Potassium 6 5 mmol/L      Chloride 79 mmol/L      CO2 13 mmol/L      ANION GAP 25 mmol/L       mg/dL      Creatinine 13 24 mg/dL      Glucose 98 mg/dL      Calcium 8 1 mg/dL      eGFR 4 ml/min/1 73sq m     Narrative:      National Kidney Disease Foundation guidelines for Chronic Kidney Disease (CKD):     Stage 1 with normal or high GFR (GFR > 90 mL/min/1 73 square meters)    Stage 2 Mild CKD (GFR = 60-89 mL/min/1 73 square meters)    Stage 3A Moderate CKD (GFR = 45-59 mL/min/1 73 square meters)    Stage 3B Moderate CKD (GFR = 30-44 mL/min/1 73 square meters)    Stage 4 Severe CKD (GFR = 15-29 mL/min/1 73 square meters)    Stage 5 End Stage CKD (GFR <15 mL/min/1 73 square meters)  Note: GFR calculation is accurate only with a steady state creatinine    Hepatic function panel [[id number]]  (Abnormal) Collected: 04/04/21 1441    Lab Status: Final result Specimen: Blood from Arm, Right Updated: 04/04/21 1522     Total Bilirubin 0 38 mg/dL      Bilirubin, Direct 0 12 mg/dL      Alkaline Phosphatase 92 U/L      AST 38 U/L      ALT 32 U/L      Total Protein 7 3 g/dL      Albumin 2 3 g/dL     TSH, 3rd generation with Free T4 reflex [[id number]]  (Normal) Collected: 04/04/21 1441    Lab Status: Final result Specimen: Blood from Arm, Right Updated: 04/04/21 1522     TSH 3RD GENERATON 0 412 uIU/mL     Narrative:      Patients undergoing fluorescein dye angiography may retain small amounts of fluorescein in the body for 48-72 hours post procedure  Samples containing fluorescein can produce falsely depressed TSH values  If the patient had this procedure,a specimen should be resubmitted post fluorescein clearance        Magnesium [[id number]]  (Abnormal) Collected: 04/04/21 1441    Lab Status: Final result Specimen: Blood from Arm, Right Updated: 04/04/21 1522     Magnesium 2 7 mg/dL     Phosphorus [[id number]]  (Abnormal) Collected: 04/04/21 1441    Lab Status: Final result Specimen: Blood from Arm, Right Updated: 04/04/21 1522     Phosphorus 11 2 mg/dL     Troponin I [[id number]]  (Normal) Collected: 04/04/21 1441    Lab Status: Final result Specimen: Blood from Arm, Right Updated: 04/04/21 1512     Troponin I <0 02 ng/mL     Protime-INR [[id number]]  (Abnormal) Collected: 04/04/21 1441    Lab Status: Final result Specimen: Blood from Arm, Right Updated: 04/04/21 1505     Protime 15 7 seconds      INR 1 23    APTT [[id number]]  (Normal) Collected: 04/04/21 1441    Lab Status: Final result Specimen: Blood from Arm, Right Updated: 04/04/21 1505     PTT 35 seconds     Blood culture #2 [[id number]] Collected: 04/04/21 1441    Lab Status: In process Specimen: Blood from Arm, Right Updated: 04/04/21 1447    Blood culture #1 [[id number]]     Lab Status: No result Specimen: Blood     UA w Reflex to Microscopic w Reflex to Culture [[id number]]     Lab Status: No result Specimen: Urine                  CT head without contrast   Final Result by Luis Acuña MD (04/04 1456)      No evidence of acute intracranial process  Chronic microangiopathy                    Workstation performed: [id number]         XR chest 1 view portable    (Results Pending)   US kidney and bladder    (Results Pending)              Procedures  ECG 12 Lead Documentation Only    Date/Time: 4/4/2021 4:24 PM  Performed by: Ximena Rea PA-C  Authorized by: Ximena Rea PA-C     Indications / Diagnosis:  AMS  ECG reviewed by me, the ED Provider: yes    Patient location:  ED  Previous ECG:     Previous ECG:  Compared to current    Comparison ECG info:  3/4/21    Similarity:  Changes noted  Interpretation:     Interpretation: abnormal    Rate:     ECG rate:  55    ECG rate assessment: bradycardic    Rhythm:     Rhythm: sinus bradycardia    Ectopy:     Ectopy: none    QRS:     QRS axis:  Normal    QRS intervals:  Normal  Conduction:     Conduction: normal    ST segments:     ST segments:  Normal  T waves:     T waves: peaked      Peaked:  V2 and V3    CriticalCare Time  Performed by: Ximena Rea PA-C  Authorized by: Ximena Rea PA-C     Critical care provider statement:     Critical care time (minutes):  30    Critical care time was exclusive of:  Separately billable procedures and treating other patients    Critical care was necessary to treat or prevent imminent or life-threatening deterioration of the following conditions:  Metabolic crisis and renal failure    Critical care was time spent personally by me on the following activities:  Blood draw for specimens, obtaining history from patient or surrogate, ordering and performing treatments and interventions, ordering and review of laboratory studies, discussions with consultants, development of treatment plan with patient or surrogate, ordering and review of radiographic studies, evaluation of patient's response to treatment, discussions with primary provider, re-evaluation of patient's condition, examination of patient and review of old charts             ED Course  ED Course as of Apr 04 1628   Sun Apr 04, 2021   1330 Creatinine 0 6 about two weeks ago  Creatinine(!): 13 24   1531 Sodium(!): 117   1542 Nephrology paged who will see patient in consult  1542 Critical care paged  1620 Critical care and nephrology at bedside  Identification of Seniors at Risk      Most Recent Value   (ISAR) Identification of Seniors at Risk   Before the illness or injury that brought you to the Emergency, did you need someone to help you on a regular basis? 0 Filed at: 04/04/2021 1410   In the last 24 hours, have you needed more help than usual?  1 Filed at: 04/04/2021 1410   Have you been hospitalized for one or more nights during the past 6 months? 1 Filed at: 04/04/2021 1410   In general, do you see well?  0 Filed at: 04/04/2021 1410   In general, do you have serious problems with your memory? 0 Filed at: 04/04/2021 1410   Do you take more than three different medications every day?   1 Filed at: 04/04/2021 1410   ISAR Score  3 Filed at: 04/04/2021 1410                    SBIRT 20yo+      Most Recent Value   SBIRT (25 yo +)   In order to provide better care to our patients, we are screening all of our patients for alcohol and drug use  Would it be okay to ask you these screening questions? Yes Filed at: 04/04/2021 1411   Initial Alcohol Screen: US AUDIT-C    1  How often do you have a drink containing alcohol?  0 Filed at: 04/04/2021 1411   2  How many drinks containing alcohol do you have on a typical day you are drinking? 0 Filed at: 04/04/2021 1411   3a  Male UNDER 65: How often do you have five or more drinks on one occasion? 0 Filed at: 04/04/2021 1411   Audit-C Score  0 Filed at: 04/04/2021 1411   MARKELL: How many times in the past year have you    Used an illegal drug or used a prescription medication for non-medical reasons? Never Filed at: 04/04/2021 1411                    MDM  Number of Diagnoses or Management Options  Acute metabolic encephalopathy: new and requires workup  Acute renal failure (ARF) ([facility] [address] ): new and requires workup  Hyperkalemia: new and requires workup  Hyponatremia: new and requires workup  SIRS (systemic inflammatory response syndrome) ([facility] [address] ): new and requires workup  Diagnosis management comments: 65yo male presenting for evaluation of malaise, fatigue, anorexia, and decreased urine output  He has not been feeling well since having a parathyroidectomy about 2 weeks ago  Patient is ill appearing on exam but non-toxic  He is noted to be mildly encephalopathic and repeats himself multiple times and provides different answers to the same questions  No focal neurologic deficits  Differential diagnosis includes but is not limited to: dehydration, renal failure, UTI, infectious process, electrolyte abnormality, intracranial bleed    Initial ED plan: Check CBC, CMP, Mg, Phos, blood cultures, lactate, troponin/EKG, UA, CXR, and CT head  Will order IV fluid bolus  Final assessment: Labs significant for profound metabolic derangements including a creatinine of 16 and a BUN of 196  Creatinine last month was 0 6   This is associated with hyperkalemia with a K of 6 5 and a metabolic acidosis with a bicarb of 13  Patient also has severe hyponatremia with a sodium level of 117  Calcium is 8 1  CT head is unremarkable  IV dextrose/insulin, bicarb, albuterol, and calcium ordered for hyperkalemia  Given significant lab abnormalities and encephalopathy, case was discussed with nephrology and critical care  Nephrology recommends Butler catheter placement, kidney ultrasound, and Q4 labs  Patient admitted to ICU for close monitoring  Patient and wife updated on test results and care plan          Amount and/or Complexity of Data Reviewed  Clinical lab tests: ordered and reviewed  Tests in the radiology section of CPT®: ordered and reviewed  Tests in the medicine section of CPT®: ordered and reviewed  Review and summarize past medical records: yes  Discuss the patient with other providers: yes  Independent visualization of images, tracings, or specimens: yes    Risk of Complications, Morbidity, and/or Mortality  Presenting problems: high  Diagnostic procedures: high  Management options: high        Disposition  Final diagnoses:   Acute renal failure (ARF) ([facility] [address] )   Hyponatremia   Hyperkalemia   SIRS (systemic inflammatory response syndrome) ([facility] [address] )   Acute metabolic encephalopathy     Time reflects when diagnosis was documented in both MDM as applicable and the Disposition within this note     Time User Action Codes Description Comment    4/4/2021  4:25  [employer], [facility] [N17 9] Acute renal failure (ARF) ([facility] [address] )     4/4/2021  4:25  [facility], [facility] [E87 1] Hyponatremia     4/4/2021  4:25 PM [address] [place], [place] [address] [facility], [facility] [E87 5] Hyperkalemia     4/4/2021  4:25  [facility] [R65 10] SIRS (systemic inflammatory response syndrome) ([facility] [address] )     4/4/2021  4:25 PM [address] [place], [place] [address] [facility], [facility] [Z91 21] Acute metabolic encephalopathy       ED Disposition     ED Disposition Condition Date/Time Comment    Admit Stable Sun Apr 4, 2021  4:28 PM Case was discussed with Dr Candance Levine and the patient's admission status was agreed to be Admission Status: inpatient status to the service of Dr Tonio Ponce   Follow-up Information    None         Patient's Medications   Discharge Prescriptions    No medications on file     No discharge procedures on file      PDMP Review       Value Time User    PDMP Reviewed  Yes 3/3/2021 11:17 AM Gordon Rahman MD          ED Provider  Electronically Signed by           Urvashi Rushing PA-C  04/04/21 550

## 2021-04-04 NOTE — ASSESSMENT & PLAN NOTE
Lab Results   Component Value Date    HGBA1C 6 0 (H) 12/28/2020       No results for input(s): POCGLU in the last 72 hours  Blood Sugar Average: Last 72 hrs:     · Hold oral glycemic regimen  · Sliding scale insulin as needed

## 2021-04-05 ENCOUNTER — APPOINTMENT (INPATIENT)
Dept: CT IMAGING | Facility: HOSPITAL | Age: 71
DRG: 682 | End: 2021-04-05
Payer: MEDICARE

## 2021-04-05 PROBLEM — E86.0 DEHYDRATION WITH HYPONATREMIA: Status: ACTIVE | Noted: 2021-04-05

## 2021-04-05 PROBLEM — R31.9 HEMATURIA: Status: ACTIVE | Noted: 2021-04-05

## 2021-04-05 PROBLEM — E87.1 DEHYDRATION WITH HYPONATREMIA: Status: ACTIVE | Noted: 2021-04-05

## 2021-04-05 LAB
25(OH)D3 SERPL-MCNC: 9 NG/ML (ref 30–100)
ALBUMIN SERPL BCP-MCNC: 1.7 G/DL (ref 3.5–5)
ALP SERPL-CCNC: 80 U/L (ref 46–116)
ALT SERPL W P-5'-P-CCNC: 24 U/L (ref 12–78)
ANION GAP SERPL CALCULATED.3IONS-SCNC: 11 MMOL/L (ref 4–13)
ANION GAP SERPL CALCULATED.3IONS-SCNC: 14 MMOL/L (ref 4–13)
ANION GAP SERPL CALCULATED.3IONS-SCNC: 15 MMOL/L (ref 4–13)
ANION GAP SERPL CALCULATED.3IONS-SCNC: 16 MMOL/L (ref 4–13)
ANION GAP SERPL CALCULATED.3IONS-SCNC: 19 MMOL/L (ref 4–13)
AST SERPL W P-5'-P-CCNC: 27 U/L (ref 5–45)
ATRIAL RATE: 55 BPM
BASE EX.OXY STD BLDV CALC-SCNC: 92.5 % (ref 60–80)
BASE EXCESS BLDV CALC-SCNC: -7.1 MMOL/L
BASOPHILS # BLD AUTO: 0.01 THOUSANDS/ΜL (ref 0–0.1)
BASOPHILS NFR BLD AUTO: 0 % (ref 0–1)
BILIRUB DIRECT SERPL-MCNC: 0.16 MG/DL (ref 0–0.2)
BILIRUB SERPL-MCNC: 0.31 MG/DL (ref 0.2–1)
BUN SERPL-MCNC: 100 MG/DL (ref 5–25)
BUN SERPL-MCNC: 115 MG/DL (ref 5–25)
BUN SERPL-MCNC: 129 MG/DL (ref 5–25)
BUN SERPL-MCNC: 157 MG/DL (ref 5–25)
BUN SERPL-MCNC: 81 MG/DL (ref 5–25)
CA-I BLD-SCNC: 1.04 MMOL/L (ref 1.12–1.32)
CA-I BLD-SCNC: 1.08 MMOL/L (ref 1.12–1.32)
CALCIUM SERPL-MCNC: 7.1 MG/DL (ref 8.3–10.1)
CALCIUM SERPL-MCNC: 8.4 MG/DL (ref 8.3–10.1)
CALCIUM SERPL-MCNC: 8.4 MG/DL (ref 8.3–10.1)
CALCIUM SERPL-MCNC: 8.7 MG/DL (ref 8.3–10.1)
CALCIUM SERPL-MCNC: 8.7 MG/DL (ref 8.3–10.1)
CHLORIDE SERPL-SCNC: 101 MMOL/L (ref 100–108)
CHLORIDE SERPL-SCNC: 103 MMOL/L (ref 100–108)
CHLORIDE SERPL-SCNC: 107 MMOL/L (ref 100–108)
CHLORIDE SERPL-SCNC: 95 MMOL/L (ref 100–108)
CHLORIDE SERPL-SCNC: 99 MMOL/L (ref 100–108)
CHLORIDE UR-SCNC: 33 MMOL/L
CO2 SERPL-SCNC: 14 MMOL/L (ref 21–32)
CO2 SERPL-SCNC: 19 MMOL/L (ref 21–32)
CO2 SERPL-SCNC: 20 MMOL/L (ref 21–32)
CO2 SERPL-SCNC: 20 MMOL/L (ref 21–32)
CO2 SERPL-SCNC: 23 MMOL/L (ref 21–32)
CREAT SERPL-MCNC: 2.06 MG/DL (ref 0.6–1.3)
CREAT SERPL-MCNC: 2.82 MG/DL (ref 0.6–1.3)
CREAT SERPL-MCNC: 3.8 MG/DL (ref 0.6–1.3)
CREAT SERPL-MCNC: 5.33 MG/DL (ref 0.6–1.3)
CREAT SERPL-MCNC: 8.15 MG/DL (ref 0.6–1.3)
CREAT UR-MCNC: 110 MG/DL
EOSINOPHIL # BLD AUTO: 0.04 THOUSAND/ΜL (ref 0–0.61)
EOSINOPHIL NFR BLD AUTO: 0 % (ref 0–6)
ERYTHROCYTE [DISTWIDTH] IN BLOOD BY AUTOMATED COUNT: 15.2 % (ref 11.6–15.1)
GFR SERPL CREATININE-BSD FRML MDRD: 11 ML/MIN/1.73SQ M
GFR SERPL CREATININE-BSD FRML MDRD: 17 ML/MIN/1.73SQ M
GFR SERPL CREATININE-BSD FRML MDRD: 25 ML/MIN/1.73SQ M
GFR SERPL CREATININE-BSD FRML MDRD: 36 ML/MIN/1.73SQ M
GFR SERPL CREATININE-BSD FRML MDRD: 7 ML/MIN/1.73SQ M
GLUCOSE SERPL-MCNC: 122 MG/DL (ref 65–140)
GLUCOSE SERPL-MCNC: 144 MG/DL (ref 65–140)
GLUCOSE SERPL-MCNC: 144 MG/DL (ref 65–140)
GLUCOSE SERPL-MCNC: 157 MG/DL (ref 65–140)
GLUCOSE SERPL-MCNC: 163 MG/DL (ref 65–140)
GLUCOSE SERPL-MCNC: 204 MG/DL (ref 65–140)
GLUCOSE SERPL-MCNC: 204 MG/DL (ref 65–140)
GLUCOSE SERPL-MCNC: 233 MG/DL (ref 65–140)
GLUCOSE SERPL-MCNC: 251 MG/DL (ref 65–140)
HCO3 BLDV-SCNC: 16.6 MMOL/L (ref 24–30)
HCT VFR BLD AUTO: 25.7 % (ref 36.5–49.3)
HGB BLD-MCNC: 9 G/DL (ref 12–17)
IMM GRANULOCYTES # BLD AUTO: 0.19 THOUSAND/UL (ref 0–0.2)
IMM GRANULOCYTES NFR BLD AUTO: 1 % (ref 0–2)
LACTATE SERPL-SCNC: 2 MMOL/L (ref 0.5–2)
LYMPHOCYTES # BLD AUTO: 0.38 THOUSANDS/ΜL (ref 0.6–4.47)
LYMPHOCYTES NFR BLD AUTO: 3 % (ref 14–44)
MAGNESIUM SERPL-MCNC: 2 MG/DL (ref 1.6–2.6)
MAGNESIUM SERPL-MCNC: 2.1 MG/DL (ref 1.6–2.6)
MAGNESIUM SERPL-MCNC: 2.1 MG/DL (ref 1.6–2.6)
MAGNESIUM SERPL-MCNC: 2.2 MG/DL (ref 1.6–2.6)
MAGNESIUM SERPL-MCNC: 2.3 MG/DL (ref 1.6–2.6)
MCH RBC QN AUTO: 26.9 PG (ref 26.8–34.3)
MCHC RBC AUTO-ENTMCNC: 35 G/DL (ref 31.4–37.4)
MCV RBC AUTO: 77 FL (ref 82–98)
MONOCYTES # BLD AUTO: 0.88 THOUSAND/ΜL (ref 0.17–1.22)
MONOCYTES NFR BLD AUTO: 6 % (ref 4–12)
NEUTROPHILS # BLD AUTO: 12.44 THOUSANDS/ΜL (ref 1.85–7.62)
NEUTS SEG NFR BLD AUTO: 90 % (ref 43–75)
NRBC BLD AUTO-RTO: 0 /100 WBCS
O2 CT BLDV-SCNC: 13 ML/DL
P AXIS: 71 DEGREES
PCO2 BLDV: 27.6 MM HG (ref 42–50)
PH BLDV: 7.4 [PH] (ref 7.3–7.4)
PHOSPHATE SERPL-MCNC: 2.7 MG/DL (ref 2.3–4.1)
PHOSPHATE SERPL-MCNC: 3.6 MG/DL (ref 2.3–4.1)
PHOSPHATE SERPL-MCNC: 4.4 MG/DL (ref 2.3–4.1)
PHOSPHATE SERPL-MCNC: 5.6 MG/DL (ref 2.3–4.1)
PHOSPHATE SERPL-MCNC: 7.3 MG/DL (ref 2.3–4.1)
PLATELET # BLD AUTO: 406 THOUSANDS/UL (ref 149–390)
PMV BLD AUTO: 8.6 FL (ref 8.9–12.7)
PO2 BLDV: 74.5 MM HG (ref 35–45)
POTASSIUM SERPL-SCNC: 3.5 MMOL/L (ref 3.5–5.3)
POTASSIUM SERPL-SCNC: 3.7 MMOL/L (ref 3.5–5.3)
POTASSIUM SERPL-SCNC: 3.7 MMOL/L (ref 3.5–5.3)
POTASSIUM SERPL-SCNC: 3.8 MMOL/L (ref 3.5–5.3)
POTASSIUM SERPL-SCNC: 4.2 MMOL/L (ref 3.5–5.3)
PR INTERVAL: 154 MS
PROT SERPL-MCNC: 5.7 G/DL (ref 6.4–8.2)
PTH-INTACT SERPL-MCNC: 124 PG/ML (ref 18.4–80.1)
QRS AXIS: 68 DEGREES
QRSD INTERVAL: 114 MS
QT INTERVAL: 460 MS
QTC INTERVAL: 440 MS
RBC # BLD AUTO: 3.35 MILLION/UL (ref 3.88–5.62)
SODIUM 24H UR-SCNC: 25 MOL/L
SODIUM SERPL-SCNC: 128 MMOL/L (ref 136–145)
SODIUM SERPL-SCNC: 133 MMOL/L (ref 136–145)
SODIUM SERPL-SCNC: 136 MMOL/L (ref 136–145)
SODIUM SERPL-SCNC: 138 MMOL/L (ref 136–145)
SODIUM SERPL-SCNC: 141 MMOL/L (ref 136–145)
T WAVE AXIS: 64 DEGREES
URATE SERPL-MCNC: 11.4 MG/DL (ref 4.2–8)
VENTRICULAR RATE: 55 BPM
WBC # BLD AUTO: 13.94 THOUSAND/UL (ref 4.31–10.16)

## 2021-04-05 PROCEDURE — 80048 BASIC METABOLIC PNL TOTAL CA: CPT | Performed by: NURSE PRACTITIONER

## 2021-04-05 PROCEDURE — 84100 ASSAY OF PHOSPHORUS: CPT | Performed by: NURSE PRACTITIONER

## 2021-04-05 PROCEDURE — 83735 ASSAY OF MAGNESIUM: CPT | Performed by: NURSE PRACTITIONER

## 2021-04-05 PROCEDURE — 82805 BLOOD GASES W/O2 SATURATION: CPT | Performed by: NURSE PRACTITIONER

## 2021-04-05 PROCEDURE — 82306 VITAMIN D 25 HYDROXY: CPT | Performed by: INTERNAL MEDICINE

## 2021-04-05 PROCEDURE — 99291 CRITICAL CARE FIRST HOUR: CPT | Performed by: NURSE PRACTITIONER

## 2021-04-05 PROCEDURE — 80076 HEPATIC FUNCTION PANEL: CPT | Performed by: NURSE PRACTITIONER

## 2021-04-05 PROCEDURE — 82330 ASSAY OF CALCIUM: CPT | Performed by: NURSE PRACTITIONER

## 2021-04-05 PROCEDURE — 85025 COMPLETE CBC W/AUTO DIFF WBC: CPT | Performed by: INTERNAL MEDICINE

## 2021-04-05 PROCEDURE — 84550 ASSAY OF BLOOD/URIC ACID: CPT | Performed by: INTERNAL MEDICINE

## 2021-04-05 PROCEDURE — 82948 REAGENT STRIP/BLOOD GLUCOSE: CPT

## 2021-04-05 PROCEDURE — 87086 URINE CULTURE/COLONY COUNT: CPT | Performed by: STUDENT IN AN ORGANIZED HEALTH CARE EDUCATION/TRAINING PROGRAM

## 2021-04-05 PROCEDURE — 74176 CT ABD & PELVIS W/O CONTRAST: CPT

## 2021-04-05 PROCEDURE — 93010 ELECTROCARDIOGRAM REPORT: CPT | Performed by: INTERNAL MEDICINE

## 2021-04-05 PROCEDURE — 83970 ASSAY OF PARATHORMONE: CPT | Performed by: INTERNAL MEDICINE

## 2021-04-05 PROCEDURE — 99233 SBSQ HOSP IP/OBS HIGH 50: CPT | Performed by: INTERNAL MEDICINE

## 2021-04-05 RX ORDER — POTASSIUM CHLORIDE 14.9 MG/ML
20 INJECTION INTRAVENOUS ONCE
Status: COMPLETED | OUTPATIENT
Start: 2021-04-05 | End: 2021-04-05

## 2021-04-05 RX ORDER — MAGNESIUM SULFATE 1 G/100ML
1 INJECTION INTRAVENOUS ONCE
Status: COMPLETED | OUTPATIENT
Start: 2021-04-05 | End: 2021-04-05

## 2021-04-05 RX ORDER — CALCIUM GLUCONATE 20 MG/ML
2 INJECTION, SOLUTION INTRAVENOUS ONCE
Status: COMPLETED | OUTPATIENT
Start: 2021-04-05 | End: 2021-04-05

## 2021-04-05 RX ORDER — SODIUM CHLORIDE 450 MG/100ML
75 INJECTION, SOLUTION INTRAVENOUS CONTINUOUS
Status: DISCONTINUED | OUTPATIENT
Start: 2021-04-05 | End: 2021-04-06

## 2021-04-05 RX ORDER — SODIUM CHLORIDE 9 MG/ML
125 INJECTION, SOLUTION INTRAVENOUS CONTINUOUS
Status: DISCONTINUED | OUTPATIENT
Start: 2021-04-05 | End: 2021-04-05

## 2021-04-05 RX ADMIN — INSULIN LISPRO 2 UNITS: 100 INJECTION, SOLUTION INTRAVENOUS; SUBCUTANEOUS at 18:31

## 2021-04-05 RX ADMIN — INSULIN LISPRO 2 UNITS: 100 INJECTION, SOLUTION INTRAVENOUS; SUBCUTANEOUS at 12:59

## 2021-04-05 RX ADMIN — INSULIN LISPRO 3 UNITS: 100 INJECTION, SOLUTION INTRAVENOUS; SUBCUTANEOUS at 22:10

## 2021-04-05 RX ADMIN — CALCIUM GLUCONATE 3 G: 98 INJECTION, SOLUTION INTRAVENOUS at 02:12

## 2021-04-05 RX ADMIN — SODIUM CHLORIDE 125 ML/HR: 0.9 INJECTION, SOLUTION INTRAVENOUS at 05:28

## 2021-04-05 RX ADMIN — CALCIUM GLUCONATE 2 G: 20 INJECTION, SOLUTION INTRAVENOUS at 04:59

## 2021-04-05 RX ADMIN — SODIUM CHLORIDE 75 ML/HR: 0.45 INJECTION, SOLUTION INTRAVENOUS at 14:23

## 2021-04-05 RX ADMIN — CEFTRIAXONE SODIUM 1000 MG: 10 INJECTION, POWDER, FOR SOLUTION INTRAVENOUS at 15:56

## 2021-04-05 RX ADMIN — ASPIRIN 81 MG: 81 TABLET, CHEWABLE ORAL at 08:46

## 2021-04-05 RX ADMIN — HEPARIN SODIUM 5000 UNITS: 5000 INJECTION INTRAVENOUS; SUBCUTANEOUS at 05:07

## 2021-04-05 RX ADMIN — CHLORHEXIDINE GLUCONATE 0.12% ORAL RINSE 15 ML: 1.2 LIQUID ORAL at 08:46

## 2021-04-05 RX ADMIN — SODIUM BICARBONATE 175 ML/HR: 84 INJECTION, SOLUTION INTRAVENOUS at 03:47

## 2021-04-05 RX ADMIN — INSULIN LISPRO 1 UNITS: 100 INJECTION, SOLUTION INTRAVENOUS; SUBCUTANEOUS at 05:49

## 2021-04-05 RX ADMIN — MAGNESIUM SULFATE HEPTAHYDRATE 1 G: 1 INJECTION, SOLUTION INTRAVENOUS at 00:37

## 2021-04-05 RX ADMIN — POTASSIUM CHLORIDE 20 MEQ: 14.9 INJECTION, SOLUTION INTRAVENOUS at 05:36

## 2021-04-05 RX ADMIN — ATORVASTATIN CALCIUM 80 MG: 40 TABLET, FILM COATED ORAL at 08:46

## 2021-04-05 NOTE — ASSESSMENT & PLAN NOTE
· Received temporizing measures; no significant EKG change  · Continuous cardiac monitoring  · Improved with hydration  · Continue to monitor

## 2021-04-05 NOTE — PROGRESS NOTES
NEPHROLOGY PROGRESS NOTE    Patient: Janelle Green               Sex: male          DOA: 4/4/2021  1:56 PM   YOB: 1950        Age:  65 y o         LOS:  LOS: 1 day       HPI     Patient admitted hospital with acute kidney injury and hyperkalemia    SUBJECTIVE     Patient had Butler catheter inserted with large amount of urine output  Renal ultrasound also showed hydronephrosis    Patient is feeling better  Sitting on the chair    Still appears slightly confused though does not appear any discomfort    No chest pain no palpitation or shortness of breath    CURRENT MEDICATIONS       Current Facility-Administered Medications:     atorvastatin (LIPITOR) tablet 80 mg, 80 mg, Oral, Daily, Thermon Craze, PA-C, 80 mg at 04/05/21 0846    ceftriaxone (ROCEPHIN) 1 g/50 mL in dextrose IVPB, 1,000 mg, Intravenous, Q24H, Thermon Craze, PA-C    chlorhexidine (PERIDEX) 0 12 % oral rinse 15 mL, 15 mL, Swish & Spit, Q12H [facility], Thermon Craze, PA-C, 15 mL at 04/05/21 0846    insulin lispro (HumaLOG) 100 units/mL subcutaneous injection 1-6 Units, 1-6 Units, Subcutaneous, Q6H [facility], 1 Units at 04/05/21 0549 **AND** Fingerstick Glucose (POCT), , , Q6H, Thermon Craze, PA-C    sodium chloride 0 9 % infusion, 125 mL/hr, Intravenous, Continuous, Rice Fillers, CRNP, Last Rate: 125 mL/hr at 04/05/21 0528, 125 mL/hr at 04/05/21 0528    OBJECTIVE     Current Weight: Weight - Scale: 94 1 kg (207 lb 7 3 oz)  Vitals:    04/05/21 0600   BP: 135/64   Pulse: 62   Resp: 17   Temp: 98 7 °F (37 1 °C)   SpO2: 95%       Intake/Output Summary (Last 24 hours) at 4/5/2021 1031  Last data filed at 4/5/2021 0600  Gross per 24 hour   Intake 5672 49 ml   Output 6200 ml   Net -527 51 ml       PHYSICAL EXAMINATION     Physical Exam  Constitutional:       General: He is not in acute distress  Appearance: He is well-developed  HENT:      Head: Normocephalic  Eyes:      General: No scleral icterus       Conjunctiva/sclera: Conjunctivae normal    Neck:      Musculoskeletal: Neck supple  Vascular: No JVD  Cardiovascular:      Rate and Rhythm: Normal rate  Heart sounds: Normal heart sounds  Pulmonary:      Effort: Pulmonary effort is normal       Breath sounds: No wheezing  Abdominal:      Palpations: Abdomen is soft  Tenderness: There is no abdominal tenderness  Musculoskeletal: Normal range of motion  Skin:     General: Skin is warm  Findings: No rash  Neurological:      Mental Status: He is alert and oriented to person, place, and time  Psychiatric:         Behavior: Behavior normal           LAB RESULTS     Results from last 7 days   Lab Units 04/05/21  0858 04/05/21  0436 04/04/21  2354 04/04/21  2158 04/04/21  1822 04/04/21  1441   WBC Thousand/uL  --  13 94*  --   --   --  19 64*   HEMOGLOBIN g/dL  --  9 0*  --   --   --  11 5*   HEMATOCRIT %  --  25 7*  --   --   --  33 2*   PLATELETS Thousands/uL  --  406*  --   --  459* 486*   POTASSIUM mmol/L 3 8 3 7 4 2 4 8  --  6 5*   CHLORIDE mmol/L 101 99* 95* 91*  --  79*   CO2 mmol/L 20* 20* 14* 13*  --  13*   BUN mg/dL 115* 129* 157* 178*  --  196*   CREATININE mg/dL 3 80* 5 33* 8 15* 10 26*  --  13 24*   EGFR ml/min/1 73sq m 17 11 7 5  --  4   CALCIUM mg/dL 8 7 8 4 7 1* 7 3*  --  8 1*   MAGNESIUM mg/dL 2 2 2 3 2 1 2 3  --  2 7*   PHOSPHORUS mg/dL 4 4* 5 6* 7 3* 8 9*  --  11 2*       RADIOLOGY RESULTS      Results for orders placed during the hospital encounter of 04/04/21   XR chest 1 view portable    Narrative CHEST     INDICATION:   SOB     COMPARISON:  3/4/2020    EXAM PERFORMED/VIEWS:  XR CHEST PORTABLE      FINDINGS:    Cardiomediastinal silhouette appears unremarkable  The lungs are clear  No pneumothorax or pleural effusion  Osseous structures appear within normal limits for patient age  Impression No acute cardiopulmonary disease              Workstation performed: [id number]       Results for orders placed during the hospital encounter of 03/04/21   XR chest pa & lateral    Narrative CHEST     INDICATION:   E21 3: Hyperparathyroidism, unspecified  COMPARISON:  Chest radiograph from 1/21/2018  CT scan for parathyroid adenoma on 1/29/2021  Chest CT from 1/2/2020  EXAM PERFORMED/VIEWS:  XR CHEST PA & LATERAL  DUAL ENERGY SUBTRACTION  FINDINGS:    Cardiomediastinal silhouette normal  Coronary stent    Lungs clear  No effusion or pneumothorax  Mild degenerative disease in the spine  Impression No acute cardiopulmonary disease  Workstation performed: [id number]       No results found for this or any previous visit  No results found for this or any previous visit  No results found for this or any previous visit  No results found for this or any previous visit  PLAN / RECOMMENDATIONS      Acute kidney injury:  Likely because of obstructive uropathy  Patient does have Butler catheter which is draining very well  Kidney function is improving  Will continue Butler catheter and will advise urology evaluation    Hyperkalemia:  Improved with the treatment as well as relief of obstruction    Hyponatremia:  Slowly improving with improving kidney function    Obstructive uropathy:  Does have bilateral hydronephrosis:  Likely because of prostate  Urology evaluation    Metabolic acidosis:  Improving    Status post parathyroidectomy:  Calcium is within acceptable range phosphorus is getting better    Will continue to monitor    Gerber Patel MD  Nephrology  4/5/2021        Portions of the record may have been created with voice recognition software  Occasional wrong word or "sound a like" substitutions may have occurred due to the inherent limitations of voice recognition software  Read the chart carefully and recognize, using context, where substitutions have occurred

## 2021-04-05 NOTE — CASE MANAGEMENT
CM met with pt at bedside  Pt lives with his wife Dana Mcmahon in a 2 story house with 13 steps w/railing to reach his bedroom and 3  INES  Pt is able to navigate steps and is independent with ADL's  He has a walker to use prn, but rarely uses it  He has never been in rehab or used [place] services  Denies substance abuse or mental health issues  He quit smoking 2-3 weeks ago  Dr Jericho Atkinson is his PCP  He uses [facility] and has no problem with co-pays  He has an Advanced Directive and his POA is his wife  He is retired, but still drives  His wife will transport home when he is medically cleared  CM discussed d/c needs including HH, but pt does not feel this will be needed  CM department will continue to follow and will assess needs as hospitalization progresses  CM reviewed discharge planning process including the following: identifying help at home, patient preference for discharge planning needs, pharmacy preference, and availability of treatment team to discuss questions or concerns patient and/or family may have regarding understanding medications and recognizing signs and symptoms once discharged  CM also encouraged patient to follow up with all recommended appointments after discharge  Patient advised of importance for patient and family to participate in managing patients medical well being

## 2021-04-05 NOTE — ASSESSMENT & PLAN NOTE
· Baseline creatinine 0 6-0 7  · Element of postobstructive uropathy likely  Upon Butler insertion, 1600 mL urine retrieved  · Improving with hydration  · Renal ultrasound reveals right hydronephrosis and some retroperitoneal fluid on the left concerning for rupture of the renal collection system  Nephrology made aware overnight  No acute interventions required  · Hold all nephrotoxic medications, diuretics  · Avoid hypotension  · Strict I&Os  · Appreciate nephrology recommendations/consultation    · Urine output brisk overnight with volume repletion

## 2021-04-05 NOTE — ASSESSMENT & PLAN NOTE
Lab Results   Component Value Date    HGBA1C 6 0 (H) 12/28/2020       Recent Labs     04/04/21 2025 04/04/21  2357   POCGLU 84 144*       Blood Sugar Average: Last 72 hrs:  (P) 114   · Hold oral glycemic regimen    · Insulin per protocol as needed

## 2021-04-05 NOTE — PLAN OF CARE
Problem: Potential for Falls  Goal: Patient will remain free of falls  Description: INTERVENTIONS:  - Assess patient frequently for physical needs  -  Identify cognitive and physical deficits and behaviors that affect risk of falls  -  [place] fall precautions as indicated by assessment   - Educate patient/family on patient safety including physical limitations  - Instruct patient to call for assistance with activity based on assessment  - Modify environment to reduce risk of injury  - Consider OT/PT consult to assist with strengthening/mobility  Outcome: Progressing     Problem: Nutrition/Hydration-ADULT  Goal: Nutrient/Hydration intake appropriate for improving, restoring or maintaining nutritional needs  Description: Monitor and assess patient's nutrition/hydration status for malnutrition  Collaborate with interdisciplinary team and initiate plan and interventions as ordered  Monitor patient's weight and dietary intake as ordered or per policy  Utilize nutrition screening tool and intervene as necessary  Determine patient's food preferences and provide high-protein, high-caloric foods as appropriate       INTERVENTIONS:  - Monitor oral intake, urinary output, labs, and treatment plans  - Assess nutrition and hydration status and recommend course of action  - Evaluate amount of meals eaten  - Assist patient with eating if necessary   - Allow adequate time for meals  - Recommend/ encourage appropriate diets, oral nutritional supplements, and vitamin/mineral supplements  - Order, calculate, and assess calorie counts as needed  - Recommend, monitor, and adjust tube feedings and TPN/PPN based on assessed needs  - Assess need for intravenous fluids  - Provide specific nutrition/hydration education as appropriate  - Include patient/family/caregiver in decisions related to nutrition  Outcome: Progressing     Problem: PAIN - ADULT  Goal: Verbalizes/displays adequate comfort level or baseline comfort level  Description: Interventions:  - Encourage patient to monitor pain and request assistance  - Assess pain using appropriate pain scale  - Administer analgesics based on type and severity of pain and evaluate response  - Implement non-pharmacological measures as appropriate and evaluate response  - Consider cultural and social influences on pain and pain management  - Notify physician/advanced practitioner if interventions unsuccessful or patient reports new pain  Outcome: Progressing     Problem: INFECTION - ADULT  Goal: Absence or prevention of progression during hospitalization  Description: INTERVENTIONS:  - Assess and monitor for signs and symptoms of infection  - Monitor lab/diagnostic results  - Monitor all insertion sites, i e  indwelling lines, tubes, and drains  - Monitor endotracheal if appropriate and nasal secretions for changes in amount and color  - [place] appropriate cooling/warming therapies per order  - Administer medications as ordered  - Instruct and encourage patient and family to use good hand hygiene technique  - Identify and instruct in appropriate isolation precautions for identified infection/condition  Outcome: Progressing     Problem: SAFETY ADULT  Goal: Maintain or return to baseline ADL function  Description: INTERVENTIONS:  -  Assess patient's ability to carry out ADLs; assess patient's baseline for ADL function and identify physical deficits which impact ability to perform ADLs (bathing, care of mouth/teeth, toileting, grooming, dressing, etc )  - Assess/evaluate cause of self-care deficits   - Assess range of motion  - Assess patient's mobility; develop plan if impaired  - Assess patient's need for assistive devices and provide as appropriate  - Encourage maximum independence but intervene and supervise when necessary  - Involve family in performance of ADLs  - Assess for home care needs following discharge   - Consider OT consult to assist with ADL evaluation and planning for discharge  - Provide patient education as appropriate  Outcome: Progressing  Goal: Maintain or return mobility status to optimal level  Description: INTERVENTIONS:  - Assess patient's baseline mobility status (ambulation, transfers, stairs, etc )    - Identify cognitive and physical deficits and behaviors that affect mobility  - Identify mobility aids required to assist with transfers and/or ambulation (gait belt, sit-to-stand, lift, walker, cane, etc )  - [place] fall precautions as indicated by assessment  - Record patient progress and toleration of activity level on Mobility SBAR; progress patient to next Phase/Stage  - Instruct patient to call for assistance with activity based on assessment  - Consider rehabilitation consult to assist with strengthening/weightbearing, etc   Outcome: Progressing     Problem: DISCHARGE PLANNING  Goal: Discharge to home or other facility with appropriate resources  Description: INTERVENTIONS:  - Identify barriers to discharge w/patient and caregiver  - Arrange for needed discharge resources and transportation as appropriate  - Identify discharge learning needs (meds, wound care, etc )  - Arrange for interpretive services to assist at discharge as needed  - Refer to Case Management Department for coordinating discharge planning if the patient needs post-hospital services based on physician/advanced practitioner order or complex needs related to functional status, cognitive ability, or social support system  Outcome: Progressing     Problem: Knowledge Deficit  Goal: Patient/family/caregiver demonstrates understanding of disease process, treatment plan, medications, and discharge instructions  Description: Complete learning assessment and assess knowledge base    Interventions:  - Provide teaching at level of understanding  - Provide teaching via preferred learning methods  Outcome: Progressing     Problem: NEUROSENSORY - ADULT  Goal: Achieves stable or improved neurological status  Description: INTERVENTIONS  - Monitor and report changes in neurological status  - Monitor vital signs such as temperature, blood pressure, glucose, and any other labs ordered   - Initiate measures to prevent increased intracranial pressure  - Monitor for seizure activity and implement precautions if appropriate      Outcome: Progressing  Goal: Achieves maximal functionality and self care  Description: INTERVENTIONS  - Monitor swallowing and airway patency with patient fatigue and changes in neurological status  - Encourage and assist patient to increase activity and self care     - Encourage visually impaired, hearing impaired and aphasic patients to use assistive/communication devices  Outcome: Progressing     Problem: CARDIOVASCULAR - ADULT  Goal: Maintains optimal cardiac output and hemodynamic stability  Description: INTERVENTIONS:  - Monitor I/O, vital signs and rhythm  - Monitor for S/S and trends of decreased cardiac output  - Administer and titrate ordered vasoactive medications to optimize hemodynamic stability  - Assess quality of pulses, skin color and temperature  - Assess for signs of decreased coronary artery perfusion  - Instruct patient to report change in severity of symptoms  Outcome: Progressing     Problem: GASTROINTESTINAL - ADULT  Goal: Minimal or absence of nausea and/or vomiting  Description: INTERVENTIONS:  - Administer IV fluids if ordered to ensure adequate hydration  - Maintain NPO status until nausea and vomiting are resolved  - Nasogastric tube if ordered  - Administer ordered antiemetic medications as needed  - Provide nonpharmacologic comfort measures as appropriate  - Advance diet as tolerated, if ordered  - Consider nutrition services referral to assist patient with adequate nutrition and appropriate food choices  Outcome: Progressing  Goal: Maintains or returns to baseline bowel function  Description: INTERVENTIONS:  - Assess bowel function  - Encourage oral fluids to ensure adequate hydration  - Administer IV fluids if ordered to ensure adequate hydration  - Administer ordered medications as needed  - Encourage mobilization and activity  - Consider nutritional services referral to assist patient with adequate nutrition and appropriate food choices  Outcome: Progressing     Problem: GENITOURINARY - ADULT  Goal: Maintains or returns to baseline urinary function  Description: INTERVENTIONS:  - Assess urinary function  - Encourage oral fluids to ensure adequate hydration if ordered  - Administer IV fluids as ordered to ensure adequate hydration  - Administer ordered medications as needed  - Offer frequent toileting  - Follow urinary retention protocol if ordered  Outcome: Progressing  Goal: Absence of urinary retention  Description: INTERVENTIONS:  - Assess patients ability to void and empty bladder  - Monitor I/O  - Bladder scan as needed  - Discuss with physician/AP medications to alleviate retention as needed  - Discuss catheterization for long term situations as appropriate  Outcome: Progressing  Goal: Urinary catheter remains patent  Description: INTERVENTIONS:  - Assess patency of urinary catheter  - If patient has a chronic saeed, consider changing catheter if non-functioning  - Follow guidelines for intermittent irrigation of non-functioning urinary catheter  Outcome: Progressing     Problem: METABOLIC, FLUID AND ELECTROLYTES - ADULT  Goal: Electrolytes maintained within normal limits  Description: INTERVENTIONS:  - Monitor labs and assess patient for signs and symptoms of electrolyte imbalances  - Administer electrolyte replacement as ordered  - Monitor response to electrolyte replacements, including repeat lab results as appropriate  - Instruct patient on fluid and nutrition as appropriate  Outcome: Progressing  Goal: Fluid balance maintained  Description: INTERVENTIONS:  - Monitor labs   - Monitor I/O and WT  - Instruct patient on fluid and nutrition as appropriate  - Assess for signs & symptoms of volume excess or deficit  Outcome: Progressing  Goal: Glucose maintained within target range  Description: INTERVENTIONS:  - Monitor Blood Glucose as ordered  - Assess for signs and symptoms of hyperglycemia and hypoglycemia  - Administer ordered medications to maintain glucose within target range  - Assess nutritional intake and initiate nutrition service referral as needed  Outcome: Progressing     Problem: SKIN/TISSUE INTEGRITY - ADULT  Goal: Skin integrity remains intact  Description: INTERVENTIONS  - Identify patients at risk for skin breakdown  - Assess and monitor skin integrity  - Assess and monitor nutrition and hydration status  - Monitor labs (i e  albumin)  - Assess for incontinence   - Turn and reposition patient  - Assist with mobility/ambulation  - Relieve pressure over bony prominences  - Avoid friction and shearing  - Provide appropriate hygiene as needed including keeping skin clean and dry  - Evaluate need for skin moisturizer/barrier cream  - Collaborate with interdisciplinary team (i e  Nutrition, Rehabilitation, etc )   - Patient/family teaching  Outcome: Progressing     Problem: HEMATOLOGIC - ADULT  Goal: Maintains hematologic stability  Description: INTERVENTIONS  - Assess for signs and symptoms of bleeding or hemorrhage  - Monitor labs  - Administer supportive blood products/factors as ordered and appropriate  Outcome: Progressing

## 2021-04-05 NOTE — PROGRESS NOTES
[address] [facility]  Progress Note - Renea Harrison 1950, 65 y o  male MRN: [id number]  Unit/Bed#:  Encounter: [id number]  Primary Care Provider: Johnson Hutson MD   Date and time admitted to hospital: 4/4/2021  1:56 PM    * CIERA (acute kidney injury) ([facility] [address] )  Assessment & Plan  · Baseline creatinine 0 6-0 7  · Element of postobstructive uropathy likely  Upon Butler insertion, 1600 mL urine retrieved  · Improving with hydration  · Renal ultrasound reveals right hydronephrosis and some retroperitoneal fluid on the left concerning for rupture of the renal collection system  Nephrology made aware overnight  No acute interventions required  · Hold all nephrotoxic medications, diuretics  · Avoid hypotension  · Strict I&Os  · Appreciate nephrology recommendations/consultation  · Urine output brisk overnight with volume repletion    Metabolic acidosis  Assessment & Plan  · Suspect 2/2 to CIERA at as noted above  Etiology unclear at this time  · Trial IV fluid hydration  · Q4hr bmp, mg,phos, ical  · Sodium bicarbonate drip    Hyperkalemia  Assessment & Plan  · Received temporizing measures; no significant EKG change  · Continuous cardiac monitoring  · Improved with hydration  · Continue to monitor    UTI (urinary tract infection)  Assessment & Plan  · UA nitrate + with moderate bacteria  · Ceftriaxone 1 g Q 24 hour  · Follow-up culture data and sensitivities    Hyperparathyroidism [facility])  Assessment & Plan  · S/p right parathyroidectomy 3/23/21 with Dr Paresh Peñaloza  · Severe hypocalcemia in the setting of requiring aggressive fluid hydration  Repletion of calcium as needed    Coronary artery disease involving native coronary artery of native heart  Assessment & Plan  · S/p PCI w/ JOSE ROBERTO to LAD x3 (2018)  · Continue ASA/Brilinta  · Hold beta-blocker and ACE-inhibitor  · High-dose statin therapy atorvastatin 80 mg daily      History of aortic regurgitation  Assessment & Plan  · Close monitoring of fluid status  · Hold hydrochlorothiazide  HTN (hypertension)  Assessment & Plan  · Hold home antihypertensives  · Goal SBP< 140    Hyperlipidemia  Assessment & Plan  · Continue home statin; atorvastatin 80mg daily     Tobacco abuse  Assessment & Plan  · Support recent cessation, encourage continued cessation  · Nicoderm patch at patient rquest    Type 2 diabetes mellitus [facility])  Assessment & Plan  Lab Results   Component Value Date    HGBA1C 6 0 (H) 12/28/2020       Recent Labs     04/04/21 2025 04/04/21  2357   POCGLU 84 144*       Blood Sugar Average: Last 72 hrs:  (P) 114   · Hold oral glycemic regimen  · Insulin per protocol as needed    Hematuria  Assessment & Plan  Given results of renal ultrasound, likely related to rupture of the collecting system secondary to postobstructive uropathy  May benefit for urology consultation for possible prostatic hypertrophy    Dehydration with hyponatremia  Assessment & Plan  Continue hydration with frequent electrolyte checks  Rate of sodium correction should not exceed 0 5-1 milliequivalent/liter per hour  Continue to monitor urinary output       ----------------------------------------------------------------------------------------  HPI/24hr events:  Admitted yesterday with CIERA related to hypovolemia and possible obstructive uropathy  Found to have 1600 mL of urine in his bladder in the setting of profound CIERA, with possible rupture of the renal collection system per ultrasound  Fluid resuscitated overnight, with correction of his sodium at a rate of 0 5-1 meq/L/hr  Patient feeling better this morning  Metabolic derangements improving      Disposition: Continue Critical Care   Code Status: Level 2 - DNAR: but accepts endotracheal intubation  ---------------------------------------------------------------------------------------  SUBJECTIVE  States feeling better this morning    Review of Systems  Review of systems was reviewed and negative unless stated above in HPI/24-hour events   ---------------------------------------------------------------------------------------  OBJECTIVE    Vitals   Vitals:    21 2300 21 0000 21 0100 21 0200   BP: 123/58 126/59 117/59 135/63   BP Location: Left arm Left arm Left arm Left arm   Pulse: 65 66 60 57   Resp: 20 20 14 17   Temp: 98 °F (36 7 °C)      TempSrc: Oral      SpO2: 96% 95% 95% 97%   Weight:       Height:         Temp (24hrs), Av 1 °F (36 7 °C), Min:97 6 °F (36 4 °C), Max:98 6 °F (37 °C)  Current: Temperature: 98 °F (36 7 °C)          Respiratory:  SpO2: SpO2: 97 %       Invasive/non-invasive ventilation settings   Respiratory    Lab Data (Last 4 hours)    None         O2/Vent Data (Last 4 hours)    None                Physical Exam  GEN:  Well nourished, well developed, appears stated age, no acute distress  HEENT:  Sclera anicteric, mucous membranes pink and moist, conjunctiva pink, no renny/rhinorrhea  Neck:  No lymphadenopathy, normal ROM, supple, no bruits  CV :  S1S2, regular no murmurs, rubs or gallops  Intact distal pulses  No JVD  Resp:  Lungs CTA =B/L  No subq air or crepitus  Symmetrical expansion  No cough noted  GI :  Abd soft, nontender, no guarding/rebound, nondistended, hypoactive bowel sounds X4 quads, no organomegaly appreciated  :  No CVA tenderness or ecchymosis  Hematuria noted via Butler catheter    Neuro:  CN II-XII grossly intact, nonfocal exam, speech clear, GCS 15  Psych:  Appropriate affect    Laboratory and Diagnostics:  Results from last 7 days   Lab Units 21  0436 21  1822 21  1441   WBC Thousand/uL 13 94*  --  19 64*   HEMOGLOBIN g/dL 9 0*  --  11 5*   HEMATOCRIT % 25 7*  --  33 2*   PLATELETS Thousands/uL 406* 459* 486*   NEUTROS PCT % 90*  --  91*   MONOS PCT % 6  --  4     Results from last 7 days   Lab Units 21  0436 21  2354 21  2158 21  1441   SODIUM mmol/L 133* 128* 125* 117*   POTASSIUM mmol/L 3 7 4 2 4 8 6 5* CHLORIDE mmol/L 99* 95* 91* 79*   CO2 mmol/L 20* 14* 13* 13*   ANION GAP mmol/L 14* 19* 21* 25*   BUN mg/dL 129* 157* 178* 196*   CREATININE mg/dL 5 33* 8 15* 10 26* 13 24*   CALCIUM mg/dL 8 4 7 1* 7 3* 8 1*   GLUCOSE RANDOM mg/dL 163* 122 91 98   ALT U/L 24  --   --  32   AST U/L 27  --   --  38   ALK PHOS U/L 80  --   --  92   ALBUMIN g/dL 1 7*  --   --  2 3*   TOTAL BILIRUBIN mg/dL 0 31  --   --  0 38     Results from last 7 days   Lab Units 04/05/21  0436 04/04/21  2354 04/04/21  2158 04/04/21  1441   MAGNESIUM mg/dL 2 3 2 1 2 3 2 7*   PHOSPHORUS mg/dL 5 6* 7 3* 8 9* 11 2*      Results from last 7 days   Lab Units 04/04/21  1441   INR  1 23*   PTT seconds 35      Results from last 7 days   Lab Units 04/04/21  1441   TROPONIN I ng/mL <0 02     Results from last 7 days   Lab Units 04/04/21  2354 04/04/21  1822 04/04/21  1441   LACTIC ACID mmol/L 2 0 3 3* 2 8*     ABG:    VBG:  Results from last 7 days   Lab Units 04/05/21  0436   PH JACQUELINE  7 397   PCO2 JACQUELINE mm Hg 27 6*   PO2 JACQUELINE mm Hg 74 5*   HCO3 JACQUELINE mmol/L 16 6*   BASE EXC JACQUELINE mmol/L -7 1           Micro  Results from last 7 days   Lab Units 04/04/21  1822 04/04/21  1441   BLOOD CULTURE  Received in Microbiology Lab  Culture in Progress  Received in Microbiology Lab  Culture in Progress  EKG:  Sinus rhythm  Imaging: I have personally reviewed pertinent reports  and I have personally reviewed pertinent films in PACS    Intake and Output  I/O       04/03 0701 - 04/04 0700 04/04 0701 - 04/05 0700    P  O   150    I V  (mL/kg)  1422 5 (14 8)    IV Piggyback  2500    Total Intake(mL/kg)  4072 5 (42 3)    Urine (mL/kg/hr)  5800    Total Output  5800    Net  -1727 5                Height and Weights   Height: 5' 7" (170 2 cm)  IBW: 66 1 kg  Body mass index is 33 25 kg/m²    Weight (last 2 days)     Date/Time   Weight    04/04/21 1408   96 3 (212 3)                Nutrition       Diet Orders   (From admission, onward)             Start     Ordered    04/04/21 7237 Diet NPO; Sips of clear liquids  Diet effective now     Question Answer Comment   Diet Type NPO    NPO Except: Sips of clear liquids    RD to adjust diet per protocol?  No        04/04/21 7897                  Active Medications  Scheduled Meds:  Current Facility-Administered Medications   Medication Dose Route Frequency Provider Last Rate    aspirin  81 mg Oral Daily Marianela Casey PA-C      atorvastatin  80 mg Oral Daily Marianela Casey PA-C      calcium gluconate  2 g Intravenous Once Virgia Moran, CRNP 2 g (04/05/21 0459)    cefTRIAXone  1,000 mg Intravenous Q24H Marianela Casey PA-C      chlorhexidine  15 mL Swish & Spit Q12H [address] Marianela Casey [place]      clopidogrel  75 mg Oral Daily Belkis Grijalva      heparin (porcine)  5,000 Units Subcutaneous [facility] Marianela Casey [place]      insulin lispro  1-6 Units Subcutaneous Q6H [address] Otsummer Casey PA-C      potassium chloride  20 mEq Intravenous Once Virgia Moran, CRNP 20 mEq (04/05/21 0536)    sodium chloride  125 mL/hr Intravenous Continuous Virgia Moran, CRNP 125 mL/hr (04/05/21 0528)     Continuous Infusions:  sodium chloride, 125 mL/hr, Last Rate: 125 mL/hr (04/05/21 0528)      PRN Meds:        Invasive Devices Review  Invasive Devices     Peripheral Intravenous Line            Peripheral IV 04/04/21 Left;Ventral (anterior) Foot less than 1 day    Peripheral IV 04/04/21 Right Antecubital less than 1 day    Peripheral IV 04/04/21 Right;Ventral (anterior) Foot less than 1 day          Drain            Urethral Catheter Non-latex less than 1 day                Rationale for remaining devices:  CIERA  ---------------------------------------------------------------------------------------  Advance Directive and Living Will:      Power of :    POLST:    ---------------------------------------------------------------------------------------  Care Time Delivered:   Upon my evaluation, this patient had a high probability of imminent or life-threatening deterioration due to CIERA and metabolic derangements, which required my direct attention, intervention, and personal management  I have personally provided 32 minutes of critical care time, exclusive of procedures, teaching, family meetings, and any prior time recorded by providers other than myself  SIGIFREDO Brown      Portions of the record may have been created with voice recognition software  Occasional wrong word or "sound a like" substitutions may have occurred due to the inherent limitations of voice recognition software    Read the chart carefully and recognize, using context, where substitutions have occurred

## 2021-04-05 NOTE — APP STUDENT NOTE
Daily Progress Note - Critical Care   Adeola Donnelly 65 y o  male MRN: [id number]  Unit/Bed#:  Encounter: [id number]        ----------------------------------------------------------------------------------------  HPI/24hr events: 66 yo man with PMH HOCM, HTN, AI, HLD, CAD, DM2 and tobacco abuse who is s/p recent parathyroidectomy on 3/23 for hypercalcemia, after which he developed persistent diarrhea  He presented to the hospital with mild confusion and malaise, and was found to be in acute renal failure with hyperkalemia and hyponatremia  On arrival creatinine was 13 24, potassium 6 5 and sodium 117  Ultrasound of the kidney and bladder revealed moderate left hydronephrosis with possible rupture of the collecting system and moderate right hydronephrosis with echogenic material within the renal pelvis concerning for hemorrhage  Patient's creatinine has been trending down, to most recently 3 8 and he is making adequate urine  [place] [facility] hematuria is noted in saeed catheter  ---------------------------------------------------------------------------------------  SUBJECTIVE  Patient reports he feels much improved since arrival  He is now alert and oriented, although has delayed responses  Denies abdominal pain, nausea, and vomiting      Review of Systems  Review of systems was reviewed and negative unless stated above in HPI/24-hour events   ---------------------------------------------------------------------------------------  Assessment and Plan:    Neuro:    Diagnosis: Mild confusion on arrival secondary to CIERA  o Plan:   - Improved now, patient is alert and oriented with delayed responses    CV:    Diagnosis: CAD s/p PCI with JOSE ROBERTO to LAD x3 in 2018  o Plan:  - Hold aspirin and Plavix in the setting of hematuria  - Hold beta blockers and ACE-inhibitor in the setting of CIERA  - Continue Atorvastatin 80 mg daily   Diagnosis: History of aortic regurgitation  o Plan:   - Hold HCTZ in the setting of CIERA  - Monitor I&O  · Diagnosis: Hypertension  · Plan:  · Hold home antihypertensives due to CIERA  · Goal SBP <140  · BP stable  · Diagnosis: Hyperlipidemia  · Plan:  · Continue Atorvastatin      Pulm:   Diagnosis: Tobacco abuse history, recently quit smoking  o Plan:   - Encourage continued cessation  - Supportive care      GI:    Diagnosis: History of PUD  o Plan:  - Not on any medications for this at home  - Monitor      :    Diagnosis: Acute Kidney Injury, likely post-obstructive etiology as patient had 1500 ml of urine on placement of Saeed catheter  Baseline creatinine 0 6-0 7, creatinine on arrival 13 24  Ultrasound of the kidney and bladder revealed moderate left hydronephrosis with possible rupture of the collecting system and moderate right hydronephrosis with echogenic material within the renal pelvis concerning for hemorrhage  o Plan:   - Creatinine is improving with IV hydration and saeed catheter, most recently 3 8  - Continue saeed catheter  - Continue NSS infusion  - Avoid nephrotoxins and hypotension  - Monitor I&O  - Nephrology consulted, appreciate input  - Consult urology   Diagnosis: Hematuria POA, likely due to rupture of collecting system from severe hydronephrosis  o Plan:   - Continue saeed catheter and monitor for clots/patency  - Monitor I&O  - Consult urology  · Diagnosis: UTI POA, as evidenced by UA positive for blood, nitrites and leukocytosis  Likely secondary to post-obstructive CIERA  · Plan:  · Continue ceftriaxone 1 g Q 24h  · Follow up on blood culture results      F/E/N:   · Diagnosis: Dehydration with hyponatremia, of unclear etiology  Sodium 117 on admission  Patient does take HCTZ at home which could be contributing  o Plan:  - Sodium 136 this morning  - Hold HCTZ  - Monitor Q4 BMP  - Continue NSS at 125 ml/hr  - Nephrology following      Heme/Onc:    Diagnosis: Leukocytosis POA, initially 19 64 with neutrophils of 91  Likely secondary to UTI    o Plan:   - Continue ceftriaxone for UTI as above  - Monitor daily on CBC with diff   Diagnosis: Anemia POA with initial hemoglobin 11 5 of unclear etiology  Hematuria with possible hemorrhage in renal pelvis on imaging  o Plan:   - Monitor hematuria  - Monitor counts daily on CBC      Endo:    Diagnosis: Hyperparathyroidism, s/p parathyroidectomy 3/23 with Dr Ulisses Cruz  o Plan:   - Monitor and replete calcium as needed   Diagnosis: Type 2 Diabetes Mellitus  o Plan:   - Hold metformin  - Q6h Accuhcheck with SSI  · Diagnosis: Metabolic acidosis, likely secondary to CIERA  · Plan:   · S/p bicarb gtt overnight  · AG and CO2 are improving with improvement in CIERA  · Monitor BMP Q4h  · Diagnosis: Hyperkalemia POA, likely due to CIERA  Potassium was 6 5 on arrival  In the ED patient received inulin, D50 and calcium gluconate  · Plan:  · Monitor Q4h on BMP  · Has resolved with creatinine improvement  · Nephrology following  · Diagnosis: Hypocalcemia, likely secondary to recent parathyroidectomy and need for aggressive fluid resucitation  · Plan:   · Patient received 5 grams of calcium gluconate overnight  · Repeat ical 1 08 this morning, additional 2 grams calcium gluc given  · Monitor ical level in the morning    ID:    Diagnosis: UTI POA, as evidenced by UA positive for blood, nitrites and leukocytosis  Likely secondary to post-obstructive CIERA  o Plan: As above      MSK/Skin:    Diagnosis: No acute issues  o Plan:   - PT/OT  - Frequent turns and repositioning       Disposition: Transfer to Med-Surg   Code Status: Level 2 - DNAR: but accepts endotracheal intubation  ---------------------------------------------------------------------------------------  ICU CORE MEASURES    Prophylaxis   VTE Pharmacologic Prophylaxis: Pharmacologic VTE Prophylaxis contraindicated due to hematuria and possible renal collecting duct rupture    VTE Mechanical Prophylaxis: sequential compression device  Stress Ulcer Prophylaxis: Prophylaxis Not Indicated ABCDE Protocol (if indicated)  Plan to perform spontaneous awakening trial today? Not applicable  Plan to perform spontaneous breathing trial today? Not applicable  Obvious barriers to extubation? Not applicable  CAM-ICU: Negative    Invasive Devices Review  Invasive Devices     Peripheral Intravenous Line            Peripheral IV 21 Left;Ventral (anterior) Foot less than 1 day    Peripheral IV 21 Right Antecubital less than 1 day    Peripheral IV 21 Right;Ventral (anterior) Foot less than 1 day          Drain            Urethral Catheter Non-latex less than 1 day              Can any invasive devices be discontinued today? Not applicable  ---------------------------------------------------------------------------------------  OBJECTIVE    Vitals   Vitals:    21 0700 21 0800 21 0900 21 1000   BP: 139/63 132/62 138/68 131/69   BP Location:       Pulse: 67 69 88 87   Resp: 21 (!) 29 (!) 33 (!) 23   Temp:       TempSrc:       SpO2: 98% 96% 95% 96%   Weight:       Height:         Temp (24hrs), Av 2 °F (36 8 °C), Min:97 6 °F (36 4 °C), Max:98 7 °F (37 1 °C)  Current: Temperature: 98 7 °F (37 1 °C)  HR: 96  BP: 145/67 (96)  RR: 23  SpO2: 96% on RA    Respiratory:  SpO2: SpO2: 96 %       Invasive/non-invasive ventilation settings   Respiratory    Lab Data (Last 4 hours)    None         O2/Vent Data (Last 4 hours)    None                Physical Exam   Constitutional: Chronically ill appearing elderly man  He is pleasant and cooperative  HEENT: AT/NC, mucous membranes pink, moist, intact  Neck: Full ROM  No JVD  Trachea midline  CV: S1, S2 with murmur  No S3, S4  No rubs or gallops  Resp: Clear, diminished lung sounds  Symmetrical excursion  GI: Normoactive bowel sounds + x4  Nontender abdomen  : Negative for CVA tenderness  Butler catheter in place and draining tammi bloody urine  Neuro: Patient is alert and oriented with delayed responses   Non-focal exam     Laboratory and Diagnostics:  Results from last 7 days   Lab Units 04/05/21  0436 04/04/21  1822 04/04/21  1441   WBC Thousand/uL 13 94*  --  19 64*   HEMOGLOBIN g/dL 9 0*  --  11 5*   HEMATOCRIT % 25 7*  --  33 2*   PLATELETS Thousands/uL 406* 459* 486*   NEUTROS PCT % 90*  --  91*   MONOS PCT % 6  --  4     Results from last 7 days   Lab Units 04/05/21  0858 04/05/21  0436 04/04/21 2354 04/04/21 2158 04/04/21  1441   SODIUM mmol/L 136 133* 128* 125* 117*   POTASSIUM mmol/L 3 8 3 7 4 2 4 8 6 5*   CHLORIDE mmol/L 101 99* 95* 91* 79*   CO2 mmol/L 20* 20* 14* 13* 13*   ANION GAP mmol/L 15* 14* 19* 21* 25*   BUN mg/dL 115* 129* 157* 178* 196*   CREATININE mg/dL 3 80* 5 33* 8 15* 10 26* 13 24*   CALCIUM mg/dL 8 7 8 4 7 1* 7 3* 8 1*   GLUCOSE RANDOM mg/dL 144* 163* 122 91 98   ALT U/L  --  24  --   --  32   AST U/L  --  27  --   --  38   ALK PHOS U/L  --  80  --   --  92   ALBUMIN g/dL  --  1 7*  --   --  2 3*   TOTAL BILIRUBIN mg/dL  --  0 31  --   --  0 38     Results from last 7 days   Lab Units 04/05/21  0858 04/05/21 0436 04/04/21 2354 04/04/21 2158 04/04/21  1441   MAGNESIUM mg/dL 2 2 2 3 2 1 2 3 2 7*   PHOSPHORUS mg/dL 4 4* 5 6* 7 3* 8 9* 11 2*      Results from last 7 days   Lab Units 04/04/21  1441   INR  1 23*   PTT seconds 35      Results from last 7 days   Lab Units 04/04/21  1441   TROPONIN I ng/mL <0 02     Results from last 7 days   Lab Units 04/04/21 2354 04/04/21  1822 04/04/21  1441   LACTIC ACID mmol/L 2 0 3 3* 2 8*     ABG:    VBG:  Results from last 7 days   Lab Units 04/05/21  0436   PH JACQUELINE  7 397   PCO2 JACQUELINE mm Hg 27 6*   PO2 JACQUELINE mm Hg 74 5*   HCO3 JACQUELINE mmol/L 16 6*   BASE EXC JACQUELINE mmol/L -7 1           Micro  Results from last 7 days   Lab Units 04/04/21  1822 04/04/21  1441   BLOOD CULTURE  Received in Microbiology Lab  Culture in Progress  Received in Microbiology Lab  Culture in Progress  EKG: Sinus bradycardia   Left ventricular hypertrophy with repolarization abnormality  Imaging: CXR, CT head, US kidney and bladder, CT abdomen pelvis I have personally reviewed pertinent reports  Intake and Output  I/O       04/03 0701 - 04/04 0700 04/04 0701 - 04/05 0700 04/05 0701 - 04/06 0700    P  O   150     I V  (mL/kg)  3022 5 (32 1)     IV Piggyback  2500     Total Intake(mL/kg)  5672 5 (60 3)     Urine (mL/kg/hr)  6200     Total Output  6200     Net  -527 5                UOP: Approximately 150 ml/hr     Height and Weights   Height: 5' 7" (170 2 cm)  IBW: 66 1 kg  Body mass index is 32 49 kg/m²  Weight (last 2 days)     Date/Time   Weight    04/05/21 0555   94 1 (207 45)    04/04/21 1408   96 3 (212 3)                Nutrition       Diet Orders   (From admission, onward)             Start     Ordered    04/05/21 0949  Diet NPO; Sips with meds  Diet effective now     Question Answer Comment   Diet Type NPO    NPO Except: Sips with meds    RD to adjust diet per protocol?  No        04/05/21 0948                  Active Medications  Scheduled Meds:  Current Facility-Administered Medications   Medication Dose Route Frequency Provider Last Rate    atorvastatin  80 mg Oral Daily [facility] TAYLER Bynum      cefTRIAXone  1,000 mg Intravenous Q24H [facility] TAYLER Bynum      chlorhexidine  15 mL Swish & Spit Q12H [facility] [place], [place]      insulin lispro  1-6 Units Subcutaneous Q6H [place], [place]      sodium chloride  125 mL/hr Intravenous Continuous SIGIFREDO Alegria 125 mL/hr (04/05/21 2090)     Continuous Infusions:  sodium chloride, 125 mL/hr, Last Rate: 125 mL/hr (04/05/21 0528)      PRN Meds:        Allergies   No Known Allergies  ---------------------------------------------------------------------------------------  Advance Directive and Living Will:      Power of :    POLST:    ---------------------------------------------------------------------------------------  Care Time Delivered:     Stone Beard RN      Portions of the record may have been created with voice recognition software  Occasional wrong word or "sound a like" substitutions may have occurred due to the inherent limitations of voice recognition software    Read the chart carefully and recognize, using context, where substitutions have occurred

## 2021-04-05 NOTE — PLAN OF CARE
Problem: Potential for Falls  Goal: Patient will remain free of falls  Description: INTERVENTIONS:  - Assess patient frequently for physical needs  -  Identify cognitive and physical deficits and behaviors that affect risk of falls    -  [place] fall precautions as indicated by assessment   - Educate patient/family on patient safety including physical limitations  - Instruct patient to call for assistance with activity based on assessment  - Modify environment to reduce risk of injury  - Consider OT/PT consult to assist with strengthening/mobility  Outcome: Progressing

## 2021-04-05 NOTE — ASSESSMENT & PLAN NOTE
Given results of renal ultrasound, likely related to rupture of the collecting system secondary to postobstructive uropathy    May benefit for urology consultation for possible prostatic hypertrophy

## 2021-04-05 NOTE — ASSESSMENT & PLAN NOTE
· Suspect 2/2 to CIERA at as noted above   Etiology unclear at this time  · Trial IV fluid hydration  · Q4hr bmp, mg,phos, ical  · Sodium bicarbonate drip

## 2021-04-05 NOTE — ASSESSMENT & PLAN NOTE
· S/p right parathyroidectomy 3/23/21 with Dr Helio Velasco  · Severe hypocalcemia in the setting of requiring aggressive fluid hydration    Repletion of calcium as needed

## 2021-04-05 NOTE — OP NOTE
UROLOGY CONSULTATION NOTE     Patient Identifiers: Miguel Ríos (MRN [id number])  Service Requesting Consultation:  Hospital medicine  Service Providing Consultation:  Urology, Ling Summers MD    Date of Service: 4/5/2021  Inpatient consult to Urology  Consult performed by: Ling Summers MD  Consult ordered by: SIGIFREDO Traore          Reason for Consultation:  Acute renal failure and bilateral hydro ureteral nephrosis    History of Present Illness:     Miguel Ríos is a 65 y o  old with a history of parathyroidectomy March 23, 2021, coronary artery disease, hypertension, hyperlipidemia, diabetes type 2 who presented to the emergency room with generalized weakness for approximately 2 weeks and decreased urine output  On admission he was noted to have a lactic acidosis with a serum lactate of 2 8, hyperkalemia potassium of 6 5, and serum creatinine of 13 2  He underwent a renal bladder ultrasound that showed moderate bilateral hydroureteronephrosis and possible distal right ureteral stone  He underwent a CT scan after placement of Butler catheter that showed resolution of his hydronephrosis and no evidence of a ureteral stone  He admits to having dark colored urine for approximately 2 weeks   He also noted urinary frequency and urgency and nocturia 2-3 times per night  He denies any prior urological history  He has never seen a urologist for any reason  He denies a personal history of prostate cancer, kidney stones or UTI  He denies any family history of  malignancies or stone      Past Medical, Past Surgical History:     Past Medical History:   Diagnosis Date    Benign neoplasm of large intestine     Bleeding gastric ulcer 2018    Colon polyp     Diabetes mellitus (HCC)     Heart murmur     History of aortic regurgitation     History of constipation     History of degenerative joint disease     History of nocturia     History of obesity     History of sebaceous cyst     History of shortness of breath     History of transfusion     History of urinary frequency     Hyperlipidemia     Hypertension     Myocardial infarction [facility]) 2018 january, 3 stents    Polyposis coli     of the large intestine    Ulcer of esophagus    :    Past Surgical History:   Procedure Laterality Date    ACHILLES TENDON SURGERY Left 1973    CATARACT EXTRACTION Right 02/18/2021    COLONOSCOPY      hyperplastic polyp    CORONARY ANGIOPLASTY WITH STENT PLACEMENT      3 stents: 2 placed on Jan 2018, 1 on July 2018    [address] [place] [address] CYST REMOVAL  2019, 2020    left and right wrists    ESOPHAGOGASTRODUODENOSCOPY N/A 1/23/2018    Procedure: ESOPHAGOGASTRODUODENOSCOPY (EGD); Surgeon: Nir Jones MD;  [facility]; Service: Gastroenterology    ESOPHAGOGASTRODUODENOSCOPY      HERNIA REPAIR      HERNIA REPAIR Bilateral 8/18/2017    Procedure: LAPAROSCOPIC INGUINAL HERNIA REPAIR WITH MESH;  Surgeon: Stuart Saha MD;  [facility];  Service: General    AK ESOPHAGOGASTRODUODENOSCOPY TRANSORAL DIAGNOSTIC N/A 3/26/2018    Procedure: ESOPHAGOGASTRODUODENOSCOPY (EGD); Surgeon: Peter Figueredo MD;  [facility]; Service: Gastroenterology    [place] ESOPHAGOGASTRODUODENOSCOPY TRANSORAL DIAGNOSTIC N/A 5/14/2018    Procedure: ESOPHAGOGASTRODUODENOSCOPY (EGD); Surgeon: Peter Figueredo MD;  [facility];   Service: Gastroenterology    [place] EXPLORE PARATHYROID GLANDS Right 3/23/2021    Procedure: RIGHT PARATHYROIDECTOMY, MINIMALLY INVASIVE, POSSIBLE 4-GLAND EXPLORATION, WITH INTRA-OPERATIVE PTH MONITORING;  Surgeon: Yvan Barry MD;  Location:  MAIN OR;  Service: Surgical Oncology    TIBIA FRACTURE SURGERY Left 1962   :    Medications, Allergies:     Current Facility-Administered Medications   Medication Dose Route Frequency    atorvastatin (LIPITOR) tablet 80 mg  80 mg Oral Daily    ceftriaxone (ROCEPHIN) 1 g/50 mL in dextrose IVPB  1,000 mg Intravenous Q24H    chlorhexidine (PERIDEX) 0 12 % oral rinse 15 mL  15 mL Swish & Spit Q12H [facility]    insulin lispro (HumaLOG) 100 units/mL subcutaneous injection 1-6 Units  1-6 Units Subcutaneous Q6H [facility]    sodium chloride infusion 0 45 %  75 mL/hr Intravenous Continuous       Allergies:  No Known Allergies:    Social and Family History:   Social History:   Social History     Tobacco Use    Smoking status: Former Smoker     Packs/day: 1 00     Years: 35 00     Pack years: 35 00     Types: Cigarettes     Quit date: 2012     Years since quittin 0    Smokeless tobacco: Never Used    Tobacco comment: pt states quit many years ago   Substance Use Topics    Alcohol use: Yes     Frequency: Monthly or less     Drinks per session: 1 or 2     Binge frequency: Never     Comment: rare    Drug use: No        Social History     Tobacco Use   Smoking Status Former Smoker    Packs/day: 1 00    Years: 35 00    Pack years: 35 00    Types: Cigarettes    Quit date: 2012    Years since quittin 0   Smokeless Tobacco Never Used   Tobacco Comment    pt states quit many years ago       Family History:  Family History   Problem Relation Age of Onset    Rheumatic fever Father     Other Father         accidental poisoning   [place] Heart disease Mother    :     Review of Systems:     General: Fever, chills, or night sweats: negative  Cardiac: Negative for chest pain  Pulmonary: Negative for shortness of breath  Gastrointestinal: Abdominal pain negative  Nausea, vomiting, or diarrhea negative,  Genitourinary: See HPI above  Patient does have hematuria  All other systems were queried and were negative except as listed above in HPI and here in the ROS      Physical Exam:   /71 (BP Location: Left arm)   Pulse (!) 108   Temp 98 5 °F (36 9 °C) (Axillary)   Resp 18   Ht 5' 7" (1 702 m)   Wt 94 1 kg (207 lb 7 3 oz)   SpO2 96%   BMI 32 49 kg/m² Temp (24hrs), Av 5 °F (36 9 °C), Min:98 °F (36 7 °C), Max:98 7 °F (37 1 °C)  current; Temperature: 98 5 °F (36 9 °C)  I/O last 24 hours: In: 4921 [P O :150; I V :4385; IV Piggyback:2700]  Out: 4920 [Urine:9150]  General: Patient is pleasant and in NAD  Awake and alert    Cardiac: Peripheral edema: negative    Pulmonary: Non-labored breathing, speaking in full sentences, no wheezing, no coughing    Abdomen: Soft, non-tender, non-distended  No surgical scars  No masses, tenderness, hernias noted  Genitourinary: Negative CVA tenderness, negative suprapubic tenderness  (Male): Penis circumcised, phallus normal, meatus patent  Testicles descended into scrotum bilaterally without masses nor tenderness  No inguinal hernias bilaterally  Neurological: Cranial nerves II-XII are intact, no sensory deficits, no neurological deficits    Musculoskeletal: Extremities are within normal limits, ROM is within normal limits    Lymphatic: There is not adenopathy in the inguinal region  Skin:  Warm dry    STEPHEN: draining pink clear urine  no clots He is not on CBI  Labs:     Lab Results   Component Value Date    HGB 9 0 (L) 04/05/2021    HCT 25 7 (L) 04/05/2021    WBC 13 94 (H) 04/05/2021     (H) 04/05/2021   ]    Lab Results   Component Value Date     12/14/2015    K 3 7 04/05/2021     04/05/2021    CO2 19 (L) 04/05/2021     (H) 04/05/2021    CREATININE 2 82 (H) 04/05/2021    CALCIUM 8 7 04/05/2021    GLUCOSE 98 12/14/2015   ]    Imaging:   I personally reviewed the images and report of the following studies, and reviewed them with the patient:    CT abdomen pelvis wo contrast  Narrative: CT ABDOMEN AND PELVIS WITHOUT IV CONTRAST    INDICATION:   ARF, hydronephrosis  COMPARISON:  Ultrasound from 4/4/2021    TECHNIQUE:  CT examination of the abdomen and pelvis was performed without intravenous contrast   Axial, sagittal, and coronal 2D reformatted images were created from the source data and submitted for interpretation       Radiation dose length product (DLP) for this visit:  637 mGy-cm   This examination, like all CT scans performed in the [facility], was performed utilizing techniques to minimize radiation dose exposure, including the use of iterative   reconstruction and automated exposure control  Enteric contrast was administered  FINDINGS:    ABDOMEN    LOWER CHEST: Small infiltrate versus atelectasis is seen at the left lung base with small effusion  LIVER/BILIARY TREE:  Unremarkable  GALLBLADDER:  No calcified gallstones  No pericholecystic inflammatory change  SPLEEN:  Unremarkable  PANCREAS:  Unremarkable  ADRENAL GLANDS:  Unremarkable  KIDNEYS/URETERS:  High density debris is noted within the right renal pelvis with hydronephrosis correlating to some areas of increased echogenicity on ultrasound  There is proximal ureteral dilatation with more distal ureter normal in caliber  The left kidney also demonstrates moderate hydronephrosis but without high density debris  There is moderate to severe hydronephrosis noted on the left     There is thickening of the renal pelvis with dilatation of the proximal ureter gradual tapering to   normal caliber in the distal left ureter without obvious focal change in caliber  STOMACH AND BOWEL:  Left colon diverticulosis is noted without CT evidence of diverticulitis  APPENDIX:  No findings to suggest appendicitis  ABDOMINOPELVIC CAVITY:  No ascites  No pneumoperitoneum  No lymphadenopathy  There is left greater than right perinephric stranding identified  There appears to be 8 perinephric collection medially adjacent to the left renal hilum which measures approximately 1 6 x 1 7 x 7 7 cm  Abutting this perinephric collection is a 2nd collection which most likely communicates the 1st and is centered in Gerota's fascia extending into the anterior pararenal space which measures approximately 2 7 x 5 8 x 8 1 cm    No gas bubbles are noted   within the this collection  VESSELS:  Atherosclerotic changes are present  Mild aneurysmal dilatation measures 3 1 cm distally in the aorta  PELVIS    REPRODUCTIVE ORGANS:  Unremarkable for patient's age  URINARY BLADDER:  Butler catheter is noted within decompressed bladder  ABDOMINAL WALL/INGUINAL REGIONS:  Unremarkable  OSSEOUS STRUCTURES:  No acute fracture or destructive osseous lesion  Impression: Left greater than right moderate to severe hydronephrosis  Left greater than right perinephric stranding may be related to obstruction or infection  Hemorrhage or complicated pyelonephritis with debris could be the cause of obstruction in the   collecting system although occult stricture of the ureters could also be present  Collection in the left perinephric space and extending into the pararenal space could be related to urinoma or perinephric abscess  This should be correlated clinically  No gas bubbles are seen however  Neoplasm is not excluded    This was discussed with Moshe Keita in the ICU at 11:50 AM    Workstation performed: [id number]  XR chest 1 view portable  Narrative: CHEST     INDICATION:   SOB     COMPARISON:  3/4/2020    EXAM PERFORMED/VIEWS:  XR CHEST PORTABLE    FINDINGS:    Cardiomediastinal silhouette appears unremarkable  The lungs are clear  No pneumothorax or pleural effusion  Osseous structures appear within normal limits for patient age  Impression: No acute cardiopulmonary disease  Workstation performed: [id number]          ASSESSMENT:     65 y o  old male with    1  Acute urinary retention likely due to bladder outlet obstruction from BPH and possibly exacerbated by acute urinary tract infection    2  Acute kidney injury improved after bladder drainage    3  Bilateral hydroureteronephrosis that has resolved after placement of Butler catheter    4   Gross hematuria       PLAN:     - I recommend bladder rest with an indwelling Butler catheter for at least 2 weeks to allow for resolution of detrusor myogenic failure  We will begin to treat his bladder outlet obstruction with tamsulosin daily     - Send urine for urine culture and treat with culture specific antibiotics pending results  - Continue to trend renal function    - After management of his acute infection and bladder outlet obstruction, will need to reassess for gross hematuria  Thank you for allowing me to participate in this patients care  Please do not hesitate to call with any additional questions      Marisol Morel MD

## 2021-04-06 ENCOUNTER — TELEPHONE (OUTPATIENT)
Dept: NEPHROLOGY | Facility: CLINIC | Age: 71
End: 2021-04-06

## 2021-04-06 LAB
ANION GAP SERPL CALCULATED.3IONS-SCNC: 12 MMOL/L (ref 4–13)
BACTERIA UR CULT: NORMAL
BASOPHILS # BLD AUTO: 0.02 THOUSANDS/ΜL (ref 0–0.1)
BASOPHILS NFR BLD AUTO: 0 % (ref 0–1)
BUN SERPL-MCNC: 65 MG/DL (ref 5–25)
CA-I BLD-SCNC: 1.13 MMOL/L (ref 1.12–1.32)
CALCIUM SERPL-MCNC: 8.2 MG/DL (ref 8.3–10.1)
CHLORIDE SERPL-SCNC: 109 MMOL/L (ref 100–108)
CO2 SERPL-SCNC: 23 MMOL/L (ref 21–32)
CREAT SERPL-MCNC: 1.54 MG/DL (ref 0.6–1.3)
EOSINOPHIL # BLD AUTO: 0.02 THOUSAND/ΜL (ref 0–0.61)
EOSINOPHIL NFR BLD AUTO: 0 % (ref 0–6)
ERYTHROCYTE [DISTWIDTH] IN BLOOD BY AUTOMATED COUNT: 15 % (ref 11.6–15.1)
GFR SERPL CREATININE-BSD FRML MDRD: 51 ML/MIN/1.73SQ M
GLUCOSE SERPL-MCNC: 163 MG/DL (ref 65–140)
GLUCOSE SERPL-MCNC: 167 MG/DL (ref 65–140)
GLUCOSE SERPL-MCNC: 176 MG/DL (ref 65–140)
GLUCOSE SERPL-MCNC: 185 MG/DL (ref 65–140)
GLUCOSE SERPL-MCNC: 210 MG/DL (ref 65–140)
GLUCOSE SERPL-MCNC: 216 MG/DL (ref 65–140)
HCT VFR BLD AUTO: 21.8 % (ref 36.5–49.3)
HGB BLD-MCNC: 7.8 G/DL (ref 12–17)
IMM GRANULOCYTES # BLD AUTO: 0.43 THOUSAND/UL (ref 0–0.2)
IMM GRANULOCYTES NFR BLD AUTO: 2 % (ref 0–2)
LYMPHOCYTES # BLD AUTO: 0.72 THOUSANDS/ΜL (ref 0.6–4.47)
LYMPHOCYTES NFR BLD AUTO: 4 % (ref 14–44)
MAGNESIUM SERPL-MCNC: 1.9 MG/DL (ref 1.6–2.6)
MCH RBC QN AUTO: 27.4 PG (ref 26.8–34.3)
MCHC RBC AUTO-ENTMCNC: 35.8 G/DL (ref 31.4–37.4)
MCV RBC AUTO: 77 FL (ref 82–98)
MONOCYTES # BLD AUTO: 2.07 THOUSAND/ΜL (ref 0.17–1.22)
MONOCYTES NFR BLD AUTO: 10 % (ref 4–12)
NEUTROPHILS # BLD AUTO: 17.14 THOUSANDS/ΜL (ref 1.85–7.62)
NEUTS SEG NFR BLD AUTO: 84 % (ref 43–75)
NRBC BLD AUTO-RTO: 0 /100 WBCS
PHOSPHATE SERPL-MCNC: 2.2 MG/DL (ref 2.3–4.1)
PLATELET # BLD AUTO: 406 THOUSANDS/UL (ref 149–390)
PMV BLD AUTO: 8.6 FL (ref 8.9–12.7)
POTASSIUM SERPL-SCNC: 3.3 MMOL/L (ref 3.5–5.3)
RBC # BLD AUTO: 2.85 MILLION/UL (ref 3.88–5.62)
SODIUM SERPL-SCNC: 144 MMOL/L (ref 136–145)
WBC # BLD AUTO: 20.4 THOUSAND/UL (ref 4.31–10.16)

## 2021-04-06 PROCEDURE — 97163 PT EVAL HIGH COMPLEX 45 MIN: CPT

## 2021-04-06 PROCEDURE — 85025 COMPLETE CBC W/AUTO DIFF WBC: CPT | Performed by: NURSE PRACTITIONER

## 2021-04-06 PROCEDURE — 99232 SBSQ HOSP IP/OBS MODERATE 35: CPT | Performed by: INTERNAL MEDICINE

## 2021-04-06 PROCEDURE — 82948 REAGENT STRIP/BLOOD GLUCOSE: CPT

## 2021-04-06 PROCEDURE — 84100 ASSAY OF PHOSPHORUS: CPT | Performed by: PHYSICIAN ASSISTANT

## 2021-04-06 PROCEDURE — 99232 SBSQ HOSP IP/OBS MODERATE 35: CPT | Performed by: STUDENT IN AN ORGANIZED HEALTH CARE EDUCATION/TRAINING PROGRAM

## 2021-04-06 PROCEDURE — 80048 BASIC METABOLIC PNL TOTAL CA: CPT | Performed by: PHYSICIAN ASSISTANT

## 2021-04-06 PROCEDURE — 83735 ASSAY OF MAGNESIUM: CPT | Performed by: PHYSICIAN ASSISTANT

## 2021-04-06 PROCEDURE — 82330 ASSAY OF CALCIUM: CPT | Performed by: NURSE PRACTITIONER

## 2021-04-06 PROCEDURE — 97167 OT EVAL HIGH COMPLEX 60 MIN: CPT

## 2021-04-06 RX ORDER — ATENOLOL 25 MG/1
25 TABLET ORAL DAILY
Status: DISCONTINUED | OUTPATIENT
Start: 2021-04-07 | End: 2021-04-13 | Stop reason: HOSPADM

## 2021-04-06 RX ORDER — METOPROLOL TARTRATE 5 MG/5ML
5 INJECTION INTRAVENOUS EVERY 6 HOURS PRN
Status: DISCONTINUED | OUTPATIENT
Start: 2021-04-06 | End: 2021-04-13 | Stop reason: HOSPADM

## 2021-04-06 RX ORDER — LANOLIN ALCOHOL/MO/W.PET/CERES
3 CREAM (GRAM) TOPICAL
Status: DISCONTINUED | OUTPATIENT
Start: 2021-04-06 | End: 2021-04-13 | Stop reason: HOSPADM

## 2021-04-06 RX ORDER — CLONIDINE HYDROCHLORIDE 0.1 MG/1
0.1 TABLET ORAL EVERY 8 HOURS SCHEDULED
Status: DISCONTINUED | OUTPATIENT
Start: 2021-04-06 | End: 2021-04-13 | Stop reason: HOSPADM

## 2021-04-06 RX ORDER — DEXTROSE AND POTASSIUM CHLORIDE 5; .15 G/100ML; G/100ML
50 SOLUTION INTRAVENOUS CONTINUOUS
Status: DISCONTINUED | OUTPATIENT
Start: 2021-04-06 | End: 2021-04-07

## 2021-04-06 RX ORDER — POTASSIUM CHLORIDE 20 MEQ/1
40 TABLET, EXTENDED RELEASE ORAL ONCE
Status: DISCONTINUED | OUTPATIENT
Start: 2021-04-06 | End: 2021-04-13 | Stop reason: HOSPADM

## 2021-04-06 RX ORDER — DEXTROSE MONOHYDRATE, SODIUM CHLORIDE, SODIUM LACTATE, POTASSIUM CHLORIDE, CALCIUM CHLORIDE 5; 600; 310; 179; 20 G/100ML; MG/100ML; MG/100ML; MG/100ML; MG/100ML
50 INJECTION, SOLUTION INTRAVENOUS CONTINUOUS
Status: DISCONTINUED | OUTPATIENT
Start: 2021-04-06 | End: 2021-04-06

## 2021-04-06 RX ADMIN — CHLORHEXIDINE GLUCONATE 0.12% ORAL RINSE 15 ML: 1.2 LIQUID ORAL at 09:56

## 2021-04-06 RX ADMIN — INSULIN LISPRO 1 UNITS: 100 INJECTION, SOLUTION INTRAVENOUS; SUBCUTANEOUS at 08:00

## 2021-04-06 RX ADMIN — ATORVASTATIN CALCIUM 80 MG: 40 TABLET, FILM COATED ORAL at 09:56

## 2021-04-06 RX ADMIN — SODIUM CHLORIDE 75 ML/HR: 0.45 INJECTION, SOLUTION INTRAVENOUS at 04:53

## 2021-04-06 RX ADMIN — MELATONIN 3 MG: at 21:51

## 2021-04-06 RX ADMIN — CLONIDINE HYDROCHLORIDE 0.1 MG: 0.1 TABLET ORAL at 21:51

## 2021-04-06 RX ADMIN — DEXTROSE AND POTASSIUM CHLORIDE 50 ML/HR: 5; .15 SOLUTION INTRAVENOUS at 11:00

## 2021-04-06 RX ADMIN — CEFTRIAXONE SODIUM 1000 MG: 10 INJECTION, POWDER, FOR SOLUTION INTRAVENOUS at 16:46

## 2021-04-06 RX ADMIN — CHLORHEXIDINE GLUCONATE 0.12% ORAL RINSE 15 ML: 1.2 LIQUID ORAL at 21:51

## 2021-04-06 RX ADMIN — INSULIN LISPRO 1 UNITS: 100 INJECTION, SOLUTION INTRAVENOUS; SUBCUTANEOUS at 21:57

## 2021-04-06 RX ADMIN — INSULIN LISPRO 2 UNITS: 100 INJECTION, SOLUTION INTRAVENOUS; SUBCUTANEOUS at 13:34

## 2021-04-06 RX ADMIN — INSULIN LISPRO 1 UNITS: 100 INJECTION, SOLUTION INTRAVENOUS; SUBCUTANEOUS at 16:46

## 2021-04-06 NOTE — PHYSICAL THERAPY NOTE
Physical Therapy Evaluation     Patient's Name: Robin Olvera    Admitting Diagnosis  Hyperkalemia [E87 5]  Hyponatremia [E87 1]  Weakness [R53 1]  Acute renal failure (ARF) ([facility]) [N17 9]  SIRS (systemic inflammatory response syndrome) ([facility]) [Q10 62]  Acute metabolic encephalopathy [X05 25]    Problem List  Patient Active Problem List   Diagnosis    Non-recurrent bilateral inguinal hernia without obstruction or gangrene    Hyperlipidemia    HTN (hypertension)    Type 2 diabetes mellitus ([facility] )    Tobacco abuse    Iron deficiency anemia due to chronic blood loss    HOCM (hypertrophic obstructive cardiomyopathy) ([facility] )    Coronary artery disease involving native coronary artery of native heart    Atherosclerosis of aorta ([facility] )    Atherosclerosis of native coronary artery of native heart without angina pectoris    Anemia associated with diabetes mellitus ([facility] )    Thrombocytosis ([facility])    Hypercalcemia    [facility]    History of aortic regurgitation    History of obesity    [facility]    Hyperkalemia    Metabolic acidosis    UTI (urinary tract infection)    Dehydration with hyponatremia    Hematuria     Past Medical History  Past Medical History:   Diagnosis Date    Benign neoplasm of large intestine     Bleeding gastric ulcer 2018    Colon polyp     Diabetes mellitus (HCC)     Heart murmur     History of aortic regurgitation     History of constipation     History of degenerative joint disease     History of nocturia     History of obesity     History of sebaceous cyst     History of shortness of breath     History of transfusion     History of urinary frequency     Hyperlipidemia     Hypertension     Myocardial infarction [facility]) 2018    january, 3 stents    Polyposis coli     of the large intestine    Ulcer of esophagus      Past Surgical History  Past Surgical History:   Procedure Laterality Date    ACHILLES TENDON SURGERY Left 1973    CATARACT EXTRACTION Right 02/18/2021    COLONOSCOPY      hyperplastic polyp    CORONARY ANGIOPLASTY WITH STENT PLACEMENT      3 stents: 2 placed on Jan 2018, 1 on July 2018    Georgeana Rouleau CYST REMOVAL  2019, 2020    left and right wrists    ESOPHAGOGASTRODUODENOSCOPY N/A 1/23/2018    Procedure: ESOPHAGOGASTRODUODENOSCOPY (EGD); Surgeon: Nahid Elkins MD;  [facility]: [facility]; Service: Gastroenterology    ESOPHAGOGASTRODUODENOSCOPY      HERNIA REPAIR      HERNIA REPAIR Bilateral 8/18/2017    Procedure: LAPAROSCOPIC INGUINAL HERNIA REPAIR WITH MESH;  Surgeon: Caron Dunaway MD;  [facility];  Service: General    [place] ESOPHAGOGASTRODUODENOSCOPY TRANSORAL DIAGNOSTIC N/A 3/26/2018    Procedure: ESOPHAGOGASTRODUODENOSCOPY (EGD); Surgeon: Patrice Obrien MD;  [facility]; Service: Gastroenterology    [place] ESOPHAGOGASTRODUODENOSCOPY TRANSORAL DIAGNOSTIC N/A 5/14/2018    Procedure: ESOPHAGOGASTRODUODENOSCOPY (EGD); Surgeon: Patrice Obrien MD;  [facility];   Service: Gastroenterology    [place] EXPLORE PARATHYROID GLANDS Right 3/23/2021    Procedure: RIGHT PARATHYROIDECTOMY, MINIMALLY INVASIVE, POSSIBLE 4-GLAND EXPLORATION, WITH INTRA-OPERATIVE PTH MONITORING;  Surgeon: Vanessa Gonzalez MD;  Location:  MAIN OR;  Service: Surgical Oncology    TIBIA FRACTURE SURGERY Left 1962 04/06/21 1030   PT Last Visit   PT Visit Date 04/06/21   Note Type   Note type Evaluation   Pain Assessment   Pain Assessment Tool 0-10   Pain Score No Pain   Home Living   Type of Home House   Home Layout Two level;Bed/bath upstairs;Stairs to enter with rails  (3 INES; FOS to bedroom)   Bathroom Shower/Tub Tub/shower unit   Bathroom Toilet Standard   Bathroom Equipment Other (Comment)  (none per patient)   [address] Walker   Additional Comments Pt ambulates with walker PRN   Prior Function   Level of [place] Independent with ADLs and functional mobility   Lives With Spouse   Receives Help From Family   ADL Assistance Independent   IADLs Independent   Falls in the last 6 months 0   Vocational Retired   Restrictions/Precautions   Wells Critz Bearing Precautions Per Order No   Braces or Orthoses Other (Comment)  (none per patient)   Other Precautions Cognitive; Chair Alarm; Bed Alarm;Telemetry;Multiple lines; Fall Risk   General   Family/Caregiver Present No   Cognition   Overall Cognitive Status Impaired   Arousal/Participation Alert   Orientation Level Oriented to person;Oriented to place; Disoriented to time;Disoriented to situation   Memory Decreased short term memory;Decreased recall of recent events   Following Commands Follows one step commands with increased time or repetition   Comments Pt agreeable to PT    RUE Assessment   RUE Assessment   (defer to OT assessment)   LUE Assessment   LUE Assessment   (defer to OT assessment)   RLE Assessment   RLE Assessment X   Strength RLE   RLE Overall Strength 4-/5   LLE Assessment   LLE Assessment X   Strength LLE   LLE Overall Strength 4-/5   Light Touch   RLE Light Touch Grossly intact  ("numbness in my feet")   LLE Light Touch Grossly intact  ("numbness in my feet")   Bed Mobility   Supine to Sit 4  Minimal assistance   Additional items Assist x 2;HOB elevated; Bedrails; Increased time required;Verbal cues;LE management   Transfers   Sit to Stand 4  Minimal assistance   Additional items Assist x 2; Increased time required;Verbal cues   Stand to Sit 4  Minimal assistance   Additional items Assist x 2;Armrests; Increased time required;Verbal cues   Ambulation/Elevation   Gait pattern Excessively slow; Step to;Short stride; Shuffling   Gait Assistance 3  Moderate assist   Additional items Assist x 1;Verbal cues   Assistive Device Rolling walker   Distance 5 feet   Stair Management Assistance Not tested   Balance   Static Sitting Fair +   Dynamic Sitting Fair   Static Standing Poor +   Dynamic Standing Poor   Ambulatory Poor   Endurance Deficit Endurance Deficit Yes   Activity Tolerance   Activity Tolerance Patient limited by fatigue   Medical Staff Made Aware OT [facility]   Nurse Made Aware Discussed case with RN Alex; post session pt was left seated in recliner in NAD, all belongings within reach, +chair alarm   Assessment   Prognosis Good   Problem List Decreased strength;Decreased endurance; Impaired balance;Decreased mobility; Decreased cognition   Assessment Pt is 65year old male seen for PT evaluation s/p admit to [facility] on 4/4/2021 with CIERA (acute kidney injury) ([facility] )  PT consulted to assess pt's functional mobility and d/c needs  Order placed for PT eval and tx, with up with assist order  Comorbidities affecting pt's physical performance at time of assessment include hyperlipidemia, hypertension, type 2 DM, tobacco abuse, CAD, hyperparathyroidism, history of aortic regurgitation, hyperkalemia, metabolic acidosis, UTI, hematuria, and dehydration with hyponatremia  PTA, pt was independent with all functional mobility with occasional use of a RW  Personal factors affecting pt at time of IE include lives in a two story house, ambulating with an assistive device, stairs to enter home, inability to ambulate household distances, inability to navigate community distances, inability to navigate level surfaces without external assistance, unable to perform dynamic tasks in community, decreased cognition, inability to perform IADLs, and inability to perform ADLs  Please find objective findings from PT assessment regarding body systems outlined above with impairments and limitations including weakness, impaired balance, decreased endurance, gait deviations, decreased activity tolerance, decreased functional mobility tolerance, fall risk, and decreased cognition  The following objective measures performed on IE also reveal limitations: Barthel Index: 35/100 and Modified Denis: 4 (moderate/severe disability)   Pt's clinical presentation is currently unstable/unpredictable seen in pt's presentation of need for ongoing medical management/moitoring, pt is a fall risk, and pt requires cues/assist for safety with functional mobility  Pt to benefit from continued PT tx to address deficits as defined above and maximize level of functional independent mobility and consistency  From PT/mobility standpoint, recommendation at time of d/c would be STR pending progress in order to facilitate return to PLOF  Barriers to Discharge Inaccessible home environment;Decreased caregiver support   Goals   Patient Goals none expressed   STG Expiration Date 04/16/21   Short Term Goal #1 In 7-10 days: Increase bilateral LE strength 1/2 grade to facilitate independent mobility, Perform all bed mobility tasks with close supervision to decrease caregiver burden, Perform all transfers with CG assist to improve independence, Ambulate > 75 ft  with least restrictive assistive device with CG assist w/o LOB and w/ normalized gait pattern 100% of the time, Increase all balance 1/2 grade to decrease risk for falls and PT to see and establish goals for stairs when appropriate   Plan   Treatment/Interventions Functional transfer training;LE strengthening/ROM; Therapeutic exercise; Endurance training;Patient/family training;Bed mobility;Gait training;Spoke to nursing;Cognitive reorientation;OT   PT Frequency Other (Comment)  (3-5x/wk)   Recommendation   PT Discharge Recommendation Post-Acute Rehabilitation Services   PT - OK to Discharge Yes  (when medically cleared, if to STR)   [facility] [address] Mobility Inpatient   Turning in Bed Without Bedrails 2   Lying on Back to Sitting on Edge of Flat Bed 2   Moving Bed to Chair 2   Standing Up From Chair 2   Walk in Room 2   Climb 3-5 Stairs 1   Basic Mobility Inpatient Raw Score 11   Basic Mobility Standardized Score 30 25   Modified [place] Scale   Modified [place] Scale 4   Barthel Index   Feeding 10   Bathing 0   Grooming Score 0   Dressing Score 5   Bladder Score 0   Bowels Score 10   Toilet Use Score 5   Transfers (Bed/Chair) Score 5   Mobility (Level Surface) Score 0   Stairs Score 0   Barthel Index Score 35     [place]ond, PT, DPT

## 2021-04-06 NOTE — PROGRESS NOTES
[address] [facility]  Progress Note - Lord Perez 1950, 65 y o  male MRN: [id number]  Unit/Bed#:  Encounter: [id number]  Primary Care Provider: Mona Navarrete MD   Date and time admitted to hospital: 4/4/2021  1:56 PM    * CIERA (acute kidney injury) [facility])  Assessment & Plan  Due to obstructive uropathy  Improving  Continue indwelling Butler catheter  Nephrology following  Urology consult pending  Hematuria  Assessment & Plan  Butler catheter noted with gross hematuria  Hemoglobin down trending  Hold aspirin, Brilinta  Currently hemoglobin 7 8  Continue monitor CBC and transfuse below 7  Urology following  Dehydration with hyponatremia  Assessment & Plan  Hypernatremia likely secondary to obstructive uropathy  Presented with sodium of 117, currently 144  Started on D5W per Nephrology recommendation  Monitor BMP  UTI (urinary tract infection)  Assessment & Plan  Currently not septic  Afebrile, hemodynamically stable  Continue IV ceftriaxone  Follow-up with pending culture  Metabolic acidosis  Assessment & Plan  Now resolved    Hyperkalemia  Assessment & Plan  Now resolved    Hyperparathyroidism [facility])  Assessment & Plan  Status post parathyroidectomy on 03/23/21  Supplement calcium as needed  Coronary artery disease involving native coronary artery of native heart  Assessment & Plan  Currently asymptomatic  Aspirin and Brilinta on hold due to hematuria  ACE-inhibitor, beta-blocker on hold due to acute kidney injury  Continue statin  Tobacco abuse  Assessment & Plan  Counseled on smoking cessation  Type 2 diabetes mellitus [facility])  Assessment & Plan  Lab Results   Component Value Date    HGBA1C 6 0 (H) 12/28/2020       Recent Labs     04/05/21  2117 04/06/21  0729 04/06/21  1144 04/06/21  1637   POCGLU 233* 163* 210* 185*       Blood Sugar Average: Last 72 hrs:  (P) 178 375     Controlled  Hold oral agent  Sliding scale coverage      HTN (hypertension)  Assessment & Plan  Overall reasonably controlled  Resume current medication  Vital monitoring  Protocol  Hyperlipidemia  Assessment & Plan  Continue statin      VTE Pharmacologic Prophylaxis:   Pharmacologic: Pharmacologic VTE Prophylaxis contraindicated due to Hematuria    Patient Centered Rounds: I have performed bedside rounds with nursing staff today  Education and Discussions with Family / Patient: left a voice message at [facility] phone [phone] at 6:00 pm     Time Spent for Care: 30 minutes  More than 50% of total time spent on counseling and coordination of care as described above  Current Length of Stay: 2 day(s)    Current Patient Status: Inpatient   Certification Statement: The patient will continue to require additional inpatient hospital stay due to Above    Discharge Plan: To be determined    Code Status: Level 2 - DNAR: but accepts endotracheal intubation      Subjective:   Afebrile, hemodynamically stable  Noted with periods of confusion at times  Currently patient is awake, alert, oriented to self, place, month, year, limited insight  Overall improving  Butler catheter noted with hematuria  Patient does not offer any complaints at this time  No other events reported  Objective:     Vitals:   Temp (24hrs), Av 1 °F (36 7 °C), Min:98 °F (36 7 °C), Max:98 3 °F (36 8 °C)    Temp:  [98 °F (36 7 °C)-98 3 °F (36 8 °C)] 98 1 °F (36 7 °C)  HR:  [101-109] 109  Resp:  [16-20] 20  BP: (129-179)/(73-96) 179/83  SpO2:  [93 %-97 %] 97 %  Body mass index is 32 49 kg/m²  Input and Output Summary (last 24 hours): Intake/Output Summary (Last 24 hours) at 2021 1802  Last data filed at 2021 1646  Gross per 24 hour   Intake 1339 58 ml   Output 3725 ml   Net -2385 42 ml       Physical Exam:     Physical Exam  Constitutional:       General: He is not in acute distress  Appearance: Normal appearance  He is not ill-appearing     HENT:      Head: Normocephalic and atraumatic  Nose: No rhinorrhea  Eyes:      Extraocular Movements: Extraocular movements intact  Conjunctiva/sclera: Conjunctivae normal       Pupils: Pupils are equal, round, and reactive to light  Neck:      Musculoskeletal: Normal range of motion and neck supple  No neck rigidity  Cardiovascular:      Rate and Rhythm: Normal rate and regular rhythm  Pulses: Normal pulses  Heart sounds: Normal heart sounds  Pulmonary:      Effort: Pulmonary effort is normal  No respiratory distress  Breath sounds: Normal breath sounds  No stridor  No wheezing or rhonchi  Abdominal:      General: Bowel sounds are normal  There is no distension  Palpations: Abdomen is soft  Tenderness: There is no abdominal tenderness  Genitourinary:     Comments: Butler catheter with gross hematuria  Musculoskeletal: Normal range of motion  Neurological:      General: No focal deficit present  Mental Status: He is alert  Mental status is at baseline  Psychiatric:         Mood and Affect: Mood normal          Behavior: Behavior normal            Additional Data:     Labs:    Results from last 7 days   Lab Units 04/06/21  0453   WBC Thousand/uL 20 40*   HEMOGLOBIN g/dL 7 8*   HEMATOCRIT % 21 8*   PLATELETS Thousands/uL 406*   NEUTROS PCT % 84*   LYMPHS PCT % 4*   MONOS PCT % 10   EOS PCT % 0     Results from last 7 days   Lab Units 04/06/21  0453  04/05/21  0436   SODIUM mmol/L 144   < > 133*   POTASSIUM mmol/L 3 3*   < > 3 7   CHLORIDE mmol/L 109*   < > 99*   CO2 mmol/L 23   < > 20*   BUN mg/dL 65*   < > 129*   CREATININE mg/dL 1 54*   < > 5 33*   ANION GAP mmol/L 12   < > 14*   CALCIUM mg/dL 8 2*   < > 8 4   ALBUMIN g/dL  --   --  1 7*   TOTAL BILIRUBIN mg/dL  --   --  0 31   ALK PHOS U/L  --   --  80   ALT U/L  --   --  24   AST U/L  --   --  27   GLUCOSE RANDOM mg/dL 167*   < > 163*    < > = values in this interval not displayed       Results from last 7 days   Lab Units 04/04/21  1441   INR  1 23*     Results from last 7 days   Lab Units 04/06/21  1637 04/06/21  1144 04/06/21  0729 04/05/21  2117 04/05/21  1559 04/05/21  1204 04/04/21  2357 04/04/21  2025   POC GLUCOSE mg/dl 185* 210* 163* 233* 204* 204* 144* 84         Results from last 7 days   Lab Units 04/04/21  2354 04/04/21  1822 04/04/21  1441   LACTIC ACID mmol/L 2 0 3 3* 2 8*           * I Have Reviewed All Lab Data Listed Above  * Additional Pertinent Lab Tests Reviewed: All Labs Within Last 24 Hours Reviewed        Recent Cultures (last 7 days):     Results from last 7 days   Lab Units 04/04/21  1822 04/04/21  1441   BLOOD CULTURE  No Growth at 24 hrs  No Growth at 24 hrs  Last 24 Hours Medication List:   Current Facility-Administered Medications   Medication Dose Route Frequency Provider Last Rate    [START ON 4/7/2021] atenolol  25 mg Oral Daily Uvaldo HUI MD      atorvastatin  80 mg Oral Daily SIGIFREDO Enciso      cefTRIAXone  1,000 mg Intravenous Q24H SIGIFREDO Enciso 1,000 mg (04/06/21 1646)    chlorhexidine  15 mL Swish & Spit Q12H [address] SIGIFREDO Enciso      cloNIDine  0 1 mg Oral Q8H [address] Uvaldo HUI MD      dextrose 5 % with KCl 20 mEq/L  50 mL/hr Intravenous Continuous Cony Hernandez MD 50 mL/hr (04/06/21 1100)    insulin lispro  1-6 Units Subcutaneous 4x Daily (AC & HS) Umer Weber PA-C      metoprolol  5 mg Intravenous Q6H PRN Florecita Gray MD          Today, Patient Was Seen By: Roxana Rodriguez MD    ** Please Note: Dictation voice to text software may have been used in the creation of this document   **

## 2021-04-06 NOTE — ASSESSMENT & PLAN NOTE
Due to obstructive uropathy  Improving  Continue indwelling Butler catheter  Nephrology following  Urology consult pending

## 2021-04-06 NOTE — ASSESSMENT & PLAN NOTE
Hypernatremia likely secondary to obstructive uropathy  Presented with sodium of 117, currently 144  Started on D5W per Nephrology recommendation  Monitor BMP

## 2021-04-06 NOTE — PROGRESS NOTES
NEPHROLOGY PROGRESS NOTE    Patient: Lord Perez               Sex: male          DOA: 4/4/2021  1:56 PM   YOB: 1950        Age:  65 y o         LOS:  LOS: 2 days       HPI     Patient admitted acute kidney injury because of obstructive uropathy    SUBJECTIVE     Much better though still appears slightly confused    Denies any chest pain    Still has a Butler catheter because of obstruction    No nausea no vomiting does    CURRENT MEDICATIONS       Current Facility-Administered Medications:     atorvastatin (LIPITOR) tablet 80 mg, 80 mg, Oral, Daily, Lysbeth Pinch, CRNP, 80 mg at 04/06/21 0956    ceftriaxone (ROCEPHIN) 1 g/50 mL in dextrose IVPB, 1,000 mg, Intravenous, Q24H, Lysbeth Pinch, CRNP, Stopped at 04/06/21 0801    chlorhexidine (PERIDEX) 0 12 % oral rinse 15 mL, 15 mL, Swish & Spit, Q12H [facility], Lysbeth Pinch, CRNP, 15 mL at 04/06/21 0956    dextrose 5 % with KCl 20 mEq/L infusion (premix), 50 mL/hr, Intravenous, Continuous, Sofi Petty MD, Last Rate: 50 mL/hr at 04/06/21 1100, 50 mL/hr at 04/06/21 1100    insulin lispro (HumaLOG) 100 units/mL subcutaneous injection 1-6 Units, 1-6 Units, Subcutaneous, 4x Daily (AC & HS), 2 Units at 04/06/21 1334 **AND** Fingerstick Glucose (POCT), , , 4x Daily AC and at bedtime, Diya Oconnor PA-C    OBJECTIVE     Current Weight: Weight - Scale: 94 1 kg (207 lb 7 3 oz)  Vitals:    04/06/21 0800   BP: 164/81   Pulse: (!) 108   Resp:    Temp:    SpO2: 95%       Intake/Output Summary (Last 24 hours) at 4/6/2021 1500  Last data filed at 4/6/2021 1427  Gross per 24 hour   Intake 1372 5 ml   Output 5100 ml   Net -3727 5 ml       PHYSICAL EXAMINATION     Physical Exam  Constitutional:       General: He is not in acute distress  Appearance: He is well-developed  HENT:      Head: Normocephalic  Eyes:      General: No scleral icterus  Conjunctiva/sclera: Conjunctivae normal    Neck:      Musculoskeletal: Neck supple  Vascular: No JVD  Cardiovascular:      Rate and Rhythm: Normal rate  Heart sounds: Normal heart sounds  Pulmonary:      Effort: Pulmonary effort is normal       Breath sounds: No wheezing  Abdominal:      Palpations: Abdomen is soft  Tenderness: There is no abdominal tenderness  Musculoskeletal: Normal range of motion  Skin:     General: Skin is warm  Findings: No rash  Neurological:      Mental Status: He is alert and oriented to person, place, and time  Psychiatric:         Behavior: Behavior normal           LAB RESULTS     Results from last 7 days   Lab Units 04/06/21  0453 04/05/21 2002 04/05/21  1259 04/05/21  0858 04/05/21  0436 04/04/21  2354 04/04/21  2158 04/04/21  1822 04/04/21  1441   WBC Thousand/uL 20 40*  --   --   --  13 94*  --   --   --  19 64*   HEMOGLOBIN g/dL 7 8*  --   --   --  9 0*  --   --   --  11 5*   HEMATOCRIT % 21 8*  --   --   --  25 7*  --   --   --  33 2*   PLATELETS Thousands/uL 406*  --   --   --  406*  --   --  459* 486*   POTASSIUM mmol/L 3 3* 3 5 3 7 3 8 3 7 4 2 4 8  --  6 5*   CHLORIDE mmol/L 109* 107 103 101 99* 95* 91*  --  79*   CO2 mmol/L 23 23 19* 20* 20* 14* 13*  --  13*   BUN mg/dL 65* 81* 100* 115* 129* 157* 178*  --  196*   CREATININE mg/dL 1 54* 2 06* 2 82* 3 80* 5 33* 8 15* 10 26*  --  13 24*   EGFR ml/min/1 73sq m 51 36 25 17 11 7 5  --  4   CALCIUM mg/dL 8 2* 8 4 8 7 8 7 8 4 7 1* 7 3*  --  8 1*   MAGNESIUM mg/dL 1 9 2 0 2 1 2 2 2 3 2 1 2 3  --  2 7*   PHOSPHORUS mg/dL 2 2* 2 7 3 6 4 4* 5 6* 7 3* 8 9*  --  11 2*       RADIOLOGY RESULTS      Results for orders placed during the hospital encounter of 04/04/21   XR chest 1 view portable    Narrative CHEST     INDICATION:   SOB     COMPARISON:  3/4/2020    EXAM PERFORMED/VIEWS:  XR CHEST PORTABLE      FINDINGS:    Cardiomediastinal silhouette appears unremarkable  The lungs are clear  No pneumothorax or pleural effusion  Osseous structures appear within normal limits for patient age        Impression No acute cardiopulmonary disease  Workstation performed: [id number]       Results for orders placed during the hospital encounter of 03/04/21   XR chest pa & lateral    Narrative CHEST     INDICATION:   E21 3: Hyperparathyroidism, unspecified  COMPARISON:  Chest radiograph from 1/21/2018  CT scan for parathyroid adenoma on 1/29/2021  Chest CT from 1/2/2020  EXAM PERFORMED/VIEWS:  XR CHEST PA & LATERAL  DUAL ENERGY SUBTRACTION  FINDINGS:    Cardiomediastinal silhouette normal  Coronary stent    Lungs clear  No effusion or pneumothorax  Mild degenerative disease in the spine  Impression No acute cardiopulmonary disease  Workstation performed: [id number]       No results found for this or any previous visit  No results found for this or any previous visit  Results for orders placed during the hospital encounter of 04/04/21   CT abdomen pelvis wo contrast    Narrative CT ABDOMEN AND PELVIS WITHOUT IV CONTRAST    INDICATION:   ARF, hydronephrosis  COMPARISON:  Ultrasound from 4/4/2021    TECHNIQUE:  CT examination of the abdomen and pelvis was performed without intravenous contrast   Axial, sagittal, and coronal 2D reformatted images were created from the source data and submitted for interpretation  Radiation dose length product (DLP) for this visit:  637 mGy-cm   This examination, like all CT scans performed in the [facility], was performed utilizing techniques to minimize radiation dose exposure, including the use of iterative   reconstruction and automated exposure control  Enteric contrast was administered  FINDINGS:    ABDOMEN    LOWER CHEST: Small infiltrate versus atelectasis is seen at the left lung base with small effusion  LIVER/BILIARY TREE:  Unremarkable  GALLBLADDER:  No calcified gallstones  No pericholecystic inflammatory change  SPLEEN:  Unremarkable  PANCREAS:  Unremarkable      ADRENAL GLANDS: Unremarkable  KIDNEYS/URETERS:  High density debris is noted within the right renal pelvis with hydronephrosis correlating to some areas of increased echogenicity on ultrasound  There is proximal ureteral dilatation with more distal ureter normal in caliber  The left kidney also demonstrates moderate hydronephrosis but without high density debris  There is moderate to severe hydronephrosis noted on the left     There is thickening of the renal pelvis with dilatation of the proximal ureter gradual tapering to   normal caliber in the distal left ureter without obvious focal change in caliber  STOMACH AND BOWEL:  Left colon diverticulosis is noted without CT evidence of diverticulitis  APPENDIX:  No findings to suggest appendicitis  ABDOMINOPELVIC CAVITY:  No ascites  No pneumoperitoneum  No lymphadenopathy  There is left greater than right perinephric stranding identified  There appears to be 8 perinephric collection medially adjacent to the left renal hilum which measures approximately 1 6 x 1 7 x 7 7 cm  Abutting this perinephric collection is a 2nd collection which most likely communicates the 1st and is centered in Gerota's fascia extending into the anterior pararenal space which measures approximately 2 7 x 5 8 x 8 1 cm  No gas bubbles are noted   within the this collection  VESSELS:  Atherosclerotic changes are present  Mild aneurysmal dilatation measures 3 1 cm distally in the aorta  PELVIS    REPRODUCTIVE ORGANS:  Unremarkable for patient's age  URINARY BLADDER:  Butler catheter is noted within decompressed bladder  ABDOMINAL WALL/INGUINAL REGIONS:  Unremarkable  OSSEOUS STRUCTURES:  No acute fracture or destructive osseous lesion  Impression Left greater than right moderate to severe hydronephrosis  Left greater than right perinephric stranding may be related to obstruction or infection    Hemorrhage or complicated pyelonephritis with debris could be the cause of obstruction in the   collecting system although occult stricture of the ureters could also be present  Collection in the left perinephric space and extending into the pararenal space could be related to urinoma or perinephric abscess  This should be correlated clinically  No gas bubbles are seen however  Neoplasm is not excluded    This was discussed with Moshe Rdz in the ICU at 11:50 AM        Workstation performed: [id number]       No results found for this or any previous visit  PLAN / RECOMMENDATIONS      Acute kidney injury:  Due to obstructive uropathy likely because of enlarged prostate  Improving    Hypertension:  Reasonably well control    Anemia:  Hemoglobin is decreasing  Will need to monitor closely  Discussed with slim  Possibly because of hematuria    Hyponatremia:  Corrected maybe little bit fast   Will start D5W at this point    Metabolic acidosis:  Corrected    Azotemia:  Improving    Will continue to monitor        Obdulio Bhatt MD  Nephrology  4/6/2021        Portions of the record may have been created with voice recognition software  Occasional wrong word or "sound a like" substitutions may have occurred due to the inherent limitations of voice recognition software  Read the chart carefully and recognize, using context, where substitutions have occurred

## 2021-04-06 NOTE — ASSESSMENT & PLAN NOTE
Currently not septic  Afebrile, hemodynamically stable  Continue IV ceftriaxone  Follow-up with pending culture

## 2021-04-06 NOTE — ASSESSMENT & PLAN NOTE
Lab Results   Component Value Date    HGBA1C 6 0 (H) 12/28/2020       Recent Labs     04/05/21  2117 04/06/21  0729 04/06/21  1144 04/06/21  1637   POCGLU 233* 163* 210* 185*       Blood Sugar Average: Last 72 hrs:  (P) 178 375     Controlled  Hold oral agent  Sliding scale coverage

## 2021-04-06 NOTE — ASSESSMENT & PLAN NOTE
Currently asymptomatic  Aspirin and Brilinta on hold due to hematuria  ACE-inhibitor, beta-blocker on hold due to acute kidney injury  Continue statin

## 2021-04-06 NOTE — PROGRESS NOTES
Recommend liberalization of renal diet per poor PO and given low K and low phos levels per labs as renal diet doesn't seem indicated at this time  Recommend regular diet with Chocolate Ensure Compact b i d  Discussed with patient

## 2021-04-06 NOTE — PLAN OF CARE
Problem: Potential for Falls  Goal: Patient will remain free of falls  Description: INTERVENTIONS:  - Assess patient frequently for physical needs  -  Identify cognitive and physical deficits and behaviors that affect risk of falls  -  [place] fall precautions as indicated by assessment   - Educate patient/family on patient safety including physical limitations  - Instruct patient to call for assistance with activity based on assessment  - Modify environment to reduce risk of injury  - Consider OT/PT consult to assist with strengthening/mobility  Outcome: Progressing     Problem: Nutrition/Hydration-ADULT  Goal: Nutrient/Hydration intake appropriate for improving, restoring or maintaining nutritional needs  Description: Monitor and assess patient's nutrition/hydration status for malnutrition  Collaborate with interdisciplinary team and initiate plan and interventions as ordered  Monitor patient's weight and dietary intake as ordered or per policy  Utilize nutrition screening tool and intervene as necessary  Determine patient's food preferences and provide high-protein, high-caloric foods as appropriate       INTERVENTIONS:  - Monitor oral intake, urinary output, labs, and treatment plans  - Assess nutrition and hydration status and recommend course of action  - Evaluate amount of meals eaten  - Assist patient with eating if necessary   - Allow adequate time for meals  - Recommend/ encourage appropriate diets, oral nutritional supplements, and vitamin/mineral supplements  - Order, calculate, and assess calorie counts as needed  - Recommend, monitor, and adjust tube feedings and TPN/PPN based on assessed needs  - Assess need for intravenous fluids  - Provide specific nutrition/hydration education as appropriate  - Include patient/family/caregiver in decisions related to nutrition  Outcome: Progressing     Problem: PAIN - ADULT  Goal: Verbalizes/displays adequate comfort level or baseline comfort level  Description: Interventions:  - Encourage patient to monitor pain and request assistance  - Assess pain using appropriate pain scale  - Administer analgesics based on type and severity of pain and evaluate response  - Implement non-pharmacological measures as appropriate and evaluate response  - Consider cultural and social influences on pain and pain management  - Notify physician/advanced practitioner if interventions unsuccessful or patient reports new pain  Outcome: Progressing     Problem: INFECTION - ADULT  Goal: Absence or prevention of progression during hospitalization  Description: INTERVENTIONS:  - Assess and monitor for signs and symptoms of infection  - Monitor lab/diagnostic results  - Monitor all insertion sites, i e  indwelling lines, tubes, and drains  - Monitor endotracheal if appropriate and nasal secretions for changes in amount and color  - [place] appropriate cooling/warming therapies per order  - Administer medications as ordered  - Instruct and encourage patient and family to use good hand hygiene technique  - Identify and instruct in appropriate isolation precautions for identified infection/condition  Outcome: Progressing     Problem: SAFETY ADULT  Goal: Maintain or return to baseline ADL function  Description: INTERVENTIONS:  -  Assess patient's ability to carry out ADLs; assess patient's baseline for ADL function and identify physical deficits which impact ability to perform ADLs (bathing, care of mouth/teeth, toileting, grooming, dressing, etc )  - Assess/evaluate cause of self-care deficits   - Assess range of motion  - Assess patient's mobility; develop plan if impaired  - Assess patient's need for assistive devices and provide as appropriate  - Encourage maximum independence but intervene and supervise when necessary  - Involve family in performance of ADLs  - Assess for home care needs following discharge   - Consider OT consult to assist with ADL evaluation and planning for discharge  - Provide patient education as appropriate  Outcome: Progressing  Goal: Maintain or return mobility status to optimal level  Description: INTERVENTIONS:  - Assess patient's baseline mobility status (ambulation, transfers, stairs, etc )    - Identify cognitive and physical deficits and behaviors that affect mobility  - Identify mobility aids required to assist with transfers and/or ambulation (gait belt, sit-to-stand, lift, walker, cane, etc )  - [place] fall precautions as indicated by assessment  - Record patient progress and toleration of activity level on Mobility SBAR; progress patient to next Phase/Stage  - Instruct patient to call for assistance with activity based on assessment  - Consider rehabilitation consult to assist with strengthening/weightbearing, etc   Outcome: Progressing     Problem: DISCHARGE PLANNING  Goal: Discharge to home or other facility with appropriate resources  Description: INTERVENTIONS:  - Identify barriers to discharge w/patient and caregiver  - Arrange for needed discharge resources and transportation as appropriate  - Identify discharge learning needs (meds, wound care, etc )  - Arrange for interpretive services to assist at discharge as needed  - Refer to Case Management Department for coordinating discharge planning if the patient needs post-hospital services based on physician/advanced practitioner order or complex needs related to functional status, cognitive ability, or social support system  Outcome: Progressing     Problem: Knowledge Deficit  Goal: Patient/family/caregiver demonstrates understanding of disease process, treatment plan, medications, and discharge instructions  Description: Complete learning assessment and assess knowledge base    Interventions:  - Provide teaching at level of understanding  - Provide teaching via preferred learning methods  Outcome: Progressing     Problem: NEUROSENSORY - ADULT  Goal: Achieves stable or improved neurological status  Description: INTERVENTIONS  - Monitor and report changes in neurological status  - Monitor vital signs such as temperature, blood pressure, glucose, and any other labs ordered   - Initiate measures to prevent increased intracranial pressure  - Monitor for seizure activity and implement precautions if appropriate      Outcome: Progressing  Goal: Achieves maximal functionality and self care  Description: INTERVENTIONS  - Monitor swallowing and airway patency with patient fatigue and changes in neurological status  - Encourage and assist patient to increase activity and self care     - Encourage visually impaired, hearing impaired and aphasic patients to use assistive/communication devices  Outcome: Progressing     Problem: CARDIOVASCULAR - ADULT  Goal: Maintains optimal cardiac output and hemodynamic stability  Description: INTERVENTIONS:  - Monitor I/O, vital signs and rhythm  - Monitor for S/S and trends of decreased cardiac output  - Administer and titrate ordered vasoactive medications to optimize hemodynamic stability  - Assess quality of pulses, skin color and temperature  - Assess for signs of decreased coronary artery perfusion  - Instruct patient to report change in severity of symptoms  Outcome: Progressing     Problem: GASTROINTESTINAL - ADULT  Goal: Minimal or absence of nausea and/or vomiting  Description: INTERVENTIONS:  - Administer IV fluids if ordered to ensure adequate hydration  - Maintain NPO status until nausea and vomiting are resolved  - Nasogastric tube if ordered  - Administer ordered antiemetic medications as needed  - Provide nonpharmacologic comfort measures as appropriate  - Advance diet as tolerated, if ordered  - Consider nutrition services referral to assist patient with adequate nutrition and appropriate food choices  Outcome: Progressing  Goal: Maintains or returns to baseline bowel function  Description: INTERVENTIONS:  - Assess bowel function  - Encourage oral fluids to ensure adequate hydration  - Administer IV fluids if ordered to ensure adequate hydration  - Administer ordered medications as needed  - Encourage mobilization and activity  - Consider nutritional services referral to assist patient with adequate nutrition and appropriate food choices  Outcome: Progressing     Problem: GENITOURINARY - ADULT  Goal: Maintains or returns to baseline urinary function  Description: INTERVENTIONS:  - Assess urinary function  - Encourage oral fluids to ensure adequate hydration if ordered  - Administer IV fluids as ordered to ensure adequate hydration  - Administer ordered medications as needed  - Offer frequent toileting  - Follow urinary retention protocol if ordered  Outcome: Progressing  Goal: Absence of urinary retention  Description: INTERVENTIONS:  - Assess patients ability to void and empty bladder  - Monitor I/O  - Bladder scan as needed  - Discuss with physician/AP medications to alleviate retention as needed  - Discuss catheterization for long term situations as appropriate  Outcome: Progressing  Goal: Urinary catheter remains patent  Description: INTERVENTIONS:  - Assess patency of urinary catheter  - If patient has a chronic saeed, consider changing catheter if non-functioning  - Follow guidelines for intermittent irrigation of non-functioning urinary catheter  Outcome: Progressing     Problem: METABOLIC, FLUID AND ELECTROLYTES - ADULT  Goal: Electrolytes maintained within normal limits  Description: INTERVENTIONS:  - Monitor labs and assess patient for signs and symptoms of electrolyte imbalances  - Administer electrolyte replacement as ordered  - Monitor response to electrolyte replacements, including repeat lab results as appropriate  - Instruct patient on fluid and nutrition as appropriate  Outcome: Progressing  Goal: Fluid balance maintained  Description: INTERVENTIONS:  - Monitor labs   - Monitor I/O and WT  - Instruct patient on fluid and nutrition as appropriate  - Assess for signs & symptoms of volume excess or deficit  Outcome: Progressing  Goal: Glucose maintained within target range  Description: INTERVENTIONS:  - Monitor Blood Glucose as ordered  - Assess for signs and symptoms of hyperglycemia and hypoglycemia  - Administer ordered medications to maintain glucose within target range  - Assess nutritional intake and initiate nutrition service referral as needed  Outcome: Progressing     Problem: SKIN/TISSUE INTEGRITY - ADULT  Goal: Skin integrity remains intact  Description: INTERVENTIONS  - Identify patients at risk for skin breakdown  - Assess and monitor skin integrity  - Assess and monitor nutrition and hydration status  - Monitor labs (i e  albumin)  - Assess for incontinence   - Turn and reposition patient  - Assist with mobility/ambulation  - Relieve pressure over bony prominences  - Avoid friction and shearing  - Provide appropriate hygiene as needed including keeping skin clean and dry  - Evaluate need for skin moisturizer/barrier cream  - Collaborate with interdisciplinary team (i e  Nutrition, Rehabilitation, etc )   - Patient/family teaching  Outcome: Progressing     Problem: HEMATOLOGIC - ADULT  Goal: Maintains hematologic stability  Description: INTERVENTIONS  - Assess for signs and symptoms of bleeding or hemorrhage  - Monitor labs  - Administer supportive blood products/factors as ordered and appropriate  Outcome: Progressing

## 2021-04-06 NOTE — ASSESSMENT & PLAN NOTE
Butler catheter noted with gross hematuria  Hemoglobin down trending  Hold aspirin, Brilinta  Currently hemoglobin 7 8  Continue monitor CBC and transfuse below 7  Urology following

## 2021-04-06 NOTE — PLAN OF CARE
Problem: PHYSICAL THERAPY ADULT  Goal: Performs mobility at highest level of function for planned discharge setting  See evaluation for individualized goals  Description: Treatment/Interventions: Functional transfer training, LE strengthening/ROM, Therapeutic exercise, Endurance training, Patient/family training, Bed mobility, Gait training, Spoke to nursing, Cognitive reorientation, OT          See flowsheet documentation for full assessment, interventions and recommendations  Note: Prognosis: Good  Problem List: Decreased strength, Decreased endurance, Impaired balance, Decreased mobility, Decreased cognition  Assessment: Pt is 65year old male seen for PT evaluation s/p admit to [facility] on 4/4/2021 with CIERA (acute kidney injury) ([facility] [address] )  PT consulted to assess pt's functional mobility and d/c needs  Order placed for PT eval and tx, with up with assist order  Comorbidities affecting pt's physical performance at time of assessment include hyperlipidemia, hypertension, type 2 DM, tobacco abuse, CAD, hyperparathyroidism, history of aortic regurgitation, hyperkalemia, metabolic acidosis, UTI, hematuria, and dehydration with hyponatremia  PTA, pt was independent with all functional mobility with occasional use of a RW  Personal factors affecting pt at time of IE include lives in a two story house, ambulating with an assistive device, stairs to enter home, inability to ambulate household distances, inability to navigate community distances, inability to navigate level surfaces without external assistance, unable to perform dynamic tasks in community, decreased cognition, inability to perform IADLs, and inability to perform ADLs   Please find objective findings from PT assessment regarding body systems outlined above with impairments and limitations including weakness, impaired balance, decreased endurance, gait deviations, decreased activity tolerance, decreased functional mobility tolerance, fall risk, and decreased cognition  The following objective measures performed on IE also reveal limitations: Barthel Index: 35/100 and Modified [place]: 4 (moderate/severe disability)  Pt's clinical presentation is currently unstable/unpredictable seen in pt's presentation of need for ongoing medical management/moitoring, pt is a fall risk, and pt requires cues/assist for safety with functional mobility  Pt to benefit from continued PT tx to address deficits as defined above and maximize level of functional independent mobility and consistency  From PT/mobility standpoint, recommendation at time of d/c would be STR pending progress in order to facilitate return to PLOF  Barriers to Discharge: Inaccessible home environment, Decreased caregiver support     PT Discharge Recommendation: [id number]     PT - OK to Discharge: Yes(when medically cleared, if to STR)    See flowsheet documentation for full assessment

## 2021-04-06 NOTE — OCCUPATIONAL THERAPY NOTE
Occupational Therapy Evaluation        Patient Name: Curtis Higgins  ZBJRN'S Date: 4/6/2021 04/06/21 1051   OT Last Visit   OT Visit Date 04/06/21   Note Type   Note type Evaluation   Restrictions/Precautions   Weight Bearing Precautions Per Order No   Braces or Orthoses Other (Comment)  (none per patient)   Other Precautions Cognitive; Chair Alarm; Bed Alarm;Telemetry;Multiple lines; Fall Risk   Pain Assessment   Pain Assessment Tool 0-10   Pain Score No Pain   Home Living   Type of Home House   Home Layout Two level;Bed/bath upstairs;Stairs to enter with rails  (3 INES; FOS to bedroom)   Bathroom Shower/Tub Tub/shower unit   Bathroom Toilet Standard   Bathroom Equipment Other (Comment)  (none at baseline)   [facility]   Additional Comments Pt ambulates with walker PRN   Prior Function   Level of [place] Independent with ADLs and functional mobility   Lives With Spouse   Receives Help From Family   ADL Assistance Independent   IADLs Independent   Falls in the last 6 months 0   Vocational Retired   Lifestyle   Autonomy patient reported independent with ADls/ IADLs, lives with spouse in a 2 story house, 3 INES and full flight to second floor bedroom  patient ambulates with walker PRN      Reciprocal Relationships Supportive family   Service to Others Retired   Psychosocial   Psychosocial (WDL) WDL   Ability to Express Feelings Needs assistance   Ability to Express Needs Needs assistance   Ability to Express Thoughts Needs assistance   Ability to Understand Others Usually understands   ADL   Eating Assistance 6  Modified independent   Grooming Assistance 5  Supervision/Setup   UB Bathing Assistance 4  Minimal Assistance   LB Bathing Assistance 3  Moderate [address] 4  [address] 3  Moderate [address]  3  Moderate Assistance   Functional Assistance 3  Moderate Assistance Bed Mobility   Supine to Sit 4  Minimal assistance   Additional items Assist x 2;HOB elevated; Bedrails; Increased time required;Verbal cues;LE management   Transfers   Sit to Stand 4  Minimal assistance   Additional items Assist x 2; Increased time required;Verbal cues   Stand to Sit 4  Minimal assistance   Additional items Assist x 2;Armrests; Increased time required;Verbal cues   Balance   Static Sitting Fair +   Dynamic Sitting Fair   Static Standing Poor +   Dynamic Standing Poor   Activity Tolerance   Activity Tolerance Patient limited by fatigue   Nurse Made Aware Discussed case with NAZIA Johnson; post session pt was left seated in recliner in NAD, all belongings within reach, +chair alarm   RUE Assessment   RUE Assessment WFL   LUE Assessment   LUE Assessment WFL   Hand Function   Gross Motor Coordination Functional   Fine Motor Coordination Functional   Sensation   Light Touch No apparent deficits  (BUEs)   Vision-Basic Assessment   Current Vision Does not wear glasses   Patient Visual Report Other (Comment)  (No significant changes reported)   Perception   Inattention/Neglect Appears intact   Cognition   Overall Cognitive Status Impaired   Arousal/Participation Alert; Responsive; Cooperative   Attention Within functional limits   Orientation Level Oriented to person;Oriented to place; Disoriented to time;Disoriented to situation   Memory Decreased short term memory;Decreased recall of recent events   Following Commands Follows one step commands with increased time or repetition   Comments Therapist completed Short Blessed Cognitive test, patient obtained a score of 9 indicative of need for further assessment (evaluate for dementing disorder)  Assessment   Limitation Decreased ADL status; Decreased cognition;Decreased endurance;Decreased high-level ADLs; Decreased self-care trans   Prognosis Good   Assessment Patient is a 65 y o  male seen for OT evaluation s/p admit to [address] [facility] on 4/4/2021 w/CIERA (acute kidney injury) ([facility] [address] )  Commorbidities affecting patient's functional performance at time of assessment include:hyperlipidemia, hypertension, type 2 DM, tobacco abuse, CAD, hyperparathyroidism, history of aortic regurgitation, hyperkalemia, metabolic acidosis, UTI, hematuria, and dehydration with hyponatremia  Orders placed for OT evaluation and treatment  Performed at least two patient identifiers during session including name and wristband  Prior to admission, patient reported independent with ADls/ IADLs, lives with spouse in a 2 story house, 3 INES and full flight to second floor bedroom  patient ambulates with walker PRN  Personal factors affecting patient at time of initial evaluation include: steps to enter, decreased initiation and engagement, difficulty performing ADLs and difficulty performing IADLs  Upon evaluation, patient requires minimal  assist for UB ADLs, moderate and maximal assist for LB ADLs, transfers and functional ambulation in room and bathroom with moderate assist, with the use of Rolling Walker  Patient is alert, oriented to name,, oriented to place,, disoriented to time, and disoriented to situation,, presents with limited ability to focus on a task over time (attention span) and limited ability to recall information after a brief period of time (short term memory) / working memory   Therapist completed Short Blessed Cognitive test, patient obtained a score of 9 indicative of need for further assessment (evaluate for dementing disorder)  Occupational performance is affected by the following deficits: orientation, degenerative arthritic joint changes, impaired gross motor coordination, dynamic sit/ stand balance deficit with poor standing tolerance time for self care and functional mobility, decreased activity tolerance, impaired memory and decreased safety awareness   Patient to benefit from continued Occupational Therapy treatment while in the hospital to address deficits as defined above and maximize level of functional independence with ADLs and functional mobility  Occupational Performance areas to address include: grooming , bathing/ shower, dressing, toilet hygiene, transfer to all surfaces, functional ambulation, functional mobility, health maintenance, medication routine/ management, IADLS: Household maintenance, IADLs: safety procedures, Leisure Participation and Social participation  Plan   Treatment Interventions ADL retraining;Functional transfer training; Endurance training;Patient/family training;Cognitive reorientation;Equipment evaluation/education; Compensatory technique education;Continued evaluation; Energy conservation; Activityengagement   Goal Expiration Date 04/20/21   OT Frequency 3-5x/wk   Recommendation   OT Discharge Recommendation Post-Acute Rehabilitation Services   AM-PAC Daily Activity Inpatient   Lower Body Dressing 2   Bathing 2   Toileting 2   Upper Body Dressing 3   Grooming 3   Eating 4   Daily Activity Raw Score 16   Daily Activity Standardized Score (Calc for Raw Score >=11) 35 96   AM-PAC Applied Cognition Inpatient   Following a Speech/Presentation 4   Understanding Ordinary Conversation 4   Taking Medications 2   Remembering Where Things Are Placed or Put Away 2   Remembering List of 4-5 Errands 2   Taking Care of Complicated Tasks 1   Applied Cognition Raw Score 15   Applied Cognition Standardized Score 33 54   Barthel Index   Feeding 10   Bathing 0   Grooming Score 0   Dressing Score 5   Bladder Score 0   Bowels Score 10   Toilet Use Score 5   Transfers (Bed/Chair) Score 5   Mobility (Level Surface) Score 0   Stairs Score 0   Barthel Index Score 35   Modified [place] Scale   Modified Denis Scale 4         1 - Patient will verbalize and demonstrate use of energy conservation/ deep breathing technique and work simplification skills during functional activity with no verbal cues      2 - Patient will verbalize and demonstrate good body mechanics and joint protection techniques during  ADLs/ IADLs with no verbal cues  3 - Patient will increase OOB/ sitting tolerance to 2-4 hours per day for increased participation in self care and leisure tasks with no s/s of exertion  4 - Patient will increase standing tolerance time to 5  minutes with unilateral UE support to complete sink level ADLs@ mod I level  5 - Patient will increase sitting tolerance at edge of bed to 20 minutes to complete UB ADLs @ set up assist level  6 - Patient will transfer bed to Chair / toilet at Set up assist level with AD as indicated  7 - Patient will complete UB ADLs with set up assist      8 - Patient will complete LB ADLs with min assist with the use of adaptive equipment       9 - Patient will complete toileting hygiene with set up assist/ supervision for thoroughness    10 - Patient/ Family  will demonstrate competency with UE Home Exercise Program

## 2021-04-06 NOTE — TELEPHONE ENCOUNTER
Nam Saez' wife Manjula Anaya called  She stated that her  is in the ICU at [facility] and she keeps missing the visiting hours  She wanted to ask Dr Kirit Jurado if it shows in his chart whether or not Nam Saez has been checked for prostate issues    Her contact number is [phone]

## 2021-04-07 LAB
ABO GROUP BLD: NORMAL
ANION GAP SERPL CALCULATED.3IONS-SCNC: 6 MMOL/L (ref 4–13)
BASOPHILS # BLD AUTO: 0.02 THOUSANDS/ΜL (ref 0–0.1)
BASOPHILS NFR BLD AUTO: 0 % (ref 0–1)
BLD GP AB SCN SERPL QL: NEGATIVE
BUN SERPL-MCNC: 33 MG/DL (ref 5–25)
CALCIUM SERPL-MCNC: 8.1 MG/DL (ref 8.3–10.1)
CHLORIDE SERPL-SCNC: 111 MMOL/L (ref 100–108)
CO2 SERPL-SCNC: 27 MMOL/L (ref 21–32)
CREAT SERPL-MCNC: 1.08 MG/DL (ref 0.6–1.3)
EOSINOPHIL # BLD AUTO: 0.29 THOUSAND/ΜL (ref 0–0.61)
EOSINOPHIL NFR BLD AUTO: 2 % (ref 0–6)
ERYTHROCYTE [DISTWIDTH] IN BLOOD BY AUTOMATED COUNT: 15.4 % (ref 11.6–15.1)
GFR SERPL CREATININE-BSD FRML MDRD: 79 ML/MIN/1.73SQ M
GLUCOSE SERPL-MCNC: 140 MG/DL (ref 65–140)
GLUCOSE SERPL-MCNC: 157 MG/DL (ref 65–140)
GLUCOSE SERPL-MCNC: 223 MG/DL (ref 65–140)
GLUCOSE SERPL-MCNC: 248 MG/DL (ref 65–140)
GLUCOSE SERPL-MCNC: 263 MG/DL (ref 65–140)
GLUCOSE SERPL-MCNC: >500 MG/DL (ref 65–140)
HCT VFR BLD AUTO: 20.1 % (ref 36.5–49.3)
HCT VFR BLD AUTO: 20.6 % (ref 36.5–49.3)
HGB BLD-MCNC: 6.7 G/DL (ref 12–17)
HGB BLD-MCNC: 7 G/DL (ref 12–17)
IMM GRANULOCYTES # BLD AUTO: 0.41 THOUSAND/UL (ref 0–0.2)
IMM GRANULOCYTES NFR BLD AUTO: 2 % (ref 0–2)
LYMPHOCYTES # BLD AUTO: 1.26 THOUSANDS/ΜL (ref 0.6–4.47)
LYMPHOCYTES NFR BLD AUTO: 6 % (ref 14–44)
MCH RBC QN AUTO: 27.3 PG (ref 26.8–34.3)
MCHC RBC AUTO-ENTMCNC: 34 G/DL (ref 31.4–37.4)
MCV RBC AUTO: 81 FL (ref 82–98)
MONOCYTES # BLD AUTO: 1.92 THOUSAND/ΜL (ref 0.17–1.22)
MONOCYTES NFR BLD AUTO: 10 % (ref 4–12)
NEUTROPHILS # BLD AUTO: 15.76 THOUSANDS/ΜL (ref 1.85–7.62)
NEUTS SEG NFR BLD AUTO: 80 % (ref 43–75)
NRBC BLD AUTO-RTO: 0 /100 WBCS
PLATELET # BLD AUTO: 375 THOUSANDS/UL (ref 149–390)
PMV BLD AUTO: 9.1 FL (ref 8.9–12.7)
POTASSIUM SERPL-SCNC: 3.3 MMOL/L (ref 3.5–5.3)
RBC # BLD AUTO: 2.56 MILLION/UL (ref 3.88–5.62)
RH BLD: POSITIVE
SODIUM SERPL-SCNC: 144 MMOL/L (ref 136–145)
SPECIMEN EXPIRATION DATE: NORMAL
WBC # BLD AUTO: 19.66 THOUSAND/UL (ref 4.31–10.16)

## 2021-04-07 PROCEDURE — 86901 BLOOD TYPING SEROLOGIC RH(D): CPT | Performed by: STUDENT IN AN ORGANIZED HEALTH CARE EDUCATION/TRAINING PROGRAM

## 2021-04-07 PROCEDURE — 99232 SBSQ HOSP IP/OBS MODERATE 35: CPT | Performed by: INTERNAL MEDICINE

## 2021-04-07 PROCEDURE — 80048 BASIC METABOLIC PNL TOTAL CA: CPT | Performed by: PHYSICIAN ASSISTANT

## 2021-04-07 PROCEDURE — 85014 HEMATOCRIT: CPT | Performed by: FAMILY MEDICINE

## 2021-04-07 PROCEDURE — 86923 COMPATIBILITY TEST ELECTRIC: CPT

## 2021-04-07 PROCEDURE — 85018 HEMOGLOBIN: CPT | Performed by: FAMILY MEDICINE

## 2021-04-07 PROCEDURE — 86900 BLOOD TYPING SEROLOGIC ABO: CPT | Performed by: STUDENT IN AN ORGANIZED HEALTH CARE EDUCATION/TRAINING PROGRAM

## 2021-04-07 PROCEDURE — 85025 COMPLETE CBC W/AUTO DIFF WBC: CPT | Performed by: PHYSICIAN ASSISTANT

## 2021-04-07 PROCEDURE — 86850 RBC ANTIBODY SCREEN: CPT | Performed by: STUDENT IN AN ORGANIZED HEALTH CARE EDUCATION/TRAINING PROGRAM

## 2021-04-07 PROCEDURE — P9016 RBC LEUKOCYTES REDUCED: HCPCS

## 2021-04-07 PROCEDURE — 82948 REAGENT STRIP/BLOOD GLUCOSE: CPT

## 2021-04-07 PROCEDURE — 30233N1 TRANSFUSION OF NONAUTOLOGOUS RED BLOOD CELLS INTO PERIPHERAL VEIN, PERCUTANEOUS APPROACH: ICD-10-PCS | Performed by: HOSPITALIST

## 2021-04-07 PROCEDURE — 99232 SBSQ HOSP IP/OBS MODERATE 35: CPT | Performed by: STUDENT IN AN ORGANIZED HEALTH CARE EDUCATION/TRAINING PROGRAM

## 2021-04-07 RX ORDER — POTASSIUM CHLORIDE 20 MEQ/1
40 TABLET, EXTENDED RELEASE ORAL 2 TIMES DAILY
Status: DISCONTINUED | OUTPATIENT
Start: 2021-04-07 | End: 2021-04-13 | Stop reason: HOSPADM

## 2021-04-07 RX ADMIN — CHLORHEXIDINE GLUCONATE 0.12% ORAL RINSE 15 ML: 1.2 LIQUID ORAL at 22:22

## 2021-04-07 RX ADMIN — MELATONIN 3 MG: at 22:22

## 2021-04-07 RX ADMIN — CHLORHEXIDINE GLUCONATE 0.12% ORAL RINSE 15 ML: 1.2 LIQUID ORAL at 08:49

## 2021-04-07 RX ADMIN — INSULIN LISPRO 3 UNITS: 100 INJECTION, SOLUTION INTRAVENOUS; SUBCUTANEOUS at 11:46

## 2021-04-07 RX ADMIN — POTASSIUM CHLORIDE 40 MEQ: 1500 TABLET, EXTENDED RELEASE ORAL at 08:49

## 2021-04-07 RX ADMIN — CLONIDINE HYDROCHLORIDE 0.1 MG: 0.1 TABLET ORAL at 05:20

## 2021-04-07 RX ADMIN — ATORVASTATIN CALCIUM 80 MG: 40 TABLET, FILM COATED ORAL at 08:49

## 2021-04-07 RX ADMIN — INSULIN LISPRO 1 UNITS: 100 INJECTION, SOLUTION INTRAVENOUS; SUBCUTANEOUS at 15:37

## 2021-04-07 RX ADMIN — CLONIDINE HYDROCHLORIDE 0.1 MG: 0.1 TABLET ORAL at 13:57

## 2021-04-07 RX ADMIN — CEFTRIAXONE SODIUM 1000 MG: 10 INJECTION, POWDER, FOR SOLUTION INTRAVENOUS at 15:31

## 2021-04-07 RX ADMIN — POTASSIUM CHLORIDE 40 MEQ: 1500 TABLET, EXTENDED RELEASE ORAL at 17:07

## 2021-04-07 RX ADMIN — CLONIDINE HYDROCHLORIDE 0.1 MG: 0.1 TABLET ORAL at 22:22

## 2021-04-07 RX ADMIN — INSULIN LISPRO 6 UNITS: 100 INJECTION, SOLUTION INTRAVENOUS; SUBCUTANEOUS at 08:50

## 2021-04-07 RX ADMIN — INSULIN LISPRO 2 UNITS: 100 INJECTION, SOLUTION INTRAVENOUS; SUBCUTANEOUS at 22:22

## 2021-04-07 RX ADMIN — ATENOLOL 25 MG: 25 TABLET ORAL at 08:49

## 2021-04-07 RX ADMIN — DEXTROSE AND POTASSIUM CHLORIDE 50 ML/HR: 5; .15 SOLUTION INTRAVENOUS at 07:01

## 2021-04-07 NOTE — CASE MANAGEMENT
Per [employer], [facility] is able to accept  Cm updated AVS  Cm department will continue to follow patient through discharge

## 2021-04-07 NOTE — ASSESSMENT & PLAN NOTE
Lab Results   Component Value Date    HGBA1C 6 0 (H) 12/28/2020       Recent Labs     04/06/21  1923 04/06/21  2120 04/07/21  0725 04/07/21  1114   POCGLU 216* 176* >500* 263*       Blood Sugar Average: Last 72 hrs:  (P) 189 [id number]     Controlled  Hold oral agent  Diabetic diet  Sliding scale coverage

## 2021-04-07 NOTE — ASSESSMENT & PLAN NOTE
Hypernatremia likely secondary to obstructive uropathy  Currently sodium 144  Will discontinue D5 water  Monitor BMP

## 2021-04-07 NOTE — ASSESSMENT & PLAN NOTE
Butler catheter noted with gross hematuria  Hemoglobin down trending  Hematuria improving  Butler catheter noted with dark punch colored urine  Hold aspirin, Brilinta  Currently hemoglobin down trending 7 0  Continue monitor CBC and transfuse below 7  Patient is agreeable with above plan  Urology following

## 2021-04-07 NOTE — PROGRESS NOTES
[address]  Progress Note - Ila Kirkland 1950, 65 y o  male MRN: [id number]  Unit/Bed#: -02 Encounter: [id number]  Primary Care Provider: Barrett Garza MD   Date and time admitted to hospital: 4/4/2021  1:56 PM    * CIERA (acute kidney injury) [facility])  Assessment & Plan  Due to obstructive uropathy  Now resolved  Creatinine 1 08  Continue indwelling Butler catheter  Nephrology following  Urology recommendation appreciated  Hematuria  Assessment & Plan  Butler catheter noted with gross hematuria  Hemoglobin down trending  Hematuria improving  Butler catheter noted with dark punch colored urine  Hold aspirin, Brilinta  Currently hemoglobin down trending 7 0  Continue monitor CBC and transfuse below 7  Patient is agreeable with above plan  Urology following  Dehydration with hyponatremia  Assessment & Plan  Hypernatremia likely secondary to obstructive uropathy  Currently sodium 144  Will discontinue D5 water  Monitor BMP  UTI (urinary tract infection)  Assessment & Plan  Currently not septic  Afebrile, hemodynamically stable  Continue IV ceftriaxone  Follow-up with pending culture  Metabolic acidosis  Assessment & Plan  Now resolved    Hyperkalemia  Assessment & Plan  Now resolved  Currently potassium 3 3  Hyperparathyroidism [facility])  Assessment & Plan  Status post parathyroidectomy on 03/23/21  Supplement calcium as needed  Coronary artery disease involving native coronary artery of native heart  Assessment & Plan  Currently asymptomatic  Aspirin and Brilinta on hold due to hematuria  ACE-inhibitor, beta-blocker on hold due to acute kidney injury  Continue statin  Tobacco abuse  Assessment & Plan  Counseled on smoking cessation      Type 2 diabetes mellitus [facility])  Assessment & Plan  Lab Results   Component Value Date    HGBA1C 6 0 (H) 12/28/2020       Recent Labs     04/06/21  1923 04/06/21  2120 04/07/21  0725 04/07/21  1114   POCGLU 216* 176* >500* 263*       Blood Sugar Average: Last 72 hrs:  (P) 189 [id number]     Controlled  Hold oral agent  Diabetic diet  Sliding scale coverage  HTN (hypertension)  Assessment & Plan  Overall reasonably controlled  Resume current medication  Vital monitoring  Protocol  Hyperlipidemia  Assessment & Plan  Continue statin       VTE Pharmacologic Prophylaxis:   Pharmacologic: Pharmacologic VTE Prophylaxis contraindicated due to Hematuria    Patient Centered Rounds: I have performed bedside rounds with nursing staff today  Education and Discussions with Family / Patient:  Spoke with spouse over the phone    Time Spent for Care: 30 minutes  More than 50% of total time spent on counseling and coordination of care as described above  Current Length of Stay: 3 day(s)    Current Patient Status: Inpatient   Certification Statement: The patient will continue to require additional inpatient hospital stay due to Above    Discharge Plan:  Expected 24-48 hours  Refusing to go to rehab  Code Status: Level 2 - DNAR: but accepts endotracheal intubation      Subjective:   Afebrile, hemodynamically stable  Oriented x3  Currently reports feeling much better  Hematuria improving  Denies chest pain, dyspnea, fever, chills, nausea vomiting, any new complaints  No other events reported  Objective:     Vitals:   Temp (24hrs), Av 3 °F (36 8 °C), Min:98 1 °F (36 7 °C), Max:98 5 °F (36 9 °C)    Temp:  [98 1 °F (36 7 °C)-98 5 °F (36 9 °C)] 98 5 °F (36 9 °C)  HR:  [] 99  Resp:  [17-20] 18  BP: (136-179)/(73-99) 155/73  SpO2:  [95 %-99 %] 95 %  Body mass index is 32 56 kg/m²  Input and Output Summary (last 24 hours): Intake/Output Summary (Last 24 hours) at 2021 1438  Last data filed at 2021 1359  Gross per 24 hour   Intake 1216 66 ml   Output 1050 ml   Net 166 66 ml       Physical Exam:     Physical Exam  Constitutional:       General: He is not in acute distress  Appearance: Normal appearance  He is not ill-appearing  HENT:      Head: Normocephalic and atraumatic  Nose: No rhinorrhea  Eyes:      Extraocular Movements: Extraocular movements intact  Conjunctiva/sclera: Conjunctivae normal       Pupils: Pupils are equal, round, and reactive to light  Neck:      Musculoskeletal: Normal range of motion and neck supple  No neck rigidity  Cardiovascular:      Rate and Rhythm: Normal rate and regular rhythm  Pulses: Normal pulses  Heart sounds: Normal heart sounds  Pulmonary:      Effort: Pulmonary effort is normal  No respiratory distress  Breath sounds: Normal breath sounds  No stridor  No wheezing or rhonchi  Abdominal:      General: Bowel sounds are normal  There is no distension  Palpations: Abdomen is soft  Tenderness: There is no abdominal tenderness  Genitourinary:     Comments: Butler catheter with gross hematuria  Musculoskeletal: Normal range of motion  Neurological:      General: No focal deficit present  Mental Status: He is alert  Mental status is at baseline     Psychiatric:         Mood and Affect: Mood normal          Behavior: Behavior normal             Additional Data:     Labs:    Results from last 7 days   Lab Units 04/07/21  0509   WBC Thousand/uL 19 66*   HEMOGLOBIN g/dL 7 0*   HEMATOCRIT % 20 6*   PLATELETS Thousands/uL 375   NEUTROS PCT % 80*   LYMPHS PCT % 6*   MONOS PCT % 10   EOS PCT % 2     Results from last 7 days   Lab Units 04/07/21  0509  04/05/21  0436   SODIUM mmol/L 144   < > 133*   POTASSIUM mmol/L 3 3*   < > 3 7   CHLORIDE mmol/L 111*   < > 99*   CO2 mmol/L 27   < > 20*   BUN mg/dL 33*   < > 129*   CREATININE mg/dL 1 08   < > 5 33*   ANION GAP mmol/L 6   < > 14*   CALCIUM mg/dL 8 1*   < > 8 4   ALBUMIN g/dL  --   --  1 7*   TOTAL BILIRUBIN mg/dL  --   --  0 31   ALK PHOS U/L  --   --  80   ALT U/L  --   --  24   AST U/L  --   --  27   GLUCOSE RANDOM mg/dL 140   < > 163*    < > = values in this interval not displayed  Results from last 7 days   Lab Units 04/04/21  1441   INR  1 23*     Results from last 7 days   Lab Units 04/07/21  1114 04/07/21  0725 04/06/21  2120 04/06/21  1923 04/06/21  1637 04/06/21  1144 04/06/21  0729 04/05/21  2117 04/05/21  1559 04/05/21  1204 04/04/21  2357 04/04/21  2025   POC GLUCOSE mg/dl 263* >500* 176* 216* 185* 210* 163* 233* 204* 204* 144* 84         Results from last 7 days   Lab Units 04/04/21  2354 04/04/21  1822 04/04/21  1441   LACTIC ACID mmol/L 2 0 3 3* 2 8*           * I Have Reviewed All Lab Data Listed Above  * Additional Pertinent Lab Tests Reviewed: All Labs Within Last 24 Hours Reviewed        Recent Cultures (last 7 days):     Results from last 7 days   Lab Units 04/05/21  1827 04/04/21  1822 04/04/21  1441   BLOOD CULTURE   --  No Growth at 48 hrs  No Growth at 48 hrs  URINE CULTURE  No Growth <1000 cfu/mL  --   --        Last 24 Hours Medication List:   Current Facility-Administered Medications   Medication Dose Route Frequency Provider Last Rate    atenolol  25 mg Oral Daily Suzanne Vaca PA-C      atorvastatin  80 mg Oral Daily [place], [place]      cefTRIAXone  1,000 mg Intravenous Q24H Suzanne Vaca PA-C 1,000 mg (04/06/21 1646)    chlorhexidine  15 mL Swish & Spit Q12H [address] [place], [place]      cloNIDine  0 1 mg Oral [place], [place]      insulin lispro  1-6 Units Subcutaneous 4x Daily (AC & HS) Suzanne Vaca PA-C      melatonin  3 mg Oral HS Suzanne Vaca PA-C      metoprolol  5 mg Intravenous Q6H PRN LUISANA Delgado-GARY      potassium chloride  40 mEq Oral Once LUISANA Delgado-C      potassium chloride  40 mEq Oral BID Sameer Hernandez MD          Today, Patient Was Seen By: Esther Reddy MD    ** Please Note: Dictation voice to text software may have been used in the creation of this document   **

## 2021-04-07 NOTE — PROGRESS NOTES
NEPHROLOGY PROGRESS NOTE    Patient: Jay Archer               Sex: male          DOA: 4/4/2021  1:56 PM   YOB: 1950        Age:  65 y o         LOS:  LOS: 3 days       HPI     Patient admitted with acute kidney injury due to urinary obstruction    SUBJECTIVE     Patient has a Butler catheter which is draining quite well  Does have hematuria    Denies any complaint    Seems to be confused    No chest pain no palpitation no nausea no vomiting    CURRENT MEDICATIONS       Current Facility-Administered Medications:     atenolol (TENORMIN) tablet 25 mg, 25 mg, Oral, Daily, Simi Plascencia PA-C, 25 mg at 04/07/21 0849    atorvastatin (LIPITOR) tablet 80 mg, 80 mg, Oral, Daily, Simi Plascencia PA-C, 80 mg at 04/07/21 0849    ceftriaxone (ROCEPHIN) 1 g/50 mL in dextrose IVPB, 1,000 mg, Intravenous, Q24H, Simi Plascencia PA-C, Last Rate: 100 mL/hr at 04/06/21 1646, 1,000 mg at 04/06/21 1646    chlorhexidine (PERIDEX) 0 12 % oral rinse 15 mL, 15 mL, Swish & Spit, Q12H [address], Simi Plascencia, PA-C, 15 mL at 04/07/21 0849    cloNIDine (CATAPRES) tablet 0 1 mg, 0 1 mg, Oral, Q8H [address], Simi Plascencia, PA-C, 0 1 mg at 04/07/21 1357    insulin lispro (HumaLOG) 100 units/mL subcutaneous injection 1-6 Units, 1-6 Units, Subcutaneous, 4x Daily (AC & HS), 3 Units at 04/07/21 1146 **AND** Fingerstick Glucose (POCT), , , 4x Daily AC and at bedtime, Simi Plascencia PA-C    melatonin tablet 3 mg, 3 mg, Oral, HS, Simi Plascencia, PA-C, 3 mg at 04/06/21 2151    metoprolol (LOPRESSOR) injection 5 mg, 5 mg, Intravenous, Q6H PRN, Simi Plascencia PA-C    potassium chloride (K-DUR,KLOR-CON) CR tablet 40 mEq, 40 mEq, Oral, Once, Simi Plascencia PA-C    potassium chloride (K-DUR,KLOR-CON) CR tablet 40 mEq, 40 mEq, Oral, BID, Uvaldo HUI MD, 40 mEq at 04/07/21 0849    OBJECTIVE     Current Weight: Weight - Scale: 94 3 kg (207 lb 14 3 oz)  Vitals:    04/07/21 1357   BP: 155/73   Pulse:    Resp:    Temp: SpO2:        Intake/Output Summary (Last 24 hours) at 4/7/2021 1458  Last data filed at 4/7/2021 1359  Gross per 24 hour   Intake 1216 66 ml   Output 1050 ml   Net 166 66 ml       PHYSICAL EXAMINATION     Physical Exam  Constitutional:       General: He is not in acute distress  Appearance: He is well-developed  HENT:      Head: Normocephalic  Eyes:      General: No scleral icterus  Conjunctiva/sclera: Conjunctivae normal    Neck:      Musculoskeletal: Neck supple  Vascular: No JVD  Cardiovascular:      Rate and Rhythm: Normal rate  Heart sounds: Normal heart sounds  Pulmonary:      Effort: Pulmonary effort is normal       Breath sounds: No wheezing  Abdominal:      Palpations: Abdomen is soft  Tenderness: There is no abdominal tenderness  Musculoskeletal: Normal range of motion  Skin:     General: Skin is warm  Findings: No rash  Neurological:      Mental Status: He is alert and oriented to person, place, and time     Psychiatric:         Behavior: Behavior normal           LAB RESULTS     Results from last 7 days   Lab Units 04/07/21  0509 04/06/21  0453 04/05/21  2002 04/05/21  1259 04/05/21  0858 04/05/21  0436 04/04/21  2354 04/04/21  2158 04/04/21  1822 04/04/21  1441   WBC Thousand/uL 19 66* 20 40*  --   --   --  13 94*  --   --   --  19 64*   HEMOGLOBIN g/dL 7 0* 7 8*  --   --   --  9 0*  --   --   --  11 5*   HEMATOCRIT % 20 6* 21 8*  --   --   --  25 7*  --   --   --  33 2*   PLATELETS Thousands/uL 375 406*  --   --   --  406*  --   --  459* 486*   POTASSIUM mmol/L 3 3* 3 3* 3 5 3 7 3 8 3 7 4 2 4 8  --  6 5*   CHLORIDE mmol/L 111* 109* 107 103 101 99* 95* 91*  --  79*   CO2 mmol/L 27 23 23 19* 20* 20* 14* 13*  --  13*   BUN mg/dL 33* 65* 81* 100* 115* 129* 157* 178*  --  196*   CREATININE mg/dL 1 08 1 54* 2 06* 2 82* 3 80* 5 33* 8 15* 10 26*  --  13 24*   EGFR ml/min/1 73sq m 79 51 36 25 17 11 7 5  --  4   CALCIUM mg/dL 8 1* 8 2* 8 4 8 7 8 7 8 4 7 1* 7 3*  -- 8 1*   MAGNESIUM mg/dL  --  1 9 2 0 2 1 2 2 2 3 2 1 2 3  --  2 7*   PHOSPHORUS mg/dL  --  2 2* 2 7 3 6 4 4* 5 6* 7 3* 8 9*  --  11 2*       RADIOLOGY RESULTS      Results for orders placed during the hospital encounter of 04/04/21   XR chest 1 view portable    Narrative CHEST     INDICATION:   SOB     COMPARISON:  3/4/2020    EXAM PERFORMED/VIEWS:  XR CHEST PORTABLE      FINDINGS:    Cardiomediastinal silhouette appears unremarkable  The lungs are clear  No pneumothorax or pleural effusion  Osseous structures appear within normal limits for patient age  Impression No acute cardiopulmonary disease  Workstation performed: [id number]       Results for orders placed during the hospital encounter of 03/04/21   XR chest pa & lateral    Narrative CHEST     INDICATION:   E21 3: Hyperparathyroidism, unspecified  COMPARISON:  Chest radiograph from 1/21/2018  CT scan for parathyroid adenoma on 1/29/2021  Chest CT from 1/2/2020  EXAM PERFORMED/VIEWS:  XR CHEST PA & LATERAL  DUAL ENERGY SUBTRACTION  FINDINGS:    Cardiomediastinal silhouette normal  Coronary stent    Lungs clear  No effusion or pneumothorax  Mild degenerative disease in the spine  Impression No acute cardiopulmonary disease  Workstation performed: [id number]       No results found for this or any previous visit  No results found for this or any previous visit  Results for orders placed during the hospital encounter of 04/04/21   CT abdomen pelvis wo contrast    Narrative CT ABDOMEN AND PELVIS WITHOUT IV CONTRAST    INDICATION:   ARF, hydronephrosis  COMPARISON:  Ultrasound from 4/4/2021    TECHNIQUE:  CT examination of the abdomen and pelvis was performed without intravenous contrast   Axial, sagittal, and coronal 2D reformatted images were created from the source data and submitted for interpretation  Radiation dose length product (DLP) for this visit:  637 mGy-cm     This examination, like all CT scans performed in the [facility], was performed utilizing techniques to minimize radiation dose exposure, including the use of iterative   reconstruction and automated exposure control  Enteric contrast was administered  FINDINGS:    ABDOMEN    LOWER CHEST: Small infiltrate versus atelectasis is seen at the left lung base with small effusion  LIVER/BILIARY TREE:  Unremarkable  GALLBLADDER:  No calcified gallstones  No pericholecystic inflammatory change  SPLEEN:  Unremarkable  PANCREAS:  Unremarkable  ADRENAL GLANDS:  Unremarkable  KIDNEYS/URETERS:  High density debris is noted within the right renal pelvis with hydronephrosis correlating to some areas of increased echogenicity on ultrasound  There is proximal ureteral dilatation with more distal ureter normal in caliber  The left kidney also demonstrates moderate hydronephrosis but without high density debris  There is moderate to severe hydronephrosis noted on the left     There is thickening of the renal pelvis with dilatation of the proximal ureter gradual tapering to   normal caliber in the distal left ureter without obvious focal change in caliber  STOMACH AND BOWEL:  Left colon diverticulosis is noted without CT evidence of diverticulitis  APPENDIX:  No findings to suggest appendicitis  ABDOMINOPELVIC CAVITY:  No ascites  No pneumoperitoneum  No lymphadenopathy  There is left greater than right perinephric stranding identified  There appears to be 8 perinephric collection medially adjacent to the left renal hilum which measures approximately 1 6 x 1 7 x 7 7 cm  Abutting this perinephric collection is a 2nd collection which most likely communicates the 1st and is centered in Gerota's fascia extending into the anterior pararenal space which measures approximately 2 7 x 5 8 x 8 1 cm  No gas bubbles are noted   within the this collection  VESSELS:  Atherosclerotic changes are present    Mild aneurysmal dilatation measures 3 1 cm distally in the aorta  PELVIS    REPRODUCTIVE ORGANS:  Unremarkable for patient's age  URINARY BLADDER:  Butler catheter is noted within decompressed bladder  ABDOMINAL WALL/INGUINAL REGIONS:  Unremarkable  OSSEOUS STRUCTURES:  No acute fracture or destructive osseous lesion  Impression Left greater than right moderate to severe hydronephrosis  Left greater than right perinephric stranding may be related to obstruction or infection  Hemorrhage or complicated pyelonephritis with debris could be the cause of obstruction in the   collecting system although occult stricture of the ureters could also be present  Collection in the left perinephric space and extending into the pararenal space could be related to urinoma or perinephric abscess  This should be correlated clinically  No gas bubbles are seen however  Neoplasm is not excluded    This was discussed with 100 Moshe Case in the ICU at 11:50 AM        Workstation performed: [id number]       No results found for this or any previous visit  PLAN / RECOMMENDATIONS      Acute kidney injury:  Resolved with Butler catheter relief of obstruction    Obstructive uropathy:  Due to enlarged prostate  Does have Butler catheter which will continue  Being monitored by urologist    Hematuria[de-identified]  Urology follow-up    Anemia:  Decreasing hemoglobin may be secondary to hematuria    Hyponatremia:  Improved seems to be going high  Under D5W at this point which I will continue    Hypokalemia:  Likely because of diuresis  Will continue potassium replacement    Will follow-up    Nighat Fagan MD  Nephrology  4/7/2021        Portions of the record may have been created with voice recognition software  Occasional wrong word or "sound a like" substitutions may have occurred due to the inherent limitations of voice recognition software  Read the chart carefully and recognize, using context, where substitutions have occurred

## 2021-04-07 NOTE — CASE MANAGEMENT
Cm met with patient at bedside to review STR recommendation  Patient reported that he would prefer to return home with services if possible  Cm discussed freedom of choice  Cm reviewed the possibility of HHC instead  Patient agreeable and denied any preferences as to which agency  Cm also reviewed patient's IMM  Patient reported that he understood his Medicare rights and was provided with a copy  Cm placed the original copy in medical records  Cm sent referrals for [facility] via [employer]  Cm department will continue to follow patient through discharge

## 2021-04-07 NOTE — ASSESSMENT & PLAN NOTE
Due to obstructive uropathy  Now resolved  Creatinine 1 08  Continue indwelling Butler catheter  Nephrology following  Urology recommendation appreciated

## 2021-04-08 LAB
ABO GROUP BLD BPU: NORMAL
ANION GAP SERPL CALCULATED.3IONS-SCNC: 7 MMOL/L (ref 4–13)
BASOPHILS # BLD MANUAL: 0.52 THOUSAND/UL (ref 0–0.1)
BASOPHILS NFR MAR MANUAL: 3 % (ref 0–1)
BPU ID: NORMAL
BUN SERPL-MCNC: 16 MG/DL (ref 5–25)
CALCIUM SERPL-MCNC: 8 MG/DL (ref 8.3–10.1)
CHLORIDE SERPL-SCNC: 109 MMOL/L (ref 100–108)
CO2 SERPL-SCNC: 28 MMOL/L (ref 21–32)
CREAT SERPL-MCNC: 0.84 MG/DL (ref 0.6–1.3)
CROSSMATCH: NORMAL
EOSINOPHIL # BLD MANUAL: 0 THOUSAND/UL (ref 0–0.4)
EOSINOPHIL NFR BLD MANUAL: 0 % (ref 0–6)
ERYTHROCYTE [DISTWIDTH] IN BLOOD BY AUTOMATED COUNT: 15.9 % (ref 11.6–15.1)
ERYTHROCYTE [DISTWIDTH] IN BLOOD BY AUTOMATED COUNT: 16.5 % (ref 11.6–15.1)
GFR SERPL CREATININE-BSD FRML MDRD: 101 ML/MIN/1.73SQ M
GLUCOSE SERPL-MCNC: 108 MG/DL (ref 65–140)
GLUCOSE SERPL-MCNC: 146 MG/DL (ref 65–140)
GLUCOSE SERPL-MCNC: 164 MG/DL (ref 65–140)
GLUCOSE SERPL-MCNC: 178 MG/DL (ref 65–140)
HCT VFR BLD AUTO: 21 % (ref 36.5–49.3)
HCT VFR BLD AUTO: 26.4 % (ref 36.5–49.3)
HEMOCCULT STL QL IA: POSITIVE
HGB BLD-MCNC: 7 G/DL (ref 12–17)
HGB BLD-MCNC: 8.8 G/DL (ref 12–17)
LYMPHOCYTES # BLD AUTO: 1.75 THOUSAND/UL (ref 0.6–4.47)
LYMPHOCYTES # BLD AUTO: 10 % (ref 14–44)
MACROCYTES BLD QL AUTO: PRESENT
MCH RBC QN AUTO: 27.6 PG (ref 26.8–34.3)
MCH RBC QN AUTO: 27.8 PG (ref 26.8–34.3)
MCHC RBC AUTO-ENTMCNC: 33.3 G/DL (ref 31.4–37.4)
MCHC RBC AUTO-ENTMCNC: 33.3 G/DL (ref 31.4–37.4)
MCV RBC AUTO: 83 FL (ref 82–98)
MCV RBC AUTO: 83 FL (ref 82–98)
MONOCYTES # BLD AUTO: 1.05 THOUSAND/UL (ref 0–1.22)
MONOCYTES NFR BLD: 6 % (ref 4–12)
NEUTROPHILS # BLD MANUAL: 14.16 THOUSAND/UL (ref 1.85–7.62)
NEUTS BAND NFR BLD MANUAL: 1 % (ref 0–8)
NEUTS SEG NFR BLD AUTO: 80 % (ref 43–75)
NRBC BLD AUTO-RTO: 1 /100 WBC (ref 0–2)
NRBC BLD AUTO-RTO: 2 /100 WBCS
PLATELET # BLD AUTO: 310 THOUSANDS/UL (ref 149–390)
PLATELET # BLD AUTO: 313 THOUSANDS/UL (ref 149–390)
PLATELET BLD QL SMEAR: ADEQUATE
PMV BLD AUTO: 9.1 FL (ref 8.9–12.7)
PMV BLD AUTO: 9.5 FL (ref 8.9–12.7)
POLYCHROMASIA BLD QL SMEAR: PRESENT
POTASSIUM SERPL-SCNC: 3.7 MMOL/L (ref 3.5–5.3)
RBC # BLD AUTO: 2.52 MILLION/UL (ref 3.88–5.62)
RBC # BLD AUTO: 3.19 MILLION/UL (ref 3.88–5.62)
SCHISTOCYTES BLD QL SMEAR: PRESENT
SODIUM SERPL-SCNC: 144 MMOL/L (ref 136–145)
TOTAL CELLS COUNTED SPEC: 100
UNIT DISPENSE STATUS: NORMAL
UNIT PRODUCT CODE: NORMAL
UNIT RH: NORMAL
WBC # BLD AUTO: 17.48 THOUSAND/UL (ref 4.31–10.16)
WBC # BLD AUTO: 18.71 THOUSAND/UL (ref 4.31–10.16)

## 2021-04-08 PROCEDURE — G0328 FECAL BLOOD SCRN IMMUNOASSAY: HCPCS | Performed by: STUDENT IN AN ORGANIZED HEALTH CARE EDUCATION/TRAINING PROGRAM

## 2021-04-08 PROCEDURE — 99232 SBSQ HOSP IP/OBS MODERATE 35: CPT | Performed by: STUDENT IN AN ORGANIZED HEALTH CARE EDUCATION/TRAINING PROGRAM

## 2021-04-08 PROCEDURE — 85027 COMPLETE CBC AUTOMATED: CPT | Performed by: STUDENT IN AN ORGANIZED HEALTH CARE EDUCATION/TRAINING PROGRAM

## 2021-04-08 PROCEDURE — 99232 SBSQ HOSP IP/OBS MODERATE 35: CPT | Performed by: NURSE PRACTITIONER

## 2021-04-08 PROCEDURE — 85007 BL SMEAR W/DIFF WBC COUNT: CPT | Performed by: PHYSICIAN ASSISTANT

## 2021-04-08 PROCEDURE — 80048 BASIC METABOLIC PNL TOTAL CA: CPT | Performed by: INTERNAL MEDICINE

## 2021-04-08 PROCEDURE — 30233N1 TRANSFUSION OF NONAUTOLOGOUS RED BLOOD CELLS INTO PERIPHERAL VEIN, PERCUTANEOUS APPROACH: ICD-10-PCS | Performed by: STUDENT IN AN ORGANIZED HEALTH CARE EDUCATION/TRAINING PROGRAM

## 2021-04-08 PROCEDURE — 82948 REAGENT STRIP/BLOOD GLUCOSE: CPT

## 2021-04-08 PROCEDURE — 99232 SBSQ HOSP IP/OBS MODERATE 35: CPT | Performed by: INTERNAL MEDICINE

## 2021-04-08 PROCEDURE — 85027 COMPLETE CBC AUTOMATED: CPT | Performed by: PHYSICIAN ASSISTANT

## 2021-04-08 PROCEDURE — P9016 RBC LEUKOCYTES REDUCED: HCPCS

## 2021-04-08 PROCEDURE — C9113 INJ PANTOPRAZOLE SODIUM, VIA: HCPCS | Performed by: STUDENT IN AN ORGANIZED HEALTH CARE EDUCATION/TRAINING PROGRAM

## 2021-04-08 RX ADMIN — CLONIDINE HYDROCHLORIDE 0.1 MG: 0.1 TABLET ORAL at 05:53

## 2021-04-08 RX ADMIN — CHLORHEXIDINE GLUCONATE 0.12% ORAL RINSE 15 ML: 1.2 LIQUID ORAL at 11:24

## 2021-04-08 RX ADMIN — INSULIN LISPRO 1 UNITS: 100 INJECTION, SOLUTION INTRAVENOUS; SUBCUTANEOUS at 21:29

## 2021-04-08 RX ADMIN — CLONIDINE HYDROCHLORIDE 0.1 MG: 0.1 TABLET ORAL at 21:28

## 2021-04-08 RX ADMIN — ATORVASTATIN CALCIUM 80 MG: 40 TABLET, FILM COATED ORAL at 11:25

## 2021-04-08 RX ADMIN — POTASSIUM CHLORIDE 40 MEQ: 1500 TABLET, EXTENDED RELEASE ORAL at 18:06

## 2021-04-08 RX ADMIN — CLONIDINE HYDROCHLORIDE 0.1 MG: 0.1 TABLET ORAL at 14:00

## 2021-04-08 RX ADMIN — ATENOLOL 25 MG: 25 TABLET ORAL at 11:25

## 2021-04-08 RX ADMIN — CHLORHEXIDINE GLUCONATE 0.12% ORAL RINSE 15 ML: 1.2 LIQUID ORAL at 21:28

## 2021-04-08 RX ADMIN — POTASSIUM CHLORIDE 40 MEQ: 1500 TABLET, EXTENDED RELEASE ORAL at 11:27

## 2021-04-08 RX ADMIN — MELATONIN 3 MG: at 21:28

## 2021-04-08 RX ADMIN — PANTOPRAZOLE SODIUM 8 MG/HR: 40 INJECTION, POWDER, FOR SOLUTION INTRAVENOUS at 19:44

## 2021-04-08 RX ADMIN — CEFTRIAXONE SODIUM 1000 MG: 10 INJECTION, POWDER, FOR SOLUTION INTRAVENOUS at 17:00

## 2021-04-08 NOTE — PROGRESS NOTES
Progress Note - Janelle Green 65 y o  male MRN: [id number]    Unit/Bed#: -02 Encounter: [id number]      Assessment:  Janelle Green is a pleasant 58-year-old male admitted to [facility] for chief complaint of generalized weakness for approximately 2 weeks with decreased urinary output  Admission lab work demonstrated elevated lactic acid, hyperkalemia and severe AK I with serum creatinine of 13 2  Patient underwent renal ultrasound which demonstrated bilateral hydronephrosis, bladder overdistention and possible distal right ureteral calculus  Butler catheter was inserted and reimaging demonstrated resolution of hydronephrosis and no evidence of ureteral stone  He was seen in urologic consultation 4/5 by  attending, Dr Aracelis Holland for dark colored urine  He was on aspirin and Plavix and Plavix was discontinued just prior to discharge  His serum creatinine is now normal   Urine and blood cultures were both negative for growth at 72 hours  On 04/05 patient's hemoglobin was 9 0 then 7 8 on 04/06, 7 0 on 04/07 in the a m  And down to 6 7 x 2200 last night  Hemoglobin this a m  Is 7  Patient is receiving transfusion of packed cells  At the bedside, patient is awake, alert, without complaints of flank, abdominal, suprapubic pain, nausea/vomiting or bladder spasm  Catheter remains in-situ and patent for blush urine without evidence of clots  Red seal intact on Butler catheter  Media Information       Document Information    Clinical Image - Mobile Device   Bosch Hematuria 4/8 04/08/2021 11:02   Attached To: Hospital Encounter on 4/4/21   Source SIGIFREDO Teran  [facility] 3rd Floor Med Surg           Plan:  1  Continue medical optimization  2  Maintain Butler catheter to straight drainage  3  Manually irrigate q 4 hours for at least the next 24 hours  4  Hold anticoagulants until urine is clear yellow for at least 48 hours  5  Monitor hemoglobin    6  Of note, anemia is likely multifactorial   Degree of hematuria present can contribute  However,  tract hemorrhage associated with acute blood-loss anemia requiring transfusion, produces extremely symptomatic patient with extremis due to clot retention  Patient's Butler catheter is a smaller lumen and would not drain bladder efficiently, if this were the case  7  Long-term, discharge with Butler catheter in place  8  Outpatient cystoscopy  9  Emergent  intervention not indicated  Subjective:   Denies fever, chills, flank, abdominal, suprapubic pain, bladder spasm or dysuria  Objective:     Vitals: Blood pressure 126/88, pulse 99, temperature 98 6 °F (37 °C), temperature source Oral, resp  rate 18, height 5' 7" (1 702 m), weight 80 1 kg (176 lb 9 4 oz), SpO2 99 %  ,Body mass index is 27 66 kg/m²        Intake/Output Summary (Last 24 hours) at 4/8/2021 1207  Last data filed at 4/8/2021 9504  Gross per 24 hour   Intake 970 ml   Output 2850 ml   Net -1880 ml       Physical Exam: General appearance: alert and oriented, in no acute distress, appears stated age, cooperative and no distress  Head: Normocephalic, without obvious abnormality, atraumatic  Neck: no adenopathy, no carotid bruit, no JVD, supple, symmetrical, trachea midline and thyroid not enlarged, symmetric, no tenderness/mass/nodules  Lungs: diminished breath sounds  Heart: regular rate and rhythm, S1, S2 normal, no murmur, click, rub or gallop  Abdomen: soft, non-tender; bowel sounds normal; no masses,  no organomegaly  Extremities: extremities normal, warm and well-perfused; no cyanosis, clubbing, or edema  Pulses: 2+ and symmetric  Neurologic: Grossly normal  [facility] Butler catheter--patent for clear blush urine without clots     Invasive Devices     Peripheral Intravenous Line            Peripheral IV 04/08/21 Right Antecubital less than 1 day          Drain            Urethral Catheter Non-latex 3 days              Lab Results   Component Value Date WBC 17 48 (H) 04/08/2021    HGB 7 0 (L) 04/08/2021    HCT 21 0 (L) 04/08/2021    MCV 83 04/08/2021     04/08/2021     Lab Results   Component Value Date    SODIUM 144 04/08/2021    K 3 7 04/08/2021     (H) 04/08/2021    CO2 28 04/08/2021    BUN 16 04/08/2021    CREATININE 0 84 04/08/2021    GLUC 146 (H) 04/08/2021    CALCIUM 8 0 (L) 04/08/2021       Lab, Imaging and other studies: I have personally reviewed pertinent reports

## 2021-04-08 NOTE — ASSESSMENT & PLAN NOTE
Due to obstructive uropathy  Now resolved  Creatinine 0 84  Continue indwelling Butler catheter  Lisinopril, Demadex on hold  Nephrology following  Urology recommendation appreciated

## 2021-04-08 NOTE — PROGRESS NOTES
Pt noted with dark black tarry stool  Notified SLIM and received new orders for IV PPI  Will continue to monitor

## 2021-04-08 NOTE — PHYSICAL THERAPY NOTE
04/08/21 0841   PT Last Visit   PT Visit Date 04/08/21   Note Type   Note Type Treatment   Cancel Reasons Medical status     PT treatment held today, Hemoglobin at 7 0   NAZIA mitchell  Will continue with POC as able

## 2021-04-08 NOTE — ASSESSMENT & PLAN NOTE
Currently asymptomatic  Aspirin and Brilinta on hold due to hematuria  Lisinopril, Demadex on hold due to CIERA  Continue statin

## 2021-04-08 NOTE — ASSESSMENT & PLAN NOTE
Currently not septic  Afebrile, hemodynamically stable  Leukocytosis improving  Urine culture no growth, blood culture without growth  Currently on IV ceftriaxone day 5

## 2021-04-08 NOTE — ASSESSMENT & PLAN NOTE
Lab Results   Component Value Date    HGBA1C 6 0 (H) 12/28/2020       Recent Labs     04/07/21  1534 04/07/21  1632 04/07/21 2054 04/08/21  0737   POCGLU 157* 248* 223* 164*       Blood Sugar Average: Last 72 hrs:  (P) 101 [id number]     Controlled  Hold oral agent  Diabetic diet  Sliding scale coverage

## 2021-04-08 NOTE — ASSESSMENT & PLAN NOTE
Overall reasonably controlled  Resumed decrease dose of antihypertensive history  Continue atenolol 25 mg daily, clonidine 0 1 mg t i d  For now  Lisinopril, Demadex on hold due to acute kidney injury  Monitor vitals per unit protocol

## 2021-04-08 NOTE — NUTRITION
04/08/21 1320   Recommendations/Interventions   Summary Patient's appetite remains fair on current diet  Acute kidney injury improved  Nursing skin care plan reviewed  Interventions Other (comment)  (RD does not have protocol to adjust diet/supplements )   Nutrition Recommendations Other (Specify); Lab consider order (Specify)  (Suggest add Glucerna BID to current diet order  Monitor electrolytes, if blood glucose elevated adjust diet to CCD3   Obtain current standing scale weight )

## 2021-04-08 NOTE — ASSESSMENT & PLAN NOTE
Butler catheter noted with gross hematuria  Hemoglobin down trending  Hematuria improving  Patient did receive 1 unit PRBC transfusion yesterday 04/07/2021      Butler catheter noted with dark punch colored urine  Hold aspirin, Plavix  Hemoglobin hovering around 7  Will transfuse 2 units PRBC today  Patient is agreeable with above plan  Urology following

## 2021-04-08 NOTE — CASE MANAGEMENT
Per rounding with SLIM, patient is anticipated to be discharged in 24-48 hrs  Patient received 1 unit of blood transfusion and is still requiring IV abx  Cm updated [place] via [employer] about the anticipated discharge

## 2021-04-08 NOTE — ASSESSMENT & PLAN NOTE
Hypernatremia likely secondary to obstructive uropathy  Currently sodium 144  Monitor BMP    Nephrology following

## 2021-04-08 NOTE — PROGRESS NOTES
Deterioration index noted  Notified SLIM and completed sepsis screen per protocol  Will continue to monitor

## 2021-04-08 NOTE — PROGRESS NOTES
NEPHROLOGY PROGRESS NOTE    Patient: Kati Ovalles               Sex: male          DOA: 4/4/2021  1:56 PM   YOB: 1950        Age:  65 y o         LOS:  LOS: 4 days       HPI     Patient admitted with the obstructive uropathy and acute kidney injury    SUBJECTIVE     Seems to doing better    Awake and alert    Denies any acute complaint    Still has hematuria and getting blood transfusion because of anemia    CURRENT MEDICATIONS       Current Facility-Administered Medications:     atenolol (TENORMIN) tablet 25 mg, 25 mg, Oral, Daily, LUISANA Delgado-GARY, 25 mg at 04/07/21 0849    atorvastatin (LIPITOR) tablet 80 mg, 80 mg, Oral, Daily, LUISANA Deglado-C, 80 mg at 04/07/21 0849    ceftriaxone (ROCEPHIN) 1 g/50 mL in dextrose IVPB, 1,000 mg, Intravenous, Q24H, Suzanne Vaca PA-C, Last Rate: 100 mL/hr at 04/07/21 1531, 1,000 mg at 04/07/21 1531    chlorhexidine (PERIDEX) 0 12 % oral rinse 15 mL, 15 mL, Swish & Spit, Q12H [address], LUISANA Delgado-C, 15 mL at 04/07/21 2222    cloNIDine (CATAPRES) tablet 0 1 mg, 0 1 mg, Oral, Q8H [address], LUISANA Delgado-C, 0 1 mg at 04/08/21 0553    insulin lispro (HumaLOG) 100 units/mL subcutaneous injection 1-6 Units, 1-6 Units, Subcutaneous, 4x Daily (AC & HS), 2 Units at 04/07/21 2222 **AND** Fingerstick Glucose (POCT), , , 4x Daily AC and at bedtime, Suzanne Vaca PA-C    melatonin tablet 3 mg, 3 mg, Oral, HS, LUISANA Delgado-GARY, 3 mg at 04/07/21 2222    metoprolol (LOPRESSOR) injection 5 mg, 5 mg, Intravenous, Q6H PRN, LUISANA Delgado-GARY    potassium chloride (K-DUR,KLOR-CON) CR tablet 40 mEq, 40 mEq, Oral, Once, Suzanne Vaca PA-C    potassium chloride (K-DUR,KLOR-CON) CR tablet 40 mEq, 40 mEq, Oral, BID, Uvaldo HUI MD, 40 mEq at 04/07/21 1707    OBJECTIVE     Current Weight: Weight - Scale: 80 1 kg (176 lb 9 4 oz)  Vitals:    04/08/21 0955   BP: 123/88   Pulse: 103   Resp: 18   Temp: 98 8 °F (37 1 °C)   SpO2: Intake/Output Summary (Last 24 hours) at 4/8/2021 1116  Last data filed at 4/8/2021 3920  Gross per 24 hour   Intake 970 ml   Output 2850 ml   Net -1880 ml       PHYSICAL EXAMINATION     Physical Exam  Constitutional:       General: He is not in acute distress  Appearance: He is well-developed  HENT:      Head: Normocephalic  Eyes:      General: No scleral icterus  Conjunctiva/sclera: Conjunctivae normal    Neck:      Musculoskeletal: Neck supple  Vascular: No JVD  Cardiovascular:      Rate and Rhythm: Normal rate  Heart sounds: Normal heart sounds  Pulmonary:      Effort: Pulmonary effort is normal       Breath sounds: No wheezing  Abdominal:      Palpations: Abdomen is soft  Tenderness: There is no abdominal tenderness  Musculoskeletal: Normal range of motion  Skin:     General: Skin is warm  Findings: No rash  Neurological:      Mental Status: He is alert and oriented to person, place, and time     Psychiatric:         Behavior: Behavior normal           LAB RESULTS     Results from last 7 days   Lab Units 04/08/21  0935 04/08/21  0619 04/07/21  2250 04/07/21  0509 04/06/21  0453 04/05/21  2002 04/05/21  1259 04/05/21  0858 04/05/21  0436 04/04/21  2354 04/04/21  2158 04/04/21  1822 04/04/21  1441   WBC Thousand/uL  --  17 48*  --  19 66* 20 40*  --   --   --  13 94*  --   --   --  19 64*   HEMOGLOBIN g/dL  --  7 0* 6 7* 7 0* 7 8*  --   --   --  9 0*  --   --   --  11 5*   HEMATOCRIT %  --  21 0* 20 1* 20 6* 21 8*  --   --   --  25 7*  --   --   --  33 2*   PLATELETS Thousands/uL  --  313  --  375 406*  --   --   --  406*  --   --  459* 486*   POTASSIUM mmol/L 3 7  --   --  3 3* 3 3* 3 5 3 7 3 8 3 7 4 2 4 8  --  6 5*   CHLORIDE mmol/L 109*  --   --  111* 109* 107 103 101 99* 95* 91*  --  79*   CO2 mmol/L 28  --   --  27 23 23 19* 20* 20* 14* 13*  --  13*   BUN mg/dL 16  --   --  33* 65* 81* 100* 115* 129* 157* 178*  --  196*   CREATININE mg/dL 0 84  --   --  1 08 1 54* 2 06* 2 82* 3 80* 5 33* 8 15* 10 26*  --  13 24*   EGFR ml/min/1 73sq m 101  --   --  79 51 36 25 17 11 7 5  --  4   CALCIUM mg/dL 8 0*  --   --  8 1* 8 2* 8 4 8 7 8 7 8 4 7 1* 7 3*  --  8 1*   MAGNESIUM mg/dL  --   --   --   --  1 9 2 0 2 1 2 2 2 3 2 1 2 3  --  2 7*   PHOSPHORUS mg/dL  --   --   --   --  2 2* 2 7 3 6 4 4* 5 6* 7 3* 8 9*  --  11 2*       RADIOLOGY RESULTS      Results for orders placed during the hospital encounter of 04/04/21   XR chest 1 view portable    Narrative CHEST     INDICATION:   SOB     COMPARISON:  3/4/2020    EXAM PERFORMED/VIEWS:  XR CHEST PORTABLE      FINDINGS:    Cardiomediastinal silhouette appears unremarkable  The lungs are clear  No pneumothorax or pleural effusion  Osseous structures appear within normal limits for patient age  Impression No acute cardiopulmonary disease  Workstation performed: [id number]       Results for orders placed during the hospital encounter of 03/04/21   XR chest pa & lateral    Narrative CHEST     INDICATION:   E21 3: Hyperparathyroidism, unspecified  COMPARISON:  Chest radiograph from 1/21/2018  CT scan for parathyroid adenoma on 1/29/2021  Chest CT from 1/2/2020  EXAM PERFORMED/VIEWS:  XR CHEST PA & LATERAL  DUAL ENERGY SUBTRACTION  FINDINGS:    Cardiomediastinal silhouette normal  Coronary stent    Lungs clear  No effusion or pneumothorax  Mild degenerative disease in the spine  Impression No acute cardiopulmonary disease  Workstation performed: [id number]       No results found for this or any previous visit  No results found for this or any previous visit  Results for orders placed during the hospital encounter of 04/04/21   CT abdomen pelvis wo contrast    Narrative CT ABDOMEN AND PELVIS WITHOUT IV CONTRAST    INDICATION:   ARF, hydronephrosis      COMPARISON:  Ultrasound from 4/4/2021    TECHNIQUE:  CT examination of the abdomen and pelvis was performed without intravenous contrast  Axial, sagittal, and coronal 2D reformatted images were created from the source data and submitted for interpretation  Radiation dose length product (DLP) for this visit:  637 mGy-cm   This examination, like all CT scans performed in the [place], was performed utilizing techniques to minimize radiation dose exposure, including the use of iterative   reconstruction and automated exposure control  Enteric contrast was administered  FINDINGS:    ABDOMEN    LOWER CHEST: Small infiltrate versus atelectasis is seen at the left lung base with small effusion  LIVER/BILIARY TREE:  Unremarkable  GALLBLADDER:  No calcified gallstones  No pericholecystic inflammatory change  SPLEEN:  Unremarkable  PANCREAS:  Unremarkable  ADRENAL GLANDS:  Unremarkable  KIDNEYS/URETERS:  High density debris is noted within the right renal pelvis with hydronephrosis correlating to some areas of increased echogenicity on ultrasound  There is proximal ureteral dilatation with more distal ureter normal in caliber  The left kidney also demonstrates moderate hydronephrosis but without high density debris  There is moderate to severe hydronephrosis noted on the left     There is thickening of the renal pelvis with dilatation of the proximal ureter gradual tapering to   normal caliber in the distal left ureter without obvious focal change in caliber  STOMACH AND BOWEL:  Left colon diverticulosis is noted without CT evidence of diverticulitis  APPENDIX:  No findings to suggest appendicitis  ABDOMINOPELVIC CAVITY:  No ascites  No pneumoperitoneum  No lymphadenopathy  There is left greater than right perinephric stranding identified  There appears to be 8 perinephric collection medially adjacent to the left renal hilum which measures approximately 1 6 x 1 7 x 7 7 cm      Abutting this perinephric collection is a 2nd collection which most likely communicates the 1st and is centered in Gerota's fascia extending into the anterior pararenal space which measures approximately 2 7 x 5 8 x 8 1 cm  No gas bubbles are noted   within the this collection  VESSELS:  Atherosclerotic changes are present  Mild aneurysmal dilatation measures 3 1 cm distally in the aorta  PELVIS    REPRODUCTIVE ORGANS:  Unremarkable for patient's age  URINARY BLADDER:  Butler catheter is noted within decompressed bladder  ABDOMINAL WALL/INGUINAL REGIONS:  Unremarkable  OSSEOUS STRUCTURES:  No acute fracture or destructive osseous lesion  Impression Left greater than right moderate to severe hydronephrosis  Left greater than right perinephric stranding may be related to obstruction or infection  Hemorrhage or complicated pyelonephritis with debris could be the cause of obstruction in the   collecting system although occult stricture of the ureters could also be present  Collection in the left perinephric space and extending into the pararenal space could be related to urinoma or perinephric abscess  This should be correlated clinically  No gas bubbles are seen however  Neoplasm is not excluded    This was discussed with Moshe Cristina in the ICU at 11:50 AM        Workstation performed: [id number]       No results found for this or any previous visit  PLAN / RECOMMENDATIONS      Acute kidney injury:  Improved seems to be normal at this point    Obstructive uropathy:  Patient still has a Butler catheter  Being monitored by urologist    Hematuria:  Because of Butler catheter and obstructive uropathy  Being monitored by urologist    Anemia:  Getting blood transfusion  Likely etiology loss of blood    UTI:  On antibiotic    Will continue to monitor    Arnaldo Bello MD  Nephrology  4/8/2021        Portions of the record may have been created with voice recognition software   Occasional wrong word or "sound a like" substitutions may have occurred due to the inherent limitations of voice recognition software  Read the chart carefully and recognize, using context, where substitutions have occurred

## 2021-04-08 NOTE — PROGRESS NOTES
[facility]  Progress Note - Shereen Beverly 1950, 65 y o  male MRN: [id number]  Unit/Bed#: -02 Encounter: [id number]  Primary Care Provider: Mike Rocha MD   Date and time admitted to hospital: 4/4/2021  1:56 PM    * CIERA (acute kidney injury) [facility])  Assessment & Plan  Due to obstructive uropathy  Now resolved  Creatinine 0 84  Continue indwelling Butler catheter  Lisinopril, Demadex on hold  Nephrology following  Urology recommendation appreciated  Hematuria  Assessment & Plan  Butler catheter noted with gross hematuria  Hemoglobin down trending  Hematuria improving  Patient did receive 1 unit PRBC transfusion yesterday 04/07/2021      Butler catheter noted with dark punch colored urine  Hold aspirin, Plavix  Hemoglobin hovering around 7  Will transfuse 2 units PRBC today  Patient is agreeable with above plan  Urology following  Dehydration with hyponatremia  Assessment & Plan  Hypernatremia likely secondary to obstructive uropathy  Currently sodium 144  Monitor BMP  Nephrology following    UTI (urinary tract infection)  Assessment & Plan  Currently not septic  Afebrile, hemodynamically stable  Leukocytosis improving  Urine culture no growth, blood culture without growth  Currently on IV ceftriaxone day 5  Metabolic acidosis  Assessment & Plan  Now resolved    Hyperkalemia  Assessment & Plan  Now resolved  Currently potassium 3 7  Hyperparathyroidism [facility])  Assessment & Plan  Status post parathyroidectomy on 03/23/21  Supplement calcium as needed  Coronary artery disease involving native coronary artery of native heart  Assessment & Plan  Currently asymptomatic  Aspirin and Brilinta on hold due to hematuria  Lisinopril, Demadex on hold due to CIERA  Continue statin  Tobacco abuse  Assessment & Plan  Counseled on smoking cessation      Type 2 diabetes mellitus ([facility] [address] )  Assessment & Plan  Lab Results   Component Value Date    HGBA1C 6 0 (H) 2020       Recent Labs     21  1534 21  1632 21  2054 21  0737   POCGLU 157* 248* 223* 164*       Blood Sugar Average: Last 72 hrs:  (P) 750 [id number]     Controlled  Hold oral agent  Diabetic diet  Sliding scale coverage  HTN (hypertension)  Assessment & Plan  Overall reasonably controlled  Resumed decrease dose of antihypertensive history  Continue atenolol 25 mg daily, clonidine 0 1 mg t i d  For now  Lisinopril, Demadex on hold due to acute kidney injury  Monitor vitals per unit protocol  Hyperlipidemia  Assessment & Plan  Continue statin    VTE Pharmacologic Prophylaxis:   Pharmacologic: Pharmacologic VTE Prophylaxis contraindicated due to Hematuria, anemia requiring transfusion  Mechanical VTE Prophylaxis in Place: Yes    Patient Centered Rounds: I have performed bedside rounds with nursing staff today  Discussions with Specialists or Other Care Team Provider:  Urology, Nephrology    Education and Discussions with Family / Patient: again called both the phone no  On the chart for [id number] and [phone] around 12:00 pm today and busy tone without ringing  Time Spent for Care: 30 minutes  More than 50% of total time spent on counseling and coordination of care as described above  Current Length of Stay: 4 day(s)    Current Patient Status: Inpatient   Certification Statement: The patient will continue to require additional inpatient hospital stay due to Above    Discharge Plan:  Expected in 24-48 hours  Patient is refusing to go to rehab  Code Status: Level 2 - DNAR: but accepts endotracheal intubation      Subjective:   Afebrile, hemodynamically stable  Her hemoglobin around 7  Hematuria improving  Full patient is awake, alert, oriented x3  Denies chest pain, dyspnea, fever, chills, nausea vomiting, any new complaints  No other events reported      Objective:     Vitals:   Temp (24hrs), Av 6 °F (37 °C), Min:98 2 °F (36 8 °C), Max:98 8 °F (37 1 °C)    Temp:  [98 2 °F (36 8 °C)-98 8 °F (37 1 °C)] 98 6 °F (37 °C)  HR:  [] 99  Resp:  [16-20] 18  BP: (123-155)/() 126/88  SpO2:  [95 %-100 %] 99 %  Body mass index is 27 66 kg/m²  Input and Output Summary (last 24 hours): Intake/Output Summary (Last 24 hours) at 4/8/2021 1200  Last data filed at 4/8/2021 1199  Gross per 24 hour   Intake 970 ml   Output 2850 ml   Net -1880 ml       Physical Exam:     Physical Exam  Constitutional:       General: He is not in acute distress  Appearance: Normal appearance  He is not ill-appearing  HENT:      Head: Normocephalic and atraumatic  Nose: No rhinorrhea  Eyes:      Extraocular Movements: Extraocular movements intact  Conjunctiva/sclera: Conjunctivae normal       Pupils: Pupils are equal, round, and reactive to light  Neck:      Musculoskeletal: Normal range of motion and neck supple  No neck rigidity  Cardiovascular:      Rate and Rhythm: Normal rate and regular rhythm  Pulses: Normal pulses  Heart sounds: Normal heart sounds  Pulmonary:      Effort: Pulmonary effort is normal  No respiratory distress  Breath sounds: Normal breath sounds  No stridor  No wheezing or rhonchi  Abdominal:      General: Bowel sounds are normal  There is no distension  Palpations: Abdomen is soft  Tenderness: There is no abdominal tenderness  Genitourinary:     Comments: Butler catheter noted with dark punch colored urine  Musculoskeletal: Normal range of motion  Neurological:      General: No focal deficit present  Mental Status: He is alert  Mental status is at baseline     Psychiatric:         Mood and Affect: Mood normal          Behavior: Behavior normal          Additional Data:     Labs:    Results from last 7 days   Lab Units 04/08/21  0619  04/07/21  0509   WBC Thousand/uL 17 48*  --  19 66*   HEMOGLOBIN g/dL 7 0*   < > 7 0*   HEMATOCRIT % 21 0*   < > 20 6*   PLATELETS Thousands/uL 313 --  375   BANDS PCT % 1  --   --    NEUTROS PCT %  --   --  80*   LYMPHS PCT %  --   --  6*   LYMPHO PCT % 10*  --   --    MONOS PCT %  --   --  10   MONO PCT % 6  --   --    EOS PCT % 0  --  2    < > = values in this interval not displayed  Results from last 7 days   Lab Units 04/08/21  0935  04/05/21  0436   SODIUM mmol/L 144   < > 133*   POTASSIUM mmol/L 3 7   < > 3 7   CHLORIDE mmol/L 109*   < > 99*   CO2 mmol/L 28   < > 20*   BUN mg/dL 16   < > 129*   CREATININE mg/dL 0 84   < > 5 33*   ANION GAP mmol/L 7   < > 14*   CALCIUM mg/dL 8 0*   < > 8 4   ALBUMIN g/dL  --   --  1 7*   TOTAL BILIRUBIN mg/dL  --   --  0 31   ALK PHOS U/L  --   --  80   ALT U/L  --   --  24   AST U/L  --   --  27   GLUCOSE RANDOM mg/dL 146*   < > 163*    < > = values in this interval not displayed  Results from last 7 days   Lab Units 04/04/21  1441   INR  1 23*     Results from last 7 days   Lab Units 04/08/21  0737 04/07/21  2054 04/07/21  1632 04/07/21  1534 04/07/21  1114 04/07/21  0725 04/06/21  2120 04/06/21  1923 04/06/21  1637 04/06/21  1144 04/06/21  0729 04/05/21  2117   POC GLUCOSE mg/dl 164* 223* 248* 157* 263* >500* 176* 216* 185* 210* 163* 233*         Results from last 7 days   Lab Units 04/04/21  2354 04/04/21  1822 04/04/21  1441   LACTIC ACID mmol/L 2 0 3 3* 2 8*           * I Have Reviewed All Lab Data Listed Above  * Additional Pertinent Lab Tests Reviewed: All Labs Within Last 24 Hours Reviewed      Recent Cultures (last 7 days):     Results from last 7 days   Lab Units 04/05/21  1827 04/04/21  1822 04/04/21  1441   BLOOD CULTURE   --  No Growth at 72 hrs  No Growth at 72 hrs     URINE CULTURE  No Growth <1000 cfu/mL  --   --        Last 24 Hours Medication List:   Current Facility-Administered Medications   Medication Dose Route Frequency Provider Last Rate    atenolol  25 mg Oral Daily Diya Oconnor PA-C      atorvastatin  80 mg Oral Daily Diya Oconnor [place]      cefTRIAXone  1,000 mg Intravenous Q24H Alvira Anchors, PA-C 1,000 mg (04/07/21 1531)    chlorhexidine  15 mL Swish & Spit Q12H [address] [place], [place]      cloNIDine  0 1 mg Oral [facility], [place]      insulin lispro  1-6 Units Subcutaneous 4x Daily (AC & HS) Alvira Anchors, PA-C      melatonin  3 mg Oral HS [place], [place]      metoprolol  5 mg Intravenous Q6H PRN Alvira Anchors, PA-C      potassium chloride  40 mEq Oral Once Alvira Anchors, PA-C      potassium chloride  40 mEq Oral BID Jace Kennedy MD          Today, Patient Was Seen By: Makayla Murillo MD    ** Please Note: Dictation voice to text software may have been used in the creation of this document   **

## 2021-04-09 PROBLEM — D64.9 ANEMIA: Status: ACTIVE | Noted: 2021-04-09

## 2021-04-09 LAB
ABO GROUP BLD BPU: NORMAL
ABO GROUP BLD BPU: NORMAL
ANION GAP SERPL CALCULATED.3IONS-SCNC: 5 MMOL/L (ref 4–13)
BASOPHILS # BLD AUTO: 0.03 THOUSANDS/ΜL (ref 0–0.1)
BASOPHILS NFR BLD AUTO: 0 % (ref 0–1)
BPU ID: NORMAL
BPU ID: NORMAL
BUN SERPL-MCNC: 16 MG/DL (ref 5–25)
CALCIUM SERPL-MCNC: 7.4 MG/DL (ref 8.3–10.1)
CHLORIDE SERPL-SCNC: 110 MMOL/L (ref 100–108)
CO2 SERPL-SCNC: 27 MMOL/L (ref 21–32)
CREAT SERPL-MCNC: 0.73 MG/DL (ref 0.6–1.3)
CROSSMATCH: NORMAL
CROSSMATCH: NORMAL
EOSINOPHIL # BLD AUTO: 0.39 THOUSAND/ΜL (ref 0–0.61)
EOSINOPHIL NFR BLD AUTO: 2 % (ref 0–6)
ERYTHROCYTE [DISTWIDTH] IN BLOOD BY AUTOMATED COUNT: 16.6 % (ref 11.6–15.1)
GFR SERPL CREATININE-BSD FRML MDRD: 107 ML/MIN/1.73SQ M
GLUCOSE SERPL-MCNC: 106 MG/DL (ref 65–140)
GLUCOSE SERPL-MCNC: 114 MG/DL (ref 65–140)
GLUCOSE SERPL-MCNC: 131 MG/DL (ref 65–140)
GLUCOSE SERPL-MCNC: 153 MG/DL (ref 65–140)
GLUCOSE SERPL-MCNC: 216 MG/DL (ref 65–140)
HCT VFR BLD AUTO: 24.5 % (ref 36.5–49.3)
HGB BLD-MCNC: 8.1 G/DL (ref 12–17)
IMM GRANULOCYTES # BLD AUTO: 0.31 THOUSAND/UL (ref 0–0.2)
IMM GRANULOCYTES NFR BLD AUTO: 2 % (ref 0–2)
LYMPHOCYTES # BLD AUTO: 1.85 THOUSANDS/ΜL (ref 0.6–4.47)
LYMPHOCYTES NFR BLD AUTO: 11 % (ref 14–44)
MCH RBC QN AUTO: 27.2 PG (ref 26.8–34.3)
MCHC RBC AUTO-ENTMCNC: 33.1 G/DL (ref 31.4–37.4)
MCV RBC AUTO: 82 FL (ref 82–98)
MONOCYTES # BLD AUTO: 1.4 THOUSAND/ΜL (ref 0.17–1.22)
MONOCYTES NFR BLD AUTO: 8 % (ref 4–12)
NEUTROPHILS # BLD AUTO: 13.65 THOUSANDS/ΜL (ref 1.85–7.62)
NEUTS SEG NFR BLD AUTO: 77 % (ref 43–75)
NRBC BLD AUTO-RTO: 2 /100 WBCS
PLATELET # BLD AUTO: 296 THOUSANDS/UL (ref 149–390)
PMV BLD AUTO: 9.3 FL (ref 8.9–12.7)
POTASSIUM SERPL-SCNC: 3.6 MMOL/L (ref 3.5–5.3)
RBC # BLD AUTO: 2.98 MILLION/UL (ref 3.88–5.62)
SODIUM SERPL-SCNC: 142 MMOL/L (ref 136–145)
UNIT DISPENSE STATUS: NORMAL
UNIT DISPENSE STATUS: NORMAL
UNIT PRODUCT CODE: NORMAL
UNIT PRODUCT CODE: NORMAL
UNIT RH: NORMAL
UNIT RH: NORMAL
WBC # BLD AUTO: 17.63 THOUSAND/UL (ref 4.31–10.16)

## 2021-04-09 PROCEDURE — 85025 COMPLETE CBC W/AUTO DIFF WBC: CPT | Performed by: PHYSICIAN ASSISTANT

## 2021-04-09 PROCEDURE — 99233 SBSQ HOSP IP/OBS HIGH 50: CPT | Performed by: INTERNAL MEDICINE

## 2021-04-09 PROCEDURE — C9113 INJ PANTOPRAZOLE SODIUM, VIA: HCPCS | Performed by: STUDENT IN AN ORGANIZED HEALTH CARE EDUCATION/TRAINING PROGRAM

## 2021-04-09 PROCEDURE — 80048 BASIC METABOLIC PNL TOTAL CA: CPT | Performed by: STUDENT IN AN ORGANIZED HEALTH CARE EDUCATION/TRAINING PROGRAM

## 2021-04-09 PROCEDURE — 82948 REAGENT STRIP/BLOOD GLUCOSE: CPT

## 2021-04-09 PROCEDURE — 99232 SBSQ HOSP IP/OBS MODERATE 35: CPT | Performed by: NURSE PRACTITIONER

## 2021-04-09 PROCEDURE — 99222 1ST HOSP IP/OBS MODERATE 55: CPT | Performed by: INTERNAL MEDICINE

## 2021-04-09 PROCEDURE — 99232 SBSQ HOSP IP/OBS MODERATE 35: CPT | Performed by: STUDENT IN AN ORGANIZED HEALTH CARE EDUCATION/TRAINING PROGRAM

## 2021-04-09 RX ADMIN — MELATONIN 3 MG: at 21:31

## 2021-04-09 RX ADMIN — ATENOLOL 25 MG: 25 TABLET ORAL at 09:30

## 2021-04-09 RX ADMIN — PANTOPRAZOLE SODIUM 8 MG/HR: 40 INJECTION, POWDER, FOR SOLUTION INTRAVENOUS at 05:48

## 2021-04-09 RX ADMIN — CHLORHEXIDINE GLUCONATE 0.12% ORAL RINSE 15 ML: 1.2 LIQUID ORAL at 09:30

## 2021-04-09 RX ADMIN — POTASSIUM CHLORIDE 40 MEQ: 1500 TABLET, EXTENDED RELEASE ORAL at 09:30

## 2021-04-09 RX ADMIN — CLONIDINE HYDROCHLORIDE 0.1 MG: 0.1 TABLET ORAL at 21:31

## 2021-04-09 RX ADMIN — INSULIN LISPRO 2 UNITS: 100 INJECTION, SOLUTION INTRAVENOUS; SUBCUTANEOUS at 16:30

## 2021-04-09 RX ADMIN — CLONIDINE HYDROCHLORIDE 0.1 MG: 0.1 TABLET ORAL at 05:47

## 2021-04-09 RX ADMIN — POTASSIUM CHLORIDE 40 MEQ: 1500 TABLET, EXTENDED RELEASE ORAL at 17:26

## 2021-04-09 RX ADMIN — INSULIN LISPRO 1 UNITS: 100 INJECTION, SOLUTION INTRAVENOUS; SUBCUTANEOUS at 21:33

## 2021-04-09 RX ADMIN — ATORVASTATIN CALCIUM 80 MG: 40 TABLET, FILM COATED ORAL at 09:30

## 2021-04-09 RX ADMIN — CLONIDINE HYDROCHLORIDE 0.1 MG: 0.1 TABLET ORAL at 13:56

## 2021-04-09 RX ADMIN — CHLORHEXIDINE GLUCONATE 0.12% ORAL RINSE 15 ML: 1.2 LIQUID ORAL at 21:31

## 2021-04-09 NOTE — ASSESSMENT & PLAN NOTE
Currently not septic  Afebrile, hemodynamically stable  Leukocytosis improving  Urine culture no growth, blood culture without growth  Completed 5 days of IV ceftriaxone  Monitor off antibiotics for now

## 2021-04-09 NOTE — PROGRESS NOTES
[address] [facility]  Progress Note - Venancio Dupont 1950, 65 y o  male MRN: [id number]  Unit/Bed#: -02 Encounter: [id number]  Primary Care Provider: Joel Lawton MD   Date and time admitted to hospital: 4/4/2021  1:56 PM    * CIERA (acute kidney injury) [facility])  Assessment & Plan  Due to obstructive uropathy  Now resolved  Creatinine 0 73  Continue indwelling Butler catheter  Lisinopril, Demadex on hold  Nephrology following  Urology recommendation appreciated  Anemia  Assessment & Plan  Acute blood loss anemia in the settings of hematuria as well as suspected GI loss as evidenced by hemoglobin down trending from 11 5 --> 6 7 as well as positive for fecal occult blood, treated with noted  PRBC transfusion, Urology consultation as well as GI consult  Currently hemoglobin stable around 8 range after 2 units PRBC transfusion  Hematuria resolved  Possible plan for EGD on Monday per GI  Hematuria  Assessment & Plan  Butler catheter noted with gross hematuria  Hemoglobin down trending  Significantly better  Patient did receive 1 unit PRBC transfusion yesterday 04/07/2021    Butler catheter noted with clear urine  Hold aspirin, Plavix  Hemoglobin stable around 8 after 2 units PRBC transfusion  Patient is agreeable with above plan  Urology following  Dehydration with hyponatremia  Assessment & Plan  Hypernatremia likely secondary to obstructive uropathy  Currently sodium 144  Monitor BMP  Nephrology following    UTI (urinary tract infection)  Assessment & Plan  Currently not septic  Afebrile, hemodynamically stable  Leukocytosis improving  Urine culture no growth, blood culture without growth  Completed 5 days of IV ceftriaxone  Monitor off antibiotics for now  Metabolic acidosis  Assessment & Plan  Now resolved    Hyperkalemia  Assessment & Plan  Now resolved  Currently potassium 3 7      Hyperparathyroidism [facility])  Assessment & Plan  Status post parathyroidectomy on 03/23/21  Supplement calcium as needed  Coronary artery disease involving native coronary artery of native heart  Assessment & Plan  Currently asymptomatic  Aspirin and Plavix on hold due to hematuria  Lisinopril, Demadex on hold due to CIERA  Continue statin  Tobacco abuse  Assessment & Plan  Counseled on smoking cessation  Type 2 diabetes mellitus [facility])  Assessment & Plan  Lab Results   Component Value Date    HGBA1C 6 0 (H) 12/28/2020       Recent Labs     04/08/21  2049 04/09/21  0741 04/09/21  1134 04/09/21  1602   POCGLU 178* 114 131 216*       Blood Sugar Average: Last 72 hrs:  (P) 480 [id number]     Controlled  Hold oral agent  Diabetic diet  Sliding scale coverage  HTN (hypertension)  Assessment & Plan  Overall reasonably controlled  Resumed decrease dose of antihypertensive history  Continue atenolol 25 mg daily, clonidine 0 1 mg t i d  For now  Lisinopril, Demadex on hold due to acute kidney injury  Monitor vitals per unit protocol  Hyperlipidemia  Assessment & Plan  Continue statin      VTE Pharmacologic Prophylaxis:   Pharmacologic: Pharmacologic VTE Prophylaxis contraindicated due to Hematuria  Mechanical VTE Prophylaxis in Place: Yes    Patient Centered Rounds: I have performed bedside rounds with nursing staff today  Discussions with Specialists or Other Care Team Provider:  Urology, GI  Education and Discussions with Family / Patient:  Spoke with spouse over the phone  Time Spent for Care: 30 minutes  More than 50% of total time spent on counseling and coordination of care as described above  Current Length of Stay: 5 day(s)    Current Patient Status: Inpatient   Certification Statement: The patient will continue to require additional inpatient hospital stay due to Above    Discharge Plan: To be determined    Code Status: Level 2 - DNAR: but accepts endotracheal intubation      Subjective:   Afebrile, hemodynamically stable  Reports hematuria have resolved  Reports feeling overall well  Patient has been in good spirits  Denies chest pain, dyspnea, fever, chills, nausea, vomiting, any new complaints at this time  No other events reported  Objective:     Vitals:   Temp (24hrs), Av 5 °F (36 9 °C), Min:98 °F (36 7 °C), Max:99 °F (37 2 °C)    Temp:  [98 °F (36 7 °C)-99 °F (37 2 °C)] 98 °F (36 7 °C)  HR:  [] 80  Resp:  [18-20] 20  BP: (138-168)/(76-90) 138/76  SpO2:  [99 %-100 %] 100 %  Body mass index is 27 24 kg/m²  Input and Output Summary (last 24 hours): Intake/Output Summary (Last 24 hours) at 2021 1619  Last data filed at 2021 1300  Gross per 24 hour   Intake 360 ml   Output 2350 ml   Net -1990 ml       Physical Exam:     Physical Exam  Constitutional:       General: He is not in acute distress  Appearance: Normal appearance  He is not ill-appearing  HENT:      Head: Normocephalic and atraumatic  Nose: No rhinorrhea  Eyes:      Extraocular Movements: Extraocular movements intact  Conjunctiva/sclera: Conjunctivae normal       Pupils: Pupils are equal, round, and reactive to light  Neck:      Musculoskeletal: Normal range of motion and neck supple  No neck rigidity  Cardiovascular:      Rate and Rhythm: Normal rate and regular rhythm  Pulses: Normal pulses  Heart sounds: Normal heart sounds  Pulmonary:      Effort: Pulmonary effort is normal  No respiratory distress  Breath sounds: Normal breath sounds  No stridor  No wheezing or rhonchi  Abdominal:      General: Bowel sounds are normal  There is no distension  Palpations: Abdomen is soft  Tenderness: There is no abdominal tenderness  Genitourinary:     Comments: Butler catheter noted with dark punch colored urine  Musculoskeletal: Normal range of motion  Neurological:      General: No focal deficit present  Mental Status: He is alert  Mental status is at baseline     Psychiatric:         Mood and Affect: Mood normal  Behavior: Behavior normal          Additional Data:     Labs:    Results from last 7 days   Lab Units 04/09/21  0751  04/08/21  0619   WBC Thousand/uL 17 63*   < > 17 48*   HEMOGLOBIN g/dL 8 1*   < > 7 0*   HEMATOCRIT % 24 5*   < > 21 0*   PLATELETS Thousands/uL 296   < > 313   BANDS PCT %  --   --  1   NEUTROS PCT % 77*  --   --    LYMPHS PCT % 11*  --   --    LYMPHO PCT %  --   --  10*   MONOS PCT % 8  --   --    MONO PCT %  --   --  6   EOS PCT % 2  --  0    < > = values in this interval not displayed  Results from last 7 days   Lab Units 04/09/21  0751  04/05/21  0436   SODIUM mmol/L 142   < > 133*   POTASSIUM mmol/L 3 6   < > 3 7   CHLORIDE mmol/L 110*   < > 99*   CO2 mmol/L 27   < > 20*   BUN mg/dL 16   < > 129*   CREATININE mg/dL 0 73   < > 5 33*   ANION GAP mmol/L 5   < > 14*   CALCIUM mg/dL 7 4*   < > 8 4   ALBUMIN g/dL  --   --  1 7*   TOTAL BILIRUBIN mg/dL  --   --  0 31   ALK PHOS U/L  --   --  80   ALT U/L  --   --  24   AST U/L  --   --  27   GLUCOSE RANDOM mg/dL 106   < > 163*    < > = values in this interval not displayed  Results from last 7 days   Lab Units 04/04/21  1441   INR  1 23*     Results from last 7 days   Lab Units 04/09/21  1602 04/09/21  1134 04/09/21  0741 04/08/21  2049 04/08/21  1612 04/08/21  0737 04/07/21  2054 04/07/21  1632 04/07/21  1534 04/07/21  1114 04/07/21  0725 04/06/21  2120   POC GLUCOSE mg/dl 216* 131 114 178* 108 164* 223* 248* 157* 263* >500* 176*         Results from last 7 days   Lab Units 04/04/21  2354 04/04/21  1822 04/04/21  1441   LACTIC ACID mmol/L 2 0 3 3* 2 8*           * I Have Reviewed All Lab Data Listed Above  * Additional Pertinent Lab Tests Reviewed: All Labs Within Last 24 Hours Reviewed      Recent Cultures (last 7 days):     Results from last 7 days   Lab Units 04/05/21  1827 04/04/21  1822 04/04/21  1441   BLOOD CULTURE   --  No Growth After 4 Days  No Growth After 4 Days     URINE CULTURE  No Growth <1000 cfu/mL  --   --        Last 24 Hours Medication List:   Current Facility-Administered Medications   Medication Dose Route Frequency Provider Last Rate    atenolol  25 mg Oral Daily Fahad Brush, TAYLER      atorvastatin  80 mg Oral Daily Fahad BrushRicki      chlorhexidine  15 mL [facility]      cloNIDine  0 1 mg Oral [facility] Fahad BrushRicki      insulin lispro  1-6 Units Subcutaneous 4x Daily (AC & HS) Fahad Brush, TAYLER      melatonin  3 mg Oral HS Fahad SaludaRicki      metoprolol  5 mg Intravenous Q6H PRN Fahad Brush, PA-GARY      pantoprozole (PROTONIX) infusion (Continuous)  8 mg/hr Intravenous Continuous Chuck HUI MD 8 mg/hr (04/09/21 0548)    potassium chloride  40 mEq Oral Once Fahad Brush, PA-C      potassium chloride  40 mEq Oral BID González Mckeon MD          Today, Patient Was Seen By: Danish Hughes MD    ** Please Note: Dictation voice to text software may have been used in the creation of this document   **

## 2021-04-09 NOTE — PROGRESS NOTES
NEPHROLOGY PROGRESS NOTE    Patient: Tico Nogueira               Sex: male          DOA: 4/4/2021  1:56 PM   YOB: 1950        Age:  65 y o         LOS:  LOS: 5 days   4/9/2021    REASON FOR THE CONSULTATION:      Acute kidney injury    SUBJECTIVE     Patient is seen and examined next to the bedside  Awake, alert and in no distress  Events of the past 24 hours noted including patient with dark tarry stool      CURRENT MEDICATIONS       Current Facility-Administered Medications:     atenolol (TENORMIN) tablet 25 mg, 25 mg, Oral, Daily, [employer], PA-C, 25 mg at 04/09/21 0930    atorvastatin (LIPITOR) tablet 80 mg, 80 mg, Oral, Daily, [employer], PA-C, 80 mg at 04/09/21 0930    ceftriaxone (ROCEPHIN) 1 g/50 mL in dextrose IVPB, 1,000 mg, Intravenous, Q24H, [employer], PA-C, Last Rate: 100 mL/hr at 04/08/21 1700, 1,000 mg at 04/08/21 1700    chlorhexidine (PERIDEX) 0 12 % oral rinse 15 mL, 15 mL, Swish & Spit, Q12H [facility], [employer], PA-C, 15 mL at 04/09/21 0930    cloNIDine (CATAPRES) tablet 0 1 mg, 0 1 mg, Oral, Q8H [facility], [facility], PA-C, 0 1 mg at 04/09/21 0547    insulin lispro (HumaLOG) 100 units/mL subcutaneous injection 1-6 Units, 1-6 Units, Subcutaneous, 4x Daily (AC & HS), 1 Units at 04/08/21 2129 **AND** Fingerstick Glucose (POCT), , , 4x Daily AC and at bedtime, [employer], PA-C    melatonin tablet 3 mg, 3 mg, Oral, HS, [employer], PA-C, 3 mg at 04/08/21 2128    metoprolol (LOPRESSOR) injection 5 mg, 5 mg, Intravenous, Q6H PRN, [employer], PA-C    pantoprazole (PROTONIX) 80 mg in sodium chloride 0 9 % 100 mL infusion, 8 mg/hr, Intravenous, Continuous, Uvaldo HUI MD, Last Rate: 10 mL/hr at 04/09/21 0548, 8 mg/hr at 04/09/21 0548    potassium chloride (K-DUR,KLOR-CON) CR tablet 40 mEq, 40 mEq, Oral, Once, TAYLER Burgess    potassium chloride (K-DUR,KLOR-CON) CR tablet 40 mEq, 40 mEq, Oral, BID, Uvaldo HUI MD, 40 mEq at 04/09/21 0930    REVIEW OF SYSTEMS     Review of Systems   Constitutional: Negative  HENT: Negative  Eyes: Negative  Respiratory: Negative  Cardiovascular: Negative  Gastrointestinal: Negative  Endocrine: Negative  Genitourinary: Negative  Musculoskeletal: Negative  Skin: Negative  Allergic/Immunologic: Negative  Neurological: Negative  Hematological: Negative  All other systems reviewed and are negative  OBJECTIVE     Current Weight: Weight - Scale: 80 1 kg (176 lb 9 4 oz)  Vitals:    04/09/21 0753   BP: 140/90   Pulse: 105   Resp: 18   Temp:    SpO2: 99%     Body mass index is 27 66 kg/m²  Intake/Output Summary (Last 24 hours) at 4/9/2021 1006  Last data filed at 4/9/2021 0237  Gross per 24 hour   Intake 240 ml   Output 2350 ml   Net -2110 ml       PHYSICAL EXAMINATION     Physical Exam  HENT:      Head: Normocephalic and atraumatic  Eyes:      Pupils: Pupils are equal, round, and reactive to light  Neck:      Musculoskeletal: Neck supple  Vascular: No JVD  Cardiovascular:      Rate and Rhythm: Normal rate and regular rhythm  Heart sounds: Normal heart sounds  No murmur  No friction rub  Pulmonary:      Effort: Pulmonary effort is normal       Breath sounds: Normal breath sounds  Abdominal:      General: Bowel sounds are normal  There is no distension  Palpations: Abdomen is soft  Tenderness: There is no abdominal tenderness  There is no rebound  Musculoskeletal:         General: No tenderness  Skin:     General: Skin is dry  Findings: No rash  Neurological:      Mental Status: He is alert and oriented to person, place, and time             LAB RESULTS     Results from last 7 days   Lab Units 04/09/21  0751 04/08/21  1828 04/08/21  0935 04/08/21  9799 04/07/21  2250 04/07/21  0509 04/06/21  0453 04/05/21 2002 04/05/21  1259 04/05/21  0858 04/05/21  0436 04/04/21  2354 04/04/21  2158 04/04/21  1822 04/04/21  1441   WBC Thousand/uL 17 63* 18 71*  --  17 48*  --  19 66* 20 40*  --   --   --  13 94*  --   --   --  19 64*   HEMOGLOBIN g/dL 8 1* 8 8*  --  7 0* 6 7* 7 0* 7 8*  --   --   --  9 0*  --   --   --  11 5*   HEMATOCRIT % 24 5* 26 4*  --  21 0* 20 1* 20 6* 21 8*  --   --   --  25 7*  --   --   --  33 2*   PLATELETS Thousands/uL 296 310  --  313  --  375 406*  --   --   --  406*  --   --  459* 486*   POTASSIUM mmol/L 3 6  --  3 7  --   --  3 3* 3 3* 3 5 3 7 3 8 3 7 4 2 4 8  --  6 5*   CHLORIDE mmol/L 110*  --  109*  --   --  111* 109* 107 103 101 99* 95* 91*  --  79*   CO2 mmol/L 27  --  28  --   --  27 23 23 19* 20* 20* 14* 13*  --  13*   BUN mg/dL 16  --  16  --   --  33* 65* 81* 100* 115* 129* 157* 178*  --  196*   CREATININE mg/dL 0 73  --  0 84  --   --  1 08 1 54* 2 06* 2 82* 3 80* 5 33* 8 15* 10 26*  --  13 24*   EGFR ml/min/1 73sq m 107  --  101  --   --  79 51 36 25 17 11 7 5  --  4   CALCIUM mg/dL 7 4*  --  8 0*  --   --  8 1* 8 2* 8 4 8 7 8 7 8 4 7 1* 7 3*  --  8 1*   MAGNESIUM mg/dL  --   --   --   --   --   --  1 9 2 0 2 1 2 2 2 3 2 1 2 3  --  2 7*   PHOSPHORUS mg/dL  --   --   --   --   --   --  2 2* 2 7 3 6 4 4* 5 6* 7 3* 8 9*  --  11 2*           RADIOLOGY RESULTS      Results for orders placed during the hospital encounter of 04/04/21   XR chest 1 view portable    Narrative CHEST     INDICATION:   SOB     COMPARISON:  3/4/2020    EXAM PERFORMED/VIEWS:  XR CHEST PORTABLE      FINDINGS:    Cardiomediastinal silhouette appears unremarkable  The lungs are clear  No pneumothorax or pleural effusion  Osseous structures appear within normal limits for patient age  Impression No acute cardiopulmonary disease              Workstation performed: [id number]           ASSESSMENT/PLAN     64-year-old male with past medical history of diabetes mellitus type 2, tobacco abuse, hypertension, dyslipidemia, hypercalcemia status post parathyroidectomy who presents with fatigue, confusion malaise noted to have CIERA with hyperkalemia and hyponatremia  1  Acute kidney injury:  Presented with serum creatinine of 13 and a state of severe azotemia  -etiology obstructive uropathy as imaging showed bilateral hydronephrosis with distended bladder   -renal parameters now improved status post Butler catheter insertion  -current serum creatinine is 0 73 and improved  2  Hypertension:  Blood pressure within acceptable range  3  Anemia:  Noted drop in hemoglobin during this hospitalization   -status post 1 unit of PRBC on April 7th  -GI on board for probable endoscopy during this admission            James Fam MD  Nephrology  4/9/2021

## 2021-04-09 NOTE — ASSESSMENT & PLAN NOTE
Currently asymptomatic  Aspirin and Plavix on hold due to hematuria  Lisinopril, Demadex on hold due to CIERA  Continue statin

## 2021-04-09 NOTE — ASSESSMENT & PLAN NOTE
Butler catheter noted with gross hematuria  Hemoglobin down trending  Significantly better  Patient did receive 1 unit PRBC transfusion yesterday 04/07/2021    Butler catheter noted with clear urine  Hold aspirin, Plavix  Hemoglobin stable around 8 after 2 units PRBC transfusion  Patient is agreeable with above plan  Urology following

## 2021-04-09 NOTE — CONSULTS
Consultation - [facility]  Sakina Pepper 65 y o  male MRN: [id number]  Unit/Bed#: -02 Encounter: [id number]         Reason for Consult / Principal Problem:  Continue drop in hemoglobin, reported melena    HPI:  Christopher Dominguez is a 66-year-old male with history of hypertension, hyperlipidemia, type 2 diabetes, CAD on Plavix, hyperparathyroidism, hypertrophic obstructive cardiomyopathy, and previous GI bleed from gastric ulcer  Patient has been admitted for the past 5 days initially with anuria and hematuria  He presented with acute renal failure, initial creatinine of 13  Initial renal ultrasound showing moderate left and right hydronephrosis with concerning distal ureteral stone  In addition, echogenic material within the renal pelvis concerning for hemorrhage  Patient's Plavix was held, his hemoglobin has been down trending since admission starting at 11 5 to 6 7 on 04/07  He was transfused 1 unit of packed red blood cells, his hemoglobin is now in the 8s  GI was consulted as the reported dark stools overnight last night  Patient has no new complaints at this time  He denies pyrosis, regurgitation, dysphagia odynophagia  His last EGD was in 2018 when he had GI bleed after starting anticoagulation from gastric ulcer  He had a repeat EGD later on in the year that showed 85% healing of the gastric ulcer  He has not been on any reflux medicine as an outpatient  Last colonoscopy was in December 2020 with no polyps, diverticulosis and internal hemorrhoids  Review of Systems:    CONSTITUTIONAL: Denies any fever, chills, or rigors  Good appetite, and no recent weight loss  HEENT: No earache or tinnitus  Denies hearing loss or visual disturbances  CARDIOVASCULAR: No chest pain or palpitations  RESPIRATORY: Denies any cough, hemoptysis, shortness of breath or dyspnea on exertion  GASTROINTESTINAL: As noted in the History of Present Illness     GENITOURINARY: No problems with urination  Denies any hematuria or dysuria  NEUROLOGIC: No dizziness or vertigo, denies headaches  MUSCULOSKELETAL: Denies any muscle or joint pain  SKIN: Denies skin rashes or itching  ENDOCRINE: Denies excessive thirst  Denies intolerance to heat or cold  PSYCHOSOCIAL: Denies depression or anxiety  Denies any recent memory loss  Historical Information   Past Medical History:   Diagnosis Date    Benign neoplasm of large intestine     Bleeding gastric ulcer 2018    Colon polyp     Diabetes mellitus (HCC)     Heart murmur     History of aortic regurgitation     History of constipation     History of degenerative joint disease     History of nocturia     History of obesity     History of sebaceous cyst     History of shortness of breath     History of transfusion     History of urinary frequency     Hyperlipidemia     Hypertension     Myocardial infarction ([facility] [address] ) 2018 january, 3 stents    Polyposis coli     of the large intestine    Ulcer of esophagus      Past Surgical History:   Procedure Laterality Date    ACHILLES TENDON SURGERY Left 1973    CATARACT EXTRACTION Right 02/18/2021    COLONOSCOPY      hyperplastic polyp    CORONARY ANGIOPLASTY WITH STENT PLACEMENT      3 stents: 2 placed on Jan 2018, 1 on July 2018    [place] CYST REMOVAL  2019, 2020    left and right wrists    ESOPHAGOGASTRODUODENOSCOPY N/A 1/23/2018    Procedure: ESOPHAGOGASTRODUODENOSCOPY (EGD); Surgeon: Arleth Petersen MD;  Location: [facility]; Service: Gastroenterology    ESOPHAGOGASTRODUODENOSCOPY      HERNIA REPAIR      HERNIA REPAIR Bilateral 8/18/2017    Procedure: LAPAROSCOPIC INGUINAL HERNIA REPAIR WITH MESH;  Surgeon: Flor Fraser MD;  Location: [facility];  Service: General    [place] ESOPHAGOGASTRODUODENOSCOPY TRANSORAL DIAGNOSTIC N/A 3/26/2018    Procedure: ESOPHAGOGASTRODUODENOSCOPY (EGD); Surgeon: Yadira Sandra MD;  Location: [facility];   Service: Gastroenterology    [place] ESOPHAGOGASTRODUODENOSCOPY TRANSORAL DIAGNOSTIC N/A 2018    Procedure: ESOPHAGOGASTRODUODENOSCOPY (EGD); Surgeon: Patrice Obrien MD;  Location: [facility];   Service: Gastroenterology    CT EXPLORE PARATHYROID GLANDS Right 3/23/2021    Procedure: RIGHT PARATHYROIDECTOMY, MINIMALLY INVASIVE, POSSIBLE 4-GLAND EXPLORATION, WITH INTRA-OPERATIVE PTH MONITORING;  Surgeon: Vanessa Gonzalez MD;  Location: [facility];  Service: Surgical Oncology    TIBIA FRACTURE SURGERY Left      Social History   Social History     Substance and Sexual Activity   Alcohol Use Yes    Frequency: Monthly or less    Drinks per session: 1 or 2    Binge frequency: Never    Comment: rare     Social History     Substance and Sexual Activity   Drug Use No     Social History     Tobacco Use   Smoking Status Former Smoker    Packs/day: 1 00    Years: 35 00    Pack years: 35 00    Types: Cigarettes    Quit date: 2012    Years since quittin 0   Smokeless Tobacco Never Used   Tobacco Comment    pt states quit many years ago     Family History   Problem Relation Age of Onset    Rheumatic fever Father     Other Father         accidental poisoning    Heart disease Mother         Meds/Allergies     Current Facility-Administered Medications   Medication Dose Route Frequency    atenolol (TENORMIN) tablet 25 mg  25 mg Oral Daily    atorvastatin (LIPITOR) tablet 80 mg  80 mg Oral Daily    ceftriaxone (ROCEPHIN) 1 g/50 mL in dextrose IVPB  1,000 mg Intravenous Q24H    chlorhexidine (PERIDEX) 0 12 % oral rinse 15 mL  15 mL Swish & Spit Q12H [address]    cloNIDine (CATAPRES) tablet 0 1 mg  0 1 mg Oral Q8H [address]    insulin lispro (HumaLOG) 100 units/mL subcutaneous injection 1-6 Units  1-6 Units Subcutaneous 4x Daily (AC & HS)    melatonin tablet 3 mg  3 mg Oral HS    metoprolol (LOPRESSOR) injection 5 mg  5 mg Intravenous Q6H PRN    pantoprazole (PROTONIX) 80 mg in sodium chloride 0 9 % 100 mL infusion  8 mg/hr Intravenous Continuous    potassium chloride (K-DUR,KLOR-CON) CR tablet 40 mEq  40 mEq Oral Once    potassium chloride (K-DUR,KLOR-CON) CR tablet 40 mEq  40 mEq Oral BID       No Known Allergies      Objective     Blood pressure 138/76, pulse 80, temperature 98 °F (36 7 °C), resp  rate 20, height 5' 7" (1 702 m), weight 78 9 kg (173 lb 15 1 oz), SpO2 100 %  Intake/Output Summary (Last 24 hours) at 4/9/2021 1529  Last data filed at 4/9/2021 1300  Gross per 24 hour   Intake 360 ml   Output 2350 ml   Net -1990 ml         PHYSICAL EXAM:      General Appearance:   Alert and oriented x 3  Cooperative, and in no respiratory distress   HEENT:   Normocephalic, atraumatic, anicteric      Neck:  Supple, symmetrical, trachea midline   Lungs:   Clear to auscultation bilaterally; no rales, rhonchi or wheezing; respirations unlabored    Heart[de-identified]   S1 and S2 normal; regular rate and rhythm; no murmur, rub, or gallop  Abdomen:   Soft, non-tender, non-distended; normal bowel sounds; no masses, no organomegaly    Genitalia:   Deferred    Rectal:   Skin tag seen surrounding the anus    Dark brown stool in the rectal wall on PARAG      Extremities:  No cyanosis, clubbing or edema    Pulses:  2+ and symmetric all extremities    Skin:  Skin color, texture, turgor normal, no rashes or lesions    Lymph nodes:  No palpable cervical or supraclavicular lymphadenopathy        Lab Results:   Results from last 7 days   Lab Units 04/09/21  0751   WBC Thousand/uL 17 63*   HEMOGLOBIN g/dL 8 1*   HEMATOCRIT % 24 5*   PLATELETS Thousands/uL 296   NEUTROS PCT % 77*   LYMPHS PCT % 11*   MONOS PCT % 8   EOS PCT % 2     Results from last 7 days   Lab Units 04/09/21  0751  04/05/21  0436   POTASSIUM mmol/L 3 6   < > 3 7   CHLORIDE mmol/L 110*   < > 99*   CO2 mmol/L 27   < > 20*   BUN mg/dL 16   < > 129*   CREATININE mg/dL 0 73   < > 5 33*   CALCIUM mg/dL 7 4*   < > 8 4   ALK PHOS U/L  --   --  80   ALT U/L  --   --  24   AST U/L  --   --  27    < > = values in this interval not displayed  Results from last 7 days   Lab Units 04/04/21  1441   INR  1 23*           Imaging Studies: I have personally reviewed pertinent imaging studies  Ct Abdomen Pelvis Wo Contrast    Result Date: 4/5/2021  Impression: Left greater than right moderate to severe hydronephrosis  Left greater than right perinephric stranding may be related to obstruction or infection  Hemorrhage or complicated pyelonephritis with debris could be the cause of obstruction in the collecting system although occult stricture of the ureters could also be present  Collection in the left perinephric space and extending into the pararenal space could be related to urinoma or perinephric abscess  This should be correlated clinically  No gas bubbles are seen however  Neoplasm is not excluded This was discussed with Moshe Correa in the ICU at 11:50 AM Workstation performed: [id number]     Xr Chest 1 View Portable    Result Date: 4/5/2021  Impression: No acute cardiopulmonary disease  Workstation performed: [id number]     Ct Head Without Contrast    Result Date: 4/4/2021  Impression: No evidence of acute intracranial process  Chronic microangiopathy  Workstation performed: [id number]     Us Kidney And Bladder    Result Date: 4/4/2021  Impression: 1  Moderate moderate left hydronephrosis  There is increased fluid in the retroperitoneum surrounding the left proximal ureter  This may represent rupture of the collecting system  Recommend CT for further characterization  2   Moderate right hydronephrosis, findings concerning for distal ureteral stone  Echogenic material within the renal pelvis is concerning for hemorrhage 3  Echogenic kidneys consistent with medical renal disease The study was marked in EPIC for immediate notification   Workstation performed: [id number]       ASSESSMENT and PLAN:      1) Acute blood-loss anemia with reported dark stool and hematuria - Patient's hematuria is improving after looking at his Butler catheter output  On exam, he has no signs of overt GI bleeding at this time  His past medical history is significant for previous GI bleed from gastric ulcer  Patient had FIT testing in the hospital, which his med to screen for polyps  However, blood can make this test positive as well  - Continue monitor hemoglobin closely  - PPI twice daily  - Please notify GI if there are any overt signs of active GI bleeding as we would perform EGD more emergently  - Otherwise we will plan for EGD on Monday  - Continue to hold Plavix  - Appreciate urology recommendations    The patient was seen and examined by Dr Guadalupe Canela, all godwin medical decisions were made with Dr Guadalupe Canela  Thank you for allowing us to participate in the care of this pleasant patient  We will follow up with you closely

## 2021-04-09 NOTE — ASSESSMENT & PLAN NOTE
Due to obstructive uropathy  Now resolved  Creatinine 0 73  Continue indwelling Butler catheter  Lisinopril, Demadex on hold  Nephrology following  Urology recommendation appreciated

## 2021-04-09 NOTE — ASSESSMENT & PLAN NOTE
Lab Results   Component Value Date    HGBA1C 6 0 (H) 12/28/2020       Recent Labs     04/08/21  2049 04/09/21  0741 04/09/21  1134 04/09/21  1602   POCGLU 178* 114 131 216*       Blood Sugar Average: Last 72 hrs:  (P) 703 [id number]     Controlled  Hold oral agent  Diabetic diet  Sliding scale coverage

## 2021-04-09 NOTE — PROGRESS NOTES
Progress Note - Jennifer Jenkins 65 y o  male MRN: [id number]    Unit/Bed#: -02 Encounter: [id number]      Assessment:  Jennifer Jenkins is a 78-year-old male with history of CAD, on Plavix and aspirin, seen in urologic consultation for urinary retention, UTI, acute kidney injury and gross hematuria  He is afebrile, hemodynamically stable and without complaints of pain  Hemoglobin 8 1 today after receiving 2 units packed RBCs  Urine grossly clear and Butler catheter today  Black tarry stool noted  GI consult for possible endoscopy this admission  Plan:  1  Continue medical optimization  2  Maintain Butler catheter to straight drainage  3  Manual irrigation p r n   4  Resume Plavix in 48 hours, if indicated  5  Discharge with Butler in place  6  Office will contact patient for void trial and further workup including cystoscopic examination  7  No further  intervention indicated during this hospital stay  8  Will sign off  Subjective:   Denies fever, chills, flank, abdominal, suprapubic, groin or testicular pain  Objective:     Vitals: Blood pressure 140/90, pulse 105, temperature 99 °F (37 2 °C), resp  rate 18, height 5' 7" (1 702 m), weight 78 9 kg (173 lb 15 1 oz), SpO2 99 %  ,Body mass index is 27 24 kg/m²        Intake/Output Summary (Last 24 hours) at 4/9/2021 1202  Last data filed at 4/9/2021 9077  Gross per 24 hour   Intake 240 ml   Output 2350 ml   Net -2110 ml       Physical Exam: General appearance: alert and oriented, in no acute distress, appears stated age, cooperative and no distress  Head: Normocephalic, without obvious abnormality, atraumatic  Neck: no adenopathy, no carotid bruit, no JVD, supple, symmetrical, trachea midline and thyroid not enlarged, symmetric, no tenderness/mass/nodules  Lungs: clear to auscultation bilaterally  Heart: regular rate and rhythm, S1, S2 normal, no murmur, click, rub or gallop  Abdomen: soft, non-tender; bowel sounds normal; no masses,  no organomegaly  Extremities: extremities normal, warm and well-perfused; no cyanosis, clubbing, or edema  Pulses: 2+ and symmetric  Neurologic: Grossly normal  Butler--clear yellow  Invasive Devices     Peripheral Intravenous Line            Peripheral IV 04/08/21 Right Antecubital 1 day          Drain            Urethral Catheter Non-latex 4 days              Lab Results   Component Value Date    WBC 17 63 (H) 04/09/2021    HGB 8 1 (L) 04/09/2021    HCT 24 5 (L) 04/09/2021    MCV 82 04/09/2021     04/09/2021     Lab Results   Component Value Date    SODIUM 142 04/09/2021    K 3 6 04/09/2021     (H) 04/09/2021    CO2 27 04/09/2021    BUN 16 04/09/2021    CREATININE 0 73 04/09/2021    GLUC 106 04/09/2021    CALCIUM 7 4 (L) 04/09/2021       Lab, Imaging and other studies: I have personally reviewed pertinent reports

## 2021-04-09 NOTE — CASE MANAGEMENT
Per rounding with SLIM, patient is anticipated to be discharged in 24-48 hrs  Cm informed [facility] of the expected discharge via [employer]  Cm department will continue to follow patient through discharge

## 2021-04-10 LAB
BACTERIA BLD CULT: NORMAL
BACTERIA BLD CULT: NORMAL
BASOPHILS # BLD AUTO: 0.04 THOUSANDS/ΜL (ref 0–0.1)
BASOPHILS NFR BLD AUTO: 0 % (ref 0–1)
EOSINOPHIL # BLD AUTO: 0.31 THOUSAND/ΜL (ref 0–0.61)
EOSINOPHIL NFR BLD AUTO: 2 % (ref 0–6)
ERYTHROCYTE [DISTWIDTH] IN BLOOD BY AUTOMATED COUNT: 16.9 % (ref 11.6–15.1)
GLUCOSE SERPL-MCNC: 117 MG/DL (ref 65–140)
GLUCOSE SERPL-MCNC: 186 MG/DL (ref 65–140)
GLUCOSE SERPL-MCNC: 366 MG/DL (ref 65–140)
GLUCOSE SERPL-MCNC: 99 MG/DL (ref 65–140)
HCT VFR BLD AUTO: 24.4 % (ref 36.5–49.3)
HGB BLD-MCNC: 8 G/DL (ref 12–17)
IMM GRANULOCYTES # BLD AUTO: 0.16 THOUSAND/UL (ref 0–0.2)
IMM GRANULOCYTES NFR BLD AUTO: 1 % (ref 0–2)
LYMPHOCYTES # BLD AUTO: 1.55 THOUSANDS/ΜL (ref 0.6–4.47)
LYMPHOCYTES NFR BLD AUTO: 12 % (ref 14–44)
MCH RBC QN AUTO: 27.7 PG (ref 26.8–34.3)
MCHC RBC AUTO-ENTMCNC: 32.8 G/DL (ref 31.4–37.4)
MCV RBC AUTO: 84 FL (ref 82–98)
MONOCYTES # BLD AUTO: 1.01 THOUSAND/ΜL (ref 0.17–1.22)
MONOCYTES NFR BLD AUTO: 8 % (ref 4–12)
NEUTROPHILS # BLD AUTO: 10.25 THOUSANDS/ΜL (ref 1.85–7.62)
NEUTS SEG NFR BLD AUTO: 77 % (ref 43–75)
NRBC BLD AUTO-RTO: 0 /100 WBCS
PLATELET # BLD AUTO: 292 THOUSANDS/UL (ref 149–390)
PMV BLD AUTO: 9.1 FL (ref 8.9–12.7)
RBC # BLD AUTO: 2.89 MILLION/UL (ref 3.88–5.62)
WBC # BLD AUTO: 13.32 THOUSAND/UL (ref 4.31–10.16)

## 2021-04-10 PROCEDURE — C9113 INJ PANTOPRAZOLE SODIUM, VIA: HCPCS | Performed by: STUDENT IN AN ORGANIZED HEALTH CARE EDUCATION/TRAINING PROGRAM

## 2021-04-10 PROCEDURE — 82948 REAGENT STRIP/BLOOD GLUCOSE: CPT

## 2021-04-10 PROCEDURE — 99232 SBSQ HOSP IP/OBS MODERATE 35: CPT | Performed by: INTERNAL MEDICINE

## 2021-04-10 PROCEDURE — 99232 SBSQ HOSP IP/OBS MODERATE 35: CPT | Performed by: STUDENT IN AN ORGANIZED HEALTH CARE EDUCATION/TRAINING PROGRAM

## 2021-04-10 PROCEDURE — 85025 COMPLETE CBC W/AUTO DIFF WBC: CPT | Performed by: PHYSICIAN ASSISTANT

## 2021-04-10 RX ORDER — DIPHENHYDRAMINE HCL 25 MG
25 TABLET ORAL EVERY 6 HOURS PRN
Status: DISCONTINUED | OUTPATIENT
Start: 2021-04-10 | End: 2021-04-13 | Stop reason: HOSPADM

## 2021-04-10 RX ADMIN — PANTOPRAZOLE SODIUM 8 MG/HR: 40 INJECTION, POWDER, FOR SOLUTION INTRAVENOUS at 13:41

## 2021-04-10 RX ADMIN — CHLORHEXIDINE GLUCONATE 0.12% ORAL RINSE 15 ML: 1.2 LIQUID ORAL at 08:38

## 2021-04-10 RX ADMIN — INSULIN LISPRO 6 UNITS: 100 INJECTION, SOLUTION INTRAVENOUS; SUBCUTANEOUS at 21:26

## 2021-04-10 RX ADMIN — CHLORHEXIDINE GLUCONATE 0.12% ORAL RINSE 15 ML: 1.2 LIQUID ORAL at 21:57

## 2021-04-10 RX ADMIN — MELATONIN 3 MG: at 21:23

## 2021-04-10 RX ADMIN — INSULIN LISPRO 1 UNITS: 100 INJECTION, SOLUTION INTRAVENOUS; SUBCUTANEOUS at 17:47

## 2021-04-10 RX ADMIN — CLONIDINE HYDROCHLORIDE 0.1 MG: 0.1 TABLET ORAL at 14:49

## 2021-04-10 RX ADMIN — POTASSIUM CHLORIDE 40 MEQ: 1500 TABLET, EXTENDED RELEASE ORAL at 08:36

## 2021-04-10 RX ADMIN — CLONIDINE HYDROCHLORIDE 0.1 MG: 0.1 TABLET ORAL at 21:23

## 2021-04-10 RX ADMIN — ATORVASTATIN CALCIUM 80 MG: 40 TABLET, FILM COATED ORAL at 08:37

## 2021-04-10 RX ADMIN — POTASSIUM CHLORIDE 40 MEQ: 1500 TABLET, EXTENDED RELEASE ORAL at 17:47

## 2021-04-10 RX ADMIN — CLONIDINE HYDROCHLORIDE 0.1 MG: 0.1 TABLET ORAL at 05:24

## 2021-04-10 RX ADMIN — DIPHENHYDRAMINE HYDROCHLORIDE 25 MG: 25 TABLET ORAL at 21:57

## 2021-04-10 RX ADMIN — ATENOLOL 25 MG: 25 TABLET ORAL at 08:37

## 2021-04-10 RX ADMIN — PANTOPRAZOLE SODIUM 8 MG/HR: 40 INJECTION, POWDER, FOR SOLUTION INTRAVENOUS at 02:45

## 2021-04-10 NOTE — PLAN OF CARE
Problem: Potential for Falls  Goal: Patient will remain free of falls  Description: INTERVENTIONS:  - Assess patient frequently for physical needs  -  Identify cognitive and physical deficits and behaviors that affect risk of falls  -  [place] fall precautions as indicated by assessment   - Educate patient/family on patient safety including physical limitations  - Instruct patient to call for assistance with activity based on assessment  - Modify environment to reduce risk of injury  - Consider OT/PT consult to assist with strengthening/mobility  Outcome: Progressing     Problem: Nutrition/Hydration-ADULT  Goal: Nutrient/Hydration intake appropriate for improving, restoring or maintaining nutritional needs  Description: Monitor and assess patient's nutrition/hydration status for malnutrition  Collaborate with interdisciplinary team and initiate plan and interventions as ordered  Monitor patient's weight and dietary intake as ordered or per policy  Utilize nutrition screening tool and intervene as necessary  Determine patient's food preferences and provide high-protein, high-caloric foods as appropriate       INTERVENTIONS:  - Monitor oral intake, urinary output, labs, and treatment plans  - Assess nutrition and hydration status and recommend course of action  - Evaluate amount of meals eaten  - Assist patient with eating if necessary   - Allow adequate time for meals  - Recommend/ encourage appropriate diets, oral nutritional supplements, and vitamin/mineral supplements  - Order, calculate, and assess calorie counts as needed  - Recommend, monitor, and adjust tube feedings and TPN/PPN based on assessed needs  - Assess need for intravenous fluids  - Provide specific nutrition/hydration education as appropriate  - Include patient/family/caregiver in decisions related to nutrition  Outcome: Progressing     Problem: PAIN - ADULT  Goal: Verbalizes/displays adequate comfort level or baseline comfort level  Description: Interventions:  - Encourage patient to monitor pain and request assistance  - Assess pain using appropriate pain scale  - Administer analgesics based on type and severity of pain and evaluate response  - Implement non-pharmacological measures as appropriate and evaluate response  - Consider cultural and social influences on pain and pain management  - Notify physician/advanced practitioner if interventions unsuccessful or patient reports new pain  Outcome: Progressing     Problem: INFECTION - ADULT  Goal: Absence or prevention of progression during hospitalization  Description: INTERVENTIONS:  - Assess and monitor for signs and symptoms of infection  - Monitor lab/diagnostic results  - Monitor all insertion sites, i e  indwelling lines, tubes, and drains  - Monitor endotracheal if appropriate and nasal secretions for changes in amount and color  - [place] appropriate cooling/warming therapies per order  - Administer medications as ordered  - Instruct and encourage patient and family to use good hand hygiene technique  - Identify and instruct in appropriate isolation precautions for identified infection/condition  Outcome: Progressing     Problem: SAFETY ADULT  Goal: Maintain or return to baseline ADL function  Description: INTERVENTIONS:  -  Assess patient's ability to carry out ADLs; assess patient's baseline for ADL function and identify physical deficits which impact ability to perform ADLs (bathing, care of mouth/teeth, toileting, grooming, dressing, etc )  - Assess/evaluate cause of self-care deficits   - Assess range of motion  - Assess patient's mobility; develop plan if impaired  - Assess patient's need for assistive devices and provide as appropriate  - Encourage maximum independence but intervene and supervise when necessary  - Involve family in performance of ADLs  - Assess for home care needs following discharge   - Consider OT consult to assist with ADL evaluation and planning for discharge  - Provide patient education as appropriate  Outcome: Progressing  Goal: Maintain or return mobility status to optimal level  Description: INTERVENTIONS:  - Assess patient's baseline mobility status (ambulation, transfers, stairs, etc )    - Identify cognitive and physical deficits and behaviors that affect mobility  - Identify mobility aids required to assist with transfers and/or ambulation (gait belt, sit-to-stand, lift, walker, cane, etc )  - [place] fall precautions as indicated by assessment  - Record patient progress and toleration of activity level on Mobility SBAR; progress patient to next Phase/Stage  - Instruct patient to call for assistance with activity based on assessment  - Consider rehabilitation consult to assist with strengthening/weightbearing, etc   Outcome: Progressing     Problem: DISCHARGE PLANNING  Goal: Discharge to home or other facility with appropriate resources  Description: INTERVENTIONS:  - Identify barriers to discharge w/patient and caregiver  - Arrange for needed discharge resources and transportation as appropriate  - Identify discharge learning needs (meds, wound care, etc )  - Arrange for interpretive services to assist at discharge as needed  - Refer to Case Management Department for coordinating discharge planning if the patient needs post-hospital services based on physician/advanced practitioner order or complex needs related to functional status, cognitive ability, or social support system  Outcome: Progressing     Problem: Knowledge Deficit  Goal: Patient/family/caregiver demonstrates understanding of disease process, treatment plan, medications, and discharge instructions  Description: Complete learning assessment and assess knowledge base    Interventions:  - Provide teaching at level of understanding  - Provide teaching via preferred learning methods  Outcome: Progressing     Problem: NEUROSENSORY - ADULT  Goal: Achieves stable or improved neurological status  Description: INTERVENTIONS  - Monitor and report changes in neurological status  - Monitor vital signs such as temperature, blood pressure, glucose, and any other labs ordered   - Initiate measures to prevent increased intracranial pressure  - Monitor for seizure activity and implement precautions if appropriate      Outcome: Progressing  Goal: Achieves maximal functionality and self care  Description: INTERVENTIONS  - Monitor swallowing and airway patency with patient fatigue and changes in neurological status  - Encourage and assist patient to increase activity and self care     - Encourage visually impaired, hearing impaired and aphasic patients to use assistive/communication devices  Outcome: Progressing     Problem: CARDIOVASCULAR - ADULT  Goal: Maintains optimal cardiac output and hemodynamic stability  Description: INTERVENTIONS:  - Monitor I/O, vital signs and rhythm  - Monitor for S/S and trends of decreased cardiac output  - Administer and titrate ordered vasoactive medications to optimize hemodynamic stability  - Assess quality of pulses, skin color and temperature  - Assess for signs of decreased coronary artery perfusion  - Instruct patient to report change in severity of symptoms  Outcome: Progressing     Problem: GENITOURINARY - ADULT  Goal: Maintains or returns to baseline urinary function  Description: INTERVENTIONS:  - Assess urinary function  - Encourage oral fluids to ensure adequate hydration if ordered  - Administer IV fluids as ordered to ensure adequate hydration  - Administer ordered medications as needed  - Offer frequent toileting  - Follow urinary retention protocol if ordered  Outcome: Progressing  Goal: Absence of urinary retention  Description: INTERVENTIONS:  - Assess patients ability to void and empty bladder  - Monitor I/O  - Bladder scan as needed  - Discuss with physician/AP medications to alleviate retention as needed  - Discuss catheterization for long term situations as appropriate  Outcome: Progressing  Goal: Urinary catheter remains patent  Description: INTERVENTIONS:  - Assess patency of urinary catheter  - If patient has a chronic aseed, consider changing catheter if non-functioning  - Follow guidelines for intermittent irrigation of non-functioning urinary catheter  Outcome: Progressing     Problem: METABOLIC, FLUID AND ELECTROLYTES - ADULT  Goal: Electrolytes maintained within normal limits  Description: INTERVENTIONS:  - Monitor labs and assess patient for signs and symptoms of electrolyte imbalances  - Administer electrolyte replacement as ordered  - Monitor response to electrolyte replacements, including repeat lab results as appropriate  - Instruct patient on fluid and nutrition as appropriate  Outcome: Progressing  Goal: Fluid balance maintained  Description: INTERVENTIONS:  - Monitor labs   - Monitor I/O and WT  - Instruct patient on fluid and nutrition as appropriate  - Assess for signs & symptoms of volume excess or deficit  Outcome: Progressing  Goal: Glucose maintained within target range  Description: INTERVENTIONS:  - Monitor Blood Glucose as ordered  - Assess for signs and symptoms of hyperglycemia and hypoglycemia  - Administer ordered medications to maintain glucose within target range  - Assess nutritional intake and initiate nutrition service referral as needed  Outcome: Progressing     Problem: SKIN/TISSUE INTEGRITY - ADULT  Goal: Skin integrity remains intact  Description: INTERVENTIONS  - Identify patients at risk for skin breakdown  - Assess and monitor skin integrity  - Assess and monitor nutrition and hydration status  - Monitor labs (i e  albumin)  - Assess for incontinence   - Turn and reposition patient  - Assist with mobility/ambulation  - Relieve pressure over bony prominences  - Avoid friction and shearing  - Provide appropriate hygiene as needed including keeping skin clean and dry  - Evaluate need for skin moisturizer/barrier cream  - Collaborate with interdisciplinary team (i e  Nutrition, Rehabilitation, etc )   - Patient/family teaching  Outcome: Progressing     Problem: HEMATOLOGIC - ADULT  Goal: Maintains hematologic stability  Description: INTERVENTIONS  - Assess for signs and symptoms of bleeding or hemorrhage  - Monitor labs  - Administer supportive blood products/factors as ordered and appropriate  Outcome: Progressing

## 2021-04-10 NOTE — ASSESSMENT & PLAN NOTE
Lab Results   Component Value Date    HGBA1C 6 0 (H) 12/28/2020       Recent Labs     04/09/21  1602 04/09/21  2109 04/10/21  0655 04/10/21  1054   POCGLU 216* 153* 117 99       Blood Sugar Average: Last 72 hrs:  (P) 167     Controlled  Hold oral agent  Diabetic diet  Sliding scale coverage

## 2021-04-10 NOTE — ASSESSMENT & PLAN NOTE
Currently not septic  Afebrile, hemodynamically stable  Leukocytosis improving  Urine culture no growth, blood culture without growth  Leukocytosis improving  Completed 5 days of IV ceftriaxone  Monitor off antibiotics for now

## 2021-04-10 NOTE — ASSESSMENT & PLAN NOTE
Butler catheter noted with gross hematuria  Hemoglobin down trending  Significantly better  Patient did receive 1 unit PRBC transfusion on 04/07/2021  Received 2 units PRBC transfusion on 07/08/2021    Now resolved  Butler catheter noted with clear urine  Hold aspirin, Plavix  Hemoglobin stable around 8 after total 3 units PRBC transfusion  Urology recommended to discharge patient with Butler catheter with close outpatient follow-up with Urology  Patient is agreeable with above plan  Urology following

## 2021-04-10 NOTE — ASSESSMENT & PLAN NOTE
Acute blood loss anemia in the settings of hematuria as well as suspected GI loss as evidenced by hemoglobin down trending from 11 5 --> 6 7 as well as positive for fecal occult blood, treated with noted  PRBC transfusion, Urology consultation as well as GI consult  Currently hemoglobin stable around 8 range after 2 units PRBC transfusion  Hematuria resolved  Possible plan for EGD on Monday per GI

## 2021-04-10 NOTE — PROGRESS NOTES
NEPHROLOGY PROGRESS NOTE    Patient: Joana Adamson               Sex: male          DOA: 4/4/2021  1:56 PM   YOB: 1950        Age:  65 y o         LOS:  LOS: 6 days   4/10/2021    REASON FOR THE CONSULTATION:      Acute kidney injury    SUBJECTIVE     Patient is seen and examined next to the bedside  In no distress  Butler catheter draining urine  Patient for upper endoscopy next week  CURRENT MEDICATIONS       Current Facility-Administered Medications:     atenolol (TENORMIN) tablet 25 mg, 25 mg, Oral, Daily, Vannie Massing, PA-C, 25 mg at 04/10/21 0837    atorvastatin (LIPITOR) tablet 80 mg, 80 mg, Oral, Daily, Vannie Massing, PA-C, 80 mg at 04/10/21 0837    chlorhexidine (PERIDEX) 0 12 % oral rinse 15 mL, 15 mL, Swish & Spit, Q12H [address], Vannie Massing, PA-C, 15 mL at 04/10/21 8456    cloNIDine (CATAPRES) tablet 0 1 mg, 0 1 mg, Oral, Q8H [address], Vannie Massing, PA-C, 0 1 mg at 04/10/21 0524    insulin lispro (HumaLOG) 100 units/mL subcutaneous injection 1-6 Units, 1-6 Units, Subcutaneous, 4x Daily (AC & HS), 1 Units at 04/09/21 2133 **AND** Fingerstick Glucose (POCT), , , 4x Daily AC and at bedtime, Vannie Massing, PA-C    melatonin tablet 3 mg, 3 mg, Oral, HS, Vannie Massing, PA-C, 3 mg at 04/09/21 2131    metoprolol (LOPRESSOR) injection 5 mg, 5 mg, Intravenous, Q6H PRN, Vannie Massing, PA-C    pantoprazole (PROTONIX) 80 mg in sodium chloride 0 9 % 100 mL infusion, 8 mg/hr, Intravenous, Continuous, Uvaldo HUI MD, Last Rate: 10 mL/hr at 04/10/21 0245, 8 mg/hr at 04/10/21 0245    potassium chloride (K-DUR,KLOR-CON) CR tablet 40 mEq, 40 mEq, Oral, Once, Vannie Massing, PA-C    potassium chloride (K-DUR,KLOR-CON) CR tablet 40 mEq, 40 mEq, Oral, BID, Lori HUI MD, 40 mEq at 04/10/21 0834    REVIEW OF SYSTEMS     Review of Systems   Constitutional: Negative  HENT: Negative  Eyes: Negative  Respiratory: Negative  Cardiovascular: Negative  Gastrointestinal: Negative  Endocrine: Negative  Genitourinary: Negative  Musculoskeletal: Negative  Skin: Negative  Allergic/Immunologic: Negative  Neurological: Negative  Hematological: Negative  All other systems reviewed and are negative  OBJECTIVE     Current Weight: Weight - Scale: 86 kg (189 lb 9 5 oz)  Vitals:    04/10/21 0657   BP: 143/88   Pulse: 91   Resp: 19   Temp: 98 5 °F (36 9 °C)   SpO2: 99%     Body mass index is 29 69 kg/m²  Intake/Output Summary (Last 24 hours) at 4/10/2021 0950  Last data filed at 4/10/2021 5483  Gross per 24 hour   Intake 600 ml   Output 2525 ml   Net -1925 ml       PHYSICAL EXAMINATION     Physical Exam  HENT:      Head: Normocephalic and atraumatic  Eyes:      Pupils: Pupils are equal, round, and reactive to light  Neck:      Musculoskeletal: Neck supple  Vascular: No JVD  Cardiovascular:      Rate and Rhythm: Normal rate and regular rhythm  Heart sounds: Normal heart sounds  No murmur  No friction rub  Pulmonary:      Effort: Pulmonary effort is normal       Breath sounds: Normal breath sounds  Abdominal:      General: Bowel sounds are normal  There is no distension  Palpations: Abdomen is soft  Tenderness: There is no abdominal tenderness  There is no rebound  Genitourinary:     Comments: Butler catheter in place  Musculoskeletal:         General: No tenderness  Skin:     General: Skin is dry  Findings: No rash  Neurological:      Mental Status: He is alert and oriented to person, place, and time             LAB RESULTS     Results from last 7 days   Lab Units 04/10/21  0643 04/09/21  0751 04/08/21  1828 04/08/21  0935 04/08/21  6603 04/07/21  2250 04/07/21  0509 04/06/21  0453 04/05/21 2002 04/05/21  1259 04/05/21  0858 04/05/21  0436 04/04/21  2354 04/04/21  2158   WBC Thousand/uL 13 32* 17 63* 18 71*  --  17 48*  --  19 66* 20 40*  --   --   --  13 94*  --   --    HEMOGLOBIN g/dL 8 0* 8 1* 8 8*  -- 7  0* 6 7* 7 0* 7 8*  --   --   --  9 0*  --   --    HEMATOCRIT % 24 4* 24 5* 26 4*  --  21 0* 20 1* 20 6* 21 8*  --   --   --  25 7*  --   --    PLATELETS Thousands/uL 292 296 310  --  313  --  375 406*  --   --   --  406*  --   --    POTASSIUM mmol/L  --  3 6  --  3 7  --   --  3 3* 3 3* 3 5 3 7 3 8 3 7 4 2 4 8   CHLORIDE mmol/L  --  110*  --  109*  --   --  111* 109* 107 103 101 99* 95* 91*   CO2 mmol/L  --  27  --  28  --   --  27 23 23 19* 20* 20* 14* 13*   BUN mg/dL  --  16  --  16  --   --  33* 65* 81* 100* 115* 129* 157* 178*   CREATININE mg/dL  --  0 73  --  0 84  --   --  1 08 1 54* 2 06* 2 82* 3 80* 5 33* 8 15* 10 26*   EGFR ml/min/1 73sq m  --  107  --  101  --   --  79 51 36 25 17 11 7 5   CALCIUM mg/dL  --  7 4*  --  8 0*  --   --  8 1* 8 2* 8 4 8 7 8 7 8 4 7 1* 7 3*   MAGNESIUM mg/dL  --   --   --   --   --   --   --  1 9 2 0 2 1 2 2 2 3 2 1 2 3   PHOSPHORUS mg/dL  --   --   --   --   --   --   --  2 2* 2 7 3 6 4 4* 5 6* 7 3* 8 9*           RADIOLOGY RESULTS      Results for orders placed during the hospital encounter of 04/04/21   XR chest 1 view portable    Narrative CHEST     INDICATION:   SOB     COMPARISON:  3/4/2020    EXAM PERFORMED/VIEWS:  XR CHEST PORTABLE      FINDINGS:    Cardiomediastinal silhouette appears unremarkable  The lungs are clear  No pneumothorax or pleural effusion  Osseous structures appear within normal limits for patient age  Impression No acute cardiopulmonary disease  Workstation performed: [id number]           ASSESSMENT/PLAN     20-year-old male with past medical history of diabetes mellitus type 2, tobacco abuse, hypertension, dyslipidemia, hypercalcemia status post parathyroidectomy who presents with fatigue, confusion malaise noted to have CIERA with hyperkalemia and hyponatremia  1  Acute kidney injury:  Presented with serum creatinine of 13 and a state of severe azotemia    -etiology obstructive uropathy as imaging showed bilateral hydronephrosis with distended bladder   -renal parameters now improved status post Butler catheter insertion  -current serum creatinine obtained yesterday is 0 73 and stable  2  Hypertension:  Blood pressure within acceptable range  3  Anemia:  Noted drop in hemoglobin during this hospitalization   -status post 1 unit of PRBC on April 7th  -endoscopy planned next week  Will sign off at this time  Call as needed         Gali Cota MD  Nephrology  4/10/2021

## 2021-04-10 NOTE — PROGRESS NOTES
[address] [facility]  Progress Note - Kris Corrales 1950, 65 y o  male MRN: [id number]  Unit/Bed#: -02 Encounter: [id number]  Primary Care Provider: Chel Arriaga MD   Date and time admitted to hospital: 4/4/2021  1:56 PM    * CIERA (acute kidney injury) [facility])  Assessment & Plan  Due to obstructive uropathy  Now resolved  Creatinine 0 73  Continue indwelling Butler catheter  Lisinopril, Demadex on hold  Nephrology following  Urology recommendation appreciated  Anemia  Assessment & Plan  Acute blood loss anemia in the settings of hematuria as well as suspected GI loss as evidenced by hemoglobin down trending from 11 5 --> 6 7 as well as positive for fecal occult blood, treated with noted  PRBC transfusion, Urology consultation as well as GI consult  Currently hemoglobin stable around 8 range after 2 units PRBC transfusion  Hematuria resolved  Possible plan for EGD on Monday per GI  Hematuria  Assessment & Plan  Butler catheter noted with gross hematuria  Hemoglobin down trending  Significantly better  Patient did receive 1 unit PRBC transfusion on 04/07/2021  Received 2 units PRBC transfusion on 07/08/2021    Now resolved  Butler catheter noted with clear urine  Hold aspirin, Plavix  Hemoglobin stable around 8 after total 3 units PRBC transfusion  Urology recommended to discharge patient with Butler catheter with close outpatient follow-up with Urology  Patient is agreeable with above plan  Urology following  Dehydration with hyponatremia  Assessment & Plan  Hypernatremia likely secondary to obstructive uropathy  Currently sodium 144  Monitor BMP  Nephrology following    UTI (urinary tract infection)  Assessment & Plan  Currently not septic  Afebrile, hemodynamically stable  Leukocytosis improving  Urine culture no growth, blood culture without growth  Leukocytosis improving  Completed 5 days of IV ceftriaxone  Monitor off antibiotics for now        Metabolic acidosis  Assessment & Plan  Now resolved    Hyperkalemia  Assessment & Plan  Now resolved  Currently potassium 3 7  Hyperparathyroidism [facility])  Assessment & Plan  Status post parathyroidectomy on 03/23/21  Supplement calcium as needed  Coronary artery disease involving native coronary artery of native heart  Assessment & Plan  Currently asymptomatic  Aspirin and Plavix on hold due to hematuria  Lisinopril, Demadex on hold due to CIERA  Continue statin  Tobacco abuse  Assessment & Plan  Counseled on smoking cessation  Type 2 diabetes mellitus [facility])  Assessment & Plan  Lab Results   Component Value Date    HGBA1C 6 0 (H) 12/28/2020       Recent Labs     04/09/21  1602 04/09/21  2109 04/10/21  0655 04/10/21  1054   POCGLU 216* 153* 117 99       Blood Sugar Average: Last 72 hrs:  (P) 167     Controlled  Hold oral agent  Diabetic diet  Sliding scale coverage  HTN (hypertension)  Assessment & Plan  Overall reasonably controlled  Resumed decrease dose of antihypertensive history  Continue atenolol 25 mg daily, clonidine 0 1 mg t i d  For now  Lisinopril, Demadex on hold due to acute kidney injury  Monitor vitals per unit protocol  Hyperlipidemia  Assessment & Plan  Continue statin    VTE Pharmacologic Prophylaxis:   Pharmacologic: Pharmacologic VTE Prophylaxis contraindicated due to Hematuria, anemia, plan for EGD on Monday  Mechanical VTE Prophylaxis in Place: Yes    Patient Centered Rounds: I have performed bedside rounds with nursing staff today  Education and Discussions with Family / Patient:  Spoke with spouse Manpreet Gary over the phone  Time Spent for Care: 30 minutes  More than 50% of total time spent on counseling and coordination of care as described above      Current Length of Stay: 6 day(s)    Current Patient Status: Inpatient   Certification Statement: The patient will continue to require additional inpatient hospital stay due to Above plan for EGD on Monday    Discharge Plan: Expected Monday or Tuesday     Code Status: Level 2 - DNAR: but accepts endotracheal intubation      Subjective:   Afebrile, hemodynamically stable  Hematuria resolved  Denies chest pain, fever, chills, nausea vomiting, melena, hematochezia, any new complaints at this time  No other events reported  Objective:     Vitals:   Temp (24hrs), Av 1 °F (36 7 °C), Min:97 7 °F (36 5 °C), Max:98 5 °F (36 9 °C)    Temp:  [97 7 °F (36 5 °C)-98 5 °F (36 9 °C)] 98 5 °F (36 9 °C)  HR:  [79-91] 91  Resp:  [19-20] 19  BP: (138-143)/(71-88) 143/88  SpO2:  [99 %-100 %] 99 %  Body mass index is 29 69 kg/m²  Input and Output Summary (last 24 hours): Intake/Output Summary (Last 24 hours) at 4/10/2021 1320  Last data filed at 4/10/2021 1313  Gross per 24 hour   Intake 900 ml   Output 2525 ml   Net -1625 ml       Physical Exam:     Physical Exam  Constitutional:       General: He is not in acute distress  Appearance: Normal appearance  He is not ill-appearing  HENT:      Head: Normocephalic and atraumatic  Nose: No rhinorrhea  Eyes:      Extraocular Movements: Extraocular movements intact  Conjunctiva/sclera: Conjunctivae normal       Pupils: Pupils are equal, round, and reactive to light  Neck:      Musculoskeletal: Normal range of motion and neck supple  No neck rigidity  Cardiovascular:      Rate and Rhythm: Normal rate and regular rhythm  Pulses: Normal pulses  Heart sounds: Normal heart sounds  Pulmonary:      Effort: Pulmonary effort is normal  No respiratory distress  Breath sounds: Normal breath sounds  No stridor  No wheezing or rhonchi  Abdominal:      General: Bowel sounds are normal  There is no distension  Palpations: Abdomen is soft  Tenderness: There is no abdominal tenderness  Genitourinary:     Comments: Butler catheter noted with clear yellow urine  Musculoskeletal: Normal range of motion  Neurological:      General: No focal deficit present  Mental Status: He is alert  Mental status is at baseline  Psychiatric:         Mood and Affect: Mood normal          Behavior: Behavior normal          Additional Data:     Labs:    Results from last 7 days   Lab Units 04/10/21  0643  04/08/21  0619   WBC Thousand/uL 13 32*   < > 17 48*   HEMOGLOBIN g/dL 8 0*   < > 7 0*   HEMATOCRIT % 24 4*   < > 21 0*   PLATELETS Thousands/uL 292   < > 313   BANDS PCT %  --   --  1   NEUTROS PCT % 77*   < >  --    LYMPHS PCT % 12*   < >  --    LYMPHO PCT %  --   --  10*   MONOS PCT % 8   < >  --    MONO PCT %  --   --  6   EOS PCT % 2   < > 0    < > = values in this interval not displayed  Results from last 7 days   Lab Units 04/09/21  0751  04/05/21  0436   SODIUM mmol/L 142   < > 133*   POTASSIUM mmol/L 3 6   < > 3 7   CHLORIDE mmol/L 110*   < > 99*   CO2 mmol/L 27   < > 20*   BUN mg/dL 16   < > 129*   CREATININE mg/dL 0 73   < > 5 33*   ANION GAP mmol/L 5   < > 14*   CALCIUM mg/dL 7 4*   < > 8 4   ALBUMIN g/dL  --   --  1 7*   TOTAL BILIRUBIN mg/dL  --   --  0 31   ALK PHOS U/L  --   --  80   ALT U/L  --   --  24   AST U/L  --   --  27   GLUCOSE RANDOM mg/dL 106   < > 163*    < > = values in this interval not displayed  Results from last 7 days   Lab Units 04/04/21  1441   INR  1 23*     Results from last 7 days   Lab Units 04/10/21  1054 04/10/21  0655 04/09/21  2109 04/09/21  1602 04/09/21  1134 04/09/21  0741 04/08/21  2049 04/08/21  1612 04/08/21  0737 04/07/21  2054 04/07/21  1632 04/07/21  1534   POC GLUCOSE mg/dl 99 117 153* 216* 131 114 178* 108 164* 223* 248* 157*         Results from last 7 days   Lab Units 04/04/21  2354 04/04/21  1822 04/04/21  1441   LACTIC ACID mmol/L 2 0 3 3* 2 8*           * I Have Reviewed All Lab Data Listed Above  * Additional Pertinent Lab Tests Reviewed:  All Labs Within Last 24 Hours Reviewed        Recent Cultures (last 7 days):     Results from last 7 days   Lab Units 04/05/21  1827 04/04/21  1822 04/04/21  1441   BLOOD CULTURE --  No Growth After 5 Days  No Growth After 5 Days  URINE CULTURE  No Growth <1000 cfu/mL  --   --        Last 24 Hours Medication List:   Current Facility-Administered Medications   Medication Dose Route Frequency Provider Last Rate    atenolol  25 mg Oral Daily TAYLER Lewis      atorvastatin  80 mg Oral Daily [place], [place]      chlorhexidine  15 mL Swish & Spit Q12H 123 [place], [place]      cloNIDine  0 1 mg Oral [place], [place]      insulin lispro  1-6 Units Subcutaneous 4x Daily (AC & HS) TAYLER Lewis      melatonin  3 mg Oral HS [place], [place]      metoprolol  5 mg Intravenous Q6H PRN TAYLER Lewis      pantoprozole (PROTONIX) infusion (Continuous)  8 mg/hr Intravenous Continuous Chuck HUI MD 8 mg/hr (04/10/21 0245)    potassium chloride  40 mEq Oral Once TAYLER Lewis      potassium chloride  40 mEq Oral BID González Mckeon MD          Today, Patient Was Seen By: Danish Hughes MD    ** Please Note: Dictation voice to text software may have been used in the creation of this document   **

## 2021-04-10 NOTE — CASE MANAGEMENT
4/10 DC 4/13 DX CIERA nephrology cx, urology cx,  hypernatremia, anemia 3 units of PRBC given  Patient to discharge with saeed cath, bld/ UAcx no growth  PT/OT have not seen since 4/6, THEN NEEDED ASSIST OF 2, [facility] MOBILITY  DCP: declined STR, Home with [facility]   Transportation: wife will transport at discharge

## 2021-04-11 LAB
BASOPHILS # BLD AUTO: 0.04 THOUSANDS/ΜL (ref 0–0.1)
BASOPHILS NFR BLD AUTO: 0 % (ref 0–1)
EOSINOPHIL # BLD AUTO: 0.42 THOUSAND/ΜL (ref 0–0.61)
EOSINOPHIL NFR BLD AUTO: 3 % (ref 0–6)
ERYTHROCYTE [DISTWIDTH] IN BLOOD BY AUTOMATED COUNT: 17.8 % (ref 11.6–15.1)
GLUCOSE SERPL-MCNC: 114 MG/DL (ref 65–140)
GLUCOSE SERPL-MCNC: 125 MG/DL (ref 65–140)
GLUCOSE SERPL-MCNC: 137 MG/DL (ref 65–140)
GLUCOSE SERPL-MCNC: 98 MG/DL (ref 65–140)
HCT VFR BLD AUTO: 27.5 % (ref 36.5–49.3)
HGB BLD-MCNC: 8.6 G/DL (ref 12–17)
IMM GRANULOCYTES # BLD AUTO: 0.13 THOUSAND/UL (ref 0–0.2)
IMM GRANULOCYTES NFR BLD AUTO: 1 % (ref 0–2)
LYMPHOCYTES # BLD AUTO: 1.72 THOUSANDS/ΜL (ref 0.6–4.47)
LYMPHOCYTES NFR BLD AUTO: 11 % (ref 14–44)
MCH RBC QN AUTO: 27.3 PG (ref 26.8–34.3)
MCHC RBC AUTO-ENTMCNC: 31.3 G/DL (ref 31.4–37.4)
MCV RBC AUTO: 87 FL (ref 82–98)
MONOCYTES # BLD AUTO: 1.07 THOUSAND/ΜL (ref 0.17–1.22)
MONOCYTES NFR BLD AUTO: 7 % (ref 4–12)
NEUTROPHILS # BLD AUTO: 12.67 THOUSANDS/ΜL (ref 1.85–7.62)
NEUTS SEG NFR BLD AUTO: 78 % (ref 43–75)
NRBC BLD AUTO-RTO: 0 /100 WBCS
PLATELET # BLD AUTO: 378 THOUSANDS/UL (ref 149–390)
PMV BLD AUTO: 9.4 FL (ref 8.9–12.7)
RBC # BLD AUTO: 3.15 MILLION/UL (ref 3.88–5.62)
WBC # BLD AUTO: 16.05 THOUSAND/UL (ref 4.31–10.16)

## 2021-04-11 PROCEDURE — 85025 COMPLETE CBC W/AUTO DIFF WBC: CPT | Performed by: PHYSICIAN ASSISTANT

## 2021-04-11 PROCEDURE — C9113 INJ PANTOPRAZOLE SODIUM, VIA: HCPCS | Performed by: STUDENT IN AN ORGANIZED HEALTH CARE EDUCATION/TRAINING PROGRAM

## 2021-04-11 PROCEDURE — 82948 REAGENT STRIP/BLOOD GLUCOSE: CPT

## 2021-04-11 PROCEDURE — 99232 SBSQ HOSP IP/OBS MODERATE 35: CPT | Performed by: STUDENT IN AN ORGANIZED HEALTH CARE EDUCATION/TRAINING PROGRAM

## 2021-04-11 RX ADMIN — CHLORHEXIDINE GLUCONATE 0.12% ORAL RINSE 15 ML: 1.2 LIQUID ORAL at 21:47

## 2021-04-11 RX ADMIN — DIPHENHYDRAMINE HYDROCHLORIDE 25 MG: 25 TABLET ORAL at 09:00

## 2021-04-11 RX ADMIN — POTASSIUM CHLORIDE 40 MEQ: 1500 TABLET, EXTENDED RELEASE ORAL at 17:07

## 2021-04-11 RX ADMIN — PANTOPRAZOLE SODIUM 8 MG/HR: 40 INJECTION, POWDER, FOR SOLUTION INTRAVENOUS at 11:20

## 2021-04-11 RX ADMIN — PANTOPRAZOLE SODIUM 8 MG/HR: 40 INJECTION, POWDER, FOR SOLUTION INTRAVENOUS at 02:35

## 2021-04-11 RX ADMIN — CLONIDINE HYDROCHLORIDE 0.1 MG: 0.1 TABLET ORAL at 14:44

## 2021-04-11 RX ADMIN — CHLORHEXIDINE GLUCONATE 0.12% ORAL RINSE 15 ML: 1.2 LIQUID ORAL at 08:55

## 2021-04-11 RX ADMIN — CLONIDINE HYDROCHLORIDE 0.1 MG: 0.1 TABLET ORAL at 05:36

## 2021-04-11 RX ADMIN — CLONIDINE HYDROCHLORIDE 0.1 MG: 0.1 TABLET ORAL at 21:47

## 2021-04-11 RX ADMIN — ATORVASTATIN CALCIUM 80 MG: 40 TABLET, FILM COATED ORAL at 08:55

## 2021-04-11 RX ADMIN — PANTOPRAZOLE SODIUM 8 MG/HR: 40 INJECTION, POWDER, FOR SOLUTION INTRAVENOUS at 21:47

## 2021-04-11 RX ADMIN — MELATONIN 3 MG: at 21:47

## 2021-04-11 RX ADMIN — POTASSIUM CHLORIDE 40 MEQ: 1500 TABLET, EXTENDED RELEASE ORAL at 08:55

## 2021-04-11 RX ADMIN — ATENOLOL 25 MG: 25 TABLET ORAL at 08:54

## 2021-04-11 NOTE — ASSESSMENT & PLAN NOTE
Acute blood loss anemia in the settings of hematuria as well as suspected GI loss as evidenced by hemoglobin down trending from 11 5 --> 6 7 as well as positive for fecal occult blood, treated with noted  PRBC transfusion, Urology consultation as well as GI consult  Currently hemoglobin stable around 8 range after 3 units PRBC transfusion  Hematuria resolved  Possible plan for EGD on Monday per GI

## 2021-04-11 NOTE — PLAN OF CARE
Problem: Potential for Falls  Goal: Patient will remain free of falls  Description: INTERVENTIONS:  - Assess patient frequently for physical needs  -  Identify cognitive and physical deficits and behaviors that affect risk of falls    -  H[place]ister fall precautions as indicated by assessment   - Educate patient/family on patient safety including physical limitations  - Instruct patient to call for assistance with activity based on assessment  - Modify environment to reduce risk of injury  - Consider OT/PT consult to assist with strengthening/mobility  Outcome: Progressing

## 2021-04-11 NOTE — ASSESSMENT & PLAN NOTE
Lab Results   Component Value Date    HGBA1C 6 0 (H) 12/28/2020       Recent Labs     04/10/21  1604 04/10/21  2048 04/11/21  0659 04/11/21  1109   POCGLU 186* 366* 114 137       Blood Sugar Average: Last 72 hrs:  (P) 160 [id number]     Controlled  Hold oral agent  Diabetic diet  Sliding scale coverage

## 2021-04-11 NOTE — PROGRESS NOTES
[address] [facility]  Progress Note - Keli Edgar 1950, 65 y o  male MRN: [id number]  Unit/Bed#: -02 Encounter: [id number]  Primary Care Provider: Naren Tarango MD   Date and time admitted to hospital: 4/4/2021  1:56 PM    * CIERA (acute kidney injury) [facility])  Assessment & Plan  Due to obstructive uropathy  Now resolved  Creatinine 0 73  Continue indwelling Butler catheter  Lisinopril, Demadex on hold  Nephrology following  Urology recommendation appreciated  Anemia  Assessment & Plan  Acute blood loss anemia in the settings of hematuria as well as suspected GI loss as evidenced by hemoglobin down trending from 11 5 --> 6 7 as well as positive for fecal occult blood, treated with noted  PRBC transfusion, Urology consultation as well as GI consult  Currently hemoglobin stable around 8 range after 3 units PRBC transfusion  Hematuria resolved  Possible plan for EGD on Monday per GI  Hematuria  Assessment & Plan  Butler catheter noted with gross hematuria  Hemoglobin down trending  Significantly better  Patient did receive 1 unit PRBC transfusion on 04/07/2021  Received 2 units PRBC transfusion on 07/08/2021    Now resolved  Butler catheter noted with clear urine  Hold aspirin, Plavix  Hemoglobin stable around 8 after total 3 units PRBC transfusion  Urology recommended to discharge patient with Butler catheter with close outpatient follow-up with Urology  Patient is agreeable with above plan  Urology following  Dehydration with hyponatremia  Assessment & Plan  Hypernatremia likely secondary to obstructive uropathy  Currently sodium 144  Monitor BMP  Nephrology following    UTI (urinary tract infection)  Assessment & Plan  Currently not septic  Afebrile, hemodynamically stable  Leukocytosis improving  Urine culture no growth, blood culture without growth  Leukocytosis improving  Completed 5 days of IV ceftriaxone  Monitor off antibiotics for now        Metabolic acidosis  Assessment & Plan  Now resolved    Hyperkalemia  Assessment & Plan  Now resolved  Currently potassium 3 7  Hyperparathyroidism [facility])  Assessment & Plan  Status post parathyroidectomy on 03/23/21  Supplement calcium as needed  Coronary artery disease involving native coronary artery of native heart  Assessment & Plan  Currently asymptomatic  Aspirin and Plavix on hold due to hematuria  Lisinopril, Demadex on hold due to CIERA  Continue statin  Tobacco abuse  Assessment & Plan  Counseled on smoking cessation  Type 2 diabetes mellitus [facility])  Assessment & Plan  Lab Results   Component Value Date    HGBA1C 6 0 (H) 12/28/2020       Recent Labs     04/10/21  1604 04/10/21  2048 04/11/21  0659 04/11/21  1109   POCGLU 186* 366* 114 137       Blood Sugar Average: Last 72 hrs:  (P) 160 [id number]     Controlled  Hold oral agent  Diabetic diet  Sliding scale coverage  HTN (hypertension)  Assessment & Plan  Overall reasonably controlled  Resumed decrease dose of antihypertensive history  Continue atenolol 25 mg daily, clonidine 0 1 mg t i d  For now  Lisinopril, Demadex on hold due to acute kidney injury  Monitor vitals per unit protocol  Hyperlipidemia  Assessment & Plan  Continue statin      VTE Pharmacologic Prophylaxis:     Pharmacologic: Pharmacologic VTE Prophylaxis contraindicated due to anemia requiring transfusion  Patient Centered Rounds: I have performed bedside rounds with nursing staff today  Education and Discussions with Family / Patient: spoke with spouse    Time Spent for Care: 30 minutes  More than 50% of total time spent on counseling and coordination of care as described above  Current Length of Stay: 7 day(s)    Current Patient Status: Inpatient   Certification Statement: The patient will continue to require additional inpatient hospital stay due to above      Discharge Plan: 48hr    Code Status: Level 2 - DNAR: but accepts endotracheal intubation      Subjective:   Afebrile, hemodynamically stable  Patient is in good spirit  Denies chest pain, dyspnea, fever, chills, nausea, vomiting, any other complaints  No other events reported  Plan for EGD tomorrow  Objective:     Vitals:   Temp (24hrs), Av 5 °F (36 9 °C), Min:98 2 °F (36 8 °C), Max:98 7 °F (37 1 °C)    Temp:  [98 2 °F (36 8 °C)-98 7 °F (37 1 °C)] 98 7 °F (37 1 °C)  HR:  [] 110  Resp:  [19-20] 20  BP: (132-152)/(66-87) 139/86  SpO2:  [97 %-100 %] 98 %  Body mass index is 30 45 kg/m²  Input and Output Summary (last 24 hours): Intake/Output Summary (Last 24 hours) at 2021 1558  Last data filed at 2021 1427  Gross per 24 hour   Intake 360 ml   Output 2750 ml   Net -2390 ml       Physical Exam:     Physical Exam  Constitutional:       General: He is not in acute distress  Appearance: Normal appearance  He is not ill-appearing  HENT:      Head: Normocephalic and atraumatic  Nose: No rhinorrhea  Eyes:      Extraocular Movements: Extraocular movements intact  Conjunctiva/sclera: Conjunctivae normal       Pupils: Pupils are equal, round, and reactive to light  Neck:      Musculoskeletal: Normal range of motion and neck supple  No neck rigidity  Cardiovascular:      Rate and Rhythm: Normal rate and regular rhythm  Pulses: Normal pulses  Heart sounds: Normal heart sounds  Pulmonary:      Effort: Pulmonary effort is normal  No respiratory distress  Breath sounds: Normal breath sounds  No stridor  No wheezing or rhonchi  Abdominal:      General: Bowel sounds are normal  There is no distension  Palpations: Abdomen is soft  Tenderness: There is no abdominal tenderness  Genitourinary:     Comments: Butler catheter noted with clear yellow urine  Musculoskeletal: Normal range of motion  Neurological:      General: No focal deficit present  Mental Status: He is alert  Mental status is at baseline     Psychiatric: Mood and Affect: Mood normal          Behavior: Behavior normal          Additional Data:     Labs:    Results from last 7 days   Lab Units 04/11/21  0445  04/08/21  0619   WBC Thousand/uL 16 05*   < > 17 48*   HEMOGLOBIN g/dL 8 6*   < > 7 0*   HEMATOCRIT % 27 5*   < > 21 0*   PLATELETS Thousands/uL 378   < > 313   BANDS PCT %  --   --  1   NEUTROS PCT % 78*   < >  --    LYMPHS PCT % 11*   < >  --    LYMPHO PCT %  --   --  10*   MONOS PCT % 7   < >  --    MONO PCT %  --   --  6   EOS PCT % 3   < > 0    < > = values in this interval not displayed  Results from last 7 days   Lab Units 04/09/21  0751  04/05/21  0436   SODIUM mmol/L 142   < > 133*   POTASSIUM mmol/L 3 6   < > 3 7   CHLORIDE mmol/L 110*   < > 99*   CO2 mmol/L 27   < > 20*   BUN mg/dL 16   < > 129*   CREATININE mg/dL 0 73   < > 5 33*   ANION GAP mmol/L 5   < > 14*   CALCIUM mg/dL 7 4*   < > 8 4   ALBUMIN g/dL  --   --  1 7*   TOTAL BILIRUBIN mg/dL  --   --  0 31   ALK PHOS U/L  --   --  80   ALT U/L  --   --  24   AST U/L  --   --  27   GLUCOSE RANDOM mg/dL 106   < > 163*    < > = values in this interval not displayed  Results from last 7 days   Lab Units 04/11/21  1109 04/11/21  0659 04/10/21  2048 04/10/21  1604 04/10/21  1054 04/10/21  0655 04/09/21  2109 04/09/21  1602 04/09/21  1134 04/09/21  0741 04/08/21  2049 04/08/21  1612   POC GLUCOSE mg/dl 137 114 366* 186* 99 117 153* 216* 131 114 178* 108         Results from last 7 days   Lab Units 04/04/21  2354 04/04/21  1822   LACTIC ACID mmol/L 2 0 3 3*           * I Have Reviewed All Lab Data Listed Above  * Additional Pertinent Lab Tests Reviewed: All Labs Within Last 24 Hours Reviewed      Recent Cultures (last 7 days):     Results from last 7 days   Lab Units 04/05/21  1827 04/04/21  1822   BLOOD CULTURE   --  No Growth After 5 Days     URINE CULTURE  No Growth <1000 cfu/mL  --        Last 24 Hours Medication List:   Current Facility-Administered Medications   Medication Dose Route Frequency Provider Last Rate    atenolol  25 mg Oral Daily Beard Maxin, PA-C      atorvastatin  80 mg Oral Daily Beard Max[place], [place]      chlorhexidine  15 mL Swish & Spit Q12H 123 [place], [place]      cloNIDine  0 1 mg Oral [facility] Beard Max[place], [place]      diphenhydrAMINE  25 mg Oral Q6H PRN Leidy Lose, CRNP      insulin lispro  1-6 Units Subcutaneous 4x Daily (AC & HS) Beard Maxin, PA-C      melatonin  3 mg Oral HS Beard Max[place], [place]      metoprolol  5 mg Intravenous Q6H PRN Beard Maxin, PA-C      pantoprozole (PROTONIX) infusion (Continuous)  8 mg/hr Intravenous Continuous Uvaldo HUI MD 8 mg/hr (04/11/21 1120)    potassium chloride  40 mEq Oral Once Beard Maxin, PA-C      potassium chloride  40 mEq Oral BID Raf Erazo MD          Today, Patient Was Seen By: Edmond Jaramillo MD    ** Please Note: Dictation voice to text software may have been used in the creation of this document   **

## 2021-04-11 NOTE — PLAN OF CARE
Problem: Potential for Falls  Goal: Patient will remain free of falls  Description: INTERVENTIONS:  - Assess patient frequently for physical needs  -  Identify cognitive and physical deficits and behaviors that affect risk of falls  -  [place] fall precautions as indicated by assessment   - Educate patient/family on patient safety including physical limitations  - Instruct patient to call for assistance with activity based on assessment  - Modify environment to reduce risk of injury  - Consider OT/PT consult to assist with strengthening/mobility  Outcome: Progressing     Problem: Nutrition/Hydration-ADULT  Goal: Nutrient/Hydration intake appropriate for improving, restoring or maintaining nutritional needs  Description: Monitor and assess patient's nutrition/hydration status for malnutrition  Collaborate with interdisciplinary team and initiate plan and interventions as ordered  Monitor patient's weight and dietary intake as ordered or per policy  Utilize nutrition screening tool and intervene as necessary  Determine patient's food preferences and provide high-protein, high-caloric foods as appropriate       INTERVENTIONS:  - Monitor oral intake, urinary output, labs, and treatment plans  - Assess nutrition and hydration status and recommend course of action  - Evaluate amount of meals eaten  - Assist patient with eating if necessary   - Allow adequate time for meals  - Recommend/ encourage appropriate diets, oral nutritional supplements, and vitamin/mineral supplements  - Order, calculate, and assess calorie counts as needed  - Recommend, monitor, and adjust tube feedings and TPN/PPN based on assessed needs  - Assess need for intravenous fluids  - Provide specific nutrition/hydration education as appropriate  - Include patient/family/caregiver in decisions related to nutrition  Outcome: Progressing     Problem: PAIN - ADULT  Goal: Verbalizes/displays adequate comfort level or baseline comfort level  Description: Interventions:  - Encourage patient to monitor pain and request assistance  - Assess pain using appropriate pain scale  - Administer analgesics based on type and severity of pain and evaluate response  - Implement non-pharmacological measures as appropriate and evaluate response  - Consider cultural and social influences on pain and pain management  - Notify physician/advanced practitioner if interventions unsuccessful or patient reports new pain  Outcome: Progressing     Problem: INFECTION - ADULT  Goal: Absence or prevention of progression during hospitalization  Description: INTERVENTIONS:  - Assess and monitor for signs and symptoms of infection  - Monitor lab/diagnostic results  - Monitor all insertion sites, i e  indwelling lines, tubes, and drains  - Monitor endotracheal if appropriate and nasal secretions for changes in amount and color  - [place] appropriate cooling/warming therapies per order  - Administer medications as ordered  - Instruct and encourage patient and family to use good hand hygiene technique  - Identify and instruct in appropriate isolation precautions for identified infection/condition  Outcome: Progressing     Problem: SAFETY ADULT  Goal: Maintain or return to baseline ADL function  Description: INTERVENTIONS:  -  Assess patient's ability to carry out ADLs; assess patient's baseline for ADL function and identify physical deficits which impact ability to perform ADLs (bathing, care of mouth/teeth, toileting, grooming, dressing, etc )  - Assess/evaluate cause of self-care deficits   - Assess range of motion  - Assess patient's mobility; develop plan if impaired  - Assess patient's need for assistive devices and provide as appropriate  - Encourage maximum independence but intervene and supervise when necessary  - Involve family in performance of ADLs  - Assess for home care needs following discharge   - Consider OT consult to assist with ADL evaluation and planning for discharge  - Provide patient education as appropriate  Outcome: Progressing  Goal: Maintain or return mobility status to optimal level  Description: INTERVENTIONS:  - Assess patient's baseline mobility status (ambulation, transfers, stairs, etc )    - Identify cognitive and physical deficits and behaviors that affect mobility  - Identify mobility aids required to assist with transfers and/or ambulation (gait belt, sit-to-stand, lift, walker, cane, etc )  - [place] fall precautions as indicated by assessment  - Record patient progress and toleration of activity level on Mobility SBAR; progress patient to next Phase/Stage  - Instruct patient to call for assistance with activity based on assessment  - Consider rehabilitation consult to assist with strengthening/weightbearing, etc   Outcome: Progressing     Problem: DISCHARGE PLANNING  Goal: Discharge to home or other facility with appropriate resources  Description: INTERVENTIONS:  - Identify barriers to discharge w/patient and caregiver  - Arrange for needed discharge resources and transportation as appropriate  - Identify discharge learning needs (meds, wound care, etc )  - Arrange for interpretive services to assist at discharge as needed  - Refer to Case Management Department for coordinating discharge planning if the patient needs post-hospital services based on physician/advanced practitioner order or complex needs related to functional status, cognitive ability, or social support system  Outcome: Progressing     Problem: Knowledge Deficit  Goal: Patient/family/caregiver demonstrates understanding of disease process, treatment plan, medications, and discharge instructions  Description: Complete learning assessment and assess knowledge base    Interventions:  - Provide teaching at level of understanding  - Provide teaching via preferred learning methods  Outcome: Progressing     Problem: GENITOURINARY - ADULT  Goal: Maintains or returns to baseline urinary function  Description: INTERVENTIONS:  - Assess urinary function  - Encourage oral fluids to ensure adequate hydration if ordered  - Administer IV fluids as ordered to ensure adequate hydration  - Administer ordered medications as needed  - Offer frequent toileting  - Follow urinary retention protocol if ordered  Outcome: Progressing  Goal: Absence of urinary retention  Description: INTERVENTIONS:  - Assess patients ability to void and empty bladder  - Monitor I/O  - Bladder scan as needed  - Discuss with physician/AP medications to alleviate retention as needed  - Discuss catheterization for long term situations as appropriate  Outcome: Progressing  Goal: Urinary catheter remains patent  Description: INTERVENTIONS:  - Assess patency of urinary catheter  - If patient has a chronic saeed, consider changing catheter if non-functioning  - Follow guidelines for intermittent irrigation of non-functioning urinary catheter  Outcome: Progressing     Problem: METABOLIC, FLUID AND ELECTROLYTES - ADULT  Goal: Electrolytes maintained within normal limits  Description: INTERVENTIONS:  - Monitor labs and assess patient for signs and symptoms of electrolyte imbalances  - Administer electrolyte replacement as ordered  - Monitor response to electrolyte replacements, including repeat lab results as appropriate  - Instruct patient on fluid and nutrition as appropriate  Outcome: Progressing  Goal: Fluid balance maintained  Description: INTERVENTIONS:  - Monitor labs   - Monitor I/O and WT  - Instruct patient on fluid and nutrition as appropriate  - Assess for signs & symptoms of volume excess or deficit  Outcome: Progressing  Goal: Glucose maintained within target range  Description: INTERVENTIONS:  - Monitor Blood Glucose as ordered  - Assess for signs and symptoms of hyperglycemia and hypoglycemia  - Administer ordered medications to maintain glucose within target range  - Assess nutritional intake and initiate nutrition service referral as needed  Outcome: Progressing     Problem: SKIN/TISSUE INTEGRITY - ADULT  Goal: Skin integrity remains intact  Description: INTERVENTIONS  - Identify patients at risk for skin breakdown  - Assess and monitor skin integrity  - Assess and monitor nutrition and hydration status  - Monitor labs (i e  albumin)  - Assess for incontinence   - Turn and reposition patient  - Assist with mobility/ambulation  - Relieve pressure over bony prominences  - Avoid friction and shearing  - Provide appropriate hygiene as needed including keeping skin clean and dry  - Evaluate need for skin moisturizer/barrier cream  - Collaborate with interdisciplinary team (i e  Nutrition, Rehabilitation, etc )   - Patient/family teaching  Outcome: Progressing     Problem: HEMATOLOGIC - ADULT  Goal: Maintains hematologic stability  Description: INTERVENTIONS  - Assess for signs and symptoms of bleeding or hemorrhage  - Monitor labs  - Administer supportive blood products/factors as ordered and appropriate  Outcome: Progressing

## 2021-04-12 ENCOUNTER — APPOINTMENT (INPATIENT)
Dept: GASTROENTEROLOGY | Facility: HOSPITAL | Age: 71
DRG: 682 | End: 2021-04-12
Payer: MEDICARE

## 2021-04-12 ENCOUNTER — ANESTHESIA EVENT (INPATIENT)
Dept: GASTROENTEROLOGY | Facility: HOSPITAL | Age: 71
DRG: 682 | End: 2021-04-12
Payer: MEDICARE

## 2021-04-12 ENCOUNTER — ANESTHESIA (INPATIENT)
Dept: GASTROENTEROLOGY | Facility: HOSPITAL | Age: 71
DRG: 682 | End: 2021-04-12
Payer: MEDICARE

## 2021-04-12 PROBLEM — Z98.890 STATUS POST PARATHYROIDECTOMY: Status: ACTIVE | Noted: 2021-04-12

## 2021-04-12 PROBLEM — Z90.89 STATUS POST PARATHYROIDECTOMY: Status: ACTIVE | Noted: 2021-04-12

## 2021-04-12 PROBLEM — E89.2 STATUS POST PARATHYROIDECTOMY (HCC): Status: ACTIVE | Noted: 2021-04-12

## 2021-04-12 LAB
BASOPHILS # BLD AUTO: 0.02 THOUSANDS/ΜL (ref 0–0.1)
BASOPHILS NFR BLD AUTO: 0 % (ref 0–1)
EOSINOPHIL # BLD AUTO: 0.42 THOUSAND/ΜL (ref 0–0.61)
EOSINOPHIL NFR BLD AUTO: 2 % (ref 0–6)
ERYTHROCYTE [DISTWIDTH] IN BLOOD BY AUTOMATED COUNT: 18.3 % (ref 11.6–15.1)
GLUCOSE SERPL-MCNC: 105 MG/DL (ref 65–140)
GLUCOSE SERPL-MCNC: 110 MG/DL (ref 65–140)
GLUCOSE SERPL-MCNC: 126 MG/DL (ref 65–140)
GLUCOSE SERPL-MCNC: 153 MG/DL (ref 65–140)
HCT VFR BLD AUTO: 28.7 % (ref 36.5–49.3)
HGB BLD-MCNC: 9.2 G/DL (ref 12–17)
IMM GRANULOCYTES # BLD AUTO: 0.15 THOUSAND/UL (ref 0–0.2)
IMM GRANULOCYTES NFR BLD AUTO: 1 % (ref 0–2)
LYMPHOCYTES # BLD AUTO: 1.37 THOUSANDS/ΜL (ref 0.6–4.47)
LYMPHOCYTES NFR BLD AUTO: 7 % (ref 14–44)
MCH RBC QN AUTO: 27.5 PG (ref 26.8–34.3)
MCHC RBC AUTO-ENTMCNC: 32.1 G/DL (ref 31.4–37.4)
MCV RBC AUTO: 86 FL (ref 82–98)
MONOCYTES # BLD AUTO: 1.31 THOUSAND/ΜL (ref 0.17–1.22)
MONOCYTES NFR BLD AUTO: 7 % (ref 4–12)
NEUTROPHILS # BLD AUTO: 16.77 THOUSANDS/ΜL (ref 1.85–7.62)
NEUTS SEG NFR BLD AUTO: 83 % (ref 43–75)
NRBC BLD AUTO-RTO: 0 /100 WBCS
PLATELET # BLD AUTO: 432 THOUSANDS/UL (ref 149–390)
PMV BLD AUTO: 9.3 FL (ref 8.9–12.7)
RBC # BLD AUTO: 3.35 MILLION/UL (ref 3.88–5.62)
WBC # BLD AUTO: 20.04 THOUSAND/UL (ref 4.31–10.16)

## 2021-04-12 PROCEDURE — 43239 EGD BIOPSY SINGLE/MULTIPLE: CPT | Performed by: INTERNAL MEDICINE

## 2021-04-12 PROCEDURE — 88313 SPECIAL STAINS GROUP 2: CPT | Performed by: PATHOLOGY

## 2021-04-12 PROCEDURE — 88305 TISSUE EXAM BY PATHOLOGIST: CPT | Performed by: PATHOLOGY

## 2021-04-12 PROCEDURE — 88342 IMHCHEM/IMCYTCHM 1ST ANTB: CPT | Performed by: PATHOLOGY

## 2021-04-12 PROCEDURE — 82948 REAGENT STRIP/BLOOD GLUCOSE: CPT

## 2021-04-12 PROCEDURE — 0DB98ZX EXCISION OF DUODENUM, VIA NATURAL OR ARTIFICIAL OPENING ENDOSCOPIC, DIAGNOSTIC: ICD-10-PCS | Performed by: INTERNAL MEDICINE

## 2021-04-12 PROCEDURE — C9113 INJ PANTOPRAZOLE SODIUM, VIA: HCPCS | Performed by: STUDENT IN AN ORGANIZED HEALTH CARE EDUCATION/TRAINING PROGRAM

## 2021-04-12 PROCEDURE — 99232 SBSQ HOSP IP/OBS MODERATE 35: CPT | Performed by: STUDENT IN AN ORGANIZED HEALTH CARE EDUCATION/TRAINING PROGRAM

## 2021-04-12 PROCEDURE — 0DB78ZX EXCISION OF STOMACH, PYLORUS, VIA NATURAL OR ARTIFICIAL OPENING ENDOSCOPIC, DIAGNOSTIC: ICD-10-PCS | Performed by: INTERNAL MEDICINE

## 2021-04-12 PROCEDURE — 88341 IMHCHEM/IMCYTCHM EA ADD ANTB: CPT | Performed by: PATHOLOGY

## 2021-04-12 PROCEDURE — 85025 COMPLETE CBC W/AUTO DIFF WBC: CPT | Performed by: PHYSICIAN ASSISTANT

## 2021-04-12 RX ORDER — LIDOCAINE HYDROCHLORIDE 10 MG/ML
INJECTION, SOLUTION EPIDURAL; INFILTRATION; INTRACAUDAL; PERINEURAL AS NEEDED
Status: DISCONTINUED | OUTPATIENT
Start: 2021-04-12 | End: 2021-04-12

## 2021-04-12 RX ORDER — PROPOFOL 10 MG/ML
INJECTION, EMULSION INTRAVENOUS AS NEEDED
Status: DISCONTINUED | OUTPATIENT
Start: 2021-04-12 | End: 2021-04-12

## 2021-04-12 RX ORDER — DIAPER,BRIEF,INFANT-TODD,DISP
EACH MISCELLANEOUS 2 TIMES DAILY PRN
Status: DISCONTINUED | OUTPATIENT
Start: 2021-04-12 | End: 2021-04-13 | Stop reason: HOSPADM

## 2021-04-12 RX ORDER — SODIUM CHLORIDE, SODIUM LACTATE, POTASSIUM CHLORIDE, CALCIUM CHLORIDE 600; 310; 30; 20 MG/100ML; MG/100ML; MG/100ML; MG/100ML
50 INJECTION, SOLUTION INTRAVENOUS CONTINUOUS
Status: DISCONTINUED | OUTPATIENT
Start: 2021-04-12 | End: 2021-04-13

## 2021-04-12 RX ORDER — PANTOPRAZOLE SODIUM 40 MG/1
40 TABLET, DELAYED RELEASE ORAL
Status: DISCONTINUED | OUTPATIENT
Start: 2021-04-12 | End: 2021-04-12

## 2021-04-12 RX ORDER — PANTOPRAZOLE SODIUM 40 MG/1
40 TABLET, DELAYED RELEASE ORAL
Status: DISCONTINUED | OUTPATIENT
Start: 2021-04-12 | End: 2021-04-13 | Stop reason: HOSPADM

## 2021-04-12 RX ORDER — SODIUM CHLORIDE, SODIUM LACTATE, POTASSIUM CHLORIDE, CALCIUM CHLORIDE 600; 310; 30; 20 MG/100ML; MG/100ML; MG/100ML; MG/100ML
75 INJECTION, SOLUTION INTRAVENOUS CONTINUOUS
Status: CANCELLED | OUTPATIENT
Start: 2021-04-12

## 2021-04-12 RX ADMIN — CHLORHEXIDINE GLUCONATE 0.12% ORAL RINSE 15 ML: 1.2 LIQUID ORAL at 21:14

## 2021-04-12 RX ADMIN — POTASSIUM CHLORIDE 40 MEQ: 1500 TABLET, EXTENDED RELEASE ORAL at 09:10

## 2021-04-12 RX ADMIN — ATORVASTATIN CALCIUM 80 MG: 40 TABLET, FILM COATED ORAL at 09:11

## 2021-04-12 RX ADMIN — PROPOFOL 50 MG: 10 INJECTION, EMULSION INTRAVENOUS at 13:42

## 2021-04-12 RX ADMIN — PANTOPRAZOLE SODIUM 8 MG/HR: 40 INJECTION, POWDER, FOR SOLUTION INTRAVENOUS at 08:57

## 2021-04-12 RX ADMIN — PROPOFOL 20 MG: 10 INJECTION, EMULSION INTRAVENOUS at 13:49

## 2021-04-12 RX ADMIN — PROPOFOL 50 MG: 10 INJECTION, EMULSION INTRAVENOUS at 13:41

## 2021-04-12 RX ADMIN — DIPHENHYDRAMINE HYDROCHLORIDE 25 MG: 25 TABLET ORAL at 17:09

## 2021-04-12 RX ADMIN — ATENOLOL 25 MG: 25 TABLET ORAL at 09:11

## 2021-04-12 RX ADMIN — INSULIN LISPRO 1 UNITS: 100 INJECTION, SOLUTION INTRAVENOUS; SUBCUTANEOUS at 17:11

## 2021-04-12 RX ADMIN — SODIUM CHLORIDE, SODIUM LACTATE, POTASSIUM CHLORIDE, AND CALCIUM CHLORIDE: .6; .31; .03; .02 INJECTION, SOLUTION INTRAVENOUS at 13:38

## 2021-04-12 RX ADMIN — PANTOPRAZOLE SODIUM 40 MG: 40 TABLET, DELAYED RELEASE ORAL at 17:09

## 2021-04-12 RX ADMIN — CLONIDINE HYDROCHLORIDE 0.1 MG: 0.1 TABLET ORAL at 17:10

## 2021-04-12 RX ADMIN — CLONIDINE HYDROCHLORIDE 0.1 MG: 0.1 TABLET ORAL at 21:14

## 2021-04-12 RX ADMIN — LIDOCAINE HYDROCHLORIDE 50 MG: 10 INJECTION, SOLUTION EPIDURAL; INFILTRATION; INTRACAUDAL; PERINEURAL at 13:41

## 2021-04-12 RX ADMIN — CHLORHEXIDINE GLUCONATE 0.12% ORAL RINSE 15 ML: 1.2 LIQUID ORAL at 09:11

## 2021-04-12 RX ADMIN — CLONIDINE HYDROCHLORIDE 0.1 MG: 0.1 TABLET ORAL at 06:04

## 2021-04-12 RX ADMIN — PROPOFOL 50 MG: 10 INJECTION, EMULSION INTRAVENOUS at 13:43

## 2021-04-12 RX ADMIN — POTASSIUM CHLORIDE 40 MEQ: 1500 TABLET, EXTENDED RELEASE ORAL at 17:09

## 2021-04-12 RX ADMIN — MELATONIN 3 MG: at 21:14

## 2021-04-12 NOTE — ASSESSMENT & PLAN NOTE
Lab Results   Component Value Date    HGBA1C 6 0 (H) 12/28/2020       Recent Labs     04/11/21  1600 04/11/21  2130 04/12/21  0734 04/12/21  1152   POCGLU 125 98 110 105       Blood Sugar Average: Last 72 hrs:  (P) 147 [id number]     Controlled  Hold oral agent  Diabetic diet  Sliding scale coverage

## 2021-04-12 NOTE — ASSESSMENT & PLAN NOTE
Currently asymptomatic  Aspirin and Plavix on hold due to hematuria  Lisinopril, Demadex on hold due to CIERA  Continue statin  Plan for EGD today and consider resuming aspirin after EGD once cleared by GI

## 2021-04-12 NOTE — DISCHARGE INSTRUCTIONS
Upper Endoscopy   WHAT YOU NEED TO KNOW:   An upper endoscopy is also called an upper gastrointestinal (GI) endoscopy, or an esophagogastroduodenoscopy (EGD)  It is a procedure to examine the inside of your esophagus, stomach, and duodenum (first part of the small intestine) with a scope  You may feel bloated, gassy, or have some abdominal discomfort after your procedure  Your throat may be sore for 24 to 36 hours  You may burp or pass gas from air that is still inside your body  DISCHARGE INSTRUCTIONS:   Seek care immediately if:    You have sudden, severe abdominal pain   You have problems swallowing   You have a large amount of black, sticky bowel movements or blood in your bowel movements   You have sudden trouble breathing   You feel weak, lightheaded, or faint or your heart beats faster than normal for you  Contact your healthcare provider if:    You have a fever and chills   You have nausea or are vomiting   Your abdomen is bloated or feels full and hard   You have abdominal pain   You have black, sticky bowel movements or blood in your bowel movements   You have not had a bowel movement for 3 days after your procedure   You have rash or hives   You have questions or concerns about your procedure  Activity:    Do not lift, strain, or run for 24 hours after your procedure   Rest after your procedure  You have been given medicine to relax you  Do not drive or make important decisions until the day after your procedure  Return to your normal activity as directed   Relieve gas and discomfort from bloating by lying on your right side with a heating pad on your abdomen  You may need to take short walks to help the gas move out  Eat small meals until bloating is relieved  Follow up with your healthcare provider as directed: Write down your questions so you remember to ask them during your visits       If you take a blood thinner, please review the specific instructions from your endoscopist about when you should resume it  These can be found in the Recommendation and Your Medication list sections of this After Visit Summary

## 2021-04-12 NOTE — ASSESSMENT & PLAN NOTE
Butler catheter noted with gross hematuria  Hemoglobin down trending  Significantly better  Patient did receive 1 unit PRBC transfusion on 04/07/2021  Received 2 units PRBC transfusion on 07/08/2021    Now resolved  Butler catheter noted with clear urine  Hold aspirin, Plavix  Hemoglobin stable around 8 -9 after total 3 units PRBC transfusion  Urology recommended to discharge patient with Butler catheter with close outpatient follow-up with Urology  Patient is agreeable with above plan  Outpatient follow-up with Urology    Urology S signed of

## 2021-04-12 NOTE — ASSESSMENT & PLAN NOTE
Acute blood loss anemia in the settings of hematuria as well as suspected GI loss as evidenced by hemoglobin down trending from 11 5 --> 6 7 as well as positive for fecal occult blood, treated with noted  PRBC transfusion, Urology consultation as well as GI consult  Currently hemoglobin stable around 8 range after 3 units PRBC transfusion  Hematuria resolved  plan for EGD today per GI recommendation

## 2021-04-12 NOTE — ANESTHESIA POSTPROCEDURE EVALUATION
Post-Op Assessment Note    CV Status:  Stable  Pain Score: 0    Pain management: adequate     Hydration Status:  Stable   PONV Controlled:  None   Airway Patency:  Patent and adequate      Post Op Vitals Reviewed: Yes      Staff: CRNA         No complications documented      BP  100/56   Temp      Pulse  91   Resp   18   SpO2   100

## 2021-04-12 NOTE — ANESTHESIA PREPROCEDURE EVALUATION
Procedure:  EGD    Relevant Problems   CARDIO   (+) Atherosclerosis of aorta (HCC)   (+) Atherosclerosis of native coronary artery of native heart without angina pectoris   (+) Coronary artery disease involving native coronary artery of native heart   (+) HTN (hypertension)   (+) Hyperlipidemia      ENDO   (+) Hyperparathyroidism (HCC)   (+) Type 2 diabetes mellitus (HCC)      /RENAL   (+) CIERA (acute kidney injury) (HCC)      HEMATOLOGY   (+) Anemia   (+) Anemia associated with diabetes mellitus (HCC)   (+) Iron deficiency anemia due to chronic blood loss      NEURO/PSYCH   (+) History of aortic regurgitation   (+) History of obesity   64-year-old male with history of hypertension, hyperlipidemia, type 2 diabetes, CAD on Plavix, hyperparathyroidism, hypertrophic obstructive cardiomyopathy, and previous GI bleed from gastric ulcer  Patient has been admitted for the past 5 days initially with anuria and hematuria  He presented with acute renal failure, initial creatinine of 13  Initial renal ultrasound showing moderate left and right hydronephrosis with concerning distal ureteral stone  In addition, echogenic material within the renal pelvis concerning for hemorrhage  Patient's Plavix was held, his hemoglobin has been down trending since admission starting at 11 5 to 6 7 on 04/07  He was transfused 1 unit of packed red blood cells, his hemoglobin is now in the 8s  GI was consulted as the reported dark stools overnight last night  Patient has no new complaints at this time  He denies pyrosis, regurgitation, dysphagia odynophagia  ECHO SUMMARY     LEFT VENTRICLE:  The cavity was small  Systolic function was normal  Ejection fraction was estimated to be 60 %  There were no regional wall motion abnormalities  Wall thickness was moderately increased  There was moderate assymetrical hypertrophy of the septum    Doppler parameters were consistent with abnormal left ventricular relaxation (grade 1 diastolic dysfunction)      RIGHT VENTRICLE:  The ventricle was mildly dilated  Systolic function was normal      LEFT ATRIUM:  The atrium was mildly dilated      MITRAL VALVE:  There was mild annular calcification  There was mild regurgitation      AORTIC VALVE:  There was mild stenosis  There was mild to moderate regurgitation      TRICUSPID VALVE:  There was trace regurgitation         Physical Exam    Airway    Mallampati score: III  TM Distance: >3 FB  Neck ROM: full     Dental       Cardiovascular  Cardiovascular exam normal    Pulmonary  Pulmonary exam normal     Other Findings        Anesthesia Plan  ASA Score- 3     Anesthesia Type-     Plan Factors-Exercise tolerance (METS): <4 METS  Chart reviewed  EKG reviewed  Imaging results reviewed  Existing labs reviewed  Patient summary reviewed  Induction- intravenous  Postoperative Plan-     Informed Consent- Anesthetic plan and risks discussed with patient  I personally reviewed this patient with the CRNA  Discussed and agreed on the Anesthesia Plan with the CRNA  Esme Ortiz

## 2021-04-12 NOTE — PROGRESS NOTES
[facility]  Progress Note - Angelina Sanchez 1950, 65 y o  male MRN: [id number]  Unit/Bed#: -02 Encounter: [id number]  Primary Care Provider: Ann Marie Crespo MD   Date and time admitted to hospital: 4/4/2021  1:56 PM    * CIERA (acute kidney injury) [facility])  Assessment & Plan  Due to obstructive uropathy  Now resolved  Creatinine 0 73  Continue indwelling Butler catheter  Lisinopril, Demadex on hold  Nephrology following  Urology recommendation appreciated  Anemia  Assessment & Plan  Acute blood loss anemia in the settings of hematuria as well as suspected GI loss as evidenced by hemoglobin down trending from 11 5 --> 6 7 as well as positive for fecal occult blood, treated with noted  PRBC transfusion, Urology consultation as well as GI consult  Currently hemoglobin stable around 8 range after 3 units PRBC transfusion  Hematuria resolved  plan for EGD today per GI recommendation  Hematuria  Assessment & Plan  Butler catheter noted with gross hematuria  Hemoglobin down trending  Significantly better  Patient did receive 1 unit PRBC transfusion on 04/07/2021  Received 2 units PRBC transfusion on 07/08/2021    Now resolved  Ubtler catheter noted with clear urine  Hold aspirin, Plavix  Hemoglobin stable around 8 -9 after total 3 units PRBC transfusion  Urology recommended to discharge patient with Butler catheter with close outpatient follow-up with Urology  Patient is agreeable with above plan  Outpatient follow-up with Urology  Urology S signed of    Dehydration with hyponatremia  Assessment & Plan  Hypernatremia likely secondary to obstructive uropathy  Currently sodium 144  Monitor BMP  Nephrology following    UTI (urinary tract infection)  Assessment & Plan  Currently not septic  Afebrile, hemodynamically stable  Leukocytosis improving  Urine culture no growth, blood culture without growth    Completed 5 days of IV ceftriaxone  Monitor off antibiotics for now       Metabolic acidosis  Assessment & Plan  Now resolved    Hyperkalemia  Assessment & Plan  Now resolved  Currently potassium 3 7  Hyperparathyroidism [facility])  Assessment & Plan  Status post parathyroidectomy on 03/23/21  Supplement calcium as needed  Coronary artery disease involving native coronary artery of native heart  Assessment & Plan  Currently asymptomatic  Aspirin and Plavix on hold due to hematuria  Lisinopril, Demadex on hold due to CIERA  Continue statin  Plan for EGD today and consider resuming aspirin after EGD once cleared by GI  Tobacco abuse  Assessment & Plan  Counseled on smoking cessation  Type 2 diabetes mellitus [facility])  Assessment & Plan  Lab Results   Component Value Date    HGBA1C 6 0 (H) 12/28/2020       Recent Labs     04/11/21  1600 04/11/21  2130 04/12/21  0734 04/12/21  1152   POCGLU 125 98 110 105       Blood Sugar Average: Last 72 hrs:  (P) 147 [id number]     Controlled  Hold oral agent  Diabetic diet  Sliding scale coverage  HTN (hypertension)  Assessment & Plan  Overall reasonably controlled  Resumed decrease dose of antihypertensive history  Continue atenolol 25 mg daily, clonidine 0 1 mg t i d  For now  Lisinopril, Demadex on hold due to acute kidney injury  Monitor vitals per unit protocol  Hyperlipidemia  Assessment & Plan  Continue statin      VTE Pharmacologic Prophylaxis:   Pharmacologic: Pharmacologic VTE Prophylaxis contraindicated due to anemia  Mechanical VTE Prophylaxis in Place: Yes      Discussions with Specialists or Other Care Team Provider: GI    Education and Discussions with Family / Patient:  Spoke with spouse over the phone  Time Spent for Care: 30 minutes  More than 50% of total time spent on counseling and coordination of care as described above      Current Length of Stay: 8 day(s)    Current Patient Status: Inpatient   Certification Statement: The patient will continue to require additional inpatient hospital stay due to Above    Discharge Plan: tbd    Code Status: Level 2 - DNAR: but accepts endotracheal intubation      Subjective:   Afebrile, hemodynamically stable  Patient is in good spirit  Denies chest pain, dyspnea, fever, chills, cough, nausea, vomiting, abdominal pain, any new complaints  reports that he is waiting for EGD  No other events reported  Objective:     Vitals:   Temp (24hrs), Av 6 °F (37 °C), Min:98 °F (36 7 °C), Max:100 2 °F (37 9 °C)    Temp:  [98 °F (36 7 °C)-100 2 °F (37 9 °C)] 98 2 °F (36 8 °C)  HR:  [] 93  Resp:  [16-27] 17  BP: (100-140)/() 133/78  SpO2:  [97 %-100 %] 99 %  Body mass index is 29 87 kg/m²  Input and Output Summary (last 24 hours): Intake/Output Summary (Last 24 hours) at 2021 1429  Last data filed at 2021 1350  Gross per 24 hour   Intake 340 ml   Output 2200 ml   Net -1860 ml       Physical Exam:     Physical Exam  Constitutional:       General: He is not in acute distress  Appearance: Normal appearance  He is not ill-appearing  HENT:      Head: Normocephalic and atraumatic  Nose: No rhinorrhea  Eyes:      Extraocular Movements: Extraocular movements intact  Conjunctiva/sclera: Conjunctivae normal       Pupils: Pupils are equal, round, and reactive to light  Neck:      Musculoskeletal: Normal range of motion and neck supple  No neck rigidity  Cardiovascular:      Rate and Rhythm: Normal rate and regular rhythm  Pulses: Normal pulses  Heart sounds: Normal heart sounds  Pulmonary:      Effort: Pulmonary effort is normal  No respiratory distress  Breath sounds: Normal breath sounds  No stridor  No wheezing or rhonchi  Abdominal:      General: Bowel sounds are normal  There is no distension  Palpations: Abdomen is soft  Tenderness: There is no abdominal tenderness  Genitourinary:     Comments: Butler catheter noted with clear yellow urine  Musculoskeletal: Normal range of motion     Neurological: General: No focal deficit present  Mental Status: He is alert  Mental status is at baseline  Psychiatric:         Mood and Affect: Mood normal          Behavior: Behavior normal          Additional Data:     Labs:    Results from last 7 days   Lab Units 04/12/21  0501  04/08/21  0619   WBC Thousand/uL 20 04*   < > 17 48*   HEMOGLOBIN g/dL 9 2*   < > 7 0*   HEMATOCRIT % 28 7*   < > 21 0*   PLATELETS Thousands/uL 432*   < > 313   BANDS PCT %  --   --  1   NEUTROS PCT % 83*   < >  --    LYMPHS PCT % 7*   < >  --    LYMPHO PCT %  --   --  10*   MONOS PCT % 7   < >  --    MONO PCT %  --   --  6   EOS PCT % 2   < > 0    < > = values in this interval not displayed  Results from last 7 days   Lab Units 04/09/21  0751   SODIUM mmol/L 142   POTASSIUM mmol/L 3 6   CHLORIDE mmol/L 110*   CO2 mmol/L 27   BUN mg/dL 16   CREATININE mg/dL 0 73   ANION GAP mmol/L 5   CALCIUM mg/dL 7 4*   GLUCOSE RANDOM mg/dL 106         Results from last 7 days   Lab Units 04/12/21  1152 04/12/21  0734 04/11/21  2130 04/11/21  1600 04/11/21  1109 04/11/21  0659 04/10/21  2048 04/10/21  1604 04/10/21  1054 04/10/21  0655 04/09/21  2109 04/09/21  1602   POC GLUCOSE mg/dl 105 110 98 125 137 114 366* 186* 99 117 153* 216*                   * I Have Reviewed All Lab Data Listed Above  * Additional Pertinent Lab Tests Reviewed:  All Labs Within Last 24 Hours Reviewed        Recent Cultures (last 7 days):     Results from last 7 days   Lab Units 04/05/21  1827   URINE CULTURE  No Growth <1000 cfu/mL       Last 24 Hours Medication List:   Current Facility-Administered Medications   Medication Dose Route Frequency Provider Last Rate    atenolol  25 mg Oral Daily Jeffrey Becerra PA-C      atorvastatin  80 mg Oral Daily Shraddha Pearl      chlorhexidine  15 mL Swish & Spit Q12H [address] [facility], Shraddha Corrales      cloNIDine  0 1 mg Oral [facility] Shraddha Pearl      diphenhydrAMINE  25 mg Oral Q6H PRN SIGIFREDO Lambert      insulin lispro  1-6 Units Subcutaneous 4x Daily (AC & HS) Alvira Anchors, PA-C      lactated ringers  50 mL/hr Intravenous Continuous Vilma Lawrence MD      melatonin  3 mg Oral HS Alvira Anchors, PA-C      metoprolol  5 mg Intravenous Q6H PRN Alvira Anchors, PA-C      pantoprazole  40 mg Oral BID AC Lurene Acres, DO      potassium chloride  40 mEq Oral Once Alvira Anchors, PA-C      potassium chloride  40 mEq Oral BID Jace Kennedy MD          Today, Patient Was Seen By: Makayla Murillo MD    ** Please Note: Dictation voice to text software may have been used in the creation of this document   **

## 2021-04-12 NOTE — PLAN OF CARE
Problem: Potential for Falls  Goal: Patient will remain free of falls  Description: INTERVENTIONS:  - Assess patient frequently for physical needs  -  Identify cognitive and physical deficits and behaviors that affect risk of falls  -  [place] fall precautions as indicated by assessment   - Educate patient/family on patient safety including physical limitations  - Instruct patient to call for assistance with activity based on assessment  - Modify environment to reduce risk of injury  - Consider OT/PT consult to assist with strengthening/mobility  Outcome: Progressing     Problem: Nutrition/Hydration-ADULT  Goal: Nutrient/Hydration intake appropriate for improving, restoring or maintaining nutritional needs  Description: Monitor and assess patient's nutrition/hydration status for malnutrition  Collaborate with interdisciplinary team and initiate plan and interventions as ordered  Monitor patient's weight and dietary intake as ordered or per policy  Utilize nutrition screening tool and intervene as necessary  Determine patient's food preferences and provide high-protein, high-caloric foods as appropriate       INTERVENTIONS:  - Monitor oral intake, urinary output, labs, and treatment plans  - Assess nutrition and hydration status and recommend course of action  - Evaluate amount of meals eaten  - Assist patient with eating if necessary   - Allow adequate time for meals  - Recommend/ encourage appropriate diets, oral nutritional supplements, and vitamin/mineral supplements  - Order, calculate, and assess calorie counts as needed  - Recommend, monitor, and adjust tube feedings and TPN/PPN based on assessed needs  - Assess need for intravenous fluids  - Provide specific nutrition/hydration education as appropriate  - Include patient/family/caregiver in decisions related to nutrition  Outcome: Progressing     Problem: PAIN - ADULT  Goal: Verbalizes/displays adequate comfort level or baseline comfort level  Description: Interventions:  - Encourage patient to monitor pain and request assistance  - Assess pain using appropriate pain scale  - Administer analgesics based on type and severity of pain and evaluate response  - Implement non-pharmacological measures as appropriate and evaluate response  - Consider cultural and social influences on pain and pain management  - Notify physician/advanced practitioner if interventions unsuccessful or patient reports new pain  Outcome: Progressing     Problem: INFECTION - ADULT  Goal: Absence or prevention of progression during hospitalization  Description: INTERVENTIONS:  - Assess and monitor for signs and symptoms of infection  - Monitor lab/diagnostic results  - Monitor all insertion sites, i e  indwelling lines, tubes, and drains  - Monitor endotracheal if appropriate and nasal secretions for changes in amount and color  - [place] appropriate cooling/warming therapies per order  - Administer medications as ordered  - Instruct and encourage patient and family to use good hand hygiene technique  - Identify and instruct in appropriate isolation precautions for identified infection/condition  Outcome: Progressing     Problem: SAFETY ADULT  Goal: Maintain or return to baseline ADL function  Description: INTERVENTIONS:  -  Assess patient's ability to carry out ADLs; assess patient's baseline for ADL function and identify physical deficits which impact ability to perform ADLs (bathing, care of mouth/teeth, toileting, grooming, dressing, etc )  - Assess/evaluate cause of self-care deficits   - Assess range of motion  - Assess patient's mobility; develop plan if impaired  - Assess patient's need for assistive devices and provide as appropriate  - Encourage maximum independence but intervene and supervise when necessary  - Involve family in performance of ADLs  - Assess for home care needs following discharge   - Consider OT consult to assist with ADL evaluation and planning for discharge  - Provide patient education as appropriate  Outcome: Progressing  Goal: Maintain or return mobility status to optimal level  Description: INTERVENTIONS:  - Assess patient's baseline mobility status (ambulation, transfers, stairs, etc )    - Identify cognitive and physical deficits and behaviors that affect mobility  - Identify mobility aids required to assist with transfers and/or ambulation (gait belt, sit-to-stand, lift, walker, cane, etc )  - [place] fall precautions as indicated by assessment  - Record patient progress and toleration of activity level on Mobility SBAR; progress patient to next Phase/Stage  - Instruct patient to call for assistance with activity based on assessment  - Consider rehabilitation consult to assist with strengthening/weightbearing, etc   Outcome: Progressing     Problem: DISCHARGE PLANNING  Goal: Discharge to home or other facility with appropriate resources  Description: INTERVENTIONS:  - Identify barriers to discharge w/patient and caregiver  - Arrange for needed discharge resources and transportation as appropriate  - Identify discharge learning needs (meds, wound care, etc )  - Arrange for interpretive services to assist at discharge as needed  - Refer to Case Management Department for coordinating discharge planning if the patient needs post-hospital services based on physician/advanced practitioner order or complex needs related to functional status, cognitive ability, or social support system  Outcome: Progressing     Problem: Knowledge Deficit  Goal: Patient/family/caregiver demonstrates understanding of disease process, treatment plan, medications, and discharge instructions  Description: Complete learning assessment and assess knowledge base    Interventions:  - Provide teaching at level of understanding  - Provide teaching via preferred learning methods  Outcome: Progressing     Problem: CARDIOVASCULAR - ADULT  Goal: Maintains optimal cardiac output and hemodynamic stability  Description: INTERVENTIONS:  - Monitor I/O, vital signs and rhythm  - Monitor for S/S and trends of decreased cardiac output  - Administer and titrate ordered vasoactive medications to optimize hemodynamic stability  - Assess quality of pulses, skin color and temperature  - Assess for signs of decreased coronary artery perfusion  - Instruct patient to report change in severity of symptoms  Outcome: Progressing     Problem: GASTROINTESTINAL - ADULT  Goal: Minimal or absence of nausea and/or vomiting  Description: INTERVENTIONS:  - Administer IV fluids if ordered to ensure adequate hydration  - Maintain NPO status until nausea and vomiting are resolved  - Nasogastric tube if ordered  - Administer ordered antiemetic medications as needed  - Provide nonpharmacologic comfort measures as appropriate  - Advance diet as tolerated, if ordered  - Consider nutrition services referral to assist patient with adequate nutrition and appropriate food choices  Outcome: Progressing  Goal: Maintains or returns to baseline bowel function  Description: INTERVENTIONS:  - Assess bowel function  - Encourage oral fluids to ensure adequate hydration  - Administer IV fluids if ordered to ensure adequate hydration  - Administer ordered medications as needed  - Encourage mobilization and activity  - Consider nutritional services referral to assist patient with adequate nutrition and appropriate food choices  Outcome: Progressing     Problem: GENITOURINARY - ADULT  Goal: Maintains or returns to baseline urinary function  Description: INTERVENTIONS:  - Assess urinary function  - Encourage oral fluids to ensure adequate hydration if ordered  - Administer IV fluids as ordered to ensure adequate hydration  - Administer ordered medications as needed  - Offer frequent toileting  - Follow urinary retention protocol if ordered  Outcome: Progressing  Goal: Absence of urinary retention  Description: INTERVENTIONS:  - Assess patients ability to void and empty bladder  - Monitor I/O  - Bladder scan as needed  - Discuss with physician/AP medications to alleviate retention as needed  - Discuss catheterization for long term situations as appropriate  Outcome: Progressing  Goal: Urinary catheter remains patent  Description: INTERVENTIONS:  - Assess patency of urinary catheter  - If patient has a chronic saeed, consider changing catheter if non-functioning  - Follow guidelines for intermittent irrigation of non-functioning urinary catheter  Outcome: Progressing     Problem: METABOLIC, FLUID AND ELECTROLYTES - ADULT  Goal: Electrolytes maintained within normal limits  Description: INTERVENTIONS:  - Monitor labs and assess patient for signs and symptoms of electrolyte imbalances  - Administer electrolyte replacement as ordered  - Monitor response to electrolyte replacements, including repeat lab results as appropriate  - Instruct patient on fluid and nutrition as appropriate  Outcome: Progressing  Goal: Fluid balance maintained  Description: INTERVENTIONS:  - Monitor labs   - Monitor I/O and WT  - Instruct patient on fluid and nutrition as appropriate  - Assess for signs & symptoms of volume excess or deficit  Outcome: Progressing  Goal: Glucose maintained within target range  Description: INTERVENTIONS:  - Monitor Blood Glucose as ordered  - Assess for signs and symptoms of hyperglycemia and hypoglycemia  - Administer ordered medications to maintain glucose within target range  - Assess nutritional intake and initiate nutrition service referral as needed  Outcome: Progressing     Problem: SKIN/TISSUE INTEGRITY - ADULT  Goal: Skin integrity remains intact  Description: INTERVENTIONS  - Identify patients at risk for skin breakdown  - Assess and monitor skin integrity  - Assess and monitor nutrition and hydration status  - Monitor labs (i e  albumin)  - Assess for incontinence   - Turn and reposition patient  - Assist with mobility/ambulation  - Relieve pressure over bony prominences  - Avoid friction and shearing  - Provide appropriate hygiene as needed including keeping skin clean and dry  - Evaluate need for skin moisturizer/barrier cream  - Collaborate with interdisciplinary team (i e  Nutrition, Rehabilitation, etc )   - Patient/family teaching  Outcome: Progressing     Problem: HEMATOLOGIC - ADULT  Goal: Maintains hematologic stability  Description: INTERVENTIONS  - Assess for signs and symptoms of bleeding or hemorrhage  - Monitor labs  - Administer supportive blood products/factors as ordered and appropriate  Outcome: Progressing     Problem: Prexisting or High Potential for Compromised Skin Integrity  Goal: Skin integrity is maintained or improved  Description: INTERVENTIONS:  - Identify patients at risk for skin breakdown  - Assess and monitor skin integrity  - Assess and monitor nutrition and hydration status  - Monitor labs   - Assess for incontinence   - Turn and reposition patient  - Assist with mobility/ambulation  - Relieve pressure over bony prominences  - Avoid friction and shearing  - Provide appropriate hygiene as needed including keeping skin clean and dry  - Evaluate need for skin moisturizer/barrier cream  - Collaborate with interdisciplinary team   - Patient/family teaching  - Consider wound care consult   Outcome: Progressing

## 2021-04-13 ENCOUNTER — TELEPHONE (OUTPATIENT)
Dept: GASTROENTEROLOGY | Facility: CLINIC | Age: 71
End: 2021-04-13

## 2021-04-13 VITALS
OXYGEN SATURATION: 98 % | RESPIRATION RATE: 20 BRPM | HEART RATE: 84 BPM | HEIGHT: 67 IN | WEIGHT: 189.82 LBS | SYSTOLIC BLOOD PRESSURE: 112 MMHG | TEMPERATURE: 98.7 F | BODY MASS INDEX: 29.79 KG/M2 | DIASTOLIC BLOOD PRESSURE: 71 MMHG

## 2021-04-13 PROBLEM — R33.9 URINARY RETENTION: Status: ACTIVE | Noted: 2021-04-13

## 2021-04-13 PROBLEM — K29.70 GASTRITIS: Status: ACTIVE | Noted: 2021-04-13

## 2021-04-13 LAB
ANION GAP SERPL CALCULATED.3IONS-SCNC: 7 MMOL/L (ref 4–13)
BASOPHILS # BLD AUTO: 0.02 THOUSANDS/ΜL (ref 0–0.1)
BASOPHILS NFR BLD AUTO: 0 % (ref 0–1)
BUN SERPL-MCNC: 12 MG/DL (ref 5–25)
CALCIUM SERPL-MCNC: 8.2 MG/DL (ref 8.3–10.1)
CHLORIDE SERPL-SCNC: 109 MMOL/L (ref 100–108)
CO2 SERPL-SCNC: 24 MMOL/L (ref 21–32)
CREAT SERPL-MCNC: 0.81 MG/DL (ref 0.6–1.3)
EOSINOPHIL # BLD AUTO: 0.5 THOUSAND/ΜL (ref 0–0.61)
EOSINOPHIL NFR BLD AUTO: 3 % (ref 0–6)
ERYTHROCYTE [DISTWIDTH] IN BLOOD BY AUTOMATED COUNT: 18.5 % (ref 11.6–15.1)
GFR SERPL CREATININE-BSD FRML MDRD: 103 ML/MIN/1.73SQ M
GLUCOSE SERPL-MCNC: 116 MG/DL (ref 65–140)
GLUCOSE SERPL-MCNC: 122 MG/DL (ref 65–140)
GLUCOSE SERPL-MCNC: 130 MG/DL (ref 65–140)
GLUCOSE SERPL-MCNC: 138 MG/DL (ref 65–140)
HCT VFR BLD AUTO: 30 % (ref 36.5–49.3)
HGB BLD-MCNC: 9.4 G/DL (ref 12–17)
IMM GRANULOCYTES # BLD AUTO: 0.14 THOUSAND/UL (ref 0–0.2)
IMM GRANULOCYTES NFR BLD AUTO: 1 % (ref 0–2)
LYMPHOCYTES # BLD AUTO: 1.48 THOUSANDS/ΜL (ref 0.6–4.47)
LYMPHOCYTES NFR BLD AUTO: 8 % (ref 14–44)
MCH RBC QN AUTO: 27.5 PG (ref 26.8–34.3)
MCHC RBC AUTO-ENTMCNC: 31.3 G/DL (ref 31.4–37.4)
MCV RBC AUTO: 88 FL (ref 82–98)
MONOCYTES # BLD AUTO: 1.12 THOUSAND/ΜL (ref 0.17–1.22)
MONOCYTES NFR BLD AUTO: 6 % (ref 4–12)
NEUTROPHILS # BLD AUTO: 14.41 THOUSANDS/ΜL (ref 1.85–7.62)
NEUTS SEG NFR BLD AUTO: 82 % (ref 43–75)
NRBC BLD AUTO-RTO: 0 /100 WBCS
PLATELET # BLD AUTO: 456 THOUSANDS/UL (ref 149–390)
PMV BLD AUTO: 8.7 FL (ref 8.9–12.7)
POTASSIUM SERPL-SCNC: 4.3 MMOL/L (ref 3.5–5.3)
RBC # BLD AUTO: 3.42 MILLION/UL (ref 3.88–5.62)
SODIUM SERPL-SCNC: 140 MMOL/L (ref 136–145)
WBC # BLD AUTO: 17.67 THOUSAND/UL (ref 4.31–10.16)

## 2021-04-13 PROCEDURE — 99239 HOSP IP/OBS DSCHRG MGMT >30: CPT | Performed by: INTERNAL MEDICINE

## 2021-04-13 PROCEDURE — NC001 PR NO CHARGE: Performed by: PHYSICIAN ASSISTANT

## 2021-04-13 PROCEDURE — 85025 COMPLETE CBC W/AUTO DIFF WBC: CPT | Performed by: PHYSICIAN ASSISTANT

## 2021-04-13 PROCEDURE — 99232 SBSQ HOSP IP/OBS MODERATE 35: CPT | Performed by: INTERNAL MEDICINE

## 2021-04-13 PROCEDURE — 80048 BASIC METABOLIC PNL TOTAL CA: CPT | Performed by: STUDENT IN AN ORGANIZED HEALTH CARE EDUCATION/TRAINING PROGRAM

## 2021-04-13 PROCEDURE — 82948 REAGENT STRIP/BLOOD GLUCOSE: CPT

## 2021-04-13 RX ORDER — CLONIDINE HYDROCHLORIDE 0.1 MG/1
0.1 TABLET ORAL EVERY 8 HOURS SCHEDULED
Qty: 90 TABLET | Refills: 0 | Status: SHIPPED | OUTPATIENT
Start: 2021-04-13 | End: 2021-06-11 | Stop reason: SDUPTHER

## 2021-04-13 RX ORDER — TORSEMIDE 10 MG/1
10 TABLET ORAL DAILY
Status: DISCONTINUED | OUTPATIENT
Start: 2021-04-13 | End: 2021-04-13 | Stop reason: HOSPADM

## 2021-04-13 RX ORDER — PANTOPRAZOLE SODIUM 40 MG/1
40 TABLET, DELAYED RELEASE ORAL
Qty: 60 TABLET | Refills: 0 | Status: SHIPPED | OUTPATIENT
Start: 2021-04-13 | End: 2021-08-30

## 2021-04-13 RX ORDER — DIAPER,BRIEF,INFANT-TODD,DISP
EACH MISCELLANEOUS 2 TIMES DAILY PRN
Qty: 30 G | Refills: 0 | Status: SHIPPED | OUTPATIENT
Start: 2021-04-13 | End: 2021-04-21 | Stop reason: ALTCHOICE

## 2021-04-13 RX ORDER — ASPIRIN 81 MG/1
81 TABLET ORAL DAILY
Status: DISCONTINUED | OUTPATIENT
Start: 2021-04-13 | End: 2021-04-13 | Stop reason: HOSPADM

## 2021-04-13 RX ORDER — LISINOPRIL 20 MG/1
40 TABLET ORAL DAILY
Status: DISCONTINUED | OUTPATIENT
Start: 2021-04-13 | End: 2021-04-13 | Stop reason: HOSPADM

## 2021-04-13 RX ORDER — CLOPIDOGREL BISULFATE 75 MG/1
75 TABLET ORAL DAILY
Status: DISCONTINUED | OUTPATIENT
Start: 2021-04-13 | End: 2021-04-13 | Stop reason: HOSPADM

## 2021-04-13 RX ADMIN — ATENOLOL 25 MG: 25 TABLET ORAL at 08:38

## 2021-04-13 RX ADMIN — POTASSIUM CHLORIDE 40 MEQ: 1500 TABLET, EXTENDED RELEASE ORAL at 08:39

## 2021-04-13 RX ADMIN — ASPIRIN 81 MG: 81 TABLET, DELAYED RELEASE ORAL at 11:36

## 2021-04-13 RX ADMIN — CHLORHEXIDINE GLUCONATE 0.12% ORAL RINSE 15 ML: 1.2 LIQUID ORAL at 08:39

## 2021-04-13 RX ADMIN — HYDROCORTISONE: 1 CREAM TOPICAL at 11:31

## 2021-04-13 RX ADMIN — TORSEMIDE 10 MG: 10 TABLET ORAL at 11:36

## 2021-04-13 RX ADMIN — ATORVASTATIN CALCIUM 80 MG: 40 TABLET, FILM COATED ORAL at 08:39

## 2021-04-13 RX ADMIN — CLONIDINE HYDROCHLORIDE 0.1 MG: 0.1 TABLET ORAL at 06:06

## 2021-04-13 RX ADMIN — LISINOPRIL 40 MG: 20 TABLET ORAL at 11:35

## 2021-04-13 RX ADMIN — CLOPIDOGREL BISULFATE 75 MG: 75 TABLET ORAL at 11:36

## 2021-04-13 RX ADMIN — PANTOPRAZOLE SODIUM 40 MG: 40 TABLET, DELAYED RELEASE ORAL at 06:06

## 2021-04-13 NOTE — ASSESSMENT & PLAN NOTE
Hypernatremia likely secondary to obstructive uropathy  Currently sodium 140  Monitor BMP    Nephrology following

## 2021-04-13 NOTE — TELEPHONE ENCOUNTER
----- Message from Ellen Lu PA-C sent at 4/13/2021  9:51 AM EDT -----  Please schedule patient for a follow up from the hospital in 2-3 weeks

## 2021-04-13 NOTE — ASSESSMENT & PLAN NOTE
Overall reasonably controlled  Continue atenolol 25 mg daily  Will hold off clonidine for now  Informed patient to follow-up with his PCP regarding his blood pressures and resuming clonidine  Discussed with Nephrology, recommending to restart his lisinopril and Demadex today  Monitor vitals per unit protocol

## 2021-04-13 NOTE — CASE MANAGEMENT
CM made aware patient is medically stable for DC today and per conversation with care team patient still remains adamantly not interested in STR  Wife and patient at bedside report they do not feel patient needs STR but are open to [facility] coming out still to complete services  [facility] was arranged for patient at DC by previous CM  CM updated [place] via [employer] of DC today and faxed DCI to [phone] to ensure coordination of care

## 2021-04-13 NOTE — NURSING NOTE
Reviewed discharge instructions with patient and his wife whom verbalized understanding  Teaching complete in regards to saeed care

## 2021-04-13 NOTE — ASSESSMENT & PLAN NOTE
Acute blood loss anemia in the settings of hematuria as well as GI loss as evidenced by hemoglobin down trending from 11 5 --> 6 7 as well as positive for fecal occult blood, treated with noted  PRBC transfusion,   Urology and GI on board, recommendations greatly appreciated    Hematuria resolved next hemoglobin remained stable

## 2021-04-13 NOTE — ASSESSMENT & PLAN NOTE
Status post Butler catheter placement  Urinary retention resolved  Continue to monitor urine output  Outpatient Urology follow-up after discharge

## 2021-04-13 NOTE — PROGRESS NOTES
Progress Note  Renea Harrison 65 y o  male MRN: [id number]  Unit/Bed#: -02 Encounter: [id number]    Subjective:  Patient denies abdominal pain  No nausea or vomiting  He reports his stools are brown  Objective:     Vitals:   Vitals:    04/13/21 0758   BP: 130/79   Pulse: (!) 117   Resp: 20   Temp: 98 7 °F (37 1 °C)   SpO2: 97%   ,Body mass index is 29 73 kg/m²  Intake/Output Summary (Last 24 hours) at 4/13/2021 0946  Last data filed at 4/13/2021 3176  Gross per 24 hour   Intake 320 ml   Output 2150 ml   Net -1830 ml       Physical Exam:     General Appearance: Alert, appears stated age and cooperative  Lungs: Clear to auscultation bilaterally, no rales or rhonchi, no labored breathing/accessory muscle use  Heart: +S1, S2; no murmur, click, rub or gallop  Abdomen: Soft, non-tender, non-distended; bowel sounds normal; no masses or no organomegaly  Extremities: No cyanosis, edema    Invasive Devices     Peripheral Intravenous Line            Peripheral IV 04/12/21 Left Hand less than 1 day          Drain            Urethral Catheter Non-latex 8 days                Lab Results:  No results displayed because visit has over 200 results  Imaging Studies:   I have personally reviewed pertinent imaging studies  Ct Abdomen Pelvis Wo Contrast    Result Date: 4/5/2021  Narrative: CT ABDOMEN AND PELVIS WITHOUT IV CONTRAST INDICATION:   ARF, hydronephrosis  COMPARISON:  Ultrasound from 4/4/2021 TECHNIQUE:  CT examination of the abdomen and pelvis was performed without intravenous contrast   Axial, sagittal, and coronal 2D reformatted images were created from the source data and submitted for interpretation  Radiation dose length product (DLP) for this visit:  637 mGy-cm   This examination, like all CT scans performed in the [facility], was performed utilizing techniques to minimize radiation dose exposure, including the use of iterative reconstruction and automated exposure control  Enteric contrast was administered  FINDINGS: ABDOMEN LOWER CHEST: Small infiltrate versus atelectasis is seen at the left lung base with small effusion  LIVER/BILIARY TREE:  Unremarkable  GALLBLADDER:  No calcified gallstones  No pericholecystic inflammatory change  SPLEEN:  Unremarkable  PANCREAS:  Unremarkable  ADRENAL GLANDS:  Unremarkable  KIDNEYS/URETERS:  High density debris is noted within the right renal pelvis with hydronephrosis correlating to some areas of increased echogenicity on ultrasound  There is proximal ureteral dilatation with more distal ureter normal in caliber  The left kidney also demonstrates moderate hydronephrosis but without high density debris  There is moderate to severe hydronephrosis noted on the left     There is thickening of the renal pelvis with dilatation of the proximal ureter gradual tapering to  normal caliber in the distal left ureter without obvious focal change in caliber  STOMACH AND BOWEL:  Left colon diverticulosis is noted without CT evidence of diverticulitis  APPENDIX:  No findings to suggest appendicitis  ABDOMINOPELVIC CAVITY:  No ascites  No pneumoperitoneum  No lymphadenopathy  There is left greater than right perinephric stranding identified  There appears to be 8 perinephric collection medially adjacent to the left renal hilum which measures approximately 1 6 x 1 7 x 7 7 cm  Abutting this perinephric collection is a 2nd collection which most likely communicates the 1st and is centered in Gerota's fascia extending into the anterior pararenal space which measures approximately 2 7 x 5 8 x 8 1 cm  No gas bubbles are noted within the this collection  VESSELS:  Atherosclerotic changes are present  Mild aneurysmal dilatation measures 3 1 cm distally in the aorta  PELVIS REPRODUCTIVE ORGANS:  Unremarkable for patient's age  URINARY BLADDER:  Butler catheter is noted within decompressed bladder  ABDOMINAL WALL/INGUINAL REGIONS:  Unremarkable   OSSEOUS STRUCTURES: No acute fracture or destructive osseous lesion  Impression: Left greater than right moderate to severe hydronephrosis  Left greater than right perinephric stranding may be related to obstruction or infection  Hemorrhage or complicated pyelonephritis with debris could be the cause of obstruction in the collecting system although occult stricture of the ureters could also be present  Collection in the left perinephric space and extending into the pararenal space could be related to urinoma or perinephric abscess  This should be correlated clinically  No gas bubbles are seen however  Neoplasm is not excluded This was discussed with Moshe Arshad in the ICU at 11:50 AM Workstation performed: [id number]     Xr Chest 1 View Portable    Result Date: 4/5/2021  Narrative: CHEST INDICATION:   SOB  COMPARISON:  3/4/2020 EXAM PERFORMED/VIEWS:  XR CHEST PORTABLE FINDINGS: Cardiomediastinal silhouette appears unremarkable  The lungs are clear  No pneumothorax or pleural effusion  Osseous structures appear within normal limits for patient age  Impression: No acute cardiopulmonary disease  Workstation performed: [id number]     Ct Head Without Contrast    Result Date: 4/4/2021  Narrative: CT BRAIN - WITHOUT CONTRAST INDICATION:   Altered mental status AMS  COMPARISON:  None  TECHNIQUE:  CT examination of the brain was performed  In addition to axial images, sagittal and coronal 2D reformatted images were created and submitted for interpretation  Radiation dose length product (DLP) for this visit:  858 mGy-cm   This examination, like all CT scans performed in the [facility], was performed utilizing techniques to minimize radiation dose exposure, including the use of iterative reconstruction and automated exposure control  IMAGE QUALITY:  Diagnostic  FINDINGS: PARENCHYMA:  No intracranial mass, mass effect or midline shift  No CT signs of acute infarction  No acute parenchymal hemorrhage  Periventricular and centrum semiovale white matter hypodensity is a nonspecific finding likely representing chronic microangiopathy  Calcification bilateral cavernous internal carotid arteries  VENTRICLES AND EXTRA-AXIAL SPACES:  No acute hydrocephalus  Mild prominence of CSF spaces diffusely attributed to generalized volume loss  VISUALIZED ORBITS AND PARANASAL SINUSES:  Changes of right-sided lens replacement noted  Old medially displaced fracture medial wall right orbit with minimal intraorbital fat protruding through the defect  Trace mucosal thickening left posterior ethmoid sinus  CALVARIUM AND EXTRACRANIAL SOFT TISSUES:  Normal      Impression: No evidence of acute intracranial process  Chronic microangiopathy  Workstation performed: [id number]     Us Kidney And Bladder    Result Date: 4/4/2021  Narrative: RENAL ULTRASOUND INDICATION:   Renal failure  COMPARISON: None TECHNIQUE:   Ultrasound of the retroperitoneum was performed with a curvilinear transducer utilizing volumetric sweeps and still imaging techniques  FINDINGS: KIDNEYS: Symmetric and normal size  Right kidney:  15 3 cm  Left kidney:  14 6 cm  Right kidney The renal parenchyma is diffusely echogenic consistent with medical renal disease  No suspicious masses detected  Moderate hydronephrosis with echogenic material noted in the renal pelvis  No shadowing calculi  No perinephric fluid collections  Left kidney The renal parenchyma is diffusely echogenic consistent with medical renal disease  No suspicious masses detected  Moderate hydronephrosis  No shadowing calculi  There is increased fluid in the retroperitoneum surrounding the left proximal ureter, best appreciated on the transverse left kidney cine URETERS: Dilation of the right distal ureter is noted, possible stone noted distally BLADDER: A saeed catheter is in place, decompressing the bladder and limiting its evaluation  No focal thickening or mass lesions   Bilateral ureteral jets detected  Impression: 1  Moderate moderate left hydronephrosis  There is increased fluid in the retroperitoneum surrounding the left proximal ureter  This may represent rupture of the collecting system  Recommend CT for further characterization  2   Moderate right hydronephrosis, findings concerning for distal ureteral stone  Echogenic material within the renal pelvis is concerning for hemorrhage 3  Echogenic kidneys consistent with medical renal disease The study was marked in EPIC for immediate notification  Workstation performed: [id number]         Assessment & Plan    PUD  Anemia    - EGD 4/12 showed antritis and 3 small gastric ulcers as well as duodenitis and a 1 cm ulceration in the duodenal bulb  - HGB this am is stable at 9 4   - Continue Pantoprazole 40mg po at BID dosing upon discharge   - Okay to resume Asa 81mg and Plavix  - Avoid all other NSAIDs - no ibuprofen, naproxen, etc   - Follow up path to rule out h pylori   - Repeat EGD in 8-12 weeks to assess for healing of the ulcers  - Okay for discharge from GI standpoint  Recommend follow up in the office in 2-3 weeks  GI will sign off, please contact with questions  The patient will be seen by Dr Nick Donohue PA-C  4/13/2021,9:46 AM

## 2021-04-13 NOTE — ASSESSMENT & PLAN NOTE
Currently asymptomatic  Aspirin and Plavix on hold due to hematuria  Lisinopril, Demadex on hold due to CIERA  Will discuss with Nephrology about resuming those medications  Continue statin

## 2021-04-13 NOTE — PLAN OF CARE
Problem: Potential for Falls  Goal: Patient will remain free of falls  Description: INTERVENTIONS:  - Assess patient frequently for physical needs  -  Identify cognitive and physical deficits and behaviors that affect risk of falls  -  [place] fall precautions as indicated by assessment   - Educate patient/family on patient safety including physical limitations  - Instruct patient to call for assistance with activity based on assessment  - Modify environment to reduce risk of injury  - Consider OT/PT consult to assist with strengthening/mobility  Outcome: Progressing     Problem: Nutrition/Hydration-ADULT  Goal: Nutrient/Hydration intake appropriate for improving, restoring or maintaining nutritional needs  Description: Monitor and assess patient's nutrition/hydration status for malnutrition  Collaborate with interdisciplinary team and initiate plan and interventions as ordered  Monitor patient's weight and dietary intake as ordered or per policy  Utilize nutrition screening tool and intervene as necessary  Determine patient's food preferences and provide high-protein, high-caloric foods as appropriate       INTERVENTIONS:  - Monitor oral intake, urinary output, labs, and treatment plans  - Assess nutrition and hydration status and recommend course of action  - Evaluate amount of meals eaten  - Assist patient with eating if necessary   - Allow adequate time for meals  - Recommend/ encourage appropriate diets, oral nutritional supplements, and vitamin/mineral supplements  - Order, calculate, and assess calorie counts as needed  - Recommend, monitor, and adjust tube feedings and TPN/PPN based on assessed needs  - Assess need for intravenous fluids  - Provide specific nutrition/hydration education as appropriate  - Include patient/family/caregiver in decisions related to nutrition  Outcome: Progressing     Problem: PAIN - ADULT  Goal: Verbalizes/displays adequate comfort level or baseline comfort level  Description: Interventions:  - Encourage patient to monitor pain and request assistance  - Assess pain using appropriate pain scale  - Administer analgesics based on type and severity of pain and evaluate response  - Implement non-pharmacological measures as appropriate and evaluate response  - Consider cultural and social influences on pain and pain management  - Notify physician/advanced practitioner if interventions unsuccessful or patient reports new pain  Outcome: Progressing     Problem: INFECTION - ADULT  Goal: Absence or prevention of progression during hospitalization  Description: INTERVENTIONS:  - Assess and monitor for signs and symptoms of infection  - Monitor lab/diagnostic results  - Monitor all insertion sites, i e  indwelling lines, tubes, and drains  - Monitor endotracheal if appropriate and nasal secretions for changes in amount and color  - [place] appropriate cooling/warming therapies per order  - Administer medications as ordered  - Instruct and encourage patient and family to use good hand hygiene technique  - Identify and instruct in appropriate isolation precautions for identified infection/condition  Outcome: Progressing     Problem: SAFETY ADULT  Goal: Maintain or return to baseline ADL function  Description: INTERVENTIONS:  -  Assess patient's ability to carry out ADLs; assess patient's baseline for ADL function and identify physical deficits which impact ability to perform ADLs (bathing, care of mouth/teeth, toileting, grooming, dressing, etc )  - Assess/evaluate cause of self-care deficits   - Assess range of motion  - Assess patient's mobility; develop plan if impaired  - Assess patient's need for assistive devices and provide as appropriate  - Encourage maximum independence but intervene and supervise when necessary  - Involve family in performance of ADLs  - Assess for home care needs following discharge   - Consider OT consult to assist with ADL evaluation and planning for discharge  - Provide patient education as appropriate  Outcome: Progressing  Goal: Maintain or return mobility status to optimal level  Description: INTERVENTIONS:  - Assess patient's baseline mobility status (ambulation, transfers, stairs, etc )    - Identify cognitive and physical deficits and behaviors that affect mobility  - Identify mobility aids required to assist with transfers and/or ambulation (gait belt, sit-to-stand, lift, walker, cane, etc )  - [place] fall precautions as indicated by assessment  - Record patient progress and toleration of activity level on Mobility SBAR; progress patient to next Phase/Stage  - Instruct patient to call for assistance with activity based on assessment  - Consider rehabilitation consult to assist with strengthening/weightbearing, etc   Outcome: Progressing     Problem: DISCHARGE PLANNING  Goal: Discharge to home or other facility with appropriate resources  Description: INTERVENTIONS:  - Identify barriers to discharge w/patient and caregiver  - Arrange for needed discharge resources and transportation as appropriate  - Identify discharge learning needs (meds, wound care, etc )  - Arrange for interpretive services to assist at discharge as needed  - Refer to Case Management Department for coordinating discharge planning if the patient needs post-hospital services based on physician/advanced practitioner order or complex needs related to functional status, cognitive ability, or social support system  Outcome: Progressing     Problem: Knowledge Deficit  Goal: Patient/family/caregiver demonstrates understanding of disease process, treatment plan, medications, and discharge instructions  Description: Complete learning assessment and assess knowledge base    Interventions:  - Provide teaching at level of understanding  - Provide teaching via preferred learning methods  Outcome: Progressing     Problem: CARDIOVASCULAR - ADULT  Goal: Maintains optimal cardiac output and hemodynamic stability  Description: INTERVENTIONS:  - Monitor I/O, vital signs and rhythm  - Monitor for S/S and trends of decreased cardiac output  - Administer and titrate ordered vasoactive medications to optimize hemodynamic stability  - Assess quality of pulses, skin color and temperature  - Assess for signs of decreased coronary artery perfusion  - Instruct patient to report change in severity of symptoms  Outcome: Progressing     Problem: GASTROINTESTINAL - ADULT  Goal: Minimal or absence of nausea and/or vomiting  Description: INTERVENTIONS:  - Administer IV fluids if ordered to ensure adequate hydration  - Maintain NPO status until nausea and vomiting are resolved  - Nasogastric tube if ordered  - Administer ordered antiemetic medications as needed  - Provide nonpharmacologic comfort measures as appropriate  - Advance diet as tolerated, if ordered  - Consider nutrition services referral to assist patient with adequate nutrition and appropriate food choices  Outcome: Progressing  Goal: Maintains or returns to baseline bowel function  Description: INTERVENTIONS:  - Assess bowel function  - Encourage oral fluids to ensure adequate hydration  - Administer IV fluids if ordered to ensure adequate hydration  - Administer ordered medications as needed  - Encourage mobilization and activity  - Consider nutritional services referral to assist patient with adequate nutrition and appropriate food choices  Outcome: Progressing     Problem: GENITOURINARY - ADULT  Goal: Maintains or returns to baseline urinary function  Description: INTERVENTIONS:  - Assess urinary function  - Encourage oral fluids to ensure adequate hydration if ordered  - Administer IV fluids as ordered to ensure adequate hydration  - Administer ordered medications as needed  - Offer frequent toileting  - Follow urinary retention protocol if ordered  Outcome: Progressing  Goal: Absence of urinary retention  Description: INTERVENTIONS:  - Assess patients ability to void and empty bladder  - Monitor I/O  - Bladder scan as needed  - Discuss with physician/AP medications to alleviate retention as needed  - Discuss catheterization for long term situations as appropriate  Outcome: Progressing  Goal: Urinary catheter remains patent  Description: INTERVENTIONS:  - Assess patency of urinary catheter  - If patient has a chronic saeed, consider changing catheter if non-functioning  - Follow guidelines for intermittent irrigation of non-functioning urinary catheter  Outcome: Progressing     Problem: METABOLIC, FLUID AND ELECTROLYTES - ADULT  Goal: Electrolytes maintained within normal limits  Description: INTERVENTIONS:  - Monitor labs and assess patient for signs and symptoms of electrolyte imbalances  - Administer electrolyte replacement as ordered  - Monitor response to electrolyte replacements, including repeat lab results as appropriate  - Instruct patient on fluid and nutrition as appropriate  Outcome: Progressing  Goal: Fluid balance maintained  Description: INTERVENTIONS:  - Monitor labs   - Monitor I/O and WT  - Instruct patient on fluid and nutrition as appropriate  - Assess for signs & symptoms of volume excess or deficit  Outcome: Progressing  Goal: Glucose maintained within target range  Description: INTERVENTIONS:  - Monitor Blood Glucose as ordered  - Assess for signs and symptoms of hyperglycemia and hypoglycemia  - Administer ordered medications to maintain glucose within target range  - Assess nutritional intake and initiate nutrition service referral as needed  Outcome: Progressing     Problem: SKIN/TISSUE INTEGRITY - ADULT  Goal: Skin integrity remains intact  Description: INTERVENTIONS  - Identify patients at risk for skin breakdown  - Assess and monitor skin integrity  - Assess and monitor nutrition and hydration status  - Monitor labs (i e  albumin)  - Assess for incontinence   - Turn and reposition patient  - Assist with mobility/ambulation  - Relieve pressure over bony prominences  - Avoid friction and shearing  - Provide appropriate hygiene as needed including keeping skin clean and dry  - Evaluate need for skin moisturizer/barrier cream  - Collaborate with interdisciplinary team (i e  Nutrition, Rehabilitation, etc )   - Patient/family teaching  Outcome: Progressing     Problem: HEMATOLOGIC - ADULT  Goal: Maintains hematologic stability  Description: INTERVENTIONS  - Assess for signs and symptoms of bleeding or hemorrhage  - Monitor labs  - Administer supportive blood products/factors as ordered and appropriate  Outcome: Progressing     Problem: Prexisting or High Potential for Compromised Skin Integrity  Goal: Skin integrity is maintained or improved  Description: INTERVENTIONS:  - Identify patients at risk for skin breakdown  - Assess and monitor skin integrity  - Assess and monitor nutrition and hydration status  - Monitor labs   - Assess for incontinence   - Turn and reposition patient  - Assist with mobility/ambulation  - Relieve pressure over bony prominences  - Avoid friction and shearing  - Provide appropriate hygiene as needed including keeping skin clean and dry  - Evaluate need for skin moisturizer/barrier cream  - Collaborate with interdisciplinary team   - Patient/family teaching  - Consider wound care consult   Outcome: Progressing

## 2021-04-13 NOTE — DISCHARGE SUMMARY
[address]  Discharge- Teri Hogan 1950, 65 y o  male MRN: [id number]  Unit/Bed#: -02 Encounter: [id number]  Primary Care Provider: Dolly Thomas MD   Date and time admitted to hospital: 4/4/2021  1:56 PM    Urinary retention  Assessment & Plan  Status post Butler catheter placement  Urinary retention resolved  Continue to monitor urine output  Outpatient Urology follow-up after discharge    Antritis and gastric ulcer  Assessment & Plan  EGD on 04/12/2021  IMPRESSION:  1  Antritis and 3 small gastric ulcers, biopsied to rule out Helicobacter pylori   2  Duodenitis and a single 1 cm ulceration in the duodenal bulb  3  Polyp of the 2nd portion the duodenum     RECOMMENDATION:  1  Continue with pantoprazole 40 mg p o  B i d   2  Resume previous diet  3  Will call the patient within 1 week regarding the biopsy results   4  Discontinue any anti-inflammatory medications with the exception of aspirin 81 mg p o  Daily     Outpatient GI follow-up after discharge       Anemia  Assessment & Plan  Acute blood loss anemia in the settings of hematuria as well as GI loss as evidenced by hemoglobin down trending from 11 5 --> 6 7 as well as positive for fecal occult blood, treated with noted  PRBC transfusion,   Urology and GI on board, recommendations greatly appreciated  Hematuria resolved next hemoglobin remained stable       Hematuria  Assessment & Plan  Butler catheter noted with gross hematuria  Hemoglobin down trending  Significantly better  Patient did receive 1 unit PRBC transfusion on 04/07/2021  Received 2 units PRBC transfusion on 07/08/2021    Now resolved  Butler catheter noted with clear urine  Hold aspirin, Plavix  Hemoglobin stable around 8 -9 after total 3 units PRBC transfusion  Urology recommended to discharge patient with Butler catheter with close outpatient follow-up with Urology  Patient is agreeable with above plan  Outpatient follow-up with Urology    Urology S signed of    Dehydration with hyponatremia  Assessment & Plan  Hypernatremia likely secondary to obstructive uropathy  Currently sodium 140  Monitor BMP  Nephrology following    UTI (urinary tract infection)  Assessment & Plan  Currently not septic  Afebrile, hemodynamically stable  Leukocytosis improving  Urine culture no growth, blood culture without growth  Completed 5 days of IV ceftriaxone  Monitor off antibiotics for now  Metabolic acidosis  Assessment & Plan  Now resolved    Hyperkalemia  Assessment & Plan  Now resolved  Currently potassium 3 7  Coronary artery disease involving native coronary artery of native heart  Assessment & Plan  Currently asymptomatic  Aspirin and Plavix on hold due to hematuria  Lisinopril, Demadex on hold due to CIERA  Will discuss with Nephrology about resuming those medications  Continue statin  Tobacco abuse  Assessment & Plan  Counseled on smoking cessation  Type 2 diabetes mellitus [facility])  Assessment & Plan  Lab Results   Component Value Date    HGBA1C 6 0 (H) 12/28/2020       Recent Labs     04/12/21  1152 04/12/21  1609 04/12/21  2102 04/13/21  0754   POCGLU 105 153* 126 122       Blood Sugar Average: Last 72 hrs:  (P) 142 [id number]     Controlled  Hold oral agent  Diabetic diet  Sliding scale coverage  HTN (hypertension)  Assessment & Plan  Overall reasonably controlled  Continue atenolol 25 mg daily  Will hold off clonidine for now  Informed patient to follow-up with his PCP regarding his blood pressures and resuming clonidine  Discussed with Nephrology, recommending to restart his lisinopril and Demadex today  Monitor vitals per unit protocol  Hyperlipidemia  Assessment & Plan  Continue statin    * CIERA (acute kidney injury) ([facility] [address] )  Assessment & Plan  Due to obstructive uropathy  Now resolved  Creatinine 0 73  Continue indwelling Butler catheter  Lisinopril, Demadex on hold  Nephrology following    Urology recommendation appreciated  Discharging Physician / Practitioner: Daria Hayes MD  PCP: Corby Oshea MD  Admission Date:   Admission Orders (From admission, onward)     Ordered        04/04/21 1628  Inpatient Admission  Once                   Discharge Date: 04/13/21    Resolved Problems  Date Reviewed: 4/13/2021    None          Consultations During Hospital Stay:  · Gastroenterology  · Nephrology    Procedures Performed:   ·     Significant Findings / Test Results:   · CT of the abdomen pelvis  IMPRESSION:     Left greater than right moderate to severe hydronephrosis  Left greater than right perinephric stranding may be related to obstruction or infection  Hemorrhage or complicated pyelonephritis with debris could be the cause of obstruction in the   collecting system although occult stricture of the ureters could also be present      Collection in the left perinephric space and extending into the pararenal space could be related to urinoma or perinephric abscess  This should be correlated clinically  No gas bubbles are seen however      Neoplasm is not excluded      Ultrasound kidney and bladder  IMPRESSION:     1  Moderate moderate left hydronephrosis  There is increased fluid in the retroperitoneum surrounding the left proximal ureter  This may represent rupture of the collecting system  Recommend CT for further characterization  2   Moderate right hydronephrosis, findings concerning for distal ureteral stone  Echogenic material within the renal pelvis is concerning for hemorrhage  3    Echogenic kidneys consistent with medical renal disease    CT of the head no acute abnormality noted  Incidental Findings:   ·     Test Results Pending at Discharge (will require follow up):   ·     Outpatient Tests Requested:  ·     Complications:  None    Reason for Admission:    Hospital Course:     Renea Harrison is a 65 y o  male patient with past medical history of Hocm, hypertension, hyperlipidemia, coronary artery disease, type 2 diabetes, recent parathyroidectomy for hypercalcemia on 3/23/21 who originally presented to the hospital on 4/4/2021 due to acute onset diarrhea, fatigue, confusion, malaise, found to be in acute renal failure with hyperkalemia and hyponatremia  Serum creatinine on admission was 13  Patient was initially admitted to step-down/ICU and had aggressive fluid hydration  Acute kidney injury was believed likely secondary to obstructive uropathy, Butler catheter was placed in kidney function continue to improve gradually  Patient was evaluated later by Urology, recommending to discharge patient with a Butler catheter and have outpatient follow-up for voiding trial   Electrolytes have been normalized  Nephrology assisted in management  Other chronic medical problems have been stable  Patient did have a diffuse maculopapular rash that has been ongoing for last few weeks  Recommended to continue hydrocortisone cream   Recommended outpatient Dermatology follow-up  While in the hospital he completed IV antibiotic course for UTI  Please see above list of diagnoses and related plan for additional information  Condition at Discharge: stable     Discharge Day Visit / Exam:     Subjective:  Patient seen and examined  No acute complaints at this time no fevers or chills or chest pain  Able to ambulate without any difficulty  Vitals: Blood Pressure: 130/79 (04/13/21 0758)  Pulse: (!) 117 (04/13/21 0758)  Temperature: 98 7 °F (37 1 °C) (04/13/21 0758)  Temp Source: Temporal (04/12/21 1509)  Respirations: 20 (04/13/21 0758)  Height: 5' 7" (170 2 cm) (04/06/21 1157)  Weight - Scale: 86 1 kg (189 lb 13 1 oz) (04/13/21 0600)  SpO2: 97 % (04/13/21 0758)  Exam:   Physical Exam  Vitals signs and nursing note reviewed  Constitutional:       Appearance: Normal appearance  HENT:      Head: Normocephalic and atraumatic  Neck:      Musculoskeletal: Normal range of motion     Cardiovascular:      Rate and Rhythm: Normal rate and regular rhythm  Pulmonary:      Effort: Pulmonary effort is normal       Breath sounds: Normal breath sounds  Abdominal:      General: Abdomen is flat  Palpations: Abdomen is soft  Genitourinary:     Comments: Butler catheter in place  Musculoskeletal: Normal range of motion  Skin:     General: Skin is warm and dry  Findings: Erythema and rash present  Comments: Extensive macular rash on his chest, abdomen and back  Neurological:      General: No focal deficit present  Mental Status: He is alert and oriented to person, place, and time  Mental status is at baseline  Discussion with Family:  Discussed plan of care with his wife and patient    Discharge instructions/Information to patient and family:   See after visit summary for information provided to patient and family  Provisions for Follow-Up Care:  See after visit summary for information related to follow-up care and any pertinent home health orders  Disposition:     Home    For Discharges to [facility] SNF:   · Not Applicable to this Patient - Not Applicable to this Patient    Planned Readmission:      Discharge Statement:  I spent 35 minutes discharging the patient  This time was spent on the day of discharge  I had direct contact with the patient on the day of discharge  Greater than 50% of the total time was spent examining patient, answering all patient questions, arranging and discussing plan of care with patient as well as directly providing post-discharge instructions  Additional time then spent on discharge activities  Discharge Medications:  See after visit summary for reconciled discharge medications provided to patient and family        ** Please Note: This note has been constructed using a voice recognition system **

## 2021-04-13 NOTE — ASSESSMENT & PLAN NOTE
EGD on 04/12/2021  IMPRESSION:  1  Antritis and 3 small gastric ulcers, biopsied to rule out Helicobacter pylori   2  Duodenitis and a single 1 cm ulceration in the duodenal bulb  3  Polyp of the 2nd portion the duodenum     RECOMMENDATION:  1  Continue with pantoprazole 40 mg p o  B i d   2  Resume previous diet  3  Will call the patient within 1 week regarding the biopsy results   4  Discontinue any anti-inflammatory medications with the exception of aspirin 81 mg p o   Daily     Outpatient GI follow-up after discharge

## 2021-04-14 ENCOUNTER — TRANSITIONAL CARE MANAGEMENT (OUTPATIENT)
Dept: INTERNAL MEDICINE CLINIC | Facility: CLINIC | Age: 71
End: 2021-04-14

## 2021-04-14 ENCOUNTER — OFFICE VISIT (OUTPATIENT)
Dept: SURGICAL ONCOLOGY | Facility: CLINIC | Age: 71
End: 2021-04-14

## 2021-04-14 ENCOUNTER — TELEPHONE (OUTPATIENT)
Dept: INTERNAL MEDICINE CLINIC | Facility: CLINIC | Age: 71
End: 2021-04-14

## 2021-04-14 ENCOUNTER — TELEPHONE (OUTPATIENT)
Dept: HEMATOLOGY ONCOLOGY | Facility: CLINIC | Age: 71
End: 2021-04-14

## 2021-04-14 VITALS
TEMPERATURE: 97.8 F | HEART RATE: 80 BPM | HEIGHT: 67 IN | WEIGHT: 192 LBS | BODY MASS INDEX: 30.13 KG/M2 | RESPIRATION RATE: 16 BRPM | DIASTOLIC BLOOD PRESSURE: 70 MMHG | SYSTOLIC BLOOD PRESSURE: 122 MMHG

## 2021-04-14 DIAGNOSIS — E89.2 STATUS POST PARATHYROIDECTOMY (HCC): Primary | ICD-10-CM

## 2021-04-14 PROCEDURE — 99024 POSTOP FOLLOW-UP VISIT: CPT | Performed by: SURGERY

## 2021-04-14 NOTE — TELEPHONE ENCOUNTER
Abel Navarro / [address] is admitting pt to homecare today     Came home w/saeed cath wife sd he's suppose to keep it in for 2 wk's  Rivka Morrisfolk is asking who is suppose to manage f/u care for this  Lauren Mitchell ? ?? His HR was 104     irregular     could hear the murmer  cloNIDine  was 0 3 now 0 1 TID -   BP Ox Temp all good    Has a rash   which got worse in hospital on abdomen,  Arms, groin    was raised    but improved since yesterday    using calamine lotion    [facility] ppr's called for hydro cortisone cream      Plavix on discharge ppr work    says he suppose to take it, wife sd she's holding Plavix    had hematuria     until he see's cardiologist  Possible drug in clonidine & atenolol    but doesn't say what it is  Lauren Mitchell

## 2021-04-14 NOTE — LETTER
April 14, 2021     Doc Mcarthur MD  [address] [address]    Patient: Teri Hogan   YOB: 1950   Date of Visit: 4/14/2021       Dear Dr Alexandra Finch: Thank you for referring Frieda Numichele to me for evaluation  Below are my notes for this consultation  If you have questions, please do not hesitate to call me  I look forward to following your patient along with you  Sincerely,        Teo Perez MD        CC: No Recipients  Teo Perez MD  4/14/2021 10:33 AM  Sign when Signing Visit     Surgical Oncology Follow Up       [address] [place] [id number]    Teri Hogan  1950  [id number]  [address] [place] [address],[address]  [facility] [address] [id number]    Chief Complaint   Patient presents with    Post-op       Assessment/Plan:    No problem-specific Assessment & Plan notes found for this encounter  Diagnoses and all orders for this visit:    Status post parathyroidectomy  -     PTH, intact; Future  -     Calcium; Future        Advance Care Planning/Advance Directives:  Discussed disease status, cancer treatment plans and/or cancer treatment goals with the patient  Oncology History    No history exists  History of Present Illness:   Easton Guevara is a 24-year-old man here for postop check status post parathyroidectomy  He did well from that standpoint, the developed an episode of acute renal failure week and half after surgery  He was recently hospitalized and treated for this  He was discharged home yesterday with normalization of his creatinine, though his levels were size 13 upon admission  He is being followed by the nephrology team at this point  Review of Systems:  Review of Systems   Constitutional: Positive for fatigue  HENT: Negative  Eyes: Negative  Respiratory: Negative  Cardiovascular: Negative  Gastrointestinal: Negative  Endocrine: Negative  Genitourinary: Positive for difficulty urinating  Musculoskeletal: Negative  Skin: Negative  Allergic/Immunologic: Negative  Neurological: Negative  Hematological: Negative  Psychiatric/Behavioral: Negative          Patient Active Problem List   Diagnosis    Non-recurrent bilateral inguinal hernia without obstruction or gangrene    Hyperlipidemia    HTN (hypertension)    Type 2 diabetes mellitus ([facility]    Tobacco abuse    Iron deficiency anemia due to chronic blood loss    HOCM (hypertrophic obstructive cardiomyopathy) ([facility] )    Coronary artery disease involving native coronary artery of native heart    Atherosclerosis of aorta (HCC)    Atherosclerosis of native coronary artery of native heart without angina pectoris    Anemia associated with diabetes mellitus (HCC)    Thrombocytosis (HCC)    Hypercalcemia    Hyperparathyroidism ([facility]    History of aortic regurgitation    History of obesity    CIERA (acute kidney injury) ([place] [address] )    Hyperkalemia    Metabolic acidosis    UTI (urinary tract infection)    Dehydration with hyponatremia    Hematuria    Anemia    Status post parathyroidectomy    Antritis and gastric ulcer    Urinary retention     Past Medical History:   Diagnosis Date    Benign neoplasm of large intestine     Bleeding gastric ulcer 2018    Colon polyp     Diabetes mellitus (HCC)     Heart murmur     History of aortic regurgitation     History of constipation     History of degenerative joint disease     History of nocturia     History of obesity     History of sebaceous cyst     History of shortness of breath     History of transfusion     History of urinary frequency     Hyperlipidemia     Hypertension     Myocardial infarction [facility]) 2018    january, 3 stents    Polyposis coli     of the large intestine    Ulcer of esophagus      Past Surgical History:   Procedure Laterality Date    ACHILLES TENDON SURGERY Left 1973    CATARACT EXTRACTION Right 02/18/2021    COLONOSCOPY      hyperplastic polyp    CORONARY ANGIOPLASTY WITH STENT PLACEMENT      3 stents: 2 placed on Jan 2018, 1 on July 2018    Earna Vani CYST REMOVAL  2019, 2020    left and right wrists    ESOPHAGOGASTRODUODENOSCOPY N/A 1/23/2018    Procedure: ESOPHAGOGASTRODUODENOSCOPY (EGD); Surgeon: Sara Nunez MD;  [facility]; Service: Gastroenterology    ESOPHAGOGASTRODUODENOSCOPY      HERNIA REPAIR      HERNIA REPAIR Bilateral 8/18/2017    Procedure: LAPAROSCOPIC INGUINAL HERNIA REPAIR WITH MESH;  Surgeon: Lilo Greene MD;  [facility];  Service: General    LA ESOPHAGOGASTRODUODENOSCOPY TRANSORAL DIAGNOSTIC N/A 3/26/2018    Procedure: ESOPHAGOGASTRODUODENOSCOPY (EGD); Surgeon: Ting Gonsalez MD;  [facility]; Service: Gastroenterology    LA ESOPHAGOGASTRODUODENOSCOPY TRANSORAL DIAGNOSTIC N/A 5/14/2018    Procedure: ESOPHAGOGASTRODUODENOSCOPY (EGD); Surgeon: Ting Gonsalez MD;  [facility];   Service: Gastroenterology    LA EXPLORE PARATHYROID GLANDS Right 3/23/2021    Procedure: RIGHT PARATHYROIDECTOMY, MINIMALLY INVASIVE, POSSIBLE 4-GLAND EXPLORATION, WITH INTRA-OPERATIVE PTH MONITORING;  Surgeon: Rosana Guzman MD;  Location:  MAIN OR;  Service: Surgical Oncology    TIBIA FRACTURE SURGERY Left 1962     Family History   Problem Relation Age of Onset    Rheumatic fever Father     Other Father         accidental poisoning   Earhang Ludwig Heart disease Mother      Social History     Socioeconomic History    Marital status: /Civil Union     Spouse name: Not on file    Number of children: Not on file    Years of education: Not on file    Highest education level: Not on file   Occupational History    Occupation:    Social Needs    Financial resource strain: Not on file    Food insecurity     Worry: Not on file     Inability: Not on file   Ryann Ludwig Transportation needs     Medical: Not on file     Non-medical: Not on file   Tobacco Use    Smoking status: Former Smoker     Packs/day: 1 00     Years: 35 00     Pack years: 35 00     Types: Cigarettes     Quit date: 2012     Years since quittin 0    Smokeless tobacco: Never Used    Tobacco comment: pt states quit many years ago   Substance and Sexual Activity    Alcohol use: Yes     Frequency: Monthly or less     Drinks per session: 1 or 2     Binge frequency: Never     Comment: rare    Drug use: No    Sexual activity: Not Currently     Partners: Female   Lifestyle    Physical activity     Days per week: 0 days     Minutes per session: 0 min    Stress: Not at all   Relationships    Social connections     Talks on phone: Not on file     Gets together: Not on file     Attends [employer] service: Not on file     Active member of club or organization: Not on file     Attends meetings of clubs or organizations: Not on file     Relationship status: Not on file    Intimate partner violence     Fear of current or ex partner: Not on file     Emotionally abused: Not on file     Physically abused: Not on file     Forced sexual activity: Not on file   Other Topics Concern    Not on file   Social History Narrative    Not on file       Current Outpatient Medications:     aspirin 81 mg chewable tablet, Chew 1 tablet daily, Disp: , Rfl:     atenolol (TENORMIN) 50 mg tablet, TAKE 1 TABLET DAILY, Disp: 90 tablet, Rfl: 3    atorvastatin (LIPITOR) 80 mg tablet, TAKE 1 TABLET DAILY, Disp: 90 tablet, Rfl: 3    cloNIDine (CATAPRES) 0 1 mg tablet, Take 1 tablet (0 1 mg total) by mouth every 8 (eight) hours, Disp: 90 tablet, Rfl: 0    clopidogrel (PLAVIX) 75 mg tablet, Take 1 tablet (75 mg total) by mouth daily, Disp: 90 tablet, Rfl: 3    ferrous sulfate 325 (65 Fe) mg tablet, Take 325 mg by mouth daily with breakfast, Disp: , Rfl:     hydrocortisone 1 % cream, Apply topically 2 (two) times a day as needed for rash, Disp: 30 g, Rfl: 0    Ketorolac Tromethamine (TORADOL ORAL PO), Take by mouth every 4 (four) hours as needed, Disp: , Rfl:     lisinopril (ZESTRIL) 40 mg tablet, Take 1 tablet (40 mg total) by mouth daily, Disp: 90 tablet, Rfl: 3    metFORMIN (GLUCOPHAGE-XR) 500 mg 24 hr tablet, TAKE 1 TABLET TWICE A DAY WITH MEALS, Disp: 180 tablet, Rfl: 3    Omega-3 1000 MG CAPS, Take by mouth 3 (three) times a day , Disp: , Rfl:     pantoprazole (PROTONIX) 40 mg tablet, Take 1 tablet (40 mg total) by mouth 2 (two) times a day before meals, Disp: 60 tablet, Rfl: 0    prednisoLONE acetate (PRED FORTE) 1 % ophthalmic suspension, INSTILL 1 DROP INTO THE RIGHT EYE FOUR TIMES DAILY AFTER SURGERY, Disp: , Rfl:     PreviDent 0 2 % SOLN, RINSE WITH 10ML FOR 1 MINUTE THEN SPIT OUT AT BEDTIME DO NOT SWALLOW , Disp: , Rfl:     torsemide (DEMADEX) 10 mg tablet, TAKE 1 TABLET(10 MG) BY MOUTH DAILY, Disp: 90 tablet, Rfl: 3  No current facility-administered medications for this visit  No Known Allergies  Vitals:    04/14/21 0959   BP: 122/70   Pulse: 80   Resp: 16   Temp: 97 8 °F (36 6 °C)       Physical Exam  HENT:      Head: Normocephalic and atraumatic  Nose: Nose normal    Eyes:      General: No scleral icterus  Extraocular Movements: Extraocular movements intact  Pupils: Pupils are equal, round, and reactive to light  Neck:      Musculoskeletal: Normal range of motion and neck supple  No neck rigidity  Comments: Incision c/d/i  Cardiovascular:      Rate and Rhythm: Normal rate  Heart sounds: Normal heart sounds  Abdominal:      General: Abdomen is flat  Lymphadenopathy:      Cervical: No cervical adenopathy  Skin:     General: Skin is warm  Neurological:      General: No focal deficit present  Mental Status: He is alert and oriented to person, place, and time  Psychiatric:         Mood and Affect: Mood normal          Behavior: Behavior normal          Thought Content:  Thought content normal          Judgment: Judgment normal            Results:  Labs:  Lab Results   Component Value Date     0 (H) 04/05/2021    CALCIUM 8 2 (L) 04/13/2021    PHOS 2 2 (L) 04/06/2021     Component      Latest Ref Rng & Units 3/23/2021 3/23/2021 3/23/2021 3/23/2021          12:16 PM  2:10 PM  2:20 PM  2:28 PM   PARATHYROID HORMONE      18 4 - 80 1 pg/mL 117 7 (H) 55 5 51 8 49 0     Case Report   Surgical Pathology Report                         Case: [id number]                                    Authorizing Provider: Amber Pedreson MD        Collected:           03/23/2021 1410               Ordering Location:     [address]      Received:            03/23/2021 [facility]                                      Hospital Operating Room                                                       Pathologist:           Jakub Walsh MD                                                                   Intraop:               Jakub Walsh MD                                                                   Specimen:    Parathyroid, right parathryroid                                                            Final Diagnosis   A  Parathyroid, Right, Parathyroidectomy:  - Hypercellular parathyroid tissue, 230 mg  Electronically signed by Jakub Walsh MD on 3/25/2021 at 10:17 AM   Comments: This is an appended report  These results have been appended to a previously preliminary verified report  Imaging  Ct Abdomen Pelvis Wo Contrast    Result Date: 4/5/2021  Narrative: CT ABDOMEN AND PELVIS WITHOUT IV CONTRAST INDICATION:   ARF, hydronephrosis  COMPARISON:  Ultrasound from 4/4/2021 TECHNIQUE:  CT examination of the abdomen and pelvis was performed without intravenous contrast   Axial, sagittal, and coronal 2D reformatted images were created from the source data and submitted for interpretation  Radiation dose length product (DLP) for this visit:  637 mGy-cm     This examination, like all CT scans performed in the [facility]  [address], was performed utilizing techniques to minimize radiation dose exposure, including the use of iterative reconstruction and automated exposure control  Enteric contrast was administered  FINDINGS: ABDOMEN LOWER CHEST: Small infiltrate versus atelectasis is seen at the left lung base with small effusion  LIVER/BILIARY TREE:  Unremarkable  GALLBLADDER:  No calcified gallstones  No pericholecystic inflammatory change  SPLEEN:  Unremarkable  PANCREAS:  Unremarkable  ADRENAL GLANDS:  Unremarkable  KIDNEYS/URETERS:  High density debris is noted within the right renal pelvis with hydronephrosis correlating to some areas of increased echogenicity on ultrasound  There is proximal ureteral dilatation with more distal ureter normal in caliber  The left kidney also demonstrates moderate hydronephrosis but without high density debris  There is moderate to severe hydronephrosis noted on the left     There is thickening of the renal pelvis with dilatation of the proximal ureter gradual tapering to  normal caliber in the distal left ureter without obvious focal change in caliber  STOMACH AND BOWEL:  Left colon diverticulosis is noted without CT evidence of diverticulitis  APPENDIX:  No findings to suggest appendicitis  ABDOMINOPELVIC CAVITY:  No ascites  No pneumoperitoneum  No lymphadenopathy  There is left greater than right perinephric stranding identified  There appears to be 8 perinephric collection medially adjacent to the left renal hilum which measures approximately 1 6 x 1 7 x 7 7 cm  Abutting this perinephric collection is a 2nd collection which most likely communicates the 1st and is centered in Gerota's fascia extending into the anterior pararenal space which measures approximately 2 7 x 5 8 x 8 1 cm  No gas bubbles are noted within the this collection  VESSELS:  Atherosclerotic changes are present  Mild aneurysmal dilatation measures 3 1 cm distally in the aorta   PELVIS REPRODUCTIVE ORGANS: Unremarkable for patient's age  URINARY BLADDER:  Butler catheter is noted within decompressed bladder  ABDOMINAL WALL/INGUINAL REGIONS:  Unremarkable  OSSEOUS STRUCTURES:  No acute fracture or destructive osseous lesion  Impression: Left greater than right moderate to severe hydronephrosis  Left greater than right perinephric stranding may be related to obstruction or infection  Hemorrhage or complicated pyelonephritis with debris could be the cause of obstruction in the collecting system although occult stricture of the ureters could also be present  Collection in the left perinephric space and extending into the pararenal space could be related to urinoma or perinephric abscess  This should be correlated clinically  No gas bubbles are seen however  Neoplasm is not excluded This was discussed with Moshe Anne in the ICU at 11:50 AM Workstation performed: [id number]     Xr Chest 1 View Portable    Result Date: 4/5/2021  Narrative: CHEST INDICATION:   SOB  COMPARISON:  3/4/2020 EXAM PERFORMED/VIEWS:  XR CHEST PORTABLE FINDINGS: Cardiomediastinal silhouette appears unremarkable  The lungs are clear  No pneumothorax or pleural effusion  Osseous structures appear within normal limits for patient age  Impression: No acute cardiopulmonary disease  Workstation performed: [id number]     Ct Head Without Contrast    Result Date: 4/4/2021  Narrative: CT BRAIN - WITHOUT CONTRAST INDICATION:   Altered mental status AMS  COMPARISON:  None  TECHNIQUE:  CT examination of the brain was performed  In addition to axial images, sagittal and coronal 2D reformatted images were created and submitted for interpretation  Radiation dose length product (DLP) for this visit:  858 mGy-cm   This examination, like all CT scans performed in the [facility], was performed utilizing techniques to minimize radiation dose exposure, including the use of iterative reconstruction and automated exposure control    IMAGE QUALITY:  Diagnostic  FINDINGS: PARENCHYMA:  No intracranial mass, mass effect or midline shift  No CT signs of acute infarction  No acute parenchymal hemorrhage  Periventricular and centrum semiovale white matter hypodensity is a nonspecific finding likely representing chronic microangiopathy  Calcification bilateral cavernous internal carotid arteries  VENTRICLES AND EXTRA-AXIAL SPACES:  No acute hydrocephalus  Mild prominence of CSF spaces diffusely attributed to generalized volume loss  VISUALIZED ORBITS AND PARANASAL SINUSES:  Changes of right-sided lens replacement noted  Old medially displaced fracture medial wall right orbit with minimal intraorbital fat protruding through the defect  Trace mucosal thickening left posterior ethmoid sinus  CALVARIUM AND EXTRACRANIAL SOFT TISSUES:  Normal      Impression: No evidence of acute intracranial process  Chronic microangiopathy  Workstation performed: [id number]     Us Kidney And Bladder    Result Date: 4/4/2021  Narrative: RENAL ULTRASOUND INDICATION:   Renal failure  COMPARISON: None TECHNIQUE:   Ultrasound of the retroperitoneum was performed with a curvilinear transducer utilizing volumetric sweeps and still imaging techniques  FINDINGS: KIDNEYS: Symmetric and normal size  Right kidney:  15 3 cm  Left kidney:  14 6 cm  Right kidney The renal parenchyma is diffusely echogenic consistent with medical renal disease  No suspicious masses detected  Moderate hydronephrosis with echogenic material noted in the renal pelvis  No shadowing calculi  No perinephric fluid collections  Left kidney The renal parenchyma is diffusely echogenic consistent with medical renal disease  No suspicious masses detected  Moderate hydronephrosis  No shadowing calculi   There is increased fluid in the retroperitoneum surrounding the left proximal ureter, best appreciated on the transverse left kidney cine URETERS: Dilation of the right distal ureter is noted, possible stone noted distally BLADDER: A saeed catheter is in place, decompressing the bladder and limiting its evaluation  No focal thickening or mass lesions  Bilateral ureteral jets detected  Impression: 1  Moderate moderate left hydronephrosis  There is increased fluid in the retroperitoneum surrounding the left proximal ureter  This may represent rupture of the collecting system  Recommend CT for further characterization  2   Moderate right hydronephrosis, findings concerning for distal ureteral stone  Echogenic material within the renal pelvis is concerning for hemorrhage 3  Echogenic kidneys consistent with medical renal disease The study was marked in EPIC for immediate notification  Workstation performed: [id number]     I reviewed the above laboratory and imaging data  Discussion/Summary:  75-year-old man, status post parathyroidectomy  Episode of acute renal failure after which is improving  I will plan see him in 6 months for repeat calcium PTH levels to assess for durability of operation  I suspect the most recent PTH levels elevated secondary to his recent bout of renal failure and hospitalization

## 2021-04-14 NOTE — PROGRESS NOTES
Surgical Oncology Follow Up       [address] [facility]  [facility] [place]  [address] [facility] [facility] [place] [address]    Tayla Mayelin  1950  [id number]  [address] [facility]  [facility] [place]  [address] [facility] [address]    Chief Complaint   Patient presents with    Post-op       Assessment/Plan:    No problem-specific Assessment & Plan notes found for this encounter  Diagnoses and all orders for this visit:    Status post parathyroidectomy  -     PTH, intact; Future  -     Calcium; Future        Advance Care Planning/Advance Directives:  Discussed disease status, cancer treatment plans and/or cancer treatment goals with the patient  Oncology History    No history exists  History of Present Illness:   Moo Soliman is a 64-year-old man here for postop check status post parathyroidectomy  He did well from that standpoint, the developed an episode of acute renal failure week and half after surgery  He was recently hospitalized and treated for this  He was discharged home yesterday with normalization of his creatinine, though his levels were size 13 upon admission  He is being followed by the nephrology team at this point  Review of Systems:  Review of Systems   Constitutional: Positive for fatigue  HENT: Negative  Eyes: Negative  Respiratory: Negative  Cardiovascular: Negative  Gastrointestinal: Negative  Endocrine: Negative  Genitourinary: Positive for difficulty urinating  Musculoskeletal: Negative  Skin: Negative  Allergic/Immunologic: Negative  Neurological: Negative  Hematological: Negative  Psychiatric/Behavioral: Negative          Patient Active Problem List   Diagnosis    Non-recurrent bilateral inguinal hernia without obstruction or gangrene    Hyperlipidemia    HTN (hypertension)    Type 2 diabetes mellitus ([address] )    Tobacco abuse    Iron deficiency anemia due to chronic blood loss    HOCM [facility]hypertrophic obstructive cardiomyopathy) [facility]    Coronary artery disease involving native coronary artery of native heart    Atherosclerosis of aorta (HCC)    Atherosclerosis of native coronary artery of native heart without angina pectoris    Anemia associated with diabetes mellitus (HCC)    Thrombocytosis (HCC)    Hypercalcemia    Hyperparathyroidism ([facility] )    History of aortic regurgitation    History of obesity    [facility]    Hyperkalemia    Metabolic acidosis    UTI (urinary tract infection)    Dehydration with hyponatremia    Hematuria    Anemia    Status post parathyroidectomy    Antritis and gastric ulcer    Urinary retention     Past Medical History:   Diagnosis Date    Benign neoplasm of large intestine     Bleeding gastric ulcer 2018    Colon polyp     Diabetes mellitus ([facility] )     Heart murmur     History of aortic regurgitation     History of constipation     History of degenerative joint disease     History of nocturia     History of obesity     History of sebaceous cyst     History of shortness of breath     History of transfusion     History of urinary frequency     Hyperlipidemia     Hypertension     Myocardial infarction ([facility] 2018    january, 3 stents    Polyposis coli     of the large intestine    Ulcer of esophagus      Past Surgical History:   Procedure Laterality Date    ACHILLES TENDON SURGERY Left 1973    CATARACT EXTRACTION Right 02/18/2021    COLONOSCOPY      hyperplastic polyp    CORONARY ANGIOPLASTY WITH STENT PLACEMENT      3 stents: 2 placed on Jan 2018, 1 on July 2018    [facility] CYST REMOVAL  2019, 2020    left and right wrists    ESOPHAGOGASTRODUODENOSCOPY N/A 1/23/2018    Procedure: ESOPHAGOGASTRODUODENOSCOPY (EGD); Surgeon: Marita Catalan MD;  Location: [facility];   Service: Gastroenterology    ESOPHAGOGASTRODUODENOSCOPY      HERNIA REPAIR      HERNIA REPAIR Bilateral 8/18/2017 Procedure: LAPAROSCOPIC INGUINAL HERNIA REPAIR WITH MESH;  Surgeon: Alley Bob MD;  Location: [facility];  Service: General    ID ESOPHAGOGASTRODUODENOSCOPY TRANSORAL DIAGNOSTIC N/A 3/26/2018    Procedure: ESOPHAGOGASTRODUODENOSCOPY (EGD); Surgeon: Cele Pelayo MD;  [facility]; Service: Gastroenterology    ID ESOPHAGOGASTRODUODENOSCOPY TRANSORAL DIAGNOSTIC N/A 2018    Procedure: ESOPHAGOGASTRODUODENOSCOPY (EGD); Surgeon: Cele Pelayo MD;  [facility];   Service: Gastroenterology    ID EXPLORE PARATHYROID GLANDS Right 3/23/2021    Procedure: RIGHT PARATHYROIDECTOMY, MINIMALLY INVASIVE, POSSIBLE 4-GLAND EXPLORATION, WITH INTRA-OPERATIVE PTH MONITORING;  Surgeon: Lori Amos MD;  Location:  MAIN OR;  Service: Surgical Oncology    TIBIA FRACTURE SURGERY Left      Family History   Problem Relation Age of Onset    Rheumatic fever Father     Other Father         accidental poisoning   Martinez Heart disease Mother      Social History     Socioeconomic History    Marital status: /Civil Union     Spouse name: Not on file    Number of children: Not on file    Years of education: Not on file    Highest education level: Not on file   Occupational History    Occupation:    Social Needs    Financial resource strain: Not on file    Food insecurity     Worry: Not on file     Inability: Not on file   [employer] needs     Medical: Not on file     Non-medical: Not on file   Tobacco Use    Smoking status: Former Smoker     Packs/day: 1 00     Years: 35 00     Pack years: 35 00     Types: Cigarettes     Quit date: 2012     Years since quittin 0    Smokeless tobacco: Never Used    Tobacco comment: pt states quit many years ago   Substance and Sexual Activity    Alcohol use: Yes     Frequency: Monthly or less     Drinks per session: 1 or 2     Binge frequency: Never     Comment: rare    Drug use: No    Sexual activity: Not Currently Partners: Female   Lifestyle    Physical activity     Days per week: 0 days     Minutes per session: 0 min    Stress: Not at all   Relationships    Social connections     Talks on phone: Not on file     Gets together: Not on file     Attends [employer] service: Not on file     Active member of club or organization: Not on file     Attends meetings of clubs or organizations: Not on file     Relationship status: Not on file    Intimate partner violence     Fear of current or ex partner: Not on file     Emotionally abused: Not on file     Physically abused: Not on file     Forced sexual activity: Not on file   Other Topics Concern    Not on file   Social History Narrative    Not on file       Current Outpatient Medications:     aspirin 81 mg chewable tablet, Chew 1 tablet daily, Disp: , Rfl:     atenolol (TENORMIN) 50 mg tablet, TAKE 1 TABLET DAILY, Disp: 90 tablet, Rfl: 3    atorvastatin (LIPITOR) 80 mg tablet, TAKE 1 TABLET DAILY, Disp: 90 tablet, Rfl: 3    cloNIDine (CATAPRES) 0 1 mg tablet, Take 1 tablet (0 1 mg total) by mouth every 8 (eight) hours, Disp: 90 tablet, Rfl: 0    clopidogrel (PLAVIX) 75 mg tablet, Take 1 tablet (75 mg total) by mouth daily, Disp: 90 tablet, Rfl: 3    ferrous sulfate 325 (65 Fe) mg tablet, Take 325 mg by mouth daily with breakfast, Disp: , Rfl:     hydrocortisone 1 % cream, Apply topically 2 (two) times a day as needed for rash, Disp: 30 g, Rfl: 0    Ketorolac Tromethamine (TORADOL ORAL PO), Take by mouth every 4 (four) hours as needed, Disp: , Rfl:     lisinopril (ZESTRIL) 40 mg tablet, Take 1 tablet (40 mg total) by mouth daily, Disp: 90 tablet, Rfl: 3    metFORMIN (GLUCOPHAGE-XR) 500 mg 24 hr tablet, TAKE 1 TABLET TWICE A DAY WITH MEALS, Disp: 180 tablet, Rfl: 3    Omega-3 1000 MG CAPS, Take by mouth 3 (three) times a day , Disp: , Rfl:     pantoprazole (PROTONIX) 40 mg tablet, Take 1 tablet (40 mg total) by mouth 2 (two) times a day before meals, Disp: 60 tablet, Rfl: 0    prednisoLONE acetate (PRED FORTE) 1 % ophthalmic suspension, INSTILL 1 DROP INTO THE RIGHT EYE FOUR TIMES DAILY AFTER SURGERY, Disp: , Rfl:     PreviDent 0 2 % SOLN, RINSE WITH 10ML FOR 1 MINUTE THEN SPIT OUT AT BEDTIME DO NOT SWALLOW , Disp: , Rfl:     torsemide (DEMADEX) 10 mg tablet, TAKE 1 TABLET(10 MG) BY MOUTH DAILY, Disp: 90 tablet, Rfl: 3  No current facility-administered medications for this visit  No Known Allergies  Vitals:    04/14/21 0959   BP: 122/70   Pulse: 80   Resp: 16   Temp: 97 8 °F (36 6 °C)       Physical Exam  HENT:      Head: Normocephalic and atraumatic  Nose: Nose normal    Eyes:      General: No scleral icterus  Extraocular Movements: Extraocular movements intact  Pupils: Pupils are equal, round, and reactive to light  Neck:      Musculoskeletal: Normal range of motion and neck supple  No neck rigidity  Comments: Incision c/d/i  Cardiovascular:      Rate and Rhythm: Normal rate  Heart sounds: Normal heart sounds  Abdominal:      General: Abdomen is flat  Lymphadenopathy:      Cervical: No cervical adenopathy  Skin:     General: Skin is warm  Neurological:      General: No focal deficit present  Mental Status: He is alert and oriented to person, place, and time  Psychiatric:         Mood and Affect: Mood normal          Behavior: Behavior normal          Thought Content:  Thought content normal          Judgment: Judgment normal            Results:  Labs:  Lab Results   Component Value Date     0 (H) 04/05/2021    CALCIUM 8 2 (L) 04/13/2021    PHOS 2 2 (L) 04/06/2021     Component      Latest Ref Rng & Units 3/23/2021 3/23/2021 3/23/2021 3/23/2021          12:16 PM  2:10 PM  2:20 PM  2:28 PM   PARATHYROID HORMONE      18 4 - 80 1 pg/mL 117 7 (H) 55 5 51 8 49 0     Case Report   Surgical Pathology Report                         Case: [id number]                                    Authorizing Provider: Gricelda Gamez MD        Collected:           03/23/2021 1410               Ordering Location:     [facility]      Received:            03/23/2021 1415                                      Hospital Operating Room                                                       Pathologist:           Ame Escamilla MD                                                                   Intraop:               Ame Escamilla MD                                                                   Specimen:    Parathyroid, right parathryroid                                                            Final Diagnosis   A  Parathyroid, Right, Parathyroidectomy:  - Hypercellular parathyroid tissue, 230 mg  Electronically signed by Ame Escamilla MD on 3/25/2021 at 10:17 AM   Comments: This is an appended report  These results have been appended to a previously preliminary verified report  Imaging  Ct Abdomen Pelvis Wo Contrast    Result Date: 4/5/2021  Narrative: CT ABDOMEN AND PELVIS WITHOUT IV CONTRAST INDICATION:   ARF, hydronephrosis  COMPARISON:  Ultrasound from 4/4/2021 TECHNIQUE:  CT examination of the abdomen and pelvis was performed without intravenous contrast   Axial, sagittal, and coronal 2D reformatted images were created from the source data and submitted for interpretation  Radiation dose length product (DLP) for this visit:  637 mGy-cm   This examination, like all CT scans performed in the [facility], was performed utilizing techniques to minimize radiation dose exposure, including the use of iterative reconstruction and automated exposure control  Enteric contrast was administered  FINDINGS: ABDOMEN LOWER CHEST: Small infiltrate versus atelectasis is seen at the left lung base with small effusion  LIVER/BILIARY TREE:  Unremarkable  GALLBLADDER:  No calcified gallstones  No pericholecystic inflammatory change  SPLEEN:  Unremarkable  PANCREAS:  Unremarkable  ADRENAL GLANDS:  Unremarkable   KIDNEYS/URETERS:  High density debris is noted within the right renal pelvis with hydronephrosis correlating to some areas of increased echogenicity on ultrasound  There is proximal ureteral dilatation with more distal ureter normal in caliber  The left kidney also demonstrates moderate hydronephrosis but without high density debris  There is moderate to severe hydronephrosis noted on the left     There is thickening of the renal pelvis with dilatation of the proximal ureter gradual tapering to  normal caliber in the distal left ureter without obvious focal change in caliber  STOMACH AND BOWEL:  Left colon diverticulosis is noted without CT evidence of diverticulitis  APPENDIX:  No findings to suggest appendicitis  ABDOMINOPELVIC CAVITY:  No ascites  No pneumoperitoneum  No lymphadenopathy  There is left greater than right perinephric stranding identified  There appears to be 8 perinephric collection medially adjacent to the left renal hilum which measures approximately 1 6 x 1 7 x 7 7 cm  Abutting this perinephric collection is a 2nd collection which most likely communicates the 1st and is centered in Gerota's fascia extending into the anterior pararenal space which measures approximately 2 7 x 5 8 x 8 1 cm  No gas bubbles are noted within the this collection  VESSELS:  Atherosclerotic changes are present  Mild aneurysmal dilatation measures 3 1 cm distally in the aorta  PELVIS REPRODUCTIVE ORGANS:  Unremarkable for patient's age  URINARY BLADDER:  Butler catheter is noted within decompressed bladder  ABDOMINAL WALL/INGUINAL REGIONS:  Unremarkable  OSSEOUS STRUCTURES:  No acute fracture or destructive osseous lesion  Impression: Left greater than right moderate to severe hydronephrosis  Left greater than right perinephric stranding may be related to obstruction or infection    Hemorrhage or complicated pyelonephritis with debris could be the cause of obstruction in the collecting system although occult stricture of the ureters could also be present  Collection in the left perinephric space and extending into the pararenal space could be related to urinoma or perinephric abscess  This should be correlated clinically  No gas bubbles are seen however  Neoplasm is not excluded This was discussed with Moshe Rdz in the ICU at 11:50 AM Workstation performed: [id number]     Xr Chest 1 View Portable    Result Date: 4/5/2021  Narrative: CHEST INDICATION:   SOB  COMPARISON:  3/4/2020 EXAM PERFORMED/VIEWS:  XR CHEST PORTABLE FINDINGS: Cardiomediastinal silhouette appears unremarkable  The lungs are clear  No pneumothorax or pleural effusion  Osseous structures appear within normal limits for patient age  Impression: No acute cardiopulmonary disease  Workstation performed: [id number]     Ct Head Without Contrast    Result Date: 4/4/2021  Narrative: CT BRAIN - WITHOUT CONTRAST INDICATION:   Altered mental status AMS  COMPARISON:  None  TECHNIQUE:  CT examination of the brain was performed  In addition to axial images, sagittal and coronal 2D reformatted images were created and submitted for interpretation  Radiation dose length product (DLP) for this visit:  858 mGy-cm   This examination, like all CT scans performed in the [facility], was performed utilizing techniques to minimize radiation dose exposure, including the use of iterative reconstruction and automated exposure control  IMAGE QUALITY:  Diagnostic  FINDINGS: PARENCHYMA:  No intracranial mass, mass effect or midline shift  No CT signs of acute infarction  No acute parenchymal hemorrhage  Periventricular and centrum semiovale white matter hypodensity is a nonspecific finding likely representing chronic microangiopathy  Calcification bilateral cavernous internal carotid arteries  VENTRICLES AND EXTRA-AXIAL SPACES:  No acute hydrocephalus  Mild prominence of CSF spaces diffusely attributed to generalized volume loss   VISUALIZED ORBITS AND PARANASAL SINUSES:  Changes of right-sided lens replacement noted  Old medially displaced fracture medial wall right orbit with minimal intraorbital fat protruding through the defect  Trace mucosal thickening left posterior ethmoid sinus  CALVARIUM AND EXTRACRANIAL SOFT TISSUES:  Normal      Impression: No evidence of acute intracranial process  Chronic microangiopathy  Workstation performed: [id number]     Us Kidney And Bladder    Result Date: 4/4/2021  Narrative: RENAL ULTRASOUND INDICATION:   Renal failure  COMPARISON: None TECHNIQUE:   Ultrasound of the retroperitoneum was performed with a curvilinear transducer utilizing volumetric sweeps and still imaging techniques  FINDINGS: KIDNEYS: Symmetric and normal size  Right kidney:  15 3 cm  Left kidney:  14 6 cm  Right kidney The renal parenchyma is diffusely echogenic consistent with medical renal disease  No suspicious masses detected  Moderate hydronephrosis with echogenic material noted in the renal pelvis  No shadowing calculi  No perinephric fluid collections  Left kidney The renal parenchyma is diffusely echogenic consistent with medical renal disease  No suspicious masses detected  Moderate hydronephrosis  No shadowing calculi  There is increased fluid in the retroperitoneum surrounding the left proximal ureter, best appreciated on the transverse left kidney cine URETERS: Dilation of the right distal ureter is noted, possible stone noted distally BLADDER: A saeed catheter is in place, decompressing the bladder and limiting its evaluation  No focal thickening or mass lesions  Bilateral ureteral jets detected  Impression: 1  Moderate moderate left hydronephrosis  There is increased fluid in the retroperitoneum surrounding the left proximal ureter  This may represent rupture of the collecting system  Recommend CT for further characterization  2   Moderate right hydronephrosis, findings concerning for distal ureteral stone    Echogenic material within the renal pelvis is concerning for hemorrhage 3  Echogenic kidneys consistent with medical renal disease The study was marked in EPIC for immediate notification  Workstation performed: [id number]     I reviewed the above laboratory and imaging data  Discussion/Summary:  68-year-old man, status post parathyroidectomy  Episode of acute renal failure after which is improving  I will plan see him in 6 months for repeat calcium PTH levels to assess for durability of operation  I suspect the most recent PTH levels elevated secondary to his recent bout of renal failure and hospitalization

## 2021-04-16 ENCOUNTER — TELEPHONE (OUTPATIENT)
Dept: GASTROENTEROLOGY | Facility: CLINIC | Age: 71
End: 2021-04-16

## 2021-04-16 ENCOUNTER — TELEPHONE (OUTPATIENT)
Dept: CARDIOLOGY CLINIC | Facility: CLINIC | Age: 71
End: 2021-04-16

## 2021-04-16 NOTE — TELEPHONE ENCOUNTER
Darnell Vaca [phone] from [facility] called stating pt was advised to hold his blood thinner after his last hospital visit, Darnell Vaca is requesting a call back to her and the patient to clarify the information

## 2021-04-16 NOTE — TELEPHONE ENCOUNTER
Patient was discharged from the hospital on 4/13  Per hospital note, "Aspirin and Plavix on hold due to hematuria"  How long should patient hold?

## 2021-04-16 NOTE — TELEPHONE ENCOUNTER
Patient to hold off on Plavix  Patient can restart on aspirin 81 mg by Monday for okay from   Urology

## 2021-04-16 NOTE — TELEPHONE ENCOUNTER
----- Message from Smitha Jose, DO sent at 4/16/2021  9:27 AM EDT -----  Please call the patient with the biopsy results  Biopsies the stomach were benign and negative for Helicobacter pylori  Biopsies the small intestine showed ulceration without Helicobacter pylori or cancer  The polyp of the small intestine was an adenoma    The patient will need a repeat esophagogastroduodenoscopy in 3 months

## 2021-04-19 ENCOUNTER — OFFICE VISIT (OUTPATIENT)
Dept: INTERNAL MEDICINE CLINIC | Facility: CLINIC | Age: 71
End: 2021-04-19
Payer: MEDICARE

## 2021-04-19 VITALS
HEART RATE: 73 BPM | TEMPERATURE: 98 F | OXYGEN SATURATION: 98 % | WEIGHT: 186.8 LBS | RESPIRATION RATE: 16 BRPM | HEIGHT: 67 IN | SYSTOLIC BLOOD PRESSURE: 142 MMHG | BODY MASS INDEX: 29.32 KG/M2 | DIASTOLIC BLOOD PRESSURE: 86 MMHG

## 2021-04-19 DIAGNOSIS — E11.9 TYPE 2 DIABETES MELLITUS WITHOUT COMPLICATION, WITHOUT LONG-TERM CURRENT USE OF INSULIN (HCC): Primary | ICD-10-CM

## 2021-04-19 PROCEDURE — 99495 TRANSJ CARE MGMT MOD F2F 14D: CPT | Performed by: INTERNAL MEDICINE
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
## 2021-04-19 NOTE — PROGRESS NOTES
Assessment/Plan:     No problem-specific Assessment & Plan notes found for this encounter  Diagnoses and all orders for this visit:    Type 2 diabetes mellitus without complication, without long-term current use of insulin ([facility] [address] )    Other orders  -     Cancel: CT lung screening program; Future  -     Cancel: Hemoglobin A1C ([facility]); Future         Subjective:     Patient ID: Venancio Gonsales is a 65 y o  male  Transition of care  Patient hospitalized for sepsis and urinary retention  He had acute renal failure  Finally discharged to home  He has an indwelling Butler catheter which will hopefully be gone in a couple of weeks  This followed a parathyroidectomy  Not     Diabetic and hypertensive with good control  Right now he feels almost back to his baseline  He has some fatigue but is otherwise okay      Review of Systems   Constitutional: Positive for fatigue  All other systems reviewed and are negative  Objective:     Physical Exam  Constitutional:       Appearance: Normal appearance  Cardiovascular:      Rate and Rhythm: Normal rate and regular rhythm  Pulmonary:      Effort: Pulmonary effort is normal       Breath sounds: Normal breath sounds  Neurological:      Mental Status: He is alert  Psychiatric:         Mood and Affect: Mood normal          Judgment: Judgment normal            Vitals:    04/19/21 0935   BP: 142/86   BP Location: Left arm   Patient Position: Sitting   Cuff Size: Standard   Pulse: 73   Resp: 16   Temp: 98 °F (36 7 °C)   TempSrc: Temporal   SpO2: 98%   Weight: 84 7 kg (186 lb 12 8 oz)   Height: 5' 7" (1 702 m)       Transitional Care Management Review:  Venancio Gonsales is a 65 y o  male here for TCM follow up       During the TCM phone call patient stated:    TCM Call (since 3/19/2021)     Date and time call was made  4/14/2021 11:21 AM    Hospital care reviewed  Records reviewed    Date of Admission  04/04/21    Date of discharge  04/13/21 Diagnosis  CIERA    Disposition  Home; Home health services    Current Symptoms  None      TCM Call (since 3/19/2021)     Post hospital issues  None    Scheduled for follow up?   Yes    Patients specialists  Other (comment)    Other specialists names  Hadley Skiff, MD, [phone]    Did you obtain your prescribed medications  Yes (Comment)  hydrocortisone 1 % cream, pantoprazole    Do you need help managing your prescriptions or medications  No    Is transportation to your appointment needed  No    I have advised the patient to call PCP with any new or worsening symptoms  Damián Montes De Oca LPN    Are you recieving home care services  Yes    Types of home care services  Lobo Martinez MD

## 2021-04-21 ENCOUNTER — OFFICE VISIT (OUTPATIENT)
Dept: CARDIOLOGY CLINIC | Facility: CLINIC | Age: 71
End: 2021-04-21
Payer: MEDICARE

## 2021-04-21 ENCOUNTER — TELEPHONE (OUTPATIENT)
Dept: NEPHROLOGY | Facility: CLINIC | Age: 71
End: 2021-04-21

## 2021-04-21 VITALS
SYSTOLIC BLOOD PRESSURE: 126 MMHG | HEART RATE: 72 BPM | WEIGHT: 184 LBS | OXYGEN SATURATION: 99 % | DIASTOLIC BLOOD PRESSURE: 76 MMHG | HEIGHT: 67 IN | BODY MASS INDEX: 28.88 KG/M2

## 2021-04-21 DIAGNOSIS — I35.0 AORTIC VALVE STENOSIS, ETIOLOGY OF CARDIAC VALVE DISEASE UNSPECIFIED: ICD-10-CM

## 2021-04-21 DIAGNOSIS — I10 ESSENTIAL HYPERTENSION: ICD-10-CM

## 2021-04-21 DIAGNOSIS — I42.1 HOCM (HYPERTROPHIC OBSTRUCTIVE CARDIOMYOPATHY) (HCC): ICD-10-CM

## 2021-04-21 DIAGNOSIS — E78.2 MIXED HYPERLIPIDEMIA: ICD-10-CM

## 2021-04-21 DIAGNOSIS — I25.10 CORONARY ARTERY DISEASE INVOLVING NATIVE HEART WITHOUT ANGINA PECTORIS, UNSPECIFIED VESSEL OR LESION TYPE: Primary | ICD-10-CM

## 2021-04-21 PROCEDURE — 99214 OFFICE O/P EST MOD 30 MIN: CPT | Performed by: PHYSICIAN ASSISTANT

## 2021-04-21 NOTE — PATIENT INSTRUCTIONS
Continue medications  Remain off of Plavix at this time  Report any symptoms  Follow with primary care physician, Urology, Nephrology  Check echocardiogram before next visit with history of aortic stenosis, hypertrophic  Obstructive cardiomyopathy,   CAD  All questions answered  Continue all medications  Previous studies reviewed with patient, medications reviewed and possible side effects discussed  Continue risk factor modification  Optimize weight, regular exercise and follow up with appropriate specialists and primary care physician as discussed  All questions answered  Patient advised to report any problems prompting to medical attention   Return for follow up visit in 4-6 months or earlier if needed

## 2021-04-21 NOTE — TELEPHONE ENCOUNTER
Russell La from [employer] Nurse called  She stated that Dr Zheng Sims has ordered that Dany's saeed catheter remain in for two weeks (lidia would be up next Tuesday)  She is calling to see if Dr Zheng Sims will approve them taking it out sooner  Please call her at  [phone]

## 2021-04-21 NOTE — PROGRESS NOTES
[facility] [place]  1 [facility] [address] [id number]  Cardiology Follow Up    Venancio Robert  1950  [id number]    1  Coronary artery disease involving native heart without angina pectoris, unspecified vessel or lesion type     2  HOCM (hypertrophic obstructive cardiomyopathy) ([address] )     3  Essential hypertension     4  Mixed hyperlipidemia     5  Aortic valve stenosis, etiology of cardiac valve disease unspecified         Discussion/Summary:  1-CAD with history of PCI (Prox and mid LAD) in 7/2018, currently stable without anginal symptoms  Continue with aspirin, atenolol, Lipitor, clonidine, lisinopril, torsemide    2- hypertrophic obstructive cardiomyopathy with last known echo July 2020,  EF 60%, moderately increased wall thickness, moderate asymmetrical hypertrophy of the septum, grade 1 diastolic dysfunction, mild MR, mild AS, mild-to-moderate AR  Plan to repeat echocardiogram before next visit  3-  Hypertension, stable,  126/76  Continue with atenolol, clonidine, lisinopril, torsemide    4- hyperlipidemia on Lipitor    5- aortic stenosis, mild per last echo with mild-to-moderate AR  Plan to repeat echo before next visit  Continue medications  Remain off of Plavix at this time  Report any symptoms  Follow with primary care physician, Urology, Nephrology  Check echocardiogram before next visit with history of aortic stenosis, hypertrophic  Obstructive cardiomyopathy,   CAD  All questions answered  Continue all medications  Previous studies reviewed with patient, medications reviewed and possible side effects discussed  Continue risk factor modification  Optimize weight, regular exercise and follow up with appropriate specialists and primary care physician as discussed  All questions answered  Patient advised to report any problems prompting to medical attention   Return for follow up visit in 4-6 months or earlier if needed    Chief Complaint   Patient presents with   [facility] follow up/BP       Interval History:   Patient presents for follow-up visit after recent hospitalization  Patient had gone to [facility] on 4/3/2021 with complaints of weakness, fatigue  Patient thought it was related to his 2nd COVID vaccine but noted to have urinary retention,  Acute heart failure, UTI, gross hematuria, dehydration  Patient's medications were changed around patient was taken off of Plavix  Patient had Butler catheter placed, which he still has it now  Patient received multiple  Blood transfusions  Patient also noted rash from body wash use in the hospital   Patient states since being out of the hospital he is feeling well  Denies any chest pain, chest pressure, chest heaviness, shortness of breath  Taking all medications as prescribed  Denies any more hematuria, he was started back on baby aspirin  He is followed closely by Urology/nephrology/PCP         Past medical history includes CAD with PCI to prox and mid LAD in 2018, hypertension, hyperlipidemia, hypertension,  Hypertrophic obstructive cardiomyopathy, CKD    Patient Active Problem List   Diagnosis    Non-recurrent bilateral inguinal hernia without obstruction or gangrene    Hyperlipidemia    HTN (hypertension)    Type 2 diabetes mellitus ([address] )    Tobacco abuse    Iron deficiency anemia due to chronic blood loss    HOCM (hypertrophic obstructive cardiomyopathy) ([address] )    Coronary artery disease involving native coronary artery of native heart    Atherosclerosis of aorta ([address] )    Atherosclerosis of native coronary artery of native heart without angina pectoris    Anemia associated with diabetes mellitus ([address] )    Thrombocytosis (HCC)    Hypercalcemia    Hyperparathyroidism ([address] )    History of aortic regurgitation    History of obesity    CIERA (acute kidney injury) ([address] )    Hyperkalemia    Metabolic acidosis    UTI (urinary tract infection)    Dehydration with hyponatremia    Hematuria    Anemia    Status post parathyroidectomy    Antritis and gastric ulcer    Urinary retention     Past Medical History:   Diagnosis Date    Benign neoplasm of large intestine     Bleeding gastric ulcer 2018    Colon polyp     Diabetes mellitus (HCC)     Heart murmur     History of aortic regurgitation     History of constipation     History of degenerative joint disease     History of nocturia     History of obesity     History of sebaceous cyst     History of shortness of breath     History of transfusion     History of urinary frequency     Hyperlipidemia     Hypertension     Myocardial infarction [facility]) 2018    january, 3 stents    Polyposis coli     of the large intestine    Ulcer of esophagus      Social History     Socioeconomic History    Marital status: /Civil Union     Spouse name: Not on file    Number of children: Not on file    Years of education: Not on file    Highest education level: Not on file   Occupational History    Occupation:    Social Needs    Financial resource strain: Not on file    Food insecurity     Worry: Not on file     Inability: Not on file   nanoMR needs     Medical: Not on file     Non-medical: Not on file   Tobacco Use    Smoking status: Light Tobacco Smoker     Packs/day: 1 00     Years: 35 00     Pack years: 35 00     Types: Cigarettes    Smokeless tobacco: Never Used    Tobacco comment: pt states quit many years ago   Substance and Sexual Activity    Alcohol use: Not Currently     Frequency: Monthly or less     Drinks per session: 1 or 2     Binge frequency: Never     Comment: rare    Drug use: No    Sexual activity: Not Currently     Partners: Female   Lifestyle    Physical activity     Days per week: 0 days     Minutes per session: 0 min    Stress: Not at all   Relationships    Social connections     Talks on phone: Not on file     Gets together: Not on file     Attends Shinto service: Not on file     Active member of club or organization: Not on file     Attends meetings of clubs or organizations: Not on file     Relationship status: Not on file    Intimate partner violence     Fear of current or ex partner: Not on file     Emotionally abused: Not on file     Physically abused: Not on file     Forced sexual activity: Not on file   Other Topics Concern    Not on file   Social History Narrative    Not on file      Family History   Problem Relation Age of Onset    Rheumatic fever Father     Other Father         accidental poisoning   Mary Rhody Heart disease Mother      Past Surgical History:   Procedure Laterality Date    ACHILLES TENDON SURGERY Left 1973    CATARACT EXTRACTION Right 02/18/2021    COLONOSCOPY      hyperplastic polyp    CORONARY ANGIOPLASTY WITH STENT PLACEMENT      3 stents: 2 placed on Jan 2018, 1 on July 2018    Mary Rhody CYST REMOVAL  2019, 2020    left and right wrists    ESOPHAGOGASTRODUODENOSCOPY N/A 1/23/2018    Procedure: ESOPHAGOGASTRODUODENOSCOPY (EGD); Surgeon: Man Duran MD;  Location: [facility]; Service: Gastroenterology    ESOPHAGOGASTRODUODENOSCOPY      HERNIA REPAIR      HERNIA REPAIR Bilateral 8/18/2017    Procedure: LAPAROSCOPIC INGUINAL HERNIA REPAIR WITH MESH;  Surgeon: Quinton Cohen MD;  Location: [facility] OR;  Service: General    OK ESOPHAGOGASTRODUODENOSCOPY TRANSORAL DIAGNOSTIC N/A 3/26/2018    Procedure: ESOPHAGOGASTRODUODENOSCOPY (EGD); Surgeon: Mary Rawls MD;  [facility]; Service: Gastroenterology    OK ESOPHAGOGASTRODUODENOSCOPY TRANSORAL DIAGNOSTIC N/A 5/14/2018    Procedure: ESOPHAGOGASTRODUODENOSCOPY (EGD); Surgeon: Mary Rawls MD;  [facility];   Service: Gastroenterology    OK EXPLORE PARATHYROID GLANDS Right 3/23/2021    Procedure: RIGHT PARATHYROIDECTOMY, MINIMALLY INVASIVE, POSSIBLE 4-GLAND EXPLORATION, WITH INTRA-OPERATIVE PTH MONITORING;  Surgeon: Nadine Gardner MD;  Location: [facility] OR;  Service: Surgical Oncology    TIBIA FRACTURE SURGERY Left 1962       Current Outpatient Medications:     aspirin 81 mg chewable tablet, Chew 1 tablet daily, Disp: , Rfl:     atenolol (TENORMIN) 50 mg tablet, TAKE 1 TABLET DAILY, Disp: 90 tablet, Rfl: 3    atorvastatin (LIPITOR) 80 mg tablet, TAKE 1 TABLET DAILY, Disp: 90 tablet, Rfl: 3    cloNIDine (CATAPRES) 0 1 mg tablet, Take 1 tablet (0 1 mg total) by mouth every 8 (eight) hours, Disp: 90 tablet, Rfl: 0    ferrous sulfate 325 (65 Fe) mg tablet, Take 325 mg by mouth daily with breakfast, Disp: , Rfl:     Ketorolac Tromethamine (TORADOL ORAL PO), Take by mouth every 4 (four) hours as needed, Disp: , Rfl:     lisinopril (ZESTRIL) 40 mg tablet, Take 1 tablet (40 mg total) by mouth daily, Disp: 90 tablet, Rfl: 3    metFORMIN (GLUCOPHAGE-XR) 500 mg 24 hr tablet, TAKE 1 TABLET TWICE A DAY WITH MEALS, Disp: 180 tablet, Rfl: 3    Omega-3 1000 MG CAPS, Take by mouth 3 (three) times a day , Disp: , Rfl:     pantoprazole (PROTONIX) 40 mg tablet, Take 1 tablet (40 mg total) by mouth 2 (two) times a day before meals, Disp: 60 tablet, Rfl: 0    PreviDent 0 2 % SOLN, RINSE WITH 10ML FOR 1 MINUTE THEN SPIT OUT AT BEDTIME DO NOT SWALLOW , Disp: , Rfl:     torsemide (DEMADEX) 10 mg tablet, TAKE 1 TABLET(10 MG) BY MOUTH DAILY, Disp: 90 tablet, Rfl: 3    prednisoLONE acetate (PRED FORTE) 1 % ophthalmic suspension, INSTILL 1 DROP INTO THE RIGHT EYE FOUR TIMES DAILY AFTER SURGERY, Disp: , Rfl:   No Known Allergies      Imaging: Ct Abdomen Pelvis Wo Contrast    Result Date: 4/5/2021  Narrative: CT ABDOMEN AND PELVIS WITHOUT IV CONTRAST INDICATION:   ARF, hydronephrosis  COMPARISON:  Ultrasound from 4/4/2021 TECHNIQUE:  CT examination of the abdomen and pelvis was performed without intravenous contrast   Axial, sagittal, and coronal 2D reformatted images were created from the source data and submitted for interpretation   Radiation dose length product (DLP) for this visit:  637 mGy-cm   This examination, like all CT scans performed in the [facility], was performed utilizing techniques to minimize radiation dose exposure, including the use of iterative reconstruction and automated exposure control  Enteric contrast was administered  FINDINGS: ABDOMEN LOWER CHEST: Small infiltrate versus atelectasis is seen at the left lung base with small effusion  LIVER/BILIARY TREE:  Unremarkable  GALLBLADDER:  No calcified gallstones  No pericholecystic inflammatory change  SPLEEN:  Unremarkable  PANCREAS:  Unremarkable  ADRENAL GLANDS:  Unremarkable  KIDNEYS/URETERS:  High density debris is noted within the right renal pelvis with hydronephrosis correlating to some areas of increased echogenicity on ultrasound  There is proximal ureteral dilatation with more distal ureter normal in caliber  The left kidney also demonstrates moderate hydronephrosis but without high density debris  There is moderate to severe hydronephrosis noted on the left     There is thickening of the renal pelvis with dilatation of the proximal ureter gradual tapering to  normal caliber in the distal left ureter without obvious focal change in caliber  STOMACH AND BOWEL:  Left colon diverticulosis is noted without CT evidence of diverticulitis  APPENDIX:  No findings to suggest appendicitis  ABDOMINOPELVIC CAVITY:  No ascites  No pneumoperitoneum  No lymphadenopathy  There is left greater than right perinephric stranding identified  There appears to be 8 perinephric collection medially adjacent to the left renal hilum which measures approximately 1 6 x 1 7 x 7 7 cm  Abutting this perinephric collection is a 2nd collection which most likely communicates the 1st and is centered in Gerota's fascia extending into the anterior pararenal space which measures approximately 2 7 x 5 8 x 8 1 cm  No gas bubbles are noted within the this collection  VESSELS:  Atherosclerotic changes are present    Mild aneurysmal dilatation measures 3 1 cm distally in the aorta  PELVIS REPRODUCTIVE ORGANS:  Unremarkable for patient's age  URINARY BLADDER:  Butler catheter is noted within decompressed bladder  ABDOMINAL WALL/INGUINAL REGIONS:  Unremarkable  OSSEOUS STRUCTURES:  No acute fracture or destructive osseous lesion  Impression: Left greater than right moderate to severe hydronephrosis  Left greater than right perinephric stranding may be related to obstruction or infection  Hemorrhage or complicated pyelonephritis with debris could be the cause of obstruction in the collecting system although occult stricture of the ureters could also be present  Collection in the left perinephric space and extending into the pararenal space could be related to urinoma or perinephric abscess  This should be correlated clinically  No gas bubbles are seen however  Neoplasm is not excluded This was discussed with Moshe Beckett in the ICU at 11:50 AM Workstation performed: [id number]     Xr Chest 1 View Portable    Result Date: 4/5/2021  Narrative: CHEST INDICATION:   SOB  COMPARISON:  3/4/2020 EXAM PERFORMED/VIEWS:  XR CHEST PORTABLE FINDINGS: Cardiomediastinal silhouette appears unremarkable  The lungs are clear  No pneumothorax or pleural effusion  Osseous structures appear within normal limits for patient age  Impression: No acute cardiopulmonary disease  Workstation performed: [id number]     Ct Head Without Contrast    Result Date: 4/4/2021  Narrative: CT BRAIN - WITHOUT CONTRAST INDICATION:   Altered mental status AMS  COMPARISON:  None  TECHNIQUE:  CT examination of the brain was performed  In addition to axial images, sagittal and coronal 2D reformatted images were created and submitted for interpretation  Radiation dose length product (DLP) for this visit:  858 mGy-cm     This examination, like all CT scans performed in the [facility], was performed utilizing techniques to minimize radiation dose exposure, including the use of iterative reconstruction and automated exposure control  IMAGE QUALITY:  Diagnostic  FINDINGS: PARENCHYMA:  No intracranial mass, mass effect or midline shift  No CT signs of acute infarction  No acute parenchymal hemorrhage  Periventricular and centrum semiovale white matter hypodensity is a nonspecific finding likely representing chronic microangiopathy  Calcification bilateral cavernous internal carotid arteries  VENTRICLES AND EXTRA-AXIAL SPACES:  No acute hydrocephalus  Mild prominence of CSF spaces diffusely attributed to generalized volume loss  VISUALIZED ORBITS AND PARANASAL SINUSES:  Changes of right-sided lens replacement noted  Old medially displaced fracture medial wall right orbit with minimal intraorbital fat protruding through the defect  Trace mucosal thickening left posterior ethmoid sinus  CALVARIUM AND EXTRACRANIAL SOFT TISSUES:  Normal      Impression: No evidence of acute intracranial process  Chronic microangiopathy  Workstation performed: [id number]     Us Kidney And Bladder    Result Date: 4/4/2021  Narrative: RENAL ULTRASOUND INDICATION:   Renal failure  COMPARISON: None TECHNIQUE:   Ultrasound of the retroperitoneum was performed with a curvilinear transducer utilizing volumetric sweeps and still imaging techniques  FINDINGS: KIDNEYS: Symmetric and normal size  Right kidney:  15 3 cm  Left kidney:  14 6 cm  Right kidney The renal parenchyma is diffusely echogenic consistent with medical renal disease  No suspicious masses detected  Moderate hydronephrosis with echogenic material noted in the renal pelvis  No shadowing calculi  No perinephric fluid collections  Left kidney The renal parenchyma is diffusely echogenic consistent with medical renal disease  No suspicious masses detected  Moderate hydronephrosis  No shadowing calculi   There is increased fluid in the retroperitoneum surrounding the left proximal ureter, best appreciated on the transverse left kidney cine URETERS: Dilation of the right distal ureter is noted, possible stone noted distally BLADDER: A saeed catheter is in place, decompressing the bladder and limiting its evaluation  No focal thickening or mass lesions  Bilateral ureteral jets detected  Impression: 1  Moderate moderate left hydronephrosis  There is increased fluid in the retroperitoneum surrounding the left proximal ureter  This may represent rupture of the collecting system  Recommend CT for further characterization  2   Moderate right hydronephrosis, findings concerning for distal ureteral stone  Echogenic material within the renal pelvis is concerning for hemorrhage 3  Echogenic kidneys consistent with medical renal disease The study was marked in EPIC for immediate notification  Workstation performed: [id number]       Review of Systems:  Review of Systems   Constitutional: Negative  Respiratory: Negative  Cardiovascular: Negative  Neurological: Negative  Hematological: Negative  Psychiatric/Behavioral: Negative  All other systems reviewed and are negative  /76 (BP Location: Left arm, Patient Position: Sitting, Cuff Size: Standard)   Pulse 72   Ht 5' 7" (1 702 m)   Wt 83 5 kg (184 lb)   SpO2 99%   BMI 28 82 kg/m²     Physical Exam:  Physical Exam  Vitals signs and nursing note reviewed  Exam conducted with a chaperone present  Constitutional:       Appearance: Normal appearance  HENT:      Head: Normocephalic and atraumatic  Neck:      Musculoskeletal: Normal range of motion and neck supple  Cardiovascular:      Rate and Rhythm: Normal rate and regular rhythm  Pulses: Normal pulses  Heart sounds: Murmur present  Pulmonary:      Effort: Pulmonary effort is normal       Breath sounds: Normal breath sounds  Musculoskeletal: Normal range of motion  General: No swelling  Skin:     General: Skin is warm and dry  Neurological:      General: No focal deficit present  Mental Status: He is alert and oriented to person, place, and time  Psychiatric:         Mood and Affect: Mood normal          Behavior: Behavior normal          Thought Content:  Thought content normal          Judgment: Judgment normal

## 2021-04-22 NOTE — TELEPHONE ENCOUNTER
Arnold Landeros called back and was inquiring as to whether Dr Adam Johnson had addressed her questions regarding patient's saeed catheter    Please return her call at [phone]

## 2021-04-23 ENCOUNTER — TELEPHONE (OUTPATIENT)
Dept: INTERNAL MEDICINE CLINIC | Facility: CLINIC | Age: 71
End: 2021-04-23

## 2021-04-23 NOTE — TELEPHONE ENCOUNTER
Patient has a Saeed catheter that was supposed to remain  In for 2 weeks after discharge from the hospital  Order was written by a urologist in the hospital  Tuesday 4/27 would end the 2 wk period  There are no discharge orders for patient to follow up with a urologist   Nurse is asking Dr Gabbi Jin if he can discharge the order for the saeed and put in a referral for a urology follow up if necessary      SHADY Robison/Visiting Nurse with [place] [facility] [phone]

## 2021-04-23 NOTE — TELEPHONE ENCOUNTER
Called ernesto and informed her that as per dr Johanna Dorantes this is a urology related problem  Patrick Burk stated that saeed was placed at hospital I advised ernesto to call pcp after reviewing pt chart and not being able to find current urology provider   Patrick Burk stated she will

## 2021-04-26 NOTE — TELEPHONE ENCOUNTER
Fer Garzon from [place] calling back in regards to patients saeed  It is suppose to be removed on 4/27  He was not discharged with orders  See previous task from Abby Keys

## 2021-04-26 NOTE — TELEPHONE ENCOUNTER
Patient's wife called regarding this ongoing issue  I relayed the last message regarding contacting patient's PCP and possibly a urologist   She stated that the visiting nurse has put calls into PCP office, but no one has responded  Encouraged her to try again and if she is still having problems, give us a call back

## 2021-04-27 ENCOUNTER — TELEPHONE (OUTPATIENT)
Dept: UROLOGY | Facility: AMBULATORY SURGERY CENTER | Age: 71
End: 2021-04-27

## 2021-04-27 NOTE — TELEPHONE ENCOUNTER
Please Triage - [facility] Patient-     What is the reason for the patients appointment? Saeed care/removal - pt wife called in requesting saeed care/removal         Imaging/Lab Results:      Do we accept the patient's insurance or is the patient Self-Pay?   Provider & Plan: MEDICARE A AND B  Member ID#: [id number]     Has the patient had any previous urologist(s)? no       Have patient records been requested? epic       Has the patient had any outside testing done? epic      Does the patient have a personal history of cancer? no      Patient can be reached at :[phone]

## 2021-05-03 ENCOUNTER — OFFICE VISIT (OUTPATIENT)
Dept: UROLOGY | Facility: CLINIC | Age: 71
End: 2021-05-03
Payer: MEDICARE

## 2021-05-03 VITALS
RESPIRATION RATE: 16 BRPM | BODY MASS INDEX: 29.82 KG/M2 | HEIGHT: 67 IN | HEART RATE: 95 BPM | SYSTOLIC BLOOD PRESSURE: 110 MMHG | DIASTOLIC BLOOD PRESSURE: 62 MMHG | WEIGHT: 190 LBS

## 2021-05-03 DIAGNOSIS — R34 DECREASED URINE OUTPUT: Primary | ICD-10-CM

## 2021-05-03 DIAGNOSIS — R33.9 URINARY RETENTION: ICD-10-CM

## 2021-05-03 DIAGNOSIS — R31.0 GROSS HEMATURIA: ICD-10-CM

## 2021-05-03 LAB
POST-VOID RESIDUAL VOLUME, ML POC: 495 ML
SL AMB  POCT GLUCOSE, UA: NORMAL
SL AMB LEUKOCYTE ESTERASE,UA: NORMAL
SL AMB POCT BILIRUBIN,UA: NORMAL
SL AMB POCT BLOOD,UA: NORMAL
SL AMB POCT CLARITY,UA: CLEAR
SL AMB POCT COLOR,UA: YELLOW
SL AMB POCT KETONES,UA: NORMAL
SL AMB POCT NITRITE,UA: NORMAL
SL AMB POCT PH,UA: 6
SL AMB POCT SPECIFIC GRAVITY,UA: 1.01
SL AMB POCT URINE PROTEIN: NORMAL
SL AMB POCT UROBILINOGEN: 0.2

## 2021-05-03 PROCEDURE — 81002 URINALYSIS NONAUTO W/O SCOPE: CPT | Performed by: PHYSICIAN ASSISTANT

## 2021-05-03 PROCEDURE — 99213 OFFICE O/P EST LOW 20 MIN: CPT | Performed by: PHYSICIAN ASSISTANT

## 2021-05-03 PROCEDURE — 88112 CYTOPATH CELL ENHANCE TECH: CPT | Performed by: PATHOLOGY

## 2021-05-03 PROCEDURE — 51798 US URINE CAPACITY MEASURE: CPT | Performed by: PHYSICIAN ASSISTANT

## 2021-05-03 PROCEDURE — 51702 INSERT TEMP BLADDER CATH: CPT

## 2021-05-03 RX ORDER — TAMSULOSIN HYDROCHLORIDE 0.4 MG/1
0.4 CAPSULE ORAL
Qty: 30 CAPSULE | Refills: 2 | Status: SHIPPED | OUTPATIENT
Start: 2021-05-03 | End: 2021-05-05

## 2021-05-03 RX ORDER — TAMSULOSIN HYDROCHLORIDE 0.4 MG/1
0.4 CAPSULE ORAL
Qty: 30 CAPSULE | Refills: 2 | Status: SHIPPED | OUTPATIENT
Start: 2021-05-03 | End: 2021-05-03

## 2021-05-03 NOTE — ASSESSMENT & PLAN NOTE
-  Patient recently hospitalized for severe CIERA  Imaging at that time showed bilateral hydronephrosis with distended urinary bladder     -  Urethral Butler catheter was inserted for 2 weeks  Recently removed last week by visiting nurse  -  Patient continues to report difficulty with urination  PVR performed today shows for  495cc  in urinary bladder,Nurse inserted urethral Butler catheter by my order  Patient was retaining approximately 1700 cc in his urinary bladder  Patient will not require Butler catheter for 2 weeks   Given this large volume  -  Flomax was  Prescribed  Discussed with patient to take this at bedtime  Discussed the side effect profile  Discussed with patient that should he feel lightheaded or dizziness with this medication that he should discontinue it  -  Patient scheduled for cystoscopy for gross hematuria will incorporate trans rectal ultrasound for evaluation of enlarged prostate

## 2021-05-03 NOTE — PROGRESS NOTES
Universal Protocol:  Consent: Verbal consent obtained  Bladder catheterization    Date/Time: 5/3/2021 9:00 AM  Performed by: Berny Bergman RN  Authorized by: Berny Bergman RN     Patient location:  Bedside  Pre-procedure details:     Procedure purpose:  Therapeutic    Preparation: Patient was prepped and draped in usual sterile fashion    Anesthesia (see MAR for exact dosages): Anesthesia method:  None  Procedure details:     Bladder irrigation: no      Catheter insertion:  Indwelling    Approach: natural orifice      Catheter type:  Coude, Butler and latex    Catheter size:  16 Fr    Number of attempts:  1    Successful placement: yes      Urine characteristics:  Dark and yellow  Post-procedure details:     Patient tolerance of procedure: Tolerated well, no immediate complications  Comments:      Bladder drained for 1700 mL  Attached to leg bag and secured with stat lock  Patient denies need for additional supplies at this time

## 2021-05-03 NOTE — CONSULTS
UROLOGY CONSULTATION NOTE      Patient Identifiers: Олег Edwards (MRN [id number])  Service Requesting Consultation:  Hospital medicine  Service Providing Consultation:  Urology, Rachel Douglass MD     Date of Service: 4/5/2021  Inpatient consult to Urology  Consult performed by: Rachel Douglass MD  Consult ordered by: SIGIFREDO Huffman           Reason for Consultation:  Acute renal failure and bilateral hydro ureteral nephrosis     History of Present Illness:      Олег Edwards is a 65 y o  old with a history of parathyroidectomy March 23, 2021, coronary artery disease, hypertension, hyperlipidemia, diabetes type 2 who presented to the emergency room with generalized weakness for approximately 2 weeks and decreased urine output  On admission he was noted to have a lactic acidosis with a serum lactate of 2 8, hyperkalemia potassium of 6 5, and serum creatinine of 13 2  He underwent a renal bladder ultrasound that showed moderate bilateral hydroureteronephrosis and possible distal right ureteral stone  He underwent a CT scan after placement of Butler catheter that showed resolution of his hydronephrosis and no evidence of a ureteral stone       He admits to having dark colored urine for approximately 2 weeks   He also noted urinary frequency and urgency and nocturia 2-3 times per night  He denies any prior urological history  He has never seen a urologist for any reason  He denies a personal history of prostate cancer, kidney stones or UTI    He denies any family history of  malignancies or stone      Past Medical, Past Surgical History:      Medical History[]Expand by Default        Past Medical History:   Diagnosis Date    Benign neoplasm of large intestine      Bleeding gastric ulcer 2018    Colon polyp      Diabetes mellitus ([address] )      Heart murmur      History of aortic regurgitation      History of constipation      History of degenerative joint disease      History of nocturia      History of obesity      History of sebaceous cyst      History of shortness of breath      History of transfusion      History of urinary frequency      Hyperlipidemia      Hypertension      [facility]ocardial infarction [facility]) 2018 january, 3 stents    Polyposis coli       of the large intestine    Ulcer of esophagus        :     Surgical History[]Expand by Default         Past Surgical History:   Procedure Laterality Date    ACHILLES TENDON SURGERY Left 1973    CATARACT EXTRACTION Right 02/18/2021    COLONOSCOPY         hyperplastic polyp    CORONARY ANGIOPLASTY WITH STENT PLACEMENT         3 stents: 2 placed on Jan 2018, 1 on July 2018    [facility]   2019, 2020     left and right wrists    ESOPHAGOGASTRODUODENOSCOPY N/A 1/23/2018     Procedure: ESOPHAGOGASTRODUODENOSCOPY (EGD); Surgeon: Remy Butts MD;  Location: [facility]; Service: Gastroenterology    ESOPHAGOGASTRODUODENOSCOPY        HERNIA REPAIR        HERNIA REPAIR Bilateral 8/18/2017     Procedure: LAPAROSCOPIC INGUINAL HERNIA REPAIR WITH MESH;  Surgeon: Love Sullivan MD;  Location: [facility];  Service: General    ND ESOPHAGOGASTRODUODENOSCOPY TRANSORAL DIAGNOSTIC N/A 3/26/2018     Procedure: ESOPHAGOGASTRODUODENOSCOPY (EGD); Surgeon: Gayle Montes De Oca MD;  Location: [facility]; Service: Gastroenterology    ND ESOPHAGOGASTRODUODENOSCOPY TRANSORAL DIAGNOSTIC N/A 5/14/2018     Procedure: ESOPHAGOGASTRODUODENOSCOPY (EGD); Surgeon: Gayle Montes De Oca MD;  Location: [facility];   Service: Gastroenterology    ND EXPLORE PARATHYROID GLANDS Right 3/23/2021     Procedure: RIGHT PARATHYROIDECTOMY, MINIMALLY INVASIVE, POSSIBLE 4-GLAND EXPLORATION, WITH INTRA-OPERATIVE PTH MONITORING;  Surgeon: Junior Sainz MD;  Location:  MAIN OR;  Service: Surgical Oncology    TIBIA FRACTURE SURGERY Left 1962      :     Medications, Allergies:      Current Medications[]Expand by Default   Current Facility-Administered Medications Medication Dose Route Frequency    atorvastatin (LIPITOR) tablet 80 mg  80 mg Oral Daily    ceftriaxone (ROCEPHIN) 1 g/50 mL in dextrose IVPB  1,000 mg Intravenous Q24H    chlorhexidine (PERIDEX) 0 12 % oral rinse 15 mL  15 mL Swish & Spit Q12H [facility]    insulin lispro (HumaLOG) 100 units/mL subcutaneous injection 1-6 Units  1-6 Units Subcutaneous Q6H [facility]    sodium chloride infusion 0 45 %  75 mL/hr Intravenous Continuous           Allergies:  No Known Allergies:     Social and Family History:   Social History:   Social History            Tobacco Use    Smoking status: Former Smoker       Packs/day: 1 00       Years: 35 00       Pack years: 35 00       Types: Cigarettes       Quit date: 2012       Years since quittin 0    Smokeless tobacco: Never Used    Tobacco comment: pt states quit many years ago   Substance Use Topics    Alcohol use: Yes       Frequency: Monthly or less       Drinks per session: 1 or 2       Binge frequency: Never       Comment: rare    Drug use: No        Social History           Tobacco Use   Smoking Status Former Smoker    Packs/day: 1 00    Years: 35 00    Pack years: 35 00    Types: Cigarettes    Quit date: 2012    Years since quittin 0   Smokeless Tobacco Never Used   Tobacco Comment     pt states quit many years ago         Family History:        Family History   Problem Relation Age of Onset    Rheumatic fever Father      Other Father           accidental poisoning   Loretta Clunes Heart disease Mother     :      Review of Systems:      General: Fever, chills, or night sweats: negative  Cardiac: Negative for chest pain     Pulmonary: Negative for shortness of breath  Gastrointestinal: Abdominal pain negative  Nausea, vomiting, or diarrhea negative,  Genitourinary: See HPI above   Patient does have hematuria    All other systems were queried and were negative except as listed above in HPI and here in the ROS      Physical Exam:   /71 (BP Location: Left arm) Pulse (!) 108   Temp 98 5 °F (36 9 °C) (Axillary)   Resp 18   Ht 5' 7" (1 702 m)   Wt 94 1 kg (207 lb 7 3 oz)   SpO2 96%   BMI 32 49 kg/m² Temp (24hrs), Av 5 °F (36 9 °C), Min:98 °F (36 7 °C), Max:98 7 °F (37 1 °C)  current; Temperature: 98 5 °F (36 9 °C)  I/O last 24 hours: In:  [P O :150;  I V :4385; IV Piggyback:2700]  Out: 1141 [Urine:9150]  General: Patient is pleasant and in NAD  Awake and alert     Cardiac: Peripheral edema: negative     Pulmonary: Non-labored breathing, speaking in full sentences, no wheezing, no coughing     Abdomen: Soft, non-tender, non-distended   No surgical scars   No masses, tenderness, hernias noted        Genitourinary: Negative CVA tenderness, negative suprapubic tenderness      (Male): Penis circumcised, phallus normal, meatus patent   Testicles descended into scrotum bilaterally without masses nor tenderness   No inguinal hernias bilaterally        Neurological: Cranial nerves II-XII are intact, no sensory deficits, no neurological deficits     Musculoskeletal: Extremities are within normal limits, ROM is within normal limits     Lymphatic: There is not adenopathy in the inguinal region      Skin:  Warm dry     STEPHEN: draining pink clear urine  no clots He is not on CBI      Labs:            Lab Results   Component Value Date     HGB 9 0 (L) 2021     HCT 25 7 (L) 2021     WBC 13 94 (H) 2021      (H) 2021   ]           Lab Results   Component Value Date      2015     K 3 7 2021      2021     CO2 19 (L) 2021      (H) 2021     CREATININE 2 82 (H) 2021     CALCIUM 8 7 2021     GLUCOSE 98 2015   ]     Imaging:   I personally reviewed the images and report of the following studies, and reviewed them with the patient:     CT abdomen pelvis wo contrast  Narrative: CT ABDOMEN AND PELVIS WITHOUT IV CONTRAST     INDICATION:   ARF, hydronephrosis      COMPARISON:  Ultrasound from 4/4/2021     TECHNIQUE:  CT examination of the abdomen and pelvis was performed without intravenous contrast   Axial, sagittal, and coronal 2D reformatted images were created from the source data and submitted for interpretation       Radiation dose length product (DLP) for this visit:  637 mGy-cm   This examination, like all CT scans performed in the [facility], was performed utilizing techniques to minimize radiation dose exposure, including the use of iterative   reconstruction and automated exposure control       Enteric contrast was administered       FINDINGS:     ABDOMEN     LOWER CHEST: Small infiltrate versus atelectasis is seen at the left lung base with small effusion  LIVER/BILIARY TREE:  Unremarkable      GALLBLADDER:  No calcified gallstones  No pericholecystic inflammatory change      SPLEEN:  Unremarkable      PANCREAS:  Unremarkable      ADRENAL GLANDS:  Unremarkable      KIDNEYS/URETERS:  High density debris is noted within the right renal pelvis with hydronephrosis correlating to some areas of increased echogenicity on ultrasound  There is proximal ureteral dilatation with more distal ureter normal in caliber      The left kidney also demonstrates moderate hydronephrosis but without high density debris  There is moderate to severe hydronephrosis noted on the left     There is thickening of the renal pelvis with dilatation of the proximal ureter gradual tapering to   normal caliber in the distal left ureter without obvious focal change in caliber      STOMACH AND BOWEL:  Left colon diverticulosis is noted without CT evidence of diverticulitis      APPENDIX:  No findings to suggest appendicitis      ABDOMINOPELVIC CAVITY:  No ascites  No pneumoperitoneum    No lymphadenopathy      There is left greater than right perinephric stranding identified      There appears to be 8 perinephric collection medially adjacent to the left renal hilum which measures approximately 1 6 x 1 7 x 7 7 cm      Abutting this perinephric collection is a 2nd collection which most likely communicates the 1st and is centered in Gerota's fascia extending into the anterior pararenal space which measures approximately 2 7 x 5 8 x 8 1 cm  No gas bubbles are noted   within the this collection      VESSELS:  Atherosclerotic changes are present  Mild aneurysmal dilatation measures 3 1 cm distally in the aorta      PELVIS     REPRODUCTIVE ORGANS:  Unremarkable for patient's age      URINARY BLADDER:  Butler catheter is noted within decompressed bladder      ABDOMINAL WALL/INGUINAL REGIONS:  Unremarkable      OSSEOUS STRUCTURES:  No acute fracture or destructive osseous lesion  Impression: Left greater than right moderate to severe hydronephrosis  Left greater than right perinephric stranding may be related to obstruction or infection  Hemorrhage or complicated pyelonephritis with debris could be the cause of obstruction in the   collecting system although occult stricture of the ureters could also be present      Collection in the left perinephric space and extending into the pararenal space could be related to urinoma or perinephric abscess  This should be correlated clinically  No gas bubbles are seen however      Neoplasm is not excluded     This was discussed with LUISANA Velasco in the ICU at 11:50 AM     Workstation performed: [id number]  XR chest 1 view portable  Narrative: CHEST      INDICATION:   SOB      COMPARISON:  3/4/2020     EXAM PERFORMED/VIEWS:  XR CHEST PORTABLE     FINDINGS:     Cardiomediastinal silhouette appears unremarkable      The lungs are clear  No pneumothorax or pleural effusion      Osseous structures appear within normal limits for patient age    Impression: No acute cardiopulmonary disease      Workstation performed: [id number]              ASSESSMENT:      65 y o  old male with    1  Acute urinary retention likely due to bladder outlet obstruction from BPH and possibly exacerbated by acute urinary tract infection     2  Acute kidney injury improved after bladder drainage     3  Bilateral hydroureteronephrosis that has resolved after placement of Butler catheter     4  Gross hematuria        PLAN:      - I recommend bladder rest with an indwelling Butler catheter for at least 2 weeks to allow for resolution of detrusor myogenic failure  We will begin to treat his bladder outlet obstruction with tamsulosin daily      - Send urine for urine culture and treat with culture specific antibiotics pending results      - Continue to trend renal function     - After management of his acute infection and bladder outlet obstruction, will need to reassess for gross hematuria         Thank you for allowing me to participate in this patients care    Please do not hesitate to call with any additional questions   Fiona Castro MD

## 2021-05-03 NOTE — ASSESSMENT & PLAN NOTE
-  Gross hematuria seen while hospitalized  Patient was on aspirin and Plavix at that time     -  CT scan was negative for any contributing abnormalities  Will proceed with gross hematuria workup including urine studies and cystoscopy  UA continues to be positive for large blood  Will send out urine for urine cytology  -  Patient will have cystoscopy within the next month for gross hematuria workup as well as for BPH workup  Discussed  Transrectal ultrasound  For approximate prostate size  For possible need of surgical intervention in the near future       -Patient is agreeable to plan

## 2021-05-03 NOTE — PROGRESS NOTES
Assessment/Plan:  Urinary retention  -  Patient recently hospitalized for severe CIERA  Imaging at that time showed bilateral hydronephrosis with distended urinary bladder     -  Urethral Butler catheter was inserted for 2 weeks  Recently removed last week by visiting nurse  -  Patient continues to report difficulty with urination  PVR performed today shows for  495cc  in urinary bladder,Nurse inserted urethral Butler catheter by my order  Patient was retaining approximately 1700 cc in his urinary bladder  Patient will not require Butler catheter for 2 weeks   Given this large volume  -  Flomax was  Prescribed  Discussed with patient to take this at bedtime  Discussed the side effect profile  Discussed with patient that should he feel lightheaded or dizziness with this medication that he should discontinue it  -  Patient scheduled for cystoscopy for gross hematuria will incorporate trans rectal ultrasound for evaluation of enlarged prostate  Hematuria  -  Gross hematuria seen while hospitalized  Patient was on aspirin and Plavix at that time     -  CT scan was negative for any contributing abnormalities  Will proceed with gross hematuria workup including urine studies and cystoscopy  UA continues to be positive for large blood  Will send out urine for urine cytology  -  Patient will have cystoscopy within the next month for gross hematuria workup as well as for BPH workup  Discussed  Transrectal ultrasound  For approximate prostate size  For possible need of surgical intervention in the near future       -Patient is agreeable to plan  Diagnoses and all orders for this visit:    Decreased urine output  -     POCT urine dip  -     Cytology, urine  -     POCT Measure PVR  -     Bladder catheterization    Urinary retention  -     Discontinue: tamsulosin (FLOMAX) 0 4 mg;  Take 1 capsule (0 4 mg total) by mouth daily with dinner  -     Bladder catheterization    Gross hematuria Subjective:      Patient ID: Susana Odell is a 65 y o  male  HPI  Patient is a 75-year-old male  Who presents for hospital follow-up  Patient was admitted to [facility] for generalized weakness for 2 weeks and decreased urinary output  Patient was discovered to be in lactic acidosis, hyperkalemia and severe CIERA of 13  Renal ultrasound showed bilateral hydronephrosis and bladder overdistention a possible distal right ureteral calculus  A Butler catheter was inserted for decompression injury imaging was performed showing resolution of hydro and no evidence of ureteral stone  Patient  Needed a Butler catheter for 2 weeks  Now presents for follow-up and possible Butler catheter removal   Patient also experienced gross hematuria while hospitalized can be from decompression of bladder  Patient is also on aspirin and Plavix  Patient also presenting for completion of hematuria workup  Patient reports that  He had his Butler catheter removed  Last Wednesday by a visiting nurse  Patient was able to urinate initially but is currently now incontinent  He has been using pads at this time  He denies any additional urinary complaints  Denies seeing blood in his urine and reports that this had cleared up on its own  Patient denies being sent home with Flomax  The following portions of the patient's history were reviewed and updated as appropriate:   He  has a past medical history of Benign neoplasm of large intestine, Bleeding gastric ulcer (2018), Colon polyp, Diabetes mellitus ([address] ), Heart murmur, History of aortic regurgitation, History of constipation, History of degenerative joint disease, History of nocturia, History of obesity, History of sebaceous cyst, History of shortness of breath, History of transfusion, History of urinary frequency, Hyperlipidemia, Hypertension, Myocardial infarction ([address] ) (2018), Polyposis coli, and Ulcer of esophagus    He   Patient Active Problem List Diagnosis Date Noted    Antritis and gastric ulcer 04/13/2021    Urinary retention 04/13/2021    Status post parathyroidectomy ([place] [address] ) 04/12/2021    Anemia 04/09/2021    Dehydration with hyponatremia 04/05/2021    Hematuria 04/05/2021    CIERA (acute kidney injury) ([place] [address] ) 04/04/2021    Hyperkalemia 87/93/4981    Metabolic acidosis 75/01/0063    UTI (urinary tract infection) 04/04/2021    History of aortic regurgitation     History of obesity     Hyperparathyroidism ([place] [address] ) 03/03/2021    Hypercalcemia 12/31/2020    Thrombocytosis ([place] [address] ) 04/13/2020    Anemia associated with diabetes mellitus ([place] [address] ) 03/09/2020    Atherosclerosis of aorta ([place] [address] ) 06/18/2019    Atherosclerosis of native coronary artery of native heart without angina pectoris 06/18/2019    HOCM (hypertrophic obstructive cardiomyopathy) ([place] [address] ) 07/16/2018    Coronary artery disease involving native coronary artery of native heart 07/16/2018    Iron deficiency anemia due to chronic blood loss 04/20/2018    Tobacco abuse 02/12/2018    Type 2 diabetes mellitus ([place] [address] ) 02/08/2018    Hyperlipidemia 01/21/2018    HTN (hypertension) 01/21/2018    Non-recurrent bilateral inguinal hernia without obstruction or gangrene 08/18/2017     He  has a past surgical history that includes Hernia repair; Tibia fracture surgery (Left, 1962); Achilles tendon surgery (Left, 1973); Hernia repair (Bilateral, 8/18/2017); Esophagogastroduodenoscopy (N/A, 1/23/2018); Esophagogastroduodenoscopy; pr esophagogastroduodenoscopy transoral diagnostic (N/A, 3/26/2018); pr esophagogastroduodenoscopy transoral diagnostic (N/A, 5/14/2018); Cyst Removal (2019, 2020); Coronary angioplasty with stent; Colonoscopy; Cataract extraction (Right, 02/18/2021); and pr explore parathyroid glands (Right, 3/23/2021)  His family history includes Heart disease in his mother; Other in his father; Rheumatic fever in his father  He  reports that he has been smoking cigarettes   He has a 35 00 pack-year smoking history  He has never used smokeless tobacco  He reports previous alcohol use  He reports that he does not use drugs  Current Outpatient Medications   Medication Sig Dispense Refill    aspirin 81 mg chewable tablet Chew 1 tablet daily      atenolol (TENORMIN) 50 mg tablet TAKE 1 TABLET DAILY 90 tablet 3    atorvastatin (LIPITOR) 80 mg tablet TAKE 1 TABLET DAILY 90 tablet 3    cloNIDine (CATAPRES) 0 1 mg tablet Take 1 tablet (0 1 mg total) by mouth every 8 (eight) hours 90 tablet 0    ferrous sulfate 325 (65 Fe) mg tablet Take 325 mg by mouth daily with breakfast      lisinopril (ZESTRIL) 40 mg tablet Take 1 tablet (40 mg total) by mouth daily 90 tablet 3    metFORMIN (GLUCOPHAGE-XR) 500 mg 24 hr tablet TAKE 1 TABLET TWICE A DAY WITH MEALS 180 tablet 3    Omega-3 1000 MG CAPS Take by mouth 3 (three) times a day       pantoprazole (PROTONIX) 40 mg tablet Take 1 tablet (40 mg total) by mouth 2 (two) times a day before meals 60 tablet 0    PreviDent 0 2 % SOLN RINSE WITH 10ML FOR 1 MINUTE THEN SPIT OUT AT BEDTIME DO NOT SWALLOW   torsemide (DEMADEX) 10 mg tablet TAKE 1 TABLET(10 MG) BY MOUTH DAILY 90 tablet 3    Ketorolac Tromethamine (TORADOL ORAL PO) Take by mouth every 4 (four) hours as needed      prednisoLONE acetate (PRED FORTE) 1 % ophthalmic suspension INSTILL 1 DROP INTO THE RIGHT EYE FOUR TIMES DAILY AFTER SURGERY      tamsulosin (FLOMAX) 0 4 mg Take 1 capsule (0 4 mg total) by mouth daily with dinner 30 capsule 2     No current facility-administered medications for this visit  He has No Known Allergies       Review of Systems   Constitutional: Negative  Negative for chills and fever  HENT: Negative  Eyes: Negative  Respiratory: Negative  Cardiovascular: Negative  Gastrointestinal: Negative  Negative for abdominal distention, abdominal pain, nausea and vomiting  Endocrine: Negative  Genitourinary: Positive for difficulty urinating   Negative for dysuria, flank pain, frequency, hematuria and urgency  Musculoskeletal: Negative  Skin: Negative  Allergic/Immunologic: Negative  Neurological: Negative  Hematological: Negative  Psychiatric/Behavioral: Negative  Objective:      /62 (BP Location: Left arm, Patient Position: Sitting, Cuff Size: Standard)   Pulse 95   Resp 16   Ht 5' 7" (1 702 m)   Wt 86 2 kg (190 lb)   BMI 29 76 kg/m²          Physical Exam  Constitutional:       General: He is not in acute distress  Appearance: Normal appearance  He is normal weight  He is not ill-appearing or toxic-appearing  HENT:      Head: Normocephalic and atraumatic  Right Ear: External ear normal       Left Ear: External ear normal       Nose: Nose normal    Eyes:      General: No scleral icterus  Conjunctiva/sclera: Conjunctivae normal    Neck:      Musculoskeletal: Normal range of motion  Pulmonary:      Effort: Pulmonary effort is normal  No respiratory distress  Musculoskeletal: Normal range of motion  Skin:     General: Skin is warm and dry  Neurological:      General: No focal deficit present  Mental Status: He is alert and oriented to person, place, and time  Psychiatric:         Mood and Affect: Mood normal          Behavior: Behavior normal          Thought Content:  Thought content normal          Judgment: Judgment normal            Jon Gutierrez PA-C

## 2021-05-05 DIAGNOSIS — R33.9 URINARY RETENTION: ICD-10-CM

## 2021-05-05 RX ORDER — TAMSULOSIN HYDROCHLORIDE 0.4 MG/1
0.4 CAPSULE ORAL
Qty: 90 CAPSULE | Refills: 0 | Status: SHIPPED | OUTPATIENT
Start: 2021-05-05 | End: 2021-06-01

## 2021-05-17 ENCOUNTER — PROCEDURE VISIT (OUTPATIENT)
Dept: UROLOGY | Facility: CLINIC | Age: 71
End: 2021-05-17
Payer: MEDICARE

## 2021-05-17 VITALS
DIASTOLIC BLOOD PRESSURE: 88 MMHG | HEIGHT: 67 IN | SYSTOLIC BLOOD PRESSURE: 132 MMHG | BODY MASS INDEX: 29.35 KG/M2 | WEIGHT: 187 LBS

## 2021-05-17 DIAGNOSIS — R33.9 URINARY RETENTION: Primary | ICD-10-CM

## 2021-05-17 LAB — POST-VOID RESIDUAL VOLUME, ML POC: 748 ML

## 2021-05-17 PROCEDURE — 51702 INSERT TEMP BLADDER CATH: CPT

## 2021-05-17 PROCEDURE — 51798 US URINE CAPACITY MEASURE: CPT

## 2021-05-17 NOTE — PROGRESS NOTES
5/17/2021    [facility] [place]  1950  [id number]    Diagnosis  Chief Complaint     Urinary Retention          Patient presents for void trial managed by our office    Plan  Patient will return as scheduled for cysto/TRUS as scheduled  Patient knows to call office in meantime with any issues  Procedure Butler removal/voiding trial    Butler catheter removed after deflation of an intact balloon  Patient tolerated well  Encouraged patient to hydrate well and return this afternoon for post void residual  He knows he may return early if uncomfortable and unable to urinate  Patient agrees to this plan  Patient returned this afternoon  Patient states unable to void  Patient voided 0 ml while in office  Bladder ultrasound performed and PVR measured 748 ml  Recent Results (from the past 1 hour(s))   POCT Measure PVR    Collection Time: 05/17/21  3:00 PM   Result Value Ref Range    POST-VOID RESIDUAL VOLUME, ML  mL         Vitals:    05/17/21 0846   BP: 132/88   BP Location: Left arm   Patient Position: Sitting   Cuff Size: Adult   Weight: 84 8 kg (187 lb)   Height: 5' 7" (1 702 m)     Universal Protocol:  Procedure performed by:  Consent given by: patient      Bladder catheterization    Date/Time: 5/17/2021 3:37 PM  Performed by: Berny Bergman RN  Authorized by: Berny Bergman RN     Patient location:  ED  Consent:     Consent given by:  Patient  Pre-procedure details:     Procedure purpose:  Therapeutic    Preparation: Patient was prepped and draped in usual sterile fashion    Anesthesia (see MAR for exact dosages): Anesthesia method:  None  Procedure details:     Bladder irrigation: no      Catheter insertion:  Indwelling    Approach: natural orifice      Catheter type:  Coude, Butler and latex    Catheter size:  16 Fr    Number of attempts:  1    Successful placement: yes      Urine characteristics:  Clear and yellow  Post-procedure details:     Patient tolerance of procedure:   Tolerated well, no immediate complications  Comments:      Bladder drained for 850 mL clear yellow urine, attached to overnight bag  Additional leg bag provided to patient               Laurence Llamas RN

## 2021-05-30 DIAGNOSIS — R33.9 URINARY RETENTION: ICD-10-CM

## 2021-06-01 RX ORDER — TAMSULOSIN HYDROCHLORIDE 0.4 MG/1
CAPSULE ORAL
Qty: 90 CAPSULE | Refills: 0 | Status: SHIPPED | OUTPATIENT
Start: 2021-06-01 | End: 2021-06-02 | Stop reason: SDUPTHER

## 2021-06-02 ENCOUNTER — PROCEDURE VISIT (OUTPATIENT)
Dept: UROLOGY | Facility: CLINIC | Age: 71
End: 2021-06-02
Payer: MEDICARE

## 2021-06-02 VITALS
DIASTOLIC BLOOD PRESSURE: 80 MMHG | HEIGHT: 67 IN | SYSTOLIC BLOOD PRESSURE: 146 MMHG | BODY MASS INDEX: 29.19 KG/M2 | WEIGHT: 186 LBS

## 2021-06-02 DIAGNOSIS — R33.9 URINARY RETENTION: ICD-10-CM

## 2021-06-02 DIAGNOSIS — N40.1 BPH WITH URINARY OBSTRUCTION: ICD-10-CM

## 2021-06-02 DIAGNOSIS — N13.30 HYDRONEPHROSIS, UNSPECIFIED HYDRONEPHROSIS TYPE: Primary | ICD-10-CM

## 2021-06-02 DIAGNOSIS — N13.8 BPH WITH URINARY OBSTRUCTION: ICD-10-CM

## 2021-06-02 PROCEDURE — 52000 CYSTOURETHROSCOPY: CPT | Performed by: STUDENT IN AN ORGANIZED HEALTH CARE EDUCATION/TRAINING PROGRAM

## 2021-06-02 RX ORDER — TAMSULOSIN HYDROCHLORIDE 0.4 MG/1
0.8 CAPSULE ORAL
Qty: 30 CAPSULE | Refills: 1 | Status: SHIPPED | OUTPATIENT
Start: 2021-06-02 | End: 2021-07-13 | Stop reason: SDUPTHER

## 2021-06-02 RX ORDER — CLONIDINE HYDROCHLORIDE 0.3 MG/1
TABLET ORAL
COMMUNITY
Start: 2021-05-22 | End: 2021-07-07

## 2021-06-02 RX ORDER — FINASTERIDE 5 MG/1
5 TABLET, FILM COATED ORAL DAILY
Qty: 30 TABLET | Refills: 1 | Status: SHIPPED | OUTPATIENT
Start: 2021-06-02 | End: 2021-07-31 | Stop reason: SDUPTHER

## 2021-06-02 NOTE — PATIENT INSTRUCTIONS
Prostatic Urethral Lift (UroLift System)   AMBULATORY CARE:   A prostatic urethral lift  is a minimally invasive procedure to widen your urethra  The procedure is used to treat benign prostatic hyperplasia (BPH), or enlarged prostate  Prostate tissue is held away from both sides of the urethra with permanent implants  This helps make it easier for you to urinate  A prostatic urethral lift may also be called the [employer]ift® System  How to prepare for this procedure:   · Your healthcare provider will tell you how to prepare  If you are having general anesthesia, your provider may tell you not to eat or drink after midnight before the procedure  Arrange to have someone drive you home after you are discharged  · Tell your provider about all medicines you currently take  He or she will tell you if you need to stop taking any medicine for the procedure, and when to stop  If you take blood thinners or aspirin, you may be told to stop about a week before the procedure  Your provider will tell you which medicines to take or not take on the day of the procedure  · Tell your provider about all your allergies, including antibiotics and anesthesia  Tell him or her if you know you are allergic to stainless steel, nickel, or titanium  · Your provider will talk to about what to expect when you have sex  You may feel a slight pulling sensation when you ejaculate  It should not be painful, and the pulling feeling should get better with time  What will happen during the procedure:   · Local anesthesia is usually used for this procedure  You may also be given medicine to help you relax during the procedure  General anesthesia may instead be used to keep you asleep and free from pain during the procedure  You may feel some pressure or feel like you need to urinate during the procedure  · Your healthcare provider will insert a resectoscope through your urethra  A resectoscope is a tube with a small monitor on the end   The monitor shows your prostate on a screen during the procedure  Your provider will put a device into your urethra  He or she will guide the device up to your prostate  The device will be used to lift and move prostate tissue away from one side of your urethra  · An implant will be placed to hold the prostate in the new position  Your provider may place 2 to 4 implants  He or she will do the same to prostate tissue on the other side of your urethra  What to expect after the procedure:   · You may have some pain in your pelvis or blood in your urine  You may have burning when you urinate  You may feel like you have to urinate urgently or more often  These effects are all expected and should get better within a few weeks  · Your BPH symptoms should start to improve within a few weeks of the procedure  Risks of a prostatic urethral lift:  You may have incontinence (leaking urine)  Scar tissue may build up  You may have trouble having sex, or develop a condition called retrograde ejaculation  This means semen goes into your bladder when you ejaculate  You may have blood clots in your urine that block your urethra  The procedure may not prevent your BPH symptoms from getting worse  You will be able to have a different procedure or surgery to help with symptoms  Call your doctor or urologist if:   · You have a fever  · You see blood clots in your urine  · You have trouble urinating  · You have pain or blood in your urine for longer than you were told to expect  · You have trouble getting an erection or ejaculating  · You have questions or concerns about your condition or care  Self-care:   · Rest as needed  Your healthcare provider will tell you when it is okay to go back to work or do your usual activities  This may be as soon as the day after your procedure  · Ask when it is okay to have sex after the procedure  You may need to wait a week or longer so the procedure area can heal fully  You should not notice much of a change during sex  You may notice a pulling feeling when you ejaculate  This should get better with time  Bladder management after the procedure:   · Urinate on a regular schedule  This will train your bladder to hold urine longer  A larger amount of urine may make it easier to urinate  · Drink less liquid during the day  Do not have liquid for several hours before you go to sleep  Do not drink large amounts of any liquid at one time  Limit alcohol and caffeine  These can cause problems with bladder control and increase your need to urinate  · Do pelvic floor muscle exercises  Pelvic floor muscle exercises, also called Kegels, may help improve your bladder control  These exercises are done by tightening and relaxing your pelvic muscles  Ask how to do pelvic floor muscle exercises, and how often to do them  · Lose weight if you are overweight  Obesity increases your risk for obstructive sleep apnea (LILIA)  LILIA can make you need to get up in the night to urinate  Exercise can help you reach or maintain a healthy weight  Aim to get at least 30 minutes of exercise on most days of the week  You can break the 30 minutes up into shorter blocks of exercise throughout the day  Follow up with your doctor or urologist as directed:  Write down your questions so you remember to ask them during your visits  © Copyright [facility] [address] Information is for End User's use only and may not be sold, redistributed or otherwise used for commercial purposes  All illustrations and images included in CareNotes® are the copyrighted property of A D A M , Inc  or [facility] Leni Kim   The above information is an  only  It is not intended as medical advice for individual conditions or treatments  Talk to your doctor, nurse or pharmacist before following any medical regimen to see if it is safe and effective for you      Transurethral Prostatectomy   AMBULATORY CARE:   What you need to know about a transurethral prostatectomy:  A transurethral prostatectomy is surgery to remove part or all of your prostate gland  This surgery is also called transurethral resection of the prostate (TURP)  How to prepare for a TURP:   · Your surgeon will tell you how to prepare  You may be told not to eat or drink anything after midnight on the day of surgery  Arrange to have someone drive you home after surgery  · Tell your surgeon about all the medicines you currently take  He or she will tell you if you need to stop any medicine before surgery, and when to stop  He or she will tell you which medicines to take or not take on the day of surgery  · Tell your surgeon if you have heart disease or blood clotting problems  · You may be given medicine to shrink the size of your prostate  You may also be given antibiotics to help prevent or treat a bacterial infection  Tell your surgeon if you had an allergic reaction to antibiotics, anesthesia, or other medicine  · You may need blood and urine tests  You may also need a rectal exam to check the size of your prostate  What will happen during a TURP:   · You may be given anesthesia to make you lose feeling from the waist down, or to keep you asleep during surgery  Your surgeon will insert a resectoscope through your urethra  A resectoscope is a tube with a small monitor on the end  The monitor shows your prostate on a screen during surgery  Your bladder may be filled with fluid during surgery  · Heat that is produced by the resectoscope will be used to remove part, or all, of your prostate  Heat will also be used to stop bleeding in the surgery area  Fluid is used to wash away extra tissue and blood  The resectoscope will be removed from your urethra  A catheter (soft tube) will be put through your urethra and into your bladder  The catheter will be left in place to drain urine out of your body and into a bag      What to expect after a TURP:  You will be taken to a room to rest  Do not get out of bed until healthcare providers say it is okay  When healthcare providers see that you are okay, you will be taken to your room  A Butler catheter is a thin tube inserted through your urethra and moved into your bladder  The catheter is used to drain urine into a bag  Healthcare providers will remove the catheter when you no longer need it  Antibiotics may be given to prevent a bacterial infection  Risks of a TURP:   · Your prostate, bladder, or urethra may be damaged  Your urethra or part of your bladder may grow narrow  This can make it difficult or painful to urinate  You may feel like you need to urinate often, or have trouble controlling when you urinate  You may get blood clots in your urine that can block your urethra  · You may develop a urinary tract infection, or an infection in the surgery area  You may have trouble getting an erection or ejaculating  You may develop TURP syndrome, which can cause dizziness, fatigue, stomach pain, and vomiting  If you had a partial resection of the prostate, the part of your prostate that was not removed may grow too large  This can cause your signs and symptoms to return, and you may need to have surgery again  Seek care immediately if:   · You have severe abdominal or back pain  · You are dizzy or confused  · You have abdominal pain, nausea, and vomiting  · Your heartbeat is slower than usual     Call your doctor or urologist if:   · You urinate little or not at all  · You have a fever  · You have new or more blood in your urine  · You have trouble starting to urinate, or have a weak stream of urine when you urinate  · You feel like you have a full bladder, even after you urinate  You may also leak urine  · You often wake up during the night to urinate  You may also feel the need to urinate right away  · You feel pain and burning when you urinate      · You feel pain or pressure in your lower abdomen  · Your urine looks cloudy, and smells bad  · You have trouble getting an erection or ejaculating  · You have questions or concerns about your condition or care  Medicines: You may need any of the following:  · Prescription pain medicine  may be given  Ask your healthcare provider how to take this medicine safely  Some prescription pain medicines contain acetaminophen  Do not take other medicines that contain acetaminophen without talking to your healthcare provider  Too much acetaminophen may cause liver damage  Prescription pain medicine may cause constipation  Ask your healthcare provider how to prevent or treat constipation  · Antibiotics  prevent or fight an infection caused by bacteria  · Blood thinners  help prevent blood clots  Clots can cause strokes, heart attacks, and death  The following are general safety guidelines to follow while you are taking a blood thinner:    ? Watch for bleeding and bruising while you take blood thinners  Watch for bleeding from your gums or nose  Watch for blood in your urine and bowel movements  Use a soft washcloth on your skin, and a soft toothbrush to brush your teeth  This can keep your skin and gums from bleeding  If you shave, use an electric shaver  Do not play contact sports  ? Tell your dentist and other healthcare providers that you take a blood thinner  Wear a bracelet or necklace that says you take this medicine  ? Do not start or stop any other medicines unless your healthcare provider tells you to  Many medicines cannot be used with blood thinners  ? Take your blood thinner exactly as prescribed by your healthcare provider  Do not skip does or take less than prescribed  Tell your provider right away if you forget to take your blood thinner, or if you take too much  ? Warfarin  is a blood thinner that you may need to take   The following are things you should be aware of if you take warfarin:     § Foods and medicines can affect the amount of warfarin in your blood  Do not make major changes to your diet while you take warfarin  Warfarin works best when you eat about the same amount of vitamin K every day  Vitamin K is found in green leafy vegetables and certain other foods  Ask for more information about what to eat when you are taking warfarin  § You will need to see your healthcare provider for follow-up visits when you are on warfarin  You will need regular blood tests  These tests are used to decide how much medicine you need  · Take your medicine as directed  Contact your healthcare provider if you think your medicine is not helping or if you have side effects  Tell him or her if you are allergic to any medicine  Keep a list of the medicines, vitamins, and herbs you take  Include the amounts, and when and why you take them  Bring the list or the pill bottles to follow-up visits  Carry your medicine list with you in case of an emergency  Butler catheter care:  Keep the bag below your waist  If the bag is too high, urine will flow back into your bladder  This can cause an infection  Do not pull on the catheter  This may cause pain and bleeding, and the catheter may come out  Do not let the catheter tubing kink or twist  A kink or twist will block the flow of urine  Bladder control:  After surgery, you may leak urine and have trouble controlling when you urinate  The following can help decrease or manage urine leakage:  · Do not have caffeine  Caffeine can cause problems with bladder control and increase your need to urinate  · Limit liquids  Drink smaller amounts of liquid throughout the day  Do not drink before bedtime  Ask if you should decrease the amount of liquid you drink each day  This may help you control your bladder  · Wear a pad or adult diapers, if needed  These may help to absorb leaking urine and decrease odor  · Do pelvic floor muscle exercises    Pelvic floor muscle exercises, also called Kegels, may help improve your bladder control  These exercises are done by tightening and relaxing your pelvic muscles  Ask how to do pelvic floor muscle exercises, and how often to do them  Activity:  Ask when it is okay for you to return to work and activities, or to have sex  Follow up with your surgeon or urologist as directed: You may need to return to make sure you do not have an infection, or to have your Butler catheter removed  Write down your questions so you remember to ask them during your visits  © Copyright [address] [place] [address] Drive Information is for End User's use only and may not be sold, redistributed or otherwise used for commercial purposes  All illustrations and images included in CareNotes® are the copyrighted property of [employer]  or [facility] Leni Kim   The above information is an  only  It is not intended as medical advice for individual conditions or treatments  Talk to your doctor, nurse or pharmacist before following any medical regimen to see if it is safe and effective for you

## 2021-06-02 NOTE — PROGRESS NOTES
Cystoscopy     Date/Time 6/2/2021 10:06 AM     Performed by  Dakota Zamora MD     Authorized by Dakota Zamora MD      Universal Protocol:  Consent: Verbal consent not obtained  Written consent obtained  Risks and benefits: risks, benefits and alternatives were discussed  Consent given by: patient  Time out: Immediately prior to procedure a "time out" was called to verify the correct patient, procedure, equipment, support staff and site/side marked as required  Patient understanding: patient states understanding of the procedure being performed  Patient consent: the patient's understanding of the procedure matches consent given  Procedure consent: procedure consent matches procedure scheduled  Relevant documents: relevant documents present and verified  Test results: test results available and properly labeled  Site marked: the operative site was marked  Patient identity confirmed: verbally with patient        Procedure Details:  Procedure type: cystoscopy    Patient tolerance: Patient tolerated the procedure well with no immediate complications    Additional Procedure Details: Office Cystoscopy Procedure Note    Indication:     urinary retention     Informed consent   The risks, benefits, complications, treatment options, and expected outcomes were discussed with the patient  The patient concurred with the proposed plan and provided informed consent  Anesthesia  Lidocaine jelly 2%    Antibiotic prophylaxis   None    Procedure  The patient was placed in the supineposition, was prepped and draped in the usual manner using sterile technique, and 2% lidocaine jelly instilled into the urethra  A 17 F flexible cystoscope was then inserted into the urethra and the urethra and bladder carefully examined  The following findings were noted:    Findings:  Urethra:  Normal  Prostate: Moderate bilobar BPH with touching lateral lobes of the prostate  Bladder:  No lesions  Mild trabeculation    Ureteral orifices: Normal locations with clear efflux  Other findings:  None    Specimens: None                 Complications:    None; patient tolerated the procedure well    TRUS Volume: 35 cc    Plan: I had a discussion with the patient about management of his urinary retention  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 of initial diagnosis of urinary retention as he had nearly 2 L of urine in his bladder  Although volume-wise his prostate is small, it does appear to be obstructive on cysto today  He is motivated to be able to spontaneously void at some point  He is interested in bladder outlet procedures  I think this is a reasonable approach because, even if we pursue urodynamics studies and he is unable to void, bladder outlet obstruction cannot be ruled out  I have increased his Tamsulosin to 0 8 mg daily and started Finasteride 5 mg  He will return in 2-3 weeks to review his renal ultrasound to assess for improvement in his hydronephrosis, void trial, and assess response to medication  If he fails void trial, he would like to consider TURP  He is not interested in Urolift  I did provide him information on both procedures  Disposition: To home after 30 minute observation             Condition: Stable

## 2021-06-04 ENCOUNTER — HOSPITAL ENCOUNTER (OUTPATIENT)
Dept: ULTRASOUND IMAGING | Facility: CLINIC | Age: 71
Discharge: HOME | End: 2021-06-04
Payer: MEDICARE

## 2021-06-04 DIAGNOSIS — N13.30 HYDRONEPHROSIS, UNSPECIFIED HYDRONEPHROSIS TYPE: ICD-10-CM

## 2021-06-04 DIAGNOSIS — N40.1 BPH WITH URINARY OBSTRUCTION: ICD-10-CM

## 2021-06-04 DIAGNOSIS — N13.8 BPH WITH URINARY OBSTRUCTION: ICD-10-CM

## 2021-06-04 PROCEDURE — 76770 US EXAM ABDO BACK WALL COMP: CPT

## 2021-06-10 ENCOUNTER — TELEPHONE (OUTPATIENT)
Dept: INTERNAL MEDICINE CLINIC | Facility: CLINIC | Age: 71
End: 2021-06-10

## 2021-06-10 NOTE — TELEPHONE ENCOUNTER
The prescription he received in April was 0 1 mg 1 tab 3 times a day  When our chart notes that he says he is not taking it, this is placed by the medical assistant when the patient is brought in the room so this is his answer to os  Whether he is or not he will have to ask him

## 2021-06-10 NOTE — TELEPHONE ENCOUNTER
Betty left this question with Dr Adam Amaral: The pt said he's on cloNIDine, 0 3 mg- 3 times a day  Is this correct? He saw a urologist on 6/2, Dr Caterina Park and he said he should be on: 0 1 mg tablet, every 8 hrs  IN the pt's chart is says: he's not taking this       Please call Betty and let her know the dosage

## 2021-06-11 DIAGNOSIS — I10 HTN (HYPERTENSION): ICD-10-CM

## 2021-06-11 RX ORDER — CLONIDINE HYDROCHLORIDE 0.1 MG/1
0.1 TABLET ORAL EVERY 8 HOURS SCHEDULED
Qty: 90 TABLET | Refills: 5 | Status: SHIPPED | OUTPATIENT
Start: 2021-06-11 | End: 2021-11-28

## 2021-06-11 NOTE — TELEPHONE ENCOUNTER
Patient only has the 0 3 he needs the 0 1    Needs refill cloNIDine (CATAPRES) 0 1    ZACARIAS PHONE [phone]

## 2021-06-17 ENCOUNTER — TELEPHONE (OUTPATIENT)
Dept: INTERNAL MEDICINE CLINIC | Facility: CLINIC | Age: 71
End: 2021-06-17

## 2021-07-06 NOTE — PROGRESS NOTES
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  -  Mr Keila Santizo is highly motivated to not have to manage his bladder with a Butler catheter  He has several months of urinary retention and has been unable to pass a void trial   His cystoscopy shows a prostate volume of approximately 40 g however does appears obstructive on cystoscopy  In order to improve his chances to have adequate bladder emptying, I do think it is important to improve his bladder outlet  After reviewing his options he would like to have transurethral resection of the prostate  I explained that this is not a guarantee that he will be able to spontaneously void  May also have neurogenic bladder with an a contractile bladder, but if he has to have any chance to not have an indwelling catheter I do believe that this procedure will give him the best chance  He has been cleared by his primary care provider  I have discussed the risks benefits and alternatives for transurethral resection of the prostate  We will schedule this in the near future  Corky Polanco MD      Chief Complaint     Chief Complaint   Patient presents with    Benign Prostatic Hypertrophy    Urinary Retention         History of Present Illness     Brianne Alexander is a 66 y o  with BPH with LUTS, chronic urinary retention who is interested in TURP  He was evaluated by his primary care provider in [place] for surgery  He has had no issues with his Butler catheter  Lab Results   Component Value Date    PSA 0 8 12/28/2020    PSA 0 6 06/18/2019    PSA 0 5 06/15/2018         Detailed Urologic History     - please refer to HPI    Review of Systems     Review of Systems          Allergies     No Known Allergies    Physical Exam     Physical Exam  Vitals reviewed  Constitutional:       Appearance: Normal appearance  HENT:      Head: Normocephalic and atraumatic        Mouth/Throat:      Mouth: Mucous membranes are moist    Eyes: Pupils: Pupils are equal, round, and reactive to light  Cardiovascular:      Rate and Rhythm: Normal rate  Pulmonary:      Effort: Pulmonary effort is normal    Abdominal:      General: Abdomen is flat  Palpations: Abdomen is soft  Musculoskeletal:         General: Normal range of motion  Skin:     General: Skin is warm and dry  Neurological:      General: No focal deficit present  Mental Status: He is alert and oriented to person, place, and time     Psychiatric:         Mood and Affect: Mood normal          Behavior: Behavior normal              Vital Signs  Vitals:    07/08/21 0742   BP: 142/70   Pulse: 57   Weight: 85 7 kg (189 lb)   Height: 5' 7" (1 702 m)         Current Medications       Current Outpatient Medications:     aspirin 81 mg chewable tablet, Chew 1 tablet daily, Disp: , Rfl:     atenolol (TENORMIN) 50 mg tablet, TAKE 1 TABLET DAILY, Disp: 90 tablet, Rfl: 3    atorvastatin (LIPITOR) 80 mg tablet, TAKE 1 TABLET DAILY, Disp: 90 tablet, Rfl: 3    cloNIDine (CATAPRES) 0 1 mg tablet, Take 1 tablet (0 1 mg total) by mouth every 8 (eight) hours, Disp: 90 tablet, Rfl: 5    ferrous sulfate 325 (65 Fe) mg tablet, Take 325 mg by mouth daily with breakfast, Disp: , Rfl:     finasteride (PROSCAR) 5 mg tablet, Take 1 tablet (5 mg total) by mouth daily, Disp: 30 tablet, Rfl: 1    Ketorolac Tromethamine (TORADOL ORAL PO), Take by mouth every 4 (four) hours as needed, Disp: , Rfl:     lisinopril (ZESTRIL) 40 mg tablet, Take 1 tablet (40 mg total) by mouth daily, Disp: 90 tablet, Rfl: 3    metFORMIN (GLUCOPHAGE-XR) 500 mg 24 hr tablet, TAKE 1 TABLET TWICE A DAY WITH MEALS, Disp: 180 tablet, Rfl: 3    Omega-3 1000 MG CAPS, Take by mouth 3 (three) times a day , Disp: , Rfl:     pantoprazole (PROTONIX) 40 mg tablet, Take 1 tablet (40 mg total) by mouth 2 (two) times a day before meals, Disp: 60 tablet, Rfl: 0    PreviDent 0 2 % SOLN, RINSE WITH 10ML FOR 1 MINUTE THEN SPIT OUT AT BEDTIME DO NOT SWALLOW , Disp: , Rfl:     tamsulosin (FLOMAX) 0 4 mg, Take 2 capsules (0 8 mg total) by mouth daily with dinner, Disp: 30 capsule, Rfl: 1    torsemide (DEMADEX) 10 mg tablet, TAKE 1 TABLET(10 MG) BY MOUTH DAILY, Disp: 90 tablet, Rfl: 3    prednisoLONE acetate (PRED FORTE) 1 % ophthalmic suspension, INSTILL 1 DROP INTO THE RIGHT EYE FOUR TIMES DAILY AFTER SURGERY (Patient not taking: Reported on 7/7/2021), Disp: , Rfl:       Active Problems     Patient Active Problem List   Diagnosis    Non-recurrent bilateral inguinal hernia without obstruction or gangrene    Hyperlipidemia    HTN (hypertension)    Type 2 diabetes mellitus ([facility] [address] )    Tobacco abuse    Iron deficiency anemia due to chronic blood loss    HOCM (hypertrophic obstructive cardiomyopathy) (HCC)    Coronary artery disease involving native coronary artery of native heart    Atherosclerosis of aorta (HCC)    Atherosclerosis of native coronary artery of native heart without angina pectoris    Anemia associated with diabetes mellitus (HCC)    Thrombocytosis (HCC)    Hypercalcemia    Hyperparathyroidism (HCC)    History of aortic regurgitation    History of obesity    CIERA (acute kidney injury) (HCC)    Hyperkalemia    Metabolic acidosis    UTI (urinary tract infection)    Dehydration with hyponatremia    Hematuria    Anemia    Status post parathyroidectomy (HCC)    Antritis and gastric ulcer    Urinary retention         Past Medical History     Past Medical History:   Diagnosis Date    Benign neoplasm of large intestine     Bleeding gastric ulcer 2018    Colon polyp     Diabetes mellitus (HCC)     Heart murmur     History of aortic regurgitation     History of constipation     History of degenerative joint disease     History of nocturia     History of obesity     History of sebaceous cyst     History of shortness of breath     History of transfusion     History of urinary frequency     Hyperlipidemia     Hypertension     Myocardial infarction [facility]) 2018 january, 3 stents    Polyposis coli     of the large intestine    Ulcer of esophagus          Surgical History     Past Surgical History:   Procedure Laterality Date    ACHILLES TENDON SURGERY Left 1973    CATARACT EXTRACTION Right 02/18/2021    COLONOSCOPY      hyperplastic polyp    CORONARY ANGIOPLASTY WITH STENT PLACEMENT      3 stents: 2 placed on Jan 2018, 1 on July 2018    [employer] CYST REMOVAL  2019, 2020    left and right wrists    ESOPHAGOGASTRODUODENOSCOPY N/A 1/23/2018    Procedure: ESOPHAGOGASTRODUODENOSCOPY (EGD); Surgeon: Brittany Perez MD;  [facility]: [facility]; Service: Gastroenterology    ESOPHAGOGASTRODUODENOSCOPY      HERNIA REPAIR      HERNIA REPAIR Bilateral 8/18/2017    Procedure: LAPAROSCOPIC INGUINAL HERNIA REPAIR WITH MESH;  Surgeon: Liz Ramos MD;  [facility]: [facility];  Service: General    WA ESOPHAGOGASTRODUODENOSCOPY TRANSORAL DIAGNOSTIC N/A 3/26/2018    Procedure: ESOPHAGOGASTRODUODENOSCOPY (EGD); Surgeon: Karina Cavazos MD;  [facility]; Service: Gastroenterology    WA ESOPHAGOGASTRODUODENOSCOPY TRANSORAL DIAGNOSTIC N/A 5/14/2018    Procedure: ESOPHAGOGASTRODUODENOSCOPY (EGD); Surgeon: Karina Cavazos MD;  [facility];   Service: Gastroenterology    WA EXPLORE PARATHYROID GLANDS Right 3/23/2021    Procedure: RIGHT PARATHYROIDECTOMY, MINIMALLY INVASIVE, POSSIBLE 4-GLAND EXPLORATION, WITH INTRA-OPERATIVE PTH MONITORING;  Surgeon: Soni Castellanos MD;  Location:  MAIN OR;  Service: Surgical Oncology    TIBIA FRACTURE SURGERY Left 1962         Family History     Family History   Problem Relation Age of Onset    Rheumatic fever Father     Other Father         accidental poisoning   [employer] Heart disease Mother          Social History     Social History     Social History     Tobacco Use   Smoking Status Light Tobacco Smoker    Packs/day: 1 00    Years: 35 00    Pack years: 35 00    Types: Cigarettes   Smokeless Tobacco Never Used   Tobacco Comment    pt states quit many years ago         Pertinent Lab Values     Lab Results   Component Value Date    CREATININE 0 81 04/13/2021       Lab Results   Component Value Date    PSA 0 8 12/28/2020    PSA 0 6 06/18/2019    PSA 0 5 06/15/2018

## 2021-07-07 ENCOUNTER — OFFICE VISIT (OUTPATIENT)
Dept: INTERNAL MEDICINE CLINIC | Facility: CLINIC | Age: 71
End: 2021-07-07
Payer: MEDICARE

## 2021-07-07 VITALS
HEIGHT: 67 IN | TEMPERATURE: 97.9 F | DIASTOLIC BLOOD PRESSURE: 78 MMHG | WEIGHT: 186 LBS | HEART RATE: 66 BPM | SYSTOLIC BLOOD PRESSURE: 140 MMHG | BODY MASS INDEX: 29.19 KG/M2 | OXYGEN SATURATION: 99 %

## 2021-07-07 DIAGNOSIS — R91.1 LUNG NODULE: ICD-10-CM

## 2021-07-07 DIAGNOSIS — E11.9 TYPE 2 DIABETES MELLITUS WITHOUT COMPLICATION, WITHOUT LONG-TERM CURRENT USE OF INSULIN (HCC): ICD-10-CM

## 2021-07-07 PROCEDURE — 99214 OFFICE O/P EST MOD 30 MIN: CPT | Performed by: INTERNAL MEDICINE

## 2021-07-07 NOTE — PROGRESS NOTES
Assessment/Plan:       Diagnoses and all orders for this visit:    Type 2 diabetes mellitus without complication, without long-term current use of insulin (HCC)  -     Hemoglobin A1C ([facility]); Future  -     CBC and differential; Future  -     Comprehensive metabolic panel; Future  -     TSH, 3rd generation with Free T4 reflex; Future  -     UA (URINE) with reflex to Scope  -     Microalbumin / creatinine urine ratio    Lung nodule  -     CT chest wo contrast; Future    Other orders  -     Cancel: CT lung screening program; Future                Subjective:      Patient ID: Lizbeth Donnelly is a 66 y o  male  A 66year-old  He is diabetic and hypertensive and control has been good  During a recent hospitalization clonidine dose was reduced from 0 3 t i d  to 0 1 t i d  and he is taking this dose with decent blood pressure control  However, he was hospitalized back in April for urinary retention and sepsis with acute kidney injury  Discharged to home and continues to have a chronic indwelling Butler catheter  He will be seeing Urology and hopefully something can be done; apparently 1 of the options is TURP for relief obstruction and urinary retention  Need to recheck BMP  Recent parathyroidectomy was done and was successful  He feels fine except for the fact that he needs the Butler catheter and would like it gone        Continues to smoke and expresses no interest in stopping      The following portions of the patient's history were reviewed and updated as appropriate:   He has a past medical history of Benign neoplasm of large intestine, Bleeding gastric ulcer (2018), Colon polyp, Diabetes mellitus ([address] ), Heart murmur, History of aortic regurgitation, History of constipation, History of degenerative joint disease, History of nocturia, History of obesity, History of sebaceous cyst, History of shortness of breath, History of transfusion, History of urinary frequency, Hyperlipidemia, Hypertension, Myocardial infarction ([facility] [address] ) (2018), Polyposis coli, and Ulcer of esophagus  ,  does not have any pertinent problems on file  ,   has a past surgical history that includes Hernia repair; Tibia fracture surgery (Left, 1962); Achilles tendon surgery (Left, 1973); Hernia repair (Bilateral, 8/18/2017); Esophagogastroduodenoscopy (N/A, 1/23/2018); Esophagogastroduodenoscopy; pr esophagogastroduodenoscopy transoral diagnostic (N/A, 3/26/2018); pr esophagogastroduodenoscopy transoral diagnostic (N/A, 5/14/2018); Cyst Removal (2019, 2020); Coronary angioplasty with stent; Colonoscopy; Cataract extraction (Right, 02/18/2021); and pr explore parathyroid glands (Right, 3/23/2021)  ,  family history includes Heart disease in his mother; Other in his father; Rheumatic fever in his father  ,   reports that he has been smoking cigarettes  He has a 35 00 pack-year smoking history  He has never used smokeless tobacco  He reports previous alcohol use  He reports that he does not use drugs  ,  has No Known Allergies     Current Outpatient Medications   Medication Sig Dispense Refill    aspirin 81 mg chewable tablet Chew 1 tablet daily      atenolol (TENORMIN) 50 mg tablet TAKE 1 TABLET DAILY 90 tablet 3    atorvastatin (LIPITOR) 80 mg tablet TAKE 1 TABLET DAILY 90 tablet 3    cloNIDine (CATAPRES) 0 1 mg tablet Take 1 tablet (0 1 mg total) by mouth every 8 (eight) hours 90 tablet 5    ferrous sulfate 325 (65 Fe) mg tablet Take 325 mg by mouth daily with breakfast      finasteride (PROSCAR) 5 mg tablet Take 1 tablet (5 mg total) by mouth daily 30 tablet 1    Ketorolac Tromethamine (TORADOL ORAL PO) Take by mouth every 4 (four) hours as needed      lisinopril (ZESTRIL) 40 mg tablet Take 1 tablet (40 mg total) by mouth daily 90 tablet 3    metFORMIN (GLUCOPHAGE-XR) 500 mg 24 hr tablet TAKE 1 TABLET TWICE A DAY WITH MEALS 180 tablet 3    Omega-3 1000 MG CAPS Take by mouth 3 (three) times a day       pantoprazole (PROTONIX) 40 mg tablet Take 1 tablet (40 mg total) by mouth 2 (two) times a day before meals 60 tablet 0    PreviDent 0 2 % SOLN RINSE WITH 10ML FOR 1 MINUTE THEN SPIT OUT AT BEDTIME DO NOT SWALLOW   tamsulosin (FLOMAX) 0 4 mg Take 2 capsules (0 8 mg total) by mouth daily with dinner 30 capsule 1    torsemide (DEMADEX) 10 mg tablet TAKE 1 TABLET(10 MG) BY MOUTH DAILY 90 tablet 3    prednisoLONE acetate (PRED FORTE) 1 % ophthalmic suspension INSTILL 1 DROP INTO THE RIGHT EYE FOUR TIMES DAILY AFTER SURGERY (Patient not taking: Reported on 7/7/2021)       No current facility-administered medications for this visit  Review of Systems   Genitourinary: Positive for difficulty urinating  All other systems reviewed and are negative  Objective:  Vitals:    07/07/21 0832   BP: 140/78   Pulse: 66   Temp: 97 9 °F (36 6 °C)   SpO2: 99%      Physical Exam  Constitutional:       Appearance: Normal appearance  Cardiovascular:      Rate and Rhythm: Normal rate and regular rhythm  Pulmonary:      Effort: Pulmonary effort is normal       Breath sounds: Normal breath sounds  Neurological:      Mental Status: He is alert  Psychiatric:         Mood and Affect: Mood normal          Judgment: Judgment normal            Patient Instructions     Doing well except for the urinary issue and will be going to urology   Needs to recheck diabetic parameters  Continue medications unchanged  CT chest for follow-up of lung nodules  Revisit here in 6 months

## 2021-07-07 NOTE — PROGRESS NOTES
BMI Counseling: Body mass index is 29 13 kg/m²  The BMI is above normal  Nutrition recommendations include decreasing portion sizes and moderation in carbohydrate intake  Diabetic Foot Exam    Patient's shoes and socks removed  Right Foot/Ankle   Right Foot Inspection  Skin Exam: dry skin                            Sensory       Monofilament testing: intact  Vascular    The right DP pulse is 1+  The right PT pulse is 0  Left Foot/Ankle  Left Foot Inspection  Skin Exam: dry skin                                         Sensory       Monofilament: intact  Vascular    The left DP pulse is 1+  The left PT pulse is 0  Assign Risk Category:  No deformity present;  No loss of protective sensation; Weak pulses       Risk: 1

## 2021-07-08 ENCOUNTER — OFFICE VISIT (OUTPATIENT)
Dept: UROLOGY | Facility: CLINIC | Age: 71
End: 2021-07-08
Payer: MEDICARE

## 2021-07-08 ENCOUNTER — APPOINTMENT (OUTPATIENT)
Dept: LAB | Facility: CLINIC | Age: 71
End: 2021-07-08
Payer: MEDICARE

## 2021-07-08 VITALS
WEIGHT: 189 LBS | HEART RATE: 57 BPM | HEIGHT: 67 IN | DIASTOLIC BLOOD PRESSURE: 70 MMHG | BODY MASS INDEX: 29.66 KG/M2 | SYSTOLIC BLOOD PRESSURE: 142 MMHG

## 2021-07-08 DIAGNOSIS — N13.8 BPH WITH URINARY OBSTRUCTION: Primary | ICD-10-CM

## 2021-07-08 DIAGNOSIS — R33.9 URINARY RETENTION: ICD-10-CM

## 2021-07-08 DIAGNOSIS — E11.9 TYPE 2 DIABETES MELLITUS WITHOUT COMPLICATION, WITHOUT LONG-TERM CURRENT USE OF INSULIN (HCC): ICD-10-CM

## 2021-07-08 DIAGNOSIS — N40.1 BPH WITH URINARY OBSTRUCTION: Primary | ICD-10-CM

## 2021-07-08 LAB
ALBUMIN SERPL BCP-MCNC: 3.2 G/DL (ref 3.5–5)
ALP SERPL-CCNC: 66 U/L (ref 46–116)
ALT SERPL W P-5'-P-CCNC: 18 U/L (ref 12–78)
ANION GAP SERPL CALCULATED.3IONS-SCNC: 5 MMOL/L (ref 4–13)
AST SERPL W P-5'-P-CCNC: 9 U/L (ref 5–45)
BACTERIA UR QL AUTO: ABNORMAL /HPF
BASOPHILS # BLD AUTO: 0.04 THOUSANDS/ΜL (ref 0–0.1)
BASOPHILS NFR BLD AUTO: 1 % (ref 0–1)
BILIRUB SERPL-MCNC: 0.35 MG/DL (ref 0.2–1)
BILIRUB UR QL STRIP: NEGATIVE
BUN SERPL-MCNC: 9 MG/DL (ref 5–25)
CALCIUM ALBUM COR SERPL-MCNC: 9.8 MG/DL (ref 8.3–10.1)
CALCIUM SERPL-MCNC: 9.2 MG/DL (ref 8.3–10.1)
CHLORIDE SERPL-SCNC: 102 MMOL/L (ref 100–108)
CLARITY UR: ABNORMAL
CO2 SERPL-SCNC: 28 MMOL/L (ref 21–32)
COLOR UR: YELLOW
CREAT SERPL-MCNC: 0.68 MG/DL (ref 0.6–1.3)
CREAT UR-MCNC: 32.2 MG/DL
EOSINOPHIL # BLD AUTO: 0.15 THOUSAND/ΜL (ref 0–0.61)
EOSINOPHIL NFR BLD AUTO: 3 % (ref 0–6)
ERYTHROCYTE [DISTWIDTH] IN BLOOD BY AUTOMATED COUNT: 17.6 % (ref 11.6–15.1)
EST. AVERAGE GLUCOSE BLD GHB EST-MCNC: 123 MG/DL
GFR SERPL CREATININE-BSD FRML MDRD: 110 ML/MIN/1.73SQ M
GLUCOSE SERPL-MCNC: 98 MG/DL (ref 65–140)
GLUCOSE UR STRIP-MCNC: NEGATIVE MG/DL
HBA1C MFR BLD: 5.9 %
HCT VFR BLD AUTO: 38.3 % (ref 36.5–49.3)
HGB BLD-MCNC: 11.6 G/DL (ref 12–17)
HGB UR QL STRIP.AUTO: ABNORMAL
HYALINE CASTS #/AREA URNS LPF: ABNORMAL /LPF
IMM GRANULOCYTES # BLD AUTO: 0.01 THOUSAND/UL (ref 0–0.2)
IMM GRANULOCYTES NFR BLD AUTO: 0 % (ref 0–2)
KETONES UR STRIP-MCNC: NEGATIVE MG/DL
LEUKOCYTE ESTERASE UR QL STRIP: ABNORMAL
LYMPHOCYTES # BLD AUTO: 2.02 THOUSANDS/ΜL (ref 0.6–4.47)
LYMPHOCYTES NFR BLD AUTO: 36 % (ref 14–44)
MCH RBC QN AUTO: 25.8 PG (ref 26.8–34.3)
MCHC RBC AUTO-ENTMCNC: 30.3 G/DL (ref 31.4–37.4)
MCV RBC AUTO: 85 FL (ref 82–98)
MICROALBUMIN UR-MCNC: 152 MG/L (ref 0–20)
MICROALBUMIN/CREAT 24H UR: 472 MG/G CREATININE (ref 0–30)
MONOCYTES # BLD AUTO: 0.56 THOUSAND/ΜL (ref 0.17–1.22)
MONOCYTES NFR BLD AUTO: 10 % (ref 4–12)
NEUTROPHILS # BLD AUTO: 2.81 THOUSANDS/ΜL (ref 1.85–7.62)
NEUTS SEG NFR BLD AUTO: 50 % (ref 43–75)
NITRITE UR QL STRIP: POSITIVE
NON-SQ EPI CELLS URNS QL MICRO: ABNORMAL /HPF
NRBC BLD AUTO-RTO: 0 /100 WBCS
PH UR STRIP.AUTO: 6 [PH]
PLATELET # BLD AUTO: 461 THOUSANDS/UL (ref 149–390)
PMV BLD AUTO: 8.8 FL (ref 8.9–12.7)
POTASSIUM SERPL-SCNC: 3.6 MMOL/L (ref 3.5–5.3)
PROT SERPL-MCNC: 7.3 G/DL (ref 6.4–8.2)
PROT UR STRIP-MCNC: ABNORMAL MG/DL
RBC # BLD AUTO: 4.5 MILLION/UL (ref 3.88–5.62)
RBC #/AREA URNS AUTO: ABNORMAL /HPF
SODIUM SERPL-SCNC: 135 MMOL/L (ref 136–145)
SP GR UR STRIP.AUTO: 1.01 (ref 1–1.03)
TSH SERPL DL<=0.05 MIU/L-ACNC: 0.77 UIU/ML (ref 0.36–3.74)
UROBILINOGEN UR QL STRIP.AUTO: 0.2 E.U./DL
WBC # BLD AUTO: 5.59 THOUSAND/UL (ref 4.31–10.16)
WBC #/AREA URNS AUTO: ABNORMAL /HPF

## 2021-07-08 PROCEDURE — 36415 COLL VENOUS BLD VENIPUNCTURE: CPT

## 2021-07-08 PROCEDURE — 84443 ASSAY THYROID STIM HORMONE: CPT

## 2021-07-08 PROCEDURE — 85025 COMPLETE CBC W/AUTO DIFF WBC: CPT

## 2021-07-08 PROCEDURE — 80053 COMPREHEN METABOLIC PANEL: CPT

## 2021-07-08 PROCEDURE — 83036 HEMOGLOBIN GLYCOSYLATED A1C: CPT

## 2021-07-08 PROCEDURE — 99213 OFFICE O/P EST LOW 20 MIN: CPT | Performed by: STUDENT IN AN ORGANIZED HEALTH CARE EDUCATION/TRAINING PROGRAM

## 2021-07-08 PROCEDURE — 82043 UR ALBUMIN QUANTITATIVE: CPT | Performed by: INTERNAL MEDICINE

## 2021-07-08 PROCEDURE — 81001 URINALYSIS AUTO W/SCOPE: CPT | Performed by: INTERNAL MEDICINE

## 2021-07-08 PROCEDURE — 82570 ASSAY OF URINE CREATININE: CPT | Performed by: INTERNAL MEDICINE

## 2021-07-12 ENCOUNTER — TELEPHONE (OUTPATIENT)
Dept: UROLOGY | Facility: AMBULATORY SURGERY CENTER | Age: 71
End: 2021-07-12

## 2021-07-12 ENCOUNTER — TELEPHONE (OUTPATIENT)
Dept: UROLOGY | Facility: MEDICAL CENTER | Age: 71
End: 2021-07-12

## 2021-07-12 NOTE — TELEPHONE ENCOUNTER
Patient returned my call and I explained that the next available date to schedule his sx would be 9/2 at [facility] with Dr Dick Riojas  Patient also asked if there was anything sooner at [place]  All I saw was time for the [place] [id number], and I dont belive he can be scheduled at the [place] [id number] with staying ovenight for observation  I will double check just to make sure and I will call patient back tomorrow to discuss  He was appreciative of my help

## 2021-07-13 ENCOUNTER — HOSPITAL ENCOUNTER (OUTPATIENT)
Dept: CT IMAGING | Facility: CLINIC | Age: 71
Discharge: HOME | End: 2021-07-13
Payer: MEDICARE

## 2021-07-13 DIAGNOSIS — R33.9 URINARY RETENTION: ICD-10-CM

## 2021-07-13 DIAGNOSIS — R91.1 LUNG NODULE: ICD-10-CM

## 2021-07-13 PROCEDURE — G1004 CDSM NDSC: HCPCS

## 2021-07-13 PROCEDURE — 71250 CT THORAX DX C-: CPT

## 2021-07-13 RX ORDER — TAMSULOSIN HYDROCHLORIDE 0.4 MG/1
0.8 CAPSULE ORAL
Qty: 180 CAPSULE | Refills: 0 | Status: SHIPPED | OUTPATIENT
Start: 2021-07-13 | End: 2021-10-08 | Stop reason: ALTCHOICE

## 2021-07-13 NOTE — TELEPHONE ENCOUNTER
Patient seen by Dr Zeinab Salazar at [facility]    Patient is calling to request a new prescription for tamsulosin (FLOMAX) 0 4 mg 90 day supply to  [facility] in [facility]

## 2021-07-13 NOTE — TELEPHONE ENCOUNTER
The patient was last seen on 7/8/21 by Luisana Espino MD in the [facility] location; continuation of the medication was authorized at that time    Request for same, 90 day supply (180 capsules) with NO refills was queued and forwarded to the Advanced Practitioner covering the [facility] location for approval

## 2021-07-14 NOTE — TELEPHONE ENCOUNTER
I called and discussed tentative surgery date with patient on 9/2 with Dr Fili Gaffney at [facility]  I reviewed pre op instructions and pre admission testing with patient  Patient stated he is fully vaccinated for covid19, so he will not need a covid test prior to surgery  Pt is aware that I will need to confirm surgery date with Dr Fili Gaffney, and I will reach out to patient if there are any additional recommendations from the provider prior to procedure  Pt was appreciative of my call

## 2021-07-20 ENCOUNTER — PREP FOR PROCEDURE (OUTPATIENT)
Dept: UROLOGY | Facility: AMBULATORY SURGERY CENTER | Age: 71
End: 2021-07-20

## 2021-07-20 DIAGNOSIS — R39.89 SUSPECTED UTI: ICD-10-CM

## 2021-07-20 DIAGNOSIS — N40.1 BPH WITH URINARY OBSTRUCTION: Primary | ICD-10-CM

## 2021-07-20 DIAGNOSIS — Z01.818 PRE-OPERATIVE EXAMINATION: ICD-10-CM

## 2021-07-20 DIAGNOSIS — N13.8 BPH WITH URINARY OBSTRUCTION: Primary | ICD-10-CM

## 2021-07-20 DIAGNOSIS — Z01.810 PRE-OPERATIVE CARDIOVASCULAR EXAMINATION: ICD-10-CM

## 2021-07-20 NOTE — TELEPHONE ENCOUNTER
I called and confirmed sx on 9/2 with Dr Keegan Lucas at [facility]  I reviewed PATs and pre op instructions  Patient's PCP has cleared pt for surgery per Dr Suzette Merida office note  Patient will also need to hold his aspirin 7 days prior to surgery  Patient did not have any additional questions at this time  I will be mailing him a surgery packet in the mail

## 2021-07-21 ENCOUNTER — TELEPHONE (OUTPATIENT)
Dept: INTERNAL MEDICINE CLINIC | Facility: CLINIC | Age: 71
End: 2021-07-21

## 2021-07-21 NOTE — TELEPHONE ENCOUNTER
----- Message from Donna Sarkar MD sent at 7/21/2021 10:39 AM EDT -----  Please call the patient regarding his  result     Stable long-term lung nodules need no further follow-up

## 2021-07-31 DIAGNOSIS — N13.8 BPH WITH URINARY OBSTRUCTION: ICD-10-CM

## 2021-07-31 DIAGNOSIS — N13.30 HYDRONEPHROSIS, UNSPECIFIED HYDRONEPHROSIS TYPE: ICD-10-CM

## 2021-07-31 DIAGNOSIS — N40.1 BPH WITH URINARY OBSTRUCTION: ICD-10-CM

## 2021-08-02 ENCOUNTER — APPOINTMENT (OUTPATIENT)
Dept: LAB | Facility: HOSPITAL | Age: 71
End: 2021-08-02
Attending: UROLOGY
Payer: MEDICARE

## 2021-08-02 ENCOUNTER — HOSPITAL ENCOUNTER (OUTPATIENT)
Dept: NON INVASIVE DIAGNOSTICS | Facility: CLINIC | Age: 71
Discharge: HOME | End: 2021-08-02
Payer: MEDICARE

## 2021-08-02 DIAGNOSIS — N13.8 BPH WITH URINARY OBSTRUCTION: ICD-10-CM

## 2021-08-02 DIAGNOSIS — R39.89 SUSPECTED UTI: ICD-10-CM

## 2021-08-02 DIAGNOSIS — N40.1 BPH WITH URINARY OBSTRUCTION: ICD-10-CM

## 2021-08-02 DIAGNOSIS — I42.1 HOCM (HYPERTROPHIC OBSTRUCTIVE CARDIOMYOPATHY) (HCC): ICD-10-CM

## 2021-08-02 DIAGNOSIS — N13.30 HYDRONEPHROSIS, UNSPECIFIED HYDRONEPHROSIS TYPE: ICD-10-CM

## 2021-08-02 DIAGNOSIS — I25.10 CORONARY ARTERY DISEASE INVOLVING NATIVE HEART WITHOUT ANGINA PECTORIS, UNSPECIFIED VESSEL OR LESION TYPE: ICD-10-CM

## 2021-08-02 DIAGNOSIS — Z01.818 PRE-OPERATIVE EXAMINATION: ICD-10-CM

## 2021-08-02 DIAGNOSIS — I35.0 AORTIC VALVE STENOSIS, ETIOLOGY OF CARDIAC VALVE DISEASE UNSPECIFIED: ICD-10-CM

## 2021-08-02 PROCEDURE — 93306 TTE W/DOPPLER COMPLETE: CPT

## 2021-08-02 PROCEDURE — 87086 URINE CULTURE/COLONY COUNT: CPT

## 2021-08-02 PROCEDURE — 87077 CULTURE AEROBIC IDENTIFY: CPT

## 2021-08-02 PROCEDURE — 87186 SC STD MICRODIL/AGAR DIL: CPT

## 2021-08-02 RX ORDER — FINASTERIDE 5 MG/1
5 TABLET, FILM COATED ORAL DAILY
Qty: 30 TABLET | Refills: 0 | Status: SHIPPED | OUTPATIENT
Start: 2021-08-02 | End: 2021-08-02

## 2021-08-02 RX ORDER — FINASTERIDE 5 MG/1
TABLET, FILM COATED ORAL
Qty: 90 TABLET | Refills: 0 | Status: SHIPPED | OUTPATIENT
Start: 2021-08-02 | End: 2021-11-12 | Stop reason: SDUPTHER

## 2021-08-02 NOTE — TELEPHONE ENCOUNTER
Called and LM for patient with details  Patient is to call the office with questions  Office number provided

## 2021-08-03 PROCEDURE — 93306 TTE W/DOPPLER COMPLETE: CPT | Performed by: INTERNAL MEDICINE

## 2021-08-04 LAB
BACTERIA UR CULT: ABNORMAL
BACTERIA UR CULT: ABNORMAL

## 2021-08-05 ENCOUNTER — TELEPHONE (OUTPATIENT)
Dept: UROLOGY | Facility: CLINIC | Age: 71
End: 2021-08-05

## 2021-08-05 DIAGNOSIS — R33.9 URINARY RETENTION: Primary | ICD-10-CM

## 2021-08-05 RX ORDER — SULFAMETHOXAZOLE AND TRIMETHOPRIM 800; 160 MG/1; MG/1
1 TABLET ORAL EVERY 12 HOURS SCHEDULED
Qty: 14 TABLET | Refills: 0 | Status: SHIPPED | OUTPATIENT
Start: 2021-08-05 | End: 2021-08-12

## 2021-08-05 NOTE — TELEPHONE ENCOUNTER
[facility] for patient to CB, number provided  Please update patient per Dr Andrew Weber note  Thank  you

## 2021-08-05 NOTE — TELEPHONE ENCOUNTER
Patient has a UTI  I prescribed antibiotics to their pharmacy  Can you please call them and instruct them to start   Thank you

## 2021-08-06 ENCOUNTER — TELEPHONE (OUTPATIENT)
Dept: CARDIOLOGY CLINIC | Facility: CLINIC | Age: 71
End: 2021-08-06

## 2021-08-06 NOTE — TELEPHONE ENCOUNTER
----- Message from Gypsy Murphy PA-C sent at 8/6/2021  2:42 PM EDT -----  Pls call echo good, same as last yr

## 2021-08-06 NOTE — TELEPHONE ENCOUNTER
Called and spoke to Alex Varela, made aware of UTI and abx ordered to his  Home	[place]  Instructions provided to patient  Patient verbalized understanding

## 2021-08-12 ENCOUNTER — ANESTHESIA EVENT (OUTPATIENT)
Dept: PERIOP | Facility: HOSPITAL | Age: 71
End: 2021-08-12
Payer: MEDICARE

## 2021-08-20 ENCOUNTER — APPOINTMENT (OUTPATIENT)
Dept: LAB | Facility: HOSPITAL | Age: 71
End: 2021-08-20
Attending: UROLOGY
Payer: MEDICARE

## 2021-08-20 ENCOUNTER — TELEPHONE (OUTPATIENT)
Dept: UROLOGY | Facility: MEDICAL CENTER | Age: 71
End: 2021-08-20

## 2021-08-20 DIAGNOSIS — Z01.818 OTHER SPECIFIED PRE-OPERATIVE EXAMINATION: Primary | ICD-10-CM

## 2021-08-20 DIAGNOSIS — N13.8 BPH WITH URINARY OBSTRUCTION: Primary | ICD-10-CM

## 2021-08-20 DIAGNOSIS — N40.1 BPH WITH URINARY OBSTRUCTION: Primary | ICD-10-CM

## 2021-08-20 LAB
ABO GROUP BLD: NORMAL
BLD GP AB SCN SERPL QL: NEGATIVE
RH BLD: POSITIVE
SPECIMEN EXPIRATION DATE: NORMAL

## 2021-08-20 PROCEDURE — 86901 BLOOD TYPING SEROLOGIC RH(D): CPT

## 2021-08-20 PROCEDURE — 36415 COLL VENOUS BLD VENIPUNCTURE: CPT

## 2021-08-20 PROCEDURE — 86900 BLOOD TYPING SEROLOGIC ABO: CPT

## 2021-08-20 PROCEDURE — 86850 RBC ANTIBODY SCREEN: CPT

## 2021-08-20 NOTE — TELEPHONE ENCOUNTER
Erica Ace from [facility] needs a new order for  Type and screen order  She states she is not seen as an active lab order  Please update   Pa;claire is at the lab now

## 2021-08-23 ENCOUNTER — TELEPHONE (OUTPATIENT)
Dept: OTHER | Facility: OTHER | Age: 71
End: 2021-08-23

## 2021-08-30 RX ORDER — TORSEMIDE 10 MG/1
10 TABLET ORAL DAILY
COMMUNITY
End: 2022-02-10

## 2021-08-30 NOTE — PRE-PROCEDURE INSTRUCTIONS
Pre-Surgery Instructions:   Medication Instructions    aspirin 81 mg chewable tablet Patient was instructed by Physician and understands      atenolol (TENORMIN) 50 mg tablet pt instructed to take on day of surgery with 1-2 sips of water    atorvastatin (LIPITOR) 80 mg tablet pt instructed to take on day of surgery with 1-2 sips of water    cloNIDine (CATAPRES) 0 1 mg tablet pt instructed to take on daY OF SURGERY WITH 1-2 SIPS OF WATER    ferrous sulfate 325 (65 Fe) mg tablet pt instructed to take on day of surgery with 1-2 sips of water    finasteride (PROSCAR) 5 mg tablet pt instructed to take on day of surgery with 1-2 sips of water    lisinopril (ZESTRIL) 40 mg tablet pt instructed to not take on day of surgery     metFORMIN (GLUCOPHAGE-XR) 500 mg 24 hr tablet pt instructed to not take on day of surgery     Omega-3 1000 MG CAPS pt instructed to stop one week prior to surgery     PreviDent 0 2 % SOLN pt uses at hs ok to use     tamsulosin (FLOMAX) 0 4 mg pt takes at hs    torsemide (DEMADEX) 10 mg tablet pt instructed to not take on day of surgery

## 2021-08-30 NOTE — PRE-PROCEDURE INSTRUCTIONS
Pre-Surgery Instructions:   Medication Instructions    aspirin 81 mg chewable tablet Patient was instructed by Physician and understands   atenolol (TENORMIN) 50 mg tablet pt instructed to take on day of surgery with 1-2 sips of water    atorvastatin (LIPITOR) 80 mg tablet pt instructed to take on day of surgery with 1-2 sips of water    cloNIDine (CATAPRES) 0 1 mg tablet pt instructed to take on daY OF SURGERY WITH 1-2 SIPS OF WATER    ferrous sulfate 325 (65 Fe) mg tablet pt instructed to take on day of surgery with 1-2 sips of water    finasteride (PROSCAR) 5 mg tablet pt instructed to take on day of surgery with 1-2 sips of water    lisinopril (ZESTRIL) 40 mg tablet pt instructed to not take on day of surgery     metFORMIN (GLUCOPHAGE-XR) 500 mg 24 hr tablet pt instructed to not take on day of surgery     Omega-3 1000 MG CAPS pt instructed to stop one week prior to surgery     PreviDent 0 2 % SOLN pt uses at hs ok to use     tamsulosin (FLOMAX) 0 4 mg pt takes at hs    torsemide (DEMADEX) 10 mg tablet pt instructed to not take on day of surgery     PT DENIES FEVER, SOB, sore throat and cough  Pt verbalized understanding of shower and med instructions  Pt instructed to stop nsaids and supplements prior to surgery

## 2021-09-02 ENCOUNTER — ANESTHESIA (OUTPATIENT)
Dept: PERIOP | Facility: HOSPITAL | Age: 71
End: 2021-09-02
Payer: MEDICARE

## 2021-09-02 ENCOUNTER — HOSPITAL ENCOUNTER (OUTPATIENT)
Facility: HOSPITAL | Age: 71
Setting detail: OUTPATIENT SURGERY
Discharge: HOME | End: 2021-09-03
Attending: UROLOGY | Admitting: UROLOGY
Payer: MEDICARE

## 2021-09-02 ENCOUNTER — TELEPHONE (OUTPATIENT)
Dept: UROLOGY | Facility: CLINIC | Age: 71
End: 2021-09-02

## 2021-09-02 DIAGNOSIS — N13.8 BPH WITH URINARY OBSTRUCTION: Primary | ICD-10-CM

## 2021-09-02 DIAGNOSIS — R33.9 URINARY RETENTION: ICD-10-CM

## 2021-09-02 DIAGNOSIS — N40.1 BPH WITH URINARY OBSTRUCTION: Primary | ICD-10-CM

## 2021-09-02 PROBLEM — N17.9 AKI (ACUTE KIDNEY INJURY) (HCC): Status: RESOLVED | Noted: 2021-04-04 | Resolved: 2021-09-02

## 2021-09-02 LAB
ANION GAP SERPL CALCULATED.3IONS-SCNC: 6 MMOL/L (ref 4–13)
BUN SERPL-MCNC: 9 MG/DL (ref 5–25)
CALCIUM SERPL-MCNC: 8.7 MG/DL (ref 8.3–10.1)
CHLORIDE SERPL-SCNC: 106 MMOL/L (ref 100–108)
CO2 SERPL-SCNC: 31 MMOL/L (ref 21–32)
CREAT SERPL-MCNC: 0.73 MG/DL (ref 0.6–1.3)
ERYTHROCYTE [DISTWIDTH] IN BLOOD BY AUTOMATED COUNT: 20.1 % (ref 11.6–15.1)
GFR SERPL CREATININE-BSD FRML MDRD: 106 ML/MIN/1.73SQ M
GLUCOSE P FAST SERPL-MCNC: 124 MG/DL (ref 65–99)
GLUCOSE SERPL-MCNC: 100 MG/DL (ref 65–140)
GLUCOSE SERPL-MCNC: 111 MG/DL (ref 65–140)
GLUCOSE SERPL-MCNC: 112 MG/DL (ref 65–140)
GLUCOSE SERPL-MCNC: 118 MG/DL (ref 65–140)
GLUCOSE SERPL-MCNC: 124 MG/DL (ref 65–140)
GLUCOSE SERPL-MCNC: 164 MG/DL (ref 65–140)
HCT VFR BLD AUTO: 39 % (ref 36.5–49.3)
HGB BLD-MCNC: 12 G/DL (ref 12–17)
MCH RBC QN AUTO: 25.4 PG (ref 26.8–34.3)
MCHC RBC AUTO-ENTMCNC: 30.8 G/DL (ref 31.4–37.4)
MCV RBC AUTO: 83 FL (ref 82–98)
PLATELET # BLD AUTO: 320 THOUSANDS/UL (ref 149–390)
PMV BLD AUTO: 8.6 FL (ref 8.9–12.7)
POTASSIUM SERPL-SCNC: 3.8 MMOL/L (ref 3.5–5.3)
RBC # BLD AUTO: 4.72 MILLION/UL (ref 3.88–5.62)
SODIUM SERPL-SCNC: 143 MMOL/L (ref 136–145)
WBC # BLD AUTO: 4.58 THOUSAND/UL (ref 4.31–10.16)

## 2021-09-02 PROCEDURE — 85027 COMPLETE CBC AUTOMATED: CPT | Performed by: UROLOGY

## 2021-09-02 PROCEDURE — 80048 BASIC METABOLIC PNL TOTAL CA: CPT | Performed by: UROLOGY

## 2021-09-02 PROCEDURE — 88305 TISSUE EXAM BY PATHOLOGIST: CPT | Performed by: PATHOLOGY

## 2021-09-02 PROCEDURE — NC001 PR NO CHARGE: Performed by: UROLOGY

## 2021-09-02 PROCEDURE — 52601 PROSTATECTOMY (TURP): CPT | Performed by: UROLOGY

## 2021-09-02 PROCEDURE — 82948 REAGENT STRIP/BLOOD GLUCOSE: CPT

## 2021-09-02 RX ORDER — ATROPA BELLADONNA AND OPIUM 16.2; 6 MG/1; MG/1
SUPPOSITORY RECTAL AS NEEDED
Status: DISCONTINUED | OUTPATIENT
Start: 2021-09-02 | End: 2021-09-02 | Stop reason: HOSPADM

## 2021-09-02 RX ORDER — ATORVASTATIN CALCIUM 40 MG/1
80 TABLET, FILM COATED ORAL DAILY
Status: DISCONTINUED | OUTPATIENT
Start: 2021-09-02 | End: 2021-09-03 | Stop reason: HOSPADM

## 2021-09-02 RX ORDER — OXYCODONE HYDROCHLORIDE 5 MG/1
2.5 TABLET ORAL EVERY 4 HOURS PRN
Status: DISCONTINUED | OUTPATIENT
Start: 2021-09-02 | End: 2021-09-03 | Stop reason: HOSPADM

## 2021-09-02 RX ORDER — ONDANSETRON 2 MG/ML
4 INJECTION INTRAMUSCULAR; INTRAVENOUS EVERY 6 HOURS PRN
Status: DISCONTINUED | OUTPATIENT
Start: 2021-09-02 | End: 2021-09-03 | Stop reason: HOSPADM

## 2021-09-02 RX ORDER — ATENOLOL 50 MG/1
50 TABLET ORAL DAILY
Status: DISCONTINUED | OUTPATIENT
Start: 2021-09-02 | End: 2021-09-03 | Stop reason: HOSPADM

## 2021-09-02 RX ORDER — CIPROFLOXACIN 2 MG/ML
400 INJECTION, SOLUTION INTRAVENOUS EVERY 12 HOURS
Status: DISCONTINUED | OUTPATIENT
Start: 2021-09-02 | End: 2021-09-03 | Stop reason: HOSPADM

## 2021-09-02 RX ORDER — OXYBUTYNIN CHLORIDE 5 MG/1
5 TABLET ORAL 2 TIMES DAILY
Status: DISCONTINUED | OUTPATIENT
Start: 2021-09-02 | End: 2021-09-03 | Stop reason: HOSPADM

## 2021-09-02 RX ORDER — LIDOCAINE HYDROCHLORIDE 10 MG/ML
INJECTION, SOLUTION EPIDURAL; INFILTRATION; INTRACAUDAL; PERINEURAL AS NEEDED
Status: DISCONTINUED | OUTPATIENT
Start: 2021-09-02 | End: 2021-09-02

## 2021-09-02 RX ORDER — CEFAZOLIN SODIUM 2 G/50ML
2000 SOLUTION INTRAVENOUS ONCE
Status: COMPLETED | OUTPATIENT
Start: 2021-09-02 | End: 2021-09-02

## 2021-09-02 RX ORDER — SENNOSIDES 8.6 MG
1 TABLET ORAL DAILY
Status: DISCONTINUED | OUTPATIENT
Start: 2021-09-02 | End: 2021-09-03 | Stop reason: HOSPADM

## 2021-09-02 RX ORDER — FENTANYL CITRATE 50 UG/ML
INJECTION, SOLUTION INTRAMUSCULAR; INTRAVENOUS AS NEEDED
Status: DISCONTINUED | OUTPATIENT
Start: 2021-09-02 | End: 2021-09-02

## 2021-09-02 RX ORDER — LISINOPRIL 20 MG/1
40 TABLET ORAL DAILY
Status: DISCONTINUED | OUTPATIENT
Start: 2021-09-02 | End: 2021-09-03 | Stop reason: HOSPADM

## 2021-09-02 RX ORDER — ACETAMINOPHEN 325 MG/1
650 TABLET ORAL EVERY 6 HOURS SCHEDULED
Status: DISCONTINUED | OUTPATIENT
Start: 2021-09-02 | End: 2021-09-03 | Stop reason: HOSPADM

## 2021-09-02 RX ORDER — SODIUM CHLORIDE, SODIUM LACTATE, POTASSIUM CHLORIDE, CALCIUM CHLORIDE 600; 310; 30; 20 MG/100ML; MG/100ML; MG/100ML; MG/100ML
INJECTION, SOLUTION INTRAVENOUS CONTINUOUS PRN
Status: DISCONTINUED | OUTPATIENT
Start: 2021-09-02 | End: 2021-09-02

## 2021-09-02 RX ORDER — PROPOFOL 10 MG/ML
INJECTION, EMULSION INTRAVENOUS AS NEEDED
Status: DISCONTINUED | OUTPATIENT
Start: 2021-09-02 | End: 2021-09-02

## 2021-09-02 RX ORDER — MAGNESIUM HYDROXIDE 1200 MG/15ML
LIQUID ORAL AS NEEDED
Status: DISCONTINUED | OUTPATIENT
Start: 2021-09-02 | End: 2021-09-02 | Stop reason: HOSPADM

## 2021-09-02 RX ORDER — ONDANSETRON 2 MG/ML
INJECTION INTRAMUSCULAR; INTRAVENOUS AS NEEDED
Status: DISCONTINUED | OUTPATIENT
Start: 2021-09-02 | End: 2021-09-02

## 2021-09-02 RX ORDER — SODIUM CHLORIDE, SODIUM LACTATE, POTASSIUM CHLORIDE, CALCIUM CHLORIDE 600; 310; 30; 20 MG/100ML; MG/100ML; MG/100ML; MG/100ML
125 INJECTION, SOLUTION INTRAVENOUS CONTINUOUS
Status: DISCONTINUED | OUTPATIENT
Start: 2021-09-02 | End: 2021-09-02

## 2021-09-02 RX ORDER — CLONIDINE HYDROCHLORIDE 0.1 MG/1
0.1 TABLET ORAL EVERY 8 HOURS SCHEDULED
Status: DISCONTINUED | OUTPATIENT
Start: 2021-09-02 | End: 2021-09-03 | Stop reason: HOSPADM

## 2021-09-02 RX ORDER — TAMSULOSIN HYDROCHLORIDE 0.4 MG/1
0.8 CAPSULE ORAL
Status: DISCONTINUED | OUTPATIENT
Start: 2021-09-02 | End: 2021-09-03 | Stop reason: HOSPADM

## 2021-09-02 RX ORDER — SODIUM CHLORIDE, SODIUM LACTATE, POTASSIUM CHLORIDE, CALCIUM CHLORIDE 600; 310; 30; 20 MG/100ML; MG/100ML; MG/100ML; MG/100ML
125 INJECTION, SOLUTION INTRAVENOUS CONTINUOUS
Status: DISCONTINUED | OUTPATIENT
Start: 2021-09-02 | End: 2021-09-03 | Stop reason: HOSPADM

## 2021-09-02 RX ORDER — DOCUSATE SODIUM 100 MG/1
100 CAPSULE, LIQUID FILLED ORAL 2 TIMES DAILY
Status: DISCONTINUED | OUTPATIENT
Start: 2021-09-02 | End: 2021-09-03 | Stop reason: HOSPADM

## 2021-09-02 RX ORDER — FINASTERIDE 5 MG/1
5 TABLET, FILM COATED ORAL DAILY
Status: DISCONTINUED | OUTPATIENT
Start: 2021-09-02 | End: 2021-09-03 | Stop reason: HOSPADM

## 2021-09-02 RX ORDER — DOCUSATE SODIUM 100 MG/1
100 CAPSULE, LIQUID FILLED ORAL 2 TIMES DAILY
Qty: 30 CAPSULE | Refills: 0 | Status: SHIPPED | OUTPATIENT
Start: 2021-09-02 | End: 2021-10-08 | Stop reason: ALTCHOICE

## 2021-09-02 RX ORDER — TORSEMIDE 10 MG/1
10 TABLET ORAL DAILY
Status: DISCONTINUED | OUTPATIENT
Start: 2021-09-02 | End: 2021-09-03 | Stop reason: HOSPADM

## 2021-09-02 RX ORDER — MAGNESIUM HYDROXIDE 1200 MG/15ML
3000 LIQUID ORAL CONTINUOUS
Status: DISCONTINUED | OUTPATIENT
Start: 2021-09-02 | End: 2021-09-03 | Stop reason: HOSPADM

## 2021-09-02 RX ORDER — OXYCODONE HYDROCHLORIDE 5 MG/1
5 TABLET ORAL EVERY 4 HOURS PRN
Qty: 5 TABLET | Refills: 0 | Status: SHIPPED | OUTPATIENT
Start: 2021-09-02 | End: 2021-09-07

## 2021-09-02 RX ORDER — BACITRACIN, NEOMYCIN, POLYMYXIN B 400; 3.5; 5 [USP'U]/G; MG/G; [USP'U]/G
1 OINTMENT TOPICAL 2 TIMES DAILY
Status: DISCONTINUED | OUTPATIENT
Start: 2021-09-02 | End: 2021-09-03 | Stop reason: HOSPADM

## 2021-09-02 RX ORDER — GABAPENTIN 100 MG/1
100 CAPSULE ORAL 3 TIMES DAILY
Status: DISCONTINUED | OUTPATIENT
Start: 2021-09-02 | End: 2021-09-03 | Stop reason: HOSPADM

## 2021-09-02 RX ORDER — FENTANYL CITRATE/PF 50 MCG/ML
50 SYRINGE (ML) INJECTION
Status: DISCONTINUED | OUTPATIENT
Start: 2021-09-02 | End: 2021-09-02 | Stop reason: HOSPADM

## 2021-09-02 RX ORDER — FERROUS SULFATE 325(65) MG
325 TABLET ORAL
Status: DISCONTINUED | OUTPATIENT
Start: 2021-09-03 | End: 2021-09-03 | Stop reason: HOSPADM

## 2021-09-02 RX ORDER — CIPROFLOXACIN 500 MG/1
500 TABLET, FILM COATED ORAL EVERY 12 HOURS SCHEDULED
Qty: 6 TABLET | Refills: 0 | Status: SHIPPED | OUTPATIENT
Start: 2021-09-02 | End: 2021-09-03 | Stop reason: SDUPTHER

## 2021-09-02 RX ORDER — OXYCODONE HYDROCHLORIDE 5 MG/1
5 TABLET ORAL EVERY 4 HOURS PRN
Status: DISCONTINUED | OUTPATIENT
Start: 2021-09-02 | End: 2021-09-03 | Stop reason: HOSPADM

## 2021-09-02 RX ORDER — ACETAMINOPHEN 325 MG/1
650 TABLET ORAL EVERY 4 HOURS PRN
Qty: 30 TABLET | Refills: 0
Start: 2021-09-02 | End: 2022-01-05

## 2021-09-02 RX ORDER — MIDAZOLAM HYDROCHLORIDE 2 MG/2ML
INJECTION, SOLUTION INTRAMUSCULAR; INTRAVENOUS AS NEEDED
Status: DISCONTINUED | OUTPATIENT
Start: 2021-09-02 | End: 2021-09-02

## 2021-09-02 RX ADMIN — SODIUM CHLORIDE, SODIUM LACTATE, POTASSIUM CHLORIDE, AND CALCIUM CHLORIDE: .6; .31; .03; .02 INJECTION, SOLUTION INTRAVENOUS at 08:31

## 2021-09-02 RX ADMIN — DOCUSATE SODIUM 100 MG: 100 CAPSULE, LIQUID FILLED ORAL at 20:20

## 2021-09-02 RX ADMIN — SODIUM CHLORIDE, SODIUM LACTATE, POTASSIUM CHLORIDE, AND CALCIUM CHLORIDE 125 ML/HR: .6; .31; .03; .02 INJECTION, SOLUTION INTRAVENOUS at 07:23

## 2021-09-02 RX ADMIN — OXYBUTYNIN CHLORIDE 5 MG: 5 TABLET ORAL at 20:19

## 2021-09-02 RX ADMIN — SODIUM CHLORIDE, SODIUM LACTATE, POTASSIUM CHLORIDE, AND CALCIUM CHLORIDE 125 ML/HR: .6; .31; .03; .02 INJECTION, SOLUTION INTRAVENOUS at 11:29

## 2021-09-02 RX ADMIN — ATORVASTATIN CALCIUM 80 MG: 40 TABLET, FILM COATED ORAL at 13:27

## 2021-09-02 RX ADMIN — ONDANSETRON 4 MG: 2 INJECTION INTRAMUSCULAR; INTRAVENOUS at 08:52

## 2021-09-02 RX ADMIN — TAMSULOSIN HYDROCHLORIDE 0.8 MG: 0.4 CAPSULE ORAL at 17:44

## 2021-09-02 RX ADMIN — DOCUSATE SODIUM 100 MG: 100 CAPSULE, LIQUID FILLED ORAL at 13:28

## 2021-09-02 RX ADMIN — BACITRACIN, NEOMYCIN, POLYMYXIN B 1 SMALL APPLICATION: 400; 3.5; 5 OINTMENT TOPICAL at 13:32

## 2021-09-02 RX ADMIN — MIDAZOLAM HYDROCHLORIDE 2 MG: 1 INJECTION, SOLUTION INTRAMUSCULAR; INTRAVENOUS at 08:31

## 2021-09-02 RX ADMIN — LISINOPRIL 40 MG: 20 TABLET ORAL at 13:24

## 2021-09-02 RX ADMIN — INSULIN LISPRO 1 UNITS: 100 INJECTION, SOLUTION INTRAVENOUS; SUBCUTANEOUS at 13:50

## 2021-09-02 RX ADMIN — FENTANYL CITRATE 50 MCG: 50 INJECTION, SOLUTION INTRAMUSCULAR; INTRAVENOUS at 08:48

## 2021-09-02 RX ADMIN — GABAPENTIN 100 MG: 100 CAPSULE ORAL at 20:19

## 2021-09-02 RX ADMIN — SODIUM CHLORIDE, SODIUM LACTATE, POTASSIUM CHLORIDE, AND CALCIUM CHLORIDE 125 ML/HR: .6; .31; .03; .02 INJECTION, SOLUTION INTRAVENOUS at 20:21

## 2021-09-02 RX ADMIN — GABAPENTIN 100 MG: 100 CAPSULE ORAL at 13:24

## 2021-09-02 RX ADMIN — FINASTERIDE 5 MG: 5 TABLET, FILM COATED ORAL at 13:30

## 2021-09-02 RX ADMIN — CEFAZOLIN SODIUM 2000 MG: 2 SOLUTION INTRAVENOUS at 08:21

## 2021-09-02 RX ADMIN — ACETAMINOPHEN 650 MG: 325 TABLET, FILM COATED ORAL at 17:44

## 2021-09-02 RX ADMIN — STANDARDIZED SENNA CONCENTRATE 8.6 MG: 8.6 TABLET ORAL at 13:28

## 2021-09-02 RX ADMIN — OXYBUTYNIN CHLORIDE 5 MG: 5 TABLET ORAL at 13:28

## 2021-09-02 RX ADMIN — BACITRACIN, NEOMYCIN, POLYMYXIN B 1 SMALL APPLICATION: 400; 3.5; 5 OINTMENT TOPICAL at 20:19

## 2021-09-02 RX ADMIN — PROPOFOL 180 MG: 10 INJECTION, EMULSION INTRAVENOUS at 08:37

## 2021-09-02 RX ADMIN — FENTANYL CITRATE 50 MCG: 50 INJECTION, SOLUTION INTRAMUSCULAR; INTRAVENOUS at 08:51

## 2021-09-02 RX ADMIN — CLONIDINE HYDROCHLORIDE 0.1 MG: 0.1 TABLET ORAL at 13:28

## 2021-09-02 RX ADMIN — CIPROFLOXACIN 400 MG: 2 INJECTION, SOLUTION INTRAVENOUS at 13:52

## 2021-09-02 RX ADMIN — ENOXAPARIN SODIUM 40 MG: 40 INJECTION SUBCUTANEOUS at 13:29

## 2021-09-02 RX ADMIN — SODIUM CHLORIDE 3000 ML: 900 IRRIGANT IRRIGATION at 09:30

## 2021-09-02 RX ADMIN — TORSEMIDE 10 MG: 10 TABLET ORAL at 13:27

## 2021-09-02 RX ADMIN — LIDOCAINE HYDROCHLORIDE 50 MG: 10 INJECTION, SOLUTION EPIDURAL; INFILTRATION; INTRACAUDAL; PERINEURAL at 08:37

## 2021-09-02 NOTE — ANESTHESIA POSTPROCEDURE EVALUATION
Post-Op Assessment Note    CV Status:  Stable  Pain Score: 0    Pain management: adequate     Mental Status:  Alert and awake   PONV Controlled:  Controlled   Airway Patency:  Patent and adequate   Two or more mitigation strategies used for obstructive sleep apnea   Post Op Vitals Reviewed: Yes      Staff: CRNA         No complications documented      BP      Temp      Pulse     Resp      SpO2

## 2021-09-02 NOTE — TELEPHONE ENCOUNTER
Status post TURP  Patient has had a chronic Butler catheter for 4 months  Please schedule for nurse visit for Butler removal and return for PVR next Thursday    Patient has at risk for requiring Butler catheter insertion and if so would repeat a voiding trial in an additional 1 week

## 2021-09-02 NOTE — ANESTHESIA PREPROCEDURE EVALUATION
Procedure:  TRANSURETHRAL RESECTION OF PROSTATE (TURP) (N/A Abdomen)    Relevant Problems   CARDIO   (+) Atherosclerosis of aorta (HCC)   (+) Atherosclerosis of native coronary artery of native heart without angina pectoris   (+) Coronary artery disease involving native coronary artery of native heart   (+) HTN (hypertension)   (+) Hyperlipidemia      ENDO   (+) Hyperparathyroidism (HCC)   (+) Type 2 diabetes mellitus (HCC)      /RENAL   (+) CIERA (acute kidney injury) (HCC) (Resolved)      HEMATOLOGY   (+) Anemia   (+) Anemia associated with diabetes mellitus (HCC)   (+) Iron deficiency anemia due to chronic blood loss      NEURO/PSYCH   (+) History of aortic regurgitation   (+) History of obesity   70-year-old male with history of hypertension, hyperlipidemia, type 2 diabetes, CAD on Plavix, hyperparathyroidism, hypertrophic obstructive cardiomyopathy, and previous GI bleed from gastric ulcer  Patient has been admitted for the past 5 days initially with anuria and hematuria  He presented with acute renal failure, initial creatinine of 13  Initial renal ultrasound showing moderate left and right hydronephrosis with concerning distal ureteral stone  In addition, echogenic material within the renal pelvis concerning for hemorrhage  Patient's Plavix was held, his hemoglobin has been down trending since admission starting at 11 5 to 6 7 on 04/07  He was transfused 1 unit of packed red blood cells, his hemoglobin is now in the 8s  GI was consulted as the reported dark stools overnight last night  Patient has no new complaints at this time  He denies pyrosis, regurgitation, dysphagia odynophagia  ECHO SUMMARY     LEFT VENTRICLE:  The cavity was small  Systolic function was normal  Ejection fraction was estimated to be 60 %  There were no regional wall motion abnormalities  Wall thickness was moderately increased  There was moderate assymetrical hypertrophy of the septum    Doppler parameters were consistent with abnormal left ventricular relaxation (grade 1 diastolic dysfunction)      RIGHT VENTRICLE:  The ventricle was mildly dilated  Systolic function was normal      LEFT ATRIUM:  The atrium was mildly dilated      MITRAL VALVE:  There was mild annular calcification  There was mild regurgitation      AORTIC VALVE:  There was mild stenosis  There was mild to moderate regurgitation      TRICUSPID VALVE:  There was trace regurgitation         Physical Exam    Airway    Mallampati score: III  TM Distance: >3 FB  Neck ROM: full     Dental       Cardiovascular  Cardiovascular exam normal    Pulmonary  Pulmonary exam normal     Other Findings        Anesthesia Plan  ASA Score- 3     Anesthesia Type- general with ASA Monitors  Additional Monitors:   Airway Plan: LMA  Plan Factors-Exercise tolerance (METS): <4 METS  Chart reviewed  EKG reviewed  Imaging results reviewed  Existing labs reviewed  Patient summary reviewed  Induction- intravenous  Postoperative Plan-     Informed Consent- Anesthetic plan and risks discussed with patient  I personally reviewed this patient with the CRNA  Discussed and agreed on the Anesthesia Plan with the CRNA  Garo Ledesma

## 2021-09-02 NOTE — H&P
UROLOGY HISTORY AND PHYSICAL     Patient Identifiers: Samm Phipps (MRN [id number])      Date of Service: 9/2/2021        ASSESSMENT:     66 y o  old male with BPH catheter dependent urinary retention     We reviewed the options for treating BPH/LUTS which include but are not limited to expectant management, medical therapy, minimally invasive options such as thermotherapy or interstitial laser therapy, transurethral resection of prostate (TURP), open prostatectomy, etc   At this point, the patient wishes to proceed with TURP  The risks of TURP include but are not limited to bleeding, infection, reaction to anesthesia such as heart attack, stroke, DVT/PE, hyponatremia, bladder neck contracture, urethral stricture, injury to surrounding structures (ureters, rectum, etc) incontinence, retrograde ejaculation, erectile dysfunction, prostatic regrowth or the need for further therapy  I also told him that the tissue resected and sent for pathological analysis could reveal prostate cancer and the implications were discussed  His questions regarding surgery, the possible complications and alternatives to therapy were answered to his satisfaction  Finally, I told him that he may require additional procedures secondary to some of these complications  Informed consent was then obtained  PLAN:     To OR for TURP      History of Present Illness:     Samm Phipps is a 66 y o  old with a history of BPH and medically refractory urinary retention, indwelling Butler catheter    Patient has been catheter dependent for 5 months    Past Medical, Past Surgical History:     Past Medical History:   Diagnosis Date    Benign neoplasm of large intestine     Bleeding gastric ulcer 2018    Colon polyp     Diabetes mellitus (HCC)     Heart murmur     History of aortic regurgitation     History of constipation     History of degenerative joint disease     History of nocturia     History of obesity     History of sebaceous cyst     History of shortness of breath     History of transfusion     History of urinary frequency     Hyperlipidemia     Hypertension     Myocardial [facility]) 2018 january, 3 stents    Polyposis coli     of the large intestine    Ulcer of esophagus    :    Past Surgical History:   Procedure Laterality Date    ACHILLES TENDON SURGERY Left 1973    CATARACT EXTRACTION Right 02/18/2021    COLONOSCOPY      hyperplastic polyp    CORONARY ANGIOPLASTY WITH STENT PLACEMENT      3 stents: 2 placed on Jan 2018, 1 on July 2018    [place] CYST REMOVAL  2019, 2020    left and right wrists    ESOPHAGOGASTRODUODENOSCOPY N/A 1/23/2018    Procedure: ESOPHAGOGASTRODUODENOSCOPY (EGD); Surgeon: Talon Torres MD;  Location: [facility]; Service: Gastroenterology    ESOPHAGOGASTRODUODENOSCOPY      HERNIA REPAIR      HERNIA REPAIR Bilateral 8/18/2017    Procedure: LAPAROSCOPIC INGUINAL HERNIA REPAIR WITH MESH;  Surgeon: Favio Santos MD;  Location: [facility];  Service: General    MA ESOPHAGOGASTRODUODENOSCOPY TRANSORAL DIAGNOSTIC N/A 3/26/2018    Procedure: ESOPHAGOGASTRODUODENOSCOPY (EGD); Surgeon: Tha Roldan MD;  [facility]; Service: Gastroenterology    MA ESOPHAGOGASTRODUODENOSCOPY TRANSORAL DIAGNOSTIC N/A 5/14/2018    Procedure: ESOPHAGOGASTRODUODENOSCOPY (EGD); Surgeon: Tha Roldan MD;  [facility];   Service: Gastroenterology    MA EXPLORE PARATHYROID GLANDS Right 3/23/2021    Procedure: RIGHT PARATHYROIDECTOMY, MINIMALLY INVASIVE, POSSIBLE 4-GLAND EXPLORATION, WITH INTRA-OPERATIVE PTH MONITORING;  Surgeon: Ashanti Day MD;  Location:  MAIN OR;  Service: Surgical Oncology    TIBIA FRACTURE SURGERY Left 1962   :    Medications, Allergies:     Current Facility-Administered Medications:     ceFAZolin (ANCEF) IVPB (premix in dextrose) 2,000 mg 50 mL, 2,000 mg, Intravenous, Once, Corky Polanco MD, Last Rate: 100 mL/hr at 09/02/21 0821, 2,000 mg at 09/02/21 9145    lactated ringers infusion, 125 mL/hr, Intravenous, Continuous, Mile Burciaga MD, Last Rate: 125 mL/hr at 09/02/21 0723, 125 mL/hr at 09/02/21 1203    Allergies:  No Known Allergies:    Social and Family History:   Social History:   Social History     Tobacco Use    Smoking status: Current Every Day Smoker     Packs/day: 1 00     Years: 35 00     Pack years: 35 00     Types: Cigarettes    Smokeless tobacco: Never Used    Tobacco comment: pt states quit many years ago   Vaping Use    Vaping Use: Never used   Substance Use Topics    Alcohol use: Not Currently    Drug use: No        Social History     Tobacco Use   Smoking Status Current Every Day Smoker    Packs/day: 1 00    Years: 35 00    Pack years: 35 00    Types: Cigarettes   Smokeless Tobacco Never Used   Tobacco Comment    pt states quit many years ago       Family History:  Family History   Problem Relation Age of Onset    Rheumatic fever Father     Other Father         accidental poisoning   Adam [place] Heart disease Mother    :     Review of Systems:     General: Fever, chills, or night sweats: negative  Cardiac: Negative for chest pain  Pulmonary: Negative for shortness of breath  Gastrointestinal: Abdominal pain negative  Nausea, vomiting, or diarrhea negative  Genitourinary: See HPI above  Patient does nothave hematuria  All other systems queried were negative  Physical Exam:   General: Patient is pleasant and in NAD  Awake and alert  /71   Pulse 55   Temp 97 6 °F (36 4 °C) (Temporal)   Resp 18   Ht 5' 7" (1 702 m)   Wt 86 6 kg (190 lb 14 7 oz)   SpO2 100%   BMI 29 90 kg/m²   HEENT:  Normocephalic atraumatic  Cardiac:  Regular rate and rhythm, Peripheral edema: negative  Pulmonary: Non-labored breathing, CTAB  Abdomen: Soft, non-tender, non-distended  No surgical scars  No masses, tenderness, hernias noted  Genitourinary: negative CVA tenderness, neg suprapubic tenderness    Extremities: normal movement in all 4       Labs:     Lab Results   Component Value Date    HGB 11 6 (L) 07/08/2021    HCT 38 3 07/08/2021    WBC 5 59 07/08/2021     (H) 07/08/2021   ]    Lab Results   Component Value Date     12/14/2015    K 3 6 07/08/2021     07/08/2021    CO2 28 07/08/2021    BUN 9 07/08/2021    CREATININE 0 68 07/08/2021    CALCIUM 9 2 07/08/2021    GLUCOSE 98 12/14/2015   ]    Imaging:   I personally reviewed the images and report of the following studies, and reviewed them with the patient:        Thank you for allowing me to participate in this patients care  Please do not hesitate to call with any additional questions    Kristina Lei MD

## 2021-09-02 NOTE — OP NOTE
Operative Note     PATIENT:  Leandro Crane (MRN [id number])    DATE OF PROCEDURE:   9/2/2021    PRE-OP DIAGNOSES:   1) BPH with obstruction  2   urinary retention  3  Indwelling Butler catheter     POST-OP DIAGNOSES AND OPERATIVE FINDINGS:   1) BPH with obstruction  2   urinary retention  3  Indwelling Butler catheter    PROCEDURES:  1)Transurethral resection of Prostate (saline bipolar)    SURGEON:   Soledad Goodell, MD    No qualified teaching residents available to assist    ANESTHESIA TYPE:  General anesthesia    ESTIMATED BLOOD LOSS:   Minimal    COMPLICATIONS:   None    ANTIBIOTICS:  Cefazolin    INTRAOPERATIVE THROMBOEMBOLISM PROPHYLAXIS:  Pneumatic compression stockings        PROCEDURE SUMMARY:    The patient was brought to the operating room and anesthesia obtained  The patient was then placed in the lithotomy position and prepped and draped using standard sterile technique  All pressure points were carefully padded  A surgical time-out occurred, antibiotics were administered, and thromboembolism prophylaxis was given  A 27 F saline resectoscope was inserted into the urethra after dilation of the urethral meatus to 28 F using standard urethral sounds  Cystoscopy revealed moderate bilobar hyperplasia and a high bladder  Resection was carried down to the floor of the bladder  It was then extended out laterally towards the prostatic capsule which was visualized but not violated  The distal margin of the resection was the vera montanum which was also left intact  Hemostasis was assured using electrocautery  All chips were evacuated from the bladder using an Ellick evacuator, and confirmed under direct vision  Each ureteral orifice was visualized and remained intact at the end of the case  The specimen was submitted for permanent pathology  A 24 [place] three-way Butler catheter was inserted and was connected to bladder irrigation, which was running clear prior to terminating the procedure   A belladonna and opium suppository was administered in the operating room  The patient was awoken transported to PACU in stable condition  DISPOSITION:   PACU - hemodynamically stable  SPECIMENS:  Prostate Chips    PLAN:  Patient will be admitted for 23 hour observation  We will wean his CBI over that time  He will be discharged home with a Butler catheter in scheduled for a voiding trial in 1 week  If he fails the voiding trial the catheter will be reinserted and we will repeat in an additional 1 week     Patient was counseled preoperatively on the possibility of permanent detrusor dysfunction which may necessitate a permanent indwelling Butler catheter or suprapubic tube  He was offered and declined preoperative suprapubic tube placement

## 2021-09-03 VITALS
HEART RATE: 61 BPM | TEMPERATURE: 98 F | SYSTOLIC BLOOD PRESSURE: 141 MMHG | OXYGEN SATURATION: 98 % | WEIGHT: 190.92 LBS | HEIGHT: 67 IN | RESPIRATION RATE: 17 BRPM | BODY MASS INDEX: 29.97 KG/M2 | DIASTOLIC BLOOD PRESSURE: 70 MMHG

## 2021-09-03 DIAGNOSIS — N40.1 BPH WITH URINARY OBSTRUCTION: ICD-10-CM

## 2021-09-03 DIAGNOSIS — N13.8 BPH WITH URINARY OBSTRUCTION: ICD-10-CM

## 2021-09-03 PROBLEM — R33.8 URINARY RETENTION DUE TO BENIGN PROSTATIC HYPERPLASIA: Status: ACTIVE | Noted: 2021-09-03

## 2021-09-03 LAB
ANION GAP SERPL CALCULATED.3IONS-SCNC: 5 MMOL/L (ref 4–13)
BUN SERPL-MCNC: 9 MG/DL (ref 5–25)
CALCIUM SERPL-MCNC: 8.1 MG/DL (ref 8.3–10.1)
CHLORIDE SERPL-SCNC: 106 MMOL/L (ref 100–108)
CO2 SERPL-SCNC: 29 MMOL/L (ref 21–32)
CREAT SERPL-MCNC: 0.69 MG/DL (ref 0.6–1.3)
ERYTHROCYTE [DISTWIDTH] IN BLOOD BY AUTOMATED COUNT: 19.9 % (ref 11.6–15.1)
GFR SERPL CREATININE-BSD FRML MDRD: 109 ML/MIN/1.73SQ M
GLUCOSE SERPL-MCNC: 111 MG/DL (ref 65–140)
GLUCOSE SERPL-MCNC: 90 MG/DL (ref 65–140)
GLUCOSE SERPL-MCNC: 98 MG/DL (ref 65–140)
HCT VFR BLD AUTO: 32.8 % (ref 36.5–49.3)
HGB BLD-MCNC: 10.1 G/DL (ref 12–17)
MCH RBC QN AUTO: 25.6 PG (ref 26.8–34.3)
MCHC RBC AUTO-ENTMCNC: 30.8 G/DL (ref 31.4–37.4)
MCV RBC AUTO: 83 FL (ref 82–98)
PLATELET # BLD AUTO: 259 THOUSANDS/UL (ref 149–390)
PMV BLD AUTO: 8.5 FL (ref 8.9–12.7)
POTASSIUM SERPL-SCNC: 3.9 MMOL/L (ref 3.5–5.3)
RBC # BLD AUTO: 3.94 MILLION/UL (ref 3.88–5.62)
SODIUM SERPL-SCNC: 140 MMOL/L (ref 136–145)
WBC # BLD AUTO: 6.26 THOUSAND/UL (ref 4.31–10.16)

## 2021-09-03 PROCEDURE — 82948 REAGENT STRIP/BLOOD GLUCOSE: CPT

## 2021-09-03 PROCEDURE — 80048 BASIC METABOLIC PNL TOTAL CA: CPT | Performed by: UROLOGY

## 2021-09-03 PROCEDURE — 99024 POSTOP FOLLOW-UP VISIT: CPT | Performed by: NURSE PRACTITIONER

## 2021-09-03 PROCEDURE — 85027 COMPLETE CBC AUTOMATED: CPT | Performed by: UROLOGY

## 2021-09-03 RX ORDER — CIPROFLOXACIN 500 MG/1
500 TABLET, FILM COATED ORAL EVERY 12 HOURS SCHEDULED
Qty: 6 TABLET | Refills: 0 | Status: SHIPPED | OUTPATIENT
Start: 2021-09-08 | End: 2021-09-07 | Stop reason: SDUPTHER

## 2021-09-03 RX ORDER — CIPROFLOXACIN 500 MG/1
500 TABLET, FILM COATED ORAL EVERY 12 HOURS SCHEDULED
Qty: 6 TABLET | Refills: 0 | Status: SHIPPED | OUTPATIENT
Start: 2021-09-08 | End: 2021-09-03 | Stop reason: SDUPTHER

## 2021-09-03 RX ADMIN — FERROUS SULFATE TAB 325 MG (65 MG ELEMENTAL FE) 325 MG: 325 (65 FE) TAB at 08:07

## 2021-09-03 RX ADMIN — DOCUSATE SODIUM 100 MG: 100 CAPSULE, LIQUID FILLED ORAL at 08:08

## 2021-09-03 RX ADMIN — ACETAMINOPHEN 650 MG: 325 TABLET, FILM COATED ORAL at 05:56

## 2021-09-03 RX ADMIN — TORSEMIDE 10 MG: 10 TABLET ORAL at 08:07

## 2021-09-03 RX ADMIN — ENOXAPARIN SODIUM 40 MG: 40 INJECTION SUBCUTANEOUS at 08:08

## 2021-09-03 RX ADMIN — SODIUM CHLORIDE, SODIUM LACTATE, POTASSIUM CHLORIDE, AND CALCIUM CHLORIDE 125 ML/HR: .6; .31; .03; .02 INJECTION, SOLUTION INTRAVENOUS at 06:00

## 2021-09-03 RX ADMIN — ACETAMINOPHEN 650 MG: 325 TABLET, FILM COATED ORAL at 01:19

## 2021-09-03 RX ADMIN — ATENOLOL 50 MG: 50 TABLET ORAL at 08:08

## 2021-09-03 RX ADMIN — FINASTERIDE 5 MG: 5 TABLET, FILM COATED ORAL at 08:07

## 2021-09-03 RX ADMIN — GABAPENTIN 100 MG: 100 CAPSULE ORAL at 08:07

## 2021-09-03 RX ADMIN — STANDARDIZED SENNA CONCENTRATE 8.6 MG: 8.6 TABLET ORAL at 08:07

## 2021-09-03 RX ADMIN — ATORVASTATIN CALCIUM 80 MG: 40 TABLET, FILM COATED ORAL at 08:07

## 2021-09-03 RX ADMIN — OXYBUTYNIN CHLORIDE 5 MG: 5 TABLET ORAL at 08:07

## 2021-09-03 RX ADMIN — BACITRACIN, NEOMYCIN, POLYMYXIN B 1 SMALL APPLICATION: 400; 3.5; 5 OINTMENT TOPICAL at 08:08

## 2021-09-03 RX ADMIN — CIPROFLOXACIN 400 MG: 2 INJECTION, SOLUTION INTRAVENOUS at 01:19

## 2021-09-03 RX ADMIN — CLONIDINE HYDROCHLORIDE 0.1 MG: 0.1 TABLET ORAL at 05:55

## 2021-09-03 RX ADMIN — LISINOPRIL 40 MG: 20 TABLET ORAL at 08:07
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
## 2021-09-03 NOTE — DISCHARGE INSTRUCTIONS
Transurethral Prostatectomy   WHAT YOU NEED TO KNOW:   A TURP is surgery to remove part or all of your prostate gland  This surgery is also called transurethral resection of the prostate  After surgery, you may have feelings of urgency and difficulty controlling your urine  You may have pain when you urinate and also a small amount of blood in your urine  You may also have a problem getting an erection and keeping one  DISCHARGE INSTRUCTIONS:   Seek care immediately if:   · You have severe abdominal or back pain  · You are dizzy or confused  · You have abdominal pain, nausea, and vomiting  · Your heartbeat is slower than usual     Call your doctor or urologist if:   · You urinate little or not at all  · You have a fever  · You have new or more blood in your urine  · You have trouble starting to urinate, or have a weak stream of urine when you urinate  · You feel like you have a full bladder, even after you urinate  · You often wake up during the night to urinate  · You feel pain or pressure in your lower abdomen  · Your urine looks cloudy, and smells bad  · You have questions or concerns about your condition or care  Medicines: You may need any of the following:  · Prescription pain medicine  may be given  Ask your healthcare provider how to take this medicine safely  Some prescription pain medicines contain acetaminophen  Do not take other medicines that contain acetaminophen without talking to your healthcare provider  Too much acetaminophen may cause liver damage  Prescription pain medicine may cause constipation  Ask your healthcare provider how to prevent or treat constipation  · Antibiotics  prevent or fight an infection caused by bacteria  · Take your medicine as directed  Contact your healthcare provider if you think your medicine is not helping or if you have side effects  Tell him or her if you are allergic to any medicine   Keep a list of the medicines, vitamins, and herbs you take  Include the amounts, and when and why you take them  Bring the list or the pill bottles to follow-up visits  Carry your medicine list with you in case of an emergency  Butler catheter care:  Keep the bag below your waist  If the bag is too high, urine will flow back into your bladder  This can cause an infection  Do not pull on the catheter  This may cause pain and bleeding, and the catheter may come out  Do not let the catheter tubing kink or twist  A kink or twist will block the flow of urine  Your healthcare provider will tell you how to clean around the catheter if you go home with one You will need to clean the area 2 times a day to prevent infection  Bladder control:  After surgery, you may leak urine and have trouble controlling when you urinate  The following can help decrease or manage urine leakage:  · Drink fluids as directed  Fluids may help your kidneys and bladder work properly  Fluids can also decrease your chance for infections  Ask your healthcare provider which fluids are best for you and how much you should drink  · Do not have caffeine  Caffeine can cause problems with bladder control and increase your need to urinate  · Wear a pad or adult diapers, if needed  These may help to absorb leaking urine and decrease odor  · Do pelvic floor muscle exercises  Pelvic floor muscle exercises, also called Kegels, may help improve your bladder control  These exercises are done by tightening and relaxing your pelvic muscles  Ask how to do pelvic floor muscle exercises, and how often to do them  Activity guidelines:  Ask when it is okay for you to return to work and activities, or to have sex  Follow up with your surgeon or urologist as directed: You may need to return to make sure you do not have an infection, or to have your Butler catheter removed  Write down your questions so you remember to ask them during your visits    © [address] Information is for End User's use only and may not be sold, redistributed or otherwise used for commercial purposes  All illustrations and images included in CareNotes® are the copyrighted property of Jessica CARRILLO  or Sonya Wilson  The above information is an  only  It is not intended as medical advice for individual conditions or treatments  Talk to your doctor, nurse or pharmacist before following any medical regimen to see if it is safe and effective for you

## 2021-09-03 NOTE — PLAN OF CARE
Problem: PAIN - ADULT  Goal: Verbalizes/displays adequate comfort level or baseline comfort level  Description: Interventions:  - Encourage patient to monitor pain and request assistance  - Assess pain using appropriate pain scale  - Administer analgesics based on type and severity of pain and evaluate response  - Implement non-pharmacological measures as appropriate and evaluate response  - Consider cultural and social influences on pain and pain management  - Notify physician/advanced practitioner if interventions unsuccessful or patient reports new pain  Outcome: Progressing     Problem: INFECTION - ADULT  Goal: Absence or prevention of progression during hospitalization  Description: INTERVENTIONS:  - Assess and monitor for signs and symptoms of infection  - Monitor lab/diagnostic results  - Monitor all insertion sites, i e  indwelling lines, tubes, and drains  - Monitor endotracheal if appropriate and nasal secretions for changes in amount and color  - [place] appropriate cooling/warming therapies per order  - Administer medications as ordered  - Instruct and encourage patient and family to use good hand hygiene technique  - Identify and instruct in appropriate isolation precautions for identified infection/condition  Outcome: Progressing  Goal: Absence of fever/infection during neutropenic period  Description: INTERVENTIONS:  - Monitor WBC    Outcome: Progressing     Problem: SAFETY ADULT  Goal: Patient will remain free of falls  Description: INTERVENTIONS:  - Educate patient/family on patient safety including physical limitations  - Instruct patient to call for assistance with activity   - Consult OT/PT to assist with strengthening/mobility   - Keep Call bell within reach  - Keep bed low and locked with side rails adjusted as appropriate  - Keep care items and personal belongings within reach  - Initiate and maintain comfort rounds  - Make Fall Risk Sign visible to staff  - Offer Toileting every   - Initiate/Maintain   - Obtain necessary fall risk management equipment:   - Apply yellow socks and bracelet for high fall risk patients  - Consider moving patient to room near nurses station  Outcome: Progressing  Goal: Maintain or return to baseline ADL function  Description: INTERVENTIONS:  -  Assess patient's ability to carry out ADLs; assess patient's baseline for ADL function and identify physical deficits which impact ability to perform ADLs (bathing, care of mouth/teeth, toileting, grooming, dressing, etc )  - Assess/evaluate cause of self-care deficits   - Assess range of motion  - Assess patient's mobility; develop plan if impaired  - Assess patient's need for assistive devices and provide as appropriate  - Encourage maximum independence but intervene and supervise when necessary  - Involve family in performance of ADLs  - Assess for home care needs following discharge   - Consider OT consult to assist with ADL evaluation and planning for discharge  - Provide patient education as appropriate  Outcome: Progressing  Goal: Maintains/Returns to pre admission functional level  Description: INTERVENTIONS:  - Perform BMAT or MOVE assessment daily    - Set and communicate daily mobility goal to care team and patient/family/caregiver     - Collaborate with rehabilitation services on mobility goals if consulted  - Perform Range of Motion   - Reposition patient every  - Dangle patient   - Stand patient   - Ambulate patient   - Out of bed to chair   - Out of bed for meals   - Out of bed for toileting  - Record patient progress and toleration of activity level   Outcome: Progressing     Problem: DISCHARGE PLANNING  Goal: Discharge to home or other facility with appropriate resources  Description: INTERVENTIONS:  - Identify barriers to discharge w/patient and caregiver  - Arrange for needed discharge resources and transportation as appropriate  - Identify discharge learning needs (meds, wound care, etc )  - Arrange for interpretive services to assist at discharge as needed  - Refer to Case Management Department for coordinating discharge planning if the patient needs post-hospital services based on physician/advanced practitioner order or complex needs related to functional status, cognitive ability, or social support system  Outcome: Progressing     Problem: Knowledge Deficit  Goal: Patient/family/caregiver demonstrates understanding of disease process, treatment plan, medications, and discharge instructions  Description: Complete learning assessment and assess knowledge base    Interventions:  - Provide teaching at level of understanding  - Provide teaching via preferred learning methods  Outcome: Progressing

## 2021-09-03 NOTE — DISCHARGE SUMMARY
DISCHARGE SUMMARY    Patient Name: Kodi Fountain  Patient MRN: [id number]  Admitting Provider: Kristina Lei MD  Discharging Provider: Kristina Lei MD  Primary Care Physician at Discharge: Ta Benedict [phone]   Admission Date: 9/2/2021       Discharge Date: 09/03/21      Admission Diagnosis   BPH with urinary obstruction [N40 1, N13 8]  Urinary retention [R33 9]    Discharge Diagnoses  Patient Active Problem List   Diagnosis    Non-recurrent bilateral inguinal hernia without obstruction or gangrene    Hyperlipidemia    HTN (hypertension)    Type 2 diabetes mellitus ([place] [address] )    Tobacco abuse    Iron deficiency anemia due to chronic blood loss    HOCM (hypertrophic obstructive cardiomyopathy) ([place] [address] )    Coronary artery disease involving native coronary artery of native heart    Atherosclerosis of aorta ([place] [address] )    Atherosclerosis of native coronary artery of native heart without angina pectoris    Anemia associated with diabetes mellitus ([place] [address] )    Thrombocytosis ([place] [address] )    Hypercalcemia    Hyperparathyroidism ([place] [address] )    History of aortic regurgitation    History of obesity    Hyperkalemia    Metabolic acidosis    UTI (urinary tract infection)    Dehydration with hyponatremia    Hematuria    Anemia    Status post parathyroidectomy ([place] [address] )    Antritis and gastric ulcer    Urinary retention    Urinary retention due to benign prostatic hyperplasia    Benign localized hyperplasia of prostate with urinary obstruction           Hospital Course  Patient known to group for BPH/ with LUTs and urinary obstruction  Unfortunately, patient failed medical management  After explanation of Risks vs  Benefits and potential complications; patient was agreeable and furnished informed consent for transurethral resection of the prostate  Refer to operative report for details  There were no complications  Patient was admitted for overnight observation and continuous bladder irrigation   Patient remained afebrile, HD stable and pain controlled  Breakfast was tolerated  CBI was clamped  Urine was clear to mild without clots  Patient therefore deemed  stable for discharge  Office will contact patient with date & time for saeed catheter removal next Thursday 9/9      Medications  Current Discharge Medication List      CONTINUE these medications which have NOT CHANGED    Details   atenolol (TENORMIN) 50 mg tablet TAKE 1 TABLET DAILY  Qty: 90 tablet, Refills: 3    Associated Diagnoses: Hypertension, unspecified type      atorvastatin (LIPITOR) 80 mg tablet TAKE 1 TABLET DAILY  Qty: 90 tablet, Refills: 3    Associated Diagnoses: Hyperlipidemia, unspecified hyperlipidemia type      cloNIDine (CATAPRES) 0 1 mg tablet Take 1 tablet (0 1 mg total) by mouth every 8 (eight) hours  Qty: 90 tablet, Refills: 5    Associated Diagnoses: HTN (hypertension)      ferrous sulfate 325 (65 Fe) mg tablet Take 325 mg by mouth daily with breakfast      finasteride (PROSCAR) 5 mg tablet TAKE 1 TABLET(5 MG) BY MOUTH DAILY  Qty: 90 tablet, Refills: 0    Comments: **Patient requests 90 days supply**  Associated Diagnoses: Hydronephrosis, unspecified hydronephrosis type; BPH with urinary obstruction      lisinopril (ZESTRIL) 40 mg tablet Take 1 tablet (40 mg total) by mouth daily  Qty: 90 tablet, Refills: 3    Associated Diagnoses: Type 2 diabetes mellitus with other specified complication, without long-term current use of insulin ([facility])      metFORMIN (GLUCOPHAGE-XR) 500 mg 24 hr tablet TAKE 1 TABLET TWICE A DAY WITH MEALS  Qty: 180 tablet, Refills: 3    Associated Diagnoses: Type 2 diabetes mellitus with other specified complication, without long-term current use of insulin ([facility])      PreviDent 0 2 % SOLN RINSE WITH 10ML FOR 1 MINUTE THEN SPIT OUT AT BEDTIME DO NOT SWALLOW       tamsulosin (FLOMAX) 0 4 mg Take 2 capsules (0 8 mg total) by mouth daily with dinner  Qty: 180 capsule, Refills: 0    Associated Diagnoses: Urinary retention torsemide (DEMADEX) 10 mg tablet Take 10 mg by mouth daily           Current Discharge Medication List      START taking these medications    Details   acetaminophen (TYLENOL) 325 mg tablet Take 2 tablets (650 mg total) by mouth every 4 (four) hours as needed for mild pain  Qty: 30 tablet, Refills: 0    Associated Diagnoses: BPH with urinary obstruction      ciprofloxacin (CIPRO) 500 mg tablet Take 1 tablet (500 mg total) by mouth every 12 (twelve) hours for 3 days  Qty: 6 tablet, Refills: 0    Associated Diagnoses: BPH with urinary obstruction      docusate sodium (COLACE) 100 mg capsule Take 1 capsule (100 mg total) by mouth 2 (two) times a day for 15 days  Qty: 30 capsule, Refills: 0    Associated Diagnoses: BPH with urinary obstruction      oxyCODONE (ROXICODONE) 5 mg immediate release tablet Take 1 tablet (5 mg total) by mouth every 4 (four) hours as needed for severe pain for up to 5 daysMax Daily Amount: 30 mg  Qty: 5 tablet, Refills: 0    Associated Diagnoses: BPH with urinary obstruction               Allergies  No Known Allergies    Outpatient Follow-Up  Future Appointments   Date Time Provider Mandy Perez   9/17/2021  9:15 AM Carlton Martinez PA-C [facility]   10/4/2021  8:40 AM Herb Maddox MD CARD E [facility]   10/13/2021  8:30 AM Ashanti Day MD SURG ONC [facility]   1/5/2022  8:00 AM Lana Muñiz MD MED [facility]       Active Issues Requiring Follow-Up  catheter removal    Test Results Pending at Discharge  Pending Labs     Order Current Status    Tissue Exam In process            Discharge Disposition  Home     Treatments   Butler catheter     Consults   none    Procedures   CBI--clamped prior to discharge    Operative Procedures Performed  Procedure(s):  TRANSURETHRAL RESECTION OF PROSTATE (TURP)    Pertinent Test Results   Lab Results   Component Value Date    WBC 6 26 09/03/2021    HGB 10 1 (L) 09/03/2021    HCT 32 8 (L) 09/03/2021    MCV 83 09/03/2021     09/03/2021     Lab Results   Component Value Date    SODIUM 140 09/03/2021    K 3 9 09/03/2021     09/03/2021    CO2 29 09/03/2021    BUN 9 09/03/2021    CREATININE 0 69 09/03/2021    GLUC 98 09/03/2021    CALCIUM 8 1 (L) 09/03/2021       Pathology Report Pending    Physical Exam at Discharge  Condition of Patient on Discharge: good  /70   Pulse 61   Temp 98 °F (36 7 °C)   Resp 17   Ht 5' 7" (1 702 m)   Wt 86 6 kg (190 lb 14 7 oz)   SpO2 98%   BMI 29 90 kg/m²   General appearance: alert and oriented, in no acute distress, appears stated age, cooperative and no distress  Head: Normocephalic, without obvious abnormality, atraumatic  Neck: no adenopathy, no carotid bruit, no JVD, supple, symmetrical, trachea midline and thyroid not enlarged, symmetric, no tenderness/mass/nodules  Lungs: clear to auscultation bilaterally  Heart: regular rate and rhythm, S1, S2 normal, no murmur, click, rub or gallop  Abdomen: soft, non-tender; bowel sounds normal; no masses,  no organomegaly  Extremities: extremities normal, warm and well-perfused; no cyanosis, clubbing, or edema  Pulses: 2+ and symmetric  Neurologic: Grossly normal  Butler--clear yellow urine      I/O last 3 completed shifts:   In: 2130 [P O :480; I V :1650]  Out: 62240 [Urine:82796]  I/O this shift:  In: 120 [P O :120]  Out: [address], SIGIFREDO
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
## 2021-09-07 DIAGNOSIS — N13.8 BPH WITH URINARY OBSTRUCTION: ICD-10-CM

## 2021-09-07 DIAGNOSIS — N40.1 BPH WITH URINARY OBSTRUCTION: ICD-10-CM

## 2021-09-07 RX ORDER — CIPROFLOXACIN 500 MG/1
500 TABLET, FILM COATED ORAL EVERY 12 HOURS SCHEDULED
Qty: 6 TABLET | Refills: 0 | Status: SHIPPED | OUTPATIENT
Start: 2021-09-08 | End: 2021-09-11

## 2021-09-07 NOTE — TELEPHONE ENCOUNTER
This was 1 of the medications patient was discharged with  I have sent the prescription to requested pharmacy below

## 2021-09-07 NOTE — TELEPHONE ENCOUNTER
Does the patient need antibiotics prior to saeed change?      Patient scheduled for void trial on 09/09 at 9:00am and 3:00pm

## 2021-09-09 ENCOUNTER — PROCEDURE VISIT (OUTPATIENT)
Dept: UROLOGY | Facility: CLINIC | Age: 71
End: 2021-09-09
Payer: MEDICARE

## 2021-09-09 VITALS
HEIGHT: 67 IN | SYSTOLIC BLOOD PRESSURE: 136 MMHG | BODY MASS INDEX: 29.66 KG/M2 | WEIGHT: 189 LBS | HEART RATE: 86 BPM | DIASTOLIC BLOOD PRESSURE: 70 MMHG

## 2021-09-09 DIAGNOSIS — N13.8 BPH WITH URINARY OBSTRUCTION: Primary | ICD-10-CM

## 2021-09-09 DIAGNOSIS — N40.1 BPH WITH URINARY OBSTRUCTION: Primary | ICD-10-CM

## 2021-09-09 LAB — POST-VOID RESIDUAL VOLUME, ML POC: 0 ML

## 2021-09-09 PROCEDURE — 99024 POSTOP FOLLOW-UP VISIT: CPT | Performed by: UROLOGY

## 2021-09-09 PROCEDURE — 51798 US URINE CAPACITY MEASURE: CPT

## 2021-09-09 NOTE — PROGRESS NOTES
9/9/2021    Alberto Owens  1950  [id number]    Diagnosis  Chief Complaint     BPH with urinary obstruction          Patient presents for saeed catheter removal and void trial managed by Dr Calvin Dense is to follow up as scheduled this afternoon with RN for post void residual      Procedure Saeed removal/voiding trial    Saeed catheter removed after deflation of an intact balloon  Patient tolerated well  Encouraged patient to hydrate well and return this afternoon for post void residual   he knows he may return early if uncomfortable and unable to urinate  Patient agrees to this plan  Patient returned this afternoon  Patient states able to void  Patient voided  ml while in office  Bladder ultrasound performed and PVR measured 0ml      Recent Results (from the past 4 hour(s))   POCT Measure PVR    Collection Time: 09/09/21  3:06 PM   Result Value Ref Range    POST-VOID RESIDUAL VOLUME, ML POC 0 mL           Vitals:    09/09/21 0904   BP: 136/70   BP Location: Right arm   Patient Position: Sitting   Cuff Size: Standard   Pulse: 86   Weight: 85 7 kg (189 lb)   Height: 5' 7" (1 702 m)           Ryan Velez RN

## 2021-09-16 NOTE — PROGRESS NOTES
9/17/2021      Chief Complaint   Patient presents with    Post Op TURP         Assessment and Plan    66 y o  male -- Dr Park Laureano    1  BPH with LUTS s/p TURP (9/2/21)  - UA unable to be obtained  - PVR 53 mL  - Patient doing well overall  - Still having mild gross hematuria intermittently  - Denies blood clots  - Still holding fish oil and aspirin, advised to continue to hold this until gross hematuria resolves  - Continue Flomax and finasteride at this time  - Will follow up in 2 months for reassessment and PVR  - Will call in the meantime with any questions or concerns  - All questions answered; patient understands and agrees with plan        History of Present Illness  Sabrina Peñaloza is a 66 y o  male patient of Dr Park Laureano with history of longstanding urinary retention requiring saeed catheter now s/p TURP here for follow up  Patient doing well overall  TURP done (9/2/21)  Patient had voiding trial (9/9/21) and had 0 mL left in bladder  States he is overall happy with urination at this time  Denies frequency, urgency, dysuria, flank pain, suprapubic pain, fevers, chills  Still having some intermittent gross hematuria  Denies large blood clots  Review of Systems   Constitutional: Negative for activity change, appetite change, chills and fever  HENT: Negative for congestion and trouble swallowing  Respiratory: Negative for cough and shortness of breath  Cardiovascular: Negative for chest pain, palpitations and leg swelling  Gastrointestinal: Negative for abdominal pain, constipation, diarrhea, nausea and vomiting  Genitourinary: Positive for hematuria (Intermittent)  Negative for difficulty urinating, dysuria, flank pain, frequency and urgency  Musculoskeletal: Negative for back pain and gait problem  Skin: Negative for wound  Allergic/Immunologic: Negative for immunocompromised state  Neurological: Negative for dizziness and syncope     Hematological: Does not bruise/bleed easily  Psychiatric/Behavioral: Negative for confusion  All other systems reviewed and are negative  Vitals  Vitals:    09/17/21 0852   BP: 130/92   Pulse: 68   Weight: 85 7 kg (189 lb)   Height: 5' 7" (1 702 m)       Physical Exam  Constitutional:       Appearance: Normal appearance  HENT:      Head: Normocephalic  Pulmonary:      Effort: Pulmonary effort is normal    Musculoskeletal:      Cervical back: Normal range of motion  Skin:     General: Skin is warm and dry  Neurological:      General: No focal deficit present  Mental Status: He is alert and oriented to person, place, and time  Psychiatric:         Mood and Affect: Mood normal          Behavior: Behavior normal          Thought Content:  Thought content normal          Judgment: Judgment normal            Past History  Past Medical History:   Diagnosis Date    Benign neoplasm of large intestine     Bleeding gastric ulcer 2018    Colon polyp     Diabetes mellitus (HCC)     Heart murmur     History of aortic regurgitation     History of constipation     History of degenerative joint disease     History of nocturia     History of obesity     History of sebaceous cyst     History of shortness of breath     History of transfusion     History of urinary frequency     Hyperlipidemia     Hypertension     Myocardial infarction [facility]) 2018    january, 3 stents    Polyposis coli     of the large intestine    Ulcer of esophagus      Social History     Socioeconomic History    Marital status: /Civil Union     Spouse name: None    Number of children: None    Years of education: None    Highest education level: None   Occupational History    Occupation:    Tobacco Use    Smoking status: Current Every Day Smoker     Packs/day: 1 00     Years: 35 00     Pack years: 35 00     Types: Cigarettes    Smokeless tobacco: Never Used    Tobacco comment: pt states quit many years ago   Vaping Use    Vaping Use: Never used   Substance and Sexual Activity    Alcohol use: Not Currently    Drug use: No    Sexual activity: Not Currently     Partners: Female   Other Topics Concern    None   Social History Narrative    None     Social Determinants of Health     Financial Resource Strain:     Difficulty of Paying Living Expenses:    Food Insecurity:     Worried About Running Out of Food in the Last Year:     Ran Out of Food in the Last Year:    Transportation Needs:     Lack of Transportation (Medical):      Lack of Transportation (Non-Medical):    Physical Activity: Inactive    Days of Exercise per Week: 0 days    Minutes of Exercise per Session: 0 min   Stress: No Stress Concern Present    Feeling of Stress : Not at all   Social Connections:     Frequency of Communication with Friends and Family:     Frequency of Social Gatherings with Friends and Family:     Attends Sabianist Services:     Active Member of Clubs or Organizations:     Attends Club or Organization Meetings:     Marital Status:    Intimate Partner Violence:     Fear of Current or Ex-Partner:     Emotionally Abused:     Physically Abused:     Sexually Abused:      Social History     Tobacco Use   Smoking Status Current Every Day Smoker    Packs/day: 1 00    Years: 35 00    Pack years: 35 00    Types: Cigarettes   Smokeless Tobacco Never Used   Tobacco Comment    pt states quit many years ago     Family History   Problem Relation Age of Onset    Rheumatic fever Father     Other Father         accidental poisoning    Heart disease Mother        The following portions of the patient's history were reviewed and updated as appropriate: allergies, current medications, past medical history, past social history, past surgical history and problem list     Results  No results found for this or any previous visit (from the past 1 hour(s)) ]  Lab Results   Component Value Date    PSA 0 8 12/28/2020    PSA 0 6 06/18/2019    PSA 0 5 06/15/2018    PSA 0 5 07/18/2016     Lab Results   Component Value Date    GLUCOSE 98 12/14/2015    CALCIUM 8 1 (L) 09/03/2021     12/14/2015    K 3 9 09/03/2021    CO2 29 09/03/2021     09/03/2021    BUN 9 09/03/2021    CREATININE 0 69 09/03/2021     Lab Results   Component Value Date    WBC 6 26 09/03/2021    HGB 10 1 (L) 09/03/2021    HCT 32 8 (L) 09/03/2021    MCV 83 09/03/2021     09/03/2021       Otis Madrigal PA-C

## 2021-09-17 ENCOUNTER — TELEPHONE (OUTPATIENT)
Dept: OTHER | Facility: OTHER | Age: 71
End: 2021-09-17

## 2021-09-17 ENCOUNTER — OFFICE VISIT (OUTPATIENT)
Dept: UROLOGY | Facility: CLINIC | Age: 71
End: 2021-09-17
Payer: MEDICARE

## 2021-09-17 VITALS
DIASTOLIC BLOOD PRESSURE: 92 MMHG | HEIGHT: 67 IN | WEIGHT: 189 LBS | HEART RATE: 68 BPM | SYSTOLIC BLOOD PRESSURE: 130 MMHG | BODY MASS INDEX: 29.66 KG/M2

## 2021-09-17 DIAGNOSIS — N13.8 BPH WITH URINARY OBSTRUCTION: Primary | ICD-10-CM

## 2021-09-17 DIAGNOSIS — N40.1 BPH WITH URINARY OBSTRUCTION: Primary | ICD-10-CM

## 2021-09-17 LAB — POST-VOID RESIDUAL VOLUME, ML POC: 53 ML

## 2021-09-17 PROCEDURE — 99024 POSTOP FOLLOW-UP VISIT: CPT | Performed by: PHYSICIAN ASSISTANT

## 2021-09-17 PROCEDURE — 51798 US URINE CAPACITY MEASURE: CPT | Performed by: PHYSICIAN ASSISTANT

## 2021-09-17 NOTE — TELEPHONE ENCOUNTER
I forgot to ask a few questions at my visit:    Can I drink sodas, like dark colors (root beer, coke)? Am I allowed to mow the lawn?

## 2021-09-17 NOTE — TELEPHONE ENCOUNTER
Called and made patient aware that he is okay to drink soda but In moderation  Patient can mow the lawn but if he develops pain he is to hold off for a few weeks

## 2021-10-07 DIAGNOSIS — E11.69 TYPE 2 DIABETES MELLITUS WITH OTHER SPECIFIED COMPLICATION, WITHOUT LONG-TERM CURRENT USE OF INSULIN (HCC): ICD-10-CM

## 2021-10-07 RX ORDER — METFORMIN HYDROCHLORIDE 500 MG/1
500 TABLET, EXTENDED RELEASE ORAL 2 TIMES DAILY WITH MEALS
Qty: 180 TABLET | Refills: 3 | Status: SHIPPED | OUTPATIENT
Start: 2021-10-07

## 2021-10-08 ENCOUNTER — OFFICE VISIT (OUTPATIENT)
Dept: CARDIOLOGY CLINIC | Facility: CLINIC | Age: 71
End: 2021-10-08
Payer: MEDICARE

## 2021-10-08 VITALS
WEIGHT: 195 LBS | OXYGEN SATURATION: 99 % | BODY MASS INDEX: 30.61 KG/M2 | RESPIRATION RATE: 16 BRPM | HEART RATE: 71 BPM | HEIGHT: 67 IN | SYSTOLIC BLOOD PRESSURE: 148 MMHG | DIASTOLIC BLOOD PRESSURE: 78 MMHG

## 2021-10-08 DIAGNOSIS — I10 ESSENTIAL HYPERTENSION: ICD-10-CM

## 2021-10-08 DIAGNOSIS — I25.10 CORONARY ARTERY DISEASE INVOLVING NATIVE HEART WITHOUT ANGINA PECTORIS, UNSPECIFIED VESSEL OR LESION TYPE: Primary | ICD-10-CM

## 2021-10-08 DIAGNOSIS — E78.2 MIXED HYPERLIPIDEMIA: ICD-10-CM

## 2021-10-08 DIAGNOSIS — I42.1 HOCM (HYPERTROPHIC OBSTRUCTIVE CARDIOMYOPATHY) (HCC): ICD-10-CM

## 2021-10-08 PROCEDURE — 99214 OFFICE O/P EST MOD 30 MIN: CPT | Performed by: INTERNAL MEDICINE

## 2021-10-08 RX ORDER — ASPIRIN 81 MG/1
81 TABLET ORAL DAILY
Start: 2021-10-08

## 2021-10-12 ENCOUNTER — APPOINTMENT (OUTPATIENT)
Dept: LAB | Facility: HOSPITAL | Age: 71
End: 2021-10-12
Attending: SURGERY
Payer: MEDICARE

## 2021-10-12 DIAGNOSIS — E89.2 STATUS POST PARATHYROIDECTOMY (HCC): Primary | ICD-10-CM

## 2021-10-12 DIAGNOSIS — E89.2 STATUS POST PARATHYROIDECTOMY (HCC): ICD-10-CM

## 2021-10-12 LAB — CALCIUM SERPL-MCNC: 9.3 MG/DL (ref 8.3–10.1)

## 2021-10-12 PROCEDURE — 83970 ASSAY OF PARATHORMONE: CPT

## 2021-10-12 PROCEDURE — 82310 ASSAY OF CALCIUM: CPT

## 2021-10-12 PROCEDURE — 36415 COLL VENOUS BLD VENIPUNCTURE: CPT

## 2021-10-13 ENCOUNTER — OFFICE VISIT (OUTPATIENT)
Dept: SURGICAL ONCOLOGY | Facility: CLINIC | Age: 71
End: 2021-10-13
Payer: MEDICARE

## 2021-10-13 VITALS
TEMPERATURE: 97.3 F | DIASTOLIC BLOOD PRESSURE: 80 MMHG | RESPIRATION RATE: 16 BRPM | WEIGHT: 195 LBS | HEIGHT: 67 IN | SYSTOLIC BLOOD PRESSURE: 148 MMHG | OXYGEN SATURATION: 98 % | BODY MASS INDEX: 30.61 KG/M2 | HEART RATE: 60 BPM

## 2021-10-13 DIAGNOSIS — E89.2 STATUS POST PARATHYROIDECTOMY (HCC): Primary | ICD-10-CM

## 2021-10-13 LAB — PTH-INTACT SERPL-MCNC: 64 PG/ML (ref 18.4–80.1)

## 2021-10-13 PROCEDURE — 99213 OFFICE O/P EST LOW 20 MIN: CPT | Performed by: SURGERY

## 2021-10-14 ENCOUNTER — TELEPHONE (OUTPATIENT)
Dept: UROLOGY | Facility: AMBULATORY SURGERY CENTER | Age: 71
End: 2021-10-14

## 2021-10-14 NOTE — TELEPHONE ENCOUNTER
Patient stated that he does not want to continue the Flomax if he does not need to  Patient is going to decline the refill at this time and will call the office with any issues

## 2021-10-14 NOTE — TELEPHONE ENCOUNTER
He does not need to continue taking Flomax given that he is now s/p TURP, is he still wanting a refill?

## 2021-10-31 DIAGNOSIS — E11.69 TYPE 2 DIABETES MELLITUS WITH OTHER SPECIFIED COMPLICATION, WITHOUT LONG-TERM CURRENT USE OF INSULIN (HCC): ICD-10-CM

## 2021-11-01 RX ORDER — LISINOPRIL 40 MG/1
TABLET ORAL
Qty: 90 TABLET | Refills: 3 | Status: SHIPPED | OUTPATIENT
Start: 2021-11-01

## 2021-11-12 ENCOUNTER — OFFICE VISIT (OUTPATIENT)
Dept: UROLOGY | Facility: CLINIC | Age: 71
End: 2021-11-12
Payer: MEDICARE

## 2021-11-12 VITALS — WEIGHT: 194 LBS | HEIGHT: 67 IN | BODY MASS INDEX: 30.45 KG/M2

## 2021-11-12 DIAGNOSIS — N13.8 BPH WITH URINARY OBSTRUCTION: Primary | ICD-10-CM

## 2021-11-12 DIAGNOSIS — N13.30 HYDRONEPHROSIS, UNSPECIFIED HYDRONEPHROSIS TYPE: ICD-10-CM

## 2021-11-12 DIAGNOSIS — N40.1 BPH WITH URINARY OBSTRUCTION: Primary | ICD-10-CM

## 2021-11-12 LAB — POST-VOID RESIDUAL VOLUME, ML POC: 46 ML

## 2021-11-12 PROCEDURE — 99024 POSTOP FOLLOW-UP VISIT: CPT | Performed by: PHYSICIAN ASSISTANT

## 2021-11-12 PROCEDURE — 51798 US URINE CAPACITY MEASURE: CPT | Performed by: PHYSICIAN ASSISTANT

## 2021-11-12 RX ORDER — DIFLUPREDNATE 0.5 MG/ML
EMULSION OPHTHALMIC
COMMUNITY
Start: 2021-11-08 | End: 2022-01-05

## 2021-11-12 RX ORDER — FINASTERIDE 5 MG/1
5 TABLET, FILM COATED ORAL DAILY
Qty: 90 TABLET | Refills: 3 | Status: SHIPPED | OUTPATIENT
Start: 2021-11-12 | End: 2021-11-12

## 2021-11-12 RX ORDER — BROMFENAC SODIUM 0.7 MG/ML
SOLUTION/ DROPS OPHTHALMIC
COMMUNITY
Start: 2021-11-08 | End: 2022-01-05

## 2021-11-22 ENCOUNTER — TELEPHONE (OUTPATIENT)
Dept: INTERNAL MEDICINE CLINIC | Facility: CLINIC | Age: 71
End: 2021-11-22

## 2021-11-22 NOTE — TELEPHONE ENCOUNTER
Rex Dodson and wife Manpreet Gary will be traveling on December 11, 2021  Need order for COVID screen prior to departure  Notify Manpreet Cookie whether test will be done December 7 or 8      # [phone]

## 2021-11-26 DIAGNOSIS — I10 HYPERTENSION, UNSPECIFIED TYPE: ICD-10-CM

## 2021-11-26 RX ORDER — ATENOLOL 50 MG/1
50 TABLET ORAL DAILY
Qty: 90 TABLET | Refills: 0 | Status: SHIPPED | OUTPATIENT
Start: 2021-11-26 | End: 2022-01-05 | Stop reason: SDUPTHER

## 2021-11-28 DIAGNOSIS — I10 HTN (HYPERTENSION): ICD-10-CM

## 2021-11-28 RX ORDER — CLONIDINE HYDROCHLORIDE 0.1 MG/1
TABLET ORAL
Qty: 90 TABLET | Refills: 5 | Status: SHIPPED | OUTPATIENT
Start: 2021-11-28 | End: 2022-05-17

## 2021-12-08 PROCEDURE — U0005 INFEC AGEN DETEC AMPLI PROBE: HCPCS | Performed by: INTERNAL MEDICINE

## 2021-12-08 PROCEDURE — U0003 INFECTIOUS AGENT DETECTION BY NUCLEIC ACID (DNA OR RNA); SEVERE ACUTE RESPIRATORY SYNDROME CORONAVIRUS 2 (SARS-COV-2) (CORONAVIRUS DISEASE [COVID-19]), AMPLIFIED PROBE TECHNIQUE, MAKING USE OF HIGH THROUGHPUT TECHNOLOGIES AS DESCRIBED BY CMS-2020-01-R: HCPCS | Performed by: INTERNAL MEDICINE

## 2021-12-10 DIAGNOSIS — R33.9 URINARY RETENTION: ICD-10-CM

## 2021-12-10 RX ORDER — TAMSULOSIN HYDROCHLORIDE 0.4 MG/1
CAPSULE ORAL
Qty: 180 CAPSULE | Refills: 0 | Status: SHIPPED | OUTPATIENT
Start: 2021-12-10 | End: 2022-05-05 | Stop reason: ALTCHOICE

## 2021-12-22 NOTE — PROGRESS NOTES
Assessment/Plan:       Diagnoses and all orders for this visit:    Anemia associated with diabetes mellitus ([address] )  -     Ambulatory referral to Hematology / Oncology; Future    Type 2 diabetes mellitus without complication, without long-term current use of insulin ([facility])                Subjective:      Patient ID: Jeancarlos Chase is a 68 y o  male  Follow-up" pre-surgical "visit  75-year-old man who has had a lot of issues presents prior to surgical excision of a right wrist ganglion on  Known to have coronary artery disease with history of stenting   The last was about 2018   Diabetic with reasonable control     Patient had bleeding gastric ulcer in 2018  Preoperative laboratory testing continues to demonstrate anemia  A Hemoccult was normal       He has really no symptoms  He has has some arthralgias  No chest pain, orthopnea, nor PND  No hematemesis nor melena  No abdominal pain  The following portions of the patient's history were reviewed and updated as appropriate:   He has a past medical history of Benign neoplasm of large intestine, Diabetes mellitus ([address] ), Heart murmur, History of aortic regurgitation, History of constipation, History of degenerative joint disease, History of nocturia, History of obesity, History of sebaceous cyst, History of shortness of breath, History of urinary frequency, Hyperlipidemia, Polyposis coli, and Ulcer of esophagus  ,  does not have any pertinent problems on file  ,   has a past surgical history that includes Hernia repair; Tibia fracture surgery (Left); Achilles tendon surgery (Left); Colonoscopy; Hernia repair (Bilateral, 8/18/2017); Esophagogastroduodenoscopy (N/A, 1/23/2018); Esophagogastroduodenoscopy; pr esophagogastroduodenoscopy transoral diagnostic (N/A, 3/26/2018); and pr esophagogastroduodenoscopy transoral diagnostic (N/A, 5/14/2018)  ,  family history includes Heart disease in his mother; Other in his father; Rheumatic fever in his father  , Noted   Scripts sent to [facility]    reports that he has been smoking cigarettes  He has a 35 00 pack-year smoking history  He has never used smokeless tobacco  He reports that he drinks alcohol  He reports that he does not use drugs  ,  has No Known Allergies     Current Outpatient Medications   Medication Sig Dispense Refill    aspirin 81 mg chewable tablet Chew 1 tablet daily      atenolol (TENORMIN) 50 mg tablet TAKE 1 TABLET DAILY 90 tablet 4    atorvastatin (LIPITOR) 80 mg tablet TAKE 1 TABLET DAILY 90 tablet 4    cloNIDine (CATAPRES) 0 3 mg tablet TAKE 1 TABLET THREE TIMES A  tablet 4    clopidogrel (PLAVIX) 75 mg tablet Take 1 tablet (75 mg total) by mouth daily 30 tablet 11    DENTAGEL 1 1 % GEL USE ONCE DAILY AFTER BRUSHING  BEFORE BEDTIME  5    hydrochlorothiazide (HYDRODIURIL) 25 mg tablet TAKE 1 TABLET DAILY 90 tablet 4    lisinopril (ZESTRIL) 40 mg tablet Take 1 tablet (40 mg total) by mouth daily 90 tablet 3    metFORMIN (GLUCOPHAGE-XR) 500 mg 24 hr tablet Take 1 tablet (500 mg total) by mouth 2 (two) times a day with meals 180 tablet 3    Omega-3 1000 MG CAPS Take by mouth 3 (three) times a day        No current facility-administered medications for this visit  Review of Systems   Constitutional: Negative for chills, fever and unexpected weight change  Respiratory: Negative for cough, shortness of breath and wheezing  Cardiovascular: Negative for chest pain  Gastrointestinal: Negative for abdominal pain  Genitourinary: Negative for difficulty urinating  Neurological: Negative for headaches  Objective:  Vitals:    03/10/20 0920   BP: 110/70   Pulse: 71   SpO2: 97%      Physical Exam   Constitutional: He is oriented to person, place, and time  Male who appears to be the stated age   Eyes: No scleral icterus  Cardiovascular: Normal rate and regular rhythm  Pulmonary/Chest: Effort normal and breath sounds normal    Abdominal: Soft     Neurological: He is alert and oriented to person, place, and time    Skin: Skin is warm and dry  Patient Instructions   ASA 3 pt with no contraindication to surgery

## 2022-01-05 ENCOUNTER — APPOINTMENT (OUTPATIENT)
Dept: LAB | Facility: CLINIC | Age: 72
End: 2022-01-05
Payer: MEDICARE

## 2022-01-05 ENCOUNTER — OFFICE VISIT (OUTPATIENT)
Dept: INTERNAL MEDICINE CLINIC | Facility: CLINIC | Age: 72
End: 2022-01-05
Payer: MEDICARE

## 2022-01-05 VITALS
HEART RATE: 58 BPM | HEIGHT: 67 IN | SYSTOLIC BLOOD PRESSURE: 140 MMHG | TEMPERATURE: 97.9 F | DIASTOLIC BLOOD PRESSURE: 84 MMHG | OXYGEN SATURATION: 99 % | BODY MASS INDEX: 30.29 KG/M2 | WEIGHT: 193 LBS

## 2022-01-05 DIAGNOSIS — I10 HYPERTENSION, UNSPECIFIED TYPE: ICD-10-CM

## 2022-01-05 DIAGNOSIS — I70.0 ATHEROSCLEROSIS OF AORTA (HCC): ICD-10-CM

## 2022-01-05 DIAGNOSIS — E78.5 HYPERLIPIDEMIA, UNSPECIFIED HYPERLIPIDEMIA TYPE: ICD-10-CM

## 2022-01-05 DIAGNOSIS — E11.69 TYPE 2 DIABETES MELLITUS WITH OTHER SPECIFIED COMPLICATION, WITHOUT LONG-TERM CURRENT USE OF INSULIN (HCC): ICD-10-CM

## 2022-01-05 DIAGNOSIS — E89.2 STATUS POST PARATHYROIDECTOMY (HCC): ICD-10-CM

## 2022-01-05 LAB
ALBUMIN SERPL BCP-MCNC: 3.5 G/DL (ref 3.5–5)
ALP SERPL-CCNC: 65 U/L (ref 46–116)
ALT SERPL W P-5'-P-CCNC: 21 U/L (ref 12–78)
ANION GAP SERPL CALCULATED.3IONS-SCNC: 0 MMOL/L (ref 4–13)
AST SERPL W P-5'-P-CCNC: 12 U/L (ref 5–45)
BACTERIA UR QL AUTO: ABNORMAL /HPF
BASOPHILS # BLD AUTO: 0.03 THOUSANDS/ΜL (ref 0–0.1)
BASOPHILS NFR BLD AUTO: 1 % (ref 0–1)
BILIRUB SERPL-MCNC: 1.46 MG/DL (ref 0.2–1)
BILIRUB UR QL STRIP: NEGATIVE
BUN SERPL-MCNC: 10 MG/DL (ref 5–25)
CALCIUM SERPL-MCNC: 8.9 MG/DL (ref 8.3–10.1)
CHLORIDE SERPL-SCNC: 106 MMOL/L (ref 100–108)
CHOLEST SERPL-MCNC: 147 MG/DL
CLARITY UR: CLEAR
CO2 SERPL-SCNC: 32 MMOL/L (ref 21–32)
COLOR UR: YELLOW
CREAT SERPL-MCNC: 0.89 MG/DL (ref 0.6–1.3)
EOSINOPHIL # BLD AUTO: 0.12 THOUSAND/ΜL (ref 0–0.61)
EOSINOPHIL NFR BLD AUTO: 2 % (ref 0–6)
ERYTHROCYTE [DISTWIDTH] IN BLOOD BY AUTOMATED COUNT: 17.8 % (ref 11.6–15.1)
EST. AVERAGE GLUCOSE BLD GHB EST-MCNC: 123 MG/DL
GFR SERPL CREATININE-BSD FRML MDRD: 84 ML/MIN/1.73SQ M
GLUCOSE P FAST SERPL-MCNC: 110 MG/DL (ref 65–99)
GLUCOSE UR STRIP-MCNC: NEGATIVE MG/DL
HBA1C MFR BLD: 5.9 %
HCT VFR BLD AUTO: 45.2 % (ref 36.5–49.3)
HDLC SERPL-MCNC: 52 MG/DL
HGB BLD-MCNC: 14.2 G/DL (ref 12–17)
HGB UR QL STRIP.AUTO: ABNORMAL
HYALINE CASTS #/AREA URNS LPF: ABNORMAL /LPF
IMM GRANULOCYTES # BLD AUTO: 0.01 THOUSAND/UL (ref 0–0.2)
IMM GRANULOCYTES NFR BLD AUTO: 0 % (ref 0–2)
KETONES UR STRIP-MCNC: NEGATIVE MG/DL
LDLC SERPL CALC-MCNC: 85 MG/DL (ref 0–100)
LEUKOCYTE ESTERASE UR QL STRIP: NEGATIVE
LYMPHOCYTES # BLD AUTO: 1.67 THOUSANDS/ΜL (ref 0.6–4.47)
LYMPHOCYTES NFR BLD AUTO: 33 % (ref 14–44)
MCH RBC QN AUTO: 26.7 PG (ref 26.8–34.3)
MCHC RBC AUTO-ENTMCNC: 31.4 G/DL (ref 31.4–37.4)
MCV RBC AUTO: 85 FL (ref 82–98)
MONOCYTES # BLD AUTO: 0.48 THOUSAND/ΜL (ref 0.17–1.22)
MONOCYTES NFR BLD AUTO: 9 % (ref 4–12)
NEUTROPHILS # BLD AUTO: 2.79 THOUSANDS/ΜL (ref 1.85–7.62)
NEUTS SEG NFR BLD AUTO: 55 % (ref 43–75)
NITRITE UR QL STRIP: NEGATIVE
NON-SQ EPI CELLS URNS QL MICRO: ABNORMAL /HPF
NRBC BLD AUTO-RTO: 0 /100 WBCS
PH UR STRIP.AUTO: 6 [PH]
PLATELET # BLD AUTO: 313 THOUSANDS/UL (ref 149–390)
PMV BLD AUTO: 8.9 FL (ref 8.9–12.7)
POTASSIUM SERPL-SCNC: 3.9 MMOL/L (ref 3.5–5.3)
PROT SERPL-MCNC: 7.3 G/DL (ref 6.4–8.2)
PROT UR STRIP-MCNC: ABNORMAL MG/DL
PTH-INTACT SERPL-MCNC: 152.8 PG/ML (ref 18.4–80.1)
RBC # BLD AUTO: 5.31 MILLION/UL (ref 3.88–5.62)
RBC #/AREA URNS AUTO: ABNORMAL /HPF
SODIUM SERPL-SCNC: 138 MMOL/L (ref 136–145)
SP GR UR STRIP.AUTO: 1.02 (ref 1–1.03)
TRIGL SERPL-MCNC: 52 MG/DL
TSH SERPL DL<=0.05 MIU/L-ACNC: 0.87 UIU/ML (ref 0.36–3.74)
UROBILINOGEN UR QL STRIP.AUTO: 0.2 E.U./DL
WBC # BLD AUTO: 5.1 THOUSAND/UL (ref 4.31–10.16)
WBC #/AREA URNS AUTO: ABNORMAL /HPF

## 2022-01-05 PROCEDURE — 1123F ACP DISCUSS/DSCN MKR DOCD: CPT | Performed by: INTERNAL MEDICINE

## 2022-01-05 PROCEDURE — 80061 LIPID PANEL: CPT

## 2022-01-05 PROCEDURE — 83970 ASSAY OF PARATHORMONE: CPT

## 2022-01-05 PROCEDURE — 85025 COMPLETE CBC W/AUTO DIFF WBC: CPT

## 2022-01-05 PROCEDURE — 81001 URINALYSIS AUTO W/SCOPE: CPT | Performed by: INTERNAL MEDICINE

## 2022-01-05 PROCEDURE — 82043 UR ALBUMIN QUANTITATIVE: CPT | Performed by: INTERNAL MEDICINE

## 2022-01-05 PROCEDURE — G0439 PPPS, SUBSEQ VISIT: HCPCS | Performed by: INTERNAL MEDICINE

## 2022-01-05 PROCEDURE — 99214 OFFICE O/P EST MOD 30 MIN: CPT | Performed by: INTERNAL MEDICINE

## 2022-01-05 PROCEDURE — 82570 ASSAY OF URINE CREATININE: CPT | Performed by: INTERNAL MEDICINE

## 2022-01-05 PROCEDURE — 84443 ASSAY THYROID STIM HORMONE: CPT

## 2022-01-05 PROCEDURE — 80053 COMPREHEN METABOLIC PANEL: CPT

## 2022-01-05 PROCEDURE — 36415 COLL VENOUS BLD VENIPUNCTURE: CPT

## 2022-01-05 PROCEDURE — 83036 HEMOGLOBIN GLYCOSYLATED A1C: CPT

## 2022-01-05 RX ORDER — ATORVASTATIN CALCIUM 80 MG/1
80 TABLET, FILM COATED ORAL DAILY
Qty: 90 TABLET | Refills: 3 | Status: SHIPPED | OUTPATIENT
Start: 2022-01-05

## 2022-01-05 RX ORDER — ATENOLOL 50 MG/1
50 TABLET ORAL DAILY
Qty: 90 TABLET | Refills: 3 | Status: SHIPPED | OUTPATIENT
Start: 2022-01-05 | End: 2022-02-15 | Stop reason: SDUPTHER

## 2022-01-05 NOTE — PATIENT INSTRUCTIONS
A 66year-old with problems which at this point I well compensated  Vaccinations are up-to-date  Cancer screens are up-to-date  Prostatectomy has been done   Will need to do laboratory panels today with revisit in 6 months; call if problems develop

## 2022-01-05 NOTE — PROGRESS NOTES
Assessment/Plan:       Diagnoses and all orders for this visit:    Hypertension, unspecified type  -     atenolol (TENORMIN) 50 mg tablet; Take 1 tablet (50 mg total) by mouth daily  -     Lipid Panel with Direct LDL reflex; Future  -     CBC and differential; Future  -     Hemoglobin A1C; Future  -     Comprehensive metabolic panel; Future  -     TSH, 3rd generation with Free T4 reflex; Future  -     UA (URINE) with reflex to Scope  -     Microalbumin / creatinine urine ratio    Hyperlipidemia, unspecified hyperlipidemia type  -     atorvastatin (LIPITOR) 80 mg tablet; Take 1 tablet (80 mg total) by mouth daily  -     Lipid Panel with Direct LDL reflex; Future  -     CBC and differential; Future  -     Hemoglobin A1C; Future  -     Comprehensive metabolic panel; Future  -     TSH, 3rd generation with Free T4 reflex; Future  -     UA (URINE) with reflex to Scope  -     Microalbumin / creatinine urine ratio    Type 2 diabetes mellitus with other specified complication, without long-term current use of insulin (HCC)  -     Lipid Panel with Direct LDL reflex; Future  -     CBC and differential; Future  -     Hemoglobin A1C; Future  -     Comprehensive metabolic panel; Future  -     TSH, 3rd generation with Free T4 reflex; Future  -     UA (URINE) with reflex to Scope  -     Microalbumin / creatinine urine ratio    Atherosclerosis of aorta (HCC)    Status post parathyroidectomy ([facility] [address] )                Subjective:      Patient ID: Ila Kirkland is a 66 y o  male  A 66year-old  He is diabetic and hypertensive and control has been good  Hypertensive cardiomyopathy without heart failure  He does have grade 1 diastolic dysfunction     Coronary artery disease by virtue of finding a cardiac catheterization done in 2017 but no angina pectoris  This is from a prior note:    " Cardiac catheterization documented noncritical CAD with 60% LAD lesion and a large vessel with good flow not requiring intervention   This also documented hypertrophic cardiomyopathy with obliteration of the cavity an ejection fraction of 80% "      However, he was hospitalized back in April 2021 for urinary retention and sepsis with acute kidney injury  Discharged to home, saeed removed after 5 months ; had TURP Sep 2021    Need to recheck BMP  Recent parathyroidectomy was done and was successful  Gastric ulcer treated in 2018  Healed with no recurrence  He feels fine       Continues to smoke and expresses no interest in stopping      The following portions of the patient's history were reviewed and updated as appropriate:   He has a past medical history of Benign neoplasm of large intestine, Bleeding gastric ulcer (2018), Colon polyp, Diabetes mellitus ([facility] [address] ), Heart murmur, History of aortic regurgitation, History of constipation, History of degenerative joint disease, History of nocturia, History of obesity, History of sebaceous cyst, History of shortness of breath, History of transfusion, History of urinary frequency, Hyperlipidemia, Hypertension, Myocardial infarction ([facility] [address] ) (2018), Polyposis coli, and Ulcer of esophagus  ,  does not have any pertinent problems on file  ,   has a past surgical history that includes Tibia fracture surgery (Left, 1962); Achilles tendon surgery (Left, 1973); Esophagogastroduodenoscopy (N/A, 1/23/2018); Esophagogastroduodenoscopy; pr esophagogastroduodenoscopy transoral diagnostic (N/A, 3/26/2018); pr esophagogastroduodenoscopy transoral diagnostic (N/A, 5/14/2018); Cyst Removal (2019, 2020); Coronary angioplasty with stent; Colonoscopy; Cataract extraction (Right, 02/18/2021); pr explore parathyroid glands (Right, 3/23/2021); Hernia repair; Hernia repair (Bilateral, 8/18/2017); and pr transurethral elec-surg prostatectom (N/A, 9/2/2021)  ,  family history includes Heart disease in his mother; Other in his father; Rheumatic fever in his father  ,   reports that he has been smoking cigarettes   He has a 35 00 pack-year smoking history  He has never used smokeless tobacco  He reports previous alcohol use  He reports that he does not use drugs  ,  has No Known Allergies     Current Outpatient Medications   Medication Sig Dispense Refill    aspirin (ECOTRIN LOW STRENGTH) 81 mg EC tablet Take 1 tablet (81 mg total) by mouth daily      atenolol (TENORMIN) 50 mg tablet Take 1 tablet (50 mg total) by mouth daily 90 tablet 3    atorvastatin (LIPITOR) 80 mg tablet Take 1 tablet (80 mg total) by mouth daily 90 tablet 3    cloNIDine (CATAPRES) 0 1 mg tablet TAKE 1 TABLET(0 1 MG) BY MOUTH EVERY 8 HOURS 90 tablet 5    ferrous sulfate 325 (65 Fe) mg tablet Take 325 mg by mouth daily with breakfast      lisinopril (ZESTRIL) 40 mg tablet TAKE 1 TABLET(40 MG) BY MOUTH DAILY 90 tablet 3    metFORMIN (GLUCOPHAGE-XR) 500 mg 24 hr tablet Take 1 tablet (500 mg total) by mouth 2 (two) times a day with meals 180 tablet 3    PreviDent 0 2 % SOLN RINSE WITH 10ML FOR 1 MINUTE THEN SPIT OUT AT BEDTIME DO NOT SWALLOW   tamsulosin (FLOMAX) 0 4 mg TAKE 2 CAPSULES(0 8 MG) BY MOUTH DAILY WITH DINNER 180 capsule 0    torsemide (DEMADEX) 10 mg tablet Take 10 mg by mouth daily       No current facility-administered medications for this visit  Review of Systems   Musculoskeletal: Positive for arthralgias  All other systems reviewed and are negative  Objective:  Vitals:    01/05/22 0751   BP: 140/84   Pulse: 58   Temp: 97 9 °F (36 6 °C)   SpO2: 99%      Physical Exam  Constitutional:       Appearance: He is well-developed  HENT:      Head: Normocephalic and atraumatic  Eyes:      Pupils: Pupils are equal, round, and reactive to light  Neck:      Thyroid: No thyromegaly  Trachea: No tracheal deviation  Cardiovascular:      Rate and Rhythm: Normal rate and regular rhythm  Heart sounds: Normal heart sounds  No murmur heard  No gallop  Pulmonary:      Effort: Pulmonary effort is normal  No respiratory distress  Breath sounds: No wheezing or rales  Abdominal:      General: Bowel sounds are normal       Palpations: Abdomen is soft  Tenderness: There is no abdominal tenderness  Musculoskeletal:         General: No tenderness or deformity  Normal range of motion  Cervical back: Normal range of motion and neck supple  Skin:     General: Skin is warm and dry  Neurological:      Mental Status: He is alert and oriented to person, place, and time  Coordination: Coordination normal       Deep Tendon Reflexes: Reflexes are normal and symmetric  Patient Instructions     A 66year-old with problems which at this point I well compensated  Vaccinations are up-to-date  Cancer screens are up-to-date  Prostatectomy has been done   Will need to do laboratory panels today with revisit in 6 months; call if problems develop

## 2022-01-05 NOTE — PROGRESS NOTES
Diabetic Foot Exam    Patient's shoes and socks removed  Right Foot/Ankle   Right Foot Inspection  Skin Exam: dry skin  Sensory   Monofilament testing: intact    Vascular  The right DP pulse is 1+  The right PT pulse is 0  Left Foot/Ankle  Left Foot Inspection  Skin Exam: dry skin  Sensory   Monofilament testing: intact    Vascular  The left DP pulse is 1+  The left PT pulse is 0       Assign Risk Category  No deformity present  No loss of protective sensation  Weak pulses  Risk: 1

## 2022-01-05 NOTE — PROGRESS NOTES
Assessment and Plan:     Problem List Items Addressed This Visit        Cardiovascular and Mediastinum    HTN (hypertension)       Other    Hyperlipidemia        BMI Counseling: Body mass index is 30 23 kg/m²  The BMI is above normal  Nutrition recommendations include decreasing portion sizes and moderation in carbohydrate intake  Rationale for BMI follow-up plan is due to patient being overweight or obese  Depression Screening and Follow-up Plan: Patient was screened for depression during today's encounter  They screened negative with a PHQ-2 score of 0  Preventive health issues were discussed with patient, and age appropriate screening tests were ordered as noted in patient's After Visit Summary  Personalized health advice and appropriate referrals for health education or preventive services given if needed, as noted in patient's After Visit Summary       History of Present Illness:     Patient presents for Medicare Annual Wellness visit    Patient Care Team:  Shawan Murillo MD as PCP - General     Problem List:     Patient Active Problem List   Diagnosis    Non-recurrent bilateral inguinal hernia without obstruction or gangrene    Hyperlipidemia    HTN (hypertension)    Type 2 diabetes mellitus ([address] )    Tobacco abuse    Iron deficiency anemia due to chronic blood loss    HOCM (hypertrophic obstructive cardiomyopathy) ([address] )    Coronary artery disease involving native coronary artery of native heart    Atherosclerosis of aorta ([address]    Atherosclerosis of native coronary artery of native heart without angina pectoris    Anemia associated with diabetes mellitus ([address] )    Thrombocytosis    Hypercalcemia    Hyperparathyroidism ([address]    History of aortic regurgitation    History of obesity    Hyperkalemia    Metabolic acidosis    UTI (urinary tract infection)    Dehydration with hyponatremia    Hematuria    Anemia    Status post parathyroidectomy (HCC)    Antritis and gastric ulcer    Urinary retention    Urinary retention due to benign prostatic hyperplasia    Benign localized hyperplasia of prostate with urinary obstruction      Past Medical and Surgical History:     Past Medical History:   Diagnosis Date    Benign neoplasm of large intestine     Bleeding gastric ulcer 2018    Colon polyp     Diabetes mellitus ([facility] [address] )     Heart murmur     History of aortic regurgitation     History of constipation     History of degenerative joint disease     History of nocturia     History of obesity     History of sebaceous cyst     History of shortness of breath     History of transfusion     History of urinary frequency     Hyperlipidemia     Hypertension     Myocardial infarction ([facility] [address] ) 2018 january, 3 stents    Polyposis coli     of the large intestine    Ulcer of esophagus      Past Surgical History:   Procedure Laterality Date    ACHILLES TENDON SURGERY Left 1973    CATARACT EXTRACTION Right 02/18/2021    COLONOSCOPY      hyperplastic polyp    CORONARY ANGIOPLASTY WITH STENT PLACEMENT      3 stents: 2 placed on Jan 2018, 1 on July 2018    Derrick Pert CYST REMOVAL  2019, 2020    left and right wrists    ESOPHAGOGASTRODUODENOSCOPY N/A 1/23/2018    Procedure: ESOPHAGOGASTRODUODENOSCOPY (EGD); Surgeon: Zack Dow MD;  Location: [facility]; Service: Gastroenterology    ESOPHAGOGASTRODUODENOSCOPY      HERNIA REPAIR      HERNIA REPAIR Bilateral 8/18/2017    Procedure: LAPAROSCOPIC INGUINAL HERNIA REPAIR WITH MESH;  Surgeon: Vilma Doherty MD;  [facility]: [facility];  Service: General    PA ESOPHAGOGASTRODUODENOSCOPY TRANSORAL DIAGNOSTIC N/A 3/26/2018    Procedure: ESOPHAGOGASTRODUODENOSCOPY (EGD); Surgeon: Gwen Pace MD;  [facility]: [facility]; Service: Gastroenterology    PA ESOPHAGOGASTRODUODENOSCOPY TRANSORAL DIAGNOSTIC N/A 5/14/2018    Procedure: ESOPHAGOGASTRODUODENOSCOPY (EGD); Surgeon: Gwen Pace MD;  [facility]: [facility];   Service: Gastroenterology   Derrick Pert OR EXPLORE PARATHYROID GLANDS Right 3/23/2021    Procedure: RIGHT PARATHYROIDECTOMY, MINIMALLY INVASIVE, POSSIBLE 4-GLAND EXPLORATION, WITH INTRA-OPERATIVE PTH MONITORING;  Surgeon: Yue Kimble MD;  Location:  MAIN OR;  Service: Surgical Oncology    OR TRANSURETHRAL ELEC-SURG PROSTATECTOM N/A 9/2/2021    Procedure: TRANSURETHRAL RESECTION OF PROSTATE (TURP);   Surgeon: Nimo Harman MD;  Location: [facility];  Service: Urology    TIBIA FRACTURE SURGERY Left 1962      Family History:     Family History   Problem Relation Age of Onset    Rheumatic fever Father     Other Father         accidental poisoning   Carleene Martinet Heart disease Mother       Social History:     Social History     Socioeconomic History    Marital status: /Civil Union     Spouse name: None    Number of children: None    Years of education: None    Highest education level: None   Occupational History    Occupation:    Tobacco Use    Smoking status: Current Every Day Smoker     Packs/day: 1 00     Years: 35 00     Pack years: 35 00     Types: Cigarettes    Smokeless tobacco: Never Used    Tobacco comment: pt states quit many years ago   Vaping Use    Vaping Use: Never used   Substance and Sexual Activity    Alcohol use: Not Currently    Drug use: No    Sexual activity: Not Currently     Partners: Female   Other Topics Concern    None   Social History Narrative    None     Social Determinants of Health     Financial Resource Strain: Not on file   Food Insecurity: Not on file   Transportation Needs: Not on file   Physical Activity: Inactive    Days of Exercise per Week: 0 days    Minutes of Exercise per Session: 0 min   Stress: No Stress Concern Present    Feeling of Stress : Not at all   Social Connections: Not on file   Intimate Partner Violence: Not on file   Housing Stability: Not on file      Medications and Allergies:     Current Outpatient Medications   Medication Sig Dispense Refill    aspirin (ECOTRIN LOW STRENGTH) 81 mg EC tablet Take 1 tablet (81 mg total) by mouth daily      atenolol (TENORMIN) 50 mg tablet Take 1 tablet (50 mg total) by mouth daily 90 tablet 0    atorvastatin (LIPITOR) 80 mg tablet TAKE 1 TABLET DAILY 90 tablet 3    cloNIDine (CATAPRES) 0 1 mg tablet TAKE 1 TABLET(0 1 MG) BY MOUTH EVERY 8 HOURS 90 tablet 5    ferrous sulfate 325 (65 Fe) mg tablet Take 325 mg by mouth daily with breakfast      lisinopril (ZESTRIL) 40 mg tablet TAKE 1 TABLET(40 MG) BY MOUTH DAILY 90 tablet 3    metFORMIN (GLUCOPHAGE-XR) 500 mg 24 hr tablet Take 1 tablet (500 mg total) by mouth 2 (two) times a day with meals 180 tablet 3    PreviDent 0 2 % SOLN RINSE WITH 10ML FOR 1 MINUTE THEN SPIT OUT AT BEDTIME DO NOT SWALLOW   tamsulosin (FLOMAX) 0 4 mg TAKE 2 CAPSULES(0 8 MG) BY MOUTH DAILY WITH DINNER 180 capsule 0    torsemide (DEMADEX) 10 mg tablet Take 10 mg by mouth daily      Difluprednate 0 05 % EMUL INSTILL 1 DROP INTO BOTH EYES FOUR TIMES DAILY (Patient not taking: Reported on 1/5/2022)      Prolensa 0 07 % SOLN INSTILL 1 DROP IN RIGHT EYE TWICE DAILY (Patient not taking: Reported on 1/5/2022)       No current facility-administered medications for this visit       No Known Allergies   Immunizations:     Immunization History   Administered Date(s) Administered    COVID-19 PFIZER VACCINE 0 3 ML IM 03/10/2021, 04/01/2021, 10/04/2021    Influenza, seasonal, injectable 1950, 1950    Pneumococcal Conjugate 13-Valent 01/18/2017    Pneumococcal Polysaccharide PPV23 1948, 03/17/2016, 07/24/2017    Tdap 1950    Zoster 03/17/2016    Zoster Vaccine Recombinant 03/17/2016      Health Maintenance:         Topic Date Due    Lung Cancer Screening  07/13/2022    Colorectal Cancer Screening  12/02/2025    Hepatitis C Screening  Completed         Topic Date Due    DTaP,Tdap,and Td Vaccines (1 - Tdap) 06/27/1969    Influenza Vaccine (1) 09/01/2021      Medicare Health Risk Assessment: Temp 97 9 °F (36 6 °C) (Tympanic)   Ht 5' 7" (1 702 m)   Wt 87 5 kg (193 lb)   BMI 30 23 kg/m²      Moo Soliman is here for his Subsequent Wellness visit  Last Medicare Wellness visit information reviewed, patient interviewed, no change since last AWV  Health Risk Assessment:   Patient rates overall health as fair  Patient feels that their physical health rating is same  Patient is satisfied with their life  Eyesight was rated as same  Hearing was rated as same  Patient feels that their emotional and mental health rating is same  Patients states they are never, rarely angry  Patient states they are never, rarely unusually tired/fatigued  Pain experienced in the last 7 days has been none  Depression Screening:   PHQ-2 Score: 0      Fall Risk Screening: In the past year, patient has experienced: no history of falling in past year      Home Safety:  Patient does not have trouble with stairs inside or outside of their home  Patient has working smoke alarms and has working carbon monoxide detector  Nutrition:   Current diet is Regular  Medications:   Patient is currently taking over-the-counter supplements  OTC medications include: see medication list  Patient is able to manage medications  Activities of Daily Living (ADLs)/Instrumental Activities of Daily Living (IADLs):   Walk and transfer into and out of bed and chair?: Yes  Dress and groom yourself?: Yes    Bathe or shower yourself?: Yes    Feed yourself?  Yes  Do your laundry/housekeeping?: Yes  Manage your money, pay your bills and track your expenses?: Yes  Make your own meals?: Yes    Do your own shopping?: Yes    Previous Hospitalizations:   Any hospitalizations or ED visits within the last 12 months?: Yes    How many hospitalizations have you had in the last year?: 1-2    Advance Care Planning:   Living will: Yes    Advanced directive: Yes    Advanced directive counseling given: Yes    End of Life Decisions reviewed with patient: Yes Cognitive Screening:   Provider or family/friend/caregiver concerned regarding cognition?: No    PREVENTIVE SCREENINGS      Cardiovascular Screening:    General: Screening Not Indicated and History Lipid Disorder      Diabetes Screening:     General: Screening Not Indicated and History Diabetes      Colorectal Cancer Screening:     General: Screening Current      Osteoporosis Screening:    General: Risks and Benefits Discussed      Abdominal Aortic Aneurysm (AAA) Screening:    Risk factors include: age between 73-67 yo and tobacco use        General: Screening Not Indicated and History AAA      Lung Cancer Screening:     General: Screening Current      Hepatitis C Screening:    General: Screening Current    Screening, Brief Intervention, and Referral to Treatment (SBIRT)    Screening  Typical number of drinks in a day: 0      Naren Tarango MD

## 2022-01-06 LAB
CREAT UR-MCNC: 150 MG/DL
MICROALBUMIN UR-MCNC: 665 MG/L (ref 0–20)
MICROALBUMIN/CREAT 24H UR: 443 MG/G CREATININE (ref 0–30)

## 2022-01-08 ENCOUNTER — OFFICE VISIT (OUTPATIENT)
Dept: INTERNAL MEDICINE CLINIC | Facility: CLINIC | Age: 72
End: 2022-01-08
Payer: MEDICARE

## 2022-01-08 ENCOUNTER — APPOINTMENT (OUTPATIENT)
Dept: LAB | Facility: CLINIC | Age: 72
End: 2022-01-08
Payer: MEDICARE

## 2022-01-08 VITALS
OXYGEN SATURATION: 98 % | DIASTOLIC BLOOD PRESSURE: 88 MMHG | RESPIRATION RATE: 16 BRPM | HEART RATE: 57 BPM | BODY MASS INDEX: 30.29 KG/M2 | TEMPERATURE: 96.3 F | WEIGHT: 193 LBS | HEIGHT: 67 IN | SYSTOLIC BLOOD PRESSURE: 164 MMHG

## 2022-01-08 DIAGNOSIS — E80.6 HYPERBILIRUBINEMIA: ICD-10-CM

## 2022-01-08 DIAGNOSIS — D50.0 IRON DEFICIENCY ANEMIA DUE TO CHRONIC BLOOD LOSS: ICD-10-CM

## 2022-01-08 DIAGNOSIS — E80.6 HYPERBILIRUBINEMIA: Primary | ICD-10-CM

## 2022-01-08 LAB
BILIRUB DIRECT SERPL-MCNC: 0.2 MG/DL (ref 0–0.2)
GGT SERPL-CCNC: 27 U/L (ref 5–85)

## 2022-01-08 PROCEDURE — 36415 COLL VENOUS BLD VENIPUNCTURE: CPT

## 2022-01-08 PROCEDURE — 99214 OFFICE O/P EST MOD 30 MIN: CPT | Performed by: INTERNAL MEDICINE

## 2022-01-08 PROCEDURE — 82977 ASSAY OF GGT: CPT

## 2022-01-08 PROCEDURE — 82248 BILIRUBIN DIRECT: CPT

## 2022-01-08 NOTE — PATIENT INSTRUCTIONS
Laboratory abnormalities noted  See Dr Priscilla Back again  Recheck Hemoccult  Direct bilirubin and GGT  Further recommendations will depend upon results of the studies

## 2022-01-08 NOTE — PROGRESS NOTES
Assessment/Plan:       Diagnoses and all orders for this visit:    Hyperbilirubinemia  -     Bilirubin, direct; Future  -     Gamma GT; Future    Iron deficiency anemia due to chronic blood loss  -     Occult Blood, Fecal Immunochemical; Future                Subjective:      Patient ID: Kris Corrales is a 66 y o  male  A 66year-old  Here to review some abnormal laboratory test results     Elevated PTH  The patient had parathyroid surgery a bit under year ago which was done after hypercalcemia was noted and the workup documented a parathyroid adenoma  Initially after surgery his parathyroid hormone level normalized but now repeat level ordered by the oncological surgeon is elevated again  Implication is that another adenoma has escaped control and he will need to follow there  Please note calcium level is normal so this is by no means a crisis     Elevated bilirubin:  New finding in somebody with no symptoms referable to this and normal transaminases  Will need to fractionate the bilirubin     Improvement in anemia after iron supplementation  This is been going on for a good 2 years  I would like to recheck the Hemoccult test       Diabetes treated to target  Hyperlipidemia treated to target    The patient feels fairly well  Echocardiogram done Dec 2015 documented is concentric LVH with normal systolic function, and mild aortic regurgitation and mitral regurgitation  No change in 12 months  Echocardiogram in 2017 about the same  Now most recent echo shows some progression with moderate aortic stenosis  A murmur is pretty apparent in the right sternal border 2nd intercostal space but he has no symptoms referable to this  Cardiac catheterization 7/25/2018  documented noncritical CAD with 60% LAD lesion and a large vessel with good flow not requiring intervention  This also documented hypertrophic cardiomyopathy with obliteration of the cavity an ejection fraction of 80%    Aortic ultrasound done in 2010 documented diffuse plaque but no aneurysm  CAD that is followed by cardiology;   Had chest pain in January2018  and had 3 stents placed  He had no complaints upon this visit  Doing quite well  No further cardiac symptoms for the past  3 years          The following portions of the patient's history were reviewed and updated as appropriate:   He has a past medical history of Benign neoplasm of large intestine, Bleeding gastric ulcer (2018), Colon polyp, Diabetes mellitus ([facility] [address] ), Heart murmur, History of aortic regurgitation, History of constipation, History of degenerative joint disease, History of nocturia, History of obesity, History of sebaceous cyst, History of shortness of breath, History of transfusion, History of urinary frequency, Hyperlipidemia, Hypertension, Myocardial infarction ([facility] [address] ) (2018), Polyposis coli, and Ulcer of esophagus  ,  does not have any pertinent problems on file  ,   has a past surgical history that includes Tibia fracture surgery (Left, 1962); Achilles tendon surgery (Left, 1973); Esophagogastroduodenoscopy (N/A, 1/23/2018); Esophagogastroduodenoscopy; pr esophagogastroduodenoscopy transoral diagnostic (N/A, 3/26/2018); pr esophagogastroduodenoscopy transoral diagnostic (N/A, 5/14/2018); Cyst Removal (2019, 2020); Coronary angioplasty with stent; Colonoscopy; Cataract extraction (Right, 02/18/2021); pr explore parathyroid glands (Right, 3/23/2021); Hernia repair; Hernia repair (Bilateral, 8/18/2017); and pr transurethral elec-surg prostatectom (N/A, 9/2/2021)  ,  family history includes Heart disease in his mother; Other in his father; Rheumatic fever in his father  ,   reports that he has been smoking cigarettes  He has a 35 00 pack-year smoking history  He has never used smokeless tobacco  He reports previous alcohol use  He reports that he does not use drugs  ,  has No Known Allergies     Current Outpatient Medications   Medication Sig Dispense Refill    aspirin (ECOTRIN LOW STRENGTH) 81 mg EC tablet Take 1 tablet (81 mg total) by mouth daily      atenolol (TENORMIN) 50 mg tablet Take 1 tablet (50 mg total) by mouth daily 90 tablet 3    atorvastatin (LIPITOR) 80 mg tablet Take 1 tablet (80 mg total) by mouth daily 90 tablet 3    cloNIDine (CATAPRES) 0 1 mg tablet TAKE 1 TABLET(0 1 MG) BY MOUTH EVERY 8 HOURS 90 tablet 5    ferrous sulfate 325 (65 Fe) mg tablet Take 325 mg by mouth daily with breakfast      lisinopril (ZESTRIL) 40 mg tablet TAKE 1 TABLET(40 MG) BY MOUTH DAILY 90 tablet 3    metFORMIN (GLUCOPHAGE-XR) 500 mg 24 hr tablet Take 1 tablet (500 mg total) by mouth 2 (two) times a day with meals 180 tablet 3    PreviDent 0 2 % SOLN RINSE WITH 10ML FOR 1 MINUTE THEN SPIT OUT AT BEDTIME DO NOT SWALLOW   tamsulosin (FLOMAX) 0 4 mg TAKE 2 CAPSULES(0 8 MG) BY MOUTH DAILY WITH DINNER 180 capsule 0    torsemide (DEMADEX) 10 mg tablet Take 10 mg by mouth daily       No current facility-administered medications for this visit  Review of Systems   Respiratory: Negative for cough and wheezing  Cardiovascular: Negative for chest pain, palpitations and leg swelling  Objective:  Vitals:    01/08/22 1011   BP: 164/88   Pulse: 57   Resp: 16   Temp: (!) 96 3 °F (35 7 °C)   SpO2: 98%      Physical Exam  Constitutional:       Appearance: Normal appearance  Cardiovascular:      Rate and Rhythm: Normal rate  Pulmonary:      Effort: Pulmonary effort is normal    Musculoskeletal:      Cervical back: Normal range of motion  Neurological:      General: No focal deficit present  Mental Status: He is alert  Patient Instructions   Laboratory abnormalities noted  See Dr Kelton Whiteside again  Recheck Hemoccult  Direct bilirubin and GGT  Further recommendations will depend upon results of the studies

## 2022-01-10 ENCOUNTER — TELEPHONE (OUTPATIENT)
Dept: HEMATOLOGY ONCOLOGY | Facility: CLINIC | Age: 72
End: 2022-01-10

## 2022-01-10 ENCOUNTER — APPOINTMENT (OUTPATIENT)
Dept: LAB | Facility: CLINIC | Age: 72
End: 2022-01-10
Payer: MEDICARE

## 2022-01-10 LAB — HEMOCCULT STL QL IA: POSITIVE

## 2022-01-10 PROCEDURE — G0328 FECAL BLOOD SCRN IMMUNOASSAY: HCPCS

## 2022-01-10 NOTE — TELEPHONE ENCOUNTER
Scheduling Appointment     Who Is Calling to Schedule Patient    Doctor Dr Byrnes Gino   Location [facility]   Date and Time 02/09 at 10:00am         Patient verbalized understanding    yes

## 2022-01-11 ENCOUNTER — TELEPHONE (OUTPATIENT)
Dept: INTERNAL MEDICINE CLINIC | Facility: CLINIC | Age: 72
End: 2022-01-11

## 2022-01-11 NOTE — TELEPHONE ENCOUNTER
----- Message from Johnson Hutson MD sent at 1/11/2022  7:54 AM EST -----   Hemoccult positive again  He needs to go back to GI I will place the consult

## 2022-01-12 ENCOUNTER — OFFICE VISIT (OUTPATIENT)
Dept: GASTROENTEROLOGY | Facility: CLINIC | Age: 72
End: 2022-01-12
Payer: MEDICARE

## 2022-01-12 VITALS
WEIGHT: 193.6 LBS | SYSTOLIC BLOOD PRESSURE: 138 MMHG | BODY MASS INDEX: 29.34 KG/M2 | DIASTOLIC BLOOD PRESSURE: 80 MMHG | OXYGEN SATURATION: 97 % | HEIGHT: 68 IN | HEART RATE: 88 BPM

## 2022-01-12 DIAGNOSIS — D13.2 DUODENAL ADENOMA: ICD-10-CM

## 2022-01-12 DIAGNOSIS — K29.70 GASTRITIS, PRESENCE OF BLEEDING UNSPECIFIED, UNSPECIFIED CHRONICITY, UNSPECIFIED GASTRITIS TYPE: Primary | ICD-10-CM

## 2022-01-12 PROCEDURE — 99214 OFFICE O/P EST MOD 30 MIN: CPT | Performed by: PHYSICIAN ASSISTANT

## 2022-01-12 NOTE — PROGRESS NOTES
[facility]  Christa Oshea 66 y o  male MRN: [id number]       CC:  Positive FOBT    HPI:  Autumn Stokes is a 47-year-old male with history of peptic ulcer disease, hypertension, hyperlipidemia, type 2 diabetes, CAD on baby aspirin, hyperparathyroidism, and hypertrophic obstructive cardiomyopathy  Patient is here to follow-up as he had routine lab work with his PCP and had positive FOBT testing  His hemoglobin is normal at 14  He has been taking iron supplementation since he had GI bleed in April when he was admitted  He had an EGD at that time revealing 3 small gastric ulcers and a duodenal polyp  The duodenal polyp did come back as an adenoma, he was supposed to have a recall in 3 months  Patient reports that he has no GI symptoms at this time  He denies melena as he experience in the hospital, but does report dark appearing stool when he takes iron  He is now off of Plavix  Last colonoscopy was in December 2020 with no polyps, diverticulosis and internal hemorrhoids  Review of Systems:    CONSTITUTIONAL: Denies any fever, chills, or rigors  Good appetite, and no recent weight loss  HEENT: No earache or tinnitus  Denies hearing loss or visual disturbances  CARDIOVASCULAR: No chest pain or palpitations  RESPIRATORY: Denies any cough, hemoptysis, shortness of breath or dyspnea on exertion  GASTROINTESTINAL: As noted in the History of Present Illness  GENITOURINARY: No problems with urination  Denies any hematuria or dysuria  NEUROLOGIC: No dizziness or vertigo, denies headaches  MUSCULOSKELETAL: Denies any muscle or joint pain  SKIN: Denies skin rashes or itching  ENDOCRINE: Denies excessive thirst  Denies intolerance to heat or cold  PSYCHOSOCIAL: Denies depression or anxiety  Denies any recent memory loss         Current Outpatient Medications   Medication Sig Dispense Refill    aspirin (ECOTRIN LOW STRENGTH) 81 mg EC tablet Take 1 tablet (81 mg total) by mouth daily  atenolol (TENORMIN) 50 mg tablet Take 1 tablet (50 mg total) by mouth daily 90 tablet 3    atorvastatin (LIPITOR) 80 mg tablet Take 1 tablet (80 mg total) by mouth daily 90 tablet 3    cloNIDine (CATAPRES) 0 1 mg tablet TAKE 1 TABLET(0 1 MG) BY MOUTH EVERY 8 HOURS 90 tablet 5    ferrous sulfate 325 (65 Fe) mg tablet Take 325 mg by mouth daily with breakfast      lisinopril (ZESTRIL) 40 mg tablet TAKE 1 TABLET(40 MG) BY MOUTH DAILY 90 tablet 3    metFORMIN (GLUCOPHAGE-XR) 500 mg 24 hr tablet Take 1 tablet (500 mg total) by mouth 2 (two) times a day with meals 180 tablet 3    PreviDent 0 2 % SOLN RINSE WITH 10ML FOR 1 MINUTE THEN SPIT OUT AT BEDTIME DO NOT SWALLOW   tamsulosin (FLOMAX) 0 4 mg TAKE 2 CAPSULES(0 8 MG) BY MOUTH DAILY WITH DINNER 180 capsule 0    torsemide (DEMADEX) 10 mg tablet Take 10 mg by mouth daily       No current facility-administered medications for this visit       Past Medical History:   Diagnosis Date    Benign neoplasm of large intestine     Bleeding gastric ulcer 2018    Colon polyp     Diabetes mellitus (HCC)     Heart murmur     History of aortic regurgitation     History of constipation     History of degenerative joint disease     History of nocturia     History of obesity     History of sebaceous cyst     History of shortness of breath     History of transfusion     History of urinary frequency     Hyperlipidemia     Hypertension     Myocardial infarction ([facility] [address] ) 2018    january, 3 stents    Polyposis coli     of the large intestine    Ulcer of esophagus      Past Surgical History:   Procedure Laterality Date    ACHILLES TENDON SURGERY Left 1973    CATARACT EXTRACTION Right 02/18/2021    COLONOSCOPY      hyperplastic polyp    CORONARY ANGIOPLASTY WITH STENT PLACEMENT      3 stents: 2 placed on Jan 2018, 1 on July 2018    Felipa Richter CYST REMOVAL  2019, 2020    left and right wrists    ESOPHAGOGASTRODUODENOSCOPY N/A 1/23/2018    Procedure: ESOPHAGOGASTRODUODENOSCOPY (EGD); Surgeon: Jan Iqbal MD;  [facility]; Service: Gastroenterology    ESOPHAGOGASTRODUODENOSCOPY      HERNIA REPAIR      HERNIA REPAIR Bilateral 8/18/2017    Procedure: LAPAROSCOPIC INGUINAL HERNIA REPAIR WITH MESH;  Surgeon: Yo Medrano MD;  [facility];  Service: General    WY ESOPHAGOGASTRODUODENOSCOPY TRANSORAL DIAGNOSTIC N/A 3/26/2018    Procedure: ESOPHAGOGASTRODUODENOSCOPY (EGD); Surgeon: Jillian Church MD;  [facility]; Service: Gastroenterology    WY ESOPHAGOGASTRODUODENOSCOPY TRANSORAL DIAGNOSTIC N/A 5/14/2018    Procedure: ESOPHAGOGASTRODUODENOSCOPY (EGD); Surgeon: Jillian Church MD;  [facility]; Service: Gastroenterology    WY EXPLORE PARATHYROID GLANDS Right 3/23/2021    Procedure: RIGHT PARATHYROIDECTOMY, MINIMALLY INVASIVE, POSSIBLE 4-GLAND EXPLORATION, WITH INTRA-OPERATIVE PTH MONITORING;  Surgeon: Awilda Rosa MD;  Location:  MAIN OR;  Service: Surgical Oncology    WY TRANSURETHRAL ELEC-SURG PROSTATECTOM N/A 9/2/2021    Procedure: TRANSURETHRAL RESECTION OF PROSTATE (TURP);   Surgeon: Lidya Nolan MD;  [facility];  Service: Urology    TIBIA FRACTURE SURGERY Left 1962     Social History     Socioeconomic History    Marital status: /Civil Union     Spouse name: None    Number of children: None    Years of education: None    Highest education level: None   Occupational History    Occupation:    Tobacco Use    Smoking status: Current Every Day Smoker     Packs/day: 1 00     Years: 35 00     Pack years: 35 00     Types: Cigarettes    Smokeless tobacco: Never Used    Tobacco comment: pt states quit many years ago   Vaping Use    Vaping Use: Never used   Substance and Sexual Activity    Alcohol use: Yes     Comment: rare    Drug use: No    Sexual activity: Not Currently     Partners: Female   Other Topics Concern    None   Social History Narrative    None     Social Determinants of Health     Financial Resource Strain: Not on file   Food Insecurity: Not on file   Transportation Needs: Not on file   Physical Activity: Inactive    Days of Exercise per Week: 0 days    Minutes of Exercise per Session: 0 min   Stress: No Stress Concern Present    Feeling of Stress : Not at all   Social Connections: Not on file   Intimate Partner Violence: Not on file   Housing Stability: Not on file     Family History   Problem Relation Age of Onset    Rheumatic fever Father     Other Father         accidental poisoning    Heart disease Mother             PHYSICAL EXAM:    Vitals:    01/12/22 1407   BP: 138/80   Pulse: 88   SpO2: 97%   Weight: 87 8 kg (193 lb 9 6 oz)   Height: 5' 8" (1 727 m)     General Appearance:   Alert and oriented x 3  Cooperative, and in no respiratory distress   HEENT:   Normocephalic, atraumatic, anicteric      Neck:  Supple, symmetrical, trachea midline   Lungs:   Clear to auscultation bilaterally    Heart[de-identified]   Regular rate and rhythm   Abdomen:   Soft, non-tender, non-distended; normal bowel sounds; no masses, no organomegaly    Genitalia:   Deferred    Rectal:   Deferred    Extremities:  No cyanosis, clubbing or edema    Pulses:  2+ and symmetric all extremities    Skin:  Skin color, texture, turgor normal, no rashes or lesions    Lymph nodes:  No palpable cervical or supraclavicular lymphadenopathy        Lab Results:   Results from last 6 Months   Lab Units 01/05/22  0832   WBC Thousand/uL 5 10   HEMOGLOBIN g/dL 14 2   HEMATOCRIT % 45 2   PLATELETS Thousands/uL 313   NEUTROS PCT % 55   LYMPHS PCT % 33   MONOS PCT % 9   EOS PCT % 2     Results from last 6 Months   Lab Units 01/05/22  0832   POTASSIUM mmol/L 3 9   CHLORIDE mmol/L 106   CO2 mmol/L 32   BUN mg/dL 10   CREATININE mg/dL 0 89   CALCIUM mg/dL 8 9   ALK PHOS U/L 65   ALT U/L 21   AST U/L 12               Imaging Studies: I have personally reviewed pertinent imaging studies      No results found     ASSESSMENT and PLAN:      1)  History of duodenal adenoma, gastric ulcerations and resolving iron deficiency anemia -  Patient with normalization of hemoglobin, but had positive FOBT stool  He was post to have an EGD recall 3 months after his EGD in April  He just had a colonoscopy in 2020, which was negative  -   Will have patient undergo EGD to investigate, document healing of ulcers and to rebiopsy duodenal polyp  -  Further recommendations based on above testing        Follow up after EGD

## 2022-01-12 NOTE — PATIENT INSTRUCTIONS
Scheduled date of EGD(as of today):1/24/22  Physician performing EGD:Rodrigo  Location of EGD:[place]  Instructions reviewed with patient by:Kellee PRETTY  Clearances: none

## 2022-01-12 NOTE — H&P (VIEW-ONLY)
[facility]  Jemma Form 66 y o  male MRN: [id number]       CC:  Positive FOBT    HPI:  Juni Rader is a 68-year-old male with history of peptic ulcer disease, hypertension, hyperlipidemia, type 2 diabetes, CAD on baby aspirin, hyperparathyroidism, and hypertrophic obstructive cardiomyopathy  Patient is here to follow-up as he had routine lab work with his PCP and had positive FOBT testing  His hemoglobin is normal at 14  He has been taking iron supplementation since he had GI bleed in April when he was admitted  He had an EGD at that time revealing 3 small gastric ulcers and a duodenal polyp  The duodenal polyp did come back as an adenoma, he was supposed to have a recall in 3 months  Patient reports that he has no GI symptoms at this time  He denies melena as he experience in the hospital, but does report dark appearing stool when he takes iron  He is now off of Plavix  Last colonoscopy was in December 2020 with no polyps, diverticulosis and internal hemorrhoids  Review of Systems:    CONSTITUTIONAL: Denies any fever, chills, or rigors  Good appetite, and no recent weight loss  HEENT: No earache or tinnitus  Denies hearing loss or visual disturbances  CARDIOVASCULAR: No chest pain or palpitations  RESPIRATORY: Denies any cough, hemoptysis, shortness of breath or dyspnea on exertion  GASTROINTESTINAL: As noted in the History of Present Illness  GENITOURINARY: No problems with urination  Denies any hematuria or dysuria  NEUROLOGIC: No dizziness or vertigo, denies headaches  MUSCULOSKELETAL: Denies any muscle or joint pain  SKIN: Denies skin rashes or itching  ENDOCRINE: Denies excessive thirst  Denies intolerance to heat or cold  PSYCHOSOCIAL: Denies depression or anxiety  Denies any recent memory loss         Current Outpatient Medications   Medication Sig Dispense Refill    aspirin (ECOTRIN LOW STRENGTH) 81 mg EC tablet Take 1 tablet (81 mg total) by mouth daily  atenolol (TENORMIN) 50 mg tablet Take 1 tablet (50 mg total) by mouth daily 90 tablet 3    atorvastatin (LIPITOR) 80 mg tablet Take 1 tablet (80 mg total) by mouth daily 90 tablet 3    cloNIDine (CATAPRES) 0 1 mg tablet TAKE 1 TABLET(0 1 MG) BY MOUTH EVERY 8 HOURS 90 tablet 5    ferrous sulfate 325 (65 Fe) mg tablet Take 325 mg by mouth daily with breakfast      lisinopril (ZESTRIL) 40 mg tablet TAKE 1 TABLET(40 MG) BY MOUTH DAILY 90 tablet 3    metFORMIN (GLUCOPHAGE-XR) 500 mg 24 hr tablet Take 1 tablet (500 mg total) by mouth 2 (two) times a day with meals 180 tablet 3    PreviDent 0 2 % SOLN RINSE WITH 10ML FOR 1 MINUTE THEN SPIT OUT AT BEDTIME DO NOT SWALLOW   tamsulosin (FLOMAX) 0 4 mg TAKE 2 CAPSULES(0 8 MG) BY MOUTH DAILY WITH DINNER 180 capsule 0    torsemide (DEMADEX) 10 mg tablet Take 10 mg by mouth daily       No current facility-administered medications for this visit       Past Medical History:   Diagnosis Date    Benign neoplasm of large intestine     Bleeding gastric ulcer 2018    Colon polyp     Diabetes mellitus (HCC)     Heart murmur     History of aortic regurgitation     History of constipation     History of degenerative joint disease     History of nocturia     History of obesity     History of sebaceous cyst     History of shortness of breath     History of transfusion     History of urinary frequency     Hyperlipidemia     Hypertension     Myocardial infarction ([facility] [address] ) 2018 january, 3 stents    Polyposis coli     of the large intestine    Ulcer of esophagus      Past Surgical History:   Procedure Laterality Date    ACHILLES TENDON SURGERY Left 1973    CATARACT EXTRACTION Right 02/18/2021    COLONOSCOPY      hyperplastic polyp    CORONARY ANGIOPLASTY WITH STENT PLACEMENT      3 stents: 2 placed on Jan 2018, 1 on July 2018    Jonathan Cobian CYST REMOVAL  2019, 2020    left and right wrists    ESOPHAGOGASTRODUODENOSCOPY N/A 1/23/2018    Procedure: ESOPHAGOGASTRODUODENOSCOPY (EGD); Surgeon: Josey Gallardo MD;  [facility]; Service: Gastroenterology    ESOPHAGOGASTRODUODENOSCOPY      HERNIA REPAIR      HERNIA REPAIR Bilateral 8/18/2017    Procedure: LAPAROSCOPIC INGUINAL HERNIA REPAIR WITH MESH;  Surgeon: Alex Perkins MD;  [facility];  Service: General    [place] ESOPHAGOGASTRODUODENOSCOPY TRANSORAL DIAGNOSTIC N/A 3/26/2018    Procedure: ESOPHAGOGASTRODUODENOSCOPY (EGD); Surgeon: Aristeo Hagen MD;  [facility]; Service: Gastroenterology    CO ESOPHAGOGASTRODUODENOSCOPY TRANSORAL DIAGNOSTIC N/A 5/14/2018    Procedure: ESOPHAGOGASTRODUODENOSCOPY (EGD); Surgeon: Aristeo Hagen MD;  [facility]; Service: Gastroenterology    CO EXPLORE PARATHYROID GLANDS Right 3/23/2021    Procedure: RIGHT PARATHYROIDECTOMY, MINIMALLY INVASIVE, POSSIBLE 4-GLAND EXPLORATION, WITH INTRA-OPERATIVE PTH MONITORING;  Surgeon: Samy Caicedo MD;  Location:  MAIN OR;  Service: Surgical Oncology    CO TRANSURETHRAL ELEC-SURG PROSTATECTOM N/A 9/2/2021    Procedure: TRANSURETHRAL RESECTION OF PROSTATE (TURP);   Surgeon: Verner Squires, MD;  [facility];  Service: Urology    TIBIA FRACTURE SURGERY Left 1962     Social History     Socioeconomic History    Marital status: /Civil Union     Spouse name: None    Number of children: None    Years of education: None    Highest education level: None   Occupational History    Occupation:    Tobacco Use    Smoking status: Current Every Day Smoker     Packs/day: 1 00     Years: 35 00     Pack years: 35 00     Types: Cigarettes    Smokeless tobacco: Never Used    Tobacco comment: pt states quit many years ago   Vaping Use    Vaping Use: Never used   Substance and Sexual Activity    Alcohol use: Yes     Comment: rare    Drug use: No    Sexual activity: Not Currently     Partners: Female   Other Topics Concern    None   Social History Narrative    None     Social Determinants of Health     Financial Resource Strain: Not on file   Food Insecurity: Not on file   Transportation Needs: Not on file   Physical Activity: Inactive    Days of Exercise per Week: 0 days    Minutes of Exercise per Session: 0 min   Stress: No Stress Concern Present    Feeling of Stress : Not at all   Social Connections: Not on file   Intimate Partner Violence: Not on file   Housing Stability: Not on file     Family History   Problem Relation Age of Onset    Rheumatic fever Father     Other Father         accidental poisoning    Heart disease Mother             PHYSICAL EXAM:    Vitals:    01/12/22 1407   BP: 138/80   Pulse: 88   SpO2: 97%   Weight: 87 8 kg (193 lb 9 6 oz)   Height: 5' 8" (1 727 m)     General Appearance:   Alert and oriented x 3  Cooperative, and in no respiratory distress   HEENT:   Normocephalic, atraumatic, anicteric      Neck:  Supple, symmetrical, trachea midline   Lungs:   Clear to auscultation bilaterally    Heart[de-identified]   Regular rate and rhythm   Abdomen:   Soft, non-tender, non-distended; normal bowel sounds; no masses, no organomegaly    Genitalia:   Deferred    Rectal:   Deferred    Extremities:  No cyanosis, clubbing or edema    Pulses:  2+ and symmetric all extremities    Skin:  Skin color, texture, turgor normal, no rashes or lesions    Lymph nodes:  No palpable cervical or supraclavicular lymphadenopathy        Lab Results:   Results from last 6 Months   Lab Units 01/05/22  0832   WBC Thousand/uL 5 10   HEMOGLOBIN g/dL 14 2   HEMATOCRIT % 45 2   PLATELETS Thousands/uL 313   NEUTROS PCT % 55   LYMPHS PCT % 33   MONOS PCT % 9   EOS PCT % 2     Results from last 6 Months   Lab Units 01/05/22  0832   POTASSIUM mmol/L 3 9   CHLORIDE mmol/L 106   CO2 mmol/L 32   BUN mg/dL 10   CREATININE mg/dL 0 89   CALCIUM mg/dL 8 9   ALK PHOS U/L 65   ALT U/L 21   AST U/L 12               Imaging Studies: I have personally reviewed pertinent imaging studies      No results found     ASSESSMENT and PLAN:      1)  History of duodenal adenoma, gastric ulcerations and resolving iron deficiency anemia -  Patient with normalization of hemoglobin, but had positive FOBT stool  He was post to have an EGD recall 3 months after his EGD in April  He just had a colonoscopy in 2020, which was negative  -   Will have patient undergo EGD to investigate, document healing of ulcers and to rebiopsy duodenal polyp  -  Further recommendations based on above testing        Follow up after EGD

## 2022-01-21 ENCOUNTER — TELEPHONE (OUTPATIENT)
Dept: SURGERY | Facility: HOSPITAL | Age: 72
End: 2022-01-21

## 2022-01-24 ENCOUNTER — HOSPITAL ENCOUNTER (OUTPATIENT)
Dept: GASTROENTEROLOGY | Facility: HOSPITAL | Age: 72
Setting detail: OUTPATIENT SURGERY
Discharge: HOME | End: 2022-01-24
Payer: MEDICARE

## 2022-01-24 ENCOUNTER — ANESTHESIA EVENT (OUTPATIENT)
Dept: GASTROENTEROLOGY | Facility: HOSPITAL | Age: 72
End: 2022-01-24

## 2022-01-24 ENCOUNTER — ANESTHESIA (OUTPATIENT)
Dept: GASTROENTEROLOGY | Facility: HOSPITAL | Age: 72
End: 2022-01-24

## 2022-01-24 VITALS
WEIGHT: 190.92 LBS | HEART RATE: 67 BPM | DIASTOLIC BLOOD PRESSURE: 82 MMHG | TEMPERATURE: 98.4 F | HEIGHT: 69 IN | OXYGEN SATURATION: 98 % | BODY MASS INDEX: 28.28 KG/M2 | RESPIRATION RATE: 18 BRPM | SYSTOLIC BLOOD PRESSURE: 159 MMHG

## 2022-01-24 DIAGNOSIS — K29.70 GASTRITIS, PRESENCE OF BLEEDING UNSPECIFIED, UNSPECIFIED CHRONICITY, UNSPECIFIED GASTRITIS TYPE: ICD-10-CM

## 2022-01-24 DIAGNOSIS — D13.2 DUODENAL ADENOMA: ICD-10-CM

## 2022-01-24 LAB — GLUCOSE SERPL-MCNC: 103 MG/DL (ref 65–140)

## 2022-01-24 PROCEDURE — 43239 EGD BIOPSY SINGLE/MULTIPLE: CPT | Performed by: INTERNAL MEDICINE

## 2022-01-24 PROCEDURE — 88305 TISSUE EXAM BY PATHOLOGIST: CPT | Performed by: PATHOLOGY

## 2022-01-24 PROCEDURE — 88342 IMHCHEM/IMCYTCHM 1ST ANTB: CPT | Performed by: PATHOLOGY

## 2022-01-24 PROCEDURE — 82948 REAGENT STRIP/BLOOD GLUCOSE: CPT

## 2022-01-24 RX ORDER — PROPOFOL 10 MG/ML
INJECTION, EMULSION INTRAVENOUS AS NEEDED
Status: DISCONTINUED | OUTPATIENT
Start: 2022-01-24 | End: 2022-01-24

## 2022-01-24 RX ORDER — PANTOPRAZOLE SODIUM 40 MG/1
TABLET, DELAYED RELEASE ORAL
Qty: 90 TABLET | Refills: 0 | Status: SHIPPED | OUTPATIENT
Start: 2022-01-24 | End: 2022-02-20

## 2022-01-24 RX ORDER — SODIUM CHLORIDE, SODIUM LACTATE, POTASSIUM CHLORIDE, CALCIUM CHLORIDE 600; 310; 30; 20 MG/100ML; MG/100ML; MG/100ML; MG/100ML
INJECTION, SOLUTION INTRAVENOUS CONTINUOUS PRN
Status: DISCONTINUED | OUTPATIENT
Start: 2022-01-24 | End: 2022-01-24

## 2022-01-24 RX ORDER — PANTOPRAZOLE SODIUM 40 MG/1
40 TABLET, DELAYED RELEASE ORAL DAILY
Qty: 30 TABLET | Refills: 11 | Status: SHIPPED | OUTPATIENT
Start: 2022-01-24 | End: 2022-01-24 | Stop reason: SDUPTHER

## 2022-01-24 RX ADMIN — SODIUM CHLORIDE, SODIUM LACTATE, POTASSIUM CHLORIDE, AND CALCIUM CHLORIDE: .6; .31; .03; .02 INJECTION, SOLUTION INTRAVENOUS at 07:47

## 2022-01-24 RX ADMIN — LIDOCAINE HYDROCHLORIDE 80 MG: 20 INJECTION, SOLUTION INTRAVENOUS at 08:01

## 2022-01-24 RX ADMIN — PROPOFOL 130 MG: 10 INJECTION, EMULSION INTRAVENOUS at 08:01

## 2022-01-24 NOTE — ANESTHESIA PREPROCEDURE EVALUATION
Procedure:  EGD    Relevant Problems   CARDIO   (+) Atherosclerosis of aorta (HCC)   (+) Atherosclerosis of native coronary artery of native heart without angina pectoris   (+) Coronary artery disease involving native coronary artery of native heart   (+) HTN (hypertension)   (+) Hyperlipidemia      ENDO   (+) Type 2 diabetes mellitus (HCC)      /RENAL   (+) Benign localized hyperplasia of prostate with urinary obstruction      HEMATOLOGY   (+) Anemia   (+) Anemia associated with diabetes mellitus (HCC)   (+) Iron deficiency anemia due to chronic blood loss      NEURO/PSYCH   (+) History of aortic regurgitation      Other   (+) Antritis and gastric ulcer   (+) Status post parathyroidectomy (HCC)   (+) Tobacco abuse   76-year-old male with history of hypertension, hyperlipidemia, type 2 diabetes, CAD on Plavix, hyperparathyroidism, hypertrophic obstructive cardiomyopathy, and previous GI bleed from gastric ulcer        Last echo showed normal EF, mild AS, and mod AR    Stents 2018  Smoker     Off plavix since April  Fish oil and ASA held 5 days  Physical Exam    Airway    Mallampati score: II  TM Distance: >3 FB  Neck ROM: full     Dental   Comment: Denies loose teeth,     Cardiovascular  Cardiovascular exam normal    Pulmonary  Pulmonary exam normal     Other Findings  Portions of exam deferred due to low yield and/or unknown COVID status      Anesthesia Plan  ASA Score- 3     Anesthesia Type- IV sedation with anesthesia with ASA Monitors  Additional Monitors:   Airway Plan:           Plan Factors-Exercise tolerance (METS): >4 METS  Chart reviewed  Existing labs reviewed  Patient summary reviewed  Patient is a current smoker  Induction- intravenous  Postoperative Plan-     Informed Consent- Anesthetic plan and risks discussed with patient  I personally reviewed this patient with the CRNA  Discussed and agreed on the Anesthesia Plan with the CRNA  Harpal Greene

## 2022-01-24 NOTE — INTERVAL H&P NOTE
H&P reviewed  After examining the patient I find no changes in the patients condition since the H&P had been written      Vitals:    01/24/22 0713   BP: (!) 179/84   Pulse: 56   Resp: 20   Temp: 98 3 °F (36 8 °C)   SpO2: 100%

## 2022-02-03 ENCOUNTER — TELEPHONE (OUTPATIENT)
Dept: OTHER | Facility: OTHER | Age: 72
End: 2022-02-03

## 2022-02-03 NOTE — TELEPHONE ENCOUNTER
Spoke with patient, he is aware he does not need to continue Flomax  Spoke with Yuliya Duran  At [place] and cancelled Flomax prescription on file

## 2022-02-03 NOTE — TELEPHONE ENCOUNTER
Patient stated that he has not had urinary retention since his TURP and is still getting calls from his pharmacy that his Tamulosin has been refilled  Patient reports he has not taking the medication and would like to know if it can be D/c'd  Please call patient back to advise

## 2022-02-09 ENCOUNTER — OFFICE VISIT (OUTPATIENT)
Dept: SURGICAL ONCOLOGY | Facility: CLINIC | Age: 72
End: 2022-02-09
Payer: MEDICARE

## 2022-02-09 VITALS
WEIGHT: 194 LBS | DIASTOLIC BLOOD PRESSURE: 70 MMHG | HEIGHT: 69 IN | HEART RATE: 52 BPM | RESPIRATION RATE: 16 BRPM | TEMPERATURE: 96.9 F | OXYGEN SATURATION: 99 % | SYSTOLIC BLOOD PRESSURE: 138 MMHG | BODY MASS INDEX: 28.73 KG/M2

## 2022-02-09 DIAGNOSIS — I42.1 HOCM (HYPERTROPHIC OBSTRUCTIVE CARDIOMYOPATHY) (HCC): ICD-10-CM

## 2022-02-09 DIAGNOSIS — E21.3 HYPERPARATHYROIDISM (HCC): Primary | ICD-10-CM

## 2022-02-09 PROCEDURE — 99213 OFFICE O/P EST LOW 20 MIN: CPT | Performed by: SURGERY

## 2022-02-09 NOTE — PROGRESS NOTES
Surgical Oncology Follow Up       [address] [place]  CANCER [facility] [place]  [address] [address]    Heri Pitt  1950  [id number]  [address] [place]  CANCER [facility] SURGICAL ONCOLOGY [place]  [address] [id number]    Chief Complaint   Patient presents with    Follow-up       Assessment/Plan:    No problem-specific Assessment & Plan notes found for this encounter  Diagnoses and all orders for this visit:    Hyperparathyroidism ([facility] )  -     CT parathyroid study w wo contrast; Future    HOCM (hypertrophic obstructive cardiomyopathy) ([facility] )        Advance Care Planning/Advance Directives:  Discussed disease status, cancer treatment plans and/or cancer treatment goals with the patient  Oncology History    No history exists  History of Present Illness:  Patient is a 75-year-old man status post parathyroidectomy here for follow-up 6 months post treatment  He feels well  -Interval History:  He had blood work done in anticipation of today's visit  Review of Systems:  Review of Systems   Constitutional: Negative  HENT: Negative  Eyes: Negative  Respiratory: Negative  Cardiovascular: Negative  Gastrointestinal: Negative  Endocrine: Negative  Genitourinary: Negative  Musculoskeletal: Negative  Skin: Negative  Allergic/Immunologic: Negative  Negative for food allergies  Neurological: Negative  Hematological: Negative  Psychiatric/Behavioral: Negative          Patient Active Problem List   Diagnosis    Non-recurrent bilateral inguinal hernia without obstruction or gangrene    Hyperlipidemia    HTN (hypertension)    Type 2 diabetes mellitus ([facility] )    Tobacco abuse    Iron deficiency anemia due to chronic blood loss    HOCM (hypertrophic obstructive cardiomyopathy) ([facility] )    Coronary artery disease involving native coronary artery of native heart    [facility] of [facility]    Atherosclerosis of native coronary artery of native heart without angina pectoris    Anemia associated with diabetes mellitus ([facility] )    Thrombocytosis    Hypercalcemia    Hyperparathyroidism ([facility] )    History of aortic regurgitation    History of obesity    Hyperkalemia    Metabolic acidosis    UTI (urinary tract infection)    Dehydration with hyponatremia    Hematuria    Anemia    Status post parathyroidectomy ([facility]    Antritis and gastric ulcer    Urinary retention    Urinary retention due to benign prostatic hyperplasia    Benign localized hyperplasia of prostate with urinary obstruction     Past Medical History:   Diagnosis Date    Benign neoplasm of large intestine     Bleeding gastric ulcer 2018    Chronic kidney disease     Colon polyp     Diabetes mellitus [facility]     Disease of thyroid gland     Heart murmur     History of aortic regurgitation     History of constipation     History of degenerative joint disease     History of nocturia     History of obesity     History of sebaceous cyst     History of shortness of breath     History of transfusion     History of urinary frequency     Hyperlipidemia     Hypertension     Myocardial infarction ([facility] ) 2018 january, 3 stents    Polyposis coli     of the large intestine    Ulcer of esophagus      Past Surgical History:   Procedure Laterality Date    ACHILLES TENDON SURGERY Left 1973    CATARACT EXTRACTION Right 02/18/2021    COLONOSCOPY      hyperplastic polyp    CORONARY ANGIOPLASTY WITH STENT PLACEMENT      3 stents: 2 placed on Jan 2018, 1 on July 2018    Oriana Worthy CYST REMOVAL  2019, 2020    left and right wrists    ESOPHAGOGASTRODUODENOSCOPY N/A 1/23/2018    Procedure: ESOPHAGOGASTRODUODENOSCOPY (EGD); Surgeon: Pam Patel MD;  [facility];   Service: Gastroenterology    ESOPHAGOGASTRODUODENOSCOPY      HERNIA REPAIR      HERNIA REPAIR Bilateral 8/18/2017    Procedure: LAPAROSCOPIC INGUINAL HERNIA REPAIR WITH MESH;  Surgeon: Braden Sarmiento MD;  [facility];  Service: General    MT ESOPHAGOGASTRODUODENOSCOPY TRANSORAL DIAGNOSTIC N/A 3/26/2018    Procedure: ESOPHAGOGASTRODUODENOSCOPY (EGD); Surgeon: Silvia Hernandez MD;  [facility]; Service: Gastroenterology    MT ESOPHAGOGASTRODUODENOSCOPY TRANSORAL DIAGNOSTIC N/A 5/14/2018    Procedure: ESOPHAGOGASTRODUODENOSCOPY (EGD); Surgeon: Silvia Hernandez MD;  [facility]; Service: Gastroenterology    MT EXPLORE PARATHYROID GLANDS Right 3/23/2021    Procedure: RIGHT PARATHYROIDECTOMY, MINIMALLY INVASIVE, POSSIBLE 4-GLAND EXPLORATION, WITH INTRA-OPERATIVE PTH MONITORING;  Surgeon: Megha Smith MD;  Location:  MAIN OR;  Service: Surgical Oncology    MT TRANSURETHRAL ELEC-SURG PROSTATECTOM N/A 9/2/2021    Procedure: TRANSURETHRAL RESECTION OF PROSTATE (TURP);   Surgeon: Kathya St MD;  [facility];  Service: Urology    TIBIA FRACTURE SURGERY Left 1962     Family History   Problem Relation Age of Onset    Rheumatic fever Father     Other Father         accidental poisoning   [facility] Heart disease Mother      Social History     Socioeconomic History    Marital status: /Civil Union     Spouse name: Not on file    Number of children: Not on file    Years of education: Not on file    Highest education level: Not on file   Occupational History    Occupation:    Tobacco Use    Smoking status: Current Every Day Smoker     Packs/day: 1 00     Years: 35 00     Pack years: 35 00     Types: Cigarettes    Smokeless tobacco: Never Used   Vaping Use    Vaping Use: Never used   Substance and Sexual Activity    Alcohol use: Never    Drug use: No    Sexual activity: Not Currently     Partners: Female   Other Topics Concern    Not on file   Social History Narrative    Not on file     Social Determinants of Health     Financial Resource Strain: Not on file   Food Insecurity: Not on file Transportation Needs: Not on file   Physical Activity: Inactive    Days of Exercise per Week: 0 days    Minutes of Exercise per Session: 0 min   Stress: No Stress Concern Present    Feeling of Stress : Not at all   Social Connections: Not on file   Intimate Partner Violence: Not on file   Housing Stability: Not on file       Current Outpatient Medications:     aspirin (ECOTRIN LOW STRENGTH) 81 mg EC tablet, Take 1 tablet (81 mg total) by mouth daily, Disp: , Rfl:     atenolol (TENORMIN) 50 mg tablet, Take 1 tablet (50 mg total) by mouth daily, Disp: 90 tablet, Rfl: 3    atorvastatin (LIPITOR) 80 mg tablet, Take 1 tablet (80 mg total) by mouth daily, Disp: 90 tablet, Rfl: 3    cloNIDine (CATAPRES) 0 1 mg tablet, TAKE 1 TABLET(0 1 MG) BY MOUTH EVERY 8 HOURS, Disp: 90 tablet, Rfl: 5    ferrous sulfate 325 (65 Fe) mg tablet, Take 325 mg by mouth daily with breakfast, Disp: , Rfl:     lisinopril (ZESTRIL) 40 mg tablet, TAKE 1 TABLET(40 MG) BY MOUTH DAILY, Disp: 90 tablet, Rfl: 3    metFORMIN (GLUCOPHAGE-XR) 500 mg 24 hr tablet, Take 1 tablet (500 mg total) by mouth 2 (two) times a day with meals, Disp: 180 tablet, Rfl: 3    Omega-3 Fatty Acids (FISH OIL ADULT GUMMIES PO), Take by mouth, Disp: , Rfl:     pantoprazole (PROTONIX) 40 mg tablet, TAKE 1 TABLET(40 MG) BY MOUTH DAILY, Disp: 90 tablet, Rfl: 0    PreviDent 0 2 % SOLN, RINSE WITH 10ML FOR 1 MINUTE THEN SPIT OUT AT BEDTIME DO NOT SWALLOW , Disp: , Rfl:     tamsulosin (FLOMAX) 0 4 mg, TAKE 2 CAPSULES(0 8 MG) BY MOUTH DAILY WITH DINNER, Disp: 180 capsule, Rfl: 0    torsemide (DEMADEX) 10 mg tablet, Take 10 mg by mouth daily, Disp: , Rfl:   No Known Allergies  Vitals:    02/09/22 0953   BP: 138/70   Pulse: (!) 52   Resp: 16   Temp: (!) 96 9 °F (36 1 °C)   SpO2: 99%       Physical Exam  Constitutional:       Appearance: Normal appearance  HENT:      Head: Normocephalic and atraumatic        Right Ear: External ear normal       Left Ear: External ear normal    Eyes:      Extraocular Movements: Extraocular movements intact  Pupils: Pupils are equal, round, and reactive to light  Cardiovascular:      Rate and Rhythm: Normal rate and regular rhythm  Heart sounds: Normal heart sounds  Pulmonary:      Effort: Pulmonary effort is normal       Breath sounds: Normal breath sounds  Abdominal:      General: Abdomen is flat  Palpations: Abdomen is soft  Musculoskeletal:         General: Normal range of motion  Cervical back: Normal range of motion and neck supple  No rigidity or tenderness  Lymphadenopathy:      Cervical: No cervical adenopathy  Skin:     General: Skin is warm  Neurological:      Mental Status: He is alert  Results:  Labs:  Lab Results   Component Value Date     8 (H) 01/05/2022    CALCIUM 8 9 01/05/2022    PHOS 2 2 (L) 04/06/2021         Imaging  EGD    Result Date: 1/24/2022  Narrative:  [facility] [address] [facility] [id number] [phone] DATE OF SERVICE: 1/24/22 PHYSICIAN(S): Sherron Maciel MD Proceduralist INDICATION: Duodenal adenoma, Gastritis, presence of bleeding unspecified, unspecified chronicity, unspecified gastritis type POST-OP DIAGNOSIS: See the impression below  PREPROCEDURE: Informed consent was obtained for the procedure, including sedation  Risks of perforation, hemorrhage, adverse drug reaction and aspiration were discussed  The patient was placed in the left lateral decubitus position  Patient was explained about the risks and benefits of the procedure  Risks including but not limited to bleeding, infection, and perforation were explained in detail  Also explained about less than 100% sensitivity with the exam and other alternatives  DETAILS OF PROCEDURE: Patient was taken to the procedure room where a time out was performed to confirm correct patient and correct procedure   The patient underwent monitored anesthesia care, which was administered by an anesthesia professional  The patient's blood pressure, heart rate, level of consciousness, respirations and oxygen were monitored throughout the procedure  The scope was advanced to the second part of the duodenum  Retroflexion was performed in the fundus  The patient experienced no blood loss  The procedure was not difficult  The patient tolerated the procedure well  There were no apparent complications  ANESTHESIA INFORMATION: ASA: III Anesthesia Type: IV Sedation with Anesthesia MEDICATIONS: No administrations occurring from 0756 to 0807 on 01/24/22 FINDINGS: The upper third of the esophagus, middle third of the esophagus and lower third of the esophagus appeared normal  Grade B esophagitis with mucosal breaks measuring 5 mm or more not continuous between folds, covering less than 75% of the circumference in the GE junction Moderate, patchy atrophic, erythematous and friable mucosa with erosion in the body of the stomach and antrum; performed cold forceps biopsy to rule out H  pylori The duodenal bulb, 1st part of the duodenum and 2nd part of the duodenum appeared normal  Performed random biopsy  One flat, adenomatous-appearing polyp measuring smaller than 5 mm in the 2nd part of the duodenum SPECIMENS: ID Type Source Tests Collected by Time Destination 1 :  Tissue Stomach TISSUE EXAM Sindhu Poole MD 1/24/2022  8:05 AM      Impression: LA grade B reflux esophagitis Moderate erosive gastritis Small duodenal polyp-not removed RECOMMENDATION: Continue PPI daily Caution with NSAIDs Await pathology EGD in 3 years Reflux precautions   Sindhu Poole MD     I reviewed the above laboratory and imaging data  Discussion/Summary:  History of parathyroidectomy, now with normal calcium levels but elevated PTH levels  Will order CT scan to look for potential 2nd target  Rationale for this discussed with patient  All questions answered  He is amenable to getting the scan done

## 2022-02-09 NOTE — LETTER
February 9, 2022     Alonzo Thomas MD  [address] [address]    Patient: Lord Perez   YOB: 1950   Date of Visit: 2/9/2022       Dear Dr Navarro Corporal: Thank you for referring Cr Wu to me for evaluation  Below are my notes for this consultation  If you have questions, please do not hesitate to call me  I look forward to following your patient along with you  Sincerely,        Jorge Castillo MD        CC: No Recipients  Jorge Castillo MD  2/9/2022 10:34 AM  Sign when Signing Visit     Surgical Oncology Follow Up       [address] [facility]  [address] [facility] [address]    Lord Perez  1950  [id number]  [address] [facility] [address] [facility]  [facility] [place]  [address] [facility] [address]    Chief Complaint   Patient presents with    Follow-up       Assessment/Plan:    No problem-specific Assessment & Plan notes found for this encounter  Diagnoses and all orders for this visit:    Hyperparathyroidism ([facility] [address] )  -     CT parathyroid study w wo contrast; Future    HOCM (hypertrophic obstructive cardiomyopathy) ([facility] [address] )        Advance Care Planning/Advance Directives:  Discussed disease status, cancer treatment plans and/or cancer treatment goals with the patient  Oncology History    No history exists  History of Present Illness:  Patient is a 75-year-old man status post parathyroidectomy here for follow-up 6 months post treatment  He feels well  -Interval History:  He had blood work done in anticipation of today's visit  Review of Systems:  Review of Systems   Constitutional: Negative  HENT: Negative  Eyes: Negative  Respiratory: Negative  Cardiovascular: Negative  Gastrointestinal: Negative  Endocrine: Negative  Genitourinary: Negative  Musculoskeletal: Negative  Skin: Negative      Allergic/Immunologic: Negative  Negative for food allergies  Neurological: Negative  Hematological: Negative  Psychiatric/Behavioral: Negative          Patient Active Problem List   Diagnosis    Non-recurrent bilateral inguinal hernia without obstruction or gangrene    Hyperlipidemia    HTN (hypertension)    Type 2 diabetes mellitus ([place] [address] )    Tobacco abuse    Iron deficiency anemia due to chronic blood loss    HOCM (hypertrophic obstructive cardiomyopathy) ([place] [address] )    Coronary artery disease involving native coronary artery of native heart    Atherosclerosis of aorta ([place] [address] )    Atherosclerosis of native coronary artery of native heart without angina pectoris    Anemia associated with diabetes mellitus ([place] [address] )    Thrombocytosis    Hypercalcemia    Hyperparathyroidism ([place] [address] )    History of aortic regurgitation    History of obesity    Hyperkalemia    Metabolic acidosis    UTI (urinary tract infection)    Dehydration with hyponatremia    Hematuria    Anemia    Status post parathyroidectomy (HCC)    Antritis and gastric ulcer    Urinary retention    Urinary retention due to benign prostatic hyperplasia    Benign localized hyperplasia of prostate with urinary obstruction     Past Medical History:   Diagnosis Date    Benign neoplasm of large intestine     Bleeding gastric ulcer 2018    Chronic kidney disease     Colon polyp     Diabetes mellitus ([place] [address] )     Disease of thyroid gland     Heart murmur     History of aortic regurgitation     History of constipation     History of degenerative joint disease     History of nocturia     History of obesity     History of sebaceous cyst     History of shortness of breath     History of transfusion     History of urinary frequency     Hyperlipidemia     Hypertension     Myocardial infarction [facility]) 2018    january, 3 stents    Polyposis coli     of the large intestine    Ulcer of esophagus      Past Surgical History:   Procedure Laterality Date    ACHILLES TENDON SURGERY Left 1973    CATARACT EXTRACTION Right 02/18/2021    COLONOSCOPY      hyperplastic polyp    CORONARY ANGIOPLASTY WITH STENT PLACEMENT      3 stents: 2 placed on Jan 2018, 1 on July 2018    [employer] CYST REMOVAL  2019, 2020    left and right wrists    ESOPHAGOGASTRODUODENOSCOPY N/A 1/23/2018    Procedure: ESOPHAGOGASTRODUODENOSCOPY (EGD); Surgeon: Johnny Sanchez MD;  Location: [facility]; Service: Gastroenterology    ESOPHAGOGASTRODUODENOSCOPY      HERNIA REPAIR      HERNIA REPAIR Bilateral 8/18/2017    Procedure: LAPAROSCOPIC INGUINAL HERNIA REPAIR WITH MESH;  Surgeon: Niharika Alejandra MD;  [facility];  Service: General    WA ESOPHAGOGASTRODUODENOSCOPY TRANSORAL DIAGNOSTIC N/A 3/26/2018    Procedure: ESOPHAGOGASTRODUODENOSCOPY (EGD); Surgeon: Barnie Gosselin, MD;  [facility]; Service: Gastroenterology    WA ESOPHAGOGASTRODUODENOSCOPY TRANSORAL DIAGNOSTIC N/A 5/14/2018    Procedure: ESOPHAGOGASTRODUODENOSCOPY (EGD); Surgeon: Barnie Gosselin, MD;  [facility]; Service: Gastroenterology    WA EXPLORE PARATHYROID GLANDS Right 3/23/2021    Procedure: RIGHT PARATHYROIDECTOMY, MINIMALLY INVASIVE, POSSIBLE 4-GLAND EXPLORATION, WITH INTRA-OPERATIVE PTH MONITORING;  Surgeon: Roger Angel MD;  Location:  MAIN OR;  Service: Surgical Oncology    WA TRANSURETHRAL ELEC-SURG PROSTATECTOM N/A 9/2/2021    Procedure: TRANSURETHRAL RESECTION OF PROSTATE (TURP);   Surgeon: Felipe Bragg MD;  [facility];  Service: Urology    TIBIA FRACTURE SURGERY Left 1962     Family History   Problem Relation Age of Onset    Rheumatic fever Father     Other Father         accidental poisoning   [place] Heart disease Mother      Social History     Socioeconomic History    Marital status: /Civil Union     Spouse name: Not on file    Number of children: Not on file    Years of education: Not on file    Highest education level: Not on file   Occupational History  Occupation:    Tobacco Use    Smoking status: Current Every Day Smoker     Packs/day: 1 00     Years: 35 00     Pack years: 35 00     Types: Cigarettes    Smokeless tobacco: Never Used   Vaping Use    Vaping Use: Never used   Substance and Sexual Activity    Alcohol use: Never    Drug use: No    Sexual activity: Not Currently     Partners: Female   Other Topics Concern    Not on file   Social History Narrative    Not on file     Social Determinants of Health     Financial Resource Strain: Not on file   Food Insecurity: Not on file   Transportation Needs: Not on file   Physical Activity: Inactive    Days of Exercise per Week: 0 days   Modulus Video of Exercise per Session: 0 min   Stress: No Stress Concern Present    Feeling of Stress : Not at all   Social Connections: Not on file   Intimate Partner Violence: Not on file   Housing Stability: Not on file       Current Outpatient Medications:     aspirin (ECOTRIN LOW STRENGTH) 81 mg EC tablet, Take 1 tablet (81 mg total) by mouth daily, Disp: , Rfl:     atenolol (TENORMIN) 50 mg tablet, Take 1 tablet (50 mg total) by mouth daily, Disp: 90 tablet, Rfl: 3    atorvastatin (LIPITOR) 80 mg tablet, Take 1 tablet (80 mg total) by mouth daily, Disp: 90 tablet, Rfl: 3    cloNIDine (CATAPRES) 0 1 mg tablet, TAKE 1 TABLET(0 1 MG) BY MOUTH EVERY 8 HOURS, Disp: 90 tablet, Rfl: 5    ferrous sulfate 325 (65 Fe) mg tablet, Take 325 mg by mouth daily with breakfast, Disp: , Rfl:     lisinopril (ZESTRIL) 40 mg tablet, TAKE 1 TABLET(40 MG) BY MOUTH DAILY, Disp: 90 tablet, Rfl: 3    metFORMIN (GLUCOPHAGE-XR) 500 mg 24 hr tablet, Take 1 tablet (500 mg total) by mouth 2 (two) times a day with meals, Disp: 180 tablet, Rfl: 3    Omega-3 Fatty Acids (FISH OIL ADULT GUMMIES PO), Take by mouth, Disp: , Rfl:     pantoprazole (PROTONIX) 40 mg tablet, TAKE 1 TABLET(40 MG) BY MOUTH DAILY, Disp: 90 tablet, Rfl: 0    PreviDent 0 2 % SOLN, RINSE WITH 10ML FOR 1 MINUTE THEN SPIT OUT AT BEDTIME DO NOT SWALLOW , Disp: , Rfl:     tamsulosin (FLOMAX) 0 4 mg, TAKE 2 CAPSULES(0 8 MG) BY MOUTH DAILY WITH DINNER, Disp: 180 capsule, Rfl: 0    torsemide (DEMADEX) 10 mg tablet, Take 10 mg by mouth daily, Disp: , Rfl:   No Known Allergies  Vitals:    02/09/22 0953   BP: 138/70   Pulse: (!) 52   Resp: 16   Temp: (!) 96 9 °F (36 1 °C)   SpO2: 99%       Physical Exam  Constitutional:       Appearance: Normal appearance  HENT:      Head: Normocephalic and atraumatic  Right Ear: External ear normal       Left Ear: External ear normal    Eyes:      Extraocular Movements: Extraocular movements intact  Pupils: Pupils are equal, round, and reactive to light  Cardiovascular:      Rate and Rhythm: Normal rate and regular rhythm  Heart sounds: Normal heart sounds  Pulmonary:      Effort: Pulmonary effort is normal       Breath sounds: Normal breath sounds  Abdominal:      General: Abdomen is flat  Palpations: Abdomen is soft  Musculoskeletal:         General: Normal range of motion  Cervical back: Normal range of motion and neck supple  No rigidity or tenderness  Lymphadenopathy:      Cervical: No cervical adenopathy  Skin:     General: Skin is warm  Neurological:      Mental Status: He is alert  Results:  Labs:  Lab Results   Component Value Date     8 (H) 01/05/2022    CALCIUM 8 9 01/05/2022    PHOS 2 2 (L) 04/06/2021         Imaging  EGD    Result Date: 1/24/2022  Narrative:  [facility] [address] [place] [id number] [phone] DATE OF SERVICE: 1/24/22 PHYSICIAN(S): Gwen Pace MD Proceduralist INDICATION: Duodenal adenoma, Gastritis, presence of bleeding unspecified, unspecified chronicity, unspecified gastritis type POST-OP DIAGNOSIS: See the impression below  PREPROCEDURE: Informed consent was obtained for the procedure, including sedation    Risks of perforation, hemorrhage, adverse drug reaction and aspiration were discussed  The patient was placed in the left lateral decubitus position  Patient was explained about the risks and benefits of the procedure  Risks including but not limited to bleeding, infection, and perforation were explained in detail  Also explained about less than 100% sensitivity with the exam and other alternatives  DETAILS OF PROCEDURE: Patient was taken to the procedure room where a time out was performed to confirm correct patient and correct procedure  The patient underwent monitored anesthesia care, which was administered by an anesthesia professional  The patient's blood pressure, heart rate, level of consciousness, respirations and oxygen were monitored throughout the procedure  The scope was advanced to the second part of the duodenum  Retroflexion was performed in the fundus  The patient experienced no blood loss  The procedure was not difficult  The patient tolerated the procedure well  There were no apparent complications  ANESTHESIA INFORMATION: ASA: III Anesthesia Type: IV Sedation with Anesthesia MEDICATIONS: No administrations occurring from 0756 to 0807 on 01/24/22 FINDINGS: The upper third of the esophagus, middle third of the esophagus and lower third of the esophagus appeared normal  Grade B esophagitis with mucosal breaks measuring 5 mm or more not continuous between folds, covering less than 75% of the circumference in the GE junction Moderate, patchy atrophic, erythematous and friable mucosa with erosion in the body of the stomach and antrum; performed cold forceps biopsy to rule out H  pylori The duodenal bulb, 1st part of the duodenum and 2nd part of the duodenum appeared normal  Performed random biopsy   One flat, adenomatous-appearing polyp measuring smaller than 5 mm in the 2nd part of the duodenum SPECIMENS: ID Type Source Tests Collected by Time Destination 1 :  Tissue Stomach TISSUE EXAM Jonathan Fuller MD 1/24/2022  8:05 AM      Impression: LA grade B reflux esophagitis Moderate erosive gastritis Small duodenal polyp-not removed RECOMMENDATION: Continue PPI daily Caution with NSAIDs Await pathology EGD in 3 years Reflux precautions   Dale Elizabeth MD     I reviewed the above laboratory and imaging data  Discussion/Summary:  History of parathyroidectomy, now with normal calcium levels but elevated PTH levels  Will order CT scan to look for potential 2nd target  Rationale for this discussed with patient  All questions answered  He is amenable to getting the scan done

## 2022-02-15 DIAGNOSIS — I10 HYPERTENSION, UNSPECIFIED TYPE: ICD-10-CM

## 2022-02-15 RX ORDER — ATENOLOL 50 MG/1
50 TABLET ORAL DAILY
Qty: 90 TABLET | Refills: 0 | Status: SHIPPED | OUTPATIENT
Start: 2022-02-15 | End: 2022-05-16

## 2022-02-20 DIAGNOSIS — K29.70 GASTRITIS, PRESENCE OF BLEEDING UNSPECIFIED, UNSPECIFIED CHRONICITY, UNSPECIFIED GASTRITIS TYPE: ICD-10-CM

## 2022-02-20 DIAGNOSIS — D13.2 DUODENAL ADENOMA: ICD-10-CM

## 2022-02-20 RX ORDER — PANTOPRAZOLE SODIUM 40 MG/1
TABLET, DELAYED RELEASE ORAL
Qty: 90 TABLET | Refills: 0 | Status: SHIPPED | OUTPATIENT
Start: 2022-02-20 | End: 2022-05-23 | Stop reason: SDUPTHER

## 2022-03-31 ENCOUNTER — HOSPITAL ENCOUNTER (OUTPATIENT)
Dept: CT IMAGING | Facility: HOSPITAL | Age: 72
Discharge: HOME | End: 2022-03-31
Attending: SURGERY
Payer: MEDICARE

## 2022-03-31 DIAGNOSIS — E21.3 HYPERPARATHYROIDISM (HCC): ICD-10-CM

## 2022-03-31 PROCEDURE — 70492 CT SFT TSUE NCK W/O & W/DYE: CPT

## 2022-03-31 PROCEDURE — G1004 CDSM NDSC: HCPCS

## 2022-03-31 RX ADMIN — IOHEXOL 85 ML: 350 INJECTION, SOLUTION INTRAVENOUS at 15:31

## 2022-04-13 ENCOUNTER — OFFICE VISIT (OUTPATIENT)
Dept: SURGICAL ONCOLOGY | Facility: CLINIC | Age: 72
End: 2022-04-13
Payer: MEDICARE

## 2022-04-13 ENCOUNTER — TELEPHONE (OUTPATIENT)
Dept: SURGICAL ONCOLOGY | Facility: CLINIC | Age: 72
End: 2022-04-13

## 2022-04-13 VITALS
BODY MASS INDEX: 29.03 KG/M2 | WEIGHT: 196 LBS | TEMPERATURE: 96.8 F | RESPIRATION RATE: 14 BRPM | HEIGHT: 69 IN | DIASTOLIC BLOOD PRESSURE: 84 MMHG | OXYGEN SATURATION: 99 % | HEART RATE: 61 BPM | SYSTOLIC BLOOD PRESSURE: 136 MMHG

## 2022-04-13 DIAGNOSIS — E89.2 STATUS POST PARATHYROIDECTOMY (HCC): Primary | ICD-10-CM

## 2022-04-13 DIAGNOSIS — E21.3 HYPERPARATHYROIDISM (HCC): ICD-10-CM

## 2022-04-13 PROCEDURE — 99214 OFFICE O/P EST MOD 30 MIN: CPT | Performed by: SURGERY

## 2022-04-13 RX ORDER — BROMFENAC SODIUM 0.7 MG/ML
SOLUTION/ DROPS OPHTHALMIC
COMMUNITY
Start: 2021-11-08 | End: 2022-05-05 | Stop reason: ALTCHOICE

## 2022-04-13 RX ORDER — TRAMADOL HYDROCHLORIDE 50 MG/1
50 TABLET ORAL EVERY 6 HOURS PRN
Qty: 10 TABLET | Refills: 0 | Status: SHIPPED | OUTPATIENT
Start: 2022-04-13

## 2022-04-13 NOTE — PROGRESS NOTES
Surgical Oncology Follow Up       [address] [place]  CANCER [facility] SURGICAL ONCOLOGY [place]  [address] [facility] [address]    Benita Theodore  1950  [id number]  [address] [facility] SURGICAL ONCOLOGY [place]  [address] [address]    Chief Complaint   Patient presents with    Follow-up     CT-parathyroid        Assessment/Plan:    No problem-specific Assessment & Plan notes found for this encounter  Diagnoses and all orders for this visit:    Status post parathyroidectomy ([address] )    Hyperparathyroidism ([address] )    Other orders  -     bromfenac sodium (Prolensa) 0 07 % SOLN;  (Patient not taking: Reported on 4/13/2022 )        Advance Care Planning/Advance Directives:  Discussed disease status, cancer treatment plans and/or cancer treatment goals with the patient  Oncology History    No history exists  History of Present Illness:  Patient is a 73-year-old man history of prior right parathyroidectomy, with recurrent elevations in PTH levels  He also complains of fatigue   -Interval History:  He had CT scan done recently anticipation of today's visit  This was look for another potential parathyroid adenoma  Review of Systems:  Review of Systems   Constitutional: Positive for fatigue  HENT: Negative  Eyes: Negative  Respiratory: Negative  Cardiovascular: Negative  Gastrointestinal: Negative  Endocrine: Negative  Genitourinary: Negative  Musculoskeletal: Negative  Skin: Negative  Allergic/Immunologic: Negative  Neurological: Negative  Hematological: Negative  Psychiatric/Behavioral: Negative          Patient Active Problem List   Diagnosis    Non-recurrent bilateral inguinal hernia without obstruction or gangrene    Hyperlipidemia    HTN (hypertension)    Type 2 diabetes mellitus ([address] )    Tobacco abuse    Iron deficiency anemia due to chronic blood loss    [facility]    Coronary artery disease involving native coronary artery of native heart    Atherosclerosis of aorta ([facility]    Atherosclerosis of native coronary artery of native heart without angina pectoris    Anemia associated with diabetes mellitus ([facility] )    Thrombocytosis    Hypercalcemia    Hyperparathyroidism ([facility] )    History of aortic regurgitation    History of obesity    Hyperkalemia    Metabolic acidosis    UTI (urinary tract infection)    Dehydration with hyponatremia    Hematuria    Anemia    Status post parathyroidectomy ([facility] )    Antritis and gastric ulcer    Urinary retention    Urinary retention due to benign prostatic hyperplasia    Benign localized hyperplasia of prostate with urinary obstruction     Past Medical History:   Diagnosis Date    Benign neoplasm of large intestine     Bleeding gastric ulcer 2018    Chronic kidney disease     Colon polyp     Diabetes mellitus ([facility]     Disease of thyroid gland     Heart murmur     History of aortic regurgitation     History of constipation     History of degenerative joint disease     History of nocturia     History of obesity     History of sebaceous cyst     History of shortness of breath     History of transfusion     History of urinary frequency     Hyperlipidemia     Hypertension     Myocardial infarction ([facility] ) 2018 january, 3 stents    Polyposis coli     of the large intestine    Ulcer of esophagus      Past Surgical History:   Procedure Laterality Date    ACHILLES TENDON SURGERY Left 1973    CATARACT EXTRACTION Right 02/18/2021    COLONOSCOPY      hyperplastic polyp    CORONARY ANGIOPLASTY WITH STENT PLACEMENT      3 stents: 2 placed on Jan 2018, 1 on July 2018    [place] Drop CYST REMOVAL  2019, 2020    left and right wrists    ESOPHAGOGASTRODUODENOSCOPY N/A 1/23/2018    Procedure: ESOPHAGOGASTRODUODENOSCOPY (EGD); Surgeon: Shanita Key MD;  [facility];   Service: Gastroenterology    ESOPHAGOGASTRODUODENOSCOPY      HERNIA REPAIR      HERNIA REPAIR Bilateral 8/18/2017    Procedure: LAPAROSCOPIC INGUINAL HERNIA REPAIR WITH MESH;  Surgeon: Shaila Alejandra MD;  [facility];  Service: General    RI ESOPHAGOGASTRODUODENOSCOPY TRANSORAL DIAGNOSTIC N/A 3/26/2018    Procedure: ESOPHAGOGASTRODUODENOSCOPY (EGD); Surgeon: Chase Carias MD;  [facility]; Service: Gastroenterology    RI ESOPHAGOGASTRODUODENOSCOPY TRANSORAL DIAGNOSTIC N/A 5/14/2018    Procedure: ESOPHAGOGASTRODUODENOSCOPY (EGD); Surgeon: Chase Carias MD;  [facility]; Service: Gastroenterology    RI EXPLORE PARATHYROID GLANDS Right 3/23/2021    Procedure: RIGHT PARATHYROIDECTOMY, MINIMALLY INVASIVE, POSSIBLE 4-GLAND EXPLORATION, WITH INTRA-OPERATIVE PTH MONITORING;  Surgeon: Monroe Aguilar MD;  Location:  MAIN OR;  Service: Surgical Oncology    RI TRANSURETHRAL ELEC-SURG PROSTATECTOM N/A 9/2/2021    Procedure: TRANSURETHRAL RESECTION OF PROSTATE (TURP);   Surgeon: Jann Brewer MD;  [facility];  Service: Urology    TIBIA FRACTURE SURGERY Left 1962     Family History   Problem Relation Age of Onset    Rheumatic fever Father     Other Father         accidental poisoning   [de-identified] Heart disease Mother      Social History     Socioeconomic History    Marital status: /Civil Union     Spouse name: Not on file    Number of children: Not on file    Years of education: Not on file    Highest education level: Not on file   Occupational History    Occupation:    Tobacco Use    Smoking status: Current Every Day Smoker     Packs/day: 1 00     Years: 35 00     Pack years: 35 00     Types: Cigarettes    Smokeless tobacco: Never Used   Vaping Use    Vaping Use: Never used   Substance and Sexual Activity    Alcohol use: Never    Drug use: No    Sexual activity: Not Currently     Partners: Female   Other Topics Concern    Not on file   Social History Narrative    Not on file     Social Determinants of Health     Financial Resource Strain: Not on file   Food Insecurity: Not on file   Transportation Needs: Not on file   Physical Activity: Inactive    Days of Exercise per Week: 0 days    Minutes of Exercise per Session: 0 min   Stress: No Stress Concern Present    Feeling of Stress : Not at all   Social Connections: Not on file   Intimate Partner Violence: Not on file   Housing Stability: Not on file       Current Outpatient Medications:     aspirin (ECOTRIN LOW STRENGTH) 81 mg EC tablet, Take 1 tablet (81 mg total) by mouth daily, Disp: , Rfl:     atenolol (TENORMIN) 50 mg tablet, Take 1 tablet (50 mg total) by mouth daily, Disp: 90 tablet, Rfl: 0    atorvastatin (LIPITOR) 80 mg tablet, Take 1 tablet (80 mg total) by mouth daily, Disp: 90 tablet, Rfl: 3    cloNIDine (CATAPRES) 0 1 mg tablet, TAKE 1 TABLET(0 1 MG) BY MOUTH EVERY 8 HOURS, Disp: 90 tablet, Rfl: 5    ferrous sulfate 325 (65 Fe) mg tablet, Take 325 mg by mouth daily with breakfast, Disp: , Rfl:     lisinopril (ZESTRIL) 40 mg tablet, TAKE 1 TABLET(40 MG) BY MOUTH DAILY, Disp: 90 tablet, Rfl: 3    metFORMIN (GLUCOPHAGE-XR) 500 mg 24 hr tablet, Take 1 tablet (500 mg total) by mouth 2 (two) times a day with meals, Disp: 180 tablet, Rfl: 3    Omega-3 Fatty Acids (FISH OIL ADULT GUMMIES PO), Take by mouth, Disp: , Rfl:     pantoprazole (PROTONIX) 40 mg tablet, TAKE 1 TABLET(40 MG) BY MOUTH DAILY, Disp: 90 tablet, Rfl: 0    PreviDent 0 2 % SOLN, RINSE WITH 10ML FOR 1 MINUTE THEN SPIT OUT AT BEDTIME DO NOT SWALLOW , Disp: , Rfl:     torsemide (DEMADEX) 10 mg tablet, TAKE 1 TABLET(10 MG) BY MOUTH DAILY, Disp: 90 tablet, Rfl: 3    bromfenac sodium (Prolensa) 0 07 % SOLN, , Disp: , Rfl:     tamsulosin (FLOMAX) 0 4 mg, TAKE 2 CAPSULES(0 8 MG) BY MOUTH DAILY WITH DINNER (Patient not taking: Reported on 4/13/2022), Disp: 180 capsule, Rfl: 0  No Known Allergies  Vitals:    04/13/22 0836   BP: 136/84   Pulse: 61   Resp: 14   Temp: (!) 96 8 °F (36 °C)   SpO2: 99%       Physical Exam  Vitals reviewed  Constitutional:       Appearance: Normal appearance  HENT:      Head: Normocephalic and atraumatic  Right Ear: External ear normal       Mouth/Throat:      Mouth: Mucous membranes are moist    Eyes:      Extraocular Movements: Extraocular movements intact  Pupils: Pupils are equal, round, and reactive to light  Cardiovascular:      Rate and Rhythm: Normal rate and regular rhythm  Pulses: Normal pulses  Heart sounds: Normal heart sounds  Pulmonary:      Effort: Pulmonary effort is normal       Breath sounds: Normal breath sounds  Abdominal:      General: Abdomen is flat  Palpations: Abdomen is soft  Musculoskeletal:         General: Normal range of motion  Cervical back: Normal range of motion and neck supple  Skin:     General: Skin is warm and dry  Neurological:      General: No focal deficit present  Mental Status: He is alert and oriented to person, place, and time  Psychiatric:         Mood and Affect: Mood normal          Behavior: Behavior normal          Thought Content: Thought content normal          Judgment: Judgment normal            Results:  Labs:  Lab Results   Component Value Date     8 (H) 01/05/2022    CALCIUM 8 9 01/05/2022    PHOS 2 2 (L) 04/06/2021         Imaging  CT parathyroid study w wo contrast    Result Date: 4/5/2022  Narrative: CT  NECK  WITHOUT AND WITH CONTRAST FROM ANTONIO TO SKULL BASE INDICATION: Hyperparathyroidism  COMPARISON:  Previous parathyroid CT dated January 29, 2021  TECHNIQUE:This examination, like all CT scans performed in the [facility], was performed utilizing techniques to minimize radiation dose exposure, including the use of iterative reconstruction and automated exposure control   Both noncontrast, contrast, and post contrast delayed images were performed according to standard parathyroid protocol (4D technique) Coronal and sagittal reconstructions were performed  3D reconstructions were performed on an independent workstation, and are supplied for review  85 mL of iohexol (OMNIPAQUE) was injected intravenously without immediate consequence  Radiation dose: 1539 mGy-cm FINDINGS: SERIAL NONCONTRAST, POST CONTRAST AND DELAYS: There is again noted a 1 07 x 0 61 x 0 96 cm lesion identified in the right neck  The lesion sits posterior to the inferior aspect of the right thyroid lobe  The patient is status post surgical exploration with 2 surgical clips noted within 0 25 cm of this lesion  It is difficult to evaluate the lesion through all phases of enhancement due to streak artifact on the arterial images, but there is significant enhancement on the delayed images, suggesting parathyroid adenoma, however, it could represent an enhancing lymph node  This may have been confirmed on previous surgery  No other suspicious lesions are identified  It is best seen on series 5 images 67 through 70  VASCULAR STRUCTURES: Bovine branching anatomy of the aortic arch is noted without origin stenosis  There is marked tortuosity of the great vessels present  There is no significant internal carotid stenosis by Nascet criteria  VISUALIZED PARANASAL SINUSES: Normal  NASAL CAVITY AND NASOPHARYNX: Normal  SUPRAHYOID NECK: Normal oropharynx, oral cavity, parapharyngeal and retropharyngeal spaces  INFRAHYOID NECK: Normal oropharynx, oral cavity, parapharyngeal and retropharyngeal spaces  THYROID GLAND: Normal   Evidence of interval surgery present with clips noted in place  LYMPH NODES: No pathologic or enlarged adenopathy  MEDIASTINUM: Unremarkable  BONY STRUCTURES Diffuse osteopenia is again noted  LUNG APICES: Centrilobular emphysema noted, otherwise unremarkable  Impression: 1   The lesion identified previously in the right neck measuring 1 07 x 0 61 x 0 96 cm sitting posterior to the infra aspect of the right thyroid lobe is again noted  The lesion is suspicious for parathyroid adenoma based on delayed images, as the arterial images are degraded by significant streak artifact  The lesion could also represent an enhancing lymph node  No other suspicious lesions are identified  2  Patient status post interval surgical exploration with clips noted in place in the neck  3  Other imaging findings stable  Workstation performed: [id number]     I reviewed the above laboratory and imaging data  Discussion/Summary:  Persistent hyperparathyroidism suspected 2nd target on the right side  Plan on repeat operation, minimally invasive right parathyroidectomy, possible 4 gland exploration with intraoperative PTH monitoring  , with flexible laryngoscopy given prior surgery  All questions answered and consents signed at this visit

## 2022-04-13 NOTE — H&P (VIEW-ONLY)
Surgical Oncology Follow Up       [address] [facility]  [facility] [place]  [address] [facility] [address]    Dulcy First  1950  [id number]  [address] [facility] [facility]  [facility] [place]  [address] [address]    Chief Complaint   Patient presents with    Follow-up     CT-parathyroid        Assessment/Plan:    No problem-specific Assessment & Plan notes found for this encounter  Diagnoses and all orders for this visit:    Status post parathyroidectomy ([address] )    Hyperparathyroidism ([address] )    Other orders  -     bromfenac sodium (Prolensa) 0 07 % SOLN;  (Patient not taking: Reported on 4/13/2022 )        Advance Care Planning/Advance Directives:  Discussed disease status, cancer treatment plans and/or cancer treatment goals with the patient  Oncology History    No history exists  History of Present Illness:  Patient is a 70-year-old man history of prior right parathyroidectomy, with recurrent elevations in PTH levels  He also complains of fatigue   -Interval History:  He had CT scan done recently anticipation of today's visit  This was look for another potential parathyroid adenoma  Review of Systems:  Review of Systems   Constitutional: Positive for fatigue  HENT: Negative  Eyes: Negative  Respiratory: Negative  Cardiovascular: Negative  Gastrointestinal: Negative  Endocrine: Negative  Genitourinary: Negative  Musculoskeletal: Negative  Skin: Negative  Allergic/Immunologic: Negative  Neurological: Negative  Hematological: Negative  Psychiatric/Behavioral: Negative          Patient Active Problem List   Diagnosis    Non-recurrent bilateral inguinal hernia without obstruction or gangrene    Hyperlipidemia    HTN (hypertension)    Type 2 diabetes mellitus ([address] )    Tobacco abuse    Iron deficiency anemia due to chronic blood loss    [facility]M [facility]hypertrophic obstructive cardiomyopathy) [facility]    Coronary artery disease involving native coronary artery of native heart    Atherosclerosis of aorta ([facility]    Atherosclerosis of native coronary artery of native heart without angina pectoris    Anemia associated with diabetes mellitus ([facility] )    Thrombocytosis    Hypercalcemia    Hyperparathyroidism ([facility] )    History of aortic regurgitation    History of obesity    Hyperkalemia    Metabolic acidosis    UTI (urinary tract infection)    Dehydration with hyponatremia    Hematuria    Anemia    Status post parathyroidectomy ([facility] )    Antritis and gastric ulcer    Urinary retention    Urinary retention due to benign prostatic hyperplasia    Benign localized hyperplasia of prostate with urinary obstruction     Past Medical History:   Diagnosis Date    Benign neoplasm of large intestine     Bleeding gastric ulcer 2018    Chronic kidney disease     Colon polyp     Diabetes mellitus ([facility]     Disease of thyroid gland     Heart murmur     History of aortic regurgitation     History of constipation     History of degenerative joint disease     History of nocturia     History of obesity     History of sebaceous cyst     History of shortness of breath     History of transfusion     History of urinary frequency     Hyperlipidemia     Hypertension     Myocardial infarction ([facility] ) 2018    january, 3 stents    Polyposis coli     of the large intestine    Ulcer of esophagus      Past Surgical History:   Procedure Laterality Date    ACHILLES TENDON SURGERY Left 1973    CATARACT EXTRACTION Right 02/18/2021    COLONOSCOPY      hyperplastic polyp    CORONARY ANGIOPLASTY WITH STENT PLACEMENT      3 stents: 2 placed on Jan 2018, 1 on July 2018    [facility] CYST REMOVAL  2019, 2020    left and right wrists    ESOPHAGOGASTRODUODENOSCOPY N/A 1/23/2018    Procedure: ESOPHAGOGASTRODUODENOSCOPY (EGD); Surgeon: Raymond Sosa MD;  [facility];   Service: Gastroenterology    ESOPHAGOGASTRODUODENOSCOPY      HERNIA REPAIR      HERNIA REPAIR Bilateral 8/18/2017    Procedure: LAPAROSCOPIC INGUINAL HERNIA REPAIR WITH MESH;  Surgeon: Natalie Dyer MD;  [facility];  Service: General    MI ESOPHAGOGASTRODUODENOSCOPY TRANSORAL DIAGNOSTIC N/A 3/26/2018    Procedure: ESOPHAGOGASTRODUODENOSCOPY (EGD); Surgeon: Naz Joseph MD;  [facility]; Service: Gastroenterology    MI ESOPHAGOGASTRODUODENOSCOPY TRANSORAL DIAGNOSTIC N/A 5/14/2018    Procedure: ESOPHAGOGASTRODUODENOSCOPY (EGD); Surgeon: Naz Joseph MD;  [facility]; Service: Gastroenterology    MI EXPLORE PARATHYROID GLANDS Right 3/23/2021    Procedure: RIGHT PARATHYROIDECTOMY, MINIMALLY INVASIVE, POSSIBLE 4-GLAND EXPLORATION, WITH INTRA-OPERATIVE PTH MONITORING;  Surgeon: Karin Wong MD;  Location:  MAIN OR;  Service: Surgical Oncology    MI TRANSURETHRAL ELEC-SURG PROSTATECTOM N/A 9/2/2021    Procedure: TRANSURETHRAL RESECTION OF PROSTATE (TURP);   Surgeon: Ramon Romano MD;  [facility];  Service: Urology    TIBIA FRACTURE SURGERY Left 1962     Family History   Problem Relation Age of Onset    Rheumatic fever Father     Other Father         accidental poisoning   Ray Bones Heart disease Mother      Social History     Socioeconomic History    Marital status: /Civil Union     Spouse name: Not on file    Number of children: Not on file    Years of education: Not on file    Highest education level: Not on file   Occupational History    Occupation:    Tobacco Use    Smoking status: Current Every Day Smoker     Packs/day: 1 00     Years: 35 00     Pack years: 35 00     Types: Cigarettes    Smokeless tobacco: Never Used   Vaping Use    Vaping Use: Never used   Substance and Sexual Activity    Alcohol use: Never    Drug use: No    Sexual activity: Not Currently     Partners: Female   Other Topics Concern    Not on file   Social History Narrative    Not on file     Social Determinants of Health     Financial Resource Strain: Not on file   Food Insecurity: Not on file   Transportation Needs: Not on file   Physical Activity: Inactive    Days of Exercise per Week: 0 days    Minutes of Exercise per Session: 0 min   Stress: No Stress Concern Present    Feeling of Stress : Not at all   Social Connections: Not on file   Intimate Partner Violence: Not on file   Housing Stability: Not on file       Current Outpatient Medications:     aspirin (ECOTRIN LOW STRENGTH) 81 mg EC tablet, Take 1 tablet (81 mg total) by mouth daily, Disp: , Rfl:     atenolol (TENORMIN) 50 mg tablet, Take 1 tablet (50 mg total) by mouth daily, Disp: 90 tablet, Rfl: 0    atorvastatin (LIPITOR) 80 mg tablet, Take 1 tablet (80 mg total) by mouth daily, Disp: 90 tablet, Rfl: 3    cloNIDine (CATAPRES) 0 1 mg tablet, TAKE 1 TABLET(0 1 MG) BY MOUTH EVERY 8 HOURS, Disp: 90 tablet, Rfl: 5    ferrous sulfate 325 (65 Fe) mg tablet, Take 325 mg by mouth daily with breakfast, Disp: , Rfl:     lisinopril (ZESTRIL) 40 mg tablet, TAKE 1 TABLET(40 MG) BY MOUTH DAILY, Disp: 90 tablet, Rfl: 3    metFORMIN (GLUCOPHAGE-XR) 500 mg 24 hr tablet, Take 1 tablet (500 mg total) by mouth 2 (two) times a day with meals, Disp: 180 tablet, Rfl: 3    Omega-3 Fatty Acids (FISH OIL ADULT GUMMIES PO), Take by mouth, Disp: , Rfl:     pantoprazole (PROTONIX) 40 mg tablet, TAKE 1 TABLET(40 MG) BY MOUTH DAILY, Disp: 90 tablet, Rfl: 0    PreviDent 0 2 % SOLN, RINSE WITH 10ML FOR 1 MINUTE THEN SPIT OUT AT BEDTIME DO NOT SWALLOW , Disp: , Rfl:     torsemide (DEMADEX) 10 mg tablet, TAKE 1 TABLET(10 MG) BY MOUTH DAILY, Disp: 90 tablet, Rfl: 3    bromfenac sodium (Prolensa) 0 07 % SOLN, , Disp: , Rfl:     tamsulosin (FLOMAX) 0 4 mg, TAKE 2 CAPSULES(0 8 MG) BY MOUTH DAILY WITH DINNER (Patient not taking: Reported on 4/13/2022), Disp: 180 capsule, Rfl: 0  No Known Allergies  Vitals:    04/13/22 0836   BP: 136/84   Pulse: 61   Resp: 14   Temp: (!) 96 8 °F (36 °C)   SpO2: 99%       Physical Exam  Vitals reviewed  Constitutional:       Appearance: Normal appearance  HENT:      Head: Normocephalic and atraumatic  Right Ear: External ear normal       Mouth/Throat:      Mouth: Mucous membranes are moist    Eyes:      Extraocular Movements: Extraocular movements intact  Pupils: Pupils are equal, round, and reactive to light  Cardiovascular:      Rate and Rhythm: Normal rate and regular rhythm  Pulses: Normal pulses  Heart sounds: Normal heart sounds  Pulmonary:      Effort: Pulmonary effort is normal       Breath sounds: Normal breath sounds  Abdominal:      General: Abdomen is flat  Palpations: Abdomen is soft  Musculoskeletal:         General: Normal range of motion  Cervical back: Normal range of motion and neck supple  Skin:     General: Skin is warm and dry  Neurological:      General: No focal deficit present  Mental Status: He is alert and oriented to person, place, and time  Psychiatric:         Mood and Affect: Mood normal          Behavior: Behavior normal          Thought Content: Thought content normal          Judgment: Judgment normal            Results:  Labs:  Lab Results   Component Value Date     8 (H) 01/05/2022    CALCIUM 8 9 01/05/2022    PHOS 2 2 (L) 04/06/2021         Imaging  CT parathyroid study w wo contrast    Result Date: 4/5/2022  Narrative: CT  NECK  WITHOUT AND WITH CONTRAST FROM ANTONIO TO SKULL BASE INDICATION: Hyperparathyroidism  COMPARISON:  Previous parathyroid CT dated January 29, 2021  TECHNIQUE:This examination, like all CT scans performed in the [facility], was performed utilizing techniques to minimize radiation dose exposure, including the use of iterative reconstruction and automated exposure control   Both noncontrast, contrast, and post contrast delayed images were performed according to standard parathyroid protocol (4D technique) Coronal and sagittal reconstructions were performed  3D reconstructions were performed on an independent workstation, and are supplied for review  85 mL of iohexol (OMNIPAQUE) was injected intravenously without immediate consequence  Radiation dose: 1539 mGy-cm FINDINGS: SERIAL NONCONTRAST, POST CONTRAST AND DELAYS: There is again noted a 1 07 x 0 61 x 0 96 cm lesion identified in the right neck  The lesion sits posterior to the inferior aspect of the right thyroid lobe  The patient is status post surgical exploration with 2 surgical clips noted within 0 25 cm of this lesion  It is difficult to evaluate the lesion through all phases of enhancement due to streak artifact on the arterial images, but there is significant enhancement on the delayed images, suggesting parathyroid adenoma, however, it could represent an enhancing lymph node  This may have been confirmed on previous surgery  No other suspicious lesions are identified  It is best seen on series 5 images 67 through 70  VASCULAR STRUCTURES: Bovine branching anatomy of the aortic arch is noted without origin stenosis  There is marked tortuosity of the great vessels present  There is no significant internal carotid stenosis by Nascet criteria  VISUALIZED PARANASAL SINUSES: Normal  NASAL CAVITY AND NASOPHARYNX: Normal  SUPRAHYOID NECK: Normal oropharynx, oral cavity, parapharyngeal and retropharyngeal spaces  INFRAHYOID NECK: Normal oropharynx, oral cavity, parapharyngeal and retropharyngeal spaces  THYROID GLAND: Normal   Evidence of interval surgery present with clips noted in place  LYMPH NODES: No pathologic or enlarged adenopathy  MEDIASTINUM: Unremarkable  BONY STRUCTURES Diffuse osteopenia is again noted  LUNG APICES: Centrilobular emphysema noted, otherwise unremarkable  Impression: 1   The lesion identified previously in the right neck measuring 1 07 x 0 61 x 0 96 cm sitting posterior to the infra aspect of the right thyroid lobe is again noted  The lesion is suspicious for parathyroid adenoma based on delayed images, as the arterial images are degraded by significant streak artifact  The lesion could also represent an enhancing lymph node  No other suspicious lesions are identified  2  Patient status post interval surgical exploration with clips noted in place in the neck  3  Other imaging findings stable  Workstation performed: [id number]     I reviewed the above laboratory and imaging data  Discussion/Summary:  Persistent hyperparathyroidism suspected 2nd target on the right side  Plan on repeat operation, minimally invasive right parathyroidectomy, possible 4 gland exploration with intraoperative PTH monitoring  , with flexible laryngoscopy given prior surgery  All questions answered and consents signed at this visit

## 2022-04-14 ENCOUNTER — TELEPHONE (OUTPATIENT)
Dept: SURGICAL ONCOLOGY | Facility: CLINIC | Age: 72
End: 2022-04-14

## 2022-04-25 ENCOUNTER — TELEPHONE (OUTPATIENT)
Dept: HEMATOLOGY ONCOLOGY | Facility: CLINIC | Age: 72
End: 2022-04-25

## 2022-04-25 ENCOUNTER — TELEPHONE (OUTPATIENT)
Dept: SURGICAL ONCOLOGY | Facility: CLINIC | Age: 72
End: 2022-04-25

## 2022-04-25 NOTE — TELEPHONE ENCOUNTER
Patient called to ask what does the Doctor mean by getting labs done  I explained to the patient that he was ordered blood work and that's what was meant by labs  Patient understood

## 2022-04-26 ENCOUNTER — HOSPITAL ENCOUNTER (OUTPATIENT)
Dept: RADIOLOGY | Facility: HOSPITAL | Age: 72
Discharge: HOME | End: 2022-04-26
Payer: MEDICARE

## 2022-04-26 ENCOUNTER — LAB (OUTPATIENT)
Dept: LAB | Facility: HOSPITAL | Age: 72
End: 2022-04-26
Payer: MEDICARE

## 2022-04-26 ENCOUNTER — APPOINTMENT (OUTPATIENT)
Dept: LAB | Facility: HOSPITAL | Age: 72
End: 2022-04-26
Payer: MEDICARE

## 2022-04-26 DIAGNOSIS — E89.2 STATUS POST PARATHYROIDECTOMY (HCC): ICD-10-CM

## 2022-04-26 LAB
ALBUMIN SERPL BCP-MCNC: 3.6 G/DL (ref 3.5–5)
ALP SERPL-CCNC: 70 U/L (ref 46–116)
ALT SERPL W P-5'-P-CCNC: 25 U/L (ref 12–78)
ANION GAP SERPL CALCULATED.3IONS-SCNC: 5 MMOL/L (ref 4–13)
AST SERPL W P-5'-P-CCNC: 16 U/L (ref 5–45)
ATRIAL RATE: 63 BPM
ATRIAL RATE: 69 BPM
BASOPHILS # BLD AUTO: 0.04 THOUSANDS/ΜL (ref 0–0.1)
BASOPHILS NFR BLD AUTO: 1 % (ref 0–1)
BILIRUB SERPL-MCNC: 0.42 MG/DL (ref 0.2–1)
BUN SERPL-MCNC: 12 MG/DL (ref 5–25)
CALCIUM SERPL-MCNC: 9.1 MG/DL (ref 8.3–10.1)
CHLORIDE SERPL-SCNC: 106 MMOL/L (ref 100–108)
CO2 SERPL-SCNC: 31 MMOL/L (ref 21–32)
CREAT SERPL-MCNC: 0.83 MG/DL (ref 0.6–1.3)
EOSINOPHIL # BLD AUTO: 0.2 THOUSAND/ΜL (ref 0–0.61)
EOSINOPHIL NFR BLD AUTO: 4 % (ref 0–6)
ERYTHROCYTE [DISTWIDTH] IN BLOOD BY AUTOMATED COUNT: 17 % (ref 11.6–15.1)
GFR SERPL CREATININE-BSD FRML MDRD: 87 ML/MIN/1.73SQ M
GLUCOSE P FAST SERPL-MCNC: 117 MG/DL (ref 65–99)
HCT VFR BLD AUTO: 42.3 % (ref 36.5–49.3)
HGB BLD-MCNC: 13.1 G/DL (ref 12–17)
IMM GRANULOCYTES # BLD AUTO: 0.02 THOUSAND/UL (ref 0–0.2)
IMM GRANULOCYTES NFR BLD AUTO: 0 % (ref 0–2)
LYMPHOCYTES # BLD AUTO: 1.84 THOUSANDS/ΜL (ref 0.6–4.47)
LYMPHOCYTES NFR BLD AUTO: 32 % (ref 14–44)
MCH RBC QN AUTO: 26.9 PG (ref 26.8–34.3)
MCHC RBC AUTO-ENTMCNC: 31 G/DL (ref 31.4–37.4)
MCV RBC AUTO: 87 FL (ref 82–98)
MONOCYTES # BLD AUTO: 0.62 THOUSAND/ΜL (ref 0.17–1.22)
MONOCYTES NFR BLD AUTO: 11 % (ref 4–12)
NEUTROPHILS # BLD AUTO: 3.05 THOUSANDS/ΜL (ref 1.85–7.62)
NEUTS SEG NFR BLD AUTO: 52 % (ref 43–75)
NRBC BLD AUTO-RTO: 0 /100 WBCS
P AXIS: 58 DEGREES
P AXIS: 59 DEGREES
PLATELET # BLD AUTO: 305 THOUSANDS/UL (ref 149–390)
PMV BLD AUTO: 8.4 FL (ref 8.9–12.7)
POTASSIUM SERPL-SCNC: 4.3 MMOL/L (ref 3.5–5.3)
PR INTERVAL: 160 MS
PR INTERVAL: 170 MS
PROT SERPL-MCNC: 7.3 G/DL (ref 6.4–8.2)
QRS AXIS: 62 DEGREES
QRS AXIS: 63 DEGREES
QRSD INTERVAL: 104 MS
QRSD INTERVAL: 106 MS
QT INTERVAL: 426 MS
QT INTERVAL: 434 MS
QTC INTERVAL: 435 MS
QTC INTERVAL: 465 MS
RBC # BLD AUTO: 4.87 MILLION/UL (ref 3.88–5.62)
SODIUM SERPL-SCNC: 142 MMOL/L (ref 136–145)
T WAVE AXIS: 209 DEGREES
T WAVE AXIS: 212 DEGREES
VENTRICULAR RATE: 63 BPM
VENTRICULAR RATE: 69 BPM
WBC # BLD AUTO: 5.77 THOUSAND/UL (ref 4.31–10.16)

## 2022-04-26 PROCEDURE — 71046 X-RAY EXAM CHEST 2 VIEWS: CPT

## 2022-04-26 PROCEDURE — 93010 ELECTROCARDIOGRAM REPORT: CPT | Performed by: INTERNAL MEDICINE

## 2022-04-26 PROCEDURE — 93005 ELECTROCARDIOGRAM TRACING: CPT

## 2022-04-26 PROCEDURE — 80053 COMPREHEN METABOLIC PANEL: CPT

## 2022-04-26 PROCEDURE — 36415 COLL VENOUS BLD VENIPUNCTURE: CPT

## 2022-04-26 PROCEDURE — 85025 COMPLETE CBC W/AUTO DIFF WBC: CPT

## 2022-05-05 ENCOUNTER — ANESTHESIA EVENT (OUTPATIENT)
Dept: PERIOP | Facility: HOSPITAL | Age: 72
End: 2022-05-05
Payer: MEDICARE

## 2022-05-05 NOTE — PRE-PROCEDURE INSTRUCTIONS
Pre-Surgery Instructions:   Medication Instructions    aspirin (ECOTRIN LOW STRENGTH) 81 mg EC tablet inst to hold 5 days by surgeon, is confirming w/ cardiologist on 5/6    atenolol (TENORMIN) 50 mg tablet Take day of surgery   atorvastatin (LIPITOR) 80 mg tablet Take day of surgery   cloNIDine (CATAPRES) 0 1 mg tablet Take day of surgery   ferrous sulfate 325 (65 Fe) mg tablet Hold day of surgery   lisinopril (ZESTRIL) 40 mg tablet Hold day of surgery   metFORMIN (GLUCOPHAGE-XR) 500 mg 24 hr tablet Hold day of surgery   Omega-3 Fatty Acids (FISH OIL ADULT GUMMIES PO) Stop taking 7 days prior to surgery   pantoprazole (PROTONIX) 40 mg tablet Take day of surgery   PreviDent 0 2 % SOLN Take night before surgery    torsemide (DEMADEX) 10 mg tablet Hold day of surgery  All pre-op instructions reviewed as per [facility] w/ pt verb understanding  Inst NPO post MN night before sx except sips water w/ atenolol, catapres, and protonix and lipitor morning of sx  Inst to hold lisinopril, metformin & torsemide morning of sx  States was told 5 day hold on his aspirin preop & will be confirming same with his cardiologist at appt on 5/6  Instructed to avoid all NSAIDs and OTC Vit/Supp from now until after surgery per anesthesia guidelines  Tylenol ok prn  Received pre-op showering instructions & AYDEN cloths from surgeon office, reviewed process at time of call  Current hospital visitor & masking policy reviewed  Inst will receive phone call w/ time to report on DOS btwn 2-8 pm afternoon/evening before surgery from hospital nursing staff  Pt  Verbalized an understanding of all instructions reviewed and offers no concerns at this time

## 2022-05-06 ENCOUNTER — OFFICE VISIT (OUTPATIENT)
Dept: CARDIOLOGY CLINIC | Facility: CLINIC | Age: 72
End: 2022-05-06
Payer: MEDICARE

## 2022-05-06 VITALS
SYSTOLIC BLOOD PRESSURE: 130 MMHG | WEIGHT: 196 LBS | RESPIRATION RATE: 16 BRPM | HEART RATE: 56 BPM | HEIGHT: 69 IN | OXYGEN SATURATION: 98 % | DIASTOLIC BLOOD PRESSURE: 70 MMHG | BODY MASS INDEX: 29.03 KG/M2

## 2022-05-06 DIAGNOSIS — E89.2 STATUS POST PARATHYROIDECTOMY (HCC): ICD-10-CM

## 2022-05-06 DIAGNOSIS — I25.10 CORONARY ARTERY DISEASE INVOLVING NATIVE HEART WITHOUT ANGINA PECTORIS, UNSPECIFIED VESSEL OR LESION TYPE: Primary | ICD-10-CM

## 2022-05-06 DIAGNOSIS — E78.2 MIXED HYPERLIPIDEMIA: ICD-10-CM

## 2022-05-06 DIAGNOSIS — I10 ESSENTIAL HYPERTENSION: ICD-10-CM

## 2022-05-06 DIAGNOSIS — I42.1 HOCM (HYPERTROPHIC OBSTRUCTIVE CARDIOMYOPATHY) (HCC): ICD-10-CM

## 2022-05-06 PROCEDURE — 99214 OFFICE O/P EST MOD 30 MIN: CPT | Performed by: INTERNAL MEDICINE

## 2022-05-06 NOTE — PROGRESS NOTES
[facility] [place] [id number] [facility] [place] [address]  Cardiology Follow Up    [facility]  1950  [id number]      1  Coronary artery disease involving native heart without angina pectoris, unspecified vessel or lesion type     2  Status post parathyroidectomy [facility])  Ambulatory referral to Cardiology   3  Essential hypertension     4  Mixed hyperlipidemia     5  HOCM (hypertrophic obstructive cardiomyopathy) [facility])         Chief Complaint   Patient presents with    Follow-up       Interval History: This patient presents for preoperative cardiovascular risk assessment for parathyroid surgery  Patient did have parathyroid surgery last year but there appears to be a recurrence  Patient does have history of LAD stent in the past   Patient also has history of asymptomatic hypertrophic cardiomyopathy for the past 2025 years  Patient denies any history of exertional chest pain  No shortness of breath out of the ordinary  No history of palpitations dizziness presyncope syncope  Unfortunately continues to smoke in spite of repeated counseling  He states that he has cut down however  He states that he has been compliant all his present medications      Patient Active Problem List   Diagnosis    Non-recurrent bilateral inguinal hernia without obstruction or gangrene    Hyperlipidemia    HTN (hypertension)    Type 2 diabetes mellitus ([address] )    Tobacco abuse    Iron deficiency anemia due to chronic blood loss    HOCM (hypertrophic obstructive cardiomyopathy) ([address] )    Coronary artery disease involving native coronary artery of native heart    Atherosclerosis of aorta ([address] )    Atherosclerosis of native coronary artery of native heart without angina pectoris    Anemia associated with diabetes mellitus ([address] )    Thrombocytosis    Hypercalcemia    Hyperparathyroidism ([address] )    History of aortic regurgitation    History of obesity    Hyperkalemia    Metabolic acidosis    UTI (urinary tract infection)    Dehydration with hyponatremia    Hematuria    Anemia    Status post parathyroidectomy ([place] [address] )    Antritis and gastric ulcer    Urinary retention    Urinary retention due to benign prostatic hyperplasia    Benign localized hyperplasia of prostate with urinary obstruction     Past Medical History:   Diagnosis Date    Anemia     Benign neoplasm of large intestine     Bleeding gastric ulcer 2018    Cardiomyopathy ([place] [address] )     Chronic kidney disease     Colon polyp     Coronary artery disease     Diabetes mellitus ([place] [address] )     Disease of thyroid gland     Heart murmur     History of aortic regurgitation     History of constipation     History of degenerative joint disease     History of nocturia     History of obesity     History of sebaceous cyst     History of shortness of breath     History of transfusion     History of urinary frequency     Hyperlipidemia     Hyperparathyroidism ([place] [address] )     Hypertension     Myocardial infarction ([place] [address] ) 2018    january, 3 stents    Polyposis coli     of the large intestine    Ulcer of esophagus      Social History     Socioeconomic History    Marital status: /Civil Union     Spouse name: Not on file    Number of children: Not on file    Years of education: Not on file    Highest education level: Not on file   Occupational History    Occupation:    Tobacco Use    Smoking status: Current Every Day Smoker     Packs/day: 1 00     Years: 40 00     Pack years: 40 00     Types: Cigarettes    Smokeless tobacco: Never Used   Vaping Use    Vaping Use: Never used   Substance and Sexual Activity    Alcohol use: Not Currently     Comment: rare social occasion, once a year," takes whiskey if feeling a cold coming"    Drug use: No    Sexual activity: Not Currently     Partners: Female   Other Topics Concern    Not on file   Social History Narrative    Not on file     Social Determinants of Health     Financial Resource Strain: Not on file   Food Insecurity: Not on file   Transportation Needs: Not on file   Physical Activity: Not on file   Stress: Not on file   Social Connections: Not on file   Intimate Partner Violence: Not on file   Housing Stability: Not on file      Family History   Problem Relation Age of Onset    Rheumatic fever Father     Other Father         accidental poisoning    Heart disease Mother      Past Surgical History:   Procedure Laterality Date    ACHILLES TENDON SURGERY Left 1973    CATARACT EXTRACTION Right 02/18/2021    COLONOSCOPY      hyperplastic polyp    CORONARY ANGIOPLASTY WITH STENT PLACEMENT      3 stents: 2 placed on Jan 2018, 1 on July 2018    Charlyne Jaksch CYST REMOVAL  2019, 2020    left and right wrists    ESOPHAGOGASTRODUODENOSCOPY N/A 1/23/2018    Procedure: ESOPHAGOGASTRODUODENOSCOPY (EGD); Surgeon: Seth Cain MD;  [facility]: [facility]; Service: Gastroenterology    ESOPHAGOGASTRODUODENOSCOPY      HERNIA REPAIR      HERNIA REPAIR Bilateral 8/18/2017    Procedure: LAPAROSCOPIC INGUINAL HERNIA REPAIR WITH MESH;  Surgeon: Jeff Vigil MD;  [facility];  Service: General    CT ESOPHAGOGASTRODUODENOSCOPY TRANSORAL DIAGNOSTIC N/A 3/26/2018    Procedure: ESOPHAGOGASTRODUODENOSCOPY (EGD); Surgeon: Eli Schaeffer MD;  [facility]; Service: Gastroenterology    CT ESOPHAGOGASTRODUODENOSCOPY TRANSORAL DIAGNOSTIC N/A 5/14/2018    Procedure: ESOPHAGOGASTRODUODENOSCOPY (EGD); Surgeon: Eli Schaeffer MD;  [facility]; Service: Gastroenterology    CT EXPLORE PARATHYROID GLANDS Right 3/23/2021    Procedure: RIGHT PARATHYROIDECTOMY, MINIMALLY INVASIVE, POSSIBLE 4-GLAND EXPLORATION, WITH INTRA-OPERATIVE PTH MONITORING;  Surgeon: Amauri Pena MD;  Location:  MAIN OR;  Service: Surgical Oncology    CT TRANSURETHRAL ELEC-SURG PROSTATECTOM N/A 9/2/2021    Procedure: TRANSURETHRAL RESECTION OF PROSTATE (TURP);   Surgeon: Lola Santiago MD;  [facility];  Service: Urology    TIBIA FRACTURE SURGERY Left 1962       Current Outpatient Medications:     aspirin (ECOTRIN LOW STRENGTH) 81 mg EC tablet, Take 1 tablet (81 mg total) by mouth daily, Disp: , Rfl:     atenolol (TENORMIN) 50 mg tablet, Take 1 tablet (50 mg total) by mouth daily, Disp: 90 tablet, Rfl: 0    atorvastatin (LIPITOR) 80 mg tablet, Take 1 tablet (80 mg total) by mouth daily, Disp: 90 tablet, Rfl: 3    cloNIDine (CATAPRES) 0 1 mg tablet, TAKE 1 TABLET(0 1 MG) BY MOUTH EVERY 8 HOURS, Disp: 90 tablet, Rfl: 5    lisinopril (ZESTRIL) 40 mg tablet, TAKE 1 TABLET(40 MG) BY MOUTH DAILY, Disp: 90 tablet, Rfl: 3    metFORMIN (GLUCOPHAGE-XR) 500 mg 24 hr tablet, Take 1 tablet (500 mg total) by mouth 2 (two) times a day with meals, Disp: 180 tablet, Rfl: 3    Omega-3 Fatty Acids (FISH OIL ADULT GUMMIES PO), Take by mouth, Disp: , Rfl:     pantoprazole (PROTONIX) 40 mg tablet, TAKE 1 TABLET(40 MG) BY MOUTH DAILY, Disp: 90 tablet, Rfl: 0    PreviDent 0 2 % SOLN, RINSE WITH 10ML FOR 1 MINUTE THEN SPIT OUT AT BEDTIME DO NOT SWALLOW , Disp: , Rfl:     torsemide (DEMADEX) 10 mg tablet, TAKE 1 TABLET(10 MG) BY MOUTH DAILY, Disp: 90 tablet, Rfl: 3    ferrous sulfate 325 (65 Fe) mg tablet, Take 325 mg by mouth daily with breakfast (Patient not taking: Reported on 5/6/2022 ), Disp: , Rfl:     traMADol (Ultram) 50 mg tablet, Take 1 tablet (50 mg total) by mouth every 6 (six) hours as needed for moderate pain (Patient not taking: Reported on 5/6/2022 ), Disp: 10 tablet, Rfl: 0  No Known Allergies    Labs:  Lab on 04/26/2022   Component Date Value    Sodium 04/26/2022 142     Potassium 04/26/2022 4 3     Chloride 04/26/2022 106     CO2 04/26/2022 31     ANION GAP 04/26/2022 5     BUN 04/26/2022 12     Creatinine 04/26/2022 0 83     Glucose, Fasting 04/26/2022 117*    Calcium 04/26/2022 9 1     AST 04/26/2022 16     ALT 04/26/2022 25     Alkaline Phosphatase 04/26/2022 70     Total Protein 04/26/2022 7 3     Albumin 04/26/2022 3 6     Total Bilirubin 04/26/2022 0 42     eGFR 04/26/2022 87     WBC 04/26/2022 5 77     RBC 04/26/2022 4 87     Hemoglobin 04/26/2022 13 1     Hematocrit 04/26/2022 42 3     MCV 04/26/2022 87     MCH 04/26/2022 26 9     MCHC 04/26/2022 31 0*    RDW 04/26/2022 17 0*    MPV 04/26/2022 8 4*    Platelets 12/85/4759 305     nRBC 04/26/2022 0     Neutrophils Relative 04/26/2022 52     Immat GRANS % 04/26/2022 0     Lymphocytes Relative 04/26/2022 32     Monocytes Relative 04/26/2022 11     Eosinophils Relative 04/26/2022 4     Basophils Relative 04/26/2022 1     Neutrophils Absolute 04/26/2022 3 05     Immature Grans Absolute 04/26/2022 0 02     Lymphocytes Absolute 04/26/2022 1 84     Monocytes Absolute 04/26/2022 0 62     Eosinophils Absolute 04/26/2022 0 20     Basophils Absolute 04/26/2022 0 04     Ventricular Rate 04/26/2022 69     Atrial Rate 04/26/2022 69     SD Interval 04/26/2022 160     QRSD Interval 04/26/2022 104     QT Interval 04/26/2022 434     QTC Interval 04/26/2022 465     P Axis 04/26/2022 59     QRS Axis 04/26/2022 63     T Wave [place] 04/26/2022 209     Ventricular Rate 04/26/2022 63     Atrial Rate 04/26/2022 63     SD Interval 04/26/2022 170     QRSD Interval 04/26/2022 106     QT Interval 04/26/2022 426     QTC Interval 04/26/2022 435     P Axis 04/26/2022 58     QRS Axis 04/26/2022 62     T Wave Axis 04/26/2022 212      Imaging: XR chest pa & lateral    Result Date: 4/29/2022  Narrative: CHEST INDICATION:   E89 2: Postprocedural hypoparathyroidism  COMPARISON:  Chest radiograph from 4/4/2021, chest CT from 7/13/2021, parathyroid CT from 3/31/2022  EXAM PERFORMED/VIEWS:  XR CHEST PA & LATERAL  DUAL ENERGY SUBTRACTION  FINDINGS: Cardiomediastinal silhouette appears unremarkable  The lungs are clear  No pneumothorax or pleural effusion  Osseous structures appear within normal limits for patient age       Impression: No acute cardiopulmonary disease  Workstation performed: [id number]       Review of Systems:  Review of Systems   REVIEW OF SYSTEMS:  Constitutional:  Denies fever or chills   Eyes:  Denies change in visual acuity   Respiratory:  Denies cough or shortness of breath   Cardiovascular:  Denies chest pain or edema   GI:  Denies abdominal pain, nausea, vomiting, bloody stools or diarrhea   :  Denies dysuria, frequency, difficulty in micturition and nocturia  Musculoskeletal:  Denies back pain or joint pain   Neurologic:  Denies headache, focal weakness or sensory changes   Endocrine:  Denies polyuria or polydipsia   Lymphatic:  Denies swollen glands   Psychiatric:  Denies depression or anxiety       Physical Exam:    /70 (BP Location: Left arm, Patient Position: Sitting, Cuff Size: Standard)   Pulse 56   Resp 16   Ht 5' 9" (1 753 m)   Wt 88 9 kg (196 lb)   SpO2 98%   BMI 28 94 kg/m²     Physical Exam   PHYSICAL EXAM:  General:  Patient is not in acute distress   Head: Normocephalic, Atraumatic  HEENT:  Right parathyroid enlargement  Neck:  JVP not raised  Trachea central  No carotid bruit  Respiratory:  normal breath sounds no crackles  no rhonchi  Cardiovascular:  Regular rate and rhythm no S3 2/6 systolic murmur right sternal border  GI:  Abdomen soft nontender  No organomegaly  Lymphatic:  No cervical or inguinal lymphadenopathy  Neurologic:  Patient is awake alert, oriented   Grossly nonfocal  Extremities no edema    EKG done showed sinus rhythm LVH with repolarization abnormality  Discussion/Summary:  Patient with multiple medical problems who seems to be doing reasonably well from cardiac standpoint  Previous studies reviewed with patient  Medications reviewed and possible side effects discussed  concepts of cardiovascular disease , signs and symptoms of heart disease  Dietary and risk factor modification reinforced  All questions answered  Safety measures reviewed   Patient advised to report any problems prompting medical attention  Patient presently asymptomatic from a cardiovascular standpoint  He states that he is able to walk 4 blocks without any difficulty  Patient has no specific contraindication from cardiac standpoint to proceed with the planned parathyroid surgery  He will be considered moderate cardiac risk based on his age and comorbidities  This was discussed with patient and family  Patient counseled to quit smoking to prevent future cardiovascular events  Follow-up in 6 months or earlier as needed  Follow-up with primary care physician  Patient is agreeable with the plan of care

## 2022-05-12 ENCOUNTER — HOSPITAL ENCOUNTER (OUTPATIENT)
Facility: HOSPITAL | Age: 72
Setting detail: OUTPATIENT SURGERY
Discharge: HOME | End: 2022-05-12
Attending: SURGERY | Admitting: SURGERY
Payer: MEDICARE

## 2022-05-12 ENCOUNTER — ANESTHESIA (OUTPATIENT)
Dept: PERIOP | Facility: HOSPITAL | Age: 72
End: 2022-05-12
Payer: MEDICARE

## 2022-05-12 VITALS
WEIGHT: 196 LBS | BODY MASS INDEX: 29.03 KG/M2 | RESPIRATION RATE: 16 BRPM | OXYGEN SATURATION: 100 % | SYSTOLIC BLOOD PRESSURE: 158 MMHG | HEIGHT: 69 IN | HEART RATE: 86 BPM | TEMPERATURE: 97.9 F | DIASTOLIC BLOOD PRESSURE: 82 MMHG

## 2022-05-12 DIAGNOSIS — E21.3 HYPERPARATHYROIDISM (HCC): ICD-10-CM

## 2022-05-12 PROBLEM — IMO0001 SMOKING: Status: ACTIVE | Noted: 2018-02-12

## 2022-05-12 PROBLEM — I25.10 CAD (CORONARY ARTERY DISEASE): Status: ACTIVE | Noted: 2022-05-12

## 2022-05-12 PROBLEM — F17.200 SMOKING: Status: ACTIVE | Noted: 2018-02-12

## 2022-05-12 LAB
CALCIUM SERPL-MCNC: 8.4 MG/DL (ref 8.3–10.1)
CALCIUM SERPL-MCNC: 8.4 MG/DL (ref 8.3–10.1)
CALCIUM SERPL-MCNC: 8.5 MG/DL (ref 8.3–10.1)
GLUCOSE SERPL-MCNC: 102 MG/DL (ref 65–140)
GLUCOSE SERPL-MCNC: 126 MG/DL (ref 65–140)
PTH-INTACT SERPL-MCNC: 131.7 PG/ML (ref 18.4–80.1)
PTH-INTACT SERPL-MCNC: 163.2 PG/ML (ref 18.4–80.1)
PTH-INTACT SERPL-MCNC: 163.7 PG/ML (ref 18.4–80.1)
PTH-INTACT SERPL-MCNC: 187.4 PG/ML (ref 18.4–80.1)
PTH-INTACT SERPL-MCNC: 25.2 PG/ML (ref 18.4–80.1)
PTH-INTACT SERPL-MCNC: 37.3 PG/ML (ref 18.4–80.1)
PTH-INTACT SERPL-MCNC: 40.7 PG/ML (ref 18.4–80.1)
PTH-INTACT SERPL-MCNC: 49.9 PG/ML (ref 18.4–80.1)

## 2022-05-12 PROCEDURE — 88331 PATH CONSLTJ SURG 1 BLK 1SPC: CPT | Performed by: PATHOLOGY

## 2022-05-12 PROCEDURE — 88305 TISSUE EXAM BY PATHOLOGIST: CPT | Performed by: PATHOLOGY

## 2022-05-12 PROCEDURE — 83970 ASSAY OF PARATHORMONE: CPT | Performed by: SURGERY

## 2022-05-12 PROCEDURE — 83970 ASSAY OF PARATHORMONE: CPT | Performed by: NURSE ANESTHETIST, CERTIFIED REGISTERED

## 2022-05-12 PROCEDURE — 82948 REAGENT STRIP/BLOOD GLUCOSE: CPT

## 2022-05-12 PROCEDURE — 60500 EXPLORE PARATHYROID GLANDS: CPT | Performed by: SURGERY

## 2022-05-12 PROCEDURE — 82310 ASSAY OF CALCIUM: CPT | Performed by: SURGERY

## 2022-05-12 RX ORDER — HYDRALAZINE HYDROCHLORIDE 20 MG/ML
10 INJECTION INTRAMUSCULAR; INTRAVENOUS ONCE AS NEEDED
Status: COMPLETED | OUTPATIENT
Start: 2022-05-12 | End: 2022-05-12

## 2022-05-12 RX ORDER — HYDROMORPHONE HCL IN WATER/PF 6 MG/30 ML
0.2 PATIENT CONTROLLED ANALGESIA SYRINGE INTRAVENOUS
Status: DISCONTINUED | OUTPATIENT
Start: 2022-05-12 | End: 2022-05-12 | Stop reason: HOSPADM

## 2022-05-12 RX ORDER — PROPOFOL 10 MG/ML
INJECTION, EMULSION INTRAVENOUS AS NEEDED
Status: DISCONTINUED | OUTPATIENT
Start: 2022-05-12 | End: 2022-05-12

## 2022-05-12 RX ORDER — ACETAMINOPHEN 325 MG/1
975 TABLET ORAL EVERY 6 HOURS SCHEDULED
Status: DISCONTINUED | OUTPATIENT
Start: 2022-05-12 | End: 2022-05-12 | Stop reason: HOSPADM

## 2022-05-12 RX ORDER — MIDAZOLAM HYDROCHLORIDE 2 MG/2ML
INJECTION, SOLUTION INTRAMUSCULAR; INTRAVENOUS AS NEEDED
Status: DISCONTINUED | OUTPATIENT
Start: 2022-05-12 | End: 2022-05-12

## 2022-05-12 RX ORDER — LABETALOL HYDROCHLORIDE 5 MG/ML
10 INJECTION, SOLUTION INTRAVENOUS
Status: COMPLETED | OUTPATIENT
Start: 2022-05-12 | End: 2022-05-12

## 2022-05-12 RX ORDER — LABETALOL HYDROCHLORIDE 5 MG/ML
10 INJECTION, SOLUTION INTRAVENOUS ONCE
Status: DISCONTINUED | OUTPATIENT
Start: 2022-05-12 | End: 2022-05-12 | Stop reason: HOSPADM

## 2022-05-12 RX ORDER — ALBUTEROL SULFATE 2.5 MG/3ML
2.5 SOLUTION RESPIRATORY (INHALATION) ONCE AS NEEDED
Status: DISCONTINUED | OUTPATIENT
Start: 2022-05-12 | End: 2022-05-12 | Stop reason: HOSPADM

## 2022-05-12 RX ORDER — TRAMADOL HYDROCHLORIDE 50 MG/1
50 TABLET ORAL EVERY 6 HOURS PRN
Status: DISCONTINUED | OUTPATIENT
Start: 2022-05-12 | End: 2022-05-12 | Stop reason: HOSPADM

## 2022-05-12 RX ORDER — DEXAMETHASONE SODIUM PHOSPHATE 10 MG/ML
INJECTION, SOLUTION INTRAMUSCULAR; INTRAVENOUS AS NEEDED
Status: DISCONTINUED | OUTPATIENT
Start: 2022-05-12 | End: 2022-05-12

## 2022-05-12 RX ORDER — HYDRALAZINE HYDROCHLORIDE 20 MG/ML
10 INJECTION INTRAMUSCULAR; INTRAVENOUS ONCE
Status: COMPLETED | OUTPATIENT
Start: 2022-05-12 | End: 2022-05-12

## 2022-05-12 RX ORDER — METOCLOPRAMIDE HYDROCHLORIDE 5 MG/ML
10 INJECTION INTRAMUSCULAR; INTRAVENOUS ONCE AS NEEDED
Status: DISCONTINUED | OUTPATIENT
Start: 2022-05-12 | End: 2022-05-12 | Stop reason: HOSPADM

## 2022-05-12 RX ORDER — SUCCINYLCHOLINE/SOD CL,ISO/PF 100 MG/5ML
SYRINGE (ML) INTRAVENOUS AS NEEDED
Status: DISCONTINUED | OUTPATIENT
Start: 2022-05-12 | End: 2022-05-12

## 2022-05-12 RX ORDER — LIDOCAINE HYDROCHLORIDE 10 MG/ML
INJECTION, SOLUTION EPIDURAL; INFILTRATION; INTRACAUDAL; PERINEURAL AS NEEDED
Status: DISCONTINUED | OUTPATIENT
Start: 2022-05-12 | End: 2022-05-12

## 2022-05-12 RX ORDER — OXYMETAZOLINE HYDROCHLORIDE 0.05 G/100ML
SPRAY NASAL AS NEEDED
Status: DISCONTINUED | OUTPATIENT
Start: 2022-05-12 | End: 2022-05-12 | Stop reason: HOSPADM

## 2022-05-12 RX ORDER — SODIUM CHLORIDE, SODIUM LACTATE, POTASSIUM CHLORIDE, CALCIUM CHLORIDE 600; 310; 30; 20 MG/100ML; MG/100ML; MG/100ML; MG/100ML
50 INJECTION, SOLUTION INTRAVENOUS CONTINUOUS
Status: DISCONTINUED | OUTPATIENT
Start: 2022-05-12 | End: 2022-05-12 | Stop reason: HOSPADM

## 2022-05-12 RX ORDER — BUPIVACAINE HYDROCHLORIDE 2.5 MG/ML
INJECTION, SOLUTION EPIDURAL; INFILTRATION; INTRACAUDAL AS NEEDED
Status: DISCONTINUED | OUTPATIENT
Start: 2022-05-12 | End: 2022-05-12 | Stop reason: HOSPADM

## 2022-05-12 RX ORDER — FENTANYL CITRATE/PF 50 MCG/ML
25 SYRINGE (ML) INJECTION
Status: DISCONTINUED | OUTPATIENT
Start: 2022-05-12 | End: 2022-05-12 | Stop reason: HOSPADM

## 2022-05-12 RX ORDER — LISINOPRIL 20 MG/1
40 TABLET ORAL ONCE
Status: COMPLETED | OUTPATIENT
Start: 2022-05-12 | End: 2022-05-12

## 2022-05-12 RX ORDER — ONDANSETRON 2 MG/ML
INJECTION INTRAMUSCULAR; INTRAVENOUS AS NEEDED
Status: DISCONTINUED | OUTPATIENT
Start: 2022-05-12 | End: 2022-05-12

## 2022-05-12 RX ORDER — MAGNESIUM HYDROXIDE 1200 MG/15ML
LIQUID ORAL AS NEEDED
Status: DISCONTINUED | OUTPATIENT
Start: 2022-05-12 | End: 2022-05-12 | Stop reason: HOSPADM

## 2022-05-12 RX ORDER — FENTANYL CITRATE 50 UG/ML
INJECTION, SOLUTION INTRAMUSCULAR; INTRAVENOUS AS NEEDED
Status: DISCONTINUED | OUTPATIENT
Start: 2022-05-12 | End: 2022-05-12

## 2022-05-12 RX ORDER — SODIUM CHLORIDE 9 MG/ML
INJECTION, SOLUTION INTRAVENOUS CONTINUOUS PRN
Status: DISCONTINUED | OUTPATIENT
Start: 2022-05-12 | End: 2022-05-12

## 2022-05-12 RX ADMIN — SODIUM CHLORIDE, SODIUM LACTATE, POTASSIUM CHLORIDE, AND CALCIUM CHLORIDE 50 ML/HR: .6; .31; .03; .02 INJECTION, SOLUTION INTRAVENOUS at 09:05

## 2022-05-12 RX ADMIN — Medication 10 MG: at 13:00

## 2022-05-12 RX ADMIN — ONDANSETRON 4 MG: 2 INJECTION INTRAMUSCULAR; INTRAVENOUS at 12:02

## 2022-05-12 RX ADMIN — FENTANYL CITRATE 25 MCG: 50 INJECTION INTRAMUSCULAR; INTRAVENOUS at 09:32

## 2022-05-12 RX ADMIN — LIDOCAINE HYDROCHLORIDE 33 ML: 10 INJECTION, SOLUTION EPIDURAL; INFILTRATION; INTRACAUDAL; PERINEURAL at 09:36

## 2022-05-12 RX ADMIN — HYDRALAZINE HYDROCHLORIDE 10 MG: 20 INJECTION, SOLUTION INTRAMUSCULAR; INTRAVENOUS at 13:52

## 2022-05-12 RX ADMIN — Medication 100 MG: at 09:36

## 2022-05-12 RX ADMIN — HYDRALAZINE HYDROCHLORIDE 10 MG: 20 INJECTION, SOLUTION INTRAMUSCULAR; INTRAVENOUS at 14:08

## 2022-05-12 RX ADMIN — Medication 10 MG: at 12:50

## 2022-05-12 RX ADMIN — PROPOFOL 140 MG: 10 INJECTION, EMULSION INTRAVENOUS at 09:37

## 2022-05-12 RX ADMIN — FENTANYL CITRATE 50 MCG: 50 INJECTION INTRAMUSCULAR; INTRAVENOUS at 09:36

## 2022-05-12 RX ADMIN — DEXAMETHASONE SODIUM PHOSPHATE 10 MG: 10 INJECTION, SOLUTION INTRAMUSCULAR; INTRAVENOUS at 12:02

## 2022-05-12 RX ADMIN — Medication 10 MG: at 12:39

## 2022-05-12 RX ADMIN — SODIUM CHLORIDE: 0.9 INJECTION, SOLUTION INTRAVENOUS at 09:44

## 2022-05-12 RX ADMIN — PHENYLEPHRINE HYDROCHLORIDE 30 MCG/MIN: 10 INJECTION INTRAVENOUS at 09:36

## 2022-05-12 RX ADMIN — FENTANYL CITRATE 25 MCG: 50 INJECTION INTRAMUSCULAR; INTRAVENOUS at 09:28

## 2022-05-12 RX ADMIN — MIDAZOLAM 1 MG: 1 INJECTION INTRAMUSCULAR; INTRAVENOUS at 09:28

## 2022-05-12 RX ADMIN — LISINOPRIL 40 MG: 20 TABLET ORAL at 17:35

## 2022-05-12 RX ADMIN — LIDOCAINE HYDROCHLORIDE 2 ML: 10 INJECTION, SOLUTION EPIDURAL; INFILTRATION; INTRACAUDAL; PERINEURAL at 09:27

## 2022-05-12 RX ADMIN — REMIFENTANIL HYDROCHLORIDE 0.02 MCG/KG/MIN: 1 INJECTION, POWDER, LYOPHILIZED, FOR SOLUTION INTRAVENOUS at 09:25

## 2022-05-12 RX ADMIN — Medication 10 MG: at 13:16

## 2022-05-12 RX ADMIN — MIDAZOLAM 1 MG: 1 INJECTION INTRAMUSCULAR; INTRAVENOUS at 09:32

## 2022-05-12 NOTE — PERIOPERATIVE NURSING NOTE
As per dr Shahzad Gilliam md, patient ok to go to [facility] and [facility] lisinopril 40mg po in [facility]  kacy Brothers, aware  Will continue to monitor

## 2022-05-12 NOTE — INTERVAL H&P NOTE
H&P reviewed  After examining the patient I find no changes in the patients condition since the H&P had been written      Vitals:    05/12/22 0820   BP: (!) 176/91   Pulse: (!) 52   Resp: 15   Temp: (!) 96 7 °F (35 9 °C)   SpO2: 99%

## 2022-05-12 NOTE — ANESTHESIA PREPROCEDURE EVALUATION
Procedure:  RIGHT MINIMALLY INVASIVE PARATHYROIDECTOMY, POSSIBLE 4 GLAND EXPLORATION, INTRAOPERATIVE MONITORING, FLEXIBLE LARYNGOSCOPY (Right Neck)  LARYNGOSCOPY DIRECT (N/A Throat)    Relevant Problems   CARDIO  TTE 8/2021: LVEF 65%, grade 2 DD, moderate asymmetriical septal HOCM  Normal RV  Mild AS, mod AR  Aortic root dilated 3 9cm   (+) Atherosclerosis of aorta (HCC)   (+) CAD (coronary artery disease) (s/p stents to LAD in 2018)   (+) HTN (hypertension)   (+) Hyperlipidemia      ENDO   (+) Hyperparathyroidism (HCC)   (+) Type 2 diabetes mellitus (HCC)      /RENAL   (+) Benign localized hyperplasia of prostate with urinary obstruction      HEMATOLOGY   (+) Anemia   (+) Iron deficiency anemia due to chronic blood loss      PULMONARY   (+) Smoking      Cardiovascular and Mediastinum   (+) HOCM (hypertrophic obstructive cardiomyopathy) (HCC)        Physical Exam    Airway    Mallampati score: II  TM Distance: >3 FB  Neck ROM: full     Dental   No notable dental hx     Cardiovascular      Pulmonary      Other Findings        Anesthesia Plan  ASA Score- 3     Anesthesia Type- general with ASA Monitors  Additional Monitors: arterial line  Airway Plan: ETT  Plan Factors-Exercise tolerance (METS): >4 METS  Chart reviewed  EKG reviewed  Existing labs reviewed  Patient summary reviewed  Patient is a current smoker  Patient instructed to abstain from smoking on day of procedure  Patient smoked on day of surgery  Induction- intravenous  Postoperative Plan- Plan for postoperative opioid use  Planned trial extubation    Informed Consent- Anesthetic plan and risks discussed with patient  I personally reviewed this patient with the CRNA  Discussed and agreed on the Anesthesia Plan with the CRNA  Sharath Garcia

## 2022-05-12 NOTE — OP NOTE
OPERATIVE REPORT  PATIENT NAME: Sheri Baeza    :  1950  MRN: [id number]  Pt Location: [facility] OR ROOM 07    SURGERY DATE: 2022    Surgeon(s) and Role:     * Erin Peterson MD - Primary     * Elia Che MD - Assisting    Preop Diagnosis:  Hyperparathyroidism ([address] ) [E21 3]    Post-Op Diagnosis Codes:     * Hyperparathyroidism ([address] ) [E21 3]    Procedure(s) (LRB):  TWO GLAND PARATHYROIDECTOMY (RIGHT SUPERIOR, LEFT INFERIOR), 4 GLAND EXPLORATION WITH PARATHYROID BIOPSY, INTRAOPERATIVE PTH MONITORING, FLEXIBLE INDIRECT LARYNGOSCOPY      Specimen(s):  ID Type Source Tests Collected by Time Destination   1 : right superior parathyroid Tissue Parathyroid TISSUE EXAM Erin Peterson MD 2022 1048    2 : rule out left inferior parathyroid Tissue Parathyroid TISSUE EXAM Erin Peterson MD 2022 1122    3 : rule out left superior parathyroid Tissue Parathyroid TISSUE EXAM Erin Peterson MD 2022 1130    4 : REMAINING LEFT INFERIOR PARATHYROID Tissue Parathyroid TISSUE EXAM Erin Peterson MD 2022 1147        Estimated Blood Loss:   25 mL    Drains:  * No LDAs found *    Anesthesia Type:   General    Operative Indications:  Hyperparathyroidism ([address] ) [E21 3]  Recurrent versus persistent    Operative Findings:  380 mg right superior parathyroid adenoma  Left-sided parathyroid glands identified both superiorly and inferiorly  Left inferior gland taken as well with left superior gland left intact post biopsy    Complications:   None    Procedure and Technique:  The patient was brought to the OR and identified by proper time-out  Flexible indirect laryngoscopy was done to confirm bilateral and symmetrical vocal cord movement  Following this he was intubated by the anesthesia team, then was prepped and draped with the neck exposed in the extended position  Local anesthesia was given, then a 4 cm incision was made sharply 2 finger breaths above the sternal notch at the previous incision  Cautery was used to dissect through the dermis subcutaneous tissue  Wheatlanner retractor was placed  The platysma was then transected with cautery  Strap muscles were then divided the midline  This exposed the surface of the thyroid  We dissected the strap muscles off the anterolateral aspect of the right thyroid lobe  This enabled us to dissect between the thyroid and the strap muscles  The thyroid gland was then retracted medially and anteriorly which exposed what appeared to be a large parathyroid gland along the midpole of the thyroid gland  This was dissected out from surrounding tissue in hemostatic fashion with cautery  Vascular pedicle was visualized  The pedicle was clipped  PTH levels were drawn at the start of the case, 0 min, 5 min, and 10 min after the gland was resected  This was consistent with a right superior gland  Frozen section confirmed a 380 mg gland consistent with adenoma  Levels without decrease significantly  We therefore opted to explore the left side of the neck  Strap muscles were mobilized off the left lateral thyroid service and the thyroid rotated anteromedially  We found the left superior and inferior glands fairly easily  They both appeared somewhat prominent but not as large as the right-sided gland that had just been removed  Frozen sections were sent of both potential targets  Both the left superior and left inferior glands were confirmed as being parathyroid tissue  We opted to remove the left inferior gland given that it was somewhat larger compared to the left in superior gland  Vascular pedicles visualized and clipped and the left inferior gland sent to pathology for routine processing  Levels were sent at 0, 5, and 10 minutes post resection of this gland though we did not wait for levels come back were closed in given that we had opted to leave the left superior gland intact      We then irrigated the field and closed using 3-0 Vicryl to close the strap muscles the midline followed by 3-0 Vicryl to close the platysma, followed by 4-0 Vicryl close the dermis, followed by 5-0 Monocryl to close skin in subcuticular fashion  Benzoin and Steri-Strips were then applied in the usual fashion  The patient was awakened transferred to the recovery room in stable condition       I was present for the entire procedure    Patient Disposition:  PACU       SIGNATURE: Isaac Grajeda MD  DATE: May 12, 2022  TIME: 12:16 PM

## 2022-05-12 NOTE — ANESTHESIA POSTPROCEDURE EVALUATION
Post-Op Assessment Note    CV Status:  Stable  Pain Score: 0    Pain management: adequate     Mental Status:  Alert and awake   Hydration Status:  Euvolemic   PONV Controlled:  Controlled   Airway Patency:  Patent      Post Op Vitals Reviewed: Yes      Staff: CRNA, Anesthesiologist         No complications documented      BP  158/91   Temp  97 2   Pulse 59   Resp   16   SpO2   99

## 2022-05-12 NOTE — DISCHARGE INSTRUCTIONS
You may take down the blue dressings tomorrow or if it falls off  May take shower tomorrow, no baths for 4-6 weeks  Dab dry around incision  Do not remove the steristrips, they will be removed in the office

## 2022-05-12 NOTE — ANESTHESIA PROCEDURE NOTES
Arterial Line Insertion    Date/Time: 5/12/2022 9:47 AM  Performed by: Maureen Mancini CRNA  Authorized by: Berry Hi MD   Consent: Verbal consent obtained  Written consent obtained  Risks and benefits: risks, benefits and alternatives were discussed  Consent given by: patient  Patient understanding: patient states understanding of the procedure being performed  Patient consent: the patient's understanding of the procedure matches consent given  Procedure consent: procedure consent matches procedure scheduled  Relevant documents: relevant documents present and verified  Test results: test results available and properly labeled  Site marked: the operative site was marked  Radiology Images: Radiology Images displayed and confirmed  If images not available, report reviewed  Required items: required blood products, implants, devices, and special equipment available  Patient identity confirmed: verbally with patient, arm band and provided demographic data  Time out: Immediately prior to procedure a "time out" was called to verify the correct patient, procedure, equipment, support staff and site/side marked as required  Preparation: Patient was prepped and draped in the usual sterile fashion  Indications: multiple ABGs and hemodynamic monitoring  Orientation:  Left  Location: radial artery  Sedation:  Patient sedated: GETA      Procedure Details:  Westley's test normal: yes  Needle gauge: 20  Seldinger technique: Seldinger technique used  Number of attempts: 1    Post-procedure:  Post-procedure: dressing applied  Waveform: good waveform and waveform confirmed  Post-procedure CNS: normal  Patient tolerance: patient tolerated the procedure well with no immediate complications  Comments: Line placed by Marialuisa Power

## 2022-05-16 DIAGNOSIS — I10 HYPERTENSION, UNSPECIFIED TYPE: ICD-10-CM

## 2022-05-16 RX ORDER — ATENOLOL 50 MG/1
TABLET ORAL
Qty: 90 TABLET | Refills: 0 | Status: SHIPPED | OUTPATIENT
Start: 2022-05-16 | End: 2022-06-13 | Stop reason: SDUPTHER

## 2022-05-17 DIAGNOSIS — I10 HTN (HYPERTENSION): ICD-10-CM

## 2022-05-17 RX ORDER — CLONIDINE HYDROCHLORIDE 0.1 MG/1
TABLET ORAL
Qty: 270 TABLET | Refills: 0 | Status: SHIPPED | OUTPATIENT
Start: 2022-05-17 | End: 2022-06-17

## 2022-05-17 RX ORDER — CLONIDINE HYDROCHLORIDE 0.1 MG/1
TABLET ORAL
Qty: 90 TABLET | Refills: 5 | Status: SHIPPED | OUTPATIENT
Start: 2022-05-17 | End: 2022-05-17

## 2022-05-23 DIAGNOSIS — K29.70 GASTRITIS, PRESENCE OF BLEEDING UNSPECIFIED, UNSPECIFIED CHRONICITY, UNSPECIFIED GASTRITIS TYPE: ICD-10-CM

## 2022-05-23 DIAGNOSIS — D13.2 DUODENAL ADENOMA: ICD-10-CM

## 2022-05-23 RX ORDER — PANTOPRAZOLE SODIUM 40 MG/1
40 TABLET, DELAYED RELEASE ORAL DAILY
Qty: 90 TABLET | Refills: 0 | Status: SHIPPED | OUTPATIENT
Start: 2022-05-23 | End: 2022-06-13 | Stop reason: SDUPTHER

## 2022-05-23 NOTE — TELEPHONE ENCOUNTER
Dr Elia Osuna - patient called for refill   Pantoprazole 40 mg tablet 1 tablet daily by mouth   Please call

## 2022-05-24 NOTE — PROGRESS NOTES
5/25/2022      Chief Complaint   Patient presents with    Benign Prostatic Hypertrophy         Assessment and Plan    66 y o  male -- Dr Hal Nicolas    1  BPH with lower urinary tract symptoms status post TURP (09/02/2021)  - doing well at this time; overall happy with urinary symptoms  - PVR today 46 mL  - Patient stopped both Flomax and finasteride 2 weeks ago, no complaints  - will follow-up with Urology on an as-needed basis  - call with any questions or concerns in the meantime  - All questions answered; patient understands and agrees with plan        History of Present Illness  Aristeo Womack is a 66 y o  male patient of Dr Hal Nicolas with history of BPH with lower urinary tract symptoms status post TURP here for follow up  Doing very well  Patient stopped both flomax and finasteride postop and has no urinary complaints  States he is overall happy at this time  Denies gross hematuria, started fish oil and aspirin again without problems  Denies fever, chills, nausea, vomiting, weight loss, bone pain  Overall happy with urination in the procedure outcome  Does have dry ejaculation  Review of Systems   Constitutional: Negative for activity change, appetite change, chills and fever  HENT: Negative for congestion and trouble swallowing  Respiratory: Negative for cough and shortness of breath  Cardiovascular: Negative for chest pain, palpitations and leg swelling  Gastrointestinal: Negative for abdominal pain, constipation, diarrhea, nausea and vomiting  Genitourinary: Negative for difficulty urinating, dysuria, flank pain, frequency, hematuria and urgency  Musculoskeletal: Negative for back pain and gait problem  Skin: Negative for wound  Allergic/Immunologic: Negative for immunocompromised state  Neurological: Negative for dizziness and syncope  Hematological: Does not bruise/bleed easily  Psychiatric/Behavioral: Negative for confusion     All other systems reviewed and are negative  Vitals  Vitals:    05/25/22 0857   BP: 128/82   BP Location: Left arm   Patient Position: Sitting   Cuff Size: Large   Pulse: 62   Resp: 16   SpO2: 97%   Weight: 89 3 kg (196 lb 12 8 oz)   Height: 5' 9" (1 753 m)       Physical Exam  Constitutional:       Appearance: Normal appearance  HENT:      Head: Normocephalic  Pulmonary:      Effort: Pulmonary effort is normal    Musculoskeletal:      Cervical back: Normal range of motion  Skin:     General: Skin is warm and dry  Neurological:      General: No focal deficit present  Mental Status: He is alert and oriented to person, place, and time  Psychiatric:         Mood and Affect: Mood normal          Behavior: Behavior normal          Thought Content:  Thought content normal          Judgment: Judgment normal            Past History  Past Medical History:   Diagnosis Date    Anemia     Benign neoplasm of large intestine     Bleeding gastric ulcer 2018    [facility]     Chronic kidney disease     Colon polyp     Coronary artery disease     Diabetes [facility]     Disease of thyroid gland     Heart murmur     History of aortic regurgitation     History of constipation     History of degenerative joint disease     History of nocturia     History of obesity     History of sebaceous cyst     History of shortness of breath     History of transfusion     History of urinary frequency     Hyperlipidemia     [facility]     Hypertension     [facility] infarction [facility] ) 2018    january, 3 stents    Polyposis coli     of the large intestine    Ulcer of esophagus      Social History     Socioeconomic History    Marital status: /Civil Union     Spouse name: None    Number of children: None    Years of education: None    Highest education level: None   Occupational History    Occupation:    Tobacco Use    Smoking status: Current Every Day Smoker     Packs/day: 1 00     Years: 40 00 Pack years: 40 00     Types: Cigarettes    Smokeless tobacco: Never Used   Vaping Use    Vaping Use: Never used   Substance and Sexual Activity    Alcohol use: Not Currently     Comment: rare social occasion, once a year," takes whiskey if feeling a cold coming"    Drug use: No    Sexual activity: Not Currently     Partners: Female   Other Topics Concern    None   Social History Narrative    None     Social Determinants of Health     Financial Resource Strain: Not on file   Food Insecurity: Not on file   Transportation Needs: Not on file   Physical Activity: Not on file   Stress: Not on file   Social Connections: Not on file   Intimate Partner Violence: Not on file   Housing Stability: Not on file     Social History     Tobacco Use   Smoking Status Current Every Day Smoker    Packs/day: 1 00    Years: 40 00    Pack years: 40 00    Types: Cigarettes   Smokeless Tobacco Never Used     Family History   Problem Relation Age of Onset    Rheumatic fever Father     Other Father         accidental poisoning    Heart disease Mother        The following portions of the patient's history were reviewed and updated as appropriate: allergies, current medications, past medical history, past social history, past surgical history and problem list     Results  No results found for this or any previous visit (from the past 1 hour(s)) ]  Lab Results   Component Value Date    PSA 0 8 12/28/2020    PSA 0 6 06/18/2019    PSA 0 5 06/15/2018    PSA 0 5 07/18/2016     Lab Results   Component Value Date    GLUCOSE 98 12/14/2015    CALCIUM 8 4 05/12/2022     12/14/2015    K 4 3 04/26/2022    CO2 31 04/26/2022     04/26/2022    BUN 12 04/26/2022    CREATININE 0 83 04/26/2022     Lab Results   Component Value Date    WBC 5 77 04/26/2022    HGB 13 1 04/26/2022    HCT 42 3 04/26/2022    MCV 87 04/26/2022     04/26/2022       Christine Yost PA-C

## 2022-05-25 ENCOUNTER — OFFICE VISIT (OUTPATIENT)
Dept: UROLOGY | Facility: CLINIC | Age: 72
End: 2022-05-25
Payer: MEDICARE

## 2022-05-25 ENCOUNTER — OFFICE VISIT (OUTPATIENT)
Dept: SURGICAL ONCOLOGY | Facility: CLINIC | Age: 72
End: 2022-05-25

## 2022-05-25 VITALS
SYSTOLIC BLOOD PRESSURE: 110 MMHG | DIASTOLIC BLOOD PRESSURE: 88 MMHG | BODY MASS INDEX: 29.03 KG/M2 | HEIGHT: 69 IN | TEMPERATURE: 97.8 F | RESPIRATION RATE: 16 BRPM | WEIGHT: 196 LBS | HEART RATE: 71 BPM | OXYGEN SATURATION: 98 %

## 2022-05-25 VITALS
WEIGHT: 196.8 LBS | SYSTOLIC BLOOD PRESSURE: 128 MMHG | OXYGEN SATURATION: 97 % | BODY MASS INDEX: 29.15 KG/M2 | HEART RATE: 62 BPM | RESPIRATION RATE: 16 BRPM | HEIGHT: 69 IN | DIASTOLIC BLOOD PRESSURE: 82 MMHG

## 2022-05-25 DIAGNOSIS — E89.2 STATUS POST PARATHYROIDECTOMY (HCC): Primary | ICD-10-CM

## 2022-05-25 DIAGNOSIS — R33.9 URINARY RETENTION: Primary | ICD-10-CM

## 2022-05-25 PROCEDURE — 99213 OFFICE O/P EST LOW 20 MIN: CPT | Performed by: PHYSICIAN ASSISTANT

## 2022-05-25 PROCEDURE — 99024 POSTOP FOLLOW-UP VISIT: CPT | Performed by: SURGERY

## 2022-05-25 NOTE — LETTER
May 25, 2022     Arielle Watts MD  [address] [address]    Patient: Jeancarlos Chase   YOB: 1950   Date of Visit: 5/25/2022       Dear Dr Consuelo Thomas: Thank you for referring Norma Gloria to me for evaluation  Below are my notes for this consultation  If you have questions, please do not hesitate to call me  I look forward to following your patient along with you  Sincerely,        Cal Chand MD        CC: MD Cal Felix MD  5/25/2022 11:18 AM  Sign when Signing Visit     Surgical Oncology Follow Up       [address] [place] [address]  [address] [place] [address]    Jeancarlos Chase  1950  [id number]  [address] [place] [address],[address]  [facility] [place] [address] [id number]    Chief Complaint   Patient presents with    Post-op       Assessment/Plan:    No problem-specific Assessment & Plan notes found for this encounter  Diagnoses and all orders for this visit:    Status post parathyroidectomy [facility])        Advance Care Planning/Advance Directives:  Did not discuss medical power of  and disease status, cancer treatment plans and/or cancer treatment goals with the patient  Oncology History    No history exists  History of Present Illness:  71-year-man for postop evaluation status post parathyroidectomy   -Interval History:   He has had no complaints since surgery  Review of Systems:  Review of Systems   Constitutional: Negative  HENT: Negative  Eyes: Negative  Respiratory: Negative  Cardiovascular: Negative  Gastrointestinal: Negative  Endocrine: Negative  Genitourinary: Negative  Musculoskeletal: Negative  Skin: Negative  Allergic/Immunologic: Negative  Neurological: Negative  Hematological: Negative      Psychiatric/Behavioral: Negative      a  Patient Active Problem List   Diagnosis    Non-recurrent bilateral inguinal hernia without obstruction or gangrene    Hyperlipidemia    HTN (hypertension)    Type [facility] diabetes mellitus [facility]    Smoking    Iron deficiency anemia due to chronic blood loss    [facility]    Atherosclerosis of aorta (HCC)    Anemia associated with diabetes mellitus ([facility]    Thrombocytosis    Hypercalcemia    [facility]parathyroidism [facility]    History of aortic regurgitation    History of obesity    Hyperkalemia    Metabolic acidosis    UTI (urinary tract infection)    Dehydration with hyponatremia    Hematuria    Anemia    Status post parathyroidectomy (HCC)    Antritis and gastric ulcer    Urinary retention    Urinary retention due to benign prostatic hyperplasia    Benign localized hyperplasia of prostate with urinary obstruction    CAD (coronary artery disease)     Past Medical History:   Diagnosis Date    Anemia     Benign neoplasm of large intestine     Bleeding gastric ulcer 2018    Cardiomyopathy [facility]     Chronic kidney disease     Colon polyp     Coronary artery disease     Diabetes mellitus [facility]     Disease of thyroid gland     Heart murmur     History of aortic regurgitation     History of constipation     History of degenerative joint disease     History of nocturia     History of obesity     History of sebaceous cyst     History of shortness of breath     History of transfusion     History of urinary frequency     Hyperlipidemia     Hyperparathyroidism [facility]     Hypertension     Myocardial infarction ([facility] ) 2018 january, 3 stents    Polyposis coli     of the large intestine    Ulcer of esophagus      Past Surgical History:   Procedure Laterality Date    ACHILLES TENDON SURGERY Left 1973    CATARACT EXTRACTION Right 02/18/2021    COLONOSCOPY      hyperplastic polyp    CORONARY ANGIOPLASTY WITH STENT PLACEMENT      3 stents: 2 placed on Jan 2018, 1 on July 2018 [facility] CYST REMOVAL  2019, 2020    left and right wrists    ESOPHAGOGASTRODUODENOSCOPY N/A 1/23/2018    Procedure: ESOPHAGOGASTRODUODENOSCOPY (EGD); Surgeon: Yancy Alonzo MD;  [facility]; Service: Gastroenterology    ESOPHAGOGASTRODUODENOSCOPY      HERNIA REPAIR      HERNIA REPAIR Bilateral 8/18/2017    Procedure: LAPAROSCOPIC INGUINAL HERNIA REPAIR WITH MESH;  Surgeon: Brianne Arnold MD;  [facility];  Service: General    LARYNGOSCOPY N/A 5/12/2022    Procedure: LARYNGOSCOPY DIRECT;  Surgeon: Ana Sifuentes MD;  [facility];  Service: Surgical Oncology    GA ESOPHAGOGASTRODUODENOSCOPY TRANSORAL DIAGNOSTIC N/A 3/26/2018    Procedure: ESOPHAGOGASTRODUODENOSCOPY (EGD); Surgeon: Shan Ltuz MD;  [facility]; Service: Gastroenterology    GA ESOPHAGOGASTRODUODENOSCOPY TRANSORAL DIAGNOSTIC N/A 5/14/2018    Procedure: ESOPHAGOGASTRODUODENOSCOPY (EGD); Surgeon: Shan Lutz MD;  [facility]; Service: Gastroenterology    GA EXPLORE PARATHYROID GLANDS Right 3/23/2021    Procedure: RIGHT PARATHYROIDECTOMY, MINIMALLY INVASIVE, POSSIBLE 4-GLAND EXPLORATION, WITH INTRA-OPERATIVE PTH MONITORING;  Surgeon: Ana Sifuentes MD;  [facility];  Service: Surgical Oncology    GA EXPLORE PARATHYROID GLANDS Right 5/12/2022    Procedure: TWO GLAND PARATHYROIDECTOMY, 4 GLAND EXPLORATION, INTRAOPERATIVE PTH MONITORING, FLEXIBLE LARYNGOSCOPY;  Surgeon: Ana Sifuentes MD;  [facility];  Service: Surgical Oncology    GA TRANSURETHRAL ELEC-SURG PROSTATECTOM N/A 9/2/2021    Procedure: TRANSURETHRAL RESECTION OF PROSTATE (TURP);   Surgeon: Trino Castanon MD;  [facility];  Service: Urology    TIBIA FRACTURE SURGERY Left 1962     Family History   Problem Relation Age of Onset    Rheumatic fever Father     Other Father         accidental poisoning   [facility] Heart disease Mother      Social History     Socioeconomic History    Marital status: /Civil Union     Spouse name: Not on file    Number of children: Not on file    Years of education: Not on file    Highest education level: Not on file   Occupational History    Occupation:    Tobacco Use    Smoking status: Current Every Day Smoker     Packs/day: 1 00     Years: 40 00     Pack years: 40 00     Types: Cigarettes    Smokeless tobacco: Never Used   Vaping Use    Vaping Use: Never used   Substance and Sexual Activity    Alcohol use: Not Currently     Comment: rare social occasion, once a year," takes whiskey if feeling a cold coming"    Drug use: No    Sexual activity: Not Currently     Partners: Female   Other Topics Concern    Not on file   Social History Narrative    Not on file     Social Determinants of Health     Financial Resource Strain: Not on file   Food Insecurity: Not on file   Transportation Needs: Not on file   Physical Activity: Not on file   Stress: Not on file   Social Connections: Not on file   Intimate Partner Violence: Not on file   Housing Stability: Not on file       Current Outpatient Medications:     aspirin (ECOTRIN LOW STRENGTH) 81 mg EC tablet, Take 1 tablet (81 mg total) by mouth daily, Disp: , Rfl:     atenolol (TENORMIN) 50 mg tablet, TAKE 1 TABLET(50 MG) BY MOUTH DAILY, Disp: 90 tablet, Rfl: 0    atorvastatin (LIPITOR) 80 mg tablet, Take 1 tablet (80 mg total) by mouth daily, Disp: 90 tablet, Rfl: 3    cloNIDine (CATAPRES) 0 1 mg tablet, TAKE 1 TABLET(0 1 MG) BY MOUTH EVERY 8 HOURS, Disp: 270 tablet, Rfl: 0    lisinopril (ZESTRIL) 40 mg tablet, TAKE 1 TABLET(40 MG) BY MOUTH DAILY, Disp: 90 tablet, Rfl: 3    metFORMIN (GLUCOPHAGE-XR) 500 mg 24 hr tablet, Take 1 tablet (500 mg total) by mouth 2 (two) times a day with meals, Disp: 180 tablet, Rfl: 3    Omega-3 Fatty Acids (FISH OIL ADULT GUMMIES PO), Take by mouth, Disp: , Rfl:     pantoprazole (PROTONIX) 40 mg tablet, Take 1 tablet (40 mg total) by mouth in the morning , Disp: 90 tablet, Rfl: 0    PreviDent 0 2 % SOLN, RINSE WITH 10ML FOR 1 MINUTE THEN SPIT OUT AT BEDTIME DO NOT SWALLOW , Disp: , Rfl:     torsemide (DEMADEX) 10 mg tablet, TAKE 1 TABLET(10 MG) BY MOUTH DAILY, Disp: 90 tablet, Rfl: 3    ferrous sulfate 325 (65 Fe) mg tablet, Take 325 mg by mouth daily with breakfast (Patient not taking: No sig reported), Disp: , Rfl:     traMADol (Ultram) 50 mg tablet, Take 1 tablet (50 mg total) by mouth every 6 (six) hours as needed for moderate pain (Patient not taking: No sig reported), Disp: 10 tablet, Rfl: 0  No Known Allergies  Vitals:    05/25/22 1053   BP: 110/88   Pulse: 71   Resp: 16   Temp: 97 8 °F (36 6 °C)   SpO2: 98%       Physical Exam  Constitutional:       Appearance: Normal appearance  Neck:      Comments: Incision clean dry intact  Musculoskeletal:      Cervical back: Normal range of motion and neck supple  No rigidity  Lymphadenopathy:      Cervical: No cervical adenopathy  Neurological:      Mental Status: He is alert             Results:  Labs:  Lab Results   Component Value Date    PTH 25 2 05/12/2022    CALCIUM 8 4 05/12/2022    PHOS 2 2 (L) 04/06/2021     Case Report   Surgical Pathology Report                         Case: [id number]                                    Authorizing Provider: James Lind MD        Collected:           05/12/2022 1048               Ordering Location:     [address]      Received:            05/12/2022 [facility]                                      Hospital Operating Room                                                       Pathologist:           Valentino King MD                                                            Intraop:               Valentino King MD                                                            Specimens:   A) - Parathyroid, right superior parathyroid                                                         B) - Parathyroid, rule out left inferior parathyroid                                                 C) - Parathyroid, rule out left superior parathyroid                                                 D) - Parathyroid, REMAINING LEFT INFERIOR PARATHYROID                                      Final Diagnosis   A  Parathyroid, "Right superior parathyroid," Resection:  - Hypercellular parathyroid tissue (0 38 grams)     B  Parathyroid, "Rule out left inferior parathyroid," Resection:  - Hypercellular parathyroid tissue (0 05 grams)     C  Parathyroid, "Rule out left superior parathyroid," Resection:  - Hypercellular parathyroid tissue (0 01 grams)     D  Parathyroid, "Remaining left inferior parathyroid," Resection:  - Hypercellular parathyroid tissue (0 01 grams)   Electronically signed by Figueroa Villarreal MD on 5/18/2022 at 10:28 AM   Comments: This is an appended report  These results have been appended to a previously preliminary verified report  Imaging  XR chest pa & lateral    Result Date: 4/29/2022  Narrative: CHEST INDICATION:   E89 2: Postprocedural hypoparathyroidism  COMPARISON:  Chest radiograph from 4/4/2021, chest CT from 7/13/2021, parathyroid CT from 3/31/2022  EXAM PERFORMED/VIEWS:  XR CHEST PA & LATERAL  DUAL ENERGY SUBTRACTION  FINDINGS: Cardiomediastinal silhouette appears unremarkable  The lungs are clear  No pneumothorax or pleural effusion  Osseous structures appear within normal limits for patient age  Impression: No acute cardiopulmonary disease  Workstation performed: [id number]     I reviewed the above laboratory and imaging data  Discussion/Summary:  Status post 2 gland parathyroidectomy  He is doing well  Will follow-up in 6 months with repeat blood work to assess for durability operation at that time  All questions answered and copy of pathology report given him for his records

## 2022-05-25 NOTE — PROGRESS NOTES
Surgical Oncology Follow Up       [address] [place]  CANCER [facility] SURGICAL ONCOLOGY [place]  [address] [facility] [place] [address]    Viridiana Cameron  1950  [id number]  [address] [facility] SURGICAL ONCOLOGY [place]  [address] [facility] [address]    Chief Complaint   Patient presents with    Post-op       Assessment/Plan:    No problem-specific Assessment & Plan notes found for this encounter  Diagnoses and all orders for this visit:    Status post parathyroidectomy [facility])        Advance Care Planning/Advance Directives:  Did not discuss medical power of  and disease status, cancer treatment plans and/or cancer treatment goals with the patient  Oncology History    No history exists  History of Present Illness:  71-year-man for postop evaluation status post parathyroidectomy   -Interval History:   He has had no complaints since surgery  Review of Systems:  Review of Systems   Constitutional: Negative  HENT: Negative  Eyes: Negative  Respiratory: Negative  Cardiovascular: Negative  Gastrointestinal: Negative  Endocrine: Negative  Genitourinary: Negative  Musculoskeletal: Negative  Skin: Negative  Allergic/Immunologic: Negative  Neurological: Negative  Hematological: Negative      Psychiatric/Behavioral: Negative      a  Patient Active Problem List   Diagnosis    Non-recurrent bilateral inguinal hernia without obstruction or gangrene    Hyperlipidemia    HTN (hypertension)    Type 2 diabetes mellitus ([address] )    Smoking    Iron deficiency anemia due to chronic blood loss    HOCM (hypertrophic obstructive cardiomyopathy) ([address] )    Atherosclerosis of aorta (HCC)    Anemia associated with diabetes mellitus ([address] )    Thrombocytosis    Hypercalcemia    Hyperparathyroidism ([address] )    History of aortic regurgitation    History of obesity    Hyperkalemia    Metabolic acidosis    UTI (urinary tract infection)    Dehydration with hyponatremia    Hematuria    Anemia    Status post par[facility]roidectomy ([facility] )    Antritis and gastric ulcer    Urinary retention    Urinary retention due to benign prostatic hyperplasia    Benign localized hyperplasia of prostate with urinary obstruction    CAD (coronary artery disease)     Past Medical History:   Diagnosis Date    Anemia     Benign neoplasm of large intestine     Bleeding gastric ulcer 2018    [facility]     Chronic kidney disease     Colon polyp     Coronary artery disease     Diabetes mellitus ([facility] )     Disease of thyroid gland     Heart murmur     History of aortic regurgitation     History of constipation     History of degenerative joint disease     History of nocturia     History of obesity     History of sebaceous cyst     History of shortness of breath     History of transfusion     History of urinary frequency     Hyperlipidemia     Hyperparathyroidism ([place] [address] )     Hypertension     Myocardial infarction ([place] [address] ) 2018 january, 3 stents    Polyposis coli     of the large intestine    Ulcer of esophagus      Past Surgical History:   Procedure Laterality Date    ACHILLES TENDON SURGERY Left 1973    CATARACT EXTRACTION Right 02/18/2021    COLONOSCOPY      hyperplastic polyp    CORONARY ANGIOPLASTY WITH STENT PLACEMENT      3 stents: 2 placed on Jan 2018, 1 on July 2018    Shirin Xie CYST REMOVAL  2019, 2020    left and right wrists    ESOPHAGOGASTRODUODENOSCOPY N/A 1/23/2018    Procedure: ESOPHAGOGASTRODUODENOSCOPY (EGD); Surgeon: Janelle Sparks MD;  [facility];   Service: Gastroenterology    ESOPHAGOGASTRODUODENOSCOPY      HERNIA REPAIR      HERNIA REPAIR Bilateral 8/18/2017    Procedure: LAPAROSCOPIC INGUINAL HERNIA REPAIR WITH MESH;  Surgeon: Tesha Lipscomb MD;  [facility];  Service: General    LARYNGOSCOPY N/A 5/12/2022    Procedure: LARYNGOSCOPY DIRECT;  Surgeon: Drake Dougherty MD; Location[facility];  Service: Surgical Oncology    LA ESOPHAGOGASTRODUODENOSCOPY TRANSORAL DIAGNOSTIC N/A 3/26/2018    Procedure: ESOPHAGOGASTRODUODENOSCOPY (EGD); Surgeon: Sharon Barros MD;  [facility]; Service: Gastroenterology    LA ESOPHAGOGASTRODUODENOSCOPY TRANSORAL DIAGNOSTIC N/A 5/14/2018    Procedure: ESOPHAGOGASTRODUODENOSCOPY (EGD); Surgeon: Sharon Barros MD;  [facility]; Service: Gastroenterology    LA EXPLORE PARATHYROID GLANDS Right 3/23/2021    Procedure: RIGHT PARATHYROIDECTOMY, MINIMALLY INVASIVE, POSSIBLE 4-GLAND EXPLORATION, WITH INTRA-OPERATIVE PTH MONITORING;  Surgeon: Tonya Lesch, MD;  [facility];  Service: Surgical Oncology    LA EXPLORE PARATHYROID GLANDS Right 5/12/2022    Procedure: TWO GLAND PARATHYROIDECTOMY, 4 GLAND EXPLORATION, INTRAOPERATIVE PTH MONITORING, FLEXIBLE LARYNGOSCOPY;  Surgeon: Tonya Lesch, MD;  [facility];  Service: Surgical Oncology    LA TRANSURETHRAL ELEC-SURG PROSTATECTOM N/A 9/2/2021    Procedure: TRANSURETHRAL RESECTION OF PROSTATE (TURP);   Surgeon: Hanh Skinner MD;  [facility];  Service: Urology    TIBIA FRACTURE SURGERY Left 1962     Family History   Problem Relation Age of Onset    Rheumatic fever Father     Other Father         accidental poisoning   Eric Shana Heart disease Mother      Social History     Socioeconomic History    Marital status: /Civil Union     Spouse name: Not on file    Number of children: Not on file    Years of education: Not on file    Highest education level: Not on file   Occupational History    Occupation:    Tobacco Use    Smoking status: Current Every Day Smoker     Packs/day: 1 00     Years: 40 00     Pack years: 40 00     Types: Cigarettes    Smokeless tobacco: Never Used   Vaping Use    Vaping Use: Never used   Substance and Sexual Activity    Alcohol use: Not Currently     Comment: rare social occasion, once a year," takes lucero if feeling a cold coming"    Drug use: No    Sexual activity: Not Currently     Partners: Female   Other Topics Concern    Not on file   Social History Narrative    Not on file     Social Determinants of Health     Financial Resource Strain: Not on file   Food Insecurity: Not on file   Transportation Needs: Not on file   Physical Activity: Not on file   Stress: Not on file   Social Connections: Not on file   Intimate Partner Violence: Not on file   Housing Stability: Not on file       Current Outpatient Medications:     aspirin (ECOTRIN LOW STRENGTH) 81 mg EC tablet, Take 1 tablet (81 mg total) by mouth daily, Disp: , Rfl:     atenolol (TENORMIN) 50 mg tablet, TAKE 1 TABLET(50 MG) BY MOUTH DAILY, Disp: 90 tablet, Rfl: 0    atorvastatin (LIPITOR) 80 mg tablet, Take 1 tablet (80 mg total) by mouth daily, Disp: 90 tablet, Rfl: 3    cloNIDine (CATAPRES) 0 1 mg tablet, TAKE 1 TABLET(0 1 MG) BY MOUTH EVERY 8 HOURS, Disp: 270 tablet, Rfl: 0    lisinopril (ZESTRIL) 40 mg tablet, TAKE 1 TABLET(40 MG) BY MOUTH DAILY, Disp: 90 tablet, Rfl: 3    metFORMIN (GLUCOPHAGE-XR) 500 mg 24 hr tablet, Take 1 tablet (500 mg total) by mouth 2 (two) times a day with meals, Disp: 180 tablet, Rfl: 3    Omega-3 Fatty Acids (FISH OIL ADULT GUMMIES PO), Take by mouth, Disp: , Rfl:     pantoprazole (PROTONIX) 40 mg tablet, Take 1 tablet (40 mg total) by mouth in the morning , Disp: 90 tablet, Rfl: 0    PreviDent 0 2 % SOLN, RINSE WITH 10ML FOR 1 MINUTE THEN SPIT OUT AT BEDTIME DO NOT SWALLOW , Disp: , Rfl:     torsemide (DEMADEX) 10 mg tablet, TAKE 1 TABLET(10 MG) BY MOUTH DAILY, Disp: 90 tablet, Rfl: 3    ferrous sulfate 325 (65 Fe) mg tablet, Take 325 mg by mouth daily with breakfast (Patient not taking: No sig reported), Disp: , Rfl:     traMADol (Ultram) 50 mg tablet, Take 1 tablet (50 mg total) by mouth every 6 (six) hours as needed for moderate pain (Patient not taking: No sig reported), Disp: 10 tablet, Rfl: 0  No Known Allergies  Vitals:    05/25/22 1053   BP: 110/88   Pulse: 71   Resp: 16   Temp: 97 8 °F (36 6 °C)   SpO2: 98%       Physical Exam  Constitutional:       Appearance: Normal appearance  Neck:      Comments: Incision clean dry intact  Musculoskeletal:      Cervical back: Normal range of motion and neck supple  No rigidity  Lymphadenopathy:      Cervical: No cervical adenopathy  Neurological:      Mental Status: He is alert  Results:  Labs:  Lab Results   Component Value Date    PTH 25 2 05/12/2022    CALCIUM 8 4 05/12/2022    PHOS 2 2 (L) 04/06/2021     Case Report   Surgical Pathology Report                         Case: [id number]                                    Authorizing Provider: Mat Canavan, MD        Collected:           05/12/2022 1048               Ordering Location:     [address]      Received:            05/12/2022 1053                                      Hospital Operating Room                                                       Pathologist:           Lissette Sifuentes MD                                                            Intraop:               Lissette Sifuentes MD                                                            Specimens:   A) - Parathyroid, right superior parathyroid                                                         B) - Parathyroid, rule out left inferior parathyroid                                                 C) - Parathyroid, rule out left superior parathyroid                                                 D) - Parathyroid, REMAINING LEFT INFERIOR PARATHYROID                                      Final Diagnosis   A  Parathyroid, "Right superior parathyroid," Resection:  - Hypercellular parathyroid tissue (0 38 grams)     B  Parathyroid, "Rule out left inferior parathyroid," Resection:  - Hypercellular parathyroid tissue (0 05 grams)     C    Parathyroid, "Rule out left superior parathyroid," Resection:  - Hypercellular parathyroid tissue (0 01 grams)     D  Parathyroid, "Remaining left inferior parathyroid," Resection:  - Hypercellular parathyroid tissue (0 01 grams)   Electronically signed by Figueroa Villarreal MD on 5/18/2022 at 10:28 AM   Comments: This is an appended report  These results have been appended to a previously preliminary verified report  Imaging  XR chest pa & lateral    Result Date: 4/29/2022  Narrative: CHEST INDICATION:   E89 2: Postprocedural hypoparathyroidism  COMPARISON:  Chest radiograph from 4/4/2021, chest CT from 7/13/2021, parathyroid CT from 3/31/2022  EXAM PERFORMED/VIEWS:  XR CHEST PA & LATERAL  DUAL ENERGY SUBTRACTION  FINDINGS: Cardiomediastinal silhouette appears unremarkable  The lungs are clear  No pneumothorax or pleural effusion  Osseous structures appear within normal limits for patient age  Impression: No acute cardiopulmonary disease  Workstation performed: [id number]     I reviewed the above laboratory and imaging data  Discussion/Summary:  Status post 2 gland parathyroidectomy  He is doing well  Will follow-up in 6 months with repeat blood work to assess for durability operation at that time  All questions answered and copy of pathology report given him for his records

## 2022-06-13 ENCOUNTER — OFFICE VISIT (OUTPATIENT)
Dept: INTERNAL MEDICINE CLINIC | Facility: CLINIC | Age: 72
End: 2022-06-13
Payer: MEDICARE

## 2022-06-13 ENCOUNTER — APPOINTMENT (OUTPATIENT)
Dept: LAB | Facility: CLINIC | Age: 72
End: 2022-06-13
Payer: MEDICARE

## 2022-06-13 VITALS
HEART RATE: 56 BPM | TEMPERATURE: 97.6 F | BODY MASS INDEX: 29.41 KG/M2 | HEIGHT: 69 IN | SYSTOLIC BLOOD PRESSURE: 130 MMHG | WEIGHT: 198.6 LBS | DIASTOLIC BLOOD PRESSURE: 74 MMHG | OXYGEN SATURATION: 99 %

## 2022-06-13 DIAGNOSIS — E11.9 TYPE 2 DIABETES MELLITUS WITHOUT COMPLICATION, WITHOUT LONG-TERM CURRENT USE OF INSULIN (HCC): Primary | ICD-10-CM

## 2022-06-13 DIAGNOSIS — I25.10 CORONARY ARTERY DISEASE INVOLVING NATIVE CORONARY ARTERY OF NATIVE HEART WITHOUT ANGINA PECTORIS: ICD-10-CM

## 2022-06-13 DIAGNOSIS — D13.2 DUODENAL ADENOMA: ICD-10-CM

## 2022-06-13 DIAGNOSIS — Z12.2 ENCOUNTER FOR SCREENING FOR LUNG CANCER: ICD-10-CM

## 2022-06-13 DIAGNOSIS — Z87.891 PERSONAL HISTORY OF NICOTINE DEPENDENCE: ICD-10-CM

## 2022-06-13 DIAGNOSIS — K29.70 GASTRITIS, PRESENCE OF BLEEDING UNSPECIFIED, UNSPECIFIED CHRONICITY, UNSPECIFIED GASTRITIS TYPE: ICD-10-CM

## 2022-06-13 DIAGNOSIS — Z23 ENCOUNTER FOR IMMUNIZATION: ICD-10-CM

## 2022-06-13 DIAGNOSIS — Z12.5 PROSTATE CANCER SCREENING: ICD-10-CM

## 2022-06-13 DIAGNOSIS — I10 HYPERTENSION, UNSPECIFIED TYPE: ICD-10-CM

## 2022-06-13 LAB
PSA SERPL-MCNC: 2 NG/ML (ref 0–4)
SL AMB POCT HEMOGLOBIN AIC: 6 (ref ?–6.5)

## 2022-06-13 PROCEDURE — 99214 OFFICE O/P EST MOD 30 MIN: CPT | Performed by: INTERNAL MEDICINE

## 2022-06-13 PROCEDURE — G0103 PSA SCREENING: HCPCS

## 2022-06-13 PROCEDURE — 83036 HEMOGLOBIN GLYCOSYLATED A1C: CPT | Performed by: INTERNAL MEDICINE

## 2022-06-13 PROCEDURE — 36415 COLL VENOUS BLD VENIPUNCTURE: CPT

## 2022-06-13 RX ORDER — ATENOLOL 50 MG/1
50 TABLET ORAL DAILY
Qty: 90 TABLET | Refills: 2 | Status: SHIPPED | OUTPATIENT
Start: 2022-06-13

## 2022-06-13 RX ORDER — PANTOPRAZOLE SODIUM 40 MG/1
40 TABLET, DELAYED RELEASE ORAL DAILY
Qty: 90 TABLET | Refills: 0 | Status: SHIPPED | OUTPATIENT
Start: 2022-06-13

## 2022-06-13 NOTE — PROGRESS NOTES
Assessment/Plan:       Diagnoses and all orders for this visit:    Type 2 diabetes mellitus without complication, without long-term current use of insulin (HCC)  -     IRIS Diabetic eye exam  -     POCT hemoglobin A1c    Encounter for immunization    Encounter for screening for lung cancer  -     CT lung screening program; Future    Personal history of nicotine dependence  -     CT lung screening program; Future    Hypertension, unspecified type  -     atenolol (TENORMIN) 50 mg tablet; Take 1 tablet (50 mg total) by mouth daily    Gastritis, presence of bleeding unspecified, unspecified chronicity, unspecified gastritis type  -     pantoprazole (PROTONIX) 40 mg tablet; Take 1 tablet (40 mg total) by mouth daily    Duodenal adenoma  -     pantoprazole (PROTONIX) 40 mg tablet; Take 1 tablet (40 mg total) by mouth daily    Coronary artery disease involving native coronary artery of native heart without angina pectoris    Prostate cancer screening  -     PSA, Total Screen; Future                Subjective:      Patient ID: Nidhi Riddle is a 66 y o  male  A 66year-old  He is diabetic and hypertensive and control has been good  Hypertensive cardiomyopathy without heart failure  He does have grade 1 diastolic dysfunction     Coronary artery disease by virtue of finding a cardiac catheterization done in 2017 but no angina pectoris  This is from a prior note:    " Cardiac catheterization documented noncritical CAD with 60% LAD lesion and a large vessel with good flow not requiring intervention  This also documented hypertrophic cardiomyopathy with obliteration of the cavity an ejection fraction of 80% "      However, he was hospitalized back in April 2021 for urinary retention and sepsis with acute kidney injury  Discharged to home, saeed removed after 5 months ; had TURP Sep 2021    Need to recheck BMP        He needed repeat parathyroidectomy after lack of normalization of PTH following the 1st parathyroidectomy  Gastric ulcer treated in 2018  Healed with no recurrence  He feels fine       Continues to smoke and expresses no interest in stopping      The following portions of the patient's history were reviewed and updated as appropriate:   He has a past medical history of Anemia, Benign neoplasm of large intestine, Bleeding gastric ulcer (2018), Cardiomyopathy ([address] ), Chronic kidney disease, Colon polyp, Diabetes mellitus ([address] ), Disease of thyroid gland, Heart murmur, History of aortic regurgitation, History of constipation, History of degenerative joint disease, History of nocturia, History of obesity, History of sebaceous cyst, History of shortness of breath, History of transfusion, History of urinary frequency, Hyperlipidemia, Hyperparathyroidism ([address] ), Hypertension, Myocardial infarction ([address] ) (2018), Polyposis coli, and Ulcer of esophagus  ,  does not have any pertinent problems on file  ,   has a past surgical history that includes Tibia fracture surgery (Left, 1962); Achilles tendon surgery (Left, 1973); Esophagogastroduodenoscopy (N/A, 1/23/2018); Esophagogastroduodenoscopy; pr esophagogastroduodenoscopy transoral diagnostic (N/A, 3/26/2018); pr esophagogastroduodenoscopy transoral diagnostic (N/A, 5/14/2018); Cyst Removal (2019, 2020); Coronary angioplasty with stent; Colonoscopy; Cataract extraction (Right, 02/18/2021); pr explore parathyroid glands (Right, 3/23/2021); Hernia repair; Hernia repair (Bilateral, 8/18/2017); pr transurethral elec-surg prostatectom (N/A, 9/2/2021); pr explore parathyroid glands (Right, 5/12/2022); and LARYNGOSCOPY (N/A, 5/12/2022)  ,  family history includes Heart disease in his mother; Other in his father; Rheumatic fever in his father  ,   reports that he has been smoking cigarettes  He has a 40 00 pack-year smoking history  He has never used smokeless tobacco  He reports previous alcohol use  He reports that he does not use drugs  ,  has No Known Allergies     Current Outpatient Medications   Medication Sig Dispense Refill    aspirin (ECOTRIN LOW STRENGTH) 81 mg EC tablet Take 1 tablet (81 mg total) by mouth daily      atenolol (TENORMIN) 50 mg tablet Take 1 tablet (50 mg total) by mouth daily 90 tablet 2    atorvastatin (LIPITOR) 80 mg tablet Take 1 tablet (80 mg total) by mouth daily 90 tablet 3    cloNIDine (CATAPRES) 0 1 mg tablet TAKE 1 TABLET(0 1 MG) BY MOUTH EVERY 8 HOURS 270 tablet 0    lisinopril (ZESTRIL) 40 mg tablet TAKE 1 TABLET(40 MG) BY MOUTH DAILY 90 tablet 3    metFORMIN (GLUCOPHAGE-XR) 500 mg 24 hr tablet Take 1 tablet (500 mg total) by mouth 2 (two) times a day with meals 180 tablet 3    Omega-3 Fatty Acids (FISH OIL ADULT GUMMIES PO) Take by mouth      pantoprazole (PROTONIX) 40 mg tablet Take 1 tablet (40 mg total) by mouth daily 90 tablet 0    PreviDent 0 2 % SOLN RINSE WITH 10ML FOR 1 MINUTE THEN SPIT OUT AT BEDTIME DO NOT SWALLOW   torsemide (DEMADEX) 10 mg tablet TAKE 1 TABLET(10 MG) BY MOUTH DAILY 90 tablet 3    traMADol (Ultram) 50 mg tablet Take 1 tablet (50 mg total) by mouth every 6 (six) hours as needed for moderate pain (Patient not taking: No sig reported) 10 tablet 0     No current facility-administered medications for this visit  Review of Systems   Musculoskeletal: Positive for arthralgias  All other systems reviewed and are negative  Objective:  Vitals:    06/13/22 0757   BP: 130/74   Pulse: 56   Temp: 97 6 °F (36 4 °C)   SpO2: 99%      Physical Exam  Constitutional:       Appearance: He is well-developed  Comments: Modestly overweight male patient who appears to be the stated age   HENT:      Head: Normocephalic and atraumatic  Eyes:      Pupils: Pupils are equal, round, and reactive to light  Neck:      Thyroid: No thyromegaly  Trachea: No tracheal deviation  Cardiovascular:      Rate and Rhythm: Normal rate and regular rhythm  Heart sounds: Normal heart sounds   No murmur heard  No gallop  Pulmonary:      Effort: Pulmonary effort is normal  No respiratory distress  Breath sounds: No wheezing or rales  Abdominal:      General: Bowel sounds are normal       Palpations: Abdomen is soft  Tenderness: There is no abdominal tenderness  Musculoskeletal:         General: No tenderness or deformity  Normal range of motion  Cervical back: Normal range of motion and neck supple  Skin:     General: Skin is warm and dry  Neurological:      Mental Status: He is alert and oriented to person, place, and time  Coordination: Coordination normal       Deep Tendon Reflexes: Reflexes are normal and symmetric  There are no Patient Instructions on file for this visit

## 2022-06-17 DIAGNOSIS — I10 HTN (HYPERTENSION): ICD-10-CM

## 2022-06-17 RX ORDER — CLONIDINE HYDROCHLORIDE 0.1 MG/1
TABLET ORAL
Qty: 270 TABLET | Refills: 0 | Status: SHIPPED | OUTPATIENT
Start: 2022-06-17

## 2022-06-20 ENCOUNTER — HOSPITAL ENCOUNTER (OUTPATIENT)
Dept: CT IMAGING | Facility: CLINIC | Age: 72
Discharge: HOME | End: 2022-06-20
Payer: MEDICARE

## 2022-06-20 DIAGNOSIS — Z12.2 ENCOUNTER FOR SCREENING FOR LUNG CANCER: ICD-10-CM

## 2022-06-20 DIAGNOSIS — Z87.891 PERSONAL HISTORY OF NICOTINE DEPENDENCE: ICD-10-CM

## 2022-06-20 PROCEDURE — 71271 CT THORAX LUNG CANCER SCR C-: CPT

## 2022-06-22 NOTE — TELEPHONE ENCOUNTER
----- Message from Kandy Romo MD sent at 7/14/2020  7:57 AM EDT -----  Echocardiogram similar to last year  Normal pumping function, a little stiff on relaxation  2 valve slightly leaky    Since he is having no symptoms this does not need a surgical referral but needs to be recheck next year
Patient notified
yes

## 2022-06-23 ENCOUNTER — TELEPHONE (OUTPATIENT)
Dept: INTERNAL MEDICINE CLINIC | Facility: CLINIC | Age: 72
End: 2022-06-23

## 2022-06-23 NOTE — TELEPHONE ENCOUNTER
Spoke with: patient  Re:  CT Lung Screen  Provider's message/resuts/instructions/inquiries relayed in full detal   Comments:

## 2022-07-25 ENCOUNTER — TELEPHONE (OUTPATIENT)
Dept: INTERNAL MEDICINE CLINIC | Facility: CLINIC | Age: 72
End: 2022-07-25

## 2022-07-25 DIAGNOSIS — U07.1 COVID-19: Primary | ICD-10-CM

## 2022-07-25 NOTE — TELEPHONE ENCOUNTER
PT was exposed to Covid (+) person on Sat  Pt started w/ congestion and coughing  Energy, appetite fine  No fever  Tested (+) for Covid / Home Test yesterday     Has been taking Coricidin Cough and Cold and his congestion if breaking up some  Does he need to take anything else  Recommendation?     Wife Feliberto Litten) home test (neg)    Please Advise: [phone]

## 2022-07-25 NOTE — TELEPHONE ENCOUNTER
Antiviral Paxlovid was sent to his pharmacy  Please take it and follow directions    Do not take Lipitor while taking the Paxlovid

## 2022-08-08 LAB
LEFT EYE DIABETIC RETINOPATHY: NORMAL
RIGHT EYE DIABETIC RETINOPATHY: NORMAL

## 2022-08-15 DIAGNOSIS — D13.2 DUODENAL ADENOMA: ICD-10-CM

## 2022-08-15 DIAGNOSIS — K29.70 GASTRITIS, PRESENCE OF BLEEDING UNSPECIFIED, UNSPECIFIED CHRONICITY, UNSPECIFIED GASTRITIS TYPE: ICD-10-CM

## 2022-08-15 RX ORDER — PANTOPRAZOLE SODIUM 40 MG/1
40 TABLET, DELAYED RELEASE ORAL DAILY
Qty: 90 TABLET | Refills: 0 | Status: SHIPPED | OUTPATIENT
Start: 2022-08-15

## 2022-08-15 NOTE — TELEPHONE ENCOUNTER
Medication Refill Request     Name  pantoprazole (PROTONIX  Dose/Frequency   40 mg tablet Take 1 tablet (40 mg total) by mouth daily       Quantity 90  Verified pharmacy   [x]  Verified ordering Provider   [x]  Does patient have enough for the next 3 days?  Yes [x] No []

## 2022-09-14 DIAGNOSIS — I10 HTN (HYPERTENSION): ICD-10-CM

## 2022-09-14 RX ORDER — CLONIDINE HYDROCHLORIDE 0.1 MG/1
TABLET ORAL
Qty: 270 TABLET | Refills: 0 | Status: SHIPPED | OUTPATIENT
Start: 2022-09-14

## 2022-09-30 DIAGNOSIS — E11.69 TYPE 2 DIABETES MELLITUS WITH OTHER SPECIFIED COMPLICATION, WITHOUT LONG-TERM CURRENT USE OF INSULIN (HCC): ICD-10-CM

## 2022-09-30 RX ORDER — METFORMIN HYDROCHLORIDE 500 MG/1
500 TABLET, EXTENDED RELEASE ORAL 2 TIMES DAILY WITH MEALS
Qty: 180 TABLET | Refills: 0 | Status: SHIPPED | OUTPATIENT
Start: 2022-09-30

## 2022-09-30 NOTE — TELEPHONE ENCOUNTER
Medication Refill Request     Name Metformin  Dose/Frequency 500mg 2xday  Quantity 180  Verified pharmacy   [x]  Verified ordering Provider   [x]  Does patient have enough for the next 3 days?  Yes [x] No []

## 2022-10-12 PROBLEM — E87.1 DEHYDRATION WITH HYPONATREMIA: Status: RESOLVED | Noted: 2021-04-05 | Resolved: 2022-10-12

## 2022-10-12 PROBLEM — N39.0 UTI (URINARY TRACT INFECTION): Status: RESOLVED | Noted: 2021-04-04 | Resolved: 2022-10-12

## 2022-10-12 PROBLEM — E86.0 DEHYDRATION WITH HYPONATREMIA: Status: RESOLVED | Noted: 2021-04-05 | Resolved: 2022-10-12

## 2022-10-20 DIAGNOSIS — E11.69 TYPE 2 DIABETES MELLITUS WITH OTHER SPECIFIED COMPLICATION, WITHOUT LONG-TERM CURRENT USE OF INSULIN (HCC): ICD-10-CM

## 2022-10-20 RX ORDER — LISINOPRIL 40 MG/1
TABLET ORAL
Qty: 90 TABLET | Refills: 3 | Status: SHIPPED | OUTPATIENT
Start: 2022-10-20

## 2022-11-10 DIAGNOSIS — D13.2 DUODENAL ADENOMA: ICD-10-CM

## 2022-11-10 DIAGNOSIS — K29.70 GASTRITIS, PRESENCE OF BLEEDING UNSPECIFIED, UNSPECIFIED CHRONICITY, UNSPECIFIED GASTRITIS TYPE: ICD-10-CM

## 2022-11-10 RX ORDER — PANTOPRAZOLE SODIUM 40 MG/1
40 TABLET, DELAYED RELEASE ORAL DAILY
Qty: 90 TABLET | Refills: 0 | Status: SHIPPED | OUTPATIENT
Start: 2022-11-10

## 2022-11-15 ENCOUNTER — OFFICE VISIT (OUTPATIENT)
Dept: CARDIOLOGY CLINIC | Facility: CLINIC | Age: 72
End: 2022-11-15

## 2022-11-15 VITALS
OXYGEN SATURATION: 97 % | BODY MASS INDEX: 29.33 KG/M2 | SYSTOLIC BLOOD PRESSURE: 146 MMHG | WEIGHT: 198 LBS | HEIGHT: 69 IN | DIASTOLIC BLOOD PRESSURE: 80 MMHG | HEART RATE: 57 BPM | RESPIRATION RATE: 16 BRPM

## 2022-11-15 DIAGNOSIS — I42.1 HOCM (HYPERTROPHIC OBSTRUCTIVE CARDIOMYOPATHY) (HCC): ICD-10-CM

## 2022-11-15 DIAGNOSIS — R00.2 HEART PALPITATIONS: Primary | ICD-10-CM

## 2022-11-15 DIAGNOSIS — I10 ESSENTIAL HYPERTENSION: ICD-10-CM

## 2022-11-15 DIAGNOSIS — F17.200 SMOKING: ICD-10-CM

## 2022-11-15 DIAGNOSIS — I25.119 ATHEROSCLEROSIS OF NATIVE CORONARY ARTERY OF NATIVE HEART WITH ANGINA PECTORIS (HCC): ICD-10-CM

## 2022-11-15 NOTE — PROGRESS NOTES
[facility]  [address] [place] [id number]  Cardiology Follow Up     Christa Oshea  1950  [id number]        1  Heart palpitations  Holter monitor   2  HOCM (hypertrophic obstructive cardiomyopathy) ([place] [address] )  Echo complete w/ contrast if indicated   3  Atherosclerosis of native coronary artery of native heart with angina pectoris ([place] [address] )      4  Essential hypertension      5  Smoking                Chief Complaint   Patient presents with   • Follow-up         Interval History:  Patient presents for follow-up visit  Patient does have history of hypertrophic obstructive cardiomyopathy but asymptomatic for more than 20 years  Patient also has history of CAD status post stents to LAD  Patient does have history of smoking as well as hypertension and hyperlipidemia  Patient does have some shortness of breath which is no more than usual   Patient unfortunately continues to smoke at least 1 pack per day and is unable to quit  No history of orthopnea PND  No history of dizziness lightheadedness    Patient does have occasional episodes of palpitations but sporadic          Patient Active Problem List   Diagnosis   • Non-recurrent bilateral inguinal hernia without obstruction or gangrene   • Hyperlipidemia   • HTN (hypertension)   • Type 2 diabetes mellitus ([place] [address] )   • Smoking   • Iron deficiency anemia due to chronic blood loss   • HOCM (hypertrophic obstructive cardiomyopathy) ([place] [address] )   • Coronary artery disease involving native coronary artery of native heart   • Atherosclerosis of aorta (HCC)   • Anemia associated with diabetes mellitus ([facility] )   • Thrombocytosis   • Hypercalcemia   • Hyperparathyroidism ([facility] )   • History of aortic regurgitation   • History of obesity   • Hyperkalemia   • Metabolic acidosis   • Hematuria   • Anemia   • Status post parathyroidectomy ([place] [address] )   • Antritis and gastric ulcer   • Urinary retention   • Urinary retention due to benign prostatic hyperplasia   • Benign localized hyperplasia of prostate with urinary obstruction   • CAD (coronary artery disease)           Past Medical History:   Diagnosis Date   • Anemia     • Benign neoplasm of large intestine     • Bleeding gastric ulcer 2018   • Cardiomyopathy ([facility] )     • Chronic kidney disease     • Colon polyp     • Diabetes mellitus ([facility] )     • Disease of thyroid gland     • Heart murmur     • History of aortic regurgitation     • History of constipation     • History of degenerative joint disease     • History of nocturia     • History of obesity     • History of sebaceous cyst     • History of shortness of breath     • History of transfusion     • History of urinary frequency     • Hyperlipidemia     • Hyperparathyroidism ([place] [address] )     • Hypertension     • Myocardial infarction [facility]) 2018     january, 3 stents   • Polyposis coli       of the large intestine   • Ulcer of esophagus        Social History            Socioeconomic History   • Marital status: /Civil Union       Spouse name: Not on file   • Number of children: Not on file   • Years of education: Not on file   • Highest education level: Not on file   Occupational History   • Occupation:    Tobacco Use   • Smoking status: Current Every Day Smoker       Packs/day: 1 00       Years: 40 00       Pack years: 40 00       Types: Cigarettes   • Smokeless tobacco: Never Used   Vaping Use   • Vaping Use: Never used   Substance and Sexual Activity   • Alcohol use: Not Currently       Comment: rare social occasion, once a year," takes whiskey if feeling a cold coming"   • Drug use: No   • Sexual activity: Not Currently       Partners: Female   Other Topics Concern   • Not on file   Social History Narrative   • Not on file      Social Determinants of Health      Financial Resource Strain: Not on file   Food Insecurity: Not on file   Transportation Needs: Not on file   Physical Activity: Not on file   Stress: Not on file   Social Connections: Not on file   Intimate Partner Violence: Not on file   Housing Stability: Not on file            Family History   Problem Relation Age of Onset   • Rheumatic fever Father     • Other Father           accidental poisoning   • Heart disease Mother        Past Surgical History:   Procedure Laterality Date   • ACHILLES TENDON SURGERY Left 1973   • CATARACT EXTRACTION Right 02/18/2021   • COLONOSCOPY         hyperplastic polyp   • CORONARY ANGIOPLASTY WITH STENT PLACEMENT         3 stents: 2 placed on Jan 2018, 1 on July 2018    • CYST REMOVAL   2019, 2020     left and right wrists   • ESOPHAGOGASTRODUODENOSCOPY N/A 1/23/2018     Procedure: ESOPHAGOGASTRODUODENOSCOPY (EGD); Surgeon: Flores Hoyt MD;  Location: [facility]; Service: Gastroenterology   • ESOPHAGOGASTRODUODENOSCOPY       • HERNIA REPAIR       • HERNIA REPAIR Bilateral 8/18/2017     Procedure: LAPAROSCOPIC INGUINAL HERNIA REPAIR WITH MESH;  Surgeon: Tico Suggs MD;  [facility];  Service: General   • LARYNGOSCOPY N/A 5/12/2022     Procedure: LARYNGOSCOPY DIRECT;  Surgeon: Edith Mcnally MD;  [facility];  Service: Surgical Oncology   • CA ESOPHAGOGASTRODUODENOSCOPY TRANSORAL DIAGNOSTIC N/A 3/26/2018     Procedure: ESOPHAGOGASTRODUODENOSCOPY (EGD); Surgeon: Caroline Ryan MD;  [facility]; Service: Gastroenterology   • CA ESOPHAGOGASTRODUODENOSCOPY TRANSORAL DIAGNOSTIC N/A 5/14/2018     Procedure: ESOPHAGOGASTRODUODENOSCOPY (EGD); Surgeon: Caroline Ryan MD;  [facility]: [facility];   Service: Gastroenterology   • CA EXPLORE PARATHYROID GLANDS Right 3/23/2021     Procedure: RIGHT PARATHYROIDECTOMY, MINIMALLY INVASIVE, POSSIBLE 4-GLAND EXPLORATION, WITH INTRA-OPERATIVE PTH MONITORING;  Surgeon: Edith Mcnally MD;  [facility];  Service: Surgical Oncology   • CA EXPLORE PARATHYROID GLANDS Right 5/12/2022     Procedure: TWO GLAND PARATHYROIDECTOMY, 4 GLAND EXPLORATION, INTRAOPERATIVE PTH MONITORING, FLEXIBLE LARYNGOSCOPY;  Surgeon: Jay Tucker MD;  [facility];  Service: Surgical Oncology   • VT TRANSURETHRAL ELEC-SURG PROSTATECTOM N/A 9/2/2021     Procedure: TRANSURETHRAL RESECTION OF PROSTATE (TURP); Surgeon: Adrien Herrera MD;  [facility];  Service: Urology   • TIBIA FRACTURE SURGERY Left 1962         Current Outpatient Medications:   •  aspirin (ECOTRIN LOW STRENGTH) 81 mg EC tablet, Take 1 tablet (81 mg total) by mouth daily, Disp: , Rfl:   •  atenolol (TENORMIN) 50 mg tablet, Take 1 tablet (50 mg total) by mouth daily, Disp: 90 tablet, Rfl: 2  •  atorvastatin (LIPITOR) 80 mg tablet, Take 1 tablet (80 mg total) by mouth daily, Disp: 90 tablet, Rfl: 3  •  cloNIDine (CATAPRES) 0 1 mg tablet, TAKE 1 TABLET(0 1 MG) BY MOUTH EVERY 8 HOURS, Disp: 270 tablet, Rfl: 0  •  lisinopril (ZESTRIL) 40 mg tablet, TAKE 1 TABLET(40 MG) BY MOUTH DAILY, Disp: 90 tablet, Rfl: 3  •  metFORMIN (GLUCOPHAGE-XR) 500 mg 24 hr tablet, Take 1 tablet (500 mg total) by mouth 2 (two) times a day with meals, Disp: 180 tablet, Rfl: 0  •  Omega-3 Fatty Acids (FISH OIL ADULT GUMMIES PO), Take by mouth, Disp: , Rfl:   •  pantoprazole (PROTONIX) 40 mg tablet, Take 1 tablet (40 mg total) by mouth daily, Disp: 90 tablet, Rfl: 0  •  torsemide (DEMADEX) 10 mg tablet, TAKE 1 TABLET(10 MG) BY MOUTH DAILY, Disp: 90 tablet, Rfl: 3  No Known Allergies     Labs:        No visits with results within 2 Month(s) from this visit     Latest known visit with results is:   Appointment on 06/13/2022   Component Date Value   • PSA 06/13/2022 2 0       Imaging: No results found      Review of Systems:  Review of Systems   REVIEW OF SYSTEMS:  Constitutional:  Denies fever or chills   Eyes:  Denies change in visual acuity   HENT:  Denies nasal congestion or sore throat   Respiratory:  shortness of breath   Cardiovascular:  Denies chest pain or edema   GI:  Denies abdominal pain, nausea, vomiting, bloody stools or diarrhea   :  Denies dysuria, frequency, difficulty in micturition and nocturia  Musculoskeletal:  Denies back pain or joint pain   Neurologic:  Denies headache, focal weakness or sensory changes   Endocrine:  Denies polyuria or polydipsia   Lymphatic:  Denies swollen glands   Psychiatric:  Denies depression or anxiety     Physical Exam:     /80 (BP Location: Left arm, Patient Position: Sitting, Cuff Size: Standard)   Pulse 57   Resp 16   Ht 5' 9" (1 753 m)   Wt 89 8 kg (198 lb)   SpO2 97%   BMI 29 24 kg/m²      Physical Exam   PHYSICAL EXAM:  General:  Patient is not in acute distress   Head: Normocephalic, Atraumatic  HEENT:  Both pupils normal-size atraumatic, normocephalic, nonicteric  Neck:  JVP not raised  Trachea central  No carotid bruit  Respiratory:  normal breath sounds no crackles  no rhonchi  Cardiovascular:  Regular rate and rhythm no S3 2/6 systolic ejection murmur in the right sternal border  GI:  Abdomen soft nontender  No organomegaly  Lymphatic:  No cervical or inguinal lymphadenopathy  Neurologic:  Patient is awake alert, oriented   Grossly nonfocal  Extremities no edema      Discussion/Summary:  Patient with multiple medical problems who seems to be doing reasonably well from cardiac standpoint  Previous studies reviewed with patient  Medications reviewed and possible side effects discussed  concepts of cardiovascular disease , signs and symptoms of heart disease  Dietary and risk factor modification reinforced  All questions answered  Safety measures reviewed  Patient advised to report any problems prompting medical attention      Patient is strongly counseled to quit smoking to prevent future cardiovascular events  Patient will be scheduled for an echocardiogram to reassess hypertrophic obstructive cardiomyopathy, EF as well as mild aortic stenosis noted in the past by echocardiogram        Holter monitor to assess symptoms of palpitations    If patient has ongoing symptoms of frequent palpitations, cardiac event monitor to be considered  This was discussed with the patient  Medications reviewed  Follow-up in 6 months or earlier as needed  Patient is agreeable with the plan of care

## 2022-11-21 ENCOUNTER — HOSPITAL ENCOUNTER (OUTPATIENT)
Dept: NON INVASIVE DIAGNOSTICS | Facility: CLINIC | Age: 72
Discharge: HOME | End: 2022-11-21

## 2022-11-21 VITALS
BODY MASS INDEX: 29.33 KG/M2 | DIASTOLIC BLOOD PRESSURE: 80 MMHG | HEIGHT: 69 IN | SYSTOLIC BLOOD PRESSURE: 146 MMHG | WEIGHT: 198 LBS | HEART RATE: 65 BPM

## 2022-11-21 DIAGNOSIS — I42.1 HOCM (HYPERTROPHIC OBSTRUCTIVE CARDIOMYOPATHY) (HCC): ICD-10-CM

## 2022-11-21 DIAGNOSIS — R00.2 HEART PALPITATIONS: ICD-10-CM

## 2022-11-21 LAB
AORTIC ROOT: 3.9 CM
AORTIC VALVE MEAN VELOCITY: 12.4 M/S
APICAL FOUR CHAMBER EJECTION FRACTION: 55 %
ASCENDING AORTA: 4 CM
AV AREA BY CONTINUOUS VTI: 3.1 CM2
AV AREA PEAK VELOCITY: 3.6 CM2
AV LVOT MEAN GRADIENT: 3 MMHG
AV LVOT PEAK GRADIENT: 6 MMHG
AV MEAN GRADIENT: 7 MMHG
AV PEAK GRADIENT: 11 MMHG
AV VALVE AREA: 3.13 CM2
AV VELOCITY RATIO: 0.73
DOP CALC AO PEAK VEL: 1.66 M/S
DOP CALC AO VTI: 43.89 CM
DOP CALC LVOT AREA: 4.91 CM2
DOP CALC LVOT DIAMETER: 2.5 CM
DOP CALC LVOT PEAK VEL VTI: 27.98 CM
DOP CALC LVOT PEAK VEL: 1.21 M/S
DOP CALC LVOT STROKE INDEX: 66.5 ML/M2
DOP CALC LVOT STROKE VOLUME: 137.28
E WAVE DECELERATION TIME: 352 MS
FRACTIONAL SHORTENING: 36 (ref 28–44)
INTERVENTRICULAR SEPTUM IN DIASTOLE (PARASTERNAL SHORT AXIS VIEW): 1.7 CM
INTERVENTRICULAR SEPTUM: 1.7 CM (ref 0.6–1.1)
LAAS-AP4: 23.4 CM2
LEFT ATRIUM AREA SYSTOLE SINGLE PLANE A4C: 19.9 CM2
LEFT ATRIUM SIZE: 5.4 CM
LEFT INTERNAL DIMENSION IN SYSTOLE: 3 CM (ref 2.1–4)
LEFT VENTRICULAR INTERNAL DIMENSION IN DIASTOLE: 4.7 CM (ref 3.5–6)
LEFT VENTRICULAR POSTERIOR WALL IN END DIASTOLE: 1.1 CM
LEFT VENTRICULAR STROKE VOLUME: 65 ML
LVSV (TEICH): 65 ML
MITRAL REGURGITATION PEAK VELOCITY: 5.83 M/S
MITRAL VALVE REGURGITANT PEAK GRADIENT: 136 MMHG
MV E'TISSUE VEL-SEP: 4 CM/S
MV PEAK A VEL: 1 M/S
MV PEAK E VEL: 78 CM/S
MV STENOSIS PRESSURE HALF TIME: 102 MS
MV VALVE AREA P 1/2 METHOD: 2.16
RIGHT ATRIAL 2D VOLUME: 56 ML
RIGHT ATRIUM AREA SYSTOLE A4C: 17.6 CM2
RIGHT VENTRICLE ID DIMENSION: 4.5 CM
SL CV LV EF: 70
SL CV PED ECHO LEFT VENTRICLE DIASTOLIC VOLUME (MOD BIPLANE) 2D: 100 ML
SL CV PED ECHO LEFT VENTRICLE SYSTOLIC VOLUME (MOD BIPLANE) 2D: 36 ML

## 2022-11-23 ENCOUNTER — DOCUMENTATION (OUTPATIENT)
Dept: CARDIOLOGY CLINIC | Facility: CLINIC | Age: 72
End: 2022-11-23

## 2022-11-25 ENCOUNTER — TELEPHONE (OUTPATIENT)
Dept: CARDIOLOGY CLINIC | Facility: CLINIC | Age: 72
End: 2022-11-25

## 2022-11-25 DIAGNOSIS — I42.1 HOCM (HYPERTROPHIC OBSTRUCTIVE CARDIOMYOPATHY) (HCC): Primary | ICD-10-CM

## 2022-11-25 NOTE — TELEPHONE ENCOUNTER
Patient called the office stating that they had a conversation on Wednesday 11/23/22 with Dr Nile Hyde regarding his holter monitor results  Patient states that he was told by Dr Nile Hyde  that he was going to schedule patient for a emergency procedure with the EP doctor  in [place] for a Defibrillator  Patient is calling to follow up on the conversation that he had with Dr Nile Hyde on Wednesday 11/23/22  Patient also states that he did not receive a call to get the procedure done by anyone, he's still waiting for the appointment to get the procedure  Patient Would like a call back as soon as possible [phone]        Please advise

## 2022-11-28 ENCOUNTER — DOCUMENTATION (OUTPATIENT)
Dept: CARDIOLOGY CLINIC | Facility: CLINIC | Age: 72
End: 2022-11-28

## 2022-11-28 ENCOUNTER — TELEPHONE (OUTPATIENT)
Dept: CARDIOLOGY CLINIC | Facility: CLINIC | Age: 72
End: 2022-11-28

## 2022-11-28 DIAGNOSIS — I47.20 VT (VENTRICULAR TACHYCARDIA) (HCC): Primary | ICD-10-CM

## 2022-11-28 DIAGNOSIS — R00.2 HEART PALPITATIONS: Primary | ICD-10-CM

## 2022-11-28 DIAGNOSIS — I42.1 HOCM (HYPERTROPHIC OBSTRUCTIVE CARDIOMYOPATHY) (HCC): ICD-10-CM

## 2022-11-28 NOTE — TELEPHONE ENCOUNTER
----- Message from [facility], DO sent at 11/28/2022  9:38 AM EST -----  PLEASE ARRANGE DUAL CHAMBER AICD WITH MICHEAL  THANKS   [place]

## 2022-11-28 NOTE — TELEPHONE ENCOUNTER
Patient scheduled for a Dual chamber ICd implant at [facility] on 01/11/2023 with Dr Todd Chol      Patient aware of general instructions    metformin to hold the night prior and morning of  Omega 3 to hold a week prior  patient cover under medicare    Can we please have the case

## 2022-11-29 ENCOUNTER — OFFICE VISIT (OUTPATIENT)
Dept: SURGICAL ONCOLOGY | Facility: CLINIC | Age: 72
End: 2022-11-29

## 2022-11-29 ENCOUNTER — APPOINTMENT (OUTPATIENT)
Dept: LAB | Facility: CLINIC | Age: 72
End: 2022-11-29

## 2022-11-29 ENCOUNTER — CONSULT (OUTPATIENT)
Dept: CARDIOLOGY CLINIC | Facility: CLINIC | Age: 72
End: 2022-11-29

## 2022-11-29 VITALS
OXYGEN SATURATION: 100 % | HEART RATE: 67 BPM | HEIGHT: 69 IN | TEMPERATURE: 98.4 F | RESPIRATION RATE: 16 BRPM | SYSTOLIC BLOOD PRESSURE: 140 MMHG | BODY MASS INDEX: 28.81 KG/M2 | WEIGHT: 194.5 LBS | DIASTOLIC BLOOD PRESSURE: 70 MMHG

## 2022-11-29 VITALS
WEIGHT: 197.6 LBS | SYSTOLIC BLOOD PRESSURE: 120 MMHG | BODY MASS INDEX: 29.18 KG/M2 | HEART RATE: 63 BPM | DIASTOLIC BLOOD PRESSURE: 80 MMHG

## 2022-11-29 DIAGNOSIS — I10 HYPERTENSION, UNSPECIFIED TYPE: ICD-10-CM

## 2022-11-29 DIAGNOSIS — E89.2 STATUS POST PARATHYROIDECTOMY (HCC): ICD-10-CM

## 2022-11-29 DIAGNOSIS — I42.1 HOCM (HYPERTROPHIC OBSTRUCTIVE CARDIOMYOPATHY) (HCC): Primary | ICD-10-CM

## 2022-11-29 DIAGNOSIS — I47.20 VT (VENTRICULAR TACHYCARDIA): ICD-10-CM

## 2022-11-29 DIAGNOSIS — R00.1 BRADYCARDIA: ICD-10-CM

## 2022-11-29 DIAGNOSIS — E89.2 S/P SUBTOTAL PARATHYROIDECTOMY (HCC): Primary | ICD-10-CM

## 2022-11-29 DIAGNOSIS — R00.2 PALPITATIONS: ICD-10-CM

## 2022-11-29 DIAGNOSIS — I25.10 CORONARY ARTERY DISEASE INVOLVING NATIVE CORONARY ARTERY OF NATIVE HEART WITHOUT ANGINA PECTORIS: ICD-10-CM

## 2022-11-29 LAB
ALBUMIN SERPL BCP-MCNC: 4 G/DL (ref 3.5–5)
ALP SERPL-CCNC: 60 U/L (ref 34–104)
ALT SERPL W P-5'-P-CCNC: 13 U/L (ref 7–52)
ANION GAP SERPL CALCULATED.3IONS-SCNC: 6 MMOL/L (ref 4–13)
AST SERPL W P-5'-P-CCNC: 14 U/L (ref 13–39)
BASOPHILS # BLD AUTO: 0.04 THOUSANDS/ÂΜL (ref 0–0.1)
BASOPHILS NFR BLD AUTO: 1 % (ref 0–1)
BILIRUB SERPL-MCNC: 0.6 MG/DL (ref 0.2–1)
BUN SERPL-MCNC: 15 MG/DL (ref 5–25)
CALCIUM SERPL-MCNC: 8.4 MG/DL (ref 8.4–10.2)
CHLORIDE SERPL-SCNC: 104 MMOL/L (ref 96–108)
CO2 SERPL-SCNC: 30 MMOL/L (ref 21–32)
CREAT SERPL-MCNC: 0.76 MG/DL (ref 0.6–1.3)
EOSINOPHIL # BLD AUTO: 0.1 THOUSAND/ÂΜL (ref 0–0.61)
EOSINOPHIL NFR BLD AUTO: 2 % (ref 0–6)
ERYTHROCYTE [DISTWIDTH] IN BLOOD BY AUTOMATED COUNT: 18.1 % (ref 11.6–15.1)
GFR SERPL CREATININE-BSD FRML MDRD: 89 ML/MIN/1.73SQ M
GLUCOSE P FAST SERPL-MCNC: 110 MG/DL (ref 65–99)
HCT VFR BLD AUTO: 42.3 % (ref 36.5–49.3)
HGB BLD-MCNC: 13 G/DL (ref 12–17)
IMM GRANULOCYTES # BLD AUTO: 0.01 THOUSAND/UL (ref 0–0.2)
IMM GRANULOCYTES NFR BLD AUTO: 0 % (ref 0–2)
LYMPHOCYTES # BLD AUTO: 1.73 THOUSANDS/ÂΜL (ref 0.6–4.47)
LYMPHOCYTES NFR BLD AUTO: 26 % (ref 14–44)
MCH RBC QN AUTO: 25.7 PG (ref 26.8–34.3)
MCHC RBC AUTO-ENTMCNC: 30.7 G/DL (ref 31.4–37.4)
MCV RBC AUTO: 84 FL (ref 82–98)
MONOCYTES # BLD AUTO: 0.56 THOUSAND/ÂΜL (ref 0.17–1.22)
MONOCYTES NFR BLD AUTO: 9 % (ref 4–12)
NEUTROPHILS # BLD AUTO: 4.17 THOUSANDS/ÂΜL (ref 1.85–7.62)
NEUTS SEG NFR BLD AUTO: 62 % (ref 43–75)
NRBC BLD AUTO-RTO: 0 /100 WBCS
PLATELET # BLD AUTO: 343 THOUSANDS/UL (ref 149–390)
PMV BLD AUTO: 8.2 FL (ref 8.9–12.7)
POTASSIUM SERPL-SCNC: 3.9 MMOL/L (ref 3.5–5.3)
PROT SERPL-MCNC: 7.2 G/DL (ref 6.4–8.4)
PTH-INTACT SERPL-MCNC: 135.8 PG/ML (ref 18.4–80.1)
RBC # BLD AUTO: 5.06 MILLION/UL (ref 3.88–5.62)
SODIUM SERPL-SCNC: 140 MMOL/L (ref 135–147)
WBC # BLD AUTO: 6.61 THOUSAND/UL (ref 4.31–10.16)

## 2022-11-29 NOTE — PROGRESS NOTES
Holter findings were dicussed with the patient in detail  He denies any ongoing symptoms  He has been scheduled for an appt with Dr Karla Ramos tomorrow and with Dr Siobhan Sequeira on 1/17 for management of HCM and VT  He is being evaluated for AICD implant  The above was discussed with the patient in detail today

## 2022-11-29 NOTE — PROGRESS NOTES
EPS Consultation/New Patient Evaluation - Ezra Ngo 74 y o  male MRN: [id number]           ASSESSMENT:    1  HOCM (hypertrophic obstructive cardiomyopathy) ([address] )        2  VT (ventricular tachycardia)  Ambulatory Referral to Cardiac Electrophysiology    POCT ECG      3  Hypertension, unspecified type        4  Coronary artery disease involving native coronary artery of native heart without angina pectoris        5  Palpitations        6  Bradycardia                      PLAN:    VT- defibrillator will be scheduled he has significant episodes of nonsustained ventricular tachycardia and hypertrophic cardiomyopathy  Has palpitations likely secondary to the nonsustained ventricular tachycardia this is new for him  He also has periods of sinus bradycardia prohibiting up titration of his beta-blocker  I discussed this with his primary cardiologist as well as with the patient and wife who were present today I showed them the episodes of nonsustained ventricular tachycardia on his monitor  I explained in detail how the procedure is performed pre and postoperative care and risks benefits and alternatives of the device  Did discuss that of course tobacco use continues to be dangerous for him  We discussed that any offspring should have an echocardiogram given that he has hypertrophic cardiomyopathy apparently he already has a son that his coronary disease so he has been evaluated  Finally I explained that once the device is placed we will either double his atenolol or change him to a higher dose of a different beta-blocker  All questions answered      CC/HPI:   Rosalio Dahl presents to the [facility] office for EP appointment to follow up on Holter Monitor  Pt was seen by Dr Joli Duverney for c/o palpitations, monitor ordered  Pt had 48 hour holter monitor was done, and pt is being referred to Dr Torres Seek to discuss a  VT, HOCM, and a defibrillator        Pt states that at 0425 this AM, had fast heart rate for about 2 seconds, and resolved  Defibrillator procedure explained, as well as risks involved  Pt is left hand dominant, but device is more beneficial being placed in the left side  Family history discussed, suggesting that family be assessed as well for HOCM  Labwork reviewed  Pt is agreeable to have defibrillator placed and will await a call from the procedure   ROS    All 12 point ROS negative for chest pain, shortness of breath, lightheadedness, dizziness, syncope, but does have palpitations  Objective:     Vitals: Blood pressure 120/80, pulse 63, weight 89 6 kg (197 lb 9 6 oz)  , Body mass index is 29 18 kg/m²  , ?        Physical Exam:    GEN: [employer] appears well, alert and oriented x 3, pleasant and cooperative   HEENT: pupils equal, round, and reactive to light; extraocular muscles intact  NECK: supple, no carotid bruits   HEART: regular rhythm, normal S1 and S2,  2/6 murmur clicks, gallops or rubs   LUNGS: clear to auscultation bilaterally; bilateral wheezes mild, no rales, or rhonchi   ABDOMEN: normal bowel sounds, soft, no tenderness, no distention  EXTREMITIES: peripheral pulses normal; no clubbing, cyanosis, or edema  NEURO: no focal findings   SKIN: normal without suspicious lesions on exposed skin    Medications:      Current Outpatient Medications:   •  aspirin (ECOTRIN LOW STRENGTH) 81 mg EC tablet, Take 1 tablet (81 mg total) by mouth daily, Disp: , Rfl:   •  atenolol (TENORMIN) 50 mg tablet, Take 1 tablet (50 mg total) by mouth daily, Disp: 90 tablet, Rfl: 2  •  atorvastatin (LIPITOR) 80 mg tablet, Take 1 tablet (80 mg total) by mouth daily, Disp: 90 tablet, Rfl: 3  •  cloNIDine (CATAPRES) 0 1 mg tablet, TAKE 1 TABLET(0 1 MG) BY MOUTH EVERY 8 HOURS, Disp: 270 tablet, Rfl: 0  •  lisinopril (ZESTRIL) 40 mg tablet, TAKE 1 TABLET(40 MG) BY MOUTH DAILY, Disp: 90 tablet, Rfl: 3  •  metFORMIN (GLUCOPHAGE-XR) 500 mg 24 hr tablet, Take 1 tablet (500 mg total) by mouth 2 (two) times a day with meals, Disp: 180 tablet, Rfl: 0  •  Omega-3 Fatty Acids (FISH OIL ADULT GUMMIES PO), Take by mouth, Disp: , Rfl:   •  pantoprazole (PROTONIX) 40 mg tablet, Take 1 tablet (40 mg total) by mouth daily, Disp: 90 tablet, Rfl: 0  •  torsemide (DEMADEX) 10 mg tablet, TAKE 1 TABLET(10 MG) BY MOUTH DAILY, Disp: 90 tablet, Rfl: 3     Family History   Problem Relation Age of Onset   • Rheumatic fever Father    • Other Father         accidental poisoning   • Heart disease Mother      Social History     Socioeconomic History   • Marital status: /Civil Union     Spouse name: Not on file   • Number of children: Not on file   • Years of education: Not on file   • Highest education level: Not on file   Occupational History   • Occupation:    Tobacco Use   • Smoking status: Every Day     Packs/day: 1 00     Years: 40 00     Pack years: 40 00     Types: Cigarettes   • Smokeless tobacco: Never   Vaping Use   • Vaping Use: Never used   Substance and Sexual Activity   • Alcohol use: Not Currently     Comment: rare social occasion, once a year," takes whiskey if feeling a cold coming"   • Drug use: No   • Sexual activity: Not Currently     Partners: Female   Other Topics Concern   • Not on file   Social History Narrative   • Not on file     Social Determinants of Health     Financial Resource Strain: Not on file   Food Insecurity: Not on file   Transportation Needs: Not on file   Physical Activity: Not on file   Stress: Not on file   Social Connections: Not on file   Intimate Partner Violence: Not on file   Housing Stability: Not on file     Social History     Tobacco Use   Smoking Status Every Day   • Packs/day: 1 00   • Years: 40 00   • Pack years: 40 00   • Types: Cigarettes   Smokeless Tobacco Never     Social History     Substance and Sexual Activity   Alcohol Use Not Currently    Comment: rare social occasion, once a year," takes whiskey if feeling a cold coming"       Labs & Results:  Below is the patient's most recent value for Albumin, ALT, AST, BUN, Calcium, Chloride, Cholesterol, CO2, Creatinine, GFR, Glucose, HDL, Hematocrit, Hemoglobin, Hemoglobin A1C, LDL, Magnesium, Phosphorus, Platelets, Potassium, PSA, Sodium, Triglycerides, and WBC  Lab Results   Component Value Date    ALT 13 2022    AST 14 2022    BUN 15 2022    CALCIUM 8 4 2022     2022    CHOL 136 2015    CO2 30 2022    CREATININE 0 76 2022    HDL 52 2022    HCT 42 3 2022    HGB 13 0 2022    HGBA1C 6 0 2022    MG 1 9 2021    PHOS 2 2 (L) 2021     2022    K 3 9 2022    PSA 2 0 2022     2015    TRIG 52 2022    WBC 6 61 2022     Note: for a comprehensive list of the patient's lab results, access the Results Review activity  Cardiac testin HOUR HOLTER MONITOR  2022              ECHOCARDIOGRAM  2022      •  Left Ventricle: Wall thickness is increased  There is severe hypertrophy of the left ventricle with asymmetric component in the septum  There is near complete obliteration of LV cavity during systole   No clear-cut ROCCO  There is LVOT gradient of approximately 120 mm Hg during Valsalva  The left ventricular ejection fraction is 70%  Systolic function is hyperdynamic  Wall motion is normal  Diastolic function is mildly abnormal, consistent with grade I (abnormal) relaxation  •  Left Atrium: The atrium is moderately dilated  •  Aortic Valve: There is mild to moderate regurgitation  There is mild stenosis  •  Mitral Valve: There is moderate annular calcification  There is mild to moderate regurgitation      Findings    Left Ventricle Left ventricular cavity size is small  Wall thickness is increased  There is severe hypertrophy of the left ventricle with asymmetric component in the septum    There is near complete obliteration of LV cavity during systole   No clear-cut ROCCO     There is LVOT gradient of approximately 120 mm Hg during Valsalva  The left ventricular ejection fraction is 70%  Systolic function is hyperdynamic  Wall motion is normal  Diastolic function is mildly abnormal, consistent with grade I (abnormal) relaxation  Right Ventricle Right ventricular cavity size is normal  Systolic function is normal  Wall thickness is normal    Left Atrium The atrium is moderately dilated  Right Atrium The atrium is normal in size  Aortic Valve The aortic valve was not well visualized  There is mild to moderate regurgitation  There is mild stenosis  Mitral Valve There is mild thickening  There is mild calcification  There is moderate annular calcification  There is mild to moderate regurgitation  There is no evidence of stenosis  Tricuspid Valve Tricuspid valve structure is normal  There is trace regurgitation  There is no evidence of stenosis  Pulmonic Valve Pulmonic valve structure is normal  There is no evidence of regurgitation  There is no evidence of stenosis  Ascending Aorta The aortic root is normal in size  IVC/SVC The inferior vena cava is normal in size  Pericardium There is no pericardial effusion  The pericardium is normal in appearance       Left Ventricle Measurements    Function/Volumes   A4C EF 55 %         LVOT stroke volume 137 28          LVOT stroke volume index 66 5 ml/m2         Dimensions   LVIDd 4 7 cm         LVIDS 3 cm         IVSd 1 7 cm         LVPWd 1 1 cm         LVOT area 4 91 cm2         FS 36          Diastolic Filling   MV E' Tissue Velocity Septal 4 cm/s         E wave deceleration time 352 ms         MV Peak E Shadi 78 cm/s         MV Peak A Shadi 1 m/s          Report Measurements   AV LVOT peak gradient 6 mmHg              Interventricular Septum Measurements    Shunt Ratio   LVOT peak VTI 27 98 cm         LVOT peak shadi 1 21 m/s              Right Ventricle Measurements    Dimensions   RVID d 4 5 cm Left Atrium Measurements    Dimensions   LA size 5 4 cm         PAMELA A4C 19 9 cm2               Right Atrium Measurements    Dimensions   RA 2D Volume 56 mL         RAA A4C 17 6 cm2               Atrial Septum Measurements    Shunt Ratio   LVOT peak VTI 27 98 cm         LVOT peak melissa 1 21 m/s               Aortic Valve Measurements    Stenosis   Aortic valve peak velocity 1 66 m/s         LVOT peak melissa 1 21 m/s         Ao VTI 43 89 cm         LVOT peak VTI 27 98 cm         AV mean gradient 7 mmHg         LVOT mn grad 3 mmHg         AV peak gradient 11 mmHg         AV LVOT peak gradient 6 mmHg         Dimensionless velociy index 0 73          Area/Dimensions   AV valve area 3 13 cm2         AV area by cont VTI 3 1 cm2         AV area peak melissa 3 6 cm2         LVOT diameter 2 5 cm         LVOT area 4 91 cm2               Mitral Valve Measurements    Stenosis   MV stenosis pressure 1/2 time 102 ms         MV valve area p 1/2 method 2 16          MR  mmHg               Aorta Measurements    Aortic Dimensions   Ao root 3 9 cm         Asc Ao 4 cm                         Echo complete with contrast if indicated    No results found for this or any previous visit  No results found for this or any previous visit  No results found for this or any previous visit

## 2022-11-29 NOTE — PROGRESS NOTES
Surgical Oncology Follow Up       [address] [place] [address],[address]  [facility]  [address] [address]    Nery Reynolds  1950  [id number]  [address] [place] [address],[address] [facility]  [facility] ONCOL[facility] [place] [address] [address] [id number]    Chief Complaint   Patient presents with   • Other     office visit       Assessment/Plan:  1  S/P subtotal parathyroidectomy (HCC)  - 6 month follow up  - will call with lab results       Discussion/Summary:  Patient is a 70-year-old male presenting today for six-month follow-up for removal of 3 parathyroid glands  Patient is pending updated PTH and calcium lab work  Patient is feeling well and denies fatigue, loss of appetite, nausea, vomiting, muscle weakness, or forgetfulness  Clinical exam was benign  I will call the patient with the results of his lab work as he is going to get his blood drawn now  In addendem to my last annotation, patient obtained a PTH and calcium lab work  His PTH was very high at 135 8  His calcium was within normal range  I have discussed this with Dr Сергей Ng and it was recommended that the patient be seen again in 6 months and have new labs drawn at that time  I am going to order another PTH and calcium for 6 months and get his rescheduled with Dr Сергей Ng  History of Present Illness:     Oncology History    No history exists         -Interval History: Patient is a 70-year-old male presenting today for six-month follow-up for removal of 3 parathyroid glands  Patient is feeling well and denies fatigue, loss of appetite, nausea, vomiting, muscle weakness, or forgetfulness  Review of Systems:  Review of Systems   Constitutional: Negative for activity change, appetite change, fatigue and unexpected weight change  Respiratory: Negative for cough and shortness of breath  Cardiovascular: Negative for chest pain     Gastrointestinal: Negative for abdominal pain, diarrhea, nausea and vomiting  Endocrine: Negative for heat intolerance  Musculoskeletal: Negative for arthralgias, back pain and myalgias  Skin: Negative for rash  Neurological: Negative for weakness and headaches  Hematological: Negative for adenopathy         Patient Active Problem List   Diagnosis   • Non-recurrent bilateral inguinal hernia without obstruction or gangrene   • Hyperlipidemia   • HTN (hypertension)   • Type 2 diabetes mellitus ([place] [address] )   • Smoking   • Iron deficiency anemia due to chronic blood loss   • HOCM (hypertrophic obstructive cardiomyopathy) ([place] [address] )   • Coronary artery disease involving native coronary artery of native heart   • Atherosclerosis of aorta (HCC)   • Anemia associated with diabetes mellitus ([place] [address] )   • Thrombocytosis   • Hypercalcemia   • Hyperparathyroidism ([place] [address] )   • History of aortic regurgitation   • History of obesity   • Hyperkalemia   • Metabolic acidosis   • Hematuria   • Anemia   • S/P subtotal parathyroidectomy ([facility])   • Antritis and gastric ulcer   • Urinary retention   • Urinary retention due to benign prostatic hyperplasia   • Benign localized hyperplasia of prostate with urinary obstruction   • CAD (coronary artery disease)     Past Medical History:   Diagnosis Date   • Anemia    • Benign neoplasm of large intestine    • Bleeding gastric ulcer 2018   • Cardiomyopathy ([place] [address] )    • Chronic kidney disease    • Colon polyp    • Diabetes mellitus ([place] [address] )    • Disease of thyroid gland    • Heart murmur    • History of aortic regurgitation    • History of constipation    • History of degenerative joint disease    • History of nocturia    • History of obesity    • History of sebaceous cyst    • History of shortness of breath    • History of transfusion    • History of urinary frequency    • Hyperlipidemia    • Hyperparathyroidism ([place] [address] )    • Hypertension    • Myocardial infarction [facility]) 2018    january, 3 stents   • Polyposis coli     of the large intestine   • Ulcer of esophagus      Past Surgical History:   Procedure Laterality Date   • ACHILLES TENDON SURGERY Left 1973   • CATARACT EXTRACTION Right 02/18/2021   • COLONOSCOPY      hyperplastic polyp   • CORONARY ANGIOPLASTY WITH STENT PLACEMENT      3 stents: 2 placed on Jan 2018, 1 on July 2018    • CYST REMOVAL  2019, 2020    left and right wrists   • ESOPHAGOGASTRODUODENOSCOPY N/A 1/23/2018    Procedure: ESOPHAGOGASTRODUODENOSCOPY (EGD); Surgeon: Ofe Garrido MD;  Location: [facility]; Service: Gastroenterology   • ESOPHAGOGASTRODUODENOSCOPY     • HERNIA REPAIR     • HERNIA REPAIR Bilateral 8/18/2017    Procedure: LAPAROSCOPIC INGUINAL HERNIA REPAIR WITH MESH;  Surgeon: Dania Crawford MD;  Location: [facility];  Service: General   • LARYNGOSCOPY N/A 5/12/2022    Procedure: LARYNGOSCOPY DIRECT;  Surgeon: Terry Theodore MD;  Location: [facility];  Service: Surgical Oncology   • MO ESOPHAGOGASTRODUODENOSCOPY TRANSORAL DIAGNOSTIC N/A 3/26/2018    Procedure: ESOPHAGOGASTRODUODENOSCOPY (EGD); Surgeon: Alma Rosa Menjivar MD;  [facility]: [facility]; Service: Gastroenterology   • MO ESOPHAGOGASTRODUODENOSCOPY TRANSORAL DIAGNOSTIC N/A 5/14/2018    Procedure: ESOPHAGOGASTRODUODENOSCOPY (EGD); Surgeon: Alma Rosa Menjivar MD;  [facility]; Service: Gastroenterology   • MO EXPLORE PARATHYROID GLANDS Right 3/23/2021    Procedure: RIGHT PARATHYROIDECTOMY, MINIMALLY INVASIVE, POSSIBLE 4-GLAND EXPLORATION, WITH INTRA-OPERATIVE PTH MONITORING;  Surgeon: Terry Theodore MD;  [facility];  Service: Surgical Oncology   • MO EXPLORE PARATHYROID GLANDS Right 5/12/2022    Procedure: TWO GLAND PARATHYROIDECTOMY, 4 GLAND EXPLORATION, INTRAOPERATIVE PTH MONITORING, FLEXIBLE LARYNGOSCOPY;  Surgeon: Terry Theodore MD;  [facility];  Service: Surgical Oncology   • MO TRANSURETHRAL ELEC-SURG PROSTATECTOM N/A 9/2/2021    Procedure: TRANSURETHRAL RESECTION OF PROSTATE (TURP);   Surgeon: Irma Yang MD; Location: [facility];  Service: Urology   • TIBIA FRACTURE SURGERY Left 1962     Family History   Problem Relation Age of Onset   • Rheumatic fever Father    • Other Father         accidental poisoning   • Heart disease Mother      Social History     Socioeconomic History   • Marital status: /Civil Union     Spouse name: Not on file   • Number of children: Not on file   • Years of education: Not on file   • Highest education level: Not on file   Occupational History   • Occupation:    Tobacco Use   • Smoking status: Every Day     Packs/day: 1 00     Years: 40 00     Pack years: 40 00     Types: Cigarettes   • Smokeless tobacco: Never   Vaping Use   • Vaping Use: Never used   Substance and Sexual Activity   • Alcohol use: Not Currently     Comment: rare social occasion, once a year," takes whiskey if feeling a cold coming"   • Drug use: No   • Sexual activity: Not Currently     Partners: Female   Other Topics Concern   • Not on file   Social History Narrative   • Not on file     Social Determinants of Health     Financial Resource Strain: Not on file   Food Insecurity: Not on file   Transportation Needs: Not on file   Physical Activity: Not on file   Stress: Not on file   Social Connections: Not on file   Intimate Partner Violence: Not on file   Housing Stability: Not on file       Current Outpatient Medications:   •  aspirin (ECOTRIN LOW STRENGTH) 81 mg EC tablet, Take 1 tablet (81 mg total) by mouth daily, Disp: , Rfl:   •  atenolol (TENORMIN) 50 mg tablet, Take 1 tablet (50 mg total) by mouth daily, Disp: 90 tablet, Rfl: 2  •  atorvastatin (LIPITOR) 80 mg tablet, Take 1 tablet (80 mg total) by mouth daily, Disp: 90 tablet, Rfl: 3  •  cloNIDine (CATAPRES) 0 1 mg tablet, TAKE 1 TABLET(0 1 MG) BY MOUTH EVERY 8 HOURS, Disp: 270 tablet, Rfl: 0  •  lisinopril (ZESTRIL) 40 mg tablet, TAKE 1 TABLET(40 MG) BY MOUTH DAILY, Disp: 90 tablet, Rfl: 3  •  metFORMIN (GLUCOPHAGE-XR) 500 mg 24 hr tablet, Take 1 tablet (500 mg total) by mouth 2 (two) times a day with meals, Disp: 180 tablet, Rfl: 0  •  Omega-3 Fatty Acids (FISH OIL ADULT GUMMIES PO), Take by mouth, Disp: , Rfl:   •  pantoprazole (PROTONIX) 40 mg tablet, Take 1 tablet (40 mg total) by mouth daily, Disp: 90 tablet, Rfl: 0  •  torsemide (DEMADEX) 10 mg tablet, TAKE 1 TABLET(10 MG) BY MOUTH DAILY, Disp: 90 tablet, Rfl: 3  No Known Allergies  Vitals:    11/29/22 0800   BP: 140/70   Pulse: 67   Resp: 16   Temp: 98 4 °F (36 9 °C)   SpO2: 100%       Physical Exam  Vitals reviewed  Constitutional:       General: He is not in acute distress  Appearance: Normal appearance  He is well-developed and well-nourished  HENT:      Head: Normocephalic and atraumatic  Neck:      Thyroid: No thyroid mass or thyromegaly  Comments: Neck incisional scar  Cardiovascular:      Rate and Rhythm: Normal rate and regular rhythm  Heart sounds: Normal heart sounds  Pulmonary:      Effort: Pulmonary effort is normal       Breath sounds: Normal breath sounds  Abdominal:      General: There is no distension  Palpations: Abdomen is soft  There is no hepatosplenomegaly or mass  Tenderness: There is no abdominal tenderness  Musculoskeletal:         General: No edema  Normal range of motion  Cervical back: Normal range of motion  No edema or signs of trauma  Lymphadenopathy:      Upper Body:   No axillary adenopathy present  Right upper body: No supraclavicular adenopathy  Left upper body: No supraclavicular adenopathy  Skin:     General: Skin is warm and dry  Findings: No rash  Neurological:      Mental Status: He is alert and oriented to person, place, and time  Psychiatric:         Mood and Affect: Mood and affect normal            Results:    Imaging  Echo complete w/ contrast if indicated    Result Date: 11/21/2022  Narrative: •  Left Ventricle: Wall thickness is increased   There is severe hypertrophy of the left ventricle with asymmetric component in the septum  There is near complete obliteration of LV cavity during systole   No clear-cut ROCCO  There is LVOT gradient of approximately 120 mm Hg during Valsalva  The left ventricular ejection fraction is 70%  Systolic function is hyperdynamic  Wall motion is normal  Diastolic function is mildly abnormal, consistent with grade I (abnormal) relaxation  •  Left Atrium: The atrium is moderately dilated  •  Aortic Valve: There is mild to moderate regurgitation  There is mild stenosis  •  Mitral Valve: There is moderate annular calcification  There is mild to moderate regurgitation  Holter monitor    Result Date: 11/23/2022  Narrative: INDICATIONS:  Palpitations DESCRIPTION OF FINDINGS: The patient was in normal sinus rhythm throughout the study  The average heart rate was 66 beats per minute  The heart rate ranged from 48 to 110 beats per minute  Ventricular ectopic activity consisted of 2768 beats (1 5% of total beats)  48 beats comprised 8 runs of nonsustained ventricular tachycardia  There was a 14 beat, 8 beat, two 5 beat and four 4-beat runs of monomorphic nonsustained ventricular tachycardia  39 were in triplets, (multifocal triplets were noted), 102 were in couplets, 81 were in bigeminy, 3 were in trigeminy  Supraventricular ectopic activity consisted of 3687 beats (2 0% of total beats)  89 beats comprised 14 runs of paroxysmal supraventricular tachycardia  The longest run consisted of 26 beats and was suggestive of atrial tachycardia  There was no definitive evidence of atrial fibrillation or atrial flutter  There were no significant pauses  There was no evidence of advanced degree heart block  Symptoms of palpitations correlated with normal sinus rhythm with PACs and PVCs  Impression: Patient was in normal sinus rhythm throughout the study  Occasional ventricular and supraventricular ectopy was noted   Runs of monomorphic nonsustained ventricular tachycardia were noted as above  Runs of paroxysmal SVT were noted as above  Results provided to the patient  He denies any active symptoms of palpitations, lightheadedness, syncope or any other concerns  He was advised to proceed to the emergency department with the onset of any cardiac symptoms  Will forward results to EP for further review  ----- Kathy Alicia MD [facility]       I reviewed the above imaging data  Advance Care Planning/Advance Directives:  Discussed disease status, cancer treatment plans and/or cancer treatment goals with the patient

## 2022-11-29 NOTE — TELEPHONE ENCOUNTER
Patient procedure rescheduled to [facility] on 12/23/22 with Dr Tracee William  Patient instructions package mail out to his address  Victoria Goes can we please have the case

## 2022-11-30 ENCOUNTER — TELEPHONE (OUTPATIENT)
Dept: HEMATOLOGY ONCOLOGY | Facility: CLINIC | Age: 72
End: 2022-11-30

## 2022-11-30 ENCOUNTER — PREP FOR PROCEDURE (OUTPATIENT)
Dept: CARDIOLOGY CLINIC | Facility: CLINIC | Age: 72
End: 2022-11-30

## 2022-11-30 ENCOUNTER — TELEPHONE (OUTPATIENT)
Dept: SURGICAL ONCOLOGY | Facility: CLINIC | Age: 72
End: 2022-11-30

## 2022-11-30 DIAGNOSIS — I47.20 VT (VENTRICULAR TACHYCARDIA): Primary | ICD-10-CM

## 2022-11-30 NOTE — TELEPHONE ENCOUNTER
Called patient to inform him that we would like to see him again in 6 months and for repeat labs  LVM

## 2022-11-30 NOTE — TELEPHONE ENCOUNTER
Patient confirmed follow up with Dr Marshal Pritchard on 5/17 at 9:30 am  Patient aware they have to get lab work done prior to appointment

## 2022-12-02 DIAGNOSIS — I42.1 HOCM (HYPERTROPHIC OBSTRUCTIVE CARDIOMYOPATHY) (HCC): Primary | ICD-10-CM

## 2022-12-04 LAB
25(OH)D2 SERPL-MCNC: <1 NG/ML
25(OH)D3 SERPL-MCNC: 5.6 NG/ML
25(OH)D3+25(OH)D2 SERPL-MCNC: 6.3 NG/ML

## 2022-12-06 ENCOUNTER — RA CDI HCC (OUTPATIENT)
Dept: OTHER | Facility: HOSPITAL | Age: 72
End: 2022-12-06

## 2022-12-06 NOTE — PROGRESS NOTES
E11 22, I13 0  [facility]  coding opportunities          Chart Reviewed number of suggestions sent to Provider: 2     Patients Insurance     Medicare Insurance: [employer]

## 2022-12-13 ENCOUNTER — APPOINTMENT (OUTPATIENT)
Dept: LAB | Facility: CLINIC | Age: 72
End: 2022-12-13

## 2022-12-13 ENCOUNTER — HOSPITAL ENCOUNTER (OUTPATIENT)
Dept: MRI IMAGING | Facility: HOSPITAL | Age: 72
Discharge: HOME | End: 2022-12-13
Attending: INTERNAL MEDICINE

## 2022-12-13 ENCOUNTER — OFFICE VISIT (OUTPATIENT)
Dept: INTERNAL MEDICINE CLINIC | Facility: CLINIC | Age: 72
End: 2022-12-13

## 2022-12-13 VITALS
BODY MASS INDEX: 29.33 KG/M2 | SYSTOLIC BLOOD PRESSURE: 162 MMHG | WEIGHT: 198 LBS | HEIGHT: 69 IN | OXYGEN SATURATION: 98 % | TEMPERATURE: 97.9 F | HEART RATE: 60 BPM | DIASTOLIC BLOOD PRESSURE: 74 MMHG

## 2022-12-13 DIAGNOSIS — E21.3 HYPERPARATHYROIDISM (HCC): Primary | ICD-10-CM

## 2022-12-13 DIAGNOSIS — E78.5 HYPERLIPIDEMIA, UNSPECIFIED HYPERLIPIDEMIA TYPE: ICD-10-CM

## 2022-12-13 DIAGNOSIS — K29.70 GASTRITIS, PRESENCE OF BLEEDING UNSPECIFIED, UNSPECIFIED CHRONICITY, UNSPECIFIED GASTRITIS TYPE: ICD-10-CM

## 2022-12-13 DIAGNOSIS — E11.69 TYPE 2 DIABETES MELLITUS WITH OTHER SPECIFIED COMPLICATION, WITHOUT LONG-TERM CURRENT USE OF INSULIN (HCC): ICD-10-CM

## 2022-12-13 DIAGNOSIS — I10 HTN (HYPERTENSION): ICD-10-CM

## 2022-12-13 DIAGNOSIS — D13.2 DUODENAL ADENOMA: ICD-10-CM

## 2022-12-13 DIAGNOSIS — I10 HYPERTENSION, UNSPECIFIED TYPE: ICD-10-CM

## 2022-12-13 DIAGNOSIS — I42.1 HOCM (HYPERTROPHIC OBSTRUCTIVE CARDIOMYOPATHY) (HCC): ICD-10-CM

## 2022-12-13 DIAGNOSIS — E21.3 HYPERPARATHYROIDISM (HCC): ICD-10-CM

## 2022-12-13 DIAGNOSIS — E11.9 TYPE 2 DIABETES MELLITUS WITHOUT COMPLICATION, WITHOUT LONG-TERM CURRENT USE OF INSULIN (HCC): ICD-10-CM

## 2022-12-13 LAB
ANION GAP SERPL CALCULATED.3IONS-SCNC: 4 MMOL/L (ref 4–13)
BACTERIA UR QL AUTO: ABNORMAL /HPF
BILIRUB UR QL STRIP: NEGATIVE
BUN SERPL-MCNC: 10 MG/DL (ref 5–25)
CA-I BLD-SCNC: 1.09 MMOL/L (ref 1.12–1.32)
CALCIUM SERPL-MCNC: 8.4 MG/DL (ref 8.3–10.1)
CHLORIDE SERPL-SCNC: 105 MMOL/L (ref 96–108)
CLARITY UR: CLEAR
CO2 SERPL-SCNC: 29 MMOL/L (ref 21–32)
COLOR UR: YELLOW
CREAT SERPL-MCNC: 0.78 MG/DL (ref 0.6–1.3)
CREAT UR-MCNC: 102 MG/DL
GFR SERPL CREATININE-BSD FRML MDRD: 88 ML/MIN/1.73SQ M
GLUCOSE P FAST SERPL-MCNC: 105 MG/DL (ref 65–99)
GLUCOSE UR STRIP-MCNC: NEGATIVE MG/DL
HGB UR QL STRIP.AUTO: NEGATIVE
KETONES UR STRIP-MCNC: NEGATIVE MG/DL
LEFT EYE DIABETIC RETINOPATHY: NORMAL
LEFT EYE IMAGE QUALITY: NORMAL
LEFT EYE MACULAR EDEMA: NORMAL
LEFT EYE OTHER RETINOPATHY: NORMAL
LEUKOCYTE ESTERASE UR QL STRIP: NEGATIVE
MICROALBUMIN UR-MCNC: 253 MG/L (ref 0–20)
MICROALBUMIN/CREAT 24H UR: 248 MG/G CREATININE (ref 0–30)
NITRITE UR QL STRIP: NEGATIVE
NON-SQ EPI CELLS URNS QL MICRO: ABNORMAL /HPF
PH UR STRIP.AUTO: 6.5 [PH]
POTASSIUM SERPL-SCNC: 4.3 MMOL/L (ref 3.5–5.3)
PROT UR STRIP-MCNC: ABNORMAL MG/DL
PTH-INTACT SERPL-MCNC: 131.9 PG/ML (ref 18.4–80.1)
RBC #/AREA URNS AUTO: ABNORMAL /HPF
RIGHT EYE DIABETIC RETINOPATHY: NORMAL
RIGHT EYE IMAGE QUALITY: NORMAL
RIGHT EYE MACULAR EDEMA: NORMAL
RIGHT EYE OTHER RETINOPATHY: NORMAL
SEVERITY (EYE EXAM): NORMAL
SODIUM SERPL-SCNC: 138 MMOL/L (ref 135–147)
SP GR UR STRIP.AUTO: 1.01 (ref 1–1.03)
TSH SERPL DL<=0.05 MIU/L-ACNC: 0.99 UIU/ML (ref 0.45–4.5)
UROBILINOGEN UR STRIP-ACNC: <2 MG/DL
WBC #/AREA URNS AUTO: ABNORMAL /HPF

## 2022-12-13 RX ORDER — ATORVASTATIN CALCIUM 80 MG/1
80 TABLET, FILM COATED ORAL DAILY
Qty: 90 TABLET | Refills: 3 | Status: SHIPPED | OUTPATIENT
Start: 2022-12-13

## 2022-12-13 RX ORDER — CLONIDINE 0.2 MG/24H
1 PATCH, EXTENDED RELEASE TRANSDERMAL WEEKLY
Qty: 12 PATCH | Refills: 3 | Status: SHIPPED | OUTPATIENT
Start: 2022-12-13

## 2022-12-13 RX ORDER — METFORMIN HYDROCHLORIDE 500 MG/1
500 TABLET, EXTENDED RELEASE ORAL 2 TIMES DAILY WITH MEALS
Qty: 180 TABLET | Refills: 3 | Status: SHIPPED | OUTPATIENT
Start: 2022-12-13

## 2022-12-13 RX ORDER — PANTOPRAZOLE SODIUM 40 MG/1
40 TABLET, DELAYED RELEASE ORAL DAILY
Qty: 90 TABLET | Refills: 3 | Status: SHIPPED | OUTPATIENT
Start: 2022-12-13

## 2022-12-13 RX ORDER — ATENOLOL 50 MG/1
50 TABLET ORAL DAILY
Qty: 90 TABLET | Refills: 3 | Status: ON HOLD | OUTPATIENT
Start: 2022-12-13 | End: 2022-12-23 | Stop reason: SDUPTHER

## 2022-12-13 RX ORDER — CLONIDINE HYDROCHLORIDE 0.1 MG/1
0.1 TABLET ORAL EVERY 8 HOURS
Qty: 270 TABLET | Refills: 0 | Status: SHIPPED | OUTPATIENT
Start: 2022-12-13 | End: 2022-12-13

## 2022-12-13 RX ADMIN — GADOBUTROL 16 ML: 604.72 INJECTION INTRAVENOUS at 21:00

## 2022-12-13 NOTE — PATIENT INSTRUCTIONS
Feels well  Need to recheck A1c and parathyroid level  Blood pressure control could be a bit better and at the same time I think we can make life easier  Discontinue clonidine 0 1 mg 3 times daily and substitute the Catapres No  2 patch applied to the skin once a week  Revisit with me in another 3 months or so

## 2022-12-13 NOTE — PROGRESS NOTES
Diabetic Foot Exam    Patient's shoes and socks removed  Right Foot/Ankle   Right Foot Inspection  Skin Exam: skin intact  Toe Exam: right toe deformity  Sensory   Monofilament testing: intact    Vascular  The right DP pulse is 0  The right PT pulse is 0  Left Foot/Ankle  Left Foot Inspection  Skin Exam: skin intact  Toe Exam: left toe deformity  Sensory   Monofilament testing: intact    Vascular  The left DP pulse is 0  The left PT pulse is 0  Assign Risk Category  Deformity present  No loss of protective sensation  Weak pulses  Risk: 1  Assessment/Plan:       Diagnoses and all orders for this visit:    Hyperparathyroidism ([address] )  -     Basic metabolic panel; Future  -     UA (URINE) with reflex to Scope  -     Microalbumin / creatinine urine ratio  -     TSH, 3rd generation with Free T4 reflex; Future  -     Calcium, ionized; Future  -     PTH, intact; Future  -     Vitamin D 1,25 dihydroxy; Future    Type 2 diabetes mellitus without complication, without long-term current use of insulin ([facility])  -     IRIS Diabetic eye exam  -     Basic metabolic panel; Future  -     UA (URINE) with reflex to Scope  -     Microalbumin / creatinine urine ratio  -     TSH, 3rd generation with Free T4 reflex; Future  -     Calcium, ionized; Future  -     PTH, intact; Future  -     Vitamin D 1,25 dihydroxy; Future    Hyperlipidemia, unspecified hyperlipidemia type  -     atorvastatin (LIPITOR) 80 mg tablet; Take 1 tablet (80 mg total) by mouth daily    Hypertension, unspecified type  -     atenolol (TENORMIN) 50 mg tablet; Take 1 tablet (50 mg total) by mouth daily  -     cloNIDine (CATAPRES-TTS-2) 0 2 mg/24 hr; Place 1 patch (0 2 mg total) on the skin once a week    HTN (hypertension)  -     Discontinue: cloNIDine (CATAPRES) 0 1 mg tablet;  Take 1 tablet (0 1 mg total) by mouth every 8 (eight) hours    Gastritis, presence of bleeding unspecified, unspecified chronicity, unspecified gastritis type  - pantoprazole (PROTONIX) 40 mg tablet; Take 1 tablet (40 mg total) by mouth daily    Duodenal adenoma  -     pantoprazole (PROTONIX) 40 mg tablet; Take 1 tablet (40 mg total) by mouth daily    Type 2 diabetes mellitus with other specified complication, without long-term current use of insulin (HCC)  -     metFORMIN (GLUCOPHAGE-XR) 500 mg 24 hr tablet; Take 1 tablet (500 mg total) by mouth 2 (two) times a day with meals                Subjective:      Patient ID: Obed Mckeon is a 74 y o  male  A 74year-old  He is diabetic and hypertensive and control has been good  Hypertensive cardiomyopathy without heart failure  He does have grade 1 diastolic dysfunction     Coronary artery disease by virtue of finding a cardiac catheterization done in 2017 but no angina pectoris  This is from a prior note:    " Cardiac catheterization documented noncritical CAD with 60% LAD lesion and a large vessel with good flow not requiring intervention  This also documented hypertrophic cardiomyopathy with obliteration of the cavity an ejection fraction of 80% "      However, he was hospitalized back in April 2021 for urinary retention and sepsis with acute kidney injury  Discharged to home, saeed removed after 5 months ; had TURP Sep 2021      He needed repeat parathyroidectomy after lack of normalization of PTH following the 1st parathyroidectomy  Apparently he had 2 removed so now he has only 1 parathyroid gland  Gastric ulcer treated in 2018  Healed with no recurrence      He feels fine       Continues to smoke and expresses no interest in stopping      The following portions of the patient's history were reviewed and updated as appropriate:   He has a past medical history of Anemia, Benign neoplasm of large intestine, Bleeding gastric ulcer (2018), Cardiomyopathy ([facility] ), Chronic kidney disease, Colon polyp, Diabetes mellitus ([facility] ), Disease of thyroid gland, Heart murmur, History of aortic regurgitation, History of constipation, History of degenerative joint disease, History of nocturia, History of obesity, History of sebaceous cyst, History of shortness of breath, History of transfusion, History of urinary frequency, Hyperlipidemia, Hyperparathyroidism ([facility] [address] ), Hypertension, Myocardial infarction ([facility] [address] ) (2018), Polyposis coli, and Ulcer of esophagus  ,  does not have any pertinent problems on file  ,   has a past surgical history that includes Tibia fracture surgery (Left, 1962); Achilles tendon surgery (Left, 1973); Esophagogastroduodenoscopy (N/A, 1/23/2018); Esophagogastroduodenoscopy; pr esophagogastroduodenoscopy transoral diagnostic (N/A, 3/26/2018); pr esophagogastroduodenoscopy transoral diagnostic (N/A, 5/14/2018); Cyst Removal (2019, 2020); Coronary angioplasty with stent; Colonoscopy; Cataract extraction (Right, 02/18/2021); pr parathyroidectomy/exploration parathyroids (Right, 3/23/2021); Hernia repair; Hernia repair (Bilateral, 8/18/2017); pr trurl electrosurg rescj prostate bleed complete (N/A, 9/2/2021); pr parathyroidectomy/exploration parathyroids (Right, 5/12/2022); and LARYNGOSCOPY (N/A, 5/12/2022)  ,  family history includes Heart disease in his mother; Other in his father; Rheumatic fever in his father  ,   reports that he has been smoking cigarettes  He started smoking about 54 years ago  He has a 40 00 pack-year smoking history  He has never used smokeless tobacco  He reports that he does not currently use alcohol  He reports that he does not use drugs  ,  has No Known Allergies     Current Outpatient Medications   Medication Sig Dispense Refill   • aspirin (ECOTRIN LOW STRENGTH) 81 mg EC tablet Take 1 tablet (81 mg total) by mouth daily     • atenolol (TENORMIN) 50 mg tablet Take 1 tablet (50 mg total) by mouth daily 90 tablet 3   • atorvastatin (LIPITOR) 80 mg tablet Take 1 tablet (80 mg total) by mouth daily 90 tablet 3   • cloNIDine (CATAPRES-TTS-2) 0 2 mg/24 hr Place 1 patch (0 2 mg total) on the skin once a week 12 patch 3   • lisinopril (ZESTRIL) 40 mg tablet TAKE 1 TABLET(40 MG) BY MOUTH DAILY 90 tablet 3   • metFORMIN (GLUCOPHAGE-XR) 500 mg 24 hr tablet Take 1 tablet (500 mg total) by mouth 2 (two) times a day with meals 180 tablet 3   • Omega-3 Fatty Acids (FISH OIL ADULT GUMMIES PO) Take by mouth     • pantoprazole (PROTONIX) 40 mg tablet Take 1 tablet (40 mg total) by mouth daily 90 tablet 3   • torsemide (DEMADEX) 10 mg tablet TAKE 1 TABLET(10 MG) BY MOUTH DAILY 90 tablet 3     No current facility-administered medications for this visit  Review of Systems   Constitutional: Negative for chills and fever  HENT: Negative for ear pain and sore throat  Eyes: Negative for pain and visual disturbance  Respiratory: Negative for cough and shortness of breath  Cardiovascular: Negative for chest pain and palpitations  Gastrointestinal: Negative for abdominal pain and vomiting  Genitourinary: Negative for dysuria and hematuria  Musculoskeletal: Negative for arthralgias and back pain  Skin: Negative for color change and rash  Neurological: Negative for seizures and syncope  All other systems reviewed and are negative  Objective:  Vitals:    12/13/22 0754   BP: 162/74   Pulse: 60   Temp: 97 9 °F (36 6 °C)   SpO2: 98%      Physical Exam  Constitutional:       Appearance: He is well-developed  HENT:      Head: Normocephalic and atraumatic  Eyes:      Pupils: Pupils are equal, round, and reactive to light  Neck:      Thyroid: No thyromegaly  Trachea: No tracheal deviation  Cardiovascular:      Rate and Rhythm: Normal rate and regular rhythm  Pulses: Pulses are weak  Dorsalis pedis pulses are 0 on the right side and 0 on the left side  Posterior tibial pulses are 0 on the right side and 0 on the left side  Heart sounds: Normal heart sounds  No murmur heard  No gallop  Pulmonary:      Effort: Pulmonary effort is normal  No respiratory distress  Breath sounds: No wheezing or rales  Abdominal:      General: Bowel sounds are normal       Palpations: Abdomen is soft  Tenderness: There is no abdominal tenderness  Musculoskeletal:         General: No tenderness or deformity  Normal range of motion  Cervical back: Normal range of motion and neck supple  Skin:     General: Skin is warm and dry  Neurological:      Mental Status: He is alert and oriented to person, place, and time  Coordination: Coordination normal       Deep Tendon Reflexes: Reflexes are normal and symmetric  Patient Instructions   Feels well  Need to recheck A1c and parathyroid level  Blood pressure control could be a bit better and at the same time I think we can make life easier  Discontinue clonidine 0 1 mg 3 times daily and substitute the Catapres No  2 patch applied to the skin once a week  Revisit with me in another 3 months or so

## 2022-12-15 ENCOUNTER — TELEPHONE (OUTPATIENT)
Dept: CARDIOLOGY CLINIC | Facility: CLINIC | Age: 72
End: 2022-12-15

## 2022-12-15 LAB — 1,25(OH)2D3 SERPL-MCNC: 41.3 PG/ML (ref 24.8–81.5)

## 2022-12-22 ENCOUNTER — ANESTHESIA EVENT (OUTPATIENT)
Dept: PERIOP | Facility: HOSPITAL | Age: 72
End: 2022-12-22

## 2022-12-23 ENCOUNTER — ANESTHESIA (OUTPATIENT)
Dept: PERIOP | Facility: HOSPITAL | Age: 72
End: 2022-12-23

## 2022-12-23 ENCOUNTER — APPOINTMENT (OUTPATIENT)
Dept: RADIOLOGY | Facility: HOSPITAL | Age: 72
End: 2022-12-23

## 2022-12-23 ENCOUNTER — HOSPITAL ENCOUNTER (OUTPATIENT)
Facility: HOSPITAL | Age: 72
Setting detail: OUTPATIENT SURGERY
Discharge: HOME | End: 2022-12-23
Attending: INTERNAL MEDICINE | Admitting: INTERNAL MEDICINE

## 2022-12-23 VITALS
BODY MASS INDEX: 28.64 KG/M2 | HEART RATE: 65 BPM | TEMPERATURE: 97.3 F | WEIGHT: 193.34 LBS | RESPIRATION RATE: 18 BRPM | DIASTOLIC BLOOD PRESSURE: 92 MMHG | OXYGEN SATURATION: 97 % | SYSTOLIC BLOOD PRESSURE: 176 MMHG | HEIGHT: 69 IN

## 2022-12-23 DIAGNOSIS — I47.20 VT (VENTRICULAR TACHYCARDIA): ICD-10-CM

## 2022-12-23 DIAGNOSIS — I10 HYPERTENSION, UNSPECIFIED TYPE: ICD-10-CM

## 2022-12-23 LAB — GLUCOSE SERPL-MCNC: 103 MG/DL (ref 65–140)

## 2022-12-23 DEVICE — ENVELOPE CMRM6133 ABSORB LRG MR
Type: IMPLANTABLE DEVICE | Site: HEART | Status: FUNCTIONAL
Brand: TYRX™

## 2022-12-23 DEVICE — PACING LEAD
Type: IMPLANTABLE DEVICE | Site: HEART | Status: FUNCTIONAL
Brand: TENDRIL™

## 2022-12-23 DEVICE — IMPLANTABLE CARDIOVERTER DEFIBRILLATOR
Type: IMPLANTABLE DEVICE | Site: HEART | Status: FUNCTIONAL
Brand: GALLANT™

## 2022-12-23 DEVICE — DEFIBRILLATION LEAD
Type: IMPLANTABLE DEVICE | Site: HEART | Status: FUNCTIONAL
Brand: OPTISURE™

## 2022-12-23 RX ORDER — LIDOCAINE HYDROCHLORIDE 20 MG/ML
INJECTION, SOLUTION EPIDURAL; INFILTRATION; INTRACAUDAL; PERINEURAL AS NEEDED
Status: DISCONTINUED | OUTPATIENT
Start: 2022-12-23 | End: 2022-12-23

## 2022-12-23 RX ORDER — OXYCODONE HYDROCHLORIDE 5 MG/1
5 TABLET ORAL EVERY 4 HOURS PRN
Status: DISCONTINUED | OUTPATIENT
Start: 2022-12-23 | End: 2022-12-23 | Stop reason: HOSPADM

## 2022-12-23 RX ORDER — ONDANSETRON 2 MG/ML
4 INJECTION INTRAMUSCULAR; INTRAVENOUS ONCE AS NEEDED
Status: DISCONTINUED | OUTPATIENT
Start: 2022-12-23 | End: 2022-12-23 | Stop reason: HOSPADM

## 2022-12-23 RX ORDER — FENTANYL CITRATE/PF 50 MCG/ML
25 SYRINGE (ML) INJECTION
Status: DISCONTINUED | OUTPATIENT
Start: 2022-12-23 | End: 2022-12-23 | Stop reason: HOSPADM

## 2022-12-23 RX ORDER — IODIXANOL 320 MG/ML
INJECTION, SOLUTION INTRAVASCULAR AS NEEDED
Status: DISCONTINUED | OUTPATIENT
Start: 2022-12-23 | End: 2022-12-23 | Stop reason: HOSPADM

## 2022-12-23 RX ORDER — LIDOCAINE HYDROCHLORIDE 10 MG/ML
INJECTION, SOLUTION EPIDURAL; INFILTRATION; INTRACAUDAL; PERINEURAL AS NEEDED
Status: DISCONTINUED | OUTPATIENT
Start: 2022-12-23 | End: 2022-12-23 | Stop reason: HOSPADM

## 2022-12-23 RX ORDER — ACETAMINOPHEN 325 MG/1
650 TABLET ORAL EVERY 4 HOURS PRN
Status: DISCONTINUED | OUTPATIENT
Start: 2022-12-23 | End: 2022-12-23 | Stop reason: HOSPADM

## 2022-12-23 RX ORDER — LISINOPRIL 20 MG/1
40 TABLET ORAL ONCE
Status: COMPLETED | OUTPATIENT
Start: 2022-12-23 | End: 2022-12-23

## 2022-12-23 RX ORDER — CEFAZOLIN SODIUM 1 G/3ML
INJECTION, POWDER, FOR SOLUTION INTRAMUSCULAR; INTRAVENOUS AS NEEDED
Status: DISCONTINUED | OUTPATIENT
Start: 2022-12-23 | End: 2022-12-23

## 2022-12-23 RX ORDER — PROPOFOL 10 MG/ML
INJECTION, EMULSION INTRAVENOUS AS NEEDED
Status: DISCONTINUED | OUTPATIENT
Start: 2022-12-23 | End: 2022-12-23

## 2022-12-23 RX ORDER — FENTANYL CITRATE 50 UG/ML
INJECTION, SOLUTION INTRAMUSCULAR; INTRAVENOUS AS NEEDED
Status: DISCONTINUED | OUTPATIENT
Start: 2022-12-23 | End: 2022-12-23

## 2022-12-23 RX ORDER — ONDANSETRON 2 MG/ML
INJECTION INTRAMUSCULAR; INTRAVENOUS AS NEEDED
Status: DISCONTINUED | OUTPATIENT
Start: 2022-12-23 | End: 2022-12-23

## 2022-12-23 RX ORDER — ATENOLOL 50 MG/1
50 TABLET ORAL 2 TIMES DAILY
Qty: 60 TABLET | Refills: 2 | Status: SHIPPED | OUTPATIENT
Start: 2022-12-23 | End: 2023-01-06 | Stop reason: SDUPTHER

## 2022-12-23 RX ORDER — SODIUM CHLORIDE 9 MG/ML
125 INJECTION, SOLUTION INTRAVENOUS CONTINUOUS
Status: DISCONTINUED | OUTPATIENT
Start: 2022-12-23 | End: 2022-12-23

## 2022-12-23 RX ADMIN — FENTANYL CITRATE 25 MCG: 50 INJECTION INTRAMUSCULAR; INTRAVENOUS at 12:54

## 2022-12-23 RX ADMIN — SODIUM CHLORIDE: 0.9 INJECTION, SOLUTION INTRAVENOUS at 12:38

## 2022-12-23 RX ADMIN — ONDANSETRON 4 MG: 2 INJECTION INTRAMUSCULAR; INTRAVENOUS at 13:22

## 2022-12-23 RX ADMIN — FENTANYL CITRATE 25 MCG: 50 INJECTION INTRAMUSCULAR; INTRAVENOUS at 13:22

## 2022-12-23 RX ADMIN — PROPOFOL 160 MG: 10 INJECTION, EMULSION INTRAVENOUS at 12:31

## 2022-12-23 RX ADMIN — LIDOCAINE HYDROCHLORIDE 80 MG: 20 INJECTION, SOLUTION EPIDURAL; INFILTRATION; INTRACAUDAL; PERINEURAL at 12:31

## 2022-12-23 RX ADMIN — FENTANYL CITRATE 25 MCG: 50 INJECTION INTRAMUSCULAR; INTRAVENOUS at 12:49

## 2022-12-23 RX ADMIN — CEFAZOLIN SODIUM 2000 MG: 1 INJECTION, POWDER, FOR SOLUTION INTRAMUSCULAR; INTRAVENOUS at 12:21

## 2022-12-23 RX ADMIN — SODIUM CHLORIDE 125 ML/HR: 0.9 INJECTION, SOLUTION INTRAVENOUS at 10:17

## 2022-12-23 RX ADMIN — LISINOPRIL 40 MG: 20 TABLET ORAL at 15:18

## 2022-12-23 RX ADMIN — FENTANYL CITRATE 25 MCG: 50 INJECTION INTRAMUSCULAR; INTRAVENOUS at 12:42

## 2022-12-23 NOTE — ANESTHESIA POSTPROCEDURE EVALUATION
Post-Op Assessment Note    CV Status:  Stable    Pain management: adequate     Mental Status:  Alert and awake   Hydration Status:  Euvolemic   PONV Controlled:  Controlled   Airway Patency:  Patent      Post Op Vitals Reviewed: Yes      Staff: Anesthesiologist         There were no known notable events for this encounter    /89   Pulse 72   Temp 97 7 °F (36 5 °C)   Resp 16   Ht 5' 9" (1 753 m)   Wt 87 7 kg (193 lb 5 5 oz)   SpO2 96%   BMI 28 55 kg/m²   BP      Temp      Pulse     Resp      SpO2

## 2022-12-23 NOTE — H&P
Use as history and physical   No changes from this consult                    ASSESSMENT:     1  HOCM (hypertrophic obstructive cardiomyopathy) ([facility], [place] [address] )          2  VT (ventricular tachycardia)  Ambulatory Referral to Cardiac Electrophysiology     POCT ECG       3  Hypertension, unspecified type          4  Coronary artery disease involving native coronary artery of native heart without angina pectoris          5  Palpitations          6  Bradycardia                      PLAN:     VT- defibrillator will be scheduled he has significant episodes of nonsustained ventricular tachycardia and hypertrophic cardiomyopathy  Has palpitations likely secondary to the nonsustained ventricular tachycardia this is new for him  He also has periods of sinus bradycardia prohibiting up titration of his beta-blocker  I discussed this with his primary cardiologist as well as with the patient and wife who were present today I showed them the episodes of nonsustained ventricular tachycardia on his monitor  I explained in detail how the procedure is performed pre and postoperative care and risks benefits and alternatives of the device  Did discuss that of course tobacco use continues to be dangerous for him  We discussed that any offspring should have an echocardiogram given that he has hypertrophic cardiomyopathy apparently he already has a son that his coronary disease so he has been evaluated  Finally I explained that once the device is placed we will either double his atenolol or change him to a higher dose of a different beta-blocker      All questions answered        CC/HPI:   Aliceois Collet presents to the [facility] office for EP appointment to follow up on Holter Monitor        Pt was seen by Dr Mable Merlin for c/o palpitations, monitor ordered    Pt had 48 hour holter monitor was done, and pt is being referred to Dr Evelyn Joseph to discuss a  VT, HOCM, and a defibrillator        Pt states that at 0425 this AM, had fast heart rate for about 2 seconds, and resolved  Defibrillator procedure explained, as well as risks involved  Pt is left hand dominant, but device is more beneficial being placed in the left side      Family history discussed, suggesting that family be assessed as well for HOCM        Labwork reviewed      Pt is agreeable to have defibrillator placed and will await a call from the procedure                        ROS    All 12 point ROS negative for chest pain, shortness of breath, lightheadedness, dizziness, syncope, but does have palpitations      Objective:      Vitals: Blood pressure 120/80, pulse 63, weight 89 6 kg (197 lb 9 6 oz)  , Body mass index is 29 18 kg/m²  , ?        Physical Exam:     GEN: Ashley Guerrero appears well, alert and oriented x 3, pleasant and cooperative   HEENT: pupils equal, round, and reactive to light; extraocular muscles intact  NECK: supple, no carotid bruits   HEART: regular rhythm, normal S1 and S2,  2/6 murmur clicks, gallops or rubs   LUNGS: clear to auscultation bilaterally; bilateral wheezes mild, no rales, or rhonchi   ABDOMEN: normal bowel sounds, soft, no tenderness, no distention  EXTREMITIES: peripheral pulses normal; no clubbing, cyanosis, or edema  NEURO: no focal findings   SKIN: normal without suspicious lesions on exposed skin     Medications:       Current Outpatient Medications:   •  aspirin (ECOTRIN LOW STRENGTH) 81 mg EC tablet, Take 1 tablet (81 mg total) by mouth daily, Disp: , Rfl:   •  atenolol (TENORMIN) 50 mg tablet, Take 1 tablet (50 mg total) by mouth daily, Disp: 90 tablet, Rfl: 2  •  atorvastatin (LIPITOR) 80 mg tablet, Take 1 tablet (80 mg total) by mouth daily, Disp: 90 tablet, Rfl: 3  •  cloNIDine (CATAPRES) 0 1 mg tablet, TAKE 1 TABLET(0 1 MG) BY MOUTH EVERY 8 HOURS, Disp: 270 tablet, Rfl: 0  •  lisinopril (ZESTRIL) 40 mg tablet, TAKE 1 TABLET(40 MG) BY MOUTH DAILY, Disp: 90 tablet, Rfl: 3  •  metFORMIN (GLUCOPHAGE-XR) 500 mg 24 hr tablet, Take 1 tablet (500 mg total) by mouth 2 (two) times a day with meals, Disp: 180 tablet, Rfl: 0  •  Omega-3 Fatty Acids (FISH OIL ADULT GUMMIES PO), Take by mouth, Disp: , Rfl:   •  pantoprazole (PROTONIX) 40 mg tablet, Take 1 tablet (40 mg total) by mouth daily, Disp: 90 tablet, Rfl: 0  •  torsemide (DEMADEX) 10 mg tablet, TAKE 1 TABLET(10 MG) BY MOUTH DAILY, Disp: 90 tablet, Rfl: 3      Family History         Family History   Problem Relation Age of Onset   • Rheumatic fever Father     • Other Father           accidental poisoning   • Heart disease Mother           Social History               Socioeconomic History   • Marital status: /Civil Union       Spouse name: Not on file   • Number of children: Not on file   • Years of education: Not on file   • Highest education level: Not on file   Occupational History   • Occupation:    Tobacco Use   • Smoking status: Every Day       Packs/day: 1 00       Years: 40 00       Pack years: 40 00       Types: Cigarettes   • Smokeless tobacco: Never   Vaping Use   • Vaping Use: Never used   Substance and Sexual Activity   • Alcohol use: Not Currently       Comment: rare social occasion, once a year," takes whiskey if feeling a cold coming"   • Drug use: No   • Sexual activity: Not Currently       Partners: Female   Other Topics Concern   • Not on file   Social History Narrative   • Not on file      Social Determinants of Health      Financial Resource Strain: Not on file   Food Insecurity: Not on file   Transportation Needs: Not on file   Physical Activity: Not on file   Stress: Not on file   Social Connections: Not on file   Intimate Partner Violence: Not on file   Housing Stability: Not on file         Social History           Tobacco Use   Smoking Status Every Day   • Packs/day: 1 00   • Years: 40 00   • Pack years: 40 00   • Types: Cigarettes   Smokeless Tobacco Never      Social History           Substance and Sexual Activity   Alcohol Use Not Currently     Comment: rare social occasion, once a year," takes whiskey if feeling a cold coming"         Labs & Results:  Below is the patient's most recent value for Albumin, ALT, AST, BUN, Calcium, Chloride, Cholesterol, CO2, Creatinine, GFR, Glucose, HDL, Hematocrit, Hemoglobin, Hemoglobin A1C, LDL, Magnesium, Phosphorus, Platelets, Potassium, PSA, Sodium, Triglycerides, and WBC  Lab Results   Component Value Date     ALT 13 2022     AST 14 2022     BUN 15 2022     CALCIUM 8 4 2022      2022     CHOL 136 2015     CO2 30 2022     CREATININE 0 76 2022     HDL 52 2022     HCT 42 3 2022     HGB 13 0 2022     HGBA1C 6 0 2022     MG 1 9 2021     PHOS 2 2 (L) 2021      2022     K 3 9 2022     PSA 2 0 2022      2015     TRIG 52 2022     WBC 6 61 2022      Note: for a comprehensive list of the patient's lab results, access the Results Review activity  Cardiac testin HOUR HOLTER MONITOR  2022              ECHOCARDIOGRAM  2022        •  Left Ventricle: Wall thickness is increased  There is severe hypertrophy of the left ventricle with asymmetric component in the septum   There is near complete obliteration of LV cavity during systole   No clear-cut ROCCO  There is LVOT gradient of approximately 120 mm Hg during Valsalva  The left ventricular ejection fraction is 70%  Systolic function is hyperdynamic  Wall motion is normal  Diastolic function is mildly abnormal, consistent with grade I (abnormal) relaxation  •  Left Atrium: The atrium is moderately dilated  •  Aortic Valve: There is mild to moderate regurgitation  There is mild stenosis  •  Mitral Valve: There is moderate annular calcification  There is mild to moderate regurgitation      Findings     Left Ventricle Left ventricular cavity size is small  Wall thickness is increased   There is severe hypertrophy of the left ventricle with asymmetric component in the septum   There is near complete obliteration of LV cavity during systole   No clear-cut ROCCO     There is LVOT gradient of approximately 120 mm Hg during Valsalva  The left ventricular ejection fraction is 70%  Systolic function is hyperdynamic   Wall motion is normal  Diastolic function is mildly abnormal, consistent with grade I (abnormal) relaxation  Right Ventricle Right ventricular cavity size is normal  Systolic function is normal  Wall thickness is normal    Left Atrium The atrium is moderately dilated  Right Atrium The atrium is normal in size  Aortic Valve The aortic valve was not well visualized  There is mild to moderate regurgitation  There is mild stenosis  Mitral Valve There is mild thickening  There is mild calcification  There is moderate annular calcification  There is mild to moderate regurgitation  There is no evidence of stenosis  Tricuspid Valve Tricuspid valve structure is normal  There is trace regurgitation  There is no evidence of stenosis  Pulmonic Valve Pulmonic valve structure is normal  There is no evidence of regurgitation  There is no evidence of stenosis  Ascending Aorta The aortic root is normal in size  IVC/SVC The inferior vena cava is normal in size  Pericardium There is no pericardial effusion   The pericardium is normal in appearance       Left Ventricle Measurements         Function/Volumes   A4C EF 55 %          LVOT stroke volume 137 28           LVOT stroke volume index 66 5 ml/m2          Dimensions   LVIDd 4 7 cm          LVIDS 3 cm          IVSd 1 7 cm          LVPWd 1 1 cm          LVOT area 4 91 cm2          FS 36           Diastolic Filling   MV E' Tissue Velocity Septal 4 cm/s          E wave deceleration time 352 ms          MV Peak E Shadi 78 cm/s          MV Peak A Shadi 1 m/s               Report Measurements   AV LVOT peak gradient 6 mmHg                Interventricular Septum Measurements         Shunt Ratio LVOT peak VTI 27 98 cm          LVOT peak melissa 1 21 m/s                Right Ventricle Measurements         Dimensions   RVID d 4 5 cm                  Left Atrium Measurements         Dimensions   LA size 5 4 cm          PAMELA A4C 19 9 cm2                  Right Atrium Measurements         Dimensions   RA 2D Volume 56 mL          RAA A4C 17 6 cm2                  Atrial Septum Measurements         Shunt Ratio   LVOT peak VTI 27 98 cm          LVOT peak melissa 1 21 m/s                  Aortic Valve Measurements         Stenosis   Aortic valve peak velocity 1 66 m/s          LVOT peak melissa 1 21 m/s          Ao VTI 43 89 cm          LVOT peak VTI 27 98 cm          AV mean gradient 7 mmHg          LVOT mn grad 3 mmHg          AV peak gradient 11 mmHg          AV LVOT peak gradient 6 mmHg          Dimensionless velociy index 0 73           Area/Dimensions   AV valve area 3 13 cm2          AV area by cont VTI 3 1 cm2          AV area peak melissa 3 6 cm2          LVOT diameter 2 5 cm          LVOT area 4 91 cm2                  Mitral Valve Measurements         Stenosis   MV stenosis pressure 1/2 time 102 ms          MV valve area p 1/2 method 2 16           MR  mmHg                  Aorta Measurements         Aortic Dimensions   Ao root 3 9 cm          Asc Ao 4 cm                                 Echo complete with contrast if indicated     No results found for this or any previous visit      No results found for this or any previous visit      No results found for this or any previous visit

## 2022-12-23 NOTE — DISCHARGE INSTR - AVS FIRST PAGE
** Aquacell can remain for one week (see below)  ** No lifting left arm above the shoulder for 4 weeks  ** Left arm to remain in the sling only overnight  ** Please increase atenolol to 50 mg twice daily, this is a change from daily dosing  Please refer to post ICD implantation discharge instructions and restrictions below and your ICD booklet/temporary card  Keep incision dry for one week  Do not use lotions/powders/creams on incision  Leave outer bandage in place for 1 week - it is water proof, and as long as it is fully adhered to your skin you may shower with it  If it appears as though the bandage is coming off and/or there is any communication to the area of device incision, please then keep the whole area dry for the remaining week  After 1 week, please remove by pulling all edges away from the center of the bandage  No overhead reaching/pushing/pulling/lifting greater than 5-10lbs with left arm for six weeks  Please call the office if you notice redness, swelling, bleeding, or drainage from incision or if you develop fevers    Implantable Cardioverter Defibrillator      If you have any questions, please call [phone] to speak with a nurse (8:30am-4pm, or [phone] after hours)  For appointments, please call [phone]  WHAT YOU SHOULD KNOW:    An implantable cardioverter defibrillator (ICD) is a small device that monitors your heart rate and rhythm  It is commonly placed inside your upper chest region  It may be used if you have a ventricular arrhythmia, which is an irregular, dangerous rhythm from the bottom chamber of your heart  Some arrhythmias may cause your heart to suddenly stop beating  An ICD can give a shock to your heart to make it start beating again, or it can give pacing therapy (also known as pain-free therapy) to return your heart to normal rhythm  It is also a pacemaker, so it will pace your heart if needed to prevent it from beating too slowly             AFTER YOU LEAVE:      Follow up with your primary healthcare provider or cardiologist as directed: You will need to follow-up to have your ICD checked and make sure you are not having problems  Write down your questions so you remember to ask them during your visits  Driving: you are ok to drive 48 hours after device is implanted     Self-care:   Ask about activity: Ask if you need to avoid moving your shoulder or arm, and for how long  Ask if you should avoid lifting heavy objects  Do not play any contact sports, such as football or wrestling, until your primary healthcare provider [facility] or cardiologist tells you it is okay  You may only be able to drive for a certain amount of time per day, or during certain hours  Ask when you can return to work  Care for your skin over the ICD: see instructions above     When you get a shock from your ICD: A shock may feel like someone has hit you, or you may feel a thump in the chest  If someone is touching you when you get a shock, they may feel a tingling feeling  The first time you feel a shock, it may scare you  Sit or lie down and stay calm  Ask someone to stay with you if possible  Please either call your cardiologist or report to an emergency room  Safety instructions when you have an ICD:   Carry an ID card for the ICD: This card has important information about your ICD  Stay away from magnets or machines with electric fields: This includes MRI machines  Avoid leaning into a car engine or doing welding  These things can interfere with how your ICD works  Tell airport security you have an ICD: You may need to be searched by hand when you go through a security gate  The security gate or handheld wand could harm your ICD  Keep an ICD diary: Record when you get a shock and what you were doing before you got the shock  Keep track of how you felt before and after the shock, as well as how many shocks you received  Write down the day and time of each shock   Bring the diary with you when you see your PHP or cardiologist    Jenny Iverson medical alert identification: Wear medical alert jewelry or carry a card that says you have an ICD  Ask your PHP or cardiologist where to get these items  Contact your primary healthcare provider or cardiologist if:   You have a fever  You feel 1 or more shocks from your ICD and feel fine afterwards  Your feet or ankles swell  The area around your ICD is painful or tender after surgery  The skin around your stitches or staples is red, swollen, or draining pus or fluid  You have chills, a cough, and feel weak or achy  You are sad or anxious and find it hard to do your usual activities  You have questions or concerns about your condition or care  Seek care immediately or call 911 if:   Your stitches or staples come apart  Blood soaks through your bandage  You feel more than 3 shocks in a row from your ICD  You become weak, dizzy, or faint  You feel your heart skip beats or beat very fast or slow, but you do not feel a shock from your ICD  You have chest pain that does not go away with rest or medicine  © 2014 [employer] is for End User's use only and may not be sold, redistributed or otherwise used for commercial purposes  All illustrations and images included in CareNotes® are the copyrighted property of [employer]  or Amos Chavez  The above information is an  only  It is not intended as medical advice for individual conditions or treatments  Talk to your doctor, nurse or pharmacist before following any medical regimen to see if it is safe and effective for you

## 2022-12-23 NOTE — ANESTHESIA PREPROCEDURE EVALUATION
Procedure:  CARDIAC ICD IMPLANT (Chest)    Relevant Problems   CARDIO   (+) Atherosclerosis of aorta (HCC)   (+) CAD (coronary artery disease)   (+) Coronary artery disease involving native coronary artery of native heart   (+) HTN (hypertension)   (+) Hyperlipidemia      ENDO   (+) Hyperparathyroidism (HCC)   (+) Type 2 diabetes mellitus (HCC)      /RENAL   (+) Benign localized hyperplasia of prostate with urinary obstruction      HEMATOLOGY   (+) Anemia   (+) Anemia associated with diabetes mellitus (HCC)   (+) Iron deficiency anemia due to chronic blood loss      NEURO/PSYCH   (+) History of aortic regurgitation   (+) History of obesity      PULMONARY   (+) Smoking      Cardiovascular and Mediastinum   (+) Cardiomyopathy (HCC)   (+) HOCM (hypertrophic obstructive cardiomyopathy) (HCC)   (+) VT (ventricular tachycardia)        Physical Exam    Airway    Mallampati score: II  TM Distance: >3 FB  Neck ROM: full     Dental   No notable dental hx     Cardiovascular  Rhythm: regular, Rate: normal, Cardiovascular exam normal    Pulmonary  Pulmonary exam normal Breath sounds clear to auscultation,     Other Findings        Anesthesia Plan  ASA Score- 3     Anesthesia Type- IV sedation with anesthesia with ASA Monitors  Additional Monitors:   Airway Plan:     Comment: GA prn  Plan Factors-    Chart reviewed  EKG reviewed  Existing labs reviewed  Patient summary reviewed  Patient is not a current smoker  Patient not instructed to abstain from smoking on day of procedure  Patient did not smoke on day of surgery  There is medical exclusion for perioperative obstructive sleep apnea risk education  Induction- intravenous  Postoperative Plan-     Informed Consent- Anesthetic plan and risks discussed with patient and spouse Ginger Bowers

## 2023-01-05 ENCOUNTER — IN-CLINIC DEVICE VISIT (OUTPATIENT)
Dept: CARDIOLOGY CLINIC | Facility: CLINIC | Age: 73
End: 2023-01-05

## 2023-01-05 DIAGNOSIS — Z95.810 PRESENCE OF IMPLANTABLE CARDIOVERTER-DEFIBRILLATOR (ICD): Primary | ICD-10-CM

## 2023-01-05 NOTE — PROGRESS NOTES
Results for orders placed or performed in visit on 01/05/23   Cardiac EP device report    Narrative    [facility] DUAL ICD/ACTIVE SYSTEM IS MRI CONDITIONAL  DEVICE INTERROGATED IN THE [facility] OFFICE: BATTERY VOLTAGE ADEQUATE (7 0-9 5 YRS)  AP 7 4%  <1% ALL LEAD PARAMETERS WITHIN NORMAL LIMITS  NO NEW SIGNIFICANT HIGH RATE EPISODES  NO PROGRAMMING CHANGES MADE TO DEVICE PARAMETERS  WOUND CHECK: INCISION CLEAN AND DRY WITH EDGES APPROXIMATED; WOUND CARE AND RESTRICTIONS REVIEWED WITH PATIENT  NORMAL DEVICE FUNCTION   AM/RG

## 2023-01-06 DIAGNOSIS — E11.69 TYPE 2 DIABETES MELLITUS WITH OTHER SPECIFIED COMPLICATION, WITHOUT LONG-TERM CURRENT USE OF INSULIN (HCC): ICD-10-CM

## 2023-01-06 DIAGNOSIS — I10 HYPERTENSION, UNSPECIFIED TYPE: ICD-10-CM

## 2023-01-07 RX ORDER — METFORMIN HYDROCHLORIDE 500 MG/1
500 TABLET, EXTENDED RELEASE ORAL 2 TIMES DAILY WITH MEALS
Qty: 180 TABLET | Refills: 3 | Status: SHIPPED | OUTPATIENT
Start: 2023-01-07

## 2023-01-07 RX ORDER — ATENOLOL 50 MG/1
50 TABLET ORAL 2 TIMES DAILY
Qty: 180 TABLET | Refills: 3 | Status: SHIPPED | OUTPATIENT
Start: 2023-01-07

## 2023-01-16 NOTE — PROGRESS NOTES
HCM Consultation - Cardiology   González Bartholomew 74 y o  male MRN: [id number]  Unit/Bed#:  Encounter: [id number]      Assessment & plan:  No problem-specific Assessment & Plan notes found for this encounter  Patient Active Problem List    Diagnosis Date Noted   • Cardiomyopathy [facility])    • VT (ventricular tachycardia) 11/29/2022   • Palpitations 11/29/2022   • Bradycardia 11/29/2022   • CAD (coronary artery disease) 05/12/2022   • Urinary retention due to benign prostatic hyperplasia 09/03/2021   • Benign localized hyperplasia of prostate with urinary obstruction 09/03/2021   • Antritis and gastric ulcer 04/13/2021   • Urinary retention 04/13/2021   • S/P subtotal parathyroidectomy ([facility] ) 04/12/2021   • Anemia 04/09/2021   • Hematuria 04/05/2021   • Hyperkalemia 44/79/0683   • Metabolic acidosis 73/46/6566   • History of aortic regurgitation    • History of obesity    • Hyperparathyroidism ([facility] ) 03/03/2021   • Hypercalcemia 12/31/2020   • Thrombocytosis 04/13/2020   • Anemia associated with diabetes mellitus ([facility] ) 03/09/2020   • Atherosclerosis of aorta ([facility] ) 06/18/2019   • HOCM (hypertrophic obstructive cardiomyopathy) ([facility] ) 07/16/2018   • Coronary artery disease involving native coronary artery of native heart    • Iron deficiency anemia due to chronic blood loss 04/20/2018   • Smoking 02/12/2018   • Type 2 diabetes mellitus ([facility] ) 02/08/2018   • Hyperlipidemia 01/21/2018   • HTN (hypertension) 01/21/2018   • Non-recurrent bilateral inguinal hernia without obstruction or gangrene 08/18/2017     Plan: Mr Perla Madrigal was interviewed, examined and evaluated together with Dr Kenton Olivas, cardiology fellow  Mr Perla Madrigal has carried a diagnosis of HOCM for more than 20 years but has been asymptomatic until recently  He also has coronary artery disease with a NSTEMI in 2018 in the setting of severe acute GI bleeding due to gastric ulcer for which he had stenting of proximal and mid-LAD   He is an active smoker and has diabetes, hypertension as well hyperlipidemia  Most recently he has had symptomatic NSVT and work up had shown severe LVH with LV wall thickness of 27 mm and extensive late gadolinium enhancement on cardiac MRI  He has undergone ICD implantation on 12/2022  He has a relatively active lifestyle, works at [employer] and does yard work there  He has been a smoker for 40 years (>1 ppd)  He checks BP at home, numbers are usually between 140-160/80s at home  Endorses compliance with medications, takes Atenolol 50mg BID at 4am and 12 pm, 0 2 mg Clonidine patch every Friday, Lisinopril 40mg at 4am, Torsemide 10mg at 8pm      Today his BP was 161/71, however, he is asymptomatic  His diet was reviewed and he verbalized understanding that he should abstain from fried and salty food  Fluid intake should be limited to 60 Oz/day and he should monitor his weight daily before breakfast  He also agreed to keep a log of BP and bring it with him during the next visit  We had a discussion about smoking cessation, however, patient is not motivated and has no intention to quit  No change to medications made  The priority at this time is to control his blood pressure effectively, improve daily physical activity and choice of food for meals and assess whether he would need to continue taking diuretic on a daily basis  High gradients have been confirmed on echocardiogram and, today, a faint murmur was heard with provocation  If he develops symptoms lisinopril can be discontinued and replaced by a non-hydropyridine calcium channel blocker  His most recent device interrogation did not show significant rhythm abnormality  Device interrogation( 1/5/2023): [facility] DUAL ICD/ACTIVE SYSTEM IS MRI CONDITIONAL  DEVICE INTERROGATED IN THE [place] OFFICE: BATTERY VOLTAGE ADEQUATE (7 0-9 5 YRS)  AP 7 4%  <1% ALL LEAD PARAMETERS WITHIN NORMAL LIMITS  NO NEW SIGNIFICANT HIGH RATE EPISODES  NO PROGRAMMING CHANGES MADE TO DEVICE PARAMETERS   WOUND CHECK: INCISION CLEAN AND DRY WITH EDGES APPROXIMATED; WOUND CARE AND RESTRICTIONS REVIEWED WITH PATIENT  NORMAL DEVICE FUNCTION  AM/RG  Physician Requesting Consult: Claudius Dance, DO   Reason for Consult / Principal Problem: HCM    HPI: Mr Shiraz Funes is a 66yo gentleman with history of Hyperparathyroidism, HTN, CAD s/p PCI to LAD, Hx of VTx s/p ICD (2022), DM2 on PO med, Anemia, and newly diagnosed HCM who is being referred by Dr Sterling Swanson for HCM  He has had several episodes of symptomatic NSVTs, for which he underwent primary prevention defibrillator implantation  As part of the work up, he had a CMR done which showed severely hypertrophied wall with significant amount of LGE  He is being seen today to establish care  He has remained asymptomatic aside from occasional palpitations, reports living an active lifestyle  He denied SOB, chest pain, dizziness, bendopnea, PND, orthopnea, fainting, and lower extremity edema  He endorses compliance with medications  Echocardiogram( 2022): •  Left Ventricle: Wall thickness is increased  There is severe hypertrophy of the left ventricle with asymmetric component in the septum  There is near complete obliteration of LV cavity during systole   No clear-cut ROCCO  There is LVOT gradient of approximately 120 mm Hg during Valsalva  The left ventricular ejection fraction is 70%  Systolic function is hyperdynamic  Wall motion is normal  Diastolic function is mildly abnormal, consistent with grade I (abnormal) relaxation  •  Left Atrium: The atrium is moderately dilated  •  Aortic Valve: There is mild to moderate regurgitation  There is mild stenosis  •  Mitral Valve: There is moderate annular calcification  There is mild to moderate regurgitation  CMR(2022):    1   Severe asymmetric left ventricular wall thickening predominantly involving the interventricular septum, which measures up to 27 mm, with a spiral pattern progressing towards the apex   Near cavity obliteration during systole  Systolic anterior motion   of the anterior mitral valve leaflet with flow acceleration in the LVOT, suggesting a component of LVOT obstruction  Apical insertion of the papillary muscles  Hyperdynamic systolic function  2  Normal right ventricular size and systolic function  3   The left atrium is normal in size  Mildly reduced opening of the mitral valve  Mitral regurgitation  5  There is no evidence of myocardial edema  6  Delayed post-gadolinium imaging demonstrates diffuse intramyocardial delayed gadolinium enhancement involving approximately 25% of the myocardium        Review of Systems: He denied SOB, chest pain, dizziness, bendopnea, PND, orthopnea, fainting, and lower extremity edema  Endorsed occasional palpitations without dizziness  However, standing up quickly results in dizziness  Family History: Father  in his mid 46s likely from Cancer, he was a  and used chemicals to clean car parts  Mother passed away in 1972 at the age of 39yo at the hospital after a fibroid removal, as she was walking back to her room in the hospital she fell and passed away  Total of 10 Siblings( 6 boys, 4 girls)- one brother passed from EtOH intake, twin brother, [place],  had two CVAs in the past 2 years ago, one sister is obese  2 brothers(one carries nitro) and 1 sister(flutter feeling) have heart problems, unknown what the problem is  Lisbeth Viera has a boy and a girl, his boy had a heart attach at age 64, daughter has thyroid problems but no heart problems and is currently 56yo       Historical Information   Past Medical History:   Diagnosis Date   • Anemia    • Benign neoplasm of large intestine    • Bleeding gastric ulcer 2018   • Cardiomyopathy [facility])    • Chronic kidney disease    • Colon polyp    • Diabetes mellitus ([facility] )    • Disease of thyroid gland    • Heart murmur    • History of aortic regurgitation    • History of constipation    • History of degenerative joint disease    • History of nocturia    • History of obesity    • History of sebaceous cyst    • History of shortness of breath    • History of transfusion    • History of urinary frequency    • Hyperlipidemia    • Hyperparathyroidism ([facility] )    • Hypertension    • Myocardial infarction [facility]) 2018    january, 3 stents   • Polyposis coli     of the large intestine   • Ulcer of esophagus      Past Surgical History:   Procedure Laterality Date   • ACHILLES TENDON SURGERY Left 1973   • CARDIAC ELECTROPHYSIOLOGY PROCEDURE Left 12/23/2022    Procedure: insertion dual chamber ICD; Surgeon: Robert Solano DO;  Location: [facility];  Service: Cardiology   • CATARACT EXTRACTION Right 02/18/2021   • COLONOSCOPY      hyperplastic polyp   • CORONARY ANGIOPLASTY WITH STENT PLACEMENT      3 stents: 2 placed on Jan 2018, 1 on July 2018    • CYST REMOVAL  2019, 2020    left and right wrists   • ESOPHAGOGASTRODUODENOSCOPY N/A 1/23/2018    Procedure: ESOPHAGOGASTRODUODENOSCOPY (EGD); Surgeon: Kingston Yost MD;  Location: [facility]; Service: Gastroenterology   • ESOPHAGOGASTRODUODENOSCOPY     • HERNIA REPAIR     • HERNIA REPAIR Bilateral 8/18/2017    Procedure: LAPAROSCOPIC INGUINAL HERNIA REPAIR WITH MESH;  Surgeon: Devan Edwards MD;  Location: [facility];  Service: General   • LARYNGOSCOPY N/A 5/12/2022    Procedure: LARYNGOSCOPY DIRECT;  Surgeon: Sandra Aviles MD;  Location:  MAIN OR;  Service: Surgical Oncology   • HI ESOPHAGOGASTRODUODENOSCOPY TRANSORAL DIAGNOSTIC N/A 3/26/2018    Procedure: ESOPHAGOGASTRODUODENOSCOPY (EGD); Surgeon: Steffani Simmonds, MD;  Location: [facility]; Service: Gastroenterology   • HI ESOPHAGOGASTRODUODENOSCOPY TRANSORAL DIAGNOSTIC N/A 5/14/2018    Procedure: ESOPHAGOGASTRODUODENOSCOPY (EGD); Surgeon: Steffani Simmonds, MD;  Location: [facility];   Service: Gastroenterology   • HI PARATHYROIDECTOMY/EXPLORATION PARATHYROIDS Right 3/23/2021    Procedure: RIGHT PARATHYROIDECTOMY, MINIMALLY INVASIVE, POSSIBLE 4-GLAND EXPLORATION, WITH INTRA-OPERATIVE PTH MONITORING;  Surgeon: Brittany Arceo MD;  Location: [facility];  Service: Surgical Oncology   • ND PARATHYROIDECTOMY/EXPLORATION PARATHYROIDS Right 5/12/2022    Procedure: TWO GLAND PARATHYROIDECTOMY, 4 GLAND EXPLORATION, INTRAOPERATIVE PTH MONITORING, FLEXIBLE LARYNGOSCOPY;  Surgeon: Brittany Arceo MD;  Location: [facility];  Service: Surgical Oncology   • ND TRURL ELECTROSURG RESCJ PROSTATE BLEED COMPLETE N/A 9/2/2021    Procedure: TRANSURETHRAL RESECTION OF PROSTATE (TURP); Surgeon: Agnes Dunne MD;  Location: [facility];  Service: Urology   • TIBIA FRACTURE SURGERY Left 1962     Family History   Problem Relation Age of Onset   • Rheumatic fever Father    • Other Father         accidental poisoning   • Heart disease Mother      Current Outpatient Medications on File Prior to Visit   Medication Sig Dispense Refill   • aspirin (ECOTRIN LOW STRENGTH) 81 mg EC tablet Take 1 tablet (81 mg total) by mouth daily     • atenolol (TENORMIN) 50 mg tablet Take 1 tablet (50 mg total) by mouth 2 (two) times a day 180 tablet 3   • atorvastatin (LIPITOR) 80 mg tablet Take 1 tablet (80 mg total) by mouth daily 90 tablet 3   • cloNIDine (CATAPRES-TTS-2) 0 2 mg/24 hr Place 1 patch (0 2 mg total) on the skin once a week 12 patch 3   • lisinopril (ZESTRIL) 40 mg tablet TAKE 1 TABLET(40 MG) BY MOUTH DAILY 90 tablet 3   • metFORMIN (GLUCOPHAGE-XR) 500 mg 24 hr tablet Take 1 tablet (500 mg total) by mouth 2 (two) times a day with meals 180 tablet 3   • Omega-3 Fatty Acids (FISH OIL ADULT GUMMIES PO) Take by mouth     • pantoprazole (PROTONIX) 40 mg tablet Take 1 tablet (40 mg total) by mouth daily 90 tablet 3   • torsemide (DEMADEX) 10 mg tablet TAKE 1 TABLET(10 MG) BY MOUTH DAILY 90 tablet 3     No current facility-administered medications on file prior to visit       No Known Allergies  Social History     Substance and Sexual Activity   Alcohol Use Not Currently    Comment: rare social occasion, once a year," takes whiskey if feeling a cold coming"     Social History     Substance and Sexual Activity   Drug Use No     Social History     Tobacco Use   Smoking Status Every Day   • Packs/day: 1 00   • Years: 40 00   • Pack years: 40 00   • Types: Cigarettes   • Start date: 1968   Smokeless Tobacco Never   Tobacco Comments    Smoked this morning 12/23 0700     Objective     Visit Vitals  /71 (BP Location: Left arm, Patient Position: Sitting, Cuff Size: Standard)   Pulse 60   Wt 91 kg (200 lb 9 6 oz)   SpO2 100%   BMI 29 62 kg/m²   Smoking Status Every Day   BSA 2 07 m²   Body mass index is 29 62 kg/m²  Physical Exam:  GEN: Patricia Reza appears well, alert and oriented x 3, pleasant and cooperative   HEENT: pupils equal, round, and reactive to light; extraocular muscles intact  NECK: supple, no carotid bruits, no JVD noted   HEART: regular rhythm, normal S1 and S2, faint systolic murmur observed    LUNGS: CTA B/L   ABDOMEN: normal bowel sounds, soft, no tenderness, no distention  EXTREMITIES: peripheral pulses normal; no clubbing, cyanosis, or edema  NEURO: no focal findings   SKIN: Dry and well healed ICD incision site over the left outer quadrant of chest wall, no sign of bleeding or infection  Otherwise, skin exam was normal  Clonidine patch noted over the left deltoid area       Lab Results:   Labs:  Lab Results   Component Value Date    WBC 6 61 11/29/2022    RBC 5 06 11/29/2022    HGB 13 0 11/29/2022    HCT 42 3 11/29/2022    MCV 84 11/29/2022     11/29/2022    RDW 18 1 (H) 11/29/2022     Lab Results   Component Value Date     12/14/2015    K 4 3 12/13/2022     12/13/2022    CO2 29 12/13/2022    ANIONGAP 5 12/14/2015    BUN 10 12/13/2022    CREATININE 0 78 12/13/2022    EGFR 88 12/13/2022    GLUCOSE 98 12/14/2015    CALCIUM 8 4 12/13/2022    AST 14 11/29/2022    ALT 13 11/29/2022    ALKPHOS 60 11/29/2022 PROT 7 1 06/12/2015    BILITOT 0 4 06/12/2015     Lab Results   Component Value Date    MG 1 9 04/06/2021     Lab Results   Component Value Date    CHOL 136 06/12/2015    HDL 52 01/05/2022    TRIG 52 01/05/2022    LDLCALC 85 01/05/2022     Lab Results   Component Value Date    MXL8ADDHXOHS 0 987 12/13/2022    FREET4 0 95 06/16/2014     Imaging:   I have personally reviewed pertinent films in PACS  XR chest portable    Result Date: 12/23/2022  Narrative: CHEST INDICATION:   Patient s/p Pacemaker/ICD Insertion  COMPARISON:  4/26/2022 EXAM PERFORMED/VIEWS:  XR CHEST PORTABLE FINDINGS: Cardiomediastinal silhouette appears unremarkable  Mild aortic arch calcifications are again seen  A new left chest dual-lead cardiac pacer device noted  Atrial lead appear appropriate positioning  Ventricular lead overlies the right lower heart border, recommend clinical correlation to ensure appropriate placement and functionality  The lungs are clear without airspace consolidation or mass  Mild emphysematous changes are seen  No pneumothorax or pleural effusion  Osseous structures appear within normal limits for patient age  No free air in the upper abdomen  Impression: No acute cardiopulmonary disease  A new left chest dual-lead cardiac pacer device noted  Atrial lead appear appropriate positioning  Ventricular lead overlies the right lower heart border, recommend clinical correlation to ensure appropriate placement  and functionality  The study was marked in [facility] for immediate notification  Workstation performed: [id number]     Cardiac ep lab eps/ablations    Result Date: 12/23/2022  Narrative: Images from the original result were not included  NCDR ICD REGISTRY INFO Heart Failure: Yes NYHA Functional Classification: Class II  Ischemic Cardiomyopathy: No  Non-Ischemic Cardiomyopathy: Yes  Timeframe: 0 >= 3 Months  Guideline Directed Medical Therapy Maximum Dose - Yes (for 3 Months)    Ejection Fraction: Cardiac MRI 22    Impression: Impression: 1  Hypertrophic cardiomyopathy with the intraventricular septum measuring up to 27 mm  ROCCO visualized with a component of LVOT obstruction  Hyperdynamic systolic function, EF 55%, with near cavity obliteration during systole  2  Normal right ventricular size and systolic function  3  Normal bilateral atria  Mildly reduced opening of the mitral valve with mitral regurgitation  4  Diffuse interstitial fibrosis involving approximately 25% of the myocardium  QRS Morphology: Narrow, Width: 104 ms Ventricular Tachycardia: Yes  Ventricular Tachycardia Type - Non-Sustained VT  Indications for Permanent Pacemaker: Yes  Class I or Class II Guideline Bradycardia Pacemaker Indication Present - Yes  Reason Pacing Indicated - Sick Sinus Syndrome  ICD Indication: Primary Prevention  A formal shared decision making encounter has occurred with the patient using an evidence-based decision tool on ICDs prior to initial ICD implantation: Yes Generator changes only- Patients clinical condition is unchanged and the LVEF will not be assessed: N/A Syndrome(s) w/Risk of Sudden Death: Yes  Hypertrophic Cardiomyopathy ELECTROPHYSIOLOGY OPERATIVE REPORT PATIENT NAME: Melquiades Betancourt :  1950 MRN: [id number] Date of surgery: 22 Surgeon: Rohini Graves DO Pt Location:  Cardiac Electrophysiology Laboratory PROCEDURE PERFORMED: 1)Dual Chamber AICD implant Preoperative Medications: ancef ANESTHESIA: general PREOPERATIVE DIAGNOSIS: Symptomatic Hypertrophic Cardiomyopathy with frequent runs of monomorphic ventricular tachycardia Sick sinus syndrome POSTOPERATIVE DIAGNOSIS: Successful Dual AICD implant  Same as Preop  Informed Consent: Risks, benefits, and alternatives discussed with patient and any family present   The patient understands risks, which include but are not limited to life threatening  bleeding, infection, air around lungs, blood around the heart and reoperation dislodged or malfunctioning device  Procedure Description: After informed consent was obtained, the patient was brought to the electrophysiology laboratory NPO  A time out was called and the patient was properly identified  The patient was pre-medicated as above  The left infraclavicular area was prepped and draped in the usual sterile fashion  After local anesthetic infiltration with 1% lidocaine, an incision was made below clavicle  The incision was extended down to the level of the pectoral fascia  A pocket was formed above the pectoral fascia using cautery and blunt dissection  axillary approach was done  A safe sheath introducer was advanced over a wire into the axillary vein, the wire was confirmed to be in the right atrium on flouroscopy, the wire was removed  Through the introducer the ICD lead was passed into the right heart  Under fluoroscopic guidance the right ventricular lead was positioned on the right ventricular septum  After satisfactory ventricular sensing and pacing thresholds were confirmed, the lead was sewn to the pectoral fascia/muscle using 0 silk sutures  The right atrial lead was placed in the right atrial appendage with similar fashion using a preformed J stylet, the helix was deployed, tissue contact was confirmed and good lead parameters were seen, the Safe-sheath was removed and the lead was tied down with 0 silk sutures to the muscle  Pocket flushed with antibiotic solution  Device placed in an antibiotic pouch  The lead and pulse generator were placed in the pre-pectoral pocket  The pocket was then closed with 3 layers of 2-0, 3-0 Vicryl 4-0 Monocryl suture was used to close the skin  Sterile dressing applied  EBL: Minimal Complications: None [id number]:23 cc Findings: normal device function  The patient tolerated the procedure well  Plan: Routine postoperative care and CXR  Follow-up in 2 weeks with incision check and interrogation        Cardiac EP device report    Result Date: 2023  Narrative: [facility] DUAL ICD/ACTIVE SYSTEM IS MRI CONDITIONAL DEVICE INTERROGATED IN THE [place] OFFICE: BATTERY VOLTAGE ADEQUATE (7 0-9 5 YRS)  AP 7 4%  <1% ALL LEAD PARAMETERS WITHIN NORMAL LIMITS  NO NEW SIGNIFICANT HIGH RATE EPISODES  NO PROGRAMMING CHANGES MADE TO DEVICE PARAMETERS  WOUND CHECK: INCISION CLEAN AND DRY WITH EDGES APPROXIMATED; WOUND CARE AND RESTRICTIONS REVIEWED WITH PATIENT  NORMAL DEVICE FUNCTION  AM/RG     Cardiac EP device report    Result Date: 1/3/2023  Narrative: [facility] DUAL ICD/ACTIVE SYSTEM IS MRI CONDITIONAL NON-BILLABLE MERLIN TRANSMISSION: BATTERY VOLTAGE ADEQUATE (7 8 YRS)  AP: 9 6%  : <1%  ALL AVAILABLE LEAD PARAMETERS WITHIN NORMAL LIMITS  1 NON-SUSTAINED EPISODE W/ EGM SHOWING NSVT 14 BEATS @ 192 PBM  1 AMS EPISODE W/ EGM SHOWING PAT, DURATION 8 SECS  PT TAKES ATENOLOL, ASA 81MG  EF: 70% (ECHO 22)  APPROPRIATELY FUNCTIONING ICD  [address]       EKG: Sinus rhythm, LVH, diffuse ST-T abnormality      Cardiac testing:   Results for orders placed during the hospital encounter of 21    Echo complete with contrast if indicated    Narrative  [facility], [address] [facility]  [phone]    Transthoracic Echocardiogram  2D, M-mode, Doppler, and Color Doppler    Study date:  02-Aug-2021    Patient: Maureen Oquendo  MR number: [id number]  Account number: [de-identified]  : 1950  Age: 66 years  Gender: Male  Status: Outpatient  [facility]  Height: 67 in  Weight: 188 5 lb  BP: 142/ 70 mmHg    Indications: CAD  Diagnoses: I25 10 - Atherosclerotic heart disease of native coronary artery without angina pectoris    Sonographer:  MARIBELL Schaffer  Primary Physician:  Michelina Meckel, MD  Referring Physician:  Yong Russell PA-C  Group:  [id number]  Interpreting Physician:  Emily Figueroa MD    SUMMARY    LEFT VENTRICLE:  The cavity was small    Systolic function was normal  Ejection fraction was estimated to be 65 %  There were no regional wall motion abnormalities  Wall thickness was increased  There was moderate assymetrical hypertrophy of the septum  Features were consistent with a pseudonormal left ventricular filling pattern, with concomitant abnormal relaxation and increased filling pressure (grade 2 diastolic dysfunction)  RIGHT VENTRICLE:  The size was normal   Systolic function was normal     LEFT ATRIUM:  The atrium was mildly dilated  MITRAL VALVE:  There was mild annular calcification  There was mild regurgitation  AORTIC VALVE:  There was mild stenosis  There was moderate regurgitation  TRICUSPID VALVE:  There was trace regurgitation  Estimated peak PA pressure was 38 mmHg  AORTA:  The root exhibited dilatation - 3 9 cm  HISTORY: PRIOR HISTORY: Myocardial infarction  Hypertrophic cardiomyopathy  Risk factors: hypertension and oral hypoglycemic-treated diabetes  History of severe regurgitation of the aortic valve  PRIOR PROCEDURES: Stent  PROCEDURE: The study was performed in the [address] [place] [address]  This was a routine study  The transthoracic approach was used  The study included complete 2D imaging, M-mode, complete spectral Doppler, and color Doppler  Image  quality was adequate  LEFT VENTRICLE: The cavity was small  Systolic function was normal  Ejection fraction was estimated to be 65 %  There were no regional wall motion abnormalities  Wall thickness was increased  There was moderate assymetrical hypertrophy of  the septum  No evidence of apical thrombus  DOPPLER: Features were consistent with a pseudonormal left ventricular filling pattern, with concomitant abnormal relaxation and increased filling pressure (grade 2 diastolic dysfunction)  RIGHT VENTRICLE: The size was normal  Systolic function was normal  Wall thickness was normal     LEFT ATRIUM: The atrium was mildly dilated      RIGHT ATRIUM: Size was normal     MITRAL VALVE: There was mild annular calcification  There was normal leaflet separation  DOPPLER: The transmitral velocity was within the normal range  There was no evidence for stenosis  There was mild regurgitation  AORTIC VALVE: The valve was trileaflet  Leaflets exhibited mildly increased thickness and normal cuspal separation  DOPPLER: There was mild stenosis  There was moderate regurgitation  TRICUSPID VALVE: The valve structure was normal  There was normal leaflet separation  DOPPLER: The transtricuspid velocity was within the normal range  There was no evidence for stenosis  There was trace regurgitation  Estimated peak PA  pressure was 38 mmHg  PULMONIC VALVE: Leaflets exhibited normal thickness, no calcification, and normal cuspal separation  DOPPLER: The transpulmonic velocity was within the normal range  There was no significant regurgitation  PERICARDIUM: There was no pericardial effusion  The pericardium was normal in appearance  AORTA: The root exhibited dilatation - 3 9 cm  SYSTEMIC VEINS: IVC: The inferior vena cava was normal in size  SYSTEM MEASUREMENT TABLES    2D  %FS: 33 99 %  Ao Diam: 3 89 cm  EDV(Teich): 105 57 ml  EF(Teich): 62 84 %  ESV(Teich): 39 23 ml  IVSd: 2 2 cm  LA Area: 21 8 cm2  LA Diam: 3 95 cm  LVEDV MOD A4C: 126 52 ml  LVEF MOD A4C: 64 55 %  LVESV MOD A4C: 44 85 ml  LVIDd: 4 76 cm  LVIDs: 3 14 cm  LVLd A4C: 7 9 cm  LVLs A4C: 6 49 cm  LVOT Diam: 2 27 cm  LVPWd: 1 41 cm  RA Area: 18 04 cm2  RVIDd: 3 51 cm  SV MOD A4C: 81 67 ml  SV(Teich): 66 34 ml    CW  AR Dec [place]: 3 12 m/s2  AR Dec Time: 1474 74 ms  AR PHT: 427 67 ms  AR Vmax: 4 39 m/s  AR maxP 22 mmHg  AV Env  Ti: 308 28 ms  AV MaxPG: 10 56 mmHg  AV VTI: 33 21 cm  AV Vmax: 1 62 m/s  AV Vmean: 1 08 m/s  AV meanP 33 mmHg  MR VTI: 204 76 cm  MR Vmax: 5 58 m/s  MR Vmean: 4 34 m/s  MR maxP 69 mmHg  MR meanP 58 mmHg  TR MaxP 77 mmHg  TR Vmax: 2 95 m/s    MM  TAPSE: 2 5 cm    PW  YOKO (VTI): 3 98 cm2  YOKO Vmax: 3 15 cm2  AVAI (VTI): 0 cm2/m2  AVAI Vmax: 0 cm2/m2  E' Sept: 0 06 m/s  E/E' Sept: 15 09  LVOT Env  Ti: 334 92 ms  LVOT VTI: 32 64 cm  LVOT Vmax: 1 26 m/s  LVOT Vmean: 0 97 m/s  LVOT maxP 4 mmHg  LVOT meanP 19 mmHg  LVSI Dopp: 67 12 ml/m2  LVSV Dopp: 132 23 ml  MV A [place]: 0 92 m/s  MV Dec [place]: 2 72 m/s2  MV DecT: 358 4 ms  MV E Shadi: 0 98 m/s  MV E/A Ratio: 1 07  MV PHT: 103 94 ms  MVA By PHT: 2 12 cm2    [employer] Accredited Echocardiography Laboratory    Prepared and electronically signed by    Haydee Navarro MD  Signed 03-Aug-2021 14:53:36      MRI cardiac  w wo contrast  Status: Final result     PACS Images     Show images for MRI cardiac w wo contrast  Study Result    Narrative & Impression   Cardiac MRI with and without contrast     INDICATION:  I42 1: Obstructive hypertrophic cardiomyopathy      Technique:  1  3 plane SSFP localizers  2  SSFP cine imaging in long and short axis plane  3  T2 weighted DIR FSE in short axis plane  4  16 ml gadobutrol power injected  5  2D inversion recovery FGRE for delayed myocardial enhancement  6  The patient tolerated the procedure well without complication      Measurements:   [place]-septal wall 27 mm  Postero-lateral wall 9 mm  Left ventricular end-diastolic dimension 52 mm  Left ventricular end-systolic dimension 29 mm  Left ventricular end-diastolic volume 307 ml  Left ventricular end-systolic volume  51 ml  Stroke volume 117 ml  Cardiac Output 7 7 L/min  Ejection fraction 70 %  Left atrium 33 mm  Aortic Root 28 mm     Findings:    1  Severe asymmetric left ventricular wall thickening predominantly involving the interventricular septum, which measures up to 27 mm, with a spiral pattern progressing towards the apex  Near cavity obliteration during systole  Systolic anterior motion   of the anterior mitral valve leaflet with flow acceleration in the LVOT, suggesting a component of LVOT obstruction    Apical insertion of the papillary muscles  Hyperdynamic systolic function  2  Normal right ventricular size and systolic function  3   The left atrium is normal in size  Mildly reduced opening of the mitral valve  Mitral regurgitation  5  There is no evidence of myocardial edema  6  Delayed post-gadolinium imaging demonstrates diffuse intramyocardial delayed gadolinium enhancement involving approximately 25% of the myocardium      IMPRESSION:  Impression:  1  Hypertrophic cardiomyopathy with the intraventricular septum measuring up to 27 mm  ROCCO visualized with a component of LVOT obstruction  Hyperdynamic systolic function, EF 23%, with near cavity obliteration during systole  2  Normal right ventricular size and systolic function  3  Normal bilateral atria  Mildly reduced opening of the mitral valve with mitral regurgitation  4  Diffuse interstitial fibrosis involving approximately 25% of the myocardium         Workstation performed: [id number]       Counseling / Coordination of Care  Total floor / unit time spent today 60 minutes  Greater than 50% of total time was spent with the patient and / or family counseling and / or coordination of care  Talia Nicole

## 2023-01-17 ENCOUNTER — OFFICE VISIT (OUTPATIENT)
Dept: CARDIAC SURGERY | Facility: CLINIC | Age: 73
End: 2023-01-17

## 2023-01-17 VITALS
BODY MASS INDEX: 29.62 KG/M2 | SYSTOLIC BLOOD PRESSURE: 161 MMHG | HEART RATE: 60 BPM | DIASTOLIC BLOOD PRESSURE: 71 MMHG | WEIGHT: 200.6 LBS | OXYGEN SATURATION: 100 %

## 2023-01-17 DIAGNOSIS — I42.1 HYPERTROPHIC OBSTRUCTIVE CARDIOMYOPATHY (HCC): ICD-10-CM

## 2023-01-17 DIAGNOSIS — I10 PRIMARY HYPERTENSION: ICD-10-CM

## 2023-01-17 DIAGNOSIS — I42.1 HOCM (HYPERTROPHIC OBSTRUCTIVE CARDIOMYOPATHY) (HCC): Primary | ICD-10-CM

## 2023-01-17 DIAGNOSIS — I47.20 VT (VENTRICULAR TACHYCARDIA): ICD-10-CM

## 2023-01-23 ENCOUNTER — TELEPHONE (OUTPATIENT)
Dept: INTERNAL MEDICINE CLINIC | Facility: CLINIC | Age: 73
End: 2023-01-23

## 2023-01-23 DIAGNOSIS — I10 HYPERTENSION, UNSPECIFIED TYPE: ICD-10-CM

## 2023-01-23 DIAGNOSIS — I10 HYPERTENSION, UNSPECIFIED TYPE: Primary | ICD-10-CM

## 2023-01-23 RX ORDER — CLONIDINE 0.3 MG/24H
1 PATCH, EXTENDED RELEASE TRANSDERMAL WEEKLY
Qty: 4 PATCH | Refills: 2 | Status: SHIPPED | OUTPATIENT
Start: 2023-01-23 | End: 2023-01-23

## 2023-01-23 RX ORDER — CLONIDINE 0.3 MG/24H
PATCH, EXTENDED RELEASE TRANSDERMAL
Qty: 12 PATCH | Refills: 2 | Status: SHIPPED | OUTPATIENT
Start: 2023-01-23

## 2023-01-23 NOTE — TELEPHONE ENCOUNTER
STARTED  NEW  RX  CLONIDINE 0 2 MG ITS  A    PATCH   HIS  BLOOD  PRESSURE   HAS   BEEN  RUNNING  164/88   PT   FEELS  HIS  BLOOD  PRESSURE   WAS   BETTER  ON  THE  PILLS  WANTS  TO KNOW   WHAT     [facility]   THINKS

## 2023-01-23 NOTE — TELEPHONE ENCOUNTER
Switch to the higher dose patch  Give it 2 weeks    If it is still not working I will put you back on the pills

## 2023-01-25 ENCOUNTER — DOCUMENTATION (OUTPATIENT)
Dept: CARDIOLOGY CLINIC | Facility: CLINIC | Age: 73
End: 2023-01-25

## 2023-02-02 DIAGNOSIS — I10 PRIMARY HYPERTENSION: ICD-10-CM

## 2023-02-02 RX ORDER — TORSEMIDE 10 MG/1
TABLET ORAL
Qty: 90 TABLET | Refills: 3 | Status: SHIPPED | OUTPATIENT
Start: 2023-02-02

## 2023-02-23 ENCOUNTER — DOCUMENTATION (OUTPATIENT)
Dept: CARDIOLOGY CLINIC | Facility: CLINIC | Age: 73
End: 2023-02-23

## 2023-03-10 ENCOUNTER — RA CDI HCC (OUTPATIENT)
Dept: OTHER | Facility: HOSPITAL | Age: 73
End: 2023-03-10

## 2023-03-10 NOTE — PROGRESS NOTES
E11 22, I13 0  [facility]  coding opportunities          Chart Reviewed number of suggestions sent to Provider: 2     Patients Insurance     Medicare Insurance: [employer]

## 2023-03-17 ENCOUNTER — OFFICE VISIT (OUTPATIENT)
Dept: INTERNAL MEDICINE CLINIC | Facility: CLINIC | Age: 73
End: 2023-03-17

## 2023-03-17 ENCOUNTER — APPOINTMENT (OUTPATIENT)
Dept: LAB | Facility: CLINIC | Age: 73
End: 2023-03-17

## 2023-03-17 VITALS
DIASTOLIC BLOOD PRESSURE: 86 MMHG | BODY MASS INDEX: 29.92 KG/M2 | SYSTOLIC BLOOD PRESSURE: 128 MMHG | OXYGEN SATURATION: 99 % | HEIGHT: 69 IN | WEIGHT: 202 LBS | HEART RATE: 72 BPM

## 2023-03-17 DIAGNOSIS — E11.69 TYPE 2 DIABETES MELLITUS WITH OTHER SPECIFIED COMPLICATION, WITHOUT LONG-TERM CURRENT USE OF INSULIN (HCC): ICD-10-CM

## 2023-03-17 DIAGNOSIS — I70.0 ATHEROSCLEROSIS OF AORTA (HCC): ICD-10-CM

## 2023-03-17 DIAGNOSIS — I49.5 SICK SINUS SYNDROME (HCC): ICD-10-CM

## 2023-03-17 DIAGNOSIS — E21.3 HYPERPARATHYROIDISM (HCC): ICD-10-CM

## 2023-03-17 DIAGNOSIS — E11.69 TYPE 2 DIABETES MELLITUS WITH OTHER SPECIFIED COMPLICATION, WITHOUT LONG-TERM CURRENT USE OF INSULIN (HCC): Primary | ICD-10-CM

## 2023-03-17 LAB
ANION GAP SERPL CALCULATED.3IONS-SCNC: 4 MMOL/L (ref 4–13)
BACTERIA UR QL AUTO: ABNORMAL /HPF
BILIRUB UR QL STRIP: NEGATIVE
BUN SERPL-MCNC: 13 MG/DL (ref 5–25)
CALCIUM SERPL-MCNC: 8.6 MG/DL (ref 8.3–10.1)
CHLORIDE SERPL-SCNC: 104 MMOL/L (ref 96–108)
CHOLEST SERPL-MCNC: 158 MG/DL
CLARITY UR: CLEAR
CO2 SERPL-SCNC: 29 MMOL/L (ref 21–32)
COLOR UR: YELLOW
CREAT SERPL-MCNC: 0.87 MG/DL (ref 0.6–1.3)
CREAT UR-MCNC: 184 MG/DL
EST. AVERAGE GLUCOSE BLD GHB EST-MCNC: 123 MG/DL
GFR SERPL CREATININE-BSD FRML MDRD: 85 ML/MIN/1.73SQ M
GLUCOSE P FAST SERPL-MCNC: 137 MG/DL (ref 65–99)
GLUCOSE UR STRIP-MCNC: NEGATIVE MG/DL
HBA1C MFR BLD: 5.9 %
HDLC SERPL-MCNC: 61 MG/DL
HGB UR QL STRIP.AUTO: NEGATIVE
HYALINE CASTS #/AREA URNS LPF: ABNORMAL /LPF
KETONES UR STRIP-MCNC: NEGATIVE MG/DL
LDLC SERPL CALC-MCNC: 87 MG/DL (ref 0–100)
LEUKOCYTE ESTERASE UR QL STRIP: NEGATIVE
MICROALBUMIN UR-MCNC: 602 MG/L (ref 0–20)
MICROALBUMIN/CREAT 24H UR: 327 MG/G CREATININE (ref 0–30)
NITRITE UR QL STRIP: NEGATIVE
NON-SQ EPI CELLS URNS QL MICRO: ABNORMAL /HPF
PH UR STRIP.AUTO: 6 [PH]
POTASSIUM SERPL-SCNC: 4 MMOL/L (ref 3.5–5.3)
PROT UR STRIP-MCNC: ABNORMAL MG/DL
RBC #/AREA URNS AUTO: ABNORMAL /HPF
SODIUM SERPL-SCNC: 137 MMOL/L (ref 135–147)
SP GR UR STRIP.AUTO: 1.02 (ref 1–1.03)
TRIGL SERPL-MCNC: 52 MG/DL
UROBILINOGEN UR STRIP-ACNC: <2 MG/DL
WBC #/AREA URNS AUTO: ABNORMAL /HPF

## 2023-03-17 NOTE — PROGRESS NOTES
Assessment and Plan:     Problem List Items Addressed This Visit        Endocrine    Type 2 diabetes mellitus ([facility] ) - Primary    Relevant Orders    Basic metabolic panel    Hemoglobin A1C    UA (URINE) with reflex to Scope    Microalbumin / creatinine urine ratio    Lipid Panel with Direct LDL reflex    Hyperparathyroidism (HCC)       Cardiovascular and Mediastinum    Atherosclerosis of aorta (HCC)    Sick sinus syndrome (HCC)     BMI Counseling: Body mass index is 29 83 kg/m²  The BMI is above normal  Nutrition recommendations include decreasing portion sizes and moderation in carbohydrate intake  Rationale for BMI follow-up plan is due to patient being overweight or obese  Depression Screening and Follow-up Plan: Patient was screened for depression during today's encounter  They screened negative with a PHQ-2 score of 0  Preventive health issues were discussed with patient, and age appropriate screening tests were ordered as noted in patient's After Visit Summary  Personalized health advice and appropriate referrals for health education or preventive services given if needed, as noted in patient's After Visit Summary  History of Present Illness:     Patient presents for a Medicare Wellness Visit     A 18-year-old  He is diabetic and hypertensive and control has been good  Hypertensive cardiomyopathy without heart failure  He does have grade 1 diastolic dysfunction monitoring documented nonsustained VT so now he has a pacemaker defibrillator  Coronary artery disease by virtue of finding a cardiac catheterization done in 2017 but no angina pectoris  This is from a prior note:    " Cardiac catheterization documented noncritical CAD with 60% LAD lesion and a large vessel with good flow not requiring intervention   This also documented hypertrophic cardiomyopathy with obliteration of the cavity an ejection fraction of 80% "      However, he was hospitalized back in April 2021 for urinary retention and sepsis with acute kidney injury  Discharged to home, saeed removed after 5 months ; had TURP Sep 2021      He needed repeat parathyroidectomy after lack of normalization of PTH following the 1st parathyroidectomy  Apparently he had 2 removed so now he has only 1 parathyroid gland  Gastric ulcer treated in 2018  Healed with no recurrence  He feels fine       Continues to smoke and expresses no interest in stopping     Patient Care Team:  Mike Cruz MD as PCP - Meghann Mccray MD (Oncology)  Daryle Dienes, MD (Cardiology)     Review of Systems:     Review of Systems   Constitutional: Positive for fatigue  Musculoskeletal: Positive for arthralgias          Problem List:     Patient Active Problem List   Diagnosis   • Non-recurrent bilateral inguinal hernia without obstruction or gangrene   • Hyperlipidemia   • HTN (hypertension)   • Type 2 diabetes mellitus ([facility] )   • Smoking   • Iron deficiency anemia due to chronic blood loss   • HOCM (hypertrophic obstructive cardiomyopathy) ([facility] )   • Coronary artery disease involving native coronary artery of native heart   • Atherosclerosis of aorta (HCC)   • Anemia associated with diabetes mellitus ([facility] )   • Thrombocytosis   • Hypercalcemia   • [facility]par[facility]roidism [facility] )   • History of aortic regurgitation   • History of obesity   • Hyperkalemia   • Metabolic acidosis   • Hematuria   • Anemia   • S/P subtotal parathyroidectomy ([facility])   • Antritis and gastric ulcer   • Urinary retention   • Urinary retention due to benign prostatic hyperplasia   • Benign localized hyperplasia of prostate with urinary obstruction   • CAD (coronary artery disease)   • VT (ventricular tachycardia)   • Palpitations   • Bradycardia   • Cardiomyopathy (HCC)   • Sick sinus syndrome [facility])      Past Medical and Surgical History:     Past Medical History:   Diagnosis Date   • Anemia    • Benign neoplasm of large intestine    • Bleeding gastric ulcer 2018   • Cardiomyopathy [facility])    • Chronic kidney disease    • Colon polyp    • Diabetes mellitus ([facility] )    • Disease of thyroid gland    • Heart murmur    • History of aortic regurgitation    • History of constipation    • History of degenerative joint disease    • History of nocturia    • History of obesity    • History of sebaceous cyst    • History of shortness of breath    • History of transfusion    • History of urinary frequency    • Hyperlipidemia    • Hyperparathyroidism ([facility] )    • Hypertension    • Myocardial infarction [facility]) 2018    january, 3 stents   • Polyposis coli     of the large intestine   • Ulcer of esophagus      Past Surgical History:   Procedure Laterality Date   • ACHILLES TENDON SURGERY Left 1973   • CARDIAC ELECTROPHYSIOLOGY PROCEDURE Left 12/23/2022    Procedure: insertion dual chamber ICD; Surgeon: Robert Solano DO;  Location: [facility];  Service: Cardiology   • CATARACT EXTRACTION Right 02/18/2021   • COLONOSCOPY      hyperplastic polyp   • CORONARY ANGIOPLASTY WITH STENT PLACEMENT      3 stents: 2 placed on Jan 2018, 1 on July 2018    • CYST REMOVAL  2019, 2020    left and right wrists   • ESOPHAGOGASTRODUODENOSCOPY N/A 1/23/2018    Procedure: ESOPHAGOGASTRODUODENOSCOPY (EGD); Surgeon: Kingston Yost MD;  Location: [facility]; Service: Gastroenterology   • ESOPHAGOGASTRODUODENOSCOPY     • HERNIA REPAIR     • HERNIA REPAIR Bilateral 8/18/2017    Procedure: LAPAROSCOPIC INGUINAL HERNIA REPAIR WITH MESH;  Surgeon: Devan Edwards MD;  Location: [facility];  Service: General   • LARYNGOSCOPY N/A 5/12/2022    Procedure: LARYNGOSCOPY DIRECT;  Surgeon: Sandra Aviles MD;  Location:  MAIN OR;  Service: Surgical Oncology   • SC ESOPHAGOGASTRODUODENOSCOPY TRANSORAL DIAGNOSTIC N/A 3/26/2018    Procedure: ESOPHAGOGASTRODUODENOSCOPY (EGD); Surgeon: Steffani Simmonds, MD;  Location: [facility];   Service: Gastroenterology   • SC ESOPHAGOGASTRODUODENOSCOPY TRANSORAL DIAGNOSTIC N/A 5/14/2018 Procedure: ESOPHAGOGASTRODUODENOSCOPY (EGD); Surgeon: Doris Mario MD;  Location: [facility]; Service: Gastroenterology   • NJ PARATHYROIDECTOMY/EXPLORATION PARATHYROIDS Right 3/23/2021    Procedure: RIGHT PARATHYROIDECTOMY, MINIMALLY INVASIVE, POSSIBLE 4-GLAND EXPLORATION, WITH INTRA-OPERATIVE PTH MONITORING;  Surgeon: Jordan Billings MD;  Location:  MAIN OR;  Service: Surgical Oncology   • NJ PARATHYROIDECTOMY/EXPLORATION PARATHYROIDS Right 5/12/2022    Procedure: TWO GLAND PARATHYROIDECTOMY, 4 GLAND EXPLORATION, INTRAOPERATIVE PTH MONITORING, FLEXIBLE LARYNGOSCOPY;  Surgeon: Jordan Billings MD;  Location:  MAIN OR;  Service: Surgical Oncology   • NJ TRURL ELECTROSURG RESCJ PROSTATE BLEED COMPLETE N/A 9/2/2021    Procedure: TRANSURETHRAL RESECTION OF PROSTATE (TURP);   Surgeon: Yonas Wiggins MD;  Location: [facility];  Service: Urology   • TIBIA FRACTURE SURGERY Left 1962      Family History:     Family History   Problem Relation Age of Onset   • Rheumatic fever Father    • Other Father         accidental poisoning   • Heart disease Mother       Social History:     Social History     Socioeconomic History   • Marital status: /Civil Union     Spouse name: None   • Number of children: None   • Years of education: None   • Highest education level: None   Occupational History   • Occupation:    Tobacco Use   • Smoking status: Every Day     Packs/day: 1 00     Years: 40 00     Pack years: 40 00     Types: Cigarettes     Start date: 1968   • Smokeless tobacco: Never   • Tobacco comments:     Smoked this morning 12/23 0700   Vaping Use   • Vaping Use: Never used   Substance and Sexual Activity   • Alcohol use: Not Currently     Comment: rare social occasion, once a year," takes whiskey if feeling a cold coming"   • Drug use: No   • Sexual activity: Not Currently     Partners: Female   Other Topics Concern   • None   Social History Narrative   • None     Social Determinants of Health Financial Resource Strain: Low Risk    • Difficulty of Paying Living Expenses: Not hard at all   Food Insecurity: Not on file   Transportation Needs: No Transportation Needs   • Lack of Transportation (Medical): No   • Lack of Transportation (Non-Medical): No   Physical Activity: Not on file   Stress: Not on file   Social Connections: Not on file   Intimate Partner Violence: Not on file   Housing Stability: Not on file      Medications and Allergies:     Current Outpatient Medications   Medication Sig Dispense Refill   • aspirin (ECOTRIN LOW STRENGTH) 81 mg EC tablet Take 1 tablet (81 mg total) by mouth daily     • atenolol (TENORMIN) 50 mg tablet Take 1 tablet (50 mg total) by mouth 2 (two) times a day 180 tablet 3   • atorvastatin (LIPITOR) 80 mg tablet Take 1 tablet (80 mg total) by mouth daily 90 tablet 3   • cloNIDine (CATAPRES-TTS-3) 0 3 mg/24 hr PLACE 1 PATCH ON THE SKIN OVER 7 DAYS ONCE A WEEK 12 patch 2   • lisinopril (ZESTRIL) 40 mg tablet TAKE 1 TABLET(40 MG) BY MOUTH DAILY 90 tablet 3   • metFORMIN (GLUCOPHAGE-XR) 500 mg 24 hr tablet Take 1 tablet (500 mg total) by mouth 2 (two) times a day with meals 180 tablet 3   • Omega-3 Fatty Acids (FISH OIL ADULT GUMMIES PO) Take by mouth     • pantoprazole (PROTONIX) 40 mg tablet Take 1 tablet (40 mg total) by mouth daily 90 tablet 3   • torsemide (DEMADEX) 10 mg tablet TAKE 1 TABLET(10 MG) BY MOUTH DAILY 90 tablet 3     No current facility-administered medications for this visit       No Known Allergies   Immunizations:     Immunization History   Administered Date(s) Administered   • COVID-19 PFIZER VACCINE 0 3 ML IM 03/10/2021, 04/01/2021, 10/04/2021   • Influenza, seasonal, injectable 1950, 1950   • Pneumococcal Conjugate 13-Valent 01/18/2017   • Pneumococcal Polysaccharide PPV23 1950, 03/17/2016, 07/24/2017   • Tdap 1950   • Zoster 03/17/2016   • Zoster Vaccine Recombinant 03/17/2016      Health Maintenance:         Topic Date Due • Lung Cancer Screening  06/20/2023   • Colorectal Cancer Screening  12/02/2025   • Hepatitis C Screening  Completed         Topic Date Due   • COVID-19 Vaccine (4 - Booster for Pfizer series) 11/29/2021   • Influenza Vaccine (1) 09/01/2022      Medicare Screening Tests and Risk Assessments:     Vikas Hoyt is here for his Subsequent Wellness visit  Last Medicare Wellness visit information reviewed, patient interviewed and updates made to the record today  Health Risk Assessment:   Patient rates overall health as good  Patient feels that their physical health rating is same  Patient is satisfied with their life  Eyesight was rated as same  Hearing was rated as same  Patient feels that their emotional and mental health rating is same  Patients states they are never, rarely angry  Patient states they are never, rarely unusually tired/fatigued  Pain experienced in the last 7 days has been none  Patient states that he has experienced no weight loss or gain in last 6 months  Depression Screening:   PHQ-2 Score: 0      Fall Risk Screening: In the past year, patient has experienced: no history of falling in past year      Home Safety:  Patient does not have trouble with stairs inside or outside of their home  Patient has working smoke alarms and has working carbon monoxide detector  Home safety hazards include: none  Nutrition:   Current diet is Regular  Medications:   Patient is currently taking over-the-counter supplements  OTC medications include: see medication list  Patient is able to manage medications  Activities of Daily Living (ADLs)/Instrumental Activities of Daily Living (IADLs):   Walk and transfer into and out of bed and chair?: Yes  Dress and groom yourself?: Yes    Bathe or shower yourself?: Yes    Feed yourself?  Yes  Do your laundry/housekeeping?: Yes  Manage your money, pay your bills and track your expenses?: Yes  Make your own meals?: Yes    Do your own shopping?: Yes    Previous Hospitalizations:   Any hospitalizations or ED visits within the last 12 months?: No      Advance Care Planning:   Living will: Yes    Advanced directive: Yes      Cognitive Screening:   Provider or family/friend/caregiver concerned regarding cognition?: No    PREVENTIVE SCREENINGS      Cardiovascular Screening:    General: Screening Not Indicated and History Lipid Disorder      Diabetes Screening:     General: Screening Not Indicated and History Diabetes      Colorectal Cancer Screening:     General: Screening Current      Prostate Cancer Screening:    General: Screening Current      Osteoporosis Screening:    General: Screening Not Indicated      Abdominal Aortic Aneurysm (AAA) Screening:    Risk factors include: age between 73-69 yo and tobacco use        General: Screening Not Indicated and History AAA      Lung Cancer Screening:     General: Screening Current      Hepatitis C Screening:    General: Screening Current    Screening, Brief Intervention, and Referral to Treatment (SBIRT)    Screening  Typical number of drinks in a day: 0    Single Item Drug Screening:  How often have you used an illegal drug (including marijuana) or a prescription medication for non-medical reasons in the past year? never    Single Item Drug Screen Score: 0  Interpretation: Negative screen for possible drug use disorder    No results found  Physical Exam:     /86 (BP Location: Left arm, Patient Position: Sitting)   Pulse 72   Ht 5' 9" (1 753 m)   Wt 91 6 kg (202 lb)   SpO2 99%   BMI 29 83 kg/m²     Physical Exam  Constitutional:       Appearance: Normal appearance  Comments: A moderately overweight male patient who appears to be the stated age   Eyes:      General: No scleral icterus  Cardiovascular:      Rate and Rhythm: Normal rate  Heart sounds: Murmur heard     Abdominal:      Comments: Large pannus   Musculoskeletal:      Comments: Age-related osteoarthrosis deformities   Skin:     General: Skin is warm and dry  Comments: Dry skin of the left palm   Neurological:      General: No focal deficit present  Mental Status: He is alert     Psychiatric:         Mood and Affect: Mood normal          Judgment: Judgment normal           Collette Lean, MD

## 2023-03-17 NOTE — PATIENT INSTRUCTIONS
A complex cardiology issue is cared for by the appropriate team  We can reassess his A1c today        Wellness parameters updated

## 2023-04-07 ENCOUNTER — IN-CLINIC DEVICE VISIT (OUTPATIENT)
Dept: CARDIOLOGY CLINIC | Facility: CLINIC | Age: 73
End: 2023-04-07

## 2023-04-07 DIAGNOSIS — Z95.810 PRESENCE OF IMPLANTABLE CARDIOVERTER-DEFIBRILLATOR (ICD): Primary | ICD-10-CM

## 2023-04-07 NOTE — PROGRESS NOTES
SJM DUAL CHAMBER ICD/ACTIVE SYSTEM IS MRI CONDITIONAL   DEVICE INTERROGATED IN THE [facility] OFF[facility]: BATTERY VOLTAGE ADEQUATE (7 8-9 1 YRS)  AP 26%  <1% ALL LEAD PARAMETERS WITHIN NORMAL LIMITS  3 NEW NONSUSTAINED EPISODES WITH EGMS SHOWING NSVT (19 BEATS  BPM, 18 BEATS @ 169 BPM) AND PAT (#13- 14 @ 158 BPM)   NO PROGRAMMING CHANGES MADE TO DEVICE PARAMETERS  NORMAL DEVICE FUNCTION   AM/RG

## 2023-05-04 DIAGNOSIS — E89.2 STATUS POST PARATHYROIDECTOMY (HCC): Primary | ICD-10-CM

## 2023-05-09 ENCOUNTER — APPOINTMENT (OUTPATIENT)
Age: 73
End: 2023-05-09

## 2023-05-09 DIAGNOSIS — E89.2 S/P SUBTOTAL PARATHYROIDECTOMY (HCC): ICD-10-CM

## 2023-05-09 DIAGNOSIS — E89.2 STATUS POST PARATHYROIDECTOMY (HCC): ICD-10-CM

## 2023-05-09 LAB
CALCIUM SERPL-MCNC: 8.7 MG/DL (ref 8.3–10.1)
PTH-INTACT SERPL-MCNC: 126.6 PG/ML (ref 18.4–80.1)

## 2023-05-14 LAB
25(OH)D2 SERPL-MCNC: 1.4 NG/ML
25(OH)D3 SERPL-MCNC: 5.2 NG/ML
25(OH)D3+25(OH)D2 SERPL-MCNC: 6.6 NG/ML

## 2023-05-17 ENCOUNTER — OFFICE VISIT (OUTPATIENT)
Dept: SURGICAL ONCOLOGY | Facility: CLINIC | Age: 73
End: 2023-05-17

## 2023-05-17 VITALS
DIASTOLIC BLOOD PRESSURE: 82 MMHG | BODY MASS INDEX: 30.21 KG/M2 | HEART RATE: 63 BPM | OXYGEN SATURATION: 98 % | WEIGHT: 204 LBS | RESPIRATION RATE: 17 BRPM | TEMPERATURE: 98.1 F | SYSTOLIC BLOOD PRESSURE: 134 MMHG | HEIGHT: 69 IN

## 2023-05-17 DIAGNOSIS — E89.2 S/P SUBTOTAL PARATHYROIDECTOMY (HCC): Primary | ICD-10-CM

## 2023-05-17 DIAGNOSIS — E21.3 HYPERPARATHYROIDISM (HCC): ICD-10-CM

## 2023-05-17 NOTE — PROGRESS NOTES
Surgical Oncology Follow Up       [facility] SURGICAL ONCOLOGY [place]  [facility] [address]    Tono Bolivar  1950  [id number]  [facility] SURGICAL ONCOLOGY [place]  [place]ia Baez [address]    Chief Complaint   Patient presents with   • Follow-up       Assessment/Plan:    No problem-specific Assessment & Plan notes found for this encounter  Diagnoses and all orders for this visit:    S/P subtotal parathyroidectomy ([facility] )    Hyperparathyroidism [facility])        Advance Care Planning/Advance Directives:  Discussed disease status, cancer treatment plans and/or cancer treatment goals with the patient  Oncology History    No history exists  History of Present Illness: 66-year-old man here for follow-up visit  He is status post 3 gland parathyroidectomy   -Interval History: He had recent blood work done in anticipation of today's visit  He otherwise feels fairly well  Review of Systems:  Review of Systems   Constitutional: Negative  HENT: Negative  Eyes: Negative  Respiratory: Negative  Cardiovascular: Negative  Gastrointestinal: Negative  Endocrine: Negative  Genitourinary: Negative  Musculoskeletal: Negative  Skin: Negative  Allergic/Immunologic: Negative  Neurological: Negative  Hematological: Negative  Psychiatric/Behavioral: Negative  All other systems reviewed and are negative        Patient Active Problem List   Diagnosis   • Non-recurrent bilateral inguinal hernia without obstruction or gangrene   • Hyperlipidemia   • HTN (hypertension)   • Type 2 diabetes mellitus ([facility] )   • Smoking   • Iron deficiency anemia due to chronic blood loss   • HOCM (hypertrophic obstructive cardiomyopathy) ([facility] )   • Coronary artery disease involving native coronary artery of native heart   • Atherosclerosis of aorta (HCC)   • Anemia associated with diabetes mellitus [facility])   • Thrombocytosis   • Hypercalcemia   • Hyperparathyroidism ([place] [address] )   • History of aortic regurgitation   • History of obesity   • Hyperkalemia   • Metabolic acidosis   • Hematuria   • Anemia   • S/P subtotal parathyroidectomy ([place] [address] )   • Antritis and gastric ulcer   • Urinary retention   • Urinary retention due to benign prostatic hyperplasia   • Benign localized hyperplasia of prostate with urinary obstruction   • CAD (coronary artery disease)   • VT (ventricular tachycardia) ([facility])   • Palpitations   • Bradycardia   • Cardiomyopathy ([place] [address] )   • Sick sinus syndrome ([place] [address] )     Past Medical History:   Diagnosis Date   • Anemia    • Benign neoplasm of large intestine    • Bleeding gastric ulcer 2018   • Cardiomyopathy ([place] [address] )    • Chronic kidney disease    • Colon polyp    • Diabetes mellitus ([place] [address] )    • Disease of thyroid gland    • Heart murmur    • History of aortic regurgitation    • History of constipation    • History of degenerative joint disease    • History of nocturia    • History of obesity    • History of sebaceous cyst    • History of shortness of breath    • History of transfusion    • History of urinary frequency    • Hyperlipidemia    • Hyperparathyroidism ([place] [address] )    • Hypertension    • Myocardial infarction ([place] [address] ) 2018    january, 3 stents   • Polyposis coli     of the large intestine   • Ulcer of esophagus      Past Surgical History:   Procedure Laterality Date   • ACHILLES TENDON SURGERY Left 1973   • CARDIAC ELECTROPHYSIOLOGY PROCEDURE Left 12/23/2022    Procedure: insertion dual chamber ICD; Surgeon: Mavis Chase DO;  [facility];  Service: Cardiology   • CATARACT EXTRACTION Right 02/18/2021   • COLONOSCOPY      hyperplastic polyp   • CORONARY ANGIOPLASTY WITH STENT PLACEMENT      3 stents: 2 placed on Jan 2018, 1 on July 2018    • CYST REMOVAL  2019, 2020    left and right wrists   • ESOPHAGOGASTRODUODENOSCOPY N/A 1/23/2018    Procedure: ESOPHAGOGASTRODUODENOSCOPY (EGD);   Surgeon: Jasvir Phelps Evelyn Garza MD;  [facility]; Service: Gastroenterology   • ESOPHAGOGASTRODUODENOSCOPY     • HERNIA REPAIR     • HERNIA REPAIR Bilateral 8/18/2017    Procedure: LAPAROSCOPIC INGUINAL HERNIA REPAIR WITH MESH;  Surgeon: Nicol Barton MD;  [facility];  Service: General   • LARYNGOSCOPY N/A 5/12/2022    Procedure: LARYNGOSCOPY DIRECT;  Surgeon: Rebecca Mederos MD;  Location: [facility];  Service: Surgical Oncology   • NM ESOPHAGOGASTRODUODENOSCOPY TRANSORAL DIAGNOSTIC N/A 3/26/2018    Procedure: ESOPHAGOGASTRODUODENOSCOPY (EGD); Surgeon: Jun Delcid MD;  [facility]; Service: Gastroenterology   • NM ESOPHAGOGASTRODUODENOSCOPY TRANSORAL DIAGNOSTIC N/A 5/14/2018    Procedure: ESOPHAGOGASTRODUODENOSCOPY (EGD); Surgeon: Jun Delcid MD;  [facility]; Service: Gastroenterology   • NM PARATHYROIDECTOMY/EXPLORATION PARATHYROIDS Right 3/23/2021    Procedure: RIGHT PARATHYROIDECTOMY, MINIMALLY INVASIVE, POSSIBLE 4-GLAND EXPLORATION, WITH INTRA-OPERATIVE PTH MONITORING;  Surgeon: Rebecca Mederos MD;  [facility];  Service: Surgical Oncology   • NM PARATHYROIDECTOMY/EXPLORATION PARATHYROIDS Right 5/12/2022    Procedure: TWO GLAND PARATHYROIDECTOMY, 4 GLAND EXPLORATION, INTRAOPERATIVE PTH MONITORING, FLEXIBLE LARYNGOSCOPY;  Surgeon: Rebecca Mederos MD;  [facility];  Service: Surgical Oncology   • NM TRURL ELECTROSURG RESCJ PROSTATE BLEED COMPLETE N/A 9/2/2021    Procedure: TRANSURETHRAL RESECTION OF PROSTATE (TURP);   Surgeon: Lavern Pérez MD;  [facility];  Service: Urology   • TIBIA FRACTURE SURGERY Left 1962     Family History   Problem Relation Age of Onset   • Rheumatic fever Father    • Other Father         accidental poisoning   • Heart disease Mother      Social History     Socioeconomic History   • Marital status: /Civil Union     Spouse name: Not on file   • Number of children: Not on file   • Years of education: Not on "file   • Highest education level: Not on file   Occupational History   • Occupation:    Tobacco Use   • Smoking status: Every Day     Packs/day: 1 00     Years: 40 00     Pack years: 40 00     Types: Cigarettes     Start date: 1968   • Smokeless tobacco: Never   • Tobacco comments:     Smoked this morning 12/23 0700   Vaping Use   • Vaping Use: Never used   Substance and Sexual Activity   • Alcohol use: Not Currently     Comment: rare social occasion, once a year,\" takes whiskey if feeling a cold coming\"   • Drug use: No   • Sexual activity: Not Currently     Partners: Female   Other Topics Concern   • Not on file   Social History Narrative   • Not on file     Social Determinants of Health     Financial Resource Strain: Low Risk    • Difficulty of Paying Living Expenses: Not hard at all   Food Insecurity: Not on file   Transportation Needs: No Transportation Needs   • Lack of Transportation (Medical): No   • Lack of Transportation (Non-Medical):  No   Physical Activity: Not on file   Stress: Not on file   Social Connections: Not on file   Intimate Partner Violence: Not on file   Housing Stability: Not on file       Current Outpatient Medications:   •  aspirin (ECOTRIN LOW STRENGTH) 81 mg EC tablet, Take 1 tablet (81 mg total) by mouth daily, Disp: , Rfl:   •  atenolol (TENORMIN) 50 mg tablet, Take 1 5 tablets (75 mg total) by mouth 2 (two) times a day, Disp: 270 tablet, Rfl: 3  •  atorvastatin (LIPITOR) 80 mg tablet, Take 1 tablet (80 mg total) by mouth daily, Disp: 90 tablet, Rfl: 3  •  cloNIDine (CATAPRES-TTS-3) 0 3 mg/24 hr, PLACE 1 PATCH ON THE SKIN OVER 7 DAYS ONCE A WEEK, Disp: 12 patch, Rfl: 2  •  lisinopril (ZESTRIL) 40 mg tablet, TAKE 1 TABLET(40 MG) BY MOUTH DAILY, Disp: 90 tablet, Rfl: 3  •  metFORMIN (GLUCOPHAGE-XR) 500 mg 24 hr tablet, Take 1 tablet (500 mg total) by mouth 2 (two) times a day with meals, Disp: 180 tablet, Rfl: 3  •  Omega-3 Fatty Acids (FISH OIL ADULT GUMMIES PO), Take by mouth, " Disp: , Rfl:   •  pantoprazole (PROTONIX) 40 mg tablet, Take 1 tablet (40 mg total) by mouth daily, Disp: 90 tablet, Rfl: 3  •  torsemide (DEMADEX) 10 mg tablet, TAKE 1 TABLET(10 MG) BY MOUTH DAILY, Disp: 90 tablet, Rfl: 3  No Known Allergies  Vitals:    05/17/23 0935   BP: 134/82   Pulse: 63   Resp: 17   Temp: 98 1 °F (36 7 °C)   SpO2: 98%       Physical Exam  Vitals reviewed  Constitutional:       Appearance: Normal appearance  HENT:      Head: Normocephalic and atraumatic  Right Ear: External ear normal       Left Ear: External ear normal    Eyes:      Extraocular Movements: Extraocular movements intact  Pupils: Pupils are equal, round, and reactive to light  Cardiovascular:      Rate and Rhythm: Normal rate and regular rhythm  Heart sounds: Normal heart sounds  Pulmonary:      Effort: Pulmonary effort is normal       Breath sounds: Normal breath sounds  Abdominal:      General: Abdomen is flat  Palpations: Abdomen is soft  Musculoskeletal:         General: Normal range of motion  Cervical back: Normal range of motion and neck supple  Skin:     General: Skin is warm and dry  Neurological:      General: No focal deficit present  Mental Status: He is alert and oriented to person, place, and time  Psychiatric:         Mood and Affect: Mood normal          Behavior: Behavior normal            Results:  Labs:  Lab Results   Component Value Date     6 (H) 05/09/2023    CALCIUM 8 7 05/09/2023    PHOS 2 2 (L) 04/06/2021         Imaging  Cardiac EP device report    Result Date: 4/18/2023  Narrative: SJM DUAL CHAMBER ICD/ACTIVE SYSTEM IS MRI CONDITIONAL NB MERLIN HVR ALERT TRANSMISSION:  BATTERY VOLTAGE ADEQUATE (7 8-8 9 YR )  AP 38%  <1%  ALL LEAD PARAMETERS WITHIN NORMAL LIMITS   2 NON-SUSTAINED EPISODES WITH 1 AVAILABLE EGM SHOWING NSVT/VF (19 @ 207 BPM)  TASK TO DR AGUSTIN   CORVUE IMPEDANCE MONITORING WITHIN NORMAL LIMITS  NORMAL DEVICE FUNCTION    RG     I reviewed the above laboratory and imaging data  Discussion/Summary: 78-year-old man status post 3 parathyroidectomy  Calcium levels are normal   He had a nice response to his last operation but his PTH levels are back up, likely due to his low vitamin D levels  I therefore recommend that he start taking vitamin D on a regular basis  I will plan on following him on apparent basis given normalization of calcium levels

## 2023-05-17 NOTE — LETTER
May 17, 2023     Elva Kilpatrick MD  [address]  [address] [facility] [employer] [address] [place] [address]    Patient: Terry Jacob   YOB: 1950   Date of Visit: 5/17/2023       Dear Dr Neema Wilkinson: Thank you for referring Della Gracia to me for evaluation  Below are my notes for this consultation  If you have questions, please do not hesitate to call me  I look forward to following your patient along with you  Sincerely,        Arnold Dill MD        CC: MD Arnold Rogers MD  5/17/2023 10:21 AM  Sign when Signing Visit     Surgical Oncology Follow Up       [address] [address]    Terry Jacob  1950  [id number]  [facility]  [facility] SURGICAL ONCOLOGY [address] [address]    Chief Complaint   Patient presents with   • Follow-up       Assessment/Plan:    No problem-specific Assessment & Plan notes found for this encounter  Diagnoses and all orders for this visit:    S/P subtotal parathyroidectomy ([facility] [address] )    Hyperparathyroidism [facility])       Advance Care Planning/Advance Directives:  Discussed disease status, cancer treatment plans and/or cancer treatment goals with the patient  Oncology History    No history exists  History of Present Illness: 15-year-old man here for follow-up visit  He is status post 3 gland parathyroidectomy   -Interval History: He had recent blood work done in anticipation of today's visit  He otherwise feels fairly well  Review of Systems:  Review of Systems   Constitutional: Negative  HENT: Negative  Eyes: Negative  Respiratory: Negative  Cardiovascular: Negative  Gastrointestinal: Negative  Endocrine: Negative  Genitourinary: Negative  Musculoskeletal: Negative  Skin: Negative  Allergic/Immunologic: Negative  Neurological: Negative      Hematological: Negative  Psychiatric/Behavioral: Negative  All other systems reviewed and are negative        Patient Active Problem List   Diagnosis   • Non-recurrent bilateral inguinal hernia without obstruction or gangrene   • Hyperlipidemia   • HTN (hypertension)   • Type 2 diabetes mellitus ([place] [address] )   • Smoking   • Iron deficiency anemia due to chronic blood loss   • HOCM (hypertrophic obstructive cardiomyopathy) ([place] [address] )   • Coronary artery disease involving native coronary artery of native heart   • Atherosclerosis of aorta ([facility])   • Anemia associated with diabetes mellitus ([facility])   • Thrombocytosis   • Hypercalcemia   • Hyperparathyroidism ([place] [address] )   • History of aortic regurgitation   • History of obesity   • Hyperkalemia   • Metabolic acidosis   • Hematuria   • Anemia   • S/P subtotal parathyroidectomy ([facility])   • Antritis and gastric ulcer   • Urinary retention   • Urinary retention due to benign prostatic hyperplasia   • Benign localized hyperplasia of prostate with urinary obstruction   • CAD (coronary artery disease)   • VT (ventricular tachycardia) ([facility])   • Palpitations   • Bradycardia   • Cardiomyopathy ([facility])   • Sick sinus syndrome ([place] [address] )     Past Medical History:   Diagnosis Date   • Anemia    • Benign neoplasm of large intestine    • Bleeding gastric ulcer 2018   • Cardiomyopathy ([place] [address] )    • Chronic kidney disease    • Colon polyp    • Diabetes mellitus ([place] [address] )    • Disease of thyroid gland    • Heart murmur    • History of aortic regurgitation    • History of constipation    • History of degenerative joint disease    • History of nocturia    • History of obesity    • History of sebaceous cyst    • History of shortness of breath    • History of transfusion    • History of urinary frequency    • Hyperlipidemia    • Hyperparathyroidism ([place] [address] )    • Hypertension    • Myocardial infarction [facility]) 2018    january, 3 stents   • Polyposis coli     of the large intestine   • Ulcer of esophagus      Past Surgical History:   Procedure Laterality Date   • ACHILLES TENDON SURGERY Left 1973   • CARDIAC ELECTROPHYSIOLOGY PROCEDURE Left 12/23/2022    Procedure: insertion dual chamber ICD; Surgeon: Amy Groves DO;  [facility];  Service: Cardiology   • CATARACT EXTRACTION Right 02/18/2021   • COLONOSCOPY      hyperplastic polyp   • CORONARY ANGIOPLASTY WITH STENT PLACEMENT      3 stents: 2 placed on Jan 2018, 1 on July 2018    • CYST REMOVAL  2019, 2020    left and right wrists   • ESOPHAGOGASTRODUODENOSCOPY N/A 1/23/2018    Procedure: ESOPHAGOGASTRODUODENOSCOPY (EGD); Surgeon: Xiomara Caro MD;  [facility]; Service: Gastroenterology   • ESOPHAGOGASTRODUODENOSCOPY     • HERNIA REPAIR     • HERNIA REPAIR Bilateral 8/18/2017    Procedure: LAPAROSCOPIC INGUINAL HERNIA REPAIR WITH MESH;  Surgeon: Lyna Mohs, MD;  [facility];  Service: General   • LARYNGOSCOPY N/A 5/12/2022    Procedure: LARYNGOSCOPY DIRECT;  Surgeon: Lauren Barone MD;  [facility];  Service: Surgical Oncology   • W[place] ESOPHAGOGASTRODUODENOSCOPY TRANSORAL DIAGNOSTIC N/A 3/26/2018    Procedure: ESOPHAGOGASTRODUODENOSCOPY (EGD); Surgeon: Taqueria Olson MD;  [facility]; Service: Gastroenterology   • W[place] ESOPHAGOGASTRODUODENOSCOPY TRANSORAL DIAGNOSTIC N/A 5/14/2018    Procedure: ESOPHAGOGASTRODUODENOSCOPY (EGD); Surgeon: Taqueria Olson MD;  [facility];   Service: Gastroenterology   • W[place] PARATHYROIDECTOMY/EXPLORATION PARATHYROIDS Right 3/23/2021    Procedure: RIGHT PARATHYROIDECTOMY, MINIMALLY INVASIVE, POSSIBLE 4-GLAND EXPLORATION, WITH INTRA-OPERATIVE PTH MONITORING;  Surgeon: Lauren Barone MD;  [facility];  Service: Surgical Oncology   • WY PARATHYROIDECTOMY/EXPLORATION PARATHYROIDS Right 5/12/2022    Procedure: TWO GLAND PARATHYROIDECTOMY, 4 GLAND EXPLORATION, INTRAOPERATIVE PTH MONITORING, FLEXIBLE LARYNGOSCOPY;  Surgeon: Lauren Barone MD;  [facility];  Service: Surgical Oncology "  • CO TRURL ELECTROSURG RESCJ PROSTATE BLEED COMPLETE N/A 9/2/2021    Procedure: TRANSURETHRAL RESECTION OF PROSTATE (TURP); Surgeon: Lian Ingram MD;  [facility];  Service: Urology   • TIBIA FRACTURE SURGERY Left 1962     Family History   Problem Relation Age of Onset   • Rheumatic fever Father    • Other Father         accidental poisoning   • Heart disease Mother      Social History     Socioeconomic History   • Marital status: /Civil Union     Spouse name: Not on file   • Number of children: Not on file   • Years of education: Not on file   • Highest education level: Not on file   Occupational History   • Occupation:    Tobacco Use   • Smoking status: Every Day     Packs/day: 1 00     Years: 40 00     Pack years: 40 00     Types: Cigarettes     Start date: 1968   • Smokeless tobacco: Never   • Tobacco comments:     Smoked this morning 12/23 0700   Vaping Use   • Vaping Use: Never used   Substance and Sexual Activity   • Alcohol use: Not Currently     Comment: rare social occasion, once a year,\" takes whiskey if feeling a cold coming\"   • Drug use: No   • Sexual activity: Not Currently     Partners: Female   Other Topics Concern   • Not on file   Social History Narrative   • Not on file     Social Determinants of Health     Financial Resource Strain: Low Risk    • Difficulty of Paying Living Expenses: Not hard at all   Food Insecurity: Not on file   Transportation Needs: No Transportation Needs   • Lack of Transportation (Medical): No   • Lack of Transportation (Non-Medical):  No   Physical Activity: Not on file   Stress: Not on file   Social Connections: Not on file   Intimate Partner Violence: Not on file   Housing Stability: Not on file       Current Outpatient Medications:   •  aspirin (ECOTRIN LOW STRENGTH) 81 mg EC tablet, Take 1 tablet (81 mg total) by mouth daily, Disp: , Rfl:   •  atenolol (TENORMIN) 50 mg tablet, Take 1 5 tablets (75 mg total) by mouth 2 (two) times a " day, Disp: 270 tablet, Rfl: 3  •  atorvastatin (LIPITOR) 80 mg tablet, Take 1 tablet (80 mg total) by mouth daily, Disp: 90 tablet, Rfl: 3  •  cloNIDine (CATAPRES-TTS-3) 0 3 mg/24 hr, PLACE 1 PATCH ON THE SKIN OVER 7 DAYS ONCE A WEEK, Disp: 12 patch, Rfl: 2  •  lisinopril (ZESTRIL) 40 mg tablet, TAKE 1 TABLET(40 MG) BY MOUTH DAILY, Disp: 90 tablet, Rfl: 3  •  metFORMIN (GLUCOPHAGE-XR) 500 mg 24 hr tablet, Take 1 tablet (500 mg total) by mouth 2 (two) times a day with meals, Disp: 180 tablet, Rfl: 3  •  Omega-3 Fatty Acids (FISH OIL ADULT GUMMIES PO), Take by mouth, Disp: , Rfl:   •  pantoprazole (PROTONIX) 40 mg tablet, Take 1 tablet (40 mg total) by mouth daily, Disp: 90 tablet, Rfl: 3  •  torsemide (DEMADEX) 10 mg tablet, TAKE 1 TABLET(10 MG) BY MOUTH DAILY, Disp: 90 tablet, Rfl: 3  No Known Allergies  Vitals:    05/17/23 0935   BP: 134/82   Pulse: 63   Resp: 17   Temp: 98 1 °F (36 7 °C)   SpO2: 98%       Physical Exam  Vitals reviewed  Constitutional:       Appearance: Normal appearance  HENT:      Head: Normocephalic and atraumatic  Right Ear: External ear normal       Left Ear: External ear normal    Eyes:      Extraocular Movements: Extraocular movements intact  Pupils: Pupils are equal, round, and reactive to light  Cardiovascular:      Rate and Rhythm: Normal rate and regular rhythm  Heart sounds: Normal heart sounds  Pulmonary:      Effort: Pulmonary effort is normal       Breath sounds: Normal breath sounds  Abdominal:      General: Abdomen is flat  Palpations: Abdomen is soft  Musculoskeletal:         General: Normal range of motion  Cervical back: Normal range of motion and neck supple  Skin:     General: Skin is warm and dry  Neurological:      General: No focal deficit present  Mental Status: He is alert and oriented to person, place, and time     Psychiatric:         Mood and Affect: Mood normal          Behavior: Behavior normal  Results:  Labs:  Lab Results   Component Value Date     6 (H) 05/09/2023    CALCIUM 8 7 05/09/2023    PHOS 2 2 (L) 04/06/2021         Imaging  Cardiac EP device report    Result Date: 4/18/2023  Narrative: SJM DUAL CHAMBER ICD/ACTIVE SYSTEM IS MRI CONDITIONAL NB MERLIN HVR ALERT TRANSMISSION:  BATTERY VOLTAGE ADEQUATE (7 8-8 9 YR )  AP 38%  <1%  ALL LEAD PARAMETERS WITHIN NORMAL LIMITS   2 NON-SUSTAINED EPISODES WITH 1 AVAILABLE EGM SHOWING NSVT/VF (19 @ 207 BPM)  TASK TO DR NIKOLE SEGURA IMPEDANCE MONITORING WITHIN NORMAL LIMITS  NORMAL DEVICE FUNCTION  RG     I reviewed the above laboratory and imaging data  Discussion/Summary: 66-year-old man status post 3 parathyroidectomy  Calcium levels are normal   He had a nice response to his last operation but his PTH levels are back up, likely due to his low vitamin D levels  I therefore recommend that he start taking vitamin D on a regular basis  I will plan on following him on apparent basis given normalization of calcium levels

## 2023-06-17 DIAGNOSIS — I10 HYPERTENSION, UNSPECIFIED TYPE: ICD-10-CM

## 2023-06-19 RX ORDER — ATENOLOL 50 MG/1
75 TABLET ORAL 2 TIMES DAILY
Qty: 270 TABLET | Refills: 0 | Status: SHIPPED | OUTPATIENT
Start: 2023-06-19 | End: 2023-06-20 | Stop reason: SDUPTHER

## 2023-06-20 ENCOUNTER — OFFICE VISIT (OUTPATIENT)
Dept: CARDIOLOGY CLINIC | Facility: CLINIC | Age: 73
End: 2023-06-20
Payer: MEDICARE

## 2023-06-20 VITALS
HEIGHT: 69 IN | RESPIRATION RATE: 16 BRPM | BODY MASS INDEX: 29.92 KG/M2 | OXYGEN SATURATION: 100 % | HEART RATE: 69 BPM | SYSTOLIC BLOOD PRESSURE: 130 MMHG | WEIGHT: 202 LBS | DIASTOLIC BLOOD PRESSURE: 80 MMHG

## 2023-06-20 DIAGNOSIS — I42.1 HOCM (HYPERTROPHIC OBSTRUCTIVE CARDIOMYOPATHY) (HCC): Primary | ICD-10-CM

## 2023-06-20 DIAGNOSIS — F17.200 SMOKING: ICD-10-CM

## 2023-06-20 DIAGNOSIS — I10 PRIMARY HYPERTENSION: ICD-10-CM

## 2023-06-20 DIAGNOSIS — I25.10 CORONARY ARTERY DISEASE INVOLVING NATIVE CORONARY ARTERY OF NATIVE HEART WITHOUT ANGINA PECTORIS: ICD-10-CM

## 2023-06-20 PROCEDURE — 99214 OFFICE O/P EST MOD 30 MIN: CPT | Performed by: INTERNAL MEDICINE

## 2023-06-20 RX ORDER — ATENOLOL 50 MG/1
75 TABLET ORAL 2 TIMES DAILY
Qty: 270 TABLET | Refills: 3 | Status: SHIPPED | OUTPATIENT
Start: 2023-06-20

## 2023-06-20 NOTE — PROGRESS NOTES
[facility]  [id number] [id number]  Cardiology Follow Up    Emilia Larkin  1950  [id number]      1  HOCM (hypertrophic obstructive cardiomyopathy) ([place] [address] )        2  Primary hypertension  atenolol (TENORMIN) 50 mg tablet      3  Coronary artery disease involving native coronary artery of native heart without angina pectoris        4  Smoking            Chief Complaint   Patient presents with   • Follow-up       Interval History:   Patient presents for follow-up visit  Patient denies any history of chest pain shortness of breath  Patient denies any history of leg edema or orthopnea PND  No history of presyncope syncope  Patient states compliance with the present list of medications  Patient denies any history of ICD discharges  Patient unfortunately continues to smoke in spite of repeated counseling  Was evaluated in the HCM clinic     No history of palpitations or dizziness        Patient Active Problem List   Diagnosis   • Non-recurrent bilateral inguinal hernia without obstruction or gangrene   • Hyperlipidemia   • HTN (hypertension)   • Type 2 diabetes mellitus ([place] [address] )   • Smoking   • Iron deficiency anemia due to chronic blood loss   • HOCM (hypertrophic obstructive cardiomyopathy) ([place] [address] )   • Coronary artery disease involving native coronary artery of native heart   • Atherosclerosis of aorta ([facility])   • Anemia associated with diabetes mellitus ([facility])   • Thrombocytosis   • Hypercalcemia   • Hyperparathyroidism ([place] [address] )   • History of aortic regurgitation   • History of obesity   • Hyperkalemia   • Metabolic acidosis   • Hematuria   • Anemia   • S/P subtotal parathyroidectomy ([facility])   • Antritis and gastric ulcer   • Urinary retention   • Urinary retention due to benign prostatic hyperplasia   • Benign localized hyperplasia of prostate with urinary obstruction   • CAD (coronary artery disease)   • VT (ventricular tachycardia) ([facility])   • Palpitations   • "Bradycardia   • Cardiomyopathy [facility])   • Sick sinus syndrome [facility])     Past Medical History:   Diagnosis Date   • Anemia    • Benign neoplasm of large intestine    • Bleeding gastric ulcer 2018   • Cardiomyopathy [facility])    • Chronic kidney disease    • Colon polyp    • Diabetes mellitus ([facility] )    • Disease of thyroid gland    • Heart murmur    • History of aortic regurgitation    • History of constipation    • History of degenerative joint disease    • History of nocturia    • History of obesity    • History of sebaceous cyst    • History of shortness of breath    • History of transfusion    • History of urinary frequency    • Hyperlipidemia    • Hyperparathyroidism ([facility] )    • Hypertension    • Myocardial infarction ([facility] ) 2018    january, 3 stents   • Polyposis coli     of the large intestine   • Ulcer of esophagus      Social History     Socioeconomic History   • Marital status: /Civil Union     Spouse name: Not on file   • Number of children: Not on file   • Years of education: Not on file   • Highest education level: Not on file   Occupational History   • Occupation:    Tobacco Use   • Smoking status: Every Day     Packs/day: 1 00     Years: 40 00     Total pack years: 40 00     Types: Cigarettes     Start date: 1968   • Smokeless tobacco: Never   • Tobacco comments:     Smoked this morning 12/23 0700   Vaping Use   • Vaping Use: Never used   Substance and Sexual Activity   • Alcohol use: Not Currently     Comment: rare social occasion, once a year,\" takes whiskey if feeling a cold coming\"   • Drug use: No   • Sexual activity: Not Currently     Partners: Female   Other Topics Concern   • Not on file   Social History Narrative   • Not on file     Social Determinants of Health     Financial Resource Strain: Low Risk  (3/17/2023)    Overall Financial Resource Strain (CARDIA)    • Difficulty of Paying Living Expenses: Not hard at all   Food Insecurity: Not on file   Transportation Needs: No " Transportation Needs (3/17/2023)    PRAPARE - Transportation    • Lack of Transportation (Medical): No    • Lack of Transportation (Non-Medical): No   Physical Activity: Inactive (4/19/2021)    Exercise Vital Sign    • Days of Exercise per Week: 0 days    • Minutes of Exercise per Session: 0 min   Stress: No Stress Concern Present (4/19/2021)    [address]    • Feeling of Stress : Not at all   Social Connections: Not on file   Intimate Partner Violence: Not on file   Housing Stability: Not on file      Family History   Problem Relation Age of Onset   • Rheumatic fever Father    • Other Father         accidental poisoning   • Heart disease Mother      Past Surgical History:   Procedure Laterality Date   • ACHILLES TENDON SURGERY Left 1973   • CARDIAC ELECTROPHYSIOLOGY PROCEDURE Left 12/23/2022    Procedure: insertion dual chamber ICD; Surgeon: Mary Solitario DO;  Location: [facility];  Service: Cardiology   • CATARACT EXTRACTION Right 02/18/2021   • COLONOSCOPY      hyperplastic polyp   • CORONARY ANGIOPLASTY WITH STENT PLACEMENT      3 stents: 2 placed on Jan 2018, 1 on July 2018    • CYST REMOVAL  2019, 2020    left and right wrists   • ESOPHAGOGASTRODUODENOSCOPY N/A 1/23/2018    Procedure: ESOPHAGOGASTRODUODENOSCOPY (EGD); Surgeon: Joce Mccall MD;  Location: [facility]; Service: Gastroenterology   • ESOPHAGOGASTRODUODENOSCOPY     • HERNIA REPAIR     • HERNIA REPAIR Bilateral 8/18/2017    Procedure: LAPAROSCOPIC INGUINAL HERNIA REPAIR WITH MESH;  Surgeon: Lduy Hilliard MD;  Location: [facility];  Service: General   • LARYNGOSCOPY N/A 5/12/2022    Procedure: LARYNGOSCOPY DIRECT;  Surgeon: Petra Banerjee MD;  Location:  MAIN OR;  Service: Surgical Oncology   • NC ESOPHAGOGASTRODUODENOSCOPY TRANSORAL DIAGNOSTIC N/A 3/26/2018    Procedure: ESOPHAGOGASTRODUODENOSCOPY (EGD); Surgeon: Hilary Abad MD;  Location: [facility];   Service: Gastroenterology   • DE ESOPHAGOGASTRODUODENOSCOPY TRANSORAL DIAGNOSTIC N/A 5/14/2018    Procedure: ESOPHAGOGASTRODUODENOSCOPY (EGD); Surgeon: Hermelinda Ugalde MD;  Location: [facility]; Service: Gastroenterology   • DE PARATHYROIDECTOMY/EXPLORATION PARATHYROIDS Right 3/23/2021    Procedure: RIGHT PARATHYROIDECTOMY, MINIMALLY INVASIVE, POSSIBLE 4-GLAND EXPLORATION, WITH INTRA-OPERATIVE PTH MONITORING;  Surgeon: Skinny Santiago MD;  [facility]: [facility];  Service: Surgical Oncology   • DE PARATHYROIDECTOMY/EXPLORATION PARATHYROIDS Right 5/12/2022    Procedure: TWO GLAND PARATHYROIDECTOMY, 4 GLAND EXPLORATION, INTRAOPERATIVE PTH MONITORING, FLEXIBLE LARYNGOSCOPY;  Surgeon: Skinny Santiago MD;  [facility];  Service: Surgical Oncology   • DE TRURL ELECTROSURG RESCJ PROSTATE BLEED COMPLETE N/A 9/2/2021    Procedure: TRANSURETHRAL RESECTION OF PROSTATE (TURP);   Surgeon: Mary Ellison MD;  Location: [facility];  Service: Urology   • TIBIA FRACTURE SURGERY Left 1962       Current Outpatient Medications:   •  aspirin (ECOTRIN LOW STRENGTH) 81 mg EC tablet, Take 1 tablet (81 mg total) by mouth daily, Disp: , Rfl:   •  atenolol (TENORMIN) 50 mg tablet, Take 1 5 tablets (75 mg total) by mouth 2 (two) times a day, Disp: 270 tablet, Rfl: 3  •  atorvastatin (LIPITOR) 80 mg tablet, Take 1 tablet (80 mg total) by mouth daily, Disp: 90 tablet, Rfl: 3  •  cloNIDine (CATAPRES-TTS-3) 0 3 mg/24 hr, PLACE 1 PATCH ON THE SKIN OVER 7 DAYS ONCE A WEEK, Disp: 12 patch, Rfl: 2  •  lisinopril (ZESTRIL) 40 mg tablet, TAKE 1 TABLET(40 MG) BY MOUTH DAILY, Disp: 90 tablet, Rfl: 3  •  metFORMIN (GLUCOPHAGE-XR) 500 mg 24 hr tablet, Take 1 tablet (500 mg total) by mouth 2 (two) times a day with meals, Disp: 180 tablet, Rfl: 3  •  Omega-3 Fatty Acids (FISH OIL ADULT GUMMIES PO), Take by mouth, Disp: , Rfl:   •  pantoprazole (PROTONIX) 40 mg tablet, Take 1 tablet (40 mg total) by mouth daily, Disp: 90 tablet, Rfl: 3  • "torsemide (DEMADEX) 10 mg tablet, TAKE 1 TABLET(10 MG) BY MOUTH DAILY, Disp: 90 tablet, Rfl: 3  No Known Allergies    Labs:  Appointment on 05/09/2023   Component Date Value   • Calcium 05/09/2023 8 7    • PTH 05/09/2023 126 6 (H)    • 25-HYDROXY VIT D 05/09/2023 6 6 (L)    • 25-Hydroxy D2 05/09/2023 1 4    • 25-HYDROXY VIT D3 05/09/2023 5 2      Imaging: No results found  Review of Systems:  Review of Systems   REVIEW OF SYSTEMS:  Constitutional:  Denies fever or chills   Eyes:  Denies change in visual acuity   HENT:  Denies nasal congestion or sore throat   Respiratory:  Denies cough or shortness of breath   Cardiovascular:  Denies chest pain or edema   GI:  Denies abdominal pain, nausea, vomiting, bloody stools or diarrhea   :  Denies dysuria, frequency, difficulty in micturition and nocturia  Musculoskeletal:  Denies back pain or joint pain   Neurologic:  Denies headache, focal weakness or sensory changes   Endocrine:  Denies polyuria or polydipsia   Lymphatic:  Denies swollen glands   Psychiatric:  Denies depression or anxiety    Physical Exam:    /80 (BP Location: Left arm, Patient Position: Sitting, Cuff Size: Large)   Pulse 69   Resp 16   Ht 5' 9\" (1 753 m)   Wt 91 6 kg (202 lb)   SpO2 100%   BMI 29 83 kg/m²     Physical Exam   PHYSICAL EXAM:  General:  Patient is not in acute distress   Head: Normocephalic, Atraumatic  HEENT:  Both pupils normal-size atraumatic, normocephalic, nonicteric  Neck:  JVP not raised  Trachea central  No carotid bruit  Respiratory:  normal breath sounds no crackles  no rhonchi  Cardiovascular:  Regular rate and rhythm no S3 faint systolic murmur right sternal border   GI:  Abdomen soft nontender  No organomegaly  Lymphatic:  No cervical or inguinal lymphadenopathy  Neurologic:  Patient is awake alert, oriented   Grossly nonfocal  Extremities no edema    ICD interrogation data reviewed  Normally functioning device    Episodes of nonsustained ventricular " tachycardia  Patient is on beta-blockers the dosage was increased recently  Discussion/Summary:  Patient with multiple medical problems who seems to be doing reasonably well from cardiac standpoint  Previous studies reviewed with patient  Medications reviewed and possible side effects discussed  concepts of cardiovascular disease , signs and symptoms of heart disease  Dietary and risk factor modification reinforced  All questions answered  Safety measures reviewed  Patient advised to report any problems prompting medical attention  Patient will continue to follow-up with device clinic for ICD  Patient strongly counseled to quit smoking to prevent future cardiovascular events  Symptoms to watch her from cardiac standpoint which would indicate the need for further cardiac evaluation also discussed  Importance of salt restriction reinforced  Medications reviewed  Follow-up in 6 months or earlier as needed  Patient is agreeable with the plan of care

## 2023-07-05 NOTE — PROGRESS NOTES
[address] [facility] Follow-up Visit- Cardiology   [facility] 74 [facility] MRN: [id number]  Unit/Bed#:  Encounter: [id number]    Patient Active Problem List    Diagnosis Date Noted   • Sick sinus syndrome ([address]) 03/17/2023   • Cardiomyopathy ([address])    • VT (ventricular tachycardia) ([address]) 11/29/2022   • Palpitations 11/29/2022   • Bradycardia 11/29/2022   • CAD (coronary artery disease) 05/12/2022   • Urinary retention due to benign prostatic hyperplasia 09/03/2021   • Benign localized hyperplasia of prostate with urinary obstruction 09/03/2021   • Antritis and gastric ulcer 04/13/2021   • Urinary retention 04/13/2021   • S/P subtotal parathyroidectomy ([address]) 04/12/2021   • Anemia 04/09/2021   • Hematuria 04/05/2021   • Hyperkalemia 08/61/8035   • Metabolic acidosis 06/75/5827   • History of aortic regurgitation    • History of obesity    • Hyperparathyroidism ([address]) 03/03/2021   • Hypercalcemia 12/31/2020   • Thrombocytosis 04/13/2020   • Anemia associated with diabetes mellitus ([address]) 03/09/2020   • Atherosclerosis of aorta ([address]) 06/18/2019   • HOCM (hypertrophic obstructive cardiomyopathy) ([address]) 07/16/2018   • Coronary artery disease involving native coronary artery of native heart    • Iron deficiency anemia due to chronic blood loss 04/20/2018   • Smoking 02/12/2018   • Type 2 diabetes mellitus ([address]) 02/08/2018   • Hyperlipidemia 01/21/2018   • HTN (hypertension) 01/21/2018   • Non-recurrent bilateral inguinal hernia without obstruction or gangrene 08/18/2017       Plan: Since his last office visit on 1- he has continued to follow with his primary cardiologist, Dr. Claybon Burkitt and was last seen on 6- and no changes were made at that timet to the patient's medications/clinical plan. He has had an updated ICD device check from 6-: SJM DUAL CHAMBER ICD/ACTIVE SYSTEM IS MRI CONDITIONAL   NON-BILLABLE MERLIN TRANSMISSION: BATTERY VOLTAGE ADEQUATE (7.6 YRS). AP: 32%. : <1%.  ALL AVAILABLE LEAD PARAMETERS WITHIN NORMAL LIMITS. 1 NON-SUSTAINED EPISODE W/ EGM SHOWING NSVT 22 BEATS @ 165 BPM. PT TAKES ATENOLOL, ASA 81MG. EF: 70% (ECHO 11/21/22). CORVUE IMPEDANCE MONITORING WITHIN NORMAL LIMITS. APPROPRIATELY FUNCTIONING ICD    Today, the patient states he feels well. 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 He often mows the lawn, performs weed whacking will clean bathrooms and vacuum the floors. He can perform all of the symptoms with no complaints of chest discomfort or dyspnea on exertion. The patient states that in late June he was down in the [place] and was walking on a flat surface with his wife when he began to notice an abrupt onset of left-sided arm " heaviness" that lasted roughly 25 to 30 minutes and eventually self resolved. There were no associated symptoms with this discomfort and this was the first and only time this is happened. It has not happened since that time. He has performed very physical activities since that time and has not reproduced that discomfort. His wife states that she thinks it could potentially have been musculoskeletal in nature as the weeks prior to this they were at their family farm in [place] and the patient was helping move equipment and chairs about the farm and perhaps this was a musculoskeletal event. Nonetheless, given the fact that the patient did have an outflow tract gradient on his last echocardiogram from November 2022 it would be beneficial to obtain a stress echo to further evaluate outflow track gradient and also this will assist us to determine if there is underlying ischemia as a possible etiology to this event. The patient does continue to get occasional palpitations but states they are short-lived. He denies any lower extremity edema and does take torsemide 10 mg daily. He has no orthopnea.   He will get some mild lightheadedness with position change but has not had any near syncope/syncope. At last office visit in January 2023 the patient's blood pressure was noted to be elevated and we did ask him to monitor at home. In April 2023 his atenolol was increased to 75 mg twice daily in the setting of NSVT noted on ICD checks. His wife states his blood pressure at home has been running systolically in the 778N and diastolic is typically 85 or less. His blood pressure is acceptable today at 132/82 and I have continue to encourage him to follow a low-sodium diet. The patient does continue to smoke about a pack to a pack and a half of cigarettes a day and is not motivated to quit. I did  him on the pulmonary and cardiac effects of continued tobacco abuse. He did have a lipid panel drawn in March of this year showing triglycerides of 52 HDL of 61 and LDL of 87 and he will continue atorvastatin 80 mg daily. I have counseled him on following a low-fat/low-cholesterol diet. His hemoglobin A1c is 5.9 down from 6.0 in June of last year. Overall, the patient is stable from a cardiac standpoint but we will obtain a stress echo to further investigate his single episode of left arm heaviness and also to further investigate for any left ventricular outflow tract obstruction. For now he will continue his current medication regimen. Physician Requesting Consult: Paul Moore DO   Reason for Consult / Principal Problem: HCM    History of Present Illness     HPI: Fela Butler is a 74y.o. year old male with history of Hyperparathyroidism, HTN, CAD s/p PCI to LAD, Hx of VTx s/p ICD (12/23/2022), DM2 on PO med, Anemia, and newly diagnosed HCM who is being referred by Dr. Joao Gallagher for HCM. He has had several episodes of symptomatic NSVTs, for which he underwent primary prevention defibrillator implantation. As part of the work up, he had a CMR done which showed severely hypertrophied wall with significant amount of LGE. He is being seen today to establish care.  He has remained asymptomatic aside from occasional palpitations, reports living an active lifestyle. He denied SOB, chest pain, dizziness, bendopnea, PND, orthopnea, fainting, and lower extremity edema. He endorses compliance with medications.                  Echocardiogram( 11/21/2022):     •  Left Ventricle: Wall thickness is increased. There is severe hypertrophy of the left ventricle with asymmetric component in the septum.  There is near complete obliteration of LV cavity during systole . No clear-cut ROCCO. There is LVOT gradient of approximately 120 mm Hg during Valsalva. The left ventricular ejection fraction is 70%. Systolic function is hyperdynamic. Wall motion is normal. Diastolic function is mildly abnormal, consistent with grade I (abnormal) relaxation. •  Left Atrium: The atrium is moderately dilated. •  Aortic Valve: There is mild to moderate regurgitation. There is mild stenosis. •  Mitral Valve: There is moderate annular calcification. There is mild to moderate regurgitation.     CMR(12/13/2022):     1. Severe asymmetric left ventricular wall thickening predominantly involving the interventricular septum, which measures up to 27 mm, with a spiral pattern progressing towards the apex.  Near cavity obliteration during systole.  Systolic anterior motion   of the anterior mitral valve leaflet with flow acceleration in the LVOT, suggesting a component of LVOT obstruction.  Apical insertion of the papillary muscles.  Hyperdynamic systolic function. 2. Normal right ventricular size and systolic function. 3.  The left atrium is normal in size.  Mildly reduced opening of the mitral valve.  Mitral regurgitation. 5. There is no evidence of myocardial edema.   6. Delayed post-gadolinium imaging demonstrates diffuse intramyocardial delayed gadolinium enhancement involving approximately 25% of the myocardium.     1-: Mr. Izzy Judge was interviewed, examined and evaluated together with Dr Catracho Naqvi, cardiology fellow. Mr Randy Molina has carried a diagnosis of HOCM for more than 20 years but has been asymptomatic until recently. He also has coronary artery disease with a NSTEMI in 2018 in the setting of severe acute GI bleeding due to gastric ulcer for which he had stenting of proximal and mid-LAD. He is an active smoker and has diabetes, hypertension as well hyperlipidemia. Most recently he has had symptomatic NSVT and work up had shown severe LVH with LV wall thickness of 27 mm and extensive late gadolinium enhancement on cardiac MRI. He has undergone ICD implantation on 12/2022. He has a relatively active lifestyle, works at [employer] and does yard work there. He has been a smoker for 40 years (>1 ppd). He checks BP at home, numbers are usually between 140-160/80s at home. Endorses compliance with medications, takes Atenolol 50mg BID at 4am and 12 pm, 0.2 mg Clonidine patch every Friday, Lisinopril 40mg at 4am, Torsemide 10mg at 8pm.      Today his BP was 161/71, however, he is asymptomatic. His diet was reviewed and he verbalized understanding that he should abstain from fried and salty food. Fluid intake should be limited to 60 Oz/day and he should monitor his weight daily before breakfast. He also agreed to keep a log of BP and bring it with him during the next visit. We had a discussion about smoking cessation, however, patient is not motivated and has no intention to quit. No change to medications made. The priority at this time is to control his blood pressure effectively, improve daily physical activity and choice of food for meals and assess whether he would need to continue taking diuretic on a daily basis. High gradients have been confirmed on echocardiogram and, today, a faint murmur was heard with provocation. If he develops symptoms lisinopril can be discontinued and replaced by a non-hydropyridine calcium channel blocker.  His most recent device interrogation did not show significant rhythm abnormality.     Device interrogation( 2023): SJ DUAL ICD/ACTIVE SYSTEM IS MRI CONDITIONAL. DEVICE INTERROGATED IN THE [place] OFFICE: BATTERY VOLTAGE ADEQUATE (7.0-9.5 YRS). AP 7.4%  <1% ALL LEAD PARAMETERS WITHIN NORMAL LIMITS. NO NEW SIGNIFICANT HIGH RATE EPISODES. NO PROGRAMMING CHANGES MADE TO DEVICE PARAMETERS. WOUND CHECK: INCISION CLEAN AND DRY WITH EDGES APPROXIMATED; WOUND CARE AND RESTRICTIONS REVIEWED WITH PATIENT. NORMAL DEVICE FUNCTION. AM/RG. Review of systems: Denies chest discomfort or dyspnea with exertion; notes occasional palpitations; denies lower extremity edema/orthopnea; some mild lightheadedness with standing; denies near syncope/syncope    Family History: Father  in his mid 46s likely from Cancer, he was a  and used chemicals to clean car parts. Mother passed away in 1972 at the age of 37yo at the hospital after a fibroid removal, as she was walking back to her room in the hospital she fell and passed away. Total of 10 Siblings( 6 boys, 4 girls)- one brother passed from EtOH intake, twin brother, Bailey Salcedo,  had two CVAs in the past 2 years ago, one sister is obese. 2 brothers(one carries nitro) and 1 sister(flutter feeling) have heart problems, unknown what the problem is. Dada Soriano has a boy and a girl, his boy had a heart attach at age 64, daughter has thyroid problems but no heart problems and is currently 58yo.      Genetic testing:         Devices: [facility] DC ICD placed 2022      Historical Information   Past Medical History:   Diagnosis Date   • Anemia    • Benign neoplasm of large intestine    • Bleeding gastric ulcer    • Cardiomyopathy ([address])    • Chronic kidney disease    • Colon polyp    • Diabetes mellitus ([address])    • Disease of thyroid gland    • Heart murmur    • History of aortic regurgitation    • History of constipation    • History of degenerative joint disease    • History of nocturia    • History of obesity    • History of sebaceous cyst    • History of shortness of breath    • History of transfusion    • History of urinary frequency    • Hyperlipidemia    • Hyperparathyroidism ([address])    • Hypertension    • Myocardial infarction [facility]) 2018 january, 3 stents   • Polyposis coli     of the large intestine   • Ulcer of esophagus      Past Surgical History:   Procedure Laterality Date   • ACHILLES TENDON SURGERY Left 1973   • CARDIAC ELECTROPHYSIOLOGY PROCEDURE Left 12/23/2022    Procedure: insertion dual chamber ICD; Surgeon: Gerry Burt DO;  Location: [facility];  Service: Cardiology   • CATARACT EXTRACTION Right 02/18/2021   • COLONOSCOPY      hyperplastic polyp   • CORONARY ANGIOPLASTY WITH STENT PLACEMENT      3 stents: 2 placed on Jan 2018, 1 on July 2018    • CYST REMOVAL  2019, 2020    left and right wrists   • ESOPHAGOGASTRODUODENOSCOPY N/A 1/23/2018    Procedure: ESOPHAGOGASTRODUODENOSCOPY (EGD); Surgeon: Gonzalez Amezcua MD;  Location: [facility]; Service: Gastroenterology   • ESOPHAGOGASTRODUODENOSCOPY     • HERNIA REPAIR     • HERNIA REPAIR Bilateral 8/18/2017    Procedure: LAPAROSCOPIC INGUINAL HERNIA REPAIR WITH MESH;  Surgeon: Corinna Dukes MD;  Location: [facility];  Service: General   • LARYNGOSCOPY N/A 5/12/2022    Procedure: LARYNGOSCOPY DIRECT;  Surgeon: Jann Garcia MD;  Location:  MAIN OR;  Service: Surgical Oncology   • SC ESOPHAGOGASTRODUODENOSCOPY TRANSORAL DIAGNOSTIC N/A 3/26/2018    Procedure: ESOPHAGOGASTRODUODENOSCOPY (EGD); Surgeon: Elena Mcconnell MD;  Location: [facility]; Service: Gastroenterology   • SC ESOPHAGOGASTRODUODENOSCOPY TRANSORAL DIAGNOSTIC N/A 5/14/2018    Procedure: ESOPHAGOGASTRODUODENOSCOPY (EGD); Surgeon: Elena Mcconnell MD;  Location: [facility];   Service: Gastroenterology   • SC PARATHYROIDECTOMY/EXPLORATION PARATHYROIDS Right 3/23/2021    Procedure: RIGHT PARATHYROIDECTOMY, MINIMALLY INVASIVE, POSSIBLE 4-GLAND EXPLORATION, WITH INTRA-OPERATIVE PTH MONITORING;  Surgeon: Nicho Dai MD;  Location: [facility];  Service: Surgical Oncology   • OR PARATHYROIDECTOMY/EXPLORATION PARATHYROIDS Right 5/12/2022    Procedure: TWO GLAND PARATHYROIDECTOMY, 4 GLAND EXPLORATION, INTRAOPERATIVE PTH MONITORING, FLEXIBLE LARYNGOSCOPY;  Surgeon: Nicho Dai MD;  Location: [facility];  Service: Surgical Oncology   • OR TRURL ELECTROSURG RESCJ PROSTATE BLEED COMPLETE N/A 9/2/2021    Procedure: TRANSURETHRAL RESECTION OF PROSTATE (TURP); Surgeon: Jaylon Granger MD;  Location: [facility];  Service: Urology   • TIBIA FRACTURE SURGERY Left 1962     Family History   Problem Relation Age of Onset   • Rheumatic fever Father    • Other Father         accidental poisoning   • Heart disease Mother      Current Outpatient Medications on File Prior to Visit   Medication Sig Dispense Refill   • aspirin (ECOTRIN LOW STRENGTH) 81 mg EC tablet Take 1 tablet (81 mg total) by mouth daily     • atenolol (TENORMIN) 50 mg tablet Take 1.5 tablets (75 mg total) by mouth 2 (two) times a day 270 tablet 3   • atorvastatin (LIPITOR) 80 mg tablet Take 1 tablet (80 mg total) by mouth daily 90 tablet 3   • cholecalciferol (VITAMIN D3) 1,000 units tablet Take 1,000 Units by mouth daily     • cloNIDine (CATAPRES-TTS-3) 0.3 mg/24 hr PLACE 1 PATCH ON THE SKIN OVER 7 DAYS ONCE A WEEK 12 patch 2   • lisinopril (ZESTRIL) 40 mg tablet TAKE 1 TABLET(40 MG) BY MOUTH DAILY 90 tablet 3   • metFORMIN (GLUCOPHAGE-XR) 500 mg 24 hr tablet Take 1 tablet (500 mg total) by mouth 2 (two) times a day with meals 180 tablet 3   • Omega-3 Fatty Acids (FISH OIL ADULT GUMMIES PO) Take by mouth     • pantoprazole (PROTONIX) 40 mg tablet Take 1 tablet (40 mg total) by mouth daily 90 tablet 3   • torsemide (DEMADEX) 10 mg tablet TAKE 1 TABLET(10 MG) BY MOUTH DAILY 90 tablet 3     No current facility-administered medications on file prior to visit.      No Known Allergies  Social History     Substance and Sexual Activity   Alcohol Use Not Currently    Comment: rare social occasion, once a year," takes whiskey if feeling a cold coming"     Social History     Substance and Sexual Activity   Drug Use No     Social History     Tobacco Use   Smoking Status Every Day   • Packs/day: 1.00   • Years: 40.00   • Total pack years: 40.00   • Types: Cigarettes   • Start date: 1968   Smokeless Tobacco Never   Tobacco Comments    Smoked this morning 12/23 0700         Objective   Vitals: Blood pressure 132/82, pulse 64, height 5' 8" (1.727 m), weight 90.7 kg (200 lb), SpO2 98 %. , Body mass index is 30.41 kg/m². ,   Vitals:    07/10/23 1041   BP: 132/82   Pulse: 64   SpO2: 98%   Weight: 90.7 kg (200 lb)   Height: 5' 8" (1.727 m)      Body mass index is 30.41 kg/m². Body surface area is 2.04 meters squared.       Invasive Devices     None                 Physical Exam:  GEN: Jodie Lai appears well, alert and oriented x 3, pleasant and cooperative   HEENT: pupils equal, round, and reactive to light; extraocular muscles intact  NECK: supple, no carotid bruits   HEART: regular rhythm, normal S1 and S2, faint murmur noted, no clicks, gallops or rubs   LUNGS: clear to auscultation bilaterally; no wheezes, rales, or rhonchi   ABDOMEN: normal bowel sounds, soft, no tenderness, no distention  EXTREMITIES: peripheral pulses normal; no clubbing, cyanosis, or edema  NEURO: no focal findings   SKIN: normal without suspicious lesions on exposed skin    Lab Results:   Lab Results   Component Value Date    WBC 6.61 11/29/2022    RBC 5.06 11/29/2022    HGB 13.0 11/29/2022    HCT 42.3 11/29/2022    MCV 84 11/29/2022     11/29/2022    RDW 18.1 (H) 11/29/2022     Lab Results   Component Value Date     12/14/2015    K 4.0 03/17/2023     03/17/2023    CO2 29 03/17/2023    ANIONGAP 5 12/14/2015    BUN 13 03/17/2023    CREATININE 0.87 03/17/2023    EGFR 85 03/17/2023    GLUCOSE 98 12/14/2015    CALCIUM 8.7 05/09/2023    AST 14 11/29/2022    ALT 13 11/29/2022 ALKPHOS 60 11/29/2022    PROT 7.1 06/12/2015    BILITOT 0.4 06/12/2015     Lab Results   Component Value Date    MG 1.9 04/06/2021     Lab Results   Component Value Date    CHOL 136 06/12/2015    HDL 61 03/17/2023    TRIG 52 03/17/2023    LDLCALC 87 03/17/2023     Lab Results   Component Value Date    LBO0OPKRADKE 0.987 12/13/2022    FREET4 0.95 06/16/2014       Imaging:   I have personally reviewed pertinent films in PACS  Cardiac EP device report    Result Date: 6/29/2023  Narrative: SJM DUAL CHAMBER ICD/ACTIVE SYSTEM IS MRI CONDITIONAL NON-BILLABLE MERLIN TRANSMISSION: BATTERY VOLTAGE ADEQUATE (7.6 YRS). AP: 32%. : <1%. ALL AVAILABLE LEAD PARAMETERS WITHIN NORMAL LIMITS. 1 NON-SUSTAINED EPISODE W/ EGM SHOWING NSVT 22 BEATS @ 165 BPM. PT TAKES ATENOLOL, ASA 81MG. EF: 70% (ECHO 11/21/22). CORVUE IMPEDANCE MONITORING WITHIN NORMAL LIMITS. APPROPRIATELY FUNCTIONING ICD. [facility]     Cardiac testing:     Echo from 11-: Interpretation Summary       •  Left Ventricle: Wall thickness is increased. There is severe hypertrophy of the left ventricle with asymmetric component in the septum. There is near complete obliteration of LV cavity during systole . No clear-cut ROCCO. There is LVOT gradient of approximately 120 mm Hg during Valsalva. The left ventricular ejection fraction is 70%. Systolic function is hyperdynamic. Wall motion is normal. Diastolic function is mildly abnormal, consistent with grade I (abnormal) relaxation. •  Left Atrium: The atrium is moderately dilated. •  Aortic Valve: There is mild to moderate regurgitation. There is mild stenosis. •  Mitral Valve: There is moderate annular calcification. There is mild to moderate regurgitation.     Findings    Left Ventricle Left ventricular cavity size is small. Wall thickness is increased. There is severe hypertrophy of the left ventricle with asymmetric component in the septum. There is near complete obliteration of LV cavity during systole .  No clear-cut ROCCO.    There is LVOT gradient of approximately 120 mm Hg during Valsalva. The left ventricular ejection fraction is 70%. Systolic function is hyperdynamic. Wall motion is normal. Diastolic function is mildly abnormal, consistent with grade I (abnormal) relaxation. Right Ventricle Right ventricular cavity size is normal. Systolic function is normal. Wall thickness is normal.      Left Atrium The atrium is moderately dilated. Right Atrium The atrium is normal in size. Aortic Valve The aortic valve was not well visualized. There is mild to moderate regurgitation. There is mild stenosis. Mitral Valve There is mild thickening. There is mild calcification. There is moderate annular calcification. There is mild to moderate regurgitation. There is no evidence of stenosis. Tricuspid Valve Tricuspid valve structure is normal. There is trace regurgitation. There is no evidence of stenosis. Pulmonic Valve Pulmonic valve structure is normal. There is no evidence of regurgitation. There is no evidence of stenosis. Ascending Aorta The aortic root is normal in size. IVC/SVC The inferior vena cava is normal in size. Pericardium There is no pericardial effusion. The pericardium is normal in appearance.         Left Ventricle Measurements    Function/Volumes   A4C EF 55 %         LVOT stroke volume 137.28          LVOT stroke volume index 66.5 ml/m2         Dimensions   LVIDd 4.7 cm         LVIDS 3 cm         IVSd 1.7 cm         LVPWd 1.1 cm         LVOT area 4.91 cm2         FS 36          Diastolic Filling   MV E' Tissue Velocity Septal 4 cm/s         E wave deceleration time 352 ms         MV Peak E Shadi 78 cm/s         MV Peak A Shadi 1 m/s          Report Measurements   AV LVOT peak gradient 6 mmHg              Interventricular Septum Measurements    Shunt Ratio   LVOT peak VTI 27.98 cm         LVOT peak shadi 1.21 m/s              Right Ventricle Measurements    Dimensions RVID d 4.5 cm               Left Atrium Measurements    Dimensions   LA size 5.4 cm         PAMELA A4C 19.9 cm2               Right Atrium Measurements    Dimensions   RA 2D Volume 56 mL         RAA A4C 17.6 cm2               Atrial Septum Measurements    Shunt Ratio   LVOT peak VTI 27.98 cm         LVOT peak melissa 1.21 m/s               Aortic Valve Measurements    Stenosis   Aortic valve peak velocity 1.66 m/s         LVOT peak melissa 1.21 m/s         Ao VTI 43.89 cm         LVOT peak VTI 27.98 cm         AV mean gradient 7 mmHg         LVOT mn grad 3 mmHg         AV peak gradient 11 mmHg         AV LVOT peak gradient 6 mmHg         Area/Dimensions   DVI 0.73          AV valve area 3.13 cm2         AV area by cont VTI 3.1 cm2         AV area peak melissa 3.6 cm2         LVOT diameter 2.5 cm         LVOT area 4.91 cm2               Mitral Valve Measurements    Stenosis   MV stenosis pressure 1/2 time 102 ms         MV valve area p 1/2 method 2.16          MR  mmHg               Aorta Measurements    Aortic Dimensions   Ao root 3.9 cm         Asc Ao 4 cm               Exam Details    Performed Procedure Technologist Supporting Staff Performing Physician   Echo complete Raffaele Austin           Appointment Date/Status Modality Department    11/21/2022     Completed AN POC ECHO 2 AN POCONO CAR NON INV           Begin Exam End Exam  End Exam Questionnaires   11/21/2022  8:27 AM 11/21/2022  9:08 AM  PATIENT EDUCATION            All Reviewers List    Sienna Bella PA-C on 12/5/2022 10:56 AM   Haydee Larkin MD on 11/21/2022  3:57 PM     Signed    Electronically signed by Haydee Larkin MD on 11/21/22 at 1342 EST           Counseling / Coordination of Care  Total floor / unit time spent today 45 minutes. Greater than 50% of total time was spent with the patient and / or family counseling and / or coordination of care. A description of the counseling / coordination of care: Kait Richardson

## 2023-07-10 ENCOUNTER — OFFICE VISIT (OUTPATIENT)
Dept: CARDIAC SURGERY | Facility: CLINIC | Age: 73
End: 2023-07-10
Payer: MEDICARE

## 2023-07-10 VITALS
BODY MASS INDEX: 30.31 KG/M2 | HEART RATE: 64 BPM | WEIGHT: 200 LBS | DIASTOLIC BLOOD PRESSURE: 82 MMHG | HEIGHT: 68 IN | OXYGEN SATURATION: 98 % | SYSTOLIC BLOOD PRESSURE: 132 MMHG

## 2023-07-10 DIAGNOSIS — I42.1 HOCM (HYPERTROPHIC OBSTRUCTIVE CARDIOMYOPATHY) (HCC): Primary | ICD-10-CM

## 2023-07-10 PROCEDURE — 99214 OFFICE O/P EST MOD 30 MIN: CPT | Performed by: NURSE PRACTITIONER

## 2023-07-10 RX ORDER — MELATONIN
1000 DAILY
COMMUNITY

## 2023-07-11 ENCOUNTER — REMOTE DEVICE CLINIC VISIT (OUTPATIENT)
Dept: CARDIOLOGY CLINIC | Facility: CLINIC | Age: 73
End: 2023-07-11
Payer: MEDICARE

## 2023-07-11 DIAGNOSIS — Z95.810 PRESENCE OF AUTOMATIC CARDIOVERTER/DEFIBRILLATOR (AICD): Primary | ICD-10-CM

## 2023-07-11 PROCEDURE — 93296 REM INTERROG EVL PM/IDS: CPT | Performed by: INTERNAL MEDICINE

## 2023-07-11 PROCEDURE — 93295 DEV INTERROG REMOTE 1/2/MLT: CPT | Performed by: INTERNAL MEDICINE

## 2023-07-11 NOTE — PROGRESS NOTES
Results for orders placed or performed in visit on 07/11/23   Cardiac EP device report    Narrative    SJM DUAL CHAMBER ICD/ACTIVE SYSTEM IS MRI CONDITIONAL  MERLIN TRANSMISSION: BATTERY VOLTAGE ADEQUATE (7.5 YRS). AP: 32%. : <1%. ALL AVAILABLE LEAD PARAMETERS WITHIN NORMAL LIMITS. 10 NON-SUSTAINED EPISODES W/ AVAIL EGM (PDF#2) SHOWING PAT @ 176 BPM, 8 SECS. PT TAKES ATENOLOL, ASA 81MG. EF: 70% (ECHO 11/21/22). CORVUE IMPEDANCE MONITORING WITHIN NORMAL LIMITS. NORMAL DEVICE FUNCTION.  [id number] [address]

## 2023-07-19 DIAGNOSIS — E11.69 TYPE 2 DIABETES MELLITUS WITH OTHER SPECIFIED COMPLICATION, WITHOUT LONG-TERM CURRENT USE OF INSULIN (HCC): ICD-10-CM

## 2023-07-19 DIAGNOSIS — I42.1 HYPERTROPHIC OBSTRUCTIVE CARDIOMYOPATHY (HCC): Primary | ICD-10-CM

## 2023-07-19 RX ORDER — LISINOPRIL 40 MG/1
TABLET ORAL
Qty: 90 TABLET | Refills: 3 | Status: SHIPPED | OUTPATIENT
Start: 2023-07-19

## 2023-07-31 ENCOUNTER — HOSPITAL ENCOUNTER (OUTPATIENT)
Dept: NON INVASIVE DIAGNOSTICS | Facility: HOSPITAL | Age: 73
Discharge: HOME | End: 2023-07-31
Payer: MEDICARE

## 2023-07-31 VITALS
WEIGHT: 200 LBS | SYSTOLIC BLOOD PRESSURE: 124 MMHG | DIASTOLIC BLOOD PRESSURE: 70 MMHG | HEIGHT: 68 IN | BODY MASS INDEX: 30.31 KG/M2 | HEART RATE: 60 BPM | OXYGEN SATURATION: 97 %

## 2023-07-31 DIAGNOSIS — I42.1 HYPERTROPHIC OBSTRUCTIVE CARDIOMYOPATHY (HCC): ICD-10-CM

## 2023-07-31 LAB
CHEST PAIN STATEMENT: NORMAL
MAX DIASTOLIC BP: 94 MMHG
MAX HEART RATE: 112 BPM
MAX PREDICTED HEART RATE: 145 BPM
MAX. SYSTOLIC BP: 180 MMHG
PROTOCOL NAME: NORMAL
REASON FOR TERMINATION: NORMAL
TARGET HR FORMULA: NORMAL
TEST INDICATION: NORMAL
TIME IN EXERCISE PHASE: NORMAL

## 2023-07-31 PROCEDURE — 93356 MYOCRD STRAIN IMG SPCKL TRCK: CPT | Performed by: INTERNAL MEDICINE

## 2023-07-31 PROCEDURE — 93356 MYOCRD STRAIN IMG SPCKL TRCK: CPT

## 2023-07-31 PROCEDURE — 93350 STRESS TTE ONLY: CPT | Performed by: INTERNAL MEDICINE

## 2023-07-31 PROCEDURE — 93350 STRESS TTE ONLY: CPT

## 2023-08-01 LAB
AV REGURGITATION PRESSURE HALF TIME: 685 MS
GLOBAL LONGITUIDAL STRAIN: -10 %
MAX HR PERCENT: 77 %
MAX HR: 112 BPM
RATE PRESSURE PRODUCT: NORMAL
SL CV AV DECELERATION TIME RETROGRADE: 2362 MS
SL CV AV PEAK GRADIENT RETROGRADE: 71 MMHG
SL CV LV EF: 68
SL CV STRESS RECOVERY BP: NORMAL MMHG
SL CV STRESS RECOVERY HR: 70 BPM
SL CV STRESS RECOVERY O2 SAT: 97 %
SL CV STRESS STAGE REACHED: 3
STRESS ANGINA INDEX: 0
STRESS BASELINE BP: NORMAL MMHG
STRESS BASELINE HR: 60 BPM
STRESS O2 SAT REST: 97 %
STRESS PEAK HR: 123 BPM
STRESS POST ESTIMATED WORKLOAD: 3.9 METS
STRESS POST EXERCISE DUR MIN: 6 MIN
STRESS POST EXERCISE DUR SEC: 0 SEC
STRESS POST O2 SAT PEAK: 97 %
STRESS POST PEAK BP: 180 MMHG

## 2023-08-11 ENCOUNTER — RA CDI HCC (OUTPATIENT)
Dept: OTHER | Facility: HOSPITAL | Age: 73
End: 2023-08-11

## 2023-08-11 NOTE — PROGRESS NOTES
E11.22, I!3.0  [address] [facility] coding opportunities          Chart Reviewed number of suggestions sent to Provider: 2     Patients Insurance     Medicare Insurance: [employer]

## 2023-08-14 ENCOUNTER — TELEPHONE (OUTPATIENT)
Dept: CARDIOLOGY CLINIC | Facility: CLINIC | Age: 73
End: 2023-08-14

## 2023-08-14 NOTE — TELEPHONE ENCOUNTER
Pts cor-gloria crossed x 10 days. PT takes demadex, atenolol. EF: 68% (st echo 7/31/23). Thanks,   ~Adore   NON-BILLABLE MERLIN TRANSMISSION: BATTERY VOLTAGE ADEQUATE (7.4 YRS). ALL AVAILABLE LEAD PARAMETERS WITHIN NORMAL LIMITS. 1 NON-SUSTAINED EPISODE W/ EGM SHOWING NSVT 14 BEATS @ 165 BPM. PT TAKES ATENOLOL. EF: 68% (ST ECHO 7/31/23). CORVUE IMPEDANCE THRESHOLD CROSSED X 10 DAYS. PT TAKES DEMADEX.  TASK TO HF TEAM. NORMAL DEVICE FUNCTION. [facility]

## 2023-08-18 ENCOUNTER — OFFICE VISIT (OUTPATIENT)
Age: 73
End: 2023-08-18
Payer: MEDICARE

## 2023-08-18 VITALS
HEIGHT: 68 IN | OXYGEN SATURATION: 100 % | SYSTOLIC BLOOD PRESSURE: 124 MMHG | DIASTOLIC BLOOD PRESSURE: 70 MMHG | HEART RATE: 60 BPM | TEMPERATURE: 97.7 F | WEIGHT: 197.4 LBS | BODY MASS INDEX: 29.92 KG/M2

## 2023-08-18 DIAGNOSIS — F17.210 SMOKING GREATER THAN 20 PACK YEARS: ICD-10-CM

## 2023-08-18 DIAGNOSIS — E11.69 TYPE 2 DIABETES MELLITUS WITH OTHER SPECIFIED COMPLICATION, WITHOUT LONG-TERM CURRENT USE OF INSULIN (HCC): ICD-10-CM

## 2023-08-18 DIAGNOSIS — I25.119 ATHEROSCLEROSIS OF NATIVE CORONARY ARTERY OF NATIVE HEART WITH ANGINA PECTORIS (HCC): ICD-10-CM

## 2023-08-18 DIAGNOSIS — Z12.2 ENCOUNTER FOR SCREENING FOR LUNG CANCER: ICD-10-CM

## 2023-08-18 LAB — SL AMB POCT HEMOGLOBIN AIC: 6.1 (ref ?–6.5)

## 2023-08-18 PROCEDURE — 99214 OFFICE O/P EST MOD 30 MIN: CPT | Performed by: INTERNAL MEDICINE

## 2023-08-18 PROCEDURE — 83036 HEMOGLOBIN GLYCOSYLATED A1C: CPT | Performed by: INTERNAL MEDICINE

## 2023-08-18 NOTE — PATIENT INSTRUCTIONS
This 49-year-old has numerous problems documented but they are well treated, well compensated, and well-controlled. No intervention needed today  Follow-up with a new physician towards the new year.

## 2023-08-18 NOTE — PROGRESS NOTES
Assessment/Plan:       Diagnoses and all orders for this visit:    Type 2 diabetes mellitus with other specified complication, without long-term current use of insulin ([address])  -     POCT hemoglobin A1c    Encounter for screening for lung cancer  -     CT lung screening program; Future    Smoking greater than 20 pack years  -     CT lung screening program; Future    Atherosclerosis of native coronary artery of native heart with angina pectoris ([address])                Subjective:      Patient ID: Duane Barajas is a 74 y.o. male. 74year-old. A 74year-old  He is diabetic and hypertensive and control has been good. Point-of-care hemoglobin A1c today 6.1% which is typical for him. Hypertensive cardiomyopathy without heart failure. He does have grade 1 diastolic dysfunction monitoring documented nonsustained VT so now he has a pacemaker defibrillator. Coronary artery disease by virtue of finding a cardiac catheterization done in 2017 but no angina pectoris. This is from a prior note:    " Cardiac catheterization documented noncritical CAD with 60% LAD lesion and a large vessel with good flow not requiring intervention. This also documented hypertrophic cardiomyopathy with obliteration of the cavity an ejection fraction of 80%."    A recent bicycle stress test was done and while it does of course demonstrate the severe LVH there was no evidence of ischemia. Urinary retention and sepsis hospitalization with acute kidney injury in April 2021. Discharged home and Butler removed after 5 months. Then TUR September 2021. He needed repeat parathyroidectomy after lack of normalization of PTH following the 1st parathyroidectomy. Apparently he had 2 removed so now he has only 1 parathyroid gland. Gastric ulcer treated in 2018. Healed with no recurrence.     He feels fine       Continues to smoke and expresses no interest in stopping      The following portions of the patient's history were reviewed and updated as appropriate:   He has a past medical history of Anemia, Benign neoplasm of large intestine, Bleeding gastric ulcer (2018), Cardiomyopathy ([address]), Chronic kidney disease, Colon polyp, Diabetes mellitus ([address]), Disease of thyroid gland, Heart murmur, History of aortic regurgitation, History of constipation, History of degenerative joint disease, History of nocturia, History of obesity, History of sebaceous cyst, History of shortness of breath, History of transfusion, History of urinary frequency, Hyperlipidemia, Hyperparathyroidism ([address]), Hypertension, Myocardial infarction ([address]) (2018), Polyposis coli, and Ulcer of esophagus. ,  does not have any pertinent problems on file. ,   has a past surgical history that includes Tibia fracture surgery (Left, 1962); Achilles tendon surgery (Left, 1973); Esophagogastroduodenoscopy (N/A, 1/23/2018); Esophagogastroduodenoscopy; pr esophagogastroduodenoscopy transoral diagnostic (N/A, 3/26/2018); pr esophagogastroduodenoscopy transoral diagnostic (N/A, 5/14/2018); Cyst Removal (2019, 2020); Coronary angioplasty with stent; Colonoscopy; Cataract extraction (Right, 02/18/2021); pr parathyroidectomy/exploration parathyroids (Right, 3/23/2021); Hernia repair; Hernia repair (Bilateral, 8/18/2017); pr trurl electrosurg rescj prostate bleed complete (N/A, 9/2/2021); pr parathyroidectomy/exploration parathyroids (Right, 5/12/2022); LARYNGOSCOPY (N/A, 5/12/2022); and Cardiac electrophysiology procedure (Left, 12/23/2022). ,  family history includes Heart disease in his mother; Other in his father; Rheumatic fever in his father. ,   reports that he has been smoking cigarettes. He started smoking about 55 years ago. He has a 40.00 pack-year smoking history. He has never used smokeless tobacco. He reports that he does not currently use alcohol. He reports that he does not use drugs. ,  has No Known Allergies. .  Current Outpatient Medications   Medication Sig Dispense Refill   • aspirin (ECOTRIN LOW STRENGTH) 81 mg EC tablet Take 1 tablet (81 mg total) by mouth daily     • atenolol (TENORMIN) 50 mg tablet Take 1.5 tablets (75 mg total) by mouth 2 (two) times a day 270 tablet 3   • atorvastatin (LIPITOR) 80 mg tablet Take 1 tablet (80 mg total) by mouth daily 90 tablet 3   • cholecalciferol (VITAMIN D3) 1,000 units tablet Take 1,000 Units by mouth daily     • cloNIDine (CATAPRES-TTS-3) 0.3 mg/24 hr PLACE 1 PATCH ON THE SKIN OVER 7 DAYS ONCE A WEEK 12 patch 2   • lisinopril (ZESTRIL) 40 mg tablet TAKE 1 TABLET(40 MG) BY MOUTH DAILY 90 tablet 3   • metFORMIN (GLUCOPHAGE-XR) 500 mg 24 hr tablet Take 1 tablet (500 mg total) by mouth 2 (two) times a day with meals 180 tablet 3   • Omega-3 Fatty Acids (FISH OIL ADULT GUMMIES PO) Take by mouth     • pantoprazole (PROTONIX) 40 mg tablet Take 1 tablet (40 mg total) by mouth daily 90 tablet 3   • torsemide (DEMADEX) 10 mg tablet TAKE 1 TABLET(10 MG) BY MOUTH DAILY 90 tablet 3     No current facility-administered medications for this visit. Review of Systems   Constitutional: Negative for chills and fever. HENT: Negative for ear pain and sore throat. Eyes: Negative for pain and visual disturbance. Respiratory: Negative for cough and shortness of breath. Cardiovascular: Negative for chest pain and palpitations. Gastrointestinal: Negative for abdominal pain and vomiting. Genitourinary: Negative for dysuria and hematuria. Musculoskeletal: Negative for arthralgias and back pain. Skin: Negative for color change and rash. Neurological: Negative for seizures and syncope. All other systems reviewed and are negative. Objective:  Vitals:    08/18/23 0737   BP: 124/70   Pulse: 60   Temp: 97.7 °F (36.5 °C)   SpO2: 100%      Physical Exam  Constitutional:       Appearance: Normal appearance. He is well-developed. HENT:      Head: Normocephalic and atraumatic. Eyes:      Pupils: Pupils are equal, round, and reactive to light.    Neck: Thyroid: No thyromegaly. Trachea: No tracheal deviation. Cardiovascular:      Rate and Rhythm: Normal rate and regular rhythm. Heart sounds: Normal heart sounds. No murmur heard. No gallop. Pulmonary:      Effort: Pulmonary effort is normal. No respiratory distress. Breath sounds: No wheezing or rales. Abdominal:      General: Bowel sounds are normal.      Palpations: Abdomen is soft. Tenderness: There is no abdominal tenderness. Musculoskeletal:         General: No tenderness or deformity. Normal range of motion. Cervical back: Normal range of motion and neck supple. Skin:     General: Skin is warm and dry. Neurological:      Mental Status: He is alert and oriented to person, place, and time. Coordination: Coordination normal.      Deep Tendon Reflexes: Reflexes are normal and symmetric. Psychiatric:         Mood and Affect: Mood normal.         Judgment: Judgment normal.           Patient Instructions   This 70-year-old has numerous problems documented but they are well treated, well compensated, and well-controlled. No intervention needed today  Follow-up with a new physician towards the new year.

## 2023-08-25 ENCOUNTER — HOSPITAL ENCOUNTER (OUTPATIENT)
Dept: CT IMAGING | Facility: HOSPITAL | Age: 73
End: 2023-08-25
Attending: INTERNAL MEDICINE
Payer: MEDICARE

## 2023-08-25 DIAGNOSIS — F17.210 SMOKING GREATER THAN 20 PACK YEARS: ICD-10-CM

## 2023-08-25 DIAGNOSIS — Z12.2 ENCOUNTER FOR SCREENING FOR LUNG CANCER: ICD-10-CM

## 2023-08-25 PROCEDURE — 71271 CT THORAX LUNG CANCER SCR C-: CPT

## 2023-09-23 DIAGNOSIS — I10 HYPERTENSION, UNSPECIFIED TYPE: ICD-10-CM

## 2023-09-25 RX ORDER — CLONIDINE 0.3 MG/24H
PATCH, EXTENDED RELEASE TRANSDERMAL
Qty: 12 PATCH | Refills: 2 | Status: SHIPPED | OUTPATIENT
Start: 2023-09-25

## 2023-10-10 ENCOUNTER — REMOTE DEVICE CLINIC VISIT (OUTPATIENT)
Dept: CARDIOLOGY CLINIC | Facility: CLINIC | Age: 73
End: 2023-10-10
Payer: MEDICARE

## 2023-10-10 DIAGNOSIS — Z95.810 AICD (AUTOMATIC CARDIOVERTER/DEFIBRILLATOR) PRESENT: Primary | ICD-10-CM

## 2023-10-10 PROCEDURE — 93295 DEV INTERROG REMOTE 1/2/MLT: CPT | Performed by: INTERNAL MEDICINE

## 2023-10-10 PROCEDURE — 93296 REM INTERROG EVL PM/IDS: CPT | Performed by: INTERNAL MEDICINE

## 2023-10-10 NOTE — PROGRESS NOTES
Results for orders placed or performed in visit on 10/10/23   Cardiac EP device report    Narrative    [facility] DUAL CHAMBER ICD/ACTIVE SYSTEM IS MRI CONDITIONAL  MERLIN TRANSMISSION: BATTERY STATUS "8 YRS." AP 37%  0%. ALL AVAILABLE LEAD PARAMETERS WITHIN NORMAL LIMITS. VHRS NOTED; NO THERAPIES GIVEN. NO AVAIL EGRAMS. PT ON ATENOLOL & EF 68% (ESTS 2023). CORVUE IMPEDANCE MONITORING WITHIN NORMAL LIMITS. NORMAL DEVICE FUNCTION.   [place]

## 2023-12-08 ENCOUNTER — CONSULT (OUTPATIENT)
Age: 73
End: 2023-12-08
Payer: MEDICARE

## 2023-12-08 ENCOUNTER — APPOINTMENT (OUTPATIENT)
Age: 73
End: 2023-12-08
Payer: MEDICARE

## 2023-12-08 VITALS
WEIGHT: 195.8 LBS | BODY MASS INDEX: 29.77 KG/M2 | HEART RATE: 60 BPM | TEMPERATURE: 94.1 F | DIASTOLIC BLOOD PRESSURE: 80 MMHG | RESPIRATION RATE: 18 BRPM | OXYGEN SATURATION: 98 % | SYSTOLIC BLOOD PRESSURE: 140 MMHG

## 2023-12-08 DIAGNOSIS — I10 PRIMARY HYPERTENSION: ICD-10-CM

## 2023-12-08 DIAGNOSIS — Z01.818 PREOP EXAMINATION: ICD-10-CM

## 2023-12-08 DIAGNOSIS — I42.1 HOCM (HYPERTROPHIC OBSTRUCTIVE CARDIOMYOPATHY) (HCC): ICD-10-CM

## 2023-12-08 DIAGNOSIS — I42.1 HOCM (HYPERTROPHIC OBSTRUCTIVE CARDIOMYOPATHY) (HCC): Primary | ICD-10-CM

## 2023-12-08 LAB
ALBUMIN SERPL BCP-MCNC: 4 G/DL (ref 3.5–5)
ALP SERPL-CCNC: 45 U/L (ref 34–104)
ALT SERPL W P-5'-P-CCNC: 15 U/L (ref 7–52)
ANION GAP SERPL CALCULATED.3IONS-SCNC: 7 MMOL/L
AST SERPL W P-5'-P-CCNC: 18 U/L (ref 13–39)
BASOPHILS # BLD AUTO: 0.02 THOUSANDS/ÂΜL (ref 0–0.1)
BASOPHILS NFR BLD AUTO: 0 % (ref 0–1)
BILIRUB SERPL-MCNC: 0.63 MG/DL (ref 0.2–1)
BUN SERPL-MCNC: 10 MG/DL (ref 5–25)
CALCIUM SERPL-MCNC: 8.5 MG/DL (ref 8.4–10.2)
CHLORIDE SERPL-SCNC: 103 MMOL/L (ref 96–108)
CO2 SERPL-SCNC: 31 MMOL/L (ref 21–32)
CREAT SERPL-MCNC: 0.84 MG/DL (ref 0.6–1.3)
EOSINOPHIL # BLD AUTO: 0.12 THOUSAND/ÂΜL (ref 0–0.61)
EOSINOPHIL NFR BLD AUTO: 3 % (ref 0–6)
ERYTHROCYTE [DISTWIDTH] IN BLOOD BY AUTOMATED COUNT: 17.6 % (ref 11.6–15.1)
GFR SERPL CREATININE-BSD FRML MDRD: 85 ML/MIN/1.73SQ M
GLUCOSE P FAST SERPL-MCNC: 109 MG/DL (ref 65–99)
HCT VFR BLD AUTO: 42.4 % (ref 36.5–49.3)
HGB BLD-MCNC: 13.3 G/DL (ref 12–17)
IMM GRANULOCYTES # BLD AUTO: 0.01 THOUSAND/UL (ref 0–0.2)
IMM GRANULOCYTES NFR BLD AUTO: 0 % (ref 0–2)
LYMPHOCYTES # BLD AUTO: 1.59 THOUSANDS/ÂΜL (ref 0.6–4.47)
LYMPHOCYTES NFR BLD AUTO: 34 % (ref 14–44)
MCH RBC QN AUTO: 27.1 PG (ref 26.8–34.3)
MCHC RBC AUTO-ENTMCNC: 31.4 G/DL (ref 31.4–37.4)
MCV RBC AUTO: 86 FL (ref 82–98)
MONOCYTES # BLD AUTO: 0.53 THOUSAND/ÂΜL (ref 0.17–1.22)
MONOCYTES NFR BLD AUTO: 11 % (ref 4–12)
NEUTROPHILS # BLD AUTO: 2.48 THOUSANDS/ÂΜL (ref 1.85–7.62)
NEUTS SEG NFR BLD AUTO: 52 % (ref 43–75)
NRBC BLD AUTO-RTO: 0 /100 WBCS
PLATELET # BLD AUTO: 298 THOUSANDS/UL (ref 149–390)
PMV BLD AUTO: 8.6 FL (ref 8.9–12.7)
POTASSIUM SERPL-SCNC: 4.6 MMOL/L (ref 3.5–5.3)
PROT SERPL-MCNC: 6.6 G/DL (ref 6.4–8.4)
RBC # BLD AUTO: 4.91 MILLION/UL (ref 3.88–5.62)
SODIUM SERPL-SCNC: 141 MMOL/L (ref 135–147)
WBC # BLD AUTO: 4.75 THOUSAND/UL (ref 4.31–10.16)

## 2023-12-08 PROCEDURE — 80053 COMPREHEN METABOLIC PANEL: CPT

## 2023-12-08 PROCEDURE — 85025 COMPLETE CBC W/AUTO DIFF WBC: CPT

## 2023-12-08 PROCEDURE — 36415 COLL VENOUS BLD VENIPUNCTURE: CPT

## 2023-12-08 PROCEDURE — 99215 OFFICE O/P EST HI 40 MIN: CPT

## 2023-12-08 PROCEDURE — 93000 ELECTROCARDIOGRAM COMPLETE: CPT

## 2023-12-08 RX ORDER — PREDNISOLONE ACETATE 10 MG/ML
SUSPENSION/ DROPS OPHTHALMIC
COMMUNITY
Start: 2023-12-01

## 2023-12-08 RX ORDER — MOXIFLOXACIN 5 MG/ML
SOLUTION/ DROPS OPHTHALMIC
COMMUNITY
Start: 2023-12-01

## 2023-12-08 NOTE — LETTER
2023     Lonnie Clancy MD  [address] [facility] [address]    Patient: Phill Aguilar   YOB: 1950   Date of Visit: 2023       Dear Dr. Lincoln Sherwood: Thank you for referring Govind Loge to me for evaluation. Below are my notes for this consultation. If you have questions, please do not hesitate to call me. I look forward to following your patient along with you. Sincerely,        April Danish Espana PA-C        CC: No Recipients    April Danish Espana [place]  2023  8:55 AM  Incomplete  INTERNAL MEDICINE PRE-OPERATIVE EVALUATION  [facility]    NAME: Phill Aguilar  AGE: 74 y.o. SEX: male  : 1950     DATE: 2023     Internal Medicine Pre-Operative Evaluation:     Chief Complaint: Pre-operative Evaluation     Surgery: L eye cataract extraction  Anticipated Date of Surgery: 2023  Referring Provider: Lonnie Clancy MD        History of Present Illness:     Phill Aguilar is a 74 y.o. male who presents to the office today for a preoperative consultation at the request of surgeon, Dr. Curtis Hill, who plans on performing L eye cataract removal on 2023. Planned anesthesia is local and IV sedation. Patient has a bleeding risk of: no recent abnormal bleeding, no remote history of abnormal bleeding, and use of Ca-channel blockers (see med list). Patient does not have objections to receiving blood products if needed. Current anti-platelet/anti-coagulation medications that the patient is prescribed includes: Aspirin. Assessment of Chronic Conditions:   - Diabetes Mellitus: Well controlled, A1c in 2023 was 6.1.   - Coronary Artery Disease: Hx of stents s/p MI, currently on statins/ASA   - Hypertension: well controlled on current medications.  - HOCM s/p ICD - Most recently echo shows stable disease.        Assessment of Cardiac Risk:  Denies unstable or severe angina or MI in the last 6 weeks or history of stent placement in the last year   Denies decompensated heart failure (e.g. New onset heart failure, NYHA functional class IV heart failure, or worsening existing heart failure)  Denies significant arrhythmias such as high grade AV block, symptomatic ventricular arrhythmia, newly recognized ventricular tachycardia, supraventricular tachycardia with resting heart rate >100, or symptomatic bradycardia  Denies severe heart valve disease including aortic stenosis or symptomatic mitral stenosis     Exercise Capacity:  Able to walk 4 blocks without symptoms?: Yes  Able to walk 2 flights without symptoms?: Yes    Prior Anesthesia Reactions: No     Personal history of venous thromboembolic disease? No    History of steroid use for >2 weeks within last year? No    STOP-BANG Sleep Apnea Screening Questionnaire:      Do you SNORE loudly (louder than talking or loud enough to be heard through closed doors)? Yes = 1 point   Do you often feel TIRED, fatigued, or sleepy during daytime? No = 0 point   Has anyone OBSERVED you stop breathing during your sleep? No = 0 point   Do you have or are you being treated for high blood pressure? Yes = 1 point   BMI more than 35 kg/m2? No = 0 point   AGE over 48years old? Yes = 1 point   NECK circumference > 16 inches (40 cm)? No = 0 point   Male GENDER? Yes = 1 point   TOTAL SCORE 4= INTERMEDIATE risk of LILIA       Review of Systems:     Review of Systems   Constitutional:  Negative for fever. HENT: Negative. Eyes:  Positive for visual disturbance (L eye cataract). Respiratory:  Negative for shortness of breath. Cardiovascular:  Negative for chest pain and palpitations. Gastrointestinal: Negative. Genitourinary: Negative. Musculoskeletal:  Negative for gait problem. Skin:  Negative for rash. Neurological:  Negative for syncope and facial asymmetry. All other systems reviewed and are negative.        Problem List:     Patient Active Problem List   Diagnosis   • Non-recurrent bilateral inguinal hernia without obstruction or gangrene   • Hyperlipidemia   • HTN (hypertension)   • Type 2 diabetes mellitus ([address])   • Smoking   • Iron deficiency anemia due to chronic blood loss   • HOCM (hypertrophic obstructive cardiomyopathy) ([address])   • Coronary artery disease involving native coronary artery of native heart   • Atherosclerosis of aorta ([facility])   • Anemia associated with diabetes mellitus    • Thrombocytosis   • Hypercalcemia   • Hyperparathyroidism ([address])   • History of aortic regurgitation   • History of obesity   • Hyperkalemia   • Metabolic acidosis   • Hematuria   • Anemia   • S/P subtotal parathyroidectomy ([facility])   • Antritis and gastric ulcer   • Urinary retention   • Urinary retention due to benign prostatic hyperplasia   • Benign localized hyperplasia of prostate with urinary obstruction   • CAD (coronary artery disease)   • VT (ventricular tachycardia) ([facility])   • Palpitations   • Bradycardia   • Cardiomyopathy ([facility])   • Sick sinus syndrome ([facility])        Allergies:     No Known Allergies     Current Medications:       Current Outpatient Medications:   •  aspirin (ECOTRIN LOW STRENGTH) 81 mg EC tablet, Take 1 tablet (81 mg total) by mouth daily, Disp: , Rfl:   •  atenolol (TENORMIN) 50 mg tablet, Take 1.5 tablets (75 mg total) by mouth 2 (two) times a day, Disp: 270 tablet, Rfl: 3  •  atorvastatin (LIPITOR) 80 mg tablet, Take 1 tablet (80 mg total) by mouth daily, Disp: 90 tablet, Rfl: 3  •  cholecalciferol (VITAMIN D3) 1,000 units tablet, Take 1,000 Units by mouth daily, Disp: , Rfl:   •  cloNIDine (CATAPRES-TTS-3) 0.3 mg/24 hr, PLACE 1 PATCH ON THE SKIN OVER 7 DAYS ONCE A WEEK, Disp: 12 patch, Rfl: 2  •  lisinopril (ZESTRIL) 40 mg tablet, TAKE 1 TABLET(40 MG) BY MOUTH DAILY, Disp: 90 tablet, Rfl: 3  •  metFORMIN (GLUCOPHAGE-XR) 500 mg 24 hr tablet, Take 1 tablet (500 mg total) by mouth 2 (two) times a day with meals, Disp: 180 tablet, Rfl: 3  • moxifloxacin (VIGAMOX) 0.5 % ophthalmic solution, INSTILL 1 DROP IN LEFT EYE FOUR TIMES DAILY AS DIRECTED, Disp: , Rfl:   •  Omega-3 Fatty Acids (FISH OIL ADULT GUMMIES PO), Take by mouth, Disp: , Rfl:   •  pantoprazole (PROTONIX) 40 mg tablet, Take 1 tablet (40 mg total) by mouth daily, Disp: 90 tablet, Rfl: 3  •  prednisoLONE acetate (PRED FORTE) 1 % ophthalmic suspension, INSTILL 1 DROP INTO LEFT EYE FOUR TIMES A DAY AS DIRECTED. USE AFTER SURGERY, Disp: , Rfl:   •  torsemide (DEMADEX) 10 mg tablet, TAKE 1 TABLET(10 MG) BY MOUTH DAILY, Disp: 90 tablet, Rfl: 3     Past History:     Past Medical History:   Diagnosis Date   • Anemia    • Benign neoplasm of large intestine    • Bleeding gastric ulcer 2018   • Cardiomyopathy ([address])    • Chronic kidney disease    • Colon polyp    • Diabetes mellitus ([address])    • Disease of thyroid gland    • Heart murmur    • History of aortic regurgitation    • History of constipation    • History of degenerative joint disease    • History of nocturia    • History of obesity    • History of sebaceous cyst    • History of shortness of breath    • History of transfusion    • History of urinary frequency    • Hyperlipidemia    • Hyperparathyroidism ([address])    • Hypertension    • Myocardial infarction ([address]) 2018    january, 3 stents   • Polyposis coli     of the large intestine   • Ulcer of esophagus         Past Surgical History:   Procedure Laterality Date   • ACHILLES TENDON SURGERY Left 1973   • CARDIAC ELECTROPHYSIOLOGY PROCEDURE Left 12/23/2022    Procedure: insertion dual chamber ICD; Surgeon: Roddy Jackson DO;  [facility];  Service: Cardiology   • CATARACT EXTRACTION Right 02/18/2021   • COLONOSCOPY      hyperplastic polyp   • CORONARY ANGIOPLASTY WITH STENT PLACEMENT      3 stents: 2 placed on Jan 2018, 1 on July 2018    • CYST REMOVAL  2019, 2020    left and right wrists   • ESOPHAGOGASTRODUODENOSCOPY N/A 1/23/2018    Procedure: ESOPHAGOGASTRODUODENOSCOPY (EGD);   Surgeon: Ciera Benitez MD;  [facility]; Service: Gastroenterology   • ESOPHAGOGASTRODUODENOSCOPY     • HERNIA REPAIR     • HERNIA REPAIR Bilateral 8/18/2017    Procedure: LAPAROSCOPIC INGUINAL HERNIA REPAIR WITH MESH;  Surgeon: Megha Sandhu MD;  [facility];  Service: General   • LARYNGOSCOPY N/A 5/12/2022    Procedure: LARYNGOSCOPY DIRECT;  Surgeon: eTrri Barth MD;  [facility];  Service: Surgical Oncology   • [place] ESOPHAGOGASTRODUODENOSCOPY TRANSORAL DIAGNOSTIC N/A 3/26/2018    Procedure: ESOPHAGOGASTRODUODENOSCOPY (EGD); Surgeon: Cheryl Alba MD;  [facility]; Service: Gastroenterology   • FL ESOPHAGOGASTRODUODENOSCOPY TRANSORAL DIAGNOSTIC N/A 5/14/2018    Procedure: ESOPHAGOGASTRODUODENOSCOPY (EGD); Surgeon: Cheryl Alba MD;  [facility]; Service: Gastroenterology   • FL PARATHYROIDECTOMY/EXPLORATION PARATHYROIDS Right 3/23/2021    Procedure: RIGHT PARATHYROIDECTOMY, MINIMALLY INVASIVE, POSSIBLE 4-GLAND EXPLORATION, WITH INTRA-OPERATIVE PTH MONITORING;  Surgeon: Terri Barth MD;  [facility];  Service: Surgical Oncology   • FL PARATHYROIDECTOMY/EXPLORATION PARATHYROIDS Right 5/12/2022    Procedure: TWO GLAND PARATHYROIDECTOMY, 4 GLAND EXPLORATION, INTRAOPERATIVE PTH MONITORING, FLEXIBLE LARYNGOSCOPY;  Surgeon: Terri Barth MD;  [facility];  Service: Surgical Oncology   • FL TRURL ELECTROSURG RESCJ PROSTATE BLEED COMPLETE N/A 9/2/2021    Procedure: TRANSURETHRAL RESECTION OF PROSTATE (TURP);   Surgeon: Ed Bueno MD;  [facility];  Service: Urology   • TIBIA FRACTURE SURGERY Left 1962        Family History   Problem Relation Age of Onset   • Rheumatic fever Father    • Other Father         accidental poisoning   • Heart disease Mother         Social History     Socioeconomic History   • Marital status: /Civil Union     Spouse name: Not on file   • Number of children: Not on file   • Years of education: Not on file   • Highest education level: Not on file   Occupational History   • Occupation:    Tobacco Use   • Smoking status: Every Day     Packs/day: 1.00     Years: 40.00     Total pack years: 40.00     Types: Cigarettes     Start date: 1968   • Smokeless tobacco: Never   • Tobacco comments:     Smoked this morning 12/23 0700   Vaping Use   • Vaping Use: Never used   Substance and Sexual Activity   • Alcohol use: Not Currently     Comment: rare social occasion, once a year," takes whiskey if feeling a cold coming"   • Drug use: No   • Sexual activity: Not Currently     Partners: Female   Other Topics Concern   • Not on file   Social History Narrative   • Not on file     Social Determinants of Health     Financial Resource Strain: Low Risk  (3/17/2023)    Overall Financial Resource Strain (CARDIA)    • Difficulty of Paying Living Expenses: Not hard at all   Food Insecurity: Not on file   Transportation Needs: No Transportation Needs (3/17/2023)    PRAPARE - Transportation    • Lack of Transportation (Medical): No    • Lack of Transportation (Non-Medical): No   Physical Activity: Inactive (4/19/2021)    Exercise Vital Sign    • Days of Exercise per Week: 0 days    • Minutes of Exercise per Session: 0 min   Stress: No Stress Concern Present (4/19/2021)    [facility]    • Feeling of Stress : Not at all   Social Connections: Not on file   Intimate Partner Violence: Not on file   Housing Stability: Not on file        Physical Exam:      /80 (BP Location: Right arm, Patient Position: Sitting, Cuff Size: Standard)   Pulse 60   Temp (!) 94.1 °F (34.5 °C) (Tympanic)   Resp 18   Wt 88.8 kg (195 lb 12.8 oz)   SpO2 98%   BMI 29.77 kg/m²     Physical Exam  Vitals reviewed. Constitutional:       General: He is not in acute distress. Appearance: Normal appearance. He is not toxic-appearing or diaphoretic.    HENT:      Head: Normocephalic and atraumatic. Right Ear: Hearing normal.      Left Ear: Hearing normal.      Nose: Nose normal.      Mouth/Throat:      Lips: Pink. Mouth: Mucous membranes are moist.   Eyes:      General: Lids are normal. No scleral icterus. Conjunctiva/sclera: Conjunctivae normal.   Cardiovascular:      Rate and Rhythm: Normal rate and regular rhythm. Pulses:           Radial pulses are 2+ on the right side and 2+ on the left side. Posterior tibial pulses are 2+ on the right side and 2+ on the left side. Heart sounds: Murmur heard. Pulmonary:      Effort: Pulmonary effort is normal.      Breath sounds: Normal breath sounds. Abdominal:      General: Bowel sounds are normal.      Palpations: Abdomen is soft. Tenderness: There is no abdominal tenderness. Musculoskeletal:      Cervical back: Full passive range of motion without pain. Skin:     General: Skin is warm and dry. Capillary Refill: Capillary refill takes less than 2 seconds. Coloration: Skin is not cyanotic. Neurological:      General: No focal deficit present. Mental Status: He is alert and oriented to person, place, and time. GCS: GCS eye subscore is 4. GCS verbal subscore is 5. GCS motor subscore is 6. Gait: Gait is intact. Psychiatric:         Behavior: Behavior normal.           Data:     Pre-operative work-up    Laboratory Results: I have personally reviewed the pertinent laboratory results/reports     EKG: I have personally reviewed pertinent reports. and shows a-paced rhythma with LVH with repolarization abnormality, similar to prior ECG of 11/2022. Chest x-ray:  n/a    Previous cardiopulmonary studies within the past year:  Echocardiogram: Increased LV wall thickness, EF 68%, no LV dynamic obstruction. Stress ECG showed no strses ST deviation, rare PVCs, negative for ischemia.   Cardiac Catheterization: n/a  Stress Test: See echo report above  Pulmonary Function Testing: n/a       Assessment:     1. HOCM (hypertrophic obstructive cardiomyopathy) (HCC)  POCT ECG    CBC and differential    Comprehensive metabolic panel    Stable disease, labs returned WNL. 2. Primary hypertension      Stable. 3. Preop examination  CANCELED: CBC and differential    CANCELED: Comprehensive metabolic panel           Plan:     74 y.o. male with planned surgery: L cataract extraction. Cardiac Risk Estimation: per the Revised Cardiac Risk Index (Circ. 100:1043, 1999), the patient's risk factors for cardiac complications include history of ischemic heart disease and HOCM/ICD in place , putting him in: RCI RISK CLASS III (2 risk factors, risk of major cardiac compl. appr. 3.6%). 1. Further preoperative workup as follows:   - Complete blood count  - Basic metabolic profile    Labs completed and are WNL. 2. Medication Management/Recommendations:   - Patient has been instructed to avoid herbs or non-directed vitamins the week prior to surgery to ensure no drug interactions with perioperative surgical and anesthetic medications. - Patient should continue antihypertensive medications up through and including the day of surgery. - Patient should continue beta-blocker medication up through and including the day of surgery.  - Hold metformin the morning of surgery and do not resume until 48 hours AFTER surgery to avoid risk of lactic acidosis. Do not resume if eGFR is < 30  - Patient has been instructed to avoid aspirin containing medications or non-steroidal anti-inflammatory drugs for the week preceding surgery. 3. Prophylaxis for cardiac events with perioperative beta-blockers: not indicated. 4. Patient requires further consultation with: None    Clearance  Patient is CLEARED for surgery without any additional cardiac testing.      April Danish Espana PA-C  [facility] [address] [address]  Phone#  [phone]  Fax# [phone]        April Rossy Crawford  2023 12:44 PM  Sign when Signing Visit  INTERNAL MEDICINE PRE-OPERATIVE EVALUATION  [facility] - Critical access hospital CARE [place]    NAME: George Becker  AGE: 74 y.o. SEX: male  : 1950     DATE: 2023     Internal Medicine Pre-Operative Evaluation:     Chief Complaint: Pre-operative Evaluation     Surgery: L eye cataract extraction  Anticipated Date of Surgery: 2023  Referring Provider: Mallory Marte MD        History of Present Illness:     George Becker is a 74 y.o. male who presents to the office today for a preoperative consultation at the request of surgeon, Dr. Flo Hernandez, who plans on performing L eye cataract removal on 2023. Planned anesthesia is local and IV sedation. Patient has a bleeding risk of: no recent abnormal bleeding, no remote history of abnormal bleeding, and use of Ca-channel blockers (see med list). Patient does not have objections to receiving blood products if needed. Current anti-platelet/anti-coagulation medications that the patient is prescribed includes: Aspirin. Assessment of Chronic Conditions:   - Diabetes Mellitus: Well controlled, A1c in 2023 was 6.1.   - Coronary Artery Disease: Hx of stents s/p MI, currently on statins/ASA   - Hypertension: well controlled on current medications.  - HOCM s/p ICD - Most recently echo shows stable disease.        Assessment of Cardiac Risk:  Denies unstable or severe angina or MI in the last 6 weeks or history of stent placement in the last year   Denies decompensated heart failure (e.g. New onset heart failure, NYHA functional class IV heart failure, or worsening existing heart failure)  Denies significant arrhythmias such as high grade AV block, symptomatic ventricular arrhythmia, newly recognized ventricular tachycardia, supraventricular tachycardia with resting heart rate >100, or symptomatic bradycardia  Denies severe heart valve disease including aortic stenosis or symptomatic mitral stenosis     Exercise Capacity:  Able to walk 4 blocks without symptoms?: Yes  Able to walk 2 flights without symptoms?: Yes    Prior Anesthesia Reactions: No     Personal history of venous thromboembolic disease? No    History of steroid use for >2 weeks within last year? No    STOP-BANG Sleep Apnea Screening Questionnaire:      Do you SNORE loudly (louder than talking or loud enough to be heard through closed doors)? Yes = 1 point   Do you often feel TIRED, fatigued, or sleepy during daytime? No = 0 point   Has anyone OBSERVED you stop breathing during your sleep? No = 0 point   Do you have or are you being treated for high blood pressure? Yes = 1 point   BMI more than 35 kg/m2? No = 0 point   AGE over 48years old? Yes = 1 point   NECK circumference > 16 inches (40 cm)? No = 0 point   Male GENDER? Yes = 1 point   TOTAL SCORE 4= INTERMEDIATE risk of LILIA       Review of Systems:     Review of Systems   Constitutional:  Negative for fever. HENT: Negative. Eyes:  Positive for visual disturbance (L eye cataract). Respiratory:  Negative for shortness of breath. Cardiovascular:  Negative for chest pain and palpitations. Gastrointestinal: Negative. Genitourinary: Negative. Musculoskeletal:  Negative for gait problem. Skin:  Negative for rash. Neurological:  Negative for syncope and facial asymmetry. All other systems reviewed and are negative.        Problem List:     Patient Active Problem List   Diagnosis   • Non-recurrent bilateral inguinal hernia without obstruction or gangrene   • Hyperlipidemia   • HTN (hypertension)   • Type 2 diabetes mellitus ([address])   • Smoking   • Iron deficiency anemia due to chronic blood loss   • HOCM (hypertrophic obstructive cardiomyopathy) ([address])   • Coronary artery disease involving native coronary artery of native heart   • Atherosclerosis of aorta (HCC)   • Anemia associated with diabetes mellitus    • Thrombocytosis   • Hypercalcemia   • Hyperparathyroidism ([address])   • History of aortic regurgitation   • History of obesity   • Hyperkalemia   • Metabolic acidosis   • Hematuria   • Anemia   • S/P subtotal parathyroidectomy ([facility])   • Antritis and gastric ulcer   • Urinary retention   • Urinary retention due to benign prostatic hyperplasia   • Benign localized hyperplasia of prostate with urinary obstruction   • CAD (coronary artery disease)   • VT (ventricular tachycardia) ([facility])   • Palpitations   • Bradycardia   • Cardiomyopathy ([facility])   • Sick sinus syndrome ([facility])        Allergies:     No Known Allergies     Current Medications:       Current Outpatient Medications:   •  aspirin (ECOTRIN LOW STRENGTH) 81 mg EC tablet, Take 1 tablet (81 mg total) by mouth daily, Disp: , Rfl:   •  atenolol (TENORMIN) 50 mg tablet, Take 1.5 tablets (75 mg total) by mouth 2 (two) times a day, Disp: 270 tablet, Rfl: 3  •  atorvastatin (LIPITOR) 80 mg tablet, Take 1 tablet (80 mg total) by mouth daily, Disp: 90 tablet, Rfl: 3  •  cholecalciferol (VITAMIN D3) 1,000 units tablet, Take 1,000 Units by mouth daily, Disp: , Rfl:   •  cloNIDine (CATAPRES-TTS-3) 0.3 mg/24 hr, PLACE 1 PATCH ON THE SKIN OVER 7 DAYS ONCE A WEEK, Disp: 12 patch, Rfl: 2  •  lisinopril (ZESTRIL) 40 mg tablet, TAKE 1 TABLET(40 MG) BY MOUTH DAILY, Disp: 90 tablet, Rfl: 3  •  metFORMIN (GLUCOPHAGE-XR) 500 mg 24 hr tablet, Take 1 tablet (500 mg total) by mouth 2 (two) times a day with meals, Disp: 180 tablet, Rfl: 3  •  moxifloxacin (VIGAMOX) 0.5 % ophthalmic solution, INSTILL 1 DROP IN LEFT EYE FOUR TIMES DAILY AS DIRECTED, Disp: , Rfl:   •  Omega-3 Fatty Acids (FISH OIL ADULT GUMMIES PO), Take by mouth, Disp: , Rfl:   •  pantoprazole (PROTONIX) 40 mg tablet, Take 1 tablet (40 mg total) by mouth daily, Disp: 90 tablet, Rfl: 3  •  prednisoLONE acetate (PRED FORTE) 1 % ophthalmic suspension, INSTILL 1 DROP INTO LEFT EYE FOUR TIMES A DAY AS DIRECTED.  USE AFTER SURGERY, Disp: , Rfl:   •  torsemide (DEMADEX) 10 mg tablet, TAKE 1 TABLET(10 MG) BY MOUTH DAILY, Disp: 90 tablet, Rfl: 3     Past History:     Past Medical History:   Diagnosis Date   • Anemia    • Benign neoplasm of large intestine    • Bleeding gastric ulcer 2018   • Cardiomyopathy ([address])    • Chronic kidney disease    • Colon polyp    • Diabetes mellitus ([address])    • Disease of thyroid gland    • Heart murmur    • History of aortic regurgitation    • History of constipation    • History of degenerative joint disease    • History of nocturia    • History of obesity    • History of sebaceous cyst    • History of shortness of breath    • History of transfusion    • History of urinary frequency    • Hyperlipidemia    • Hyperparathyroidism ([address])    • Hypertension    • Myocardial infarction [facility]) 2018    january, 3 stents   • Polyposis coli     of the large intestine   • Ulcer of esophagus         Past Surgical History:   Procedure Laterality Date   • ACHILLES TENDON SURGERY Left 1973   • CARDIAC ELECTROPHYSIOLOGY PROCEDURE Left 12/23/2022    Procedure: insertion dual chamber ICD; Surgeon: Shanell Rachel DO;  [facility];  Service: Cardiology   • CATARACT EXTRACTION Right 02/18/2021   • COLONOSCOPY      hyperplastic polyp   • CORONARY ANGIOPLASTY WITH STENT PLACEMENT      3 stents: 2 placed on Jan 2018, 1 on July 2018    • CYST REMOVAL  2019, 2020    left and right wrists   • ESOPHAGOGASTRODUODENOSCOPY N/A 1/23/2018    Procedure: ESOPHAGOGASTRODUODENOSCOPY (EGD); Surgeon: Mike Jarquin MD;  [facility];   Service: Gastroenterology   • ESOPHAGOGASTRODUODENOSCOPY     • HERNIA REPAIR     • HERNIA REPAIR Bilateral 8/18/2017    Procedure: LAPAROSCOPIC INGUINAL HERNIA REPAIR WITH MESH;  Surgeon: Beltran Wilkerson MD;  [facility];  Service: General   • LARYNGOSCOPY N/A 5/12/2022    Procedure: LARYNGOSCOPY DIRECT;  Surgeon: Radha Davis MD;  Location:  MAIN OR;  Service: Surgical Oncology   • [place] ESOPHAGOGASTRODUODENOSCOPY TRANSORAL DIAGNOSTIC N/A 3/26/2018    Procedure: ESOPHAGOGASTRODUODENOSCOPY (EGD); Surgeon: Aurea Godfrey MD;  [facility]; Service: Gastroenterology   • GA ESOPHAGOGASTRODUODENOSCOPY TRANSORAL DIAGNOSTIC N/A 5/14/2018    Procedure: ESOPHAGOGASTRODUODENOSCOPY (EGD); Surgeon: Aurea Godfrey MD;  [facility]; Service: Gastroenterology   • GA PARATHYROIDECTOMY/EXPLORATION PARATHYROIDS Right 3/23/2021    Procedure: RIGHT PARATHYROIDECTOMY, MINIMALLY INVASIVE, POSSIBLE 4-GLAND EXPLORATION, WITH INTRA-OPERATIVE PTH MONITORING;  Surgeon: Melba Carr MD;  [facility];  Service: Surgical Oncology   • GA PARATHYROIDECTOMY/EXPLORATION PARATHYROIDS Right 5/12/2022    Procedure: TWO GLAND PARATHYROIDECTOMY, 4 GLAND EXPLORATION, INTRAOPERATIVE PTH MONITORING, FLEXIBLE LARYNGOSCOPY;  Surgeon: Melba Carr MD;  [facility];  Service: Surgical Oncology   • GA TRURL ELECTROSURG RESCJ PROSTATE BLEED COMPLETE N/A 9/2/2021    Procedure: TRANSURETHRAL RESECTION OF PROSTATE (TURP);   Surgeon: Francois Tomas MD;  [facility];  Service: Urology   • TIBIA FRACTURE SURGERY Left 1962        Family History   Problem Relation Age of Onset   • Rheumatic fever Father    • Other Father         accidental poisoning   • Heart disease Mother         Social History     Socioeconomic History   • Marital status: /Civil Union     Spouse name: Not on file   • Number of children: Not on file   • Years of education: Not on file   • Highest education level: Not on file   Occupational History   • Occupation:    Tobacco Use   • Smoking status: Every Day     Packs/day: 1.00     Years: 40.00     Total pack years: 40.00     Types: Cigarettes     Start date: 1968   • Smokeless tobacco: Never   • Tobacco comments:     Smoked this morning 12/23 0700   Vaping Use   • Vaping Use: Never used   Substance and Sexual Activity   • Alcohol use: Not Currently Comment: rare social occasion, once a year," takes whiskey if feeling a cold coming"   • Drug use: No   • Sexual activity: Not Currently     Partners: Female   Other Topics Concern   • Not on file   Social History Narrative   • Not on file     Social Determinants of Health     Financial Resource Strain: Low Risk  (3/17/2023)    Overall Financial Resource Strain (CARDIA)    • Difficulty of Paying Living Expenses: Not hard at all   Food Insecurity: Not on file   Transportation Needs: No Transportation Needs (3/17/2023)    PRAPARE - Transportation    • Lack of Transportation (Medical): No    • Lack of Transportation (Non-Medical): No   Physical Activity: Inactive (4/19/2021)    Exercise Vital Sign    • Days of Exercise per Week: 0 days    • Minutes of Exercise per Session: 0 min   Stress: No Stress Concern Present (4/19/2021)    [address] [facility]    • Feeling of Stress : Not at all   Social Connections: Not on file   Intimate Partner Violence: Not on file   Housing Stability: Not on file        Physical Exam:      /80 (BP Location: Right arm, Patient Position: Sitting, Cuff Size: Standard)   Pulse 60   Temp (!) 94.1 °F (34.5 °C) (Tympanic)   Resp 18   Wt 88.8 kg (195 lb 12.8 oz)   SpO2 98%   BMI 29.77 kg/m²     Physical Exam  Vitals reviewed. Constitutional:       General: He is not in acute distress. Appearance: Normal appearance. He is not toxic-appearing or diaphoretic. HENT:      Head: Normocephalic and atraumatic. Right Ear: Hearing normal.      Left Ear: Hearing normal.      Nose: Nose normal.      Mouth/Throat:      Lips: Pink. Mouth: Mucous membranes are moist.   Eyes:      General: Lids are normal. No scleral icterus. Conjunctiva/sclera: Conjunctivae normal.   Cardiovascular:      Rate and Rhythm: Normal rate and regular rhythm. Pulses:           Radial pulses are 2+ on the right side and 2+ on the left side. Posterior tibial pulses are 2+ on the right side and 2+ on the left side. Heart sounds: Murmur heard. Pulmonary:      Effort: Pulmonary effort is normal.      Breath sounds: Normal breath sounds. Abdominal:      General: Bowel sounds are normal.      Palpations: Abdomen is soft. Tenderness: There is no abdominal tenderness. Musculoskeletal:      Cervical back: Full passive range of motion without pain. Skin:     General: Skin is warm and dry. Capillary Refill: Capillary refill takes less than 2 seconds. Coloration: Skin is not cyanotic. Neurological:      General: No focal deficit present. Mental Status: He is alert and oriented to person, place, and time. GCS: GCS eye subscore is 4. GCS verbal subscore is 5. GCS motor subscore is 6. Gait: Gait is intact. Psychiatric:         Behavior: Behavior normal.           Data:     Pre-operative work-up    Laboratory Results: I have personally reviewed the pertinent laboratory results/reports     EKG: I have personally reviewed pertinent reports. and shows a-paced rhythma with LVH with repolarization abnormality, similar to prior ECG of 11/2022. Chest x-ray:  n/a    Previous cardiopulmonary studies within the past year:  Echocardiogram: Increased LV wall thickness, EF 68%, no LV dynamic obstruction. Stress ECG showed no strses ST deviation, rare PVCs, negative for ischemia. Cardiac Catheterization: n/a  Stress Test: See echo report above  Pulmonary Function Testing: n/a       Assessment:     1. HOCM (hypertrophic obstructive cardiomyopathy) (HCC)  POCT ECG    CBC and differential    Comprehensive metabolic panel      2. Preop examination  CANCELED: CBC and differential    CANCELED: Comprehensive metabolic panel      3. Primary hypertension             Plan:     74 y.o. male with planned surgery: L cataract extraction.       Cardiac Risk Estimation: per the Revised Cardiac Risk Index (Circ. 100:1043, 1999), the patient's risk factors for cardiac complications include history of ischemic heart disease and HOCM/ICD in place , putting him in: RCI RISK CLASS III (2 risk factors, risk of major cardiac compl. appr. 3.6%). 1. Further preoperative workup as follows:   - Complete blood count  - Basic metabolic profile    2. Medication Management/Recommendations:   - Patient has been instructed to avoid herbs or non-directed vitamins the week prior to surgery to ensure no drug interactions with perioperative surgical and anesthetic medications. - Patient should continue antihypertensive medications up through and including the day of surgery. - Patient should continue beta-blocker medication up through and including the day of surgery.  - Hold metformin the morning of surgery and do not resume until 48 hours AFTER surgery to avoid risk of lactic acidosis. Do not resume if eGFR is < 30  - Patient has been instructed to avoid aspirin containing medications or non-steroidal anti-inflammatory drugs for the week preceding surgery. 3. Prophylaxis for cardiac events with perioperative beta-blockers: not indicated. 4. Patient requires further consultation with: None    Clearance  Patient is CLEARED for surgery without any additional cardiac testing.      April Holley Coleman, [address] [facility] [address]  Phone#  [phone]  Fax#  [phone]

## 2023-12-08 NOTE — PROGRESS NOTES
INTERNAL MEDICINE PRE-OPERATIVE EVALUATION  [facility] PHYSICIAN GROUP - [facility] PRIMARY CARE [place]    NAME: Brianne Noonan  AGE: 74 y.o. SEX: male  : 1950     DATE: 2023     Internal Medicine Pre-Operative Evaluation:     Chief Complaint: Pre-operative Evaluation     Surgery: L eye cataract extraction  Anticipated Date of Surgery: 2023  Referring Provider: Noemi Mckeon MD        History of Present Illness:     Brianne Noonan is a 74 y.o. male who presents to the office today for a preoperative consultation at the request of surgeon, Dr. Daija Parrish, who plans on performing L eye cataract removal on 2023. Planned anesthesia is local and IV sedation. Patient has a bleeding risk of: no recent abnormal bleeding, no remote history of abnormal bleeding, and use of Ca-channel blockers (see med list). Patient does not have objections to receiving blood products if needed. Current anti-platelet/anti-coagulation medications that the patient is prescribed includes: Aspirin. Assessment of Chronic Conditions:   - Diabetes Mellitus: Well controlled, A1c in 2023 was 6.1.   - Coronary Artery Disease: Hx of stents s/p MI, currently on statins/ASA   - Hypertension: well controlled on current medications.  - HOCM s/p ICD - Most recently echo shows stable disease.        Assessment of Cardiac Risk:  Denies unstable or severe angina or MI in the last 6 weeks or history of stent placement in the last year   Denies decompensated heart failure (e.g. New onset heart failure, NYHA functional class IV heart failure, or worsening existing heart failure)  Denies significant arrhythmias such as high grade AV block, symptomatic ventricular arrhythmia, newly recognized ventricular tachycardia, supraventricular tachycardia with resting heart rate >100, or symptomatic bradycardia  Denies severe heart valve disease including aortic stenosis or symptomatic mitral stenosis     Exercise Capacity:  Able to walk 4 blocks without symptoms?: Yes  Able to walk 2 flights without symptoms?: Yes    Prior Anesthesia Reactions: No     Personal history of venous thromboembolic disease? No    History of steroid use for >2 weeks within last year? No    STOP-BANG Sleep Apnea Screening Questionnaire:      Do you SNORE loudly (louder than talking or loud enough to be heard through closed doors)? Yes = 1 point   Do you often feel TIRED, fatigued, or sleepy during daytime? No = 0 point   Has anyone OBSERVED you stop breathing during your sleep? No = 0 point   Do you have or are you being treated for high blood pressure? Yes = 1 point   BMI more than 35 kg/m2? No = 0 point   AGE over 48years old? Yes = 1 point   NECK circumference > 16 inches (40 cm)? No = 0 point   Male GENDER? Yes = 1 point   TOTAL SCORE 4= INTERMEDIATE risk of LILIA       Review of Systems:     Review of Systems   Constitutional:  Negative for fever. HENT: Negative. Eyes:  Positive for visual disturbance (L eye cataract). Respiratory:  Negative for shortness of breath. Cardiovascular:  Negative for chest pain and palpitations. Gastrointestinal: Negative. Genitourinary: Negative. Musculoskeletal:  Negative for gait problem. Skin:  Negative for rash. Neurological:  Negative for syncope and facial asymmetry. All other systems reviewed and are negative.        Problem List:     Patient Active Problem List   Diagnosis    Non-recurrent bilateral inguinal hernia without obstruction or gangrene    Hyperlipidemia    HTN (hypertension)    Type 2 diabetes mellitus (HCC)    Smoking    Iron deficiency anemia due to chronic blood loss    HOCM (hypertrophic obstructive cardiomyopathy) ([address])    Coronary artery disease involving native coronary artery of native heart    Atherosclerosis of aorta (HCC)    Anemia associated with diabetes mellitus     Thrombocytosis    Hypercalcemia    Hyperparathyroidism ([address])    History of aortic regurgitation    History of obesity    Hyperkalemia    Metabolic acidosis    Hematuria    Anemia    S/P subtotal parathyroidectomy (HCC)    Antritis and gastric ulcer    Urinary retention    Urinary retention due to benign prostatic hyperplasia    Benign localized hyperplasia of prostate with urinary obstruction    CAD (coronary artery disease)    VT (ventricular tachycardia) (HCC)    Palpitations    Bradycardia    Cardiomyopathy (HCC)    Sick sinus syndrome (HCC)        Allergies:     No Known Allergies     Current Medications:       Current Outpatient Medications:     aspirin (ECOTRIN LOW STRENGTH) 81 mg EC tablet, Take 1 tablet (81 mg total) by mouth daily, Disp: , Rfl:     atenolol (TENORMIN) 50 mg tablet, Take 1.5 tablets (75 mg total) by mouth 2 (two) times a day, Disp: 270 tablet, Rfl: 3    atorvastatin (LIPITOR) 80 mg tablet, Take 1 tablet (80 mg total) by mouth daily, Disp: 90 tablet, Rfl: 3    cholecalciferol (VITAMIN D3) 1,000 units tablet, Take 1,000 Units by mouth daily, Disp: , Rfl:     cloNIDine (CATAPRES-TTS-3) 0.3 mg/24 hr, PLACE 1 PATCH ON THE SKIN OVER 7 DAYS ONCE A WEEK, Disp: 12 patch, Rfl: 2    lisinopril (ZESTRIL) 40 mg tablet, TAKE 1 TABLET(40 MG) BY MOUTH DAILY, Disp: 90 tablet, Rfl: 3    metFORMIN (GLUCOPHAGE-XR) 500 mg 24 hr tablet, Take 1 tablet (500 mg total) by mouth 2 (two) times a day with meals, Disp: 180 tablet, Rfl: 3    moxifloxacin (VIGAMOX) 0.5 % ophthalmic solution, INSTILL 1 DROP IN LEFT EYE FOUR TIMES DAILY AS DIRECTED, Disp: , Rfl:     Omega-3 Fatty Acids (FISH OIL ADULT GUMMIES PO), Take by mouth, Disp: , Rfl:     pantoprazole (PROTONIX) 40 mg tablet, Take 1 tablet (40 mg total) by mouth daily, Disp: 90 tablet, Rfl: 3    prednisoLONE acetate (PRED FORTE) 1 % ophthalmic suspension, INSTILL 1 DROP INTO LEFT EYE FOUR TIMES A DAY AS DIRECTED.  USE AFTER SURGERY, Disp: , Rfl:     torsemide (DEMADEX) 10 mg tablet, TAKE 1 TABLET(10 MG) BY MOUTH DAILY, Disp: 90 tablet, Rfl: 3     Past History:     Past Medical History:   Diagnosis Date    Anemia     Benign neoplasm of large intestine     Bleeding gastric ulcer 2018    Cardiomyopathy ([address])     Chronic kidney disease     Colon polyp     Diabetes mellitus ([address])     Disease of thyroid gland     Heart murmur     History of aortic regurgitation     History of constipation     History of degenerative joint disease     History of nocturia     History of obesity     History of sebaceous cyst     History of shortness of breath     History of transfusion     History of urinary frequency     Hyperlipidemia     Hyperparathyroidism ([address])     Hypertension     Myocardial infarction [facility]) 2018    january, 3 stents    Polyposis coli     of the large intestine    Ulcer of esophagus         Past Surgical History:   Procedure Laterality Date    ACHILLES TENDON SURGERY Left 1973    CARDIAC ELECTROPHYSIOLOGY PROCEDURE Left 12/23/2022    Procedure: insertion dual chamber ICD; Surgeon: Corbin Burris DO;  Location: [facility];  Service: Cardiology    CATARACT EXTRACTION Right 02/18/2021    COLONOSCOPY      hyperplastic polyp    CORONARY ANGIOPLASTY WITH STENT PLACEMENT      3 stents: 2 placed on Jan 2018, 1 on July 2018     CYST REMOVAL  2019, 2020    left and right wrists    ESOPHAGOGASTRODUODENOSCOPY N/A 1/23/2018    Procedure: ESOPHAGOGASTRODUODENOSCOPY (EGD); Surgeon: Jasen Zavala MD;  Location: [facility]; Service: Gastroenterology    ESOPHAGOGASTRODUODENOSCOPY      HERNIA REPAIR      HERNIA REPAIR Bilateral 8/18/2017    Procedure: LAPAROSCOPIC INGUINAL HERNIA REPAIR WITH MESH;  Surgeon: Lydia Flores MD;  [facility];  Service: General    LARYNGOSCOPY N/A 5/12/2022    Procedure: LARYNGOSCOPY DIRECT;  Surgeon: José Luis Vaughn MD;  Location:  MAIN OR;  Service: Surgical Oncology    MT ESOPHAGOGASTRODUODENOSCOPY TRANSORAL DIAGNOSTIC N/A 3/26/2018    Procedure: ESOPHAGOGASTRODUODENOSCOPY (EGD); Surgeon: Job Looney MD;  [facility];   Service: Gastroenterology    WA ESOPHAGOGASTRODUODENOSCOPY TRANSORAL DIAGNOSTIC N/A 5/14/2018    Procedure: ESOPHAGOGASTRODUODENOSCOPY (EGD); Surgeon: Shashi Montelongo MD;  [facility]; Service: Gastroenterology    WA PARATHYROIDECTOMY/EXPLORATION PARATHYROIDS Right 3/23/2021    Procedure: RIGHT PARATHYROIDECTOMY, MINIMALLY INVASIVE, POSSIBLE 4-GLAND EXPLORATION, WITH INTRA-OPERATIVE PTH MONITORING;  Surgeon: Sacha Combs MD;  [facility];  Service: Surgical Oncology    WA PARATHYROIDECTOMY/EXPLORATION PARATHYROIDS Right 5/12/2022    Procedure: TWO GLAND PARATHYROIDECTOMY, 4 GLAND EXPLORATION, INTRAOPERATIVE PTH MONITORING, FLEXIBLE LARYNGOSCOPY;  Surgeon: Sacha Combs MD;  Location: [facility];  Service: Surgical Oncology    WA TRURL ELECTROSURG RESCJ PROSTATE BLEED COMPLETE N/A 9/2/2021    Procedure: TRANSURETHRAL RESECTION OF PROSTATE (TURP);   Surgeon: Alfredo Cortes MD;  [facility];  Service: Urology    TIBIA FRACTURE SURGERY Left 1962        Family History   Problem Relation Age of Onset    Rheumatic fever Father     Other Father         accidental poisoning    Heart disease Mother         Social History     Socioeconomic History    Marital status: /Civil Union     Spouse name: Not on file    Number of children: Not on file    Years of education: Not on file    Highest education level: Not on file   Occupational History    Occupation:    Tobacco Use    Smoking status: Every Day     Packs/day: 1.00     Years: 40.00     Total pack years: 40.00     Types: Cigarettes     Start date: 1968    Smokeless tobacco: Never    Tobacco comments:     Smoked this morning 12/23 0700   Vaping Use    Vaping Use: Never used   Substance and Sexual Activity    Alcohol use: Not Currently     Comment: rare social occasion, once a year," takes whiskey if feeling a cold coming"    Drug use: No    Sexual activity: Not Currently     Partners: Female   Other Topics Concern    Not on file   Social History Narrative    Not on file     Social Determinants of Health     Financial Resource Strain: Low Risk  (3/17/2023)    Overall Financial Resource Strain (CARDIA)     Difficulty of Paying Living Expenses: Not hard at all   Food Insecurity: Not on file   Transportation Needs: No Transportation Needs (3/17/2023)    PRAPARE - Transportation     Lack of Transportation (Medical): No     Lack of Transportation (Non-Medical): No   Physical Activity: Inactive (4/19/2021)    Exercise Vital Sign     Days of Exercise per Week: 0 days     Minutes of Exercise per Session: 0 min   Stress: No Stress Concern Present (4/19/2021)    [address] [facility]     Feeling of Stress : Not at all   Social Connections: Not on file   Intimate Partner Violence: Not on file   Housing Stability: Not on file        Physical Exam:      /80 (BP Location: Right arm, Patient Position: Sitting, Cuff Size: Standard)   Pulse 60   Temp (!) 94.1 °F (34.5 °C) (Tympanic)   Resp 18   Wt 88.8 kg (195 lb 12.8 oz)   SpO2 98%   BMI 29.77 kg/m²     Physical Exam  Vitals reviewed. Constitutional:       General: He is not in acute distress. Appearance: Normal appearance. He is not toxic-appearing or diaphoretic. HENT:      Head: Normocephalic and atraumatic. Right Ear: Hearing normal.      Left Ear: Hearing normal.      Nose: Nose normal.      Mouth/Throat:      Lips: Pink. Mouth: Mucous membranes are moist.   Eyes:      General: Lids are normal. No scleral icterus. Conjunctiva/sclera: Conjunctivae normal.   Cardiovascular:      Rate and Rhythm: Normal rate and regular rhythm. Pulses:           Radial pulses are 2+ on the right side and 2+ on the left side. Posterior tibial pulses are 2+ on the right side and 2+ on the left side. Heart sounds: Murmur heard.    Pulmonary:      Effort: Pulmonary effort is normal.      Breath sounds: Normal breath sounds. Abdominal:      General: Bowel sounds are normal.      Palpations: Abdomen is soft. Tenderness: There is no abdominal tenderness. Musculoskeletal:      Cervical back: Full passive range of motion without pain. Skin:     General: Skin is warm and dry. Capillary Refill: Capillary refill takes less than 2 seconds. Coloration: Skin is not cyanotic. Neurological:      General: No focal deficit present. Mental Status: He is alert and oriented to person, place, and time. GCS: GCS eye subscore is 4. GCS verbal subscore is 5. GCS motor subscore is 6. Gait: Gait is intact. Psychiatric:         Behavior: Behavior normal.           Data:     Pre-operative work-up    Laboratory Results: I have personally reviewed the pertinent laboratory results/reports     EKG: I have personally reviewed pertinent reports. and shows a-paced rhythma with LVH with repolarization abnormality, similar to prior ECG of 11/2022. Chest x-ray:  n/a    Previous cardiopulmonary studies within the past year:  Echocardiogram: Increased LV wall thickness, EF 68%, no LV dynamic obstruction. Stress ECG showed no strses ST deviation, rare PVCs, negative for ischemia. Cardiac Catheterization: n/a  Stress Test: See echo report above  Pulmonary Function Testing: n/a       Assessment:     1. HOCM (hypertrophic obstructive cardiomyopathy) (HCC)  POCT ECG    CBC and differential    Comprehensive metabolic panel    Stable disease, labs returned WNL. 2. Primary hypertension      Stable. 3. Preop examination  CANCELED: CBC and differential    CANCELED: Comprehensive metabolic panel           Plan:     74 y.o. male with planned surgery: L cataract extraction.       Cardiac Risk Estimation: per the Revised Cardiac Risk Index (Circ. 100:1043, 1999), the patient's risk factors for cardiac complications include history of ischemic heart disease and HOCM/ICD in place , putting him in: RCI RISK CLASS III (2 risk factors, risk of major cardiac compl. appr. 3.6%). 1. Further preoperative workup as follows:   - Complete blood count  - Basic metabolic profile    Labs completed and are WNL. 2. Medication Management/Recommendations:   - Patient has been instructed to avoid herbs or non-directed vitamins the week prior to surgery to ensure no drug interactions with perioperative surgical and anesthetic medications. - Patient should continue antihypertensive medications up through and including the day of surgery. - Patient should continue beta-blocker medication up through and including the day of surgery.  - Hold metformin the morning of surgery and do not resume until 48 hours AFTER surgery to avoid risk of lactic acidosis. Do not resume if eGFR is < 30  - Patient has been instructed to avoid aspirin containing medications or non-steroidal anti-inflammatory drugs for the week preceding surgery. 3. Prophylaxis for cardiac events with perioperative beta-blockers: not indicated. 4. Patient requires further consultation with: None    Clearance  Patient is CLEARED for surgery without any additional cardiac testing.      April Lola Montiel, [address] [facility] [address]  Phone#  [phone]  Fax#  [phone]

## 2023-12-08 NOTE — ASSESSMENT & PLAN NOTE
AICD in place, normal device function. Follows with cardiology. Stable disease. EKG similar to prior EKG of 11/2022.

## 2023-12-11 DIAGNOSIS — E78.5 HYPERLIPIDEMIA, UNSPECIFIED HYPERLIPIDEMIA TYPE: ICD-10-CM

## 2023-12-11 RX ORDER — ATORVASTATIN CALCIUM 80 MG/1
80 TABLET, FILM COATED ORAL DAILY
Qty: 90 TABLET | Refills: 0 | Status: SHIPPED | OUTPATIENT
Start: 2023-12-11

## 2023-12-12 ENCOUNTER — TELEPHONE (OUTPATIENT)
Age: 73
End: 2023-12-12

## 2023-12-12 NOTE — TELEPHONE ENCOUNTER
----- Message from April Dorita Boyd PA-C sent at 12/12/2023  7:39 AM EST -----  Let patient know his lab work was fine and I sent over my notes for his cataract surgery to [facility] clearing him for surgery.

## 2024-01-09 ENCOUNTER — REMOTE DEVICE CLINIC VISIT (OUTPATIENT)
Dept: CARDIOLOGY CLINIC | Facility: CLINIC | Age: 74
End: 2024-01-09
Payer: MEDICARE

## 2024-01-09 DIAGNOSIS — Z95.810 PRESENCE OF IMPLANTABLE CARDIOVERTER-DEFIBRILLATOR (ICD): Primary | ICD-10-CM

## 2024-01-09 PROCEDURE — 93295 DEV INTERROG REMOTE 1/2/MLT: CPT | Performed by: INTERNAL MEDICINE

## 2024-01-09 PROCEDURE — 93296 REM INTERROG EVL PM/IDS: CPT | Performed by: INTERNAL MEDICINE

## 2024-01-09 NOTE — PROGRESS NOTES
S[facility] DC ICD/ACTIVE SYSTEM IS MRI CONDITIONAL   MERLIN TRANSMISSION:  BATTERY VOLTAGE ADEQUATE (7.1-8.1 YR.).  AP 41%  <1%.  ALL LEAD PARAMETERS WITHIN NORMAL LIMITS.  5 NON-SUSTAINED EPISODES, NO EGMS.  CORVUE IMPEDANCE MONITORING WITHIN NORMAL LIMITS.  NORMAL DEVICE FUNCTION.  RG

## 2024-01-29 DIAGNOSIS — K29.70 GASTRITIS, PRESENCE OF BLEEDING UNSPECIFIED, UNSPECIFIED CHRONICITY, UNSPECIFIED GASTRITIS TYPE: ICD-10-CM

## 2024-01-29 DIAGNOSIS — I10 PRIMARY HYPERTENSION: ICD-10-CM

## 2024-01-29 DIAGNOSIS — D13.2 DUODENAL ADENOMA: ICD-10-CM

## 2024-01-29 RX ORDER — TORSEMIDE 10 MG/1
10 TABLET ORAL DAILY
Qty: 90 TABLET | Refills: 3 | Status: SHIPPED | OUTPATIENT
Start: 2024-01-29

## 2024-01-29 RX ORDER — PANTOPRAZOLE SODIUM 40 MG/1
40 TABLET, DELAYED RELEASE ORAL DAILY
Qty: 90 TABLET | Refills: 3 | Status: SHIPPED | OUTPATIENT
Start: 2024-01-29

## 2024-01-29 NOTE — PROGRESS NOTES
[facility] Clinic Follow-up Visit - Cardiology   Dany Bosch 73 y.o. male MRN: [id number]  Unit/Bed#:  Encounter: [id number]    Patient Active Problem List    Diagnosis Date Noted    Sick sinus syndrome ([facility]) 03/17/2023    Cardiomyopathy ([facility])     VT (ventricular tachycardia) ([facility]) 11/29/2022    Palpitations 11/29/2022    Bradycardia 11/29/2022    CAD (coronary artery disease) 05/12/2022    Urinary retention due to benign prostatic hyperplasia 09/03/2021    Benign localized hyperplasia of prostate with urinary obstruction 09/03/2021    Antritis and gastric ulcer 04/13/2021    Urinary retention 04/13/2021    S/P subtotal parathyroidectomy ([facility]) 04/12/2021    Anemia 04/09/2021    Hematuria 04/05/2021    Hyperkalemia 04/04/2021    Metabolic acidosis 04/04/2021    History of aortic regurgitation     History of obesity     Hyperparathyroidism ([facility]) 03/03/2021    Hypercalcemia 12/31/2020    Thrombocytosis 04/13/2020    Anemia associated with diabetes mellitus  03/09/2020    Atherosclerosis of aorta ([facility]) 06/18/2019    HOCM (hypertrophic obstructive cardiomyopathy) ([facility]) 07/16/2018    Coronary artery disease involving native coronary artery of native heart     Iron deficiency anemia due to chronic blood loss 04/20/2018    Smoking 02/12/2018    Type 2 diabetes mellitus ([facility]) 02/08/2018    Hyperlipidemia 01/21/2018    HTN (hypertension) 01/21/2018    Non-recurrent bilateral inguinal hernia without obstruction or gangrene 08/18/2017     Plan: Since his last office visit on 7- he had left-sided cataract surgery that overall went well. He did also have updated ICD device checks/diagnostic studies with the following results:     ICD interrogation 7-:    [facility]JM DUAL CHAMBER ICD/ACTIVE SYSTEM IS MRI CONDITIONAL MERLIN TRANSMISSION: BATTERY VOLTAGE ADEQUATE (7.5 YRS). AP: 32%. : <1%. ALL AVAILABLE LEAD PARAMETERS WITHIN NORMAL LIMITS. 10 NON-SUSTAINED EPISODES W/ AVAIL EGM (PDF#2) SHOWING PAT @ 176 BPM, 8 SECS. PT TAKES  ATENOLOL, ASA 81MG. EF: 70% (ECHO 11/21/22). CORVUE IMPEDANCE MONITORING WITHIN NORMAL LIMITS. NORMAL DEVICE FUNCTION.     ICD interrogation 1-9-2024:    [facility] ICD/ACTIVE SYSTEM IS MRI CONDITIONAL   MERLIN TRANSMISSION:  BATTERY VOLTAGE ADEQUATE (7.1-8.1 YR.).  AP 41%  <1%.  ALL LEAD PARAMETERS WITHIN NORMAL LIMITS.  5 NON-SUSTAINED EPISODES, NO EGMS.  CORVUE IMPEDANCE MONITORING WITHIN NORMAL LIMITS.  NORMAL DEVICE FUNCTION.        Exercise stress echocardiography 7-: A bicycle protocol stress test was performed. Overall, the patient's exercise capacity was mildly impaired for their age. The patient reached stage 3.0 of the protocol after exercising for 6 min and 0 sec and had a maximal HR of 112 bpm (77 % of MPHR) and 3.9 METS. The patient experienced no angina during the test. The patient reached the end of the protocol. The patient reported dyspnea and fatigue during the stress test. Symptoms began during stress and ended during recovery. Blood pressure demonstrated a normal response and heart rate demonstrated a blunted response to stress.       Left Ventricle: Wall thickness is severely increased. There is severe asymmetric hypertrophy of the septal wall. The left ventricular ejection fraction is 68%. Systolic function is vigorous. Global longitudinal strain is reduced at -10%. Wall motion is normal. Diastolic function is mildly abnormal, consistent with grade I (abnormal) relaxation. There is no LV dynamic obstruction.    Left Atrium: The atrium is mildly dilated.    Aortic Valve: There is mild regurgitation. There is aortic valve sclerosis.    Mitral Valve: There is mild annular calcification. There is systolic anterior motion of the anterior leaflet without late peaking gradient.    Stress ECG: No ST deviation is noted. Arrhythmias during recovery: rare PVCs. The ECG was equivocal for ischemia. The stress ECG is negative for ischemia after submaximal exercise, without reproduction of  symptoms.    Post Stress Echo: Left ventricle cavity has normal reduction in size post-stress. The left ventricle systolic function is hyperdynamic post-stress.         Today he states overall he feels well. He continues to do work several days a week at his local [employer] and has no cardiac complaints while doing these activities. He did feel a brief episode of palpitations while lying down on Sunday morning but he notes occurrence of palpitations is rare and this episode was very brief. Last BMP showed K of 4.6 and normal kidney function. He continues to take torsemide for water retention. His blood pressure is mildly elevated today at 148/80 on his current regimen of clonidine 0.3 mg patch, torsemide 10 mg daily, lisinopril 40 mg daily and atenolol 75 mg BID but it is noted that he takes his medications at sporadic times through the day which is leading to 16 hrs in between doses. We have written him a schedule to take meds at 8 am and 8 pm. Low sodium diet had been reinforced as his wife notes he does salt his food. We have asked him to monitor his blood pressure twice daily for one week and call our office with results. His LDL from March of 2023 was 87 and he continues on atorvastatin 80 mg daily. His HgbA1c was 6.1 in August 2023. He will follow up in our office in 6 months.    Physician Requesting Consult: Viral Blair DO   Reason for Consult / Principal Problem: HCM     HPI: Dany Bosch is a 73 y.o. year old male with history of Hyperparathyroidism, HTN, CAD s/p PCI to LAD, Hx of VTx s/p ICD (12/23/2022), DM2 on PO med, Anemia, and newly diagnosed HCM who is being referred by Dr. Blair for HCM. He has had several episodes of symptomatic NSVTs, for which he underwent primary prevention defibrillator implantation. As part of the work up, he had a CMR done which showed severely hypertrophied wall with significant amount of LGE. He is being seen today to establish care. He has remained asymptomatic aside  from occasional palpitations, reports living an active lifestyle.  He denied SOB, chest pain, dizziness, bendopnea, PND, orthopnea, fainting, and lower extremity edema. He endorses compliance with medications.              Echocardiogram( 11/21/2022):       Left Ventricle: Wall thickness is increased. There is severe hypertrophy of the left ventricle with asymmetric component in the septum.  There is near complete obliteration of LV cavity during systole . No clear-cut ROCCO. There is LVOT gradient of approximately 120 mm Hg during Valsalva. The left ventricular ejection fraction is 70%. Systolic function is hyperdynamic. Wall motion is normal. Diastolic function is mildly abnormal, consistent with grade I (abnormal) relaxation.    Left Atrium: The atrium is moderately dilated.    Aortic Valve: There is mild to moderate regurgitation. There is mild stenosis.    Mitral Valve: There is moderate annular calcification. There is mild to moderate regurgitation.     CMR(12/13/2022):     1. Severe asymmetric left ventricular wall thickening predominantly involving the interventricular septum, which measures up to 27 mm, with a spiral pattern progressing towards the apex.  Near cavity obliteration during systole.  Systolic anterior motion   of the anterior mitral valve leaflet with flow acceleration in the LVOT, suggesting a component of LVOT obstruction.  Apical insertion of the papillary muscles.  Hyperdynamic systolic function.  2. Normal right ventricular size and systolic function.  3.  The left atrium is normal in size.  Mildly reduced opening of the mitral valve.  Mitral regurgitation.  5. There is no evidence of myocardial edema.  6. Delayed post-gadolinium imaging demonstrates diffuse intramyocardial delayed gadolinium enhancement involving approximately 25% of the myocardium.     1-: Mr. Bosch was interviewed, examined and evaluated together with Dr Violetta Kennedy, cardiology fellow. Mr Bosch has carried  a diagnosis of HOCM for more than 20 years but has been asymptomatic until recently. He also has coronary artery disease with a NSTEMI in 2018 in the setting of severe acute GI bleeding due to gastric ulcer for which he had stenting of proximal and mid-LAD. He is an active smoker and has diabetes, hypertension as well hyperlipidemia. Most recently he has had symptomatic NSVT and work up had shown severe LVH with LV wall thickness of 27 mm and extensive late gadolinium enhancement on cardiac MRI. He has undergone ICD implantation on 12/2022. He has a relatively active lifestyle, works at a [employer] and does yard work there. He has been a smoker for 40 years (>1 ppd). He checks BP at home, numbers are usually between 140-160/80s at home. Endorses compliance with medications, takes Atenolol 50mg BID at 4am and 12 pm, 0.2 mg Clonidine patch every Friday, Lisinopril 40mg at 4am, Torsemide 10mg at 8pm.      Today his BP was 161/71, however, he is asymptomatic. His diet was reviewed and he verbalized understanding that he should abstain from fried and salty food. Fluid intake should be limited to 60 Oz/day and he should monitor his weight daily before breakfast. He also agreed to keep a log of BP and bring it with him during the next visit. We had a discussion about smoking cessation, however, patient is not motivated and has no intention to quit. No change to medications made. The priority at this time is to control his blood pressure effectively, improve daily physical activity and choice of food for meals and assess whether he would need to continue taking diuretic on a daily basis. High gradients have been confirmed on echocardiogram and, today, a faint murmur was heard with provocation. If he develops symptoms lisinopril can be discontinued and replaced by a non-hydropyridine calcium channel blocker. His most recent device interrogation did not show significant rhythm abnormality.    7-: Since his last office  "visit on 1- he has continued to follow with his primary cardiologist, Dr. Coleman and was last seen on 6- and no changes were made at that timet to the patient's medications/clinical plan. He has had an updated ICD device check from 6-: SJM DUAL CHAMBER ICD/ACTIVE SYSTEM IS MRI CONDITIONAL   NON-BILLABLE MERLIN TRANSMISSION: BATTERY VOLTAGE ADEQUATE (7.6 YRS). AP: 32%. : <1%. ALL AVAILABLE LEAD PARAMETERS WITHIN NORMAL LIMITS. 1 NON-SUSTAINED EPISODE W/ EGM SHOWING NSVT 22 BEATS @ 165 BPM. PT TAKES ATENOLOL, ASA 81MG. EF: 70% (ECHO 11/21/22). CORVUE IMPEDANCE MONITORING WITHIN NORMAL LIMITS. APPROPRIATELY FUNCTIONING ICD     Today, the patient states he feels well.  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  He often mows the lawn, performs weed whacking will clean bathrooms and vacuum the floors.  He can perform all of the symptoms with no complaints of chest discomfort or dyspnea on exertion.  The patient states that in late June he was down in the [facility] and was walking on a flat surface with his wife when he began to notice an abrupt onset of left-sided arm \" heaviness\" that lasted roughly 25 to 30 minutes and eventually self resolved.  There were no associated symptoms with this discomfort and this was the first and only time this is happened.  It has not happened since that time.  He has performed very physical activities since that time and has not reproduced that discomfort.  His wife states that she thinks it could potentially have been musculoskeletal in nature as the weeks prior to this they were at their family farm in [place] and the patient was helping move equipment and chairs about the farm and perhaps this was a musculoskeletal event.  Nonetheless, given the fact that the patient did have an outflow tract gradient on his last echocardiogram from November 2022 it would be beneficial to obtain a stress echo " to further evaluate outflow track gradient and also this will assist us to determine if there is underlying ischemia as a possible etiology to this event.  The patient does continue to get occasional palpitations but states they are short-lived.  He denies any lower extremity edema and does take torsemide 10 mg daily.  He has no orthopnea.  He will get some mild lightheadedness with position change but has not had any near syncope/syncope.  At last office visit in January 2023 the patient's blood pressure was noted to be elevated and we did ask him to monitor at home.  In April 2023 his atenolol was increased to 75 mg twice daily in the setting of NSVT noted on ICD checks.  His wife states his blood pressure at home has been running systolically in the 130s and diastolic is typically 85 or less.  His blood pressure is acceptable today at 132/82 and I have continue to encourage him to follow a low-sodium diet.  The patient does continue to smoke about a pack to a pack and a half of cigarettes a day and is not motivated to quit.  I did  him on the pulmonary and cardiac effects of continued tobacco abuse.  He did have a lipid panel drawn in March of this year showing triglycerides of 52 HDL of 61 and LDL of 87 and he will continue atorvastatin 80 mg daily.  I have counseled him on following a low-fat/low-cholesterol diet.  His hemoglobin A1c is 5.9 down from 6.0 in June of last year. Overall, the patient is stable from a cardiac standpoint but we will obtain a stress echo to further investigate his single episode of left arm heaviness and also to further investigate for any left ventricular outflow tract obstruction.  For now he will continue his current medication regimen.     7-: Since his last office visit on 1- he has continued to follow with his primary cardiologist, Dr. Coleman and was last seen on 6- and no changes were made at that timet to the patient's medications/clinical plan.  "He has had an updated ICD device check from 6-: [facility]JM DUAL CHAMBER ICD/ACTIVE SYSTEM IS MRI CONDITIONAL   NON-BILLABLE MERLIN TRANSMISSION: BATTERY VOLTAGE ADEQUATE (7.6 YRS). AP: 32%. : <1%. ALL AVAILABLE LEAD PARAMETERS WITHIN NORMAL LIMITS. 1 NON-SUSTAINED EPISODE W/ EGM SHOWING NSVT 22 BEATS @ 165 BPM. PT TAKES ATENOLOL, ASA 81MG. EF: 70% (ECHO 11/21/22). CORVUE IMPEDANCE MONITORING WITHIN NORMAL LIMITS. APPROPRIATELY FUNCTIONING ICD     Today, the patient states he feels well.  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  He often mows the lawn, performs weed whacking will clean bathrooms and vacuum the floors.  He can perform all of the symptoms with no complaints of chest discomfort or dyspnea on exertion.  The patient states that in late June he was down in the [facility] and was walking on a flat surface with his wife when he began to notice an abrupt onset of left-sided arm \" heaviness\" that lasted roughly 25 to 30 minutes and eventually self resolved.  There were no associated symptoms with this discomfort and this was the first and only time this is happened.  It has not happened since that time.  He has performed very physical activities since that time and has not reproduced that discomfort.  His wife states that she thinks it could potentially have been musculoskeletal in nature as the weeks prior to this they were at their family farm in [place] and the patient was helping move equipment and chairs about the farm and perhaps this was a musculoskeletal event.  Nonetheless, given the fact that the patient did have an outflow tract gradient on his last echocardiogram from November 2022 it would be beneficial to obtain a stress echo to further evaluate outflow track gradient and also this will assist us to determine if there is underlying ischemia as a possible etiology to this event.  The patient does continue to get occasional " palpitations but states they are short-lived.  He denies any lower extremity edema and does take torsemide 10 mg daily.  He has no orthopnea.  He will get some mild lightheadedness with position change but has not had any near syncope/syncope.  At last office visit in January 2023 the patient's blood pressure was noted to be elevated and we did ask him to monitor at home.  In April 2023 his atenolol was increased to 75 mg twice daily in the setting of NSVT noted on ICD checks.  His wife states his blood pressure at home has been running systolically in the 130s and diastolic is typically 85 or less.  His blood pressure is acceptable today at 132/82 and I have continue to encourage him to follow a low-sodium diet.  The patient does continue to smoke about a pack to a pack and a half of cigarettes a day and is not motivated to quit.  I did  him on the pulmonary and cardiac effects of continued tobacco abuse.  He did have a lipid panel drawn in March of this year showing triglycerides of 52 HDL of 61 and LDL of 87 and he will continue atorvastatin 80 mg daily.  I have counseled him on following a low-fat/low-cholesterol diet.  His hemoglobin A1c is 5.9 down from 6.0 in June of last year. Overall, the patient is stable from a cardiac standpoint but we will obtain a stress echo to further investigate his single episode of left arm heaviness and also to further investigate for any left ventricular outflow tract obstruction.  For now he will continue his current medication regimen.      Device interrogation( 1/5/2023): [facility] DUAL ICD/ACTIVE SYSTEM IS MRI CONDITIONAL. DEVICE INTERROGATED IN THE [place] OFFICE: BATTERY VOLTAGE ADEQUATE (7.0-9.5 YRS). AP 7.4%  <1% ALL LEAD PARAMETERS WITHIN NORMAL LIMITS. NO NEW SIGNIFICANT HIGH RATE EPISODES. NO PROGRAMMING CHANGES MADE TO DEVICE PARAMETERS. WOUND CHECK: INCISION CLEAN AND DRY WITH EDGES APPROXIMATED; WOUND CARE AND RESTRICTIONS REVIEWED WITH PATIENT. NORMAL  DEVICE FUNCTION. AM/RG.     Review of systems: Denies chest discomfort or dyspnea with exertion; notes occasional palpitations; denies lower extremity edema/orthopnea; some mild lightheadedness with standing; denies near syncope/syncope     Family History: Father  in his mid 50s likely from Cancer, he was a  and used chemicals to clean car parts. Mother passed away in 1972 at the age of 44yo at the hospital after a fibroid removal, as she was walking back to her room in the hospital she fell and passed away. Total of 10 Siblings( 6 boys, 4 girls)- one brother passed from EtOH intake, twin brother, Prabhu,  had two CVAs in the past 2 years ago, one sister is obese. 2 brothers(one carries nitro) and 1 sister(flutter feeling) have heart problems, unknown what the problem is.   Dany has a boy and a girl, his boy had a heart attack at age 56, daughter has thyroid problems but no heart problems and is currently 58yo.      Genetic testing:           Devices: [facility] DC ICD placed 2022    Historical Information   Past Medical History:   Diagnosis Date    Anemia     Benign neoplasm of large intestine     Bleeding gastric ulcer     Cardiomyopathy (HCC)     Chronic kidney disease     Colon polyp     Diabetes mellitus (HCC)     Disease of thyroid gland     Heart murmur     History of aortic regurgitation     History of constipation     History of degenerative joint disease     History of nocturia     History of obesity     History of sebaceous cyst     History of shortness of breath     History of transfusion     History of urinary frequency     Hyperlipidemia     Hyperparathyroidism (HCC)     Hypertension     Myocardial infarction (HCC) 2018    january, 3 stents    Polyposis coli     of the large intestine    Ulcer of esophagus      Past Surgical History:   Procedure Laterality Date    ACHILLES TENDON SURGERY Left     CARDIAC ELECTROPHYSIOLOGY PROCEDURE Left 2022    Procedure: insertion dual  chamber ICD;  Surgeon: Viral Blair DO;  Location: [facility];  Service: Cardiology    CATARACT EXTRACTION Right 02/18/2021    COLONOSCOPY      hyperplastic polyp    CORONARY ANGIOPLASTY WITH STENT PLACEMENT      3 stents: 2 placed on Jan 2018, 1 on July 2018     CYST REMOVAL  2019, 2020    left and right wrists    ESOPHAGOGASTRODUODENOSCOPY N/A 1/23/2018    Procedure: ESOPHAGOGASTRODUODENOSCOPY (EGD);  Surgeon: Ping Burgos MD;  Location: [facility];  Service: Gastroenterology    ESOPHAGOGASTRODUODENOSCOPY      HERNIA REPAIR      HERNIA REPAIR Bilateral 8/18/2017    Procedure: LAPAROSCOPIC INGUINAL HERNIA REPAIR WITH MESH;  Surgeon: Keegan Loco MD;  Location: [facility];  Service: General    LARYNGOSCOPY N/A 5/12/2022    Procedure: LARYNGOSCOPY DIRECT;  Surgeon: Delvin Burch MD;  Location: [facility];  Service: Surgical Oncology    IN ESOPHAGOGASTRODUODENOSCOPY TRANSORAL DIAGNOSTIC N/A 3/26/2018    Procedure: ESOPHAGOGASTRODUODENOSCOPY (EGD);  Surgeon: Breezy Wallace III, MD;  Location: [facility];  Service: Gastroenterology    IN ESOPHAGOGASTRODUODENOSCOPY TRANSORAL DIAGNOSTIC N/A 5/14/2018    Procedure: ESOPHAGOGASTRODUODENOSCOPY (EGD);  Surgeon: Breezy Wallace III, MD;  Location: [facility];  Service: Gastroenterology    IN PARATHYROIDECTOMY/EXPLORATION PARATHYROIDS Right 3/23/2021    Procedure: RIGHT PARATHYROIDECTOMY, MINIMALLY INVASIVE, POSSIBLE 4-GLAND EXPLORATION, WITH INTRA-OPERATIVE PTH MONITORING;  Surgeon: Delvin Burch MD;  Location: [facility];  Service: Surgical Oncology    IN PARATHYROIDECTOMY/EXPLORATION PARATHYROIDS Right 5/12/2022    Procedure: TWO GLAND PARATHYROIDECTOMY, 4 GLAND EXPLORATION, INTRAOPERATIVE PTH MONITORING, FLEXIBLE LARYNGOSCOPY;  Surgeon: Delvin Burch MD;  [facility]: [facility];  Service: Surgical Oncology    IN TRURL ELECTROSURG RESCJ PROSTATE BLEED COMPLETE N/A 9/2/2021    Procedure: TRANSURETHRAL RESECTION OF PROSTATE (TURP);  Surgeon: Robinson  "MD Aneta;  Location: [facility];  Service: Urology    TIBIA FRACTURE SURGERY Left 1962     Family History   Problem Relation Age of Onset    Rheumatic fever Father     Other Father         accidental poisoning    Heart disease Mother      Current Outpatient Medications on File Prior to Visit   Medication Sig Dispense Refill    aspirin (ECOTRIN LOW STRENGTH) 81 mg EC tablet Take 1 tablet (81 mg total) by mouth daily      atenolol (TENORMIN) 50 mg tablet Take 1.5 tablets (75 mg total) by mouth 2 (two) times a day 270 tablet 3    atorvastatin (LIPITOR) 80 mg tablet TAKE 1 TABLET(80 MG) BY MOUTH DAILY 90 tablet 0    cholecalciferol (VITAMIN D3) 1,000 units tablet Take 1,000 Units by mouth daily      cloNIDine (CATAPRES-TTS-3) 0.3 mg/24 hr PLACE 1 PATCH ON THE SKIN OVER 7 DAYS ONCE A WEEK 12 patch 2    lisinopril (ZESTRIL) 40 mg tablet TAKE 1 TABLET(40 MG) BY MOUTH DAILY 90 tablet 3    metFORMIN (GLUCOPHAGE-XR) 500 mg 24 hr tablet Take 1 tablet (500 mg total) by mouth 2 (two) times a day with meals 180 tablet 3    Omega-3 Fatty Acids (FISH OIL ADULT GUMMIES PO) Take by mouth      pantoprazole (PROTONIX) 40 mg tablet Take 1 tablet (40 mg total) by mouth daily 90 tablet 3    torsemide (DEMADEX) 10 mg tablet Take 1 tablet (10 mg total) by mouth daily 90 tablet 3    [DISCONTINUED] moxifloxacin (VIGAMOX) 0.5 % ophthalmic solution INSTILL 1 DROP IN LEFT EYE FOUR TIMES DAILY AS DIRECTED      [DISCONTINUED] prednisoLONE acetate (PRED FORTE) 1 % ophthalmic suspension INSTILL 1 DROP INTO LEFT EYE FOUR TIMES A DAY AS DIRECTED. USE AFTER SURGERY       No current facility-administered medications on file prior to visit.     No Known Allergies  Social History     Substance and Sexual Activity   Alcohol Use Not Currently    Comment: rare social occasion, once a year,\" takes whiskey if feeling a cold coming\"     Social History     Substance and Sexual Activity   Drug Use No     Social History     Tobacco Use   Smoking Status Every " "Day    Current packs/day: 1.00    Average packs/day: 1 pack/day for 56.1 years (56.1 ttl pk-yrs)    Types: Cigarettes    Start date: 1968   Smokeless Tobacco Never     Objective   Vitals: Visit Vitals  /80 (BP Location: Left arm, Patient Position: Sitting, Cuff Size: Standard)   Pulse 59   Ht 5' 8\" (1.727 m)   Wt 85.3 kg (188 lb)   SpO2 99%   BMI 28.59 kg/m²   Smoking Status Every Day   BSA 1.99 m²      Invasive Devices       None                 Physical Exam:  GEN: Dany Bosch appears well, alert and oriented x 3, pleasant and cooperative   HEENT: pupils equal, round, and reactive to light; extraocular muscles intact  NECK: supple, no carotid bruits   HEART: regular rhythm, normal S1 and S2, 2/6 diastolic murmur noted, no clicks, gallops or rubs   LUNGS: clear to auscultation bilaterally; no wheezes, rales, or rhonchi   ABDOMEN: normal bowel sounds, soft, no tenderness, no distention  EXTREMITIES: peripheral pulses normal; no clubbing, cyanosis, or edema  NEURO: no focal findings   SKIN: normal without suspicious lesions on exposed skin    Lab Results:   Lab Results   Component Value Date    WBC 4.75 12/08/2023    RBC 4.91 12/08/2023    HGB 13.3 12/08/2023    HCT 42.4 12/08/2023    MCV 86 12/08/2023     12/08/2023    RDW 17.6 (H) 12/08/2023     Lab Results   Component Value Date     12/14/2015    K 4.6 12/08/2023     12/08/2023    CO2 31 12/08/2023    ANIONGAP 5 12/14/2015    BUN 10 12/08/2023    CREATININE 0.84 12/08/2023    EGFR 85 12/08/2023    GLUCOSE 98 12/14/2015    CALCIUM 8.5 12/08/2023    AST 18 12/08/2023    ALT 15 12/08/2023    ALKPHOS 45 12/08/2023    PROT 7.1 06/12/2015    BILITOT 0.4 06/12/2015     Lab Results   Component Value Date    MG 1.9 04/06/2021     Lab Results   Component Value Date    CHOL 136 06/12/2015    HDL 61 03/17/2023    TRIG 52 03/17/2023    LDLCALC 87 03/17/2023     Lab Results   Component Value Date    GOY1QPHXTMOF 0.987 12/13/2022    FREET4 0.95 " 2014     Imaging:   I have personally reviewed pertinent films in PACS  Cardiac EP device report    Result Date: 2024  Narrative: SJM DC ICD/ACTIVE SYSTEM IS MRI CONDITIONAL MERLIN TRANSMISSION:  BATTERY VOLTAGE ADEQUATE (7.1-8.1 YR.).  AP 41%  <1%.  ALL LEAD PARAMETERS WITHIN NORMAL LIMITS.  5 NON-SUSTAINED EPISODES, NO EGMS.  CORVUE IMPEDANCE MONITORING WITHIN NORMAL LIMITS.  NORMAL DEVICE FUNCTION.  RG     Cardiac testing:   Results for orders placed during the hospital encounter of 21    Echo complete with contrast if indicated    Narrative  [facility]  [address]  [place] [address]  [phone]    Transthoracic Echocardiogram  2D, M-mode, Doppler, and Color Doppler    Study date:  02-Aug-2021    Patient: KATHERIN SNOWDEN  MR number: [id number]  Account number: [id number]  : 1950  Age: 71 years  Gender: Male  Status: Outpatient  Location: [facility]  Height: 67 in  Weight: 188.5 lb  BP: 142/ 70 mmHg    Indications: CAD.    Diagnoses: I25.10 - Atherosclerotic heart disease of native coronary artery without angina pectoris    Sonographer:  MARIBELL Gregorio  Primary Physician:  David Emerson MD  Referring Physician:  Adina Soliz PA-C  Group:  [facility]  Interpreting Physician:  Anthony Garcia MD    SUMMARY    LEFT VENTRICLE:  The cavity was small.  Systolic function was normal. Ejection fraction was estimated to be 65 %.  There were no regional wall motion abnormalities.  Wall thickness was increased.  There was moderate assymetrical hypertrophy of the septum.  Features were consistent with a pseudonormal left ventricular filling pattern, with concomitant abnormal relaxation and increased filling pressure (grade 2 diastolic dysfunction).    RIGHT VENTRICLE:  The size was normal.  Systolic function was normal.    LEFT ATRIUM:  The atrium was mildly dilated.    MITRAL VALVE:  There was mild annular  calcification.  There was mild regurgitation.    AORTIC VALVE:  There was mild stenosis.  There was moderate regurgitation.    TRICUSPID VALVE:  There was trace regurgitation.  Estimated peak PA pressure was 38 mmHg.    AORTA:  The root exhibited dilatation - 3.9 cm.    HISTORY: PRIOR HISTORY: Myocardial infarction. Hypertrophic cardiomyopathy. Risk factors: hypertension and oral hypoglycemic-treated diabetes. History of severe regurgitation of the aortic valve. PRIOR PROCEDURES: Stent.    PROCEDURE: The study was performed in the [facility]. This was a routine study. The transthoracic approach was used. The study included complete 2D imaging, M-mode, complete spectral Doppler, and color Doppler. Image  quality was adequate.    LEFT VENTRICLE: The cavity was small. Systolic function was normal. Ejection fraction was estimated to be 65 %. There were no regional wall motion abnormalities. Wall thickness was increased. There was moderate assymetrical hypertrophy of  the septum. No evidence of apical thrombus. DOPPLER: Features were consistent with a pseudonormal left ventricular filling pattern, with concomitant abnormal relaxation and increased filling pressure (grade 2 diastolic dysfunction).    RIGHT VENTRICLE: The size was normal. Systolic function was normal. Wall thickness was normal.    LEFT ATRIUM: The atrium was mildly dilated.    RIGHT ATRIUM: Size was normal.    MITRAL VALVE: There was mild annular calcification. There was normal leaflet separation. DOPPLER: The transmitral velocity was within the normal range. There was no evidence for stenosis. There was mild regurgitation.    AORTIC VALVE: The valve was trileaflet. Leaflets exhibited mildly increased thickness and normal cuspal separation. DOPPLER: There was mild stenosis. There was moderate regurgitation.    TRICUSPID VALVE: The valve structure was normal. There was normal leaflet separation. DOPPLER: The transtricuspid velocity was  within the normal range. There was no evidence for stenosis. There was trace regurgitation. Estimated peak PA  pressure was 38 mmHg.    PULMONIC VALVE: Leaflets exhibited normal thickness, no calcification, and normal cuspal separation. DOPPLER: The transpulmonic velocity was within the normal range. There was no significant regurgitation.    PERICARDIUM: There was no pericardial effusion. The pericardium was normal in appearance.    AORTA: The root exhibited dilatation - 3.9 cm.    SYSTEMIC VEINS: IVC: The inferior vena cava was normal in size.    SYSTEM MEASUREMENT TABLES    2D  %FS: 33.99 %  Ao Diam: 3.89 cm  EDV(Teich): 105.57 ml  EF(Teich): 62.84 %  ESV(Teich): 39.23 ml  IVSd: 2.2 cm  LA Area: 21.8 cm2  LA Diam: 3.95 cm  LVEDV MOD A4C: 126.52 ml  LVEF MOD A4C: 64.55 %  LVESV MOD A4C: 44.85 ml  LVIDd: 4.76 cm  LVIDs: 3.14 cm  LVLd A4C: 7.9 cm  LVLs A4C: 6.49 cm  LVOT Diam: 2.27 cm  LVPWd: 1.41 cm  RA Area: 18.04 cm2  RVIDd: 3.51 cm  SV MOD A4C: 81.67 ml  SV(Teich): 66.34 ml    CW  AR Dec [place]: 3.12 m/s2  AR Dec Time: 1474.74 ms  AR PHT: 427.67 ms  AR Vmax: 4.39 m/s  AR maxP.22 mmHg  AV Env.Ti: 308.28 ms  AV MaxPG: 10.56 mmHg  AV VTI: 33.21 cm  AV Vmax: 1.62 m/s  AV Vmean: 1.08 m/s  AV meanP.33 mmHg  MR VTI: 204.76 cm  MR Vmax: 5.58 m/s  MR Vmean: 4.34 m/s  MR maxP.69 mmHg  MR meanP.58 mmHg  TR MaxP.77 mmHg  TR Vmax: 2.95 m/s    MM  TAPSE: 2.5 cm    PW  YOKO (VTI): 3.98 cm2  YOKO Vmax: 3.15 cm2  AVAI (VTI): 0 cm2/m2  AVAI Vmax: 0 cm2/m2  E' Sept: 0.06 m/s  E/E' Sept: 15.09  LVOT Env.Ti: 334.92 ms  LVOT VTI: 32.64 cm  LVOT Vmax: 1.26 m/s  LVOT Vmean: 0.97 m/s  LVOT maxP.4 mmHg  LVOT meanP.19 mmHg  LVSI Dopp: 67.12 ml/m2  LVSV Dopp: 132.23 ml  MV A Shadi: 0.92 m/s  MV Dec [place]: 2.72 m/s2  MV DecT: 358.4 ms  MV E Shadi: 0.98 m/s  MV E/A Ratio: 1.07  MV PHT: 103.94 ms  MVA By PHT: 2.12 cm2    [facility] Accredited Echocardiography [facility]    Prepared and electronically  "signed by    Anthony Garcia MD  Signed 03-Aug-2021 14:53:36    Name: Dany Bosch                       : 1950  MRN: [id number]                       Age: 73 y.o.  Patient Status: Outpatient          Gender: male  Echo stress test, exercise w/ strain    Height: 5' 8\" (1.727 m)   Weight: 90.7 kg (200 lb)   BSA: 2.04 m²   Blood Pressure: 124/70    Date of Study: 23   Ordering Provider: SIGIFREDO Elder   Clinical Indications: Other, Please Specify in Comments - hypertrophic cardiomyopathy       Interpreting Physicians  Performing Staff   Keyshawn Barba MD Tech: Adina Vieyra RN   Support Staff: Quynh Dowd RN        Height & Weight    Height Weight BSA (Calculated - m2)   5' 8\" (1.727 m) 90.7 kg (200 lb) 2.04 sq meters     PACS Images     Show images for Echo stress test, exercise  Study Details    Study quality was adequate. The apical and parasternal views were obtained.     History    HOCM, CAD, PTCA, DM, NSVT, ICD, smoker     Interpretation Summary       Left Ventricle: Wall thickness is severely increased. There is severe asymmetric hypertrophy of the septal wall. The left ventricular ejection fraction is 68%. Systolic function is vigorous. Global longitudinal strain is reduced at -10%. Wall motion is normal. Diastolic function is mildly abnormal, consistent with grade I (abnormal) relaxation. There is no LV dynamic obstruction.    Left Atrium: The atrium is mildly dilated.    Aortic Valve: There is mild regurgitation. There is aortic valve sclerosis.    Mitral Valve: There is mild annular calcification. There is systolic anterior motion of the anterior leaflet without late peaking gradient.    Stress ECG: No ST deviation is noted. Arrhythmias during recovery: rare PVCs. The ECG was equivocal for ischemia. The stress ECG is negative for ischemia after submaximal exercise, without reproduction of symptoms.    Post Stress Echo: Left ventricle cavity has normal reduction in size " post-stress. The left ventricle systolic function is hyperdynamic post-stress.     Strain was performed to quantify interventricular dyssynchrony and evaluate components of myocardial function due to Hypertrophic Cardiomyopathy. Results from the utilization of Strain Analysis are listed in the report below.     Stress Findings    Stress Findings A bicycle protocol stress test was performed. Overall, the patient's exercise capacity was mildly impaired for their age. The patient reached stage 3.0 of the protocol after exercising for 6 min and 0 sec and had a maximal HR of 112 bpm (77 % of MPHR) and 3.9 METS. The patient experienced no angina during the test. The patient reached the end of the protocol. The patient reported dyspnea and fatigue during the stress test. Symptoms began during stress and ended during recovery. Blood pressure demonstrated a normal response and heart rate demonstrated a blunted response to stress.     Findings    Left Ventricle Left ventricular cavity size is small. Wall thickness is severely increased. There is severe asymmetric hypertrophy of the septal wall. The left ventricular ejection fraction is 68%. Systolic function is vigorous. Global longitudinal strain is reduced at -10%.  Wall motion is normal. Diastolic function is mildly abnormal, consistent with grade I (abnormal) relaxation.  There is no LV dynamic obstruction.   Right Ventricle Right ventricular cavity size is normal. Systolic function is normal. Wall thickness is normal.   Left Atrium The atrium is mildly dilated.   Right Atrium The atrium is normal in size.   Aortic Valve The aortic valve is trileaflet. The leaflets are mildly thickened. The leaflets are not calcified. The leaflets exhibit normal mobility. There is mild regurgitation. There is aortic valve sclerosis.   Mitral Valve The leaflets are not thickened. The leaflets are not calcified. The leaflets exhibit normal mobility. There is mild annular calcification.  There is trace regurgitation. There is no evidence of stenosis. There is systolic anterior motion of the anterior leaflet without late peaking gradient.   Tricuspid Valve Tricuspid valve structure is normal. There is no evidence of regurgitation. There is no evidence of stenosis.   Pulmonic Valve Pulmonic valve structure is normal. There is no evidence of regurgitation. There is no evidence of stenosis.   Ascending Aorta The aortic root is normal in size.   Pericardium There is no pericardial effusion. The pericardium is normal in appearance.     Stress Measurements    Baseline Vitals   Baseline HR 60 bpm         Baseline /70 mmHg         O2 sat rest 97 %         Peak Stress Vitals   Stress peak  bpm         Post peak  mmHg         O2 sat peak 97 %         Max HR Percent 77 %         Max  bpm         Recovery Vitals   Recovery HR 70 bpm         Recovery /80 mmHg         O2 sat recovery 97 %          Exercise Data   Max  bpm         Exercise duration (min) 6 min         Exercise duration (sec) 0 sec         Estimated workload 3.9 METS         Stress Stage Reached 3         Angina Index 0         Rate Pressure Product 22,140              Stage Data 7/31/23 11:20 AM--7/31/23 12:10 PM    Date/Time BP HR O2 RPP Bicycle - Shin Symptoms   07/31/23 1135 124/70 60 bpm 97 % 7440 -- none   07/31/23 1141 134/80 78 bpm 96 % 90048 25 none   07/31/23 1143 152/90 88 bpm -- 10909 50 mild fatigue   07/31/23 1145 -- 112 bpm 97 % -- 75 fatigue   07/31/23 1146 180/94 112 bpm 97 % 19035 -- fatigue   07/31/23 1148 148/90 68 bpm 98 % 21107 -- subsiding   07/31/23 1151 170/74 77 bpm 97 % 05736 -- none   07/31/23 1154 140/80 70 bpm 97 % 9800 -- none     Left Ventricle Measurements    Strain   GLS -10 %               Aortic Valve Measurements    Regurgitation   AV peak gradient 71 mmHg         AV Deceleration Time 2,362 ms         AV regurgitation pressure 1/2 time 685 ms               Exam  Details    Performed Procedure Technologist Supporting Staff Performing Physician   Echo stress test, exercise w/ strain Adina Vieyra, RN Quynh Dowd, NAZIA Barba MD         Appointment Date/Status Modality Department    7/31/2023     Completed BE STRESS 1 BE CAR NON INV           Begin Exam End Exam Begin Exam Questionnaires End Exam Questionnaires   7/31/2023 11:20 AM 7/31/2023 12:10 PM CV STRESS QUESTIONNAIRE PATIENT EDUCATION            All Reviewers List    SIGIFREDO Elder on 8/1/2023  9:04 AM     Signed    Electronically signed by Keyshawn Barba MD on 8/1/23 at 0729 EDT     Counseling / Coordination of Care  Total floor / unit time spent today 40 minutes.  Greater than 50% of total time was spent with the patient and / or family counseling and / or coordination of care.

## 2024-01-30 ENCOUNTER — OFFICE VISIT (OUTPATIENT)
Dept: CARDIAC SURGERY | Facility: CLINIC | Age: 74
End: 2024-01-30
Payer: MEDICARE

## 2024-01-30 VITALS
OXYGEN SATURATION: 99 % | BODY MASS INDEX: 28.49 KG/M2 | SYSTOLIC BLOOD PRESSURE: 148 MMHG | WEIGHT: 188 LBS | DIASTOLIC BLOOD PRESSURE: 80 MMHG | HEIGHT: 68 IN | HEART RATE: 59 BPM

## 2024-01-30 DIAGNOSIS — I10 PRIMARY HYPERTENSION: ICD-10-CM

## 2024-01-30 DIAGNOSIS — I42.1 HOCM (HYPERTROPHIC OBSTRUCTIVE CARDIOMYOPATHY) (HCC): ICD-10-CM

## 2024-01-30 DIAGNOSIS — I47.20 VT (VENTRICULAR TACHYCARDIA) (HCC): ICD-10-CM

## 2024-01-30 DIAGNOSIS — I42.1 HYPERTROPHIC OBSTRUCTIVE CARDIOMYOPATHY (HCC): Primary | ICD-10-CM

## 2024-01-30 PROCEDURE — 99215 OFFICE O/P EST HI 40 MIN: CPT | Performed by: INTERNAL MEDICINE

## 2024-01-30 NOTE — PATIENT INSTRUCTIONS
Please monitor your blood pressure at home twice a day for one week and call our office with results at [phone]  Measure the blood pressure once in the AM at least one hour after you have taken your morning medications and measure it again in the afternoon/evening; make sure you have been sitting and resting for at least 5 minutes prior to measuring your blood pressure  Please start taking your medications on a new schedule:   8 am---take atenolol 75 mg, aspirin 81 mg, metformin 500 mg   8 pm--take atenolol 75 mg,  lisinopril 40 mg, metformin 500 mg, Lipitor 80     Torsemide 10 mg   You can take your stomach medicine (pantoprazole) whenever you would like    4. Lower your salt intake

## 2024-02-09 ENCOUNTER — TELEPHONE (OUTPATIENT)
Dept: CARDIOLOGY CLINIC | Facility: CLINIC | Age: 74
End: 2024-02-09

## 2024-02-09 NOTE — TELEPHONE ENCOUNTER
Per Dr Barba- pt to take 2 atenolol tablets (100 mg) / day. Spoke with patients wife, Mila who verbally acknowledged. Will also keep log of BP readings

## 2024-02-16 ENCOUNTER — TELEPHONE (OUTPATIENT)
Dept: CARDIOLOGY CLINIC | Facility: CLINIC | Age: 74
End: 2024-02-16

## 2024-02-16 DIAGNOSIS — I10 PRIMARY HYPERTENSION: ICD-10-CM

## 2024-02-16 NOTE — TELEPHONE ENCOUNTER
Patient's spouse called today with a BP report after increasing Atenolol to  100 mg BID on 2/9.    Mrs Bosch read these results to me:    2/9    9 /80       9 /77    2/10  none in AM         9 /77    2/11  12 noon 148/84   4 /74    2/12 2:30 /85    9 /78    2/13  9:30 /84   9 /80    2/14  12 noon 153/85    9 /80    2/15 10: /86       9 /77     Today /87    HR in 60s.

## 2024-02-20 ENCOUNTER — APPOINTMENT (OUTPATIENT)
Age: 74
End: 2024-02-20
Payer: MEDICARE

## 2024-02-20 ENCOUNTER — OFFICE VISIT (OUTPATIENT)
Age: 74
End: 2024-02-20
Payer: MEDICARE

## 2024-02-20 ENCOUNTER — TELEPHONE (OUTPATIENT)
Dept: ADMINISTRATIVE | Facility: OTHER | Age: 74
End: 2024-02-20

## 2024-02-20 VITALS
HEART RATE: 60 BPM | OXYGEN SATURATION: 100 % | TEMPERATURE: 97.3 F | HEIGHT: 68 IN | DIASTOLIC BLOOD PRESSURE: 70 MMHG | BODY MASS INDEX: 28.58 KG/M2 | WEIGHT: 188.6 LBS | SYSTOLIC BLOOD PRESSURE: 130 MMHG

## 2024-02-20 DIAGNOSIS — I71.40 ABDOMINAL AORTIC ANEURYSM (AAA) WITHOUT RUPTURE, UNSPECIFIED PART (HCC): Chronic | ICD-10-CM

## 2024-02-20 DIAGNOSIS — E78.2 MIXED HYPERLIPIDEMIA DUE TO TYPE 2 DIABETES MELLITUS: Chronic | ICD-10-CM

## 2024-02-20 DIAGNOSIS — E11.69 MIXED HYPERLIPIDEMIA DUE TO TYPE 2 DIABETES MELLITUS: Chronic | ICD-10-CM

## 2024-02-20 DIAGNOSIS — E89.2 S/P SUBTOTAL PARATHYROIDECTOMY (HCC): ICD-10-CM

## 2024-02-20 DIAGNOSIS — I10 PRIMARY HYPERTENSION: Chronic | ICD-10-CM

## 2024-02-20 DIAGNOSIS — E11.21 TYPE 2 DIABETES MELLITUS WITH DIABETIC NEPHROPATHY, WITHOUT LONG-TERM CURRENT USE OF INSULIN (HCC): Primary | ICD-10-CM

## 2024-02-20 DIAGNOSIS — I77.810 THORACIC AORTIC ECTASIA (HCC): ICD-10-CM

## 2024-02-20 DIAGNOSIS — I25.10 CORONARY ARTERY DISEASE INVOLVING NATIVE CORONARY ARTERY OF NATIVE HEART WITHOUT ANGINA PECTORIS: Chronic | ICD-10-CM

## 2024-02-20 PROBLEM — E87.20 METABOLIC ACIDOSIS: Status: RESOLVED | Noted: 2021-04-04 | Resolved: 2024-02-20

## 2024-02-20 PROBLEM — Z95.810 S/P ICD (INTERNAL CARDIAC DEFIBRILLATOR) PROCEDURE: Chronic | Status: ACTIVE | Noted: 2024-02-20

## 2024-02-20 PROBLEM — K29.70 GASTRITIS: Status: RESOLVED | Noted: 2021-04-13 | Resolved: 2024-02-20

## 2024-02-20 PROBLEM — K40.21 BILATERAL RECURRENT INGUINAL HERNIA WITHOUT OBSTRUCTION OR GANGRENE: Status: ACTIVE | Noted: 2017-08-01

## 2024-02-20 PROBLEM — D63.8 ANEMIA ASSOCIATED WITH DIABETES MELLITUS: Status: RESOLVED | Noted: 2020-03-09 | Resolved: 2024-02-20

## 2024-02-20 PROBLEM — I42.1 HOCM (HYPERTROPHIC OBSTRUCTIVE CARDIOMYOPATHY) (HCC): Chronic | Status: ACTIVE | Noted: 2018-07-16

## 2024-02-20 PROBLEM — E87.5 HYPERKALEMIA: Status: RESOLVED | Noted: 2021-04-04 | Resolved: 2024-02-20

## 2024-02-20 PROBLEM — F17.200 SMOKING: Status: RESOLVED | Noted: 2018-02-12 | Resolved: 2024-02-20

## 2024-02-20 PROBLEM — D64.9 ANEMIA: Status: RESOLVED | Noted: 2021-04-09 | Resolved: 2024-02-20

## 2024-02-20 PROBLEM — Z98.890 S/P SUBTOTAL PARATHYROIDECTOMY: Chronic | Status: ACTIVE | Noted: 2021-04-12

## 2024-02-20 PROBLEM — D75.839 THROMBOCYTOSIS: Status: RESOLVED | Noted: 2020-04-13 | Resolved: 2024-02-20

## 2024-02-20 PROBLEM — Z95.810 S/P ICD (INTERNAL CARDIAC DEFIBRILLATOR) PROCEDURE: Status: ACTIVE | Noted: 2024-02-20

## 2024-02-20 PROBLEM — R31.9 HEMATURIA: Status: RESOLVED | Noted: 2021-04-05 | Resolved: 2024-02-20

## 2024-02-20 PROBLEM — I25.119 ATHEROSCLEROSIS OF NATIVE CORONARY ARTERY OF NATIVE HEART WITH ANGINA PECTORIS (HCC): Status: ACTIVE | Noted: 2024-02-20

## 2024-02-20 PROBLEM — N40.1 URINARY RETENTION DUE TO BENIGN PROSTATIC HYPERPLASIA: Status: RESOLVED | Noted: 2021-09-03 | Resolved: 2024-02-20

## 2024-02-20 PROBLEM — Z86.39 HISTORY OF HYPERPARATHYROIDISM: Chronic | Status: ACTIVE | Noted: 2021-03-03

## 2024-02-20 PROBLEM — IMO0001 SMOKING: Status: RESOLVED | Noted: 2018-02-12 | Resolved: 2024-02-20

## 2024-02-20 PROBLEM — K40.21 BILATERAL RECURRENT INGUINAL HERNIA WITHOUT OBSTRUCTION OR GANGRENE: Status: RESOLVED | Noted: 2017-08-01 | Resolved: 2024-02-20

## 2024-02-20 PROBLEM — E11.9 TYPE 2 DIABETES MELLITUS (HCC): Chronic | Status: ACTIVE | Noted: 2018-02-08

## 2024-02-20 PROBLEM — N40.0 BPH (BENIGN PROSTATIC HYPERPLASIA): Chronic | Status: ACTIVE | Noted: 2021-09-03

## 2024-02-20 PROBLEM — E83.52 HYPERCALCEMIA: Status: RESOLVED | Noted: 2020-12-31 | Resolved: 2024-02-20

## 2024-02-20 PROBLEM — I47.20 VT (VENTRICULAR TACHYCARDIA) (HCC): Chronic | Status: ACTIVE | Noted: 2022-11-29

## 2024-02-20 PROBLEM — R00.1 BRADYCARDIA: Status: RESOLVED | Noted: 2022-11-29 | Resolved: 2024-02-20

## 2024-02-20 PROBLEM — R00.2 PALPITATIONS: Status: RESOLVED | Noted: 2022-11-29 | Resolved: 2024-02-20

## 2024-02-20 PROBLEM — R33.9 URINARY RETENTION: Status: RESOLVED | Noted: 2021-04-13 | Resolved: 2024-02-20

## 2024-02-20 PROBLEM — R33.8 URINARY RETENTION DUE TO BENIGN PROSTATIC HYPERPLASIA: Status: RESOLVED | Noted: 2021-09-03 | Resolved: 2024-02-20

## 2024-02-20 PROBLEM — Z90.89 S/P SUBTOTAL PARATHYROIDECTOMY: Chronic | Status: ACTIVE | Noted: 2021-04-12

## 2024-02-20 PROBLEM — E78.5 HYPERLIPIDEMIA: Chronic | Status: ACTIVE | Noted: 2018-01-21

## 2024-02-20 PROBLEM — Z86.79 HISTORY OF SICK SINUS SYNDROME: Chronic | Status: ACTIVE | Noted: 2023-03-17

## 2024-02-20 PROBLEM — D50.0 IRON DEFICIENCY ANEMIA DUE TO CHRONIC BLOOD LOSS: Status: RESOLVED | Noted: 2018-04-20 | Resolved: 2024-02-20

## 2024-02-20 LAB
ANION GAP SERPL CALCULATED.3IONS-SCNC: 13 MMOL/L
BUN SERPL-MCNC: 14 MG/DL (ref 5–25)
CALCIUM SERPL-MCNC: 9.1 MG/DL (ref 8.4–10.2)
CHLORIDE SERPL-SCNC: 100 MMOL/L (ref 96–108)
CHOLEST SERPL-MCNC: 153 MG/DL
CO2 SERPL-SCNC: 29 MMOL/L (ref 21–32)
CREAT SERPL-MCNC: 0.97 MG/DL (ref 0.6–1.3)
CREAT UR-MCNC: 181.4 MG/DL
ERYTHROCYTE [DISTWIDTH] IN BLOOD BY AUTOMATED COUNT: 17.6 % (ref 11.6–15.1)
GFR SERPL CREATININE-BSD FRML MDRD: 76 ML/MIN/1.73SQ M
GLUCOSE P FAST SERPL-MCNC: 120 MG/DL (ref 65–99)
HCT VFR BLD AUTO: 46.2 % (ref 36.5–49.3)
HDLC SERPL-MCNC: 51 MG/DL
HGB BLD-MCNC: 14.1 G/DL (ref 12–17)
LDLC SERPL CALC-MCNC: 89 MG/DL (ref 0–100)
MCH RBC QN AUTO: 26.8 PG (ref 26.8–34.3)
MCHC RBC AUTO-ENTMCNC: 30.5 G/DL (ref 31.4–37.4)
MCV RBC AUTO: 88 FL (ref 82–98)
MICROALBUMIN UR-MCNC: 288.6 MG/L
MICROALBUMIN/CREAT 24H UR: 159 MG/G CREATININE (ref 0–30)
PLATELET # BLD AUTO: 335 THOUSANDS/UL (ref 149–390)
PMV BLD AUTO: 9.2 FL (ref 8.9–12.7)
POTASSIUM SERPL-SCNC: 4 MMOL/L (ref 3.5–5.3)
RBC # BLD AUTO: 5.27 MILLION/UL (ref 3.88–5.62)
SL AMB POCT HEMOGLOBIN AIC: 5.8 % (ref ?–5.7)
SODIUM SERPL-SCNC: 142 MMOL/L (ref 135–147)
TRIGL SERPL-MCNC: 65 MG/DL
TSH SERPL DL<=0.05 MIU/L-ACNC: 1.12 UIU/ML (ref 0.45–4.5)
WBC # BLD AUTO: 5.82 THOUSAND/UL (ref 4.31–10.16)

## 2024-02-20 PROCEDURE — 85027 COMPLETE CBC AUTOMATED: CPT

## 2024-02-20 PROCEDURE — 80048 BASIC METABOLIC PNL TOTAL CA: CPT

## 2024-02-20 PROCEDURE — 36415 COLL VENOUS BLD VENIPUNCTURE: CPT

## 2024-02-20 PROCEDURE — 80061 LIPID PANEL: CPT

## 2024-02-20 PROCEDURE — 84443 ASSAY THYROID STIM HORMONE: CPT

## 2024-02-20 PROCEDURE — 99214 OFFICE O/P EST MOD 30 MIN: CPT | Performed by: INTERNAL MEDICINE

## 2024-02-20 PROCEDURE — 82570 ASSAY OF URINE CREATININE: CPT

## 2024-02-20 PROCEDURE — 83036 HEMOGLOBIN GLYCOSYLATED A1C: CPT | Performed by: INTERNAL MEDICINE

## 2024-02-20 PROCEDURE — 82043 UR ALBUMIN QUANTITATIVE: CPT

## 2024-02-20 NOTE — PROGRESS NOTES
Name: Dany Bosch      : 1950      MRN: [id number]  Encounter Provider: Shahram Flannery DO  Encounter Date: 2024   Encounter department: [facility] [place]    Assessment & Plan     Assessment & Plan  1. Type 2 diabetes mellitus with diabetic nephropathy, without long-term current use of insulin (HCC)  2. Mixed hyperlipidemia due to type 2 diabetes mellitus     Most recent A1c was 5.8 % on 2024. Continue medication as prescribed. Check updated albumin/cr ratio. Will hold off SGLT2 inhibitor at this time due to cost concerns. He is on an ACE inhibitor. Continue statin.     - Albumin / creatinine urine ratio; Future  - Basic metabolic panel; Future  - Lipid Panel with Direct LDL reflex; Future  - CBC and Platelet; Future  - TSH, 3rd generation with Free T4 reflex; Future    3. Thoracic aortic ectasia (HCC)    Seen on CT lung cancer screening. He is due for another CT lung cancer screening in 2024.    4. S/P subtotal parathyroidectomy (HCC)    Last calcium level was normal. Check updated labs.    5. Abdominal aortic aneurysm (AAA) without rupture, unspecified part (HCC)    Check updated duplex of the abdominal aorta. Previously size was noted to be 3.1 cm.    - VAS abdominal aorta/iliac duplex; Future    6. Coronary artery disease involving native coronary artery of native heart without angina pectoris    Stable without angina. Continue follow-up with cardiology. Has history of VT s/p ICD and HOCM. He is taking medications appropriately now.    7. Primary hypertension    Blood pressure stable and controlled in office today. Continue current anti-HTN regimen.       Depression Screening and Follow-up Plan: Patient was screened for depression during today's encounter. They screened negative with a PHQ-2 score of 0.      Subjective      History of Present Illness  74 y/o male presents to [facility] care. Former patient of Dr. David Emerson who retired from my office. He has a  "history of hyperparathyroidism s/p subtotal parathyroidectomy, HTN, CAD s/p PCI to LAD, VT s/p dual chamber ICD, type 2 diabetes, mixed hyperlipidemia, and hypertrophic cardiomyopathy (follows with [facility] clinic), thoracic aortic ectasia, tobacco dependence (50+ pack years) and BPH s/p TURP. No significant kidney disease noted when reviewing prior labs. He is due for updated lab work. Has history of anemia in the past and noted to have iron deficiency. Last CBC does not show any evidence of anemia. Last calcium level was normal. Most recent A1c was 5.8 % on 2/20/2024.     He did have a positive occult blood, fecal immunochemical test back in 2022. His PCP placed a consult for gastroenterology and it appears that patient never saw gastroenterology at that time. Colonoscopy in Dec 2020 showed: \"Few small, mild scattered diverticula with no bleeding in the descending colon and sigmoid colon. All observed locations appeared normal, including the cecum, ascending colon, hepatic flexure, transverse colon, splenic flexure, rectosigmoid and rectum. Internal hemorrhoids with no bleeding.\"    He saw Dr. Barba at the [facility] clinic on 1/30/2024. His medications were reviewed at that visit and he was instructed on when he should be taking his medications. He was noted to be taking his medication at sporadic times. He was instructed to monitor his blood pressure at home. Relevant cardiac medications include: clonidine 0.3 mg patch, torsemide 10 mg daily, lisinopril 40 mg daily and atenolol 75 mg twice daily. His EF on MRI cardiac from 2022 was 70%.    He continues to smoke cigarettes. His last lung cancer screening was performed on 8/25/2023. Impression was: \"Small benign nodules, stable from 2017. Stable 4.3 cm ectasia of the ascending aorta. Continued annual surveillance recommended.\" He has also been noted to have an abdominal aortic aneurysm. Size was noted to be 3.1 cm when he had an US kidney and bladder 6/4/2021.    He " "denies any concerns with his health lately. He does not see a podiatrist.     He lives with his wife. They have been  for 15 years. He has 2 children and grandchildren.  He denies any family history of prostate cancer.  He had 3 parathyroid glands removed in the past.     Review of Systems   Constitutional:  Negative for activity change, appetite change and fatigue.   Respiratory:  Negative for apnea, cough, chest tightness, shortness of breath and wheezing.    Cardiovascular:  Negative for chest pain, palpitations and leg swelling.   Gastrointestinal:  Negative for abdominal distention, abdominal pain, blood in stool, constipation, diarrhea, nausea and vomiting.   Neurological:  Negative for dizziness, weakness, light-headedness, numbness and headaches.   Psychiatric/Behavioral:  Negative for behavioral problems, confusion, hallucinations, sleep disturbance and suicidal ideas. The patient is not nervous/anxious.      Objective     /70   Pulse 60   Temp (!) 97.3 °F (36.3 °C)   Ht 5' 8\" (1.727 m)   Wt 85.5 kg (188 lb 9.6 oz)   SpO2 100%   BMI 28.68 kg/m²     Physical Exam  Constitutional:       General: He is not in acute distress.     Appearance: He is well-developed. He is not diaphoretic.   Neck:      Thyroid: No thyromegaly.      Vascular: No JVD.   Cardiovascular:      Rate and Rhythm: Normal rate and regular rhythm.      Heart sounds: Normal heart sounds. No murmur heard.  Pulmonary:      Effort: Pulmonary effort is normal. No respiratory distress.      Breath sounds: Normal breath sounds. No wheezing or rales.   Abdominal:      General: Bowel sounds are normal. There is no distension.      Palpations: Abdomen is soft. There is no mass.      Tenderness: There is no abdominal tenderness. There is no guarding or rebound.   Musculoskeletal:      Right lower leg: No edema.      Left lower leg: No edema.   Neurological:      Mental Status: He is alert.       [facility],        "

## 2024-02-20 NOTE — TELEPHONE ENCOUNTER
----- Message from Ave Owens sent at 2/20/2024  7:23 AM EST -----  02/20/24 7:23 AM    Hello, our patient Dany Bosch has had  eye exam completed/performed. Please assist in obtaining the exclusion documentation by making an External outreach to [facility] Dr. wilson facility located in [place], [place]. The date of service is 12/2023.     Thank you,  Ave Owens   [facility]ARE [place]

## 2024-02-20 NOTE — TELEPHONE ENCOUNTER
Upon review of the In Basket request and the patient's chart, initial outreach has been made via fax to facility. Please see Contacts section for details.     Thank you  Rayna Mendez

## 2024-02-20 NOTE — LETTER
Diabetic Eye Exam Form     Date Requested: 24  Patient: Dany Bosch  Patient : 1950   Referring Provider: Shahram Flannery,       DIABETIC Eye Exam Date _______________________________      Type of Exam MUST be documented for Diabetic Eye Exams. Please CHECK ONE.     Retinal Exam       Dilated Retinal Exam       OCT       Optomap-Iris Exam      Fundus Photography       Left Eye - Please check Retinopathy or No Retinopathy        Exam did show retinopathy    Exam did not show retinopathy       Right Eye - Please check Retinopathy or No Retinopathy       Exam did show retinopathy    Exam did not show retinopathy       Comments __________________________________________________________    Practice Providing Exam ______________________________________________    Exam Performed By (print name) _______________________________________      Provider Signature ___________________________________________________      These reports are needed for  compliance.  Please fax this completed form and a copy of the Diabetic Eye Exam report to our office located at [address] [place] [address] as soon as possible via Fax [phone] attention Rayna: Phone [phone]  We thank you for your assistance in treating our mutual patient.

## 2024-02-20 NOTE — PROGRESS NOTES
Diabetic Foot Exam    Patient's shoes and socks removed.    Right Foot/Ankle   Right Foot Inspection  Skin Exam: skin normal, skin intact and dry skin. No warmth, no callus, no erythema, no maceration, no abnormal color, no pre-ulcer, no ulcer and no callus.     Toe Exam: ROM and strength within normal limits and right toe deformity (onychomycosis).     Sensory   Proprioception: intact  Monofilament testing: intact    Vascular  Capillary refills: < 3 seconds  The right DP pulse is 1+. The right PT pulse is 1+.     Left Foot/Ankle  Left Foot Inspection  Skin Exam: skin normal, skin intact and dry skin. No warmth, no erythema, no maceration, normal color, no pre-ulcer, no ulcer and no callus.     Toe Exam: ROM and strength within normal limits and left toe deformity (onychomycosis).     Sensory   Proprioception: intact  Monofilament testing: intact    Vascular  Capillary refills: < 3 seconds  The left DP pulse is 1+. The left PT pulse is 1+.     Assign Risk Category  Deformity present  No loss of protective sensation  Weak pulses  Risk: 0

## 2024-02-20 NOTE — PATIENT INSTRUCTIONS
Type 2 Diabetes Management for Adults   WHAT YOU NEED TO KNOW:   What do I need to know about type 2 diabetes management?  Type 2 diabetes is a disease that affects how your body uses glucose (sugar). Either your body cannot make enough insulin, or it cannot use the insulin correctly. It is important to keep diabetes controlled to prevent damage to your heart, blood vessels, and other organs. Management will help you feel well and enjoy your daily activities. Your diabetes care team providers can help you make a plan to fit diabetes care into your schedule. Your plan can change over time to fit your needs and your family's needs.       What do I need to know about high blood sugar levels?  High blood sugar levels may not cause any symptoms. You may feel more thirsty or urinate more often than usual. Over time, high blood sugar levels can damage your nerves, blood vessels, tissues, and organs. The following can increase your blood sugar levels:  Large meals or large amounts of carbohydrates at one time    Less physical activity    Stress    Illness    A lower dose of diabetes medicine or insulin, or a late dose    What do I need to know about low blood sugar levels?  Symptoms include feeling shaky, dizzy, irritable, or confused. You can prevent symptoms by keeping your blood sugar levels from going too low.  Treat a low blood sugar level right away:      Drink 4 ounces of juice or have 1 tube of glucose gel.    Check your blood sugar level again 10 to 15 minutes later.    When the level goes back to normal, eat a meal or snack to prevent another decrease.       Keep glucose gel, raisins, or hard candy with you at all times to treat a low blood sugar level.     Your blood sugar level can get too low if you take diabetes medicine or insulin and do not eat enough food.     If you use insulin, check your blood sugar level before you exercise.      If your blood sugar level is below 100 mg/dL, eat 4 crackers or 2 ounces  of raisins, or drink 4 ounces of juice.    Check your level every 30 minutes if you exercise longer than 1 hour.    You may need a snack during or after exercise.    What can I do to manage my blood sugar levels?   Check your blood sugar levels as directed and as needed.  Several items are available to use to check your levels. You may need to check by testing a drop of blood in a glucose monitor. You may instead be given a continuous glucose monitoring (CGM) device. The device is worn at all times. The CGM checks your blood sugar level every 5 minutes. It sends results to an electronic device such as a smart phone. A CGM can be used with or without an insulin pump. You and your diabetes care team providers will decide on the best method for you. The goal for blood sugar levels before meals  is between 80 and 130 mg/dL and 2 hours after eating  is lower than 180 mg/dL.            Make healthy food choices.  Work with a dietitian to create a meal plan that works for you and your schedule. A dietitian can help you learn how to eat the right amount of carbohydrates (sugar and starchy foods) during your meals and snacks. Examples of carbohydrates are breads, cereals, rice, pasta, fruit, low-fat dairy, and sweets. Carbohydrates can raise your blood sugar level if you eat too many at one time.         Eat high-fiber foods as directed.  Fiber helps improve blood sugar levels. Fiber also lowers your risk for heart disease and other problems diabetes can cause. Examples of high-fiber foods include vegetables, whole-grain bread, and beans such as hector beans. Your dietitian can tell you how much fiber to have each day.         Get regular physical activity.  Physical activity can help you get to your target blood sugar level goal and manage your weight. Get at least 150 minutes of moderate to vigorous aerobic physical activity each week. Resistance training, such as lifting weights, should be done 3 times each week. Do not  miss more than 2 days of physical activity in a row. Do not sit longer than 30 minutes at a time. Your healthcare provider can help you create an activity plan. The plan can include the best activities for you and can help you build your strength and endurance.            Maintain a healthy weight.  Ask your team what a healthy weight is for you. A healthy weight can help you control diabetes and prevent heart disease. Ask your team to help you create a weight-loss plan, if needed. Even a loss of 3% to 7% of your excess body weight can help make a difference in managing diabetes. Your team will help you set a weight-loss goal, such as 10 to 15 pounds, or 5% of your extra weight. Together you and your team can set manageable weight-loss goals.    Take your diabetes medicine or insulin as directed.  You may need diabetes medicine, insulin, or both to help control your blood sugar levels. Your provider will teach you how and when to take your diabetes medicine or insulin. You will also be taught about side effects oral diabetes medicine can cause. Insulin may be injected or given through a pump or pen. You and your providers will decide on the best method for you:    An insulin pump  is an implanted device that gives your insulin 24 hours a day. An insulin pump prevents the need for multiple insulin injections in a day.         An insulin pen  is a device prefilled with the right amount of insulin.         You and your family members will be taught how to draw up and give insulin  if this is the best method for you. Your providers will also teach you how to dispose of needles and syringes.    You will learn how much insulin you need  and when to give it. You will be taught when not to give insulin. You will also be taught what to do if your blood sugar level drops too low. This may happen if you take insulin and do not eat the right amount of carbohydrates.    What else can I do to manage type 2 diabetes?   Wear  medical alert identification.  Wear medical alert jewelry or carry a card that says you have diabetes. Ask your care team provider where to get these items.         Do not smoke.  Nicotine and other chemicals in cigarettes and cigars can cause lung and blood vessel damage. It also makes it more difficult to manage your diabetes. Ask your provider for information if you currently smoke and need help to quit. Do not use e-cigarettes or smokeless tobacco in place of cigarettes or to help you quit. They still contain nicotine.    Check your feet each day for cuts, scratches, calluses, or other wounds.  Look for redness and swelling, and feel for warmth. Wear shoes that fit well. Check your shoes for rocks or other objects that can hurt your feet. Do not walk barefoot or wear shoes without socks. Wear cotton socks to help keep your feet dry.         Ask about vaccines you may need.  You have a higher risk for serious illness if you get the flu, pneumonia, COVID-19, or hepatitis. Ask your provider if you should get vaccines to prevent these or other diseases, and when to get the vaccines.    Talk to your provider if you become stressed about diabetes care.  Sometimes being able to fit diabetes care into your life can cause increased stress. The stress can cause you not to take care of yourself properly. Your provider can help by offering tips about self-care. A mental health provider can listen and offer help with self-care issues. Other types of counseling can help you make nutrition or physical activity changes.    Have your A1c checked as directed.  Your provider may check your A1c every 3 months, or 2 times each year if your diabetes is controlled. An A1c test shows the average amount of sugar in your blood over the past 2 to 3 months. Your provider will tell you what your A1c level should be.    Have screening tests as directed.  Your provider may recommend screening for complications of diabetes and other conditions  that may develop. Some screenings may begin right away and some may happen within the first 5 years of diagnosis:    Examples of diabetes complications  include kidney problems, high cholesterol, high blood pressure, blood vessel problems, eye problems, and sleep apnea.    You may be screened for a low vitamin B level  if you take oral diabetes medicine for a long time.    You may be screened for polycystic ovarian syndrome (PCOS)  if you are of childbearing age.    Have someone call your local emergency number (911 in the [place]) if:   You cannot be woken.    You have signs of diabetic ketoacidosis:     confusion, fatigue    vomiting    rapid heartbeat    fruity smelling breath    extreme thirst    dry mouth and skin    You have any of the following signs of a heart attack:      Squeezing, pressure, or pain in your chest    You may  also have any of the following:     Discomfort or pain in your back, neck, jaw, stomach, or arm    Shortness of breath    Nausea or vomiting    Lightheadedness or a sudden cold sweat    You have any of the following signs of a stroke:      Numbness or drooping on one side of your face     Weakness in an arm or leg    Confusion or difficulty speaking    Dizziness, a severe headache, or vision loss    When should I call my doctor or diabetes care team provider?   You have a sore or wound that will not heal.    You have a change in the amount you urinate.    Your blood sugar levels are higher than your target goals.    You often have lower blood sugar levels than your target goals.    Your skin is red, dry, warm, or swollen.    You have trouble coping with diabetes, or you feel anxious or depressed.    You have trouble following any part of your care plan, such as your meal plan.    You have questions or concerns about your condition or care.    CARE AGREEMENT:   You have the right to help plan your care. Learn about your health condition and how it may be treated. Discuss treatment options  with your healthcare providers to decide what care you want to receive. You always have the right to refuse treatment. The above information is an  only. It is not intended as medical advice for individual conditions or treatments. Talk to your doctor, nurse or pharmacist before following any medical regimen to see if it is safe and effective for you.  © Copyright [employer] 2023 Information is for End User's use only and may not be sold, redistributed or otherwise used for commercial purposes.

## 2024-02-21 DIAGNOSIS — I10 PRIMARY HYPERTENSION: ICD-10-CM

## 2024-02-21 RX ORDER — HYDRALAZINE HYDROCHLORIDE 25 MG/1
25 TABLET, FILM COATED ORAL 2 TIMES DAILY
Qty: 60 TABLET | Refills: 3 | Status: SHIPPED | OUTPATIENT
Start: 2024-02-21 | End: 2024-02-21

## 2024-02-21 RX ORDER — ATENOLOL 100 MG/1
100 TABLET ORAL 2 TIMES DAILY
Start: 2024-02-21

## 2024-02-21 RX ORDER — HYDRALAZINE HYDROCHLORIDE 25 MG/1
25 TABLET, FILM COATED ORAL 2 TIMES DAILY
Qty: 180 TABLET | Refills: 0 | Status: SHIPPED | OUTPATIENT
Start: 2024-02-21

## 2024-02-21 NOTE — TELEPHONE ENCOUNTER
Upon review of the In Basket request we were able to locate, review, and update the patient chart as requested for Diabetic Eye Exam. 8-8-22    Any additional questions or concerns should be emailed to the Practice Liaisons via the appropriate education email address, please do not reply via In Basket.    Thank you  Rayna Mendez

## 2024-02-21 NOTE — TELEPHONE ENCOUNTER
BP remains elevated  He will remain on clonidine 0.3 patch, lisinopril 40 mg QD, atenolol 100 mg BID and we will add hydralazine 25 mg BID.  I spoke to the patient and instructed him of this medication change.  Medicine was sent to the pharmacy and I asked the patient to contact us in 1 week with blood pressure readings.

## 2024-02-21 NOTE — TELEPHONE ENCOUNTER
Upon review of the In Basket request we have found/obtained the documentation. After careful review of the document we are unable to complete this request for Diabetic Eye Exam because the documentation does not have the result(s) needed to close the requested care gap(s). 1-19-24 in the media tab. No 2023 was received.     Any additional questions or concerns should be emailed to the Practice Liaisons via the appropriate education email address, please do not reply via In Basket.    Thank you  Rayna Mendez

## 2024-02-21 NOTE — TELEPHONE ENCOUNTER
Requested medication(s) are due for refill today: Yes  Patient has already received a courtesy refill: No  Other reason request has been forwarded to provider: surya

## 2024-02-22 ENCOUNTER — TELEPHONE (OUTPATIENT)
Age: 74
End: 2024-02-22

## 2024-02-22 NOTE — TELEPHONE ENCOUNTER
----- Message from Shahram [facility], DO sent at 2/21/2024  7:35 PM EST -----  Labs look good. Some improvements from last set of labs.

## 2024-03-04 ENCOUNTER — TELEPHONE (OUTPATIENT)
Dept: CARDIOLOGY CLINIC | Facility: CLINIC | Age: 74
End: 2024-03-04

## 2024-03-04 NOTE — TELEPHONE ENCOUNTER
Patient started on hydralazine 25 mg BID in addition to clonidine 0.3 mg patch, lisinopril 40 mg QD and atenolol 100 mg BID given elevated BP  Today he sends BP readings as below  Better controlled, informed patient to continue current meds and continue to monitor BP and call us if SBP high 140's or > more consistently. Low sodium diet, patient understands       Media Information      Document Information    Clinical Image - Mobile Device      03/04/2024 1:54 PM   Attached To:   Dany Bosch   Source Information    [employer] [facility]  [facility]

## 2024-03-06 DIAGNOSIS — I10 PRIMARY HYPERTENSION: ICD-10-CM

## 2024-03-07 RX ORDER — ATENOLOL 100 MG/1
100 TABLET ORAL 2 TIMES DAILY
Qty: 180 TABLET | Refills: 1 | Status: SHIPPED | OUTPATIENT
Start: 2024-03-07

## 2024-03-08 DIAGNOSIS — E11.69 TYPE 2 DIABETES MELLITUS WITH OTHER SPECIFIED COMPLICATION, WITHOUT LONG-TERM CURRENT USE OF INSULIN (HCC): ICD-10-CM

## 2024-03-08 DIAGNOSIS — E78.5 HYPERLIPIDEMIA, UNSPECIFIED HYPERLIPIDEMIA TYPE: ICD-10-CM

## 2024-03-08 RX ORDER — ATORVASTATIN CALCIUM 80 MG/1
80 TABLET, FILM COATED ORAL DAILY
Qty: 90 TABLET | Refills: 1 | Status: SHIPPED | OUTPATIENT
Start: 2024-03-08

## 2024-03-08 RX ORDER — METFORMIN HYDROCHLORIDE 500 MG/1
500 TABLET, EXTENDED RELEASE ORAL 2 TIMES DAILY WITH MEALS
Qty: 180 TABLET | Refills: 3 | Status: SHIPPED | OUTPATIENT
Start: 2024-03-08

## 2024-04-12 ENCOUNTER — IN-CLINIC DEVICE VISIT (OUTPATIENT)
Dept: CARDIOLOGY CLINIC | Facility: CLINIC | Age: 74
End: 2024-04-12
Payer: MEDICARE

## 2024-04-12 DIAGNOSIS — Z95.810 PRESENCE OF IMPLANTABLE CARDIOVERTER-DEFIBRILLATOR (ICD): Primary | ICD-10-CM

## 2024-04-12 PROCEDURE — 93283 PRGRMG EVAL IMPLANTABLE DFB: CPT | Performed by: INTERNAL MEDICINE

## 2024-04-12 NOTE — PROGRESS NOTES
[facility] DC ICD/ACTIVE SYSTEM IS MRI CONDITIONAL  DEVICE INTERROGATED IN THE [facility] OFFICE:  BATTERY VOLTAGE ADEQUATE (7.9-8.0 YR.).  AP 38%  <1%.  ALL LEAD PARAMETERS WITHIN NORMAL LIMITS.  19 NS EPISODES; 2 EGMS SHOW PAT (#21-28 @ 157 BPM, #29 - 15 @ 169 BPM), AND ALL REMAINING EGMS SHOW NSVT (13 @ 158 BPM, 20 @ 158 BPM, 14 @ 161 BPM, 12 @ 174 BPM, 10 @ 167 BPM, 17 @ 170 BPM, 26 @ 160 BPM, 13 @ 158 BPM, 13 @ 172 BPM, 15 @ 170 BPM, 14 @ 172 BPM, 26 @ 160 BPM, 21 @ 155 BPM, 10 @ 169 BPM, 32 @ 157 BPM, 16 @ 197 BPM, 10 @ 167 BPM).  NO PROGRAMMING CHANGES MADE TO DEVICE PARAMETERS.  NORMAL DEVICE FUNCTION.  RG

## 2024-04-17 DIAGNOSIS — I10 PRIMARY HYPERTENSION: ICD-10-CM

## 2024-04-18 ENCOUNTER — PATIENT MESSAGE (OUTPATIENT)
Age: 74
End: 2024-04-18

## 2024-04-18 ENCOUNTER — TELEPHONE (OUTPATIENT)
Age: 74
End: 2024-04-18

## 2024-04-18 ENCOUNTER — HOSPITAL ENCOUNTER (OUTPATIENT)
Dept: VASCULAR ULTRASOUND | Facility: HOSPITAL | Age: 74
Discharge: HOME | End: 2024-04-18
Attending: INTERNAL MEDICINE
Payer: MEDICARE

## 2024-04-18 DIAGNOSIS — E11.69 TYPE 2 DIABETES MELLITUS WITH OTHER SPECIFIED COMPLICATION, WITHOUT LONG-TERM CURRENT USE OF INSULIN (HCC): ICD-10-CM

## 2024-04-18 DIAGNOSIS — I71.40 ABDOMINAL AORTIC ANEURYSM (AAA) WITHOUT RUPTURE, UNSPECIFIED PART (HCC): Chronic | ICD-10-CM

## 2024-04-18 PROCEDURE — 93978 VASCULAR STUDY: CPT | Performed by: SURGERY

## 2024-04-18 PROCEDURE — 93978 VASCULAR STUDY: CPT

## 2024-04-18 RX ORDER — LISINOPRIL 40 MG/1
40 TABLET ORAL DAILY
Qty: 90 TABLET | Refills: 3 | Status: SHIPPED | OUTPATIENT
Start: 2024-04-18

## 2024-04-18 RX ORDER — HYDRALAZINE HYDROCHLORIDE 25 MG/1
25 TABLET, FILM COATED ORAL 2 TIMES DAILY
Qty: 180 TABLET | Refills: 0 | Status: SHIPPED | OUTPATIENT
Start: 2024-04-18

## 2024-04-18 NOTE — TELEPHONE ENCOUNTER
----- Message from Shahram [facility], DO sent at 4/18/2024  1:17 PM EDT -----  Some small growth in size of abdominal aortic aneurysm since 2017. Measures 3.3 x 3.6 cm. Repeat vascular study for surveillance is recommended again in 1 year.

## 2024-04-30 DIAGNOSIS — I10 HYPERTENSION, UNSPECIFIED TYPE: ICD-10-CM

## 2024-05-01 RX ORDER — CLONIDINE 0.3 MG/24H
1 PATCH, EXTENDED RELEASE TRANSDERMAL WEEKLY
Qty: 12 PATCH | Refills: 1 | Status: SHIPPED | OUTPATIENT
Start: 2024-05-01 | End: 2024-05-07 | Stop reason: SDUPTHER

## 2024-05-07 DIAGNOSIS — I10 HYPERTENSION, UNSPECIFIED TYPE: ICD-10-CM

## 2024-05-07 RX ORDER — CLONIDINE 0.3 MG/24H
1 PATCH, EXTENDED RELEASE TRANSDERMAL WEEKLY
Qty: 12 PATCH | Refills: 1 | Status: SHIPPED | OUTPATIENT
Start: 2024-05-07

## 2024-05-23 ENCOUNTER — TELEPHONE (OUTPATIENT)
Age: 74
End: 2024-05-23

## 2024-06-11 DIAGNOSIS — I10 PRIMARY HYPERTENSION: ICD-10-CM

## 2024-06-12 RX ORDER — HYDRALAZINE HYDROCHLORIDE 25 MG/1
25 TABLET, FILM COATED ORAL 2 TIMES DAILY
Qty: 60 TABLET | Refills: 0 | Status: SHIPPED | OUTPATIENT
Start: 2024-06-12

## 2024-07-15 ENCOUNTER — REMOTE DEVICE CLINIC VISIT (OUTPATIENT)
Dept: CARDIOLOGY CLINIC | Facility: CLINIC | Age: 74
End: 2024-07-15
Payer: MEDICARE

## 2024-07-15 DIAGNOSIS — Z95.810 AICD (AUTOMATIC CARDIOVERTER/DEFIBRILLATOR) PRESENT: Primary | ICD-10-CM

## 2024-07-15 PROCEDURE — 93296 REM INTERROG EVL PM/IDS: CPT | Performed by: INTERNAL MEDICINE

## 2024-07-15 PROCEDURE — 93295 DEV INTERROG REMOTE 1/2/MLT: CPT | Performed by: INTERNAL MEDICINE

## 2024-07-16 NOTE — PROGRESS NOTES
"Results for orders placed or performed in visit on 07/15/24   Cardiac EP device report    Narrative    [facility] DC ICD/ACTIVE SYSTEM IS MRI CONDITIONAL  MERLIN TRANSMISSION: BATTERY STATUS \"7 YRS.\" AP 23%  0%. ALL AVAILABLE LEAD PARAMETERS WITHIN NORMAL LIMITS. 3 AMS & 12 VHRS NOTED. 0% BURDEN; NO THERAPIES GIVEN. AVAIL EGRAMS PRESENT AS PAT & NSVT. PT ON ASA & ATENOLOL. EF 68% (ECHO/STS 2023). CORVUE IMPEDANCE MONITORING WITHIN NORMAL LIMITS. NORMAL DEVICE FUNCTION. NC         "

## 2024-07-18 ENCOUNTER — OFFICE VISIT (OUTPATIENT)
Age: 74
End: 2024-07-18
Payer: MEDICARE

## 2024-07-18 ENCOUNTER — APPOINTMENT (OUTPATIENT)
Age: 74
End: 2024-07-18
Payer: MEDICARE

## 2024-07-18 VITALS
OXYGEN SATURATION: 98 % | SYSTOLIC BLOOD PRESSURE: 122 MMHG | HEIGHT: 68 IN | BODY MASS INDEX: 27.71 KG/M2 | WEIGHT: 182.8 LBS | DIASTOLIC BLOOD PRESSURE: 74 MMHG | HEART RATE: 64 BPM | TEMPERATURE: 96.7 F | RESPIRATION RATE: 18 BRPM

## 2024-07-18 DIAGNOSIS — E78.2 MIXED HYPERLIPIDEMIA DUE TO TYPE 2 DIABETES MELLITUS  (HCC): ICD-10-CM

## 2024-07-18 DIAGNOSIS — E78.2 MIXED HYPERLIPIDEMIA DUE TO TYPE 2 DIABETES MELLITUS  (HCC): Primary | ICD-10-CM

## 2024-07-18 DIAGNOSIS — I10 PRIMARY HYPERTENSION: Chronic | ICD-10-CM

## 2024-07-18 DIAGNOSIS — Z00.00 MEDICARE ANNUAL WELLNESS VISIT, SUBSEQUENT: ICD-10-CM

## 2024-07-18 DIAGNOSIS — E11.69 MIXED HYPERLIPIDEMIA DUE TO TYPE 2 DIABETES MELLITUS  (HCC): ICD-10-CM

## 2024-07-18 DIAGNOSIS — F17.210 SMOKING GREATER THAN 20 PACK YEARS: ICD-10-CM

## 2024-07-18 DIAGNOSIS — E11.69 MIXED HYPERLIPIDEMIA DUE TO TYPE 2 DIABETES MELLITUS  (HCC): Primary | ICD-10-CM

## 2024-07-18 DIAGNOSIS — I25.10 CORONARY ARTERY DISEASE INVOLVING NATIVE CORONARY ARTERY OF NATIVE HEART WITHOUT ANGINA PECTORIS: Chronic | ICD-10-CM

## 2024-07-18 LAB
ANION GAP SERPL CALCULATED.3IONS-SCNC: 10 MMOL/L (ref 4–13)
BUN SERPL-MCNC: 14 MG/DL (ref 5–25)
CALCIUM SERPL-MCNC: 8.6 MG/DL (ref 8.4–10.2)
CHLORIDE SERPL-SCNC: 102 MMOL/L (ref 96–108)
CHOLEST SERPL-MCNC: 135 MG/DL
CO2 SERPL-SCNC: 28 MMOL/L (ref 21–32)
CREAT SERPL-MCNC: 0.68 MG/DL (ref 0.6–1.3)
EST. AVERAGE GLUCOSE BLD GHB EST-MCNC: 131 MG/DL
GFR SERPL CREATININE-BSD FRML MDRD: 92 ML/MIN/1.73SQ M
GLUCOSE P FAST SERPL-MCNC: 102 MG/DL (ref 65–99)
HBA1C MFR BLD: 6.2 %
HDLC SERPL-MCNC: 47 MG/DL
LDLC SERPL CALC-MCNC: 75 MG/DL (ref 0–100)
POTASSIUM SERPL-SCNC: 3.9 MMOL/L (ref 3.5–5.3)
SODIUM SERPL-SCNC: 140 MMOL/L (ref 135–147)
TRIGL SERPL-MCNC: 64 MG/DL

## 2024-07-18 PROCEDURE — G0439 PPPS, SUBSEQ VISIT: HCPCS | Performed by: INTERNAL MEDICINE

## 2024-07-18 PROCEDURE — 99214 OFFICE O/P EST MOD 30 MIN: CPT | Performed by: INTERNAL MEDICINE

## 2024-07-18 PROCEDURE — 36415 COLL VENOUS BLD VENIPUNCTURE: CPT

## 2024-07-18 PROCEDURE — 80048 BASIC METABOLIC PNL TOTAL CA: CPT

## 2024-07-18 PROCEDURE — 83036 HEMOGLOBIN GLYCOSYLATED A1C: CPT

## 2024-07-18 PROCEDURE — 80061 LIPID PANEL: CPT

## 2024-07-18 RX ORDER — ATENOLOL 100 MG/1
100 TABLET ORAL 2 TIMES DAILY
Qty: 180 TABLET | Refills: 3 | Status: SHIPPED | OUTPATIENT
Start: 2024-07-18

## 2024-07-18 RX ORDER — HYDRALAZINE HYDROCHLORIDE 25 MG/1
25 TABLET, FILM COATED ORAL 2 TIMES DAILY
Qty: 180 TABLET | Refills: 3 | Status: SHIPPED | OUTPATIENT
Start: 2024-07-18

## 2024-07-18 RX ORDER — ATORVASTATIN CALCIUM 80 MG/1
80 TABLET, FILM COATED ORAL DAILY
Qty: 90 TABLET | Refills: 3 | Status: SHIPPED | OUTPATIENT
Start: 2024-07-18

## 2024-07-18 NOTE — PROGRESS NOTES
Ambulatory Visit  Name: Dany Bosch      : 1950      MRN: [id number]  Encounter Provider: Shahram Flannery DO  Encounter Date: 2024   Encounter department: [facility] PRIMARY CARE [place]    Assessment & Plan   1. Mixed hyperlipidemia due to type 2 diabetes mellitus  (HCC)    Most recent A1c was 5.8 % on 2024. Will check updated lab work. Continue statin with history of diabetes and CAD. Last ldl was 89. Continue metformin monotherapy for his blood sugars.    - Hemoglobin A1C; Future  - Lipid Panel with Direct LDL reflex; Future  - atorvastatin (LIPITOR) 80 mg tablet; Take 1 tablet (80 mg total) by mouth daily  Dispense: 90 tablet; Refill: 3  - Basic metabolic panel; Future  - CBC; Future  - Lipid Panel with Direct LDL reflex; Future  - Hemoglobin A1C; Future  - Albumin / creatinine urine ratio; Future  - Comprehensive metabolic panel; Future    2. Primary hypertension    Blood pressure stable and well controlled. Continue current anti-HTN medications.    - atenolol (TENORMIN) 100 mg tablet; Take 1 tablet (100 mg total) by mouth 2 (two) times a day  Dispense: 180 tablet; Refill: 3  - hydrALAZINE (APRESOLINE) 25 mg tablet; Take 1 tablet (25 mg total) by mouth 2 (two) times a day  Dispense: 180 tablet; Refill: 3    3. Coronary artery disease involving native coronary artery of native heart without angina pectoris    Stable and without angina. Continue current cardiovascular medications. Stay active and consume a well balanced diet.    4. Smoking greater than 20 pack years    I discussed with him that he is a candidate for lung cancer CT screening.     The following Shared Decision-Making points were covered:  Benefits of screening were discussed, including the rates of reduction in death from lung cancer and other causes.  Harms of screening were reviewed, including false positive tests, radiation exposure levels, risks of invasive procedures, risks of complications of screening, and risk  of overdiagnosis.  I counseled on the importance of adherence to annual lung cancer LDCT screening, impact of co-morbidities, and ability or willingness to undergo diagnosis and treatment.  I counseled on the importance of maintaining abstinence as a former smoker or was counseled on the importance of smoking cessation if a current smoker    Review of Eligibility Criteria: He meets all of the criteria for Lung Cancer Screening.   He is 74 y.o.   He has 20 pack year tobacco history and is a current smoker or has quit within the past 15 years  He presents no signs or symptoms of lung cancer    After discussion, the patient decided to elect lung cancer screening.    - CT lung screening program; Future    5. Medicare annual wellness visit, subsequent      Depression Screening and Follow-up Plan: Patient was screened for depression during today's encounter. They screened negative with a PHQ-2 score of 0.    Tobacco Cessation Counseling: Tobacco cessation counseling was provided. The patient is sincerely urged to quit consumption of tobacco. He is not ready to quit tobacco.       Preventive health issues were discussed with patient, and age appropriate screening tests were ordered as noted in patient's After Visit Summary. Personalized health advice and appropriate referrals for health education or preventive services given if needed, as noted in patient's After Visit Summary.    History of Present Illness     History of Present Illness  The patient presents for a follow-up visit and medicare wellness visit.    He reports no chest pain, shortness of breath, or palpitations. His medication regimen is uneventful, with no reported issues. He has not experienced any episodes of hypoglycemia. He requires a refill of his atenolol, atorvastatin, and hydralazine prescriptions. He has been engaging in light walking for exercise. He reports no current abdominal pain or seasonal allergies.     He is still smoking at least a pack a  day.     Patient Care Team:  Shahram RainDO [place]akota as PCP - General (Internal Medicine)  Delvin Burch MD (Oncology)  Danay Coleman MD (Cardiology)    Review of Systems   Constitutional:  Negative for activity change, appetite change and fatigue.   Respiratory:  Negative for apnea, cough, chest tightness, shortness of breath and wheezing.    Cardiovascular:  Negative for chest pain, palpitations and leg swelling.   Gastrointestinal:  Negative for abdominal distention, abdominal pain, blood in stool, constipation, diarrhea, nausea and vomiting.   Musculoskeletal:  Negative for arthralgias, back pain, gait problem, joint swelling and myalgias.   Skin:  Negative for rash and wound.   Neurological:  Negative for dizziness, weakness, light-headedness, numbness and headaches.   Psychiatric/Behavioral:  Negative for behavioral problems, confusion, hallucinations, sleep disturbance and suicidal ideas. The patient is not nervous/anxious.      Medical History Reviewed by provider this encounter:  Tobacco  Allergies  Meds  Problems  Med Hx  Surg Hx  Fam Hx       Annual Wellness Visit Questionnaire   Dany is here for his Subsequent Wellness visit. Last Medicare Wellness visit information reviewed, patient interviewed and updates made to the record today.      Health Risk Assessment:   Patient rates overall health as good. Patient feels that their physical health rating is same. Patient is satisfied with their life. Eyesight was rated as same. Hearing was rated as same. Patient feels that their emotional and mental health rating is same. Patients states they are never, rarely angry. Patient states they are sometimes unusually tired/fatigued. Pain experienced in the last 7 days has been none. Patient states that he has experienced no weight loss or gain in last 6 months.     Depression Screening:   PHQ-2 Score: 0      Fall Risk Screening:   In the past year, patient has experienced: no history of falling in past  year      Home Safety:  Patient does not have trouble with stairs inside or outside of their home. Patient has working smoke alarms and has working carbon monoxide detector. Home safety hazards include: none.     Nutrition:   Current diet is Regular.     Medications:   Patient is currently taking over-the-counter supplements. OTC medications include: see medication list. Patient is able to manage medications.     Activities of Daily Living (ADLs)/Instrumental Activities of Daily Living (IADLs):   Walk and transfer into and out of bed and chair?: Yes  Dress and groom yourself?: Yes    Bathe or shower yourself?: Yes    Feed yourself? Yes  Do your laundry/housekeeping?: Yes  Manage your money, pay your bills and track your expenses?: Yes  Make your own meals?: Yes    Do your own shopping?: Yes    Previous Hospitalizations:   Any hospitalizations or ED visits within the last 12 months?: No      Advance Care Planning:   Living will: Yes    Durable POA for healthcare: No    Advanced directive: Yes    Five wishes given: No      Cognitive Screening:   Provider or family/friend/caregiver concerned regarding cognition?: No    PREVENTIVE SCREENINGS      Cardiovascular Screening:    General: Screening Not Indicated and History Lipid Disorder      Diabetes Screening:     General: Screening Not Indicated and History Diabetes      Colorectal Cancer Screening:     General: Screening Current      Prostate Cancer Screening:    General: Screening Not Indicated      Osteoporosis Screening:    General: Screening Not Indicated      Abdominal Aortic Aneurysm (AAA) Screening:    Risk factors include: tobacco use        General: Screening Not Indicated and History AAA      Lung Cancer Screening:     General: Screening Current      Hepatitis C Screening:    General: Screening Current    Screening, Brief Intervention, and Referral to Treatment (SBIRT)    Screening  Typical number of drinks in a day: 0  Typical number of drinks in a week:  0  Interpretation: Low risk drinking behavior.    AUDIT-C Screenin) How often did you have a drink containing alcohol in the past year? monthly or less  2) How many drinks did you have on a typical day when you were drinking in the past year? 1 to 2  3) How often did you have 6 or more drinks on one occasion in the past year? never    AUDIT-C Score: 1  Interpretation: Score 0-3 (male): Negative screen for alcohol misuse    Single Item Drug Screening:  How often have you used an illegal drug (including marijuana) or a prescription medication for non-medical reasons in the past year? never    Single Item Drug Screen Score: 0  Interpretation: Negative screen for possible drug use disorder    Brief Intervention  Alcohol & drug use screenings were reviewed. No concerns regarding substance use disorder identified.     Other Counseling Topics:   Car/seat belt/driving safety, skin self-exam, sunscreen and regular weightbearing exercise.     Social Determinants of Health     Financial Resource Strain: Low Risk  (3/17/2023)    Overall Financial Resource Strain (CARDIA)     Difficulty of Paying Living Expenses: Not hard at all   Food Insecurity: No Food Insecurity (2024)    Hunger Vital Sign     Worried About Running Out of Food in the Last Year: Never true     Ran Out of Food in the Last Year: Never true   Transportation Needs: No Transportation Needs (2024)    PRAPARE - Transportation     Lack of Transportation (Medical): No     Lack of Transportation (Non-Medical): No   Housing Stability: Low Risk  (2024)    Housing Stability Vital Sign     Unable to Pay for Housing in the Last Year: No     Number of Times Moved in the Last Year: 0     Homeless in the Last Year: No   Utilities: Not At Risk (2024)    [facility] Utilities     Threatened with loss of utilities: No     Objective     /74 (BP Location: Left arm, Patient Position: Sitting, Cuff Size: Standard)   Pulse 64   Temp (!) 96.7 °F (35.9 °C)  "(Tympanic)   Resp 18   Ht 5' 8\" (1.727 m)   Wt 82.9 kg (182 lb 12.8 oz)   SpO2 98%   BMI 27.79 kg/m²     Physical Exam  Constitutional:       General: He is not in acute distress.     Appearance: He is not ill-appearing.   Cardiovascular:      Rate and Rhythm: Normal rate and regular rhythm.      Heart sounds: No murmur heard.  Pulmonary:      Effort: Pulmonary effort is normal. No respiratory distress.      Breath sounds: No wheezing.   Abdominal:      General: Bowel sounds are normal. There is no distension.      Tenderness: There is no abdominal tenderness.   Musculoskeletal:      Right lower leg: No edema.      Left lower leg: No edema.   Neurological:      Mental Status: He is alert.       Shahram Flannery,    "

## 2024-07-18 NOTE — PATIENT INSTRUCTIONS
Medicare Preventive Visit Patient Instructions  Thank you for completing your Welcome to Medicare Visit or Medicare Annual Wellness Visit today. Your next wellness visit will be due in one year (7/19/2025).  The screening/preventive services that you may require over the next 5-10 years are detailed below. Some tests may not apply to you based off risk factors and/or age. Screening tests ordered at today's visit but not completed yet may show as past due. Also, please note that scanned in results may not display below.  Preventive Screenings:  Service Recommendations Previous Testing/Comments   Colorectal Cancer Screening  Colonoscopy    Fecal Occult Blood Test (FOBT)/Fecal Immunochemical Test (FIT)  Fecal DNA/Cologuard Test  Flexible Sigmoidoscopy Age: 45-75 years old   Colonoscopy: every 10 years (May be performed more frequently if at higher risk)  OR  FOBT/FIT: every 1 year  OR  Cologuard: every 3 years  OR  Sigmoidoscopy: every 5 years  Screening may be recommended earlier than age 45 if at higher risk for colorectal cancer. Also, an individualized decision between you and your healthcare provider will decide whether screening between the ages of 76-85 would be appropriate. Colonoscopy: 12/02/2020  FOBT/FIT: 01/10/2022  Cologuard: Not on file  Sigmoidoscopy: Not on file    Screening Current     Prostate Cancer Screening Individualized decision between patient and health care provider in men between ages of 55-69   Medicare will cover every 12 months beginning on the day after your 50th birthday PSA: 2.0 ng/mL     Screening Not Indicated     Hepatitis C Screening Once for adults born between 1945 and 1965  More frequently in patients at high risk for Hepatitis C Hep C Antibody: 06/24/2020    Screening Current   Diabetes Screening 1-2 times per year if you're at risk for diabetes or have pre-diabetes Fasting glucose: 120 mg/dL (2/20/2024)  A1C: 5.8 % (2/20/2024)  Screening Not Indicated  History Diabetes    Cholesterol Screening Once every 5 years if you don't have a lipid disorder. May order more often based on risk factors. Lipid panel: 02/20/2024  Screening Not Indicated  History Lipid Disorder      Other Preventive Screenings Covered by Medicare:  Abdominal Aortic Aneurysm (AAA) Screening: covered once if your at risk. You're considered to be at risk if you have a family history of AAA or a male between the age of 65-75 who smoking at least 100 cigarettes in your lifetime.  Lung Cancer Screening: covers low dose CT scan once per year if you meet all of the following conditions: (1) Age 55-77; (2) No signs or symptoms of lung cancer; (3) Current smoker or have quit smoking within the last 15 years; (4) You have a tobacco smoking history of at least 20 pack years (packs per day x number of years you smoked); (5) You get a written order from a healthcare provider.  Glaucoma Screening: covered annually if you're considered high risk: (1) You have diabetes OR (2) Family history of glaucoma OR (3)  aged 50 and older OR (4)  American aged 65 and older  Osteoporosis Screening: covered every 2 years if you meet one of the following conditions: (1) Have a vertebral abnormality; (2) On glucocorticoid therapy for more than 3 months; (3) Have primary hyperparathyroidism; (4) On osteoporosis medications and need to assess response to drug therapy.  HIV Screening: covered annually if you're between the age of 15-65. Also covered annually if you are younger than 15 and older than 65 with risk factors for HIV infection. For pregnant patients, it is covered up to 3 times per pregnancy.    Immunizations:  Immunization Recommendations   Influenza Vaccine Annual influenza vaccination during flu season is recommended for all persons aged >= 6 months who do not have contraindications   Pneumococcal Vaccine   * Pneumococcal conjugate vaccine = PCV13 (Prevnar 13), PCV15 (Vaxneuvance), PCV20 (Prevnar 20)  *  Pneumococcal polysaccharide vaccine = PPSV23 (Pneumovax) Adults 19-65 yo with certain risk factors or if 65+ yo  If never received any pneumonia vaccine: recommend Prevnar 20 (PCV20)  Give PCV20 if previously received 1 dose of PCV13 or PPSV23   Hepatitis B Vaccine 3 dose series if at intermediate or high risk (ex: diabetes, end stage renal disease, liver disease)   Respiratory syncytial virus (RSV) Vaccine - COVERED BY MEDICARE PART D  * RSVPreF3 (Arexvy) CDC recommends that adults 60 years of age and older may receive a single dose of RSV vaccine using shared clinical decision-making (SCDM)   Tetanus (Td) Vaccine - COST NOT COVERED BY MEDICARE PART B Following completion of primary series, a booster dose should be given every 10 years to maintain immunity against tetanus. Td may also be given as tetanus wound prophylaxis.   Tdap Vaccine - COST NOT COVERED BY MEDICARE PART B Recommended at least once for all adults. For pregnant patients, recommended with each pregnancy.   Shingles Vaccine (Shingrix) - COST NOT COVERED BY MEDICARE PART B  2 shot series recommended in those 19 years and older who have or will have weakened immune systems or those 50 years and older     Health Maintenance Due:      Topic Date Due    Lung Cancer Screening  08/25/2024    Colorectal Cancer Screening  12/02/2025    Hepatitis C Screening  Completed     Immunizations Due:      Topic Date Due    Influenza Vaccine (1) 09/01/2024     Advance Directives   What are advance directives?  Advance directives are legal documents that state your wishes and plans for medical care. These plans are made ahead of time in case you lose your ability to make decisions for yourself. Advance directives can apply to any medical decision, such as the treatments you want, and if you want to donate organs.   What are the types of advance directives?  There are many types of advance directives, and each state has rules about how to use them. You may choose a  combination of any of the following:  Living will:  This is a written record of the treatment you want. You can also choose which treatments you do not want, which to limit, and which to stop at a certain time. This includes surgery, medicine, IV fluid, and tube feedings.   Durable power of  for healthcare (DPAHC):  This is a written record that states who you want to make healthcare choices for you when you are unable to make them for yourself. This person, called a proxy, is usually a family member or a friend. You may choose more than 1 proxy.  Do not resuscitate (DNR) order:  A DNR order is used in case your heart stops beating or you stop breathing. It is a request not to have certain forms of treatment, such as CPR. A DNR order may be included in other types of advance directives.  Medical directive:  This covers the care that you want if you are in a coma, near death, or unable to make decisions for yourself. You can list the treatments you want for each condition. Treatment may include pain medicine, surgery, blood transfusions, dialysis, IV or tube feedings, and a ventilator (breathing machine).  Values history:  This document has questions about your views, beliefs, and how you feel and think about life. This information can help others choose the care that you would choose.  Why are advance directives important?  An advance directive helps you control your care. Although spoken wishes may be used, it is better to have your wishes written down. Spoken wishes can be misunderstood, or not followed. Treatments may be given even if you do not want them. An advance directive may make it easier for your family to make difficult choices about your care.   Cigarette Smoking and Your Health   Risks to your health if you smoke:  Nicotine and other chemicals found in tobacco damage every cell in your body. Even if you are a light smoker, you have an increased risk for cancer, heart disease, and lung disease.  If you are pregnant or have diabetes, smoking increases your risk for complications.   Benefits to your health if you stop smoking:   You decrease respiratory symptoms such as coughing, wheezing, and shortness of breath.   You reduce your risk for cancers of the lung, mouth, throat, kidney, bladder, pancreas, stomach, and cervix. If you already have cancer, you increase the benefits of chemotherapy. You also reduce your risk for cancer returning or a second cancer from developing.   You reduce your risk for heart disease, blood clots, heart attack, and stroke.   You reduce your risk for lung infections, and diseases such as pneumonia, asthma, chronic bronchitis, and emphysema.  Your circulation improves. More oxygen can be delivered to your body. If you have diabetes, you lower your risk for complications, such as kidney, artery, and eye diseases. You also lower your risk for nerve damage. Nerve damage can lead to amputations, poor vision, and blindness.  You improve your body's ability to heal and to fight infections.  For more information and support to stop smoking:   [employer]  Phone: 0- 191 - [phone]  Web Address: www.[employer].com  How to Quit Using Smokeless Tobacco   Why it is important to stop using smokeless tobacco:  Smokeless tobacco comes in many forms. Examples include chew, snuff, dip, dissolvable tobacco, and snus. All smokeless tobacco products contain nicotine and may contain as much nicotine as 3 cigarettes. You may be physically dependent on nicotine. You may also be emotionally addicted to it. The cravings can be strong, but it is important to quit using smokeless tobacco. You will improve your health and decrease your cancer, stroke, and heart attack risk. Mouth sores and tooth problems will also improve when you quit. You can benefit from quitting no matter how long you have used smokeless tobacco.   Prepare to stop using smokeless tobacco:  Nicotine is a highly addictive drug. Withdrawal  symptoms can happen when you stop and make it hard to quit. The following can help keep you on track:  Set a quit date.    Tell friends, family, and coworkers that you plan to quit.    Remove all smokeless tobacco products from your home, car, and workplace.    Manage weight gain after you quit:  Nicotine can affect your metabolism. You may gain a few pounds after you quit. The following can help you control your weight:  Eat healthy foods.    Drink water before, during, and between meals.    Exercise as directed.      Weight Management   Why it is important to manage your weight:  Being overweight increases your risk of health conditions such as heart disease, high blood pressure, type 2 diabetes, and certain types of cancer. It can also increase your risk for osteoarthritis, sleep apnea, and other respiratory problems. Aim for a slow, steady weight loss. Even a small amount of weight loss can lower your risk of health problems.  How to lose weight safely:  A safe and healthy way to lose weight is to eat fewer calories and get regular exercise. You can lose up about 1 pound a week by decreasing the number of calories you eat by 500 calories each day.   Healthy meal plan for weight management:  A healthy meal plan includes a variety of foods, contains fewer calories, and helps you stay healthy. A healthy meal plan includes the following:  Eat whole-grain foods more often.  A healthy meal plan should contain fiber. Fiber is the part of grains, fruits, and vegetables that is not broken down by your body. Whole-grain foods are healthy and provide extra fiber in your diet. Some examples of whole-grain foods are whole-wheat breads and pastas, oatmeal, brown rice, and bulgur.  Eat a variety of vegetables every day.  Include dark, leafy greens such as spinach, kale, mukesh greens, and mustard greens. Eat yellow and orange vegetables such as carrots, sweet potatoes, and winter squash.   Eat a variety of fruits every day.   Choose fresh or canned fruit (canned in its own juice or light syrup) instead of juice. Fruit juice has very little or no fiber.  Eat low-fat dairy foods.  Drink fat-free (skim) milk or 1% milk. Eat fat-free yogurt and low-fat cottage cheese. Try low-fat cheeses such as mozzarella and other reduced-fat cheeses.  Choose meat and other protein foods that are low in fat.  Choose beans or other legumes such as split peas or lentils. Choose fish, skinless poultry (chicken or turkey), or lean cuts of red meat (beef or pork). Before you cook meat or poultry, cut off any visible fat.   Use less fat and oil.  Try baking foods instead of frying them. Add less fat, such as margarine, sour cream, regular salad dressing and mayonnaise to foods. Eat fewer high-fat foods. Some examples of high-fat foods include french fries, doughnuts, ice cream, and cakes.  Eat fewer sweets.  Limit foods and drinks that are high in sugar. This includes candy, cookies, regular soda, and sweetened drinks.  Exercise:  Exercise at least 30 minutes per day on most days of the week. Some examples of exercise include walking, biking, dancing, and swimming. You can also fit in more physical activity by taking the stairs instead of the elevator or parking farther away from stores. Ask your healthcare provider about the best exercise plan for you.    © Copyright [employer] 2018 Information is for End User's use only and may not be sold, redistributed or otherwise used for commercial purposes. All illustrations and images included in CareNotes® are the copyrighted property of [employer] or [employer]

## 2024-07-19 ENCOUNTER — TELEPHONE (OUTPATIENT)
Age: 74
End: 2024-07-19

## 2024-07-19 NOTE — TELEPHONE ENCOUNTER
----- Message from Shahram [facility], DO sent at 7/19/2024  7:11 AM EDT -----  Labs look good. Blood sugars remain controlled with A1c of 6.2%

## 2024-07-24 ENCOUNTER — OFFICE VISIT (OUTPATIENT)
Dept: CARDIOLOGY CLINIC | Facility: CLINIC | Age: 74
End: 2024-07-24
Payer: MEDICARE

## 2024-07-24 VITALS
RESPIRATION RATE: 16 BRPM | OXYGEN SATURATION: 99 % | BODY MASS INDEX: 28.04 KG/M2 | SYSTOLIC BLOOD PRESSURE: 146 MMHG | DIASTOLIC BLOOD PRESSURE: 82 MMHG | HEIGHT: 68 IN | WEIGHT: 185 LBS | HEART RATE: 61 BPM

## 2024-07-24 DIAGNOSIS — I47.20 VT (VENTRICULAR TACHYCARDIA) (HCC): ICD-10-CM

## 2024-07-24 DIAGNOSIS — I10 PRIMARY HYPERTENSION: Chronic | ICD-10-CM

## 2024-07-24 DIAGNOSIS — I25.10 CORONARY ARTERY DISEASE INVOLVING NATIVE CORONARY ARTERY OF NATIVE HEART WITHOUT ANGINA PECTORIS: Primary | Chronic | ICD-10-CM

## 2024-07-24 DIAGNOSIS — Z95.810 S/P ICD (INTERNAL CARDIAC DEFIBRILLATOR) PROCEDURE: Chronic | ICD-10-CM

## 2024-07-24 DIAGNOSIS — I42.1 HOCM (HYPERTROPHIC OBSTRUCTIVE CARDIOMYOPATHY) (HCC): Chronic | ICD-10-CM

## 2024-07-24 DIAGNOSIS — E11.9 TYPE 2 DIABETES MELLITUS WITHOUT COMPLICATION, WITHOUT LONG-TERM CURRENT USE OF INSULIN (HCC): Chronic | ICD-10-CM

## 2024-07-24 PROCEDURE — 99213 OFFICE O/P EST LOW 20 MIN: CPT | Performed by: NURSE PRACTITIONER

## 2024-07-24 NOTE — PROGRESS NOTES
Cardiology Office Note    Dany Bosch 74 y.o. male MRN: [id number]    07/24/24          Assessment/Plan:    1.  CAD s/p PCI of the proximal to mid LAD  - Denies chest pain, shortness of breath or other anginal equivalent  - Continue aspirin, atenolol, atorvastatin    2.  Hypertension  - Blood pressure well-controlled with atenolol, clonidine, hydralazine and lisinopril  - Continue low-sodium diet and ambulatory blood pressure monitoring    3.  History of HOCM  - Follows with HOCM clinic    4.  History of NSVT and VT s/p [employer] ICD implantation  -Continue follow-up with device clinic  - Denies palpitations  - Continue atenolol    Follow up: 6 months or sooner as needed    1. Coronary artery disease involving native coronary artery of native heart without angina pectoris        2. HOCM (hypertrophic obstructive cardiomyopathy) ([facility])        3. Primary hypertension        4. Type 2 diabetes mellitus without complication, without long-term current use of insulin ([facility])        5. S/P ICD (internal cardiac defibrillator) procedure        6. VT (ventricular tachycardia) ([facility])            HPI: Dany Bosch is a 74 y.o. year old male with a past medical history of HOCM, NSVT, VT with [employer] ICD implantation, hypertension, CAD s/p PCI of the proximal to mid LAD, tobacco abuse, diabetes type 2 who presents today for routine follow-up.  He was last seen in this office 1 year ago and was doing well from cardiac standpoint at that time.  Today he reports that he continues to do well from cardiac standpoint.  Fortunately he does continue to smoke 1 pack/day but stays overall active and denies chest pain, dyspnea on exertion or rest, lower extremity edema, lightheadedness, dizziness, syncopal episodes, or palpitations and was instructed to call  the office or seek medical attention if any such symptoms develop.     All medications reviewed and patient is tolerating medications without side effects. Refills were sent  if needed.         Patient Active Problem List   Diagnosis    Mixed hyperlipidemia due to type 2 diabetes mellitus  (HCC)    Primary hypertension    Type 2 diabetes mellitus (HCC)    HOCM (hypertrophic obstructive cardiomyopathy) (HCC)    Coronary artery disease involving native coronary artery of native heart    Thoracic aortic ectasia (HCC)    History of hyperparathyroidism    S/P subtotal parathyroidectomy    BPH (benign prostatic hyperplasia) s/p TURP    VT (ventricular tachycardia) (HCC)    History of sick sinus syndrome    S/P ICD (internal cardiac defibrillator) procedure    Abdominal aortic aneurysm (AAA) without rupture (HCC)       No Known Allergies      Current Outpatient Medications:     aspirin (ECOTRIN LOW STRENGTH) 81 mg EC tablet, Take 1 tablet (81 mg total) by mouth daily, Disp: , Rfl:     atenolol (TENORMIN) 100 mg tablet, Take 1 tablet (100 mg total) by mouth 2 (two) times a day, Disp: 180 tablet, Rfl: 3    atorvastatin (LIPITOR) 80 mg tablet, Take 1 tablet (80 mg total) by mouth daily, Disp: 90 tablet, Rfl: 3    cholecalciferol (VITAMIN D3) 1,000 units tablet, Take 1,000 Units by mouth daily, Disp: , Rfl:     cloNIDine (CATAPRES-TTS-3) 0.3 mg/24 hr, Place 1 patch (0.3 mg total) on the skin over 7 days once a week, Disp: 12 patch, Rfl: 1    hydrALAZINE (APRESOLINE) 25 mg tablet, Take 1 tablet (25 mg total) by mouth 2 (two) times a day, Disp: 180 tablet, Rfl: 3    lisinopril (ZESTRIL) 40 mg tablet, Take 1 tablet (40 mg total) by mouth daily, Disp: 90 tablet, Rfl: 3    metFORMIN (GLUCOPHAGE-XR) 500 mg 24 hr tablet, Take 1 tablet (500 mg total) by mouth 2 (two) times a day with meals, Disp: 180 tablet, Rfl: 3    Omega-3 Fatty Acids (FISH OIL ADULT GUMMIES PO), Take by mouth, Disp: , Rfl:     pantoprazole (PROTONIX) 40 mg tablet, Take 1 tablet (40 mg total) by mouth daily, Disp: 90 tablet, Rfl: 3    torsemide (DEMADEX) 10 mg tablet, Take 1 tablet (10 mg total) by mouth daily, Disp: 90 tablet, Rfl:  3    Past Medical History:   Diagnosis Date    Anemia     Benign neoplasm of large intestine     Bilateral recurrent inguinal hernia without obstruction or gangrene     Bleeding gastric ulcer 2018    Cardiomyopathy (HCC)     Chronic kidney disease     Colon polyp     Coronary artery disease 01/2018    Diabetes mellitus (HCC)     Disease of thyroid gland     Diverticulitis of colon 2020    Heart murmur     History of aortic regurgitation     History of constipation     History of degenerative joint disease     History of nocturia     History of obesity     History of sebaceous cyst     History of shortness of breath     History of transfusion     History of urinary frequency     Hyperlipidemia     Hyperparathyroidism (HCC)     Hypertension     Iron deficiency anemia due to chronic blood loss 04/20/2018    Added automatically from request for surgery [id number]    Myocardial infarction (HCC) 2018 january, 3 stents    Polyposis coli     of the large intestine    Ulcer of esophagus        Family History   Problem Relation Age of Onset    Rheumatic fever Father     Other Father         accidental poisoning    Alcohol abuse Father     Heart disease Mother        Past Surgical History:   Procedure Laterality Date    ACHILLES TENDON SURGERY Left 1973    CARDIAC ELECTROPHYSIOLOGY PROCEDURE Left 12/23/2022    Procedure: insertion dual chamber ICD;  Surgeon: Viral Blair DO;  Location: [facility];  Service: Cardiology    CATARACT EXTRACTION Right 02/18/2021    CATARACT EXTRACTION Left 12/18/2023    COLONOSCOPY      hyperplastic polyp    CORONARY ANGIOPLASTY WITH STENT PLACEMENT      3 stents: 2 placed on Jan 2018, 1 on July 2018     CYST REMOVAL  2019, 2020    left and right wrists    ESOPHAGOGASTRODUODENOSCOPY N/A 01/23/2018    Procedure: ESOPHAGOGASTRODUODENOSCOPY (EGD);  Surgeon: Ping Burgos MD;  Location: [facility];  Service: Gastroenterology    ESOPHAGOGASTRODUODENOSCOPY      EYE SURGERY  2/18/2021    Cataract  removal    HERNIA REPAIR      HERNIA REPAIR Bilateral 08/18/2017    Procedure: LAPAROSCOPIC INGUINAL HERNIA REPAIR WITH MESH;  Surgeon: Keegan Loco MD;  Location: [facility];  Service: General    LARYNGOSCOPY N/A 05/12/2022    Procedure: LARYNGOSCOPY DIRECT;  Surgeon: Delvin Burch MD;  Location: [facility];  Service: Surgical Oncology    NV ESOPHAGOGASTRODUODENOSCOPY TRANSORAL DIAGNOSTIC N/A 03/26/2018    Procedure: ESOPHAGOGASTRODUODENOSCOPY (EGD);  Surgeon: Breezy Wallace III, MD;  Location: [facility];  Service: Gastroenterology    NV ESOPHAGOGASTRODUODENOSCOPY TRANSORAL DIAGNOSTIC N/A 05/14/2018    Procedure: ESOPHAGOGASTRODUODENOSCOPY (EGD);  Surgeon: Breezy Wallace III, MD;  Location: [facility];  Service: Gastroenterology    NV PARATHYROIDECTOMY/EXPLORATION PARATHYROIDS Right 03/23/2021    Procedure: RIGHT PARATHYROIDECTOMY, MINIMALLY INVASIVE, POSSIBLE 4-GLAND EXPLORATION, WITH INTRA-OPERATIVE PTH MONITORING;  Surgeon: Delvin Burch MD;  [facility];  Service: Surgical Oncology    NV PARATHYROIDECTOMY/EXPLORATION PARATHYROIDS Right 05/12/2022    Procedure: TWO GLAND PARATHYROIDECTOMY, 4 GLAND EXPLORATION, INTRAOPERATIVE PTH MONITORING, FLEXIBLE LARYNGOSCOPY;  Surgeon: Delvin Burch MD;  [facility]: [facility];  Service: Surgical Oncology    NV TRURL ELECTROSURG RESCJ PROSTATE BLEED COMPLETE N/A 09/02/2021    Procedure: TRANSURETHRAL RESECTION OF PROSTATE (TURP);  Surgeon: Robinson Cornell MD;  Location: [facility];  Service: Urology    PROSTATE SURGERY  10/2021    TIBIA FRACTURE SURGERY Left 1962       Social History     Socioeconomic History    Marital status: /Civil Union     Spouse name: Not on file    Number of children: Not on file    Years of education: Not on file    Highest education level: Not on file   Occupational History    Occupation:    Tobacco Use    Smoking status: Every Day     Current packs/day: 1.00     Average packs/day: 1 pack/day for 56.6 years  "(56.6 ttl pk-yrs)     Types: Cigarettes     Start date: 1968    Smokeless tobacco: Current   Vaping Use    Vaping status: Never Used   Substance and Sexual Activity    Alcohol use: Not Currently     Comment: rare social occasion, once a year,\" takes whiskey if feeling a cold coming\"    Drug use: No    Sexual activity: Yes     Partners: Female   Other Topics Concern    Not on file   Social History Narrative    Not on file     Social Determinants of Health     Financial Resource Strain: Low Risk  (3/17/2023)    Overall Financial Resource Strain (CARDIA)     Difficulty of Paying Living Expenses: Not hard at all   Food Insecurity: No Food Insecurity (7/18/2024)    Hunger Vital Sign     Worried About Running Out of Food in the Last Year: Never true     Ran Out of Food in the Last Year: Never true   Transportation Needs: No Transportation Needs (7/18/2024)    PRAPARE - Transportation     Lack of Transportation (Medical): No     Lack of Transportation (Non-Medical): No   Physical Activity: Inactive (4/19/2021)    Exercise Vital Sign     Days of Exercise per Week: 0 days     Minutes of Exercise per Session: 0 min   Stress: No Stress Concern Present (4/19/2021)    [employer] [place] [employer] - Occupational Stress Questionnaire     Feeling of Stress : Not at all   Social Connections: Not on file   Intimate Partner Violence: Not on file   Housing Stability: Low Risk  (7/18/2024)    Housing Stability Vital Sign     Unable to Pay for Housing in the Last Year: No     Number of Times Moved in the Last Year: 0     Homeless in the Last Year: No       Review of symptoms:   Constitution:  Negative  HEENT:  Negative  Cardiovascular:  Negative  Respiratory:  Negative  Skin:  Negative  Gastrointestinal:  Negative  Genitourinary:  Negative  Musculoskeletal:  Negative  Neurological:  Negative  Endocrine:  Negative  Psychological:  Negative    Vitals: /82 (BP Location: Left arm, Patient Position: Sitting, Cuff Size: " "Standard)   Pulse 61   Resp 16   Ht 5' 8\" (1.727 m)   Wt 83.9 kg (185 lb)   SpO2 99%   BMI 28.13 kg/m²         Physical Exam:     GEN: Alert and oriented x 3, in no acute distress.  Well appearing and well nourished.   HEENT: Sclera anicteric, conjunctivae pink, mucous membranes moist.   NECK: Supple, no carotid bruits, no significant JVD. Trachea midline.  HEART: Regular rhythm, normal S1 and S2, no murmurs, clicks, gallops or rubs.   LUNGS: Clear to auscultation bilaterally; no wheezes, rales, or rhonchi. No increased work of breathing or signs of respiratory distress.   ABDOMEN: Soft, nontender, nondistended, normoactive bowel sounds.   EXTREMITIES: Skin warm and well perfused, no clubbing, cyanosis, or edema.  NEURO: No focal findings. Normal speech. Mood and affect normal.   SKIN: Normal without suspicious lesions on exposed skin.    "

## 2024-07-25 ENCOUNTER — APPOINTMENT (OUTPATIENT)
Dept: RADIOLOGY | Facility: HOSPITAL | Age: 74
End: 2024-07-25
Payer: MEDICARE

## 2024-07-25 ENCOUNTER — HOSPITAL ENCOUNTER (OUTPATIENT)
Dept: CT IMAGING | Facility: HOSPITAL | Age: 74
End: 2024-07-25
Attending: INTERNAL MEDICINE
Payer: MEDICARE

## 2024-07-25 DIAGNOSIS — F17.210 SMOKING GREATER THAN 20 PACK YEARS: ICD-10-CM

## 2024-07-25 PROCEDURE — 71271 CT THORAX LUNG CANCER SCR C-: CPT

## 2024-07-29 ENCOUNTER — TELEPHONE (OUTPATIENT)
Age: 74
End: 2024-07-29

## 2024-07-29 NOTE — TELEPHONE ENCOUNTER
----- [facility] 7[facility]28/2024  8:03 PM EDT -----  Lung cancer screening was negative. Lung nodule remains small in size and hasn't grown. Aortic aneurysm size is stable as well. Recommend repeat surveillance in 1 year.

## 2024-08-04 DIAGNOSIS — E11.69 TYPE 2 DIABETES MELLITUS WITH OTHER SPECIFIED COMPLICATION, WITHOUT LONG-TERM CURRENT USE OF INSULIN (HCC): ICD-10-CM

## 2024-08-05 RX ORDER — LISINOPRIL 40 MG/1
40 TABLET ORAL DAILY
Qty: 90 TABLET | Refills: 3 | Status: SHIPPED | OUTPATIENT
Start: 2024-08-05

## 2024-08-19 DIAGNOSIS — I10 HYPERTENSION, UNSPECIFIED TYPE: ICD-10-CM

## 2024-08-20 RX ORDER — CLONIDINE 0.3 MG/24H
1 PATCH, EXTENDED RELEASE TRANSDERMAL WEEKLY
Qty: 12 PATCH | Refills: 0 | OUTPATIENT
Start: 2024-08-20

## 2024-09-08 DIAGNOSIS — I10 PRIMARY HYPERTENSION: Chronic | ICD-10-CM

## 2024-09-08 RX ORDER — ATENOLOL 100 MG/1
100 TABLET ORAL 2 TIMES DAILY
Qty: 180 TABLET | Refills: 1 | Status: SHIPPED | OUTPATIENT
Start: 2024-09-08

## 2024-09-08 RX ORDER — HYDRALAZINE HYDROCHLORIDE 25 MG/1
25 TABLET, FILM COATED ORAL 2 TIMES DAILY
Qty: 180 TABLET | Refills: 1 | Status: SHIPPED | OUTPATIENT
Start: 2024-09-08

## 2024-09-30 NOTE — PROGRESS NOTES
HCM Clinic Follow-up Visit - Cardiology   Dany MARIA DE JESUS Bosch 74 y.o. male MRN: [id number]  Unit/Bed#:  Encounter: [id number]    Patient Active Problem List    Diagnosis Date Noted    S/P ICD (internal cardiac defibrillator) procedure 02/20/2024    Abdominal aortic aneurysm (AAA) without rupture ([facility]) 02/20/2024    History of sick sinus syndrome 03/17/2023    VT (ventricular tachycardia) ([facility]) 11/29/2022    BPH (benign prostatic hyperplasia) s/p TURP 09/03/2021    S/P subtotal parathyroidectomy 04/12/2021    History of hyperparathyroidism 03/03/2021    Thoracic aortic ectasia ([facility]) 06/18/2019    HOCM (hypertrophic obstructive cardiomyopathy) ([facility]) 07/16/2018    Coronary artery disease involving native coronary artery of native heart     Type 2 diabetes mellitus ([facility]) 02/08/2018    Mixed hyperlipidemia due to type 2 diabetes mellitus  ([facility]) 01/21/2018    Primary hypertension 01/21/2018     Plan: Patient recently had cataract surgery for bilateral eyes. He now has 20/20 vision and had no complications with the procedure. He has not noticed any heart palpitations. He is still working at the [employer] doing yard work and pushing the . He goes to the [employer] 3-4 days a week and spend 3-4 hours there. He also does some chores around the house. His blood pressure is elevated this morning at 150/80. He is due to change his clonidine patch tonight at 8 pm (changes it on Tuesday nights). He is currently on  atenolol 100 mg BID, hydralazine 25 mg BID, lisinopril 40 mg daily, clonidine 0.3 mg patch weekly and torsemide 10 mg daily. He did take the long way in to the office and went up the step twice. We did some deep breathing in the office prior to rechecking the blood pressure. BP on recheck was 148/78. He does not routinely check his blood pressure at home. He checked his blood pressure earlier in September and systolic was in the 130s. This morning his BP at home was in the 150s at home. He has been adding salt to his  food more recently. He eats lightly salted potato chips regularly. We discussed importance of low sodium diet for blood pressure control. His wife does not add salt to her cooking. We discussed reading labels and limiting sodium intake to 1500 mg daily. We recommended to watch strict low sodium diet for 1 week and check his blood pressure twice daily for a week to see if this will improve his blood pressure or if he needs medication adjustments. He has both a wrist cuff and an arm cuff for blood pressure. We have recommended to check blood pressure on both machines to see if there is agreement. If they do he can use either. If not, then he can use the arm cuff. We recommended resting for 5 mins while sitting in a chair. He should check morning blood pressure after washing up about 1 hour after medication. For the evening blood pressure he should take it before bed after taking evening meds. He will reach out to us with his numbers. Patient's LDL is 75 as of 7/2024 and he is on atorvastatin 80 mg daily and tolerating it well. He had a CT lung cancer screening that demonstrated stable ascending thoracic aortic ectasia at 4.3 cm. We will continue annual surveillance. He still smokes one pack per day. Counseling was provided for smoke cessation. He has extensive scarring in his heart and is at risk for rhythm abnormalities. In addition, he does have high NSVT load on his recent device interrogations. Last hemoglobin A1C is 6.2%. Patient will follow up in 6 months with an echo on the same day.     ICD interrogation 4-:    [facility] ICD/ACTIVE SYSTEM IS MRI CONDITIONAL DEVICE INTERROGATED IN THE [place] OFFICE:  BATTERY VOLTAGE ADEQUATE (7.9-8.0 YR.).  AP 38%  <1%.  ALL LEAD PARAMETERS WITHIN NORMAL LIMITS.  19 NS EPISODES; 2 EGMS SHOW PAT (#21-28 @ 157 BPM, #29 - 15 @ 169 BPM), AND ALL REMAINING EGMS SHOW NSVT (13 @ 158 BPM, 20 @ 158 BPM, 14 @ 161 BPM, 12 @ 174 BPM, 10 @ 167 BPM, 17 @ 170 BPM, 26 @ 160  "BPM, 13 @ 158 BPM, 13 @ 172 BPM, 15 @ 170 BPM, 14 @ 172 BPM, 26 @ 160 BPM, 21 @ 155 BPM, 10 @ 169 BPM, 32 @ 157 BPM, 16 @ 197 BPM, 10 @ 167 BPM).  NO PROGRAMMING CHANGES MADE TO DEVICE PARAMETERS.  NORMAL DEVICE FUNCTION.       ICD interrogation 7-:    [facility] DC ICD/ACTIVE SYSTEM IS MRI CONDITIONAL MERLIN TRANSMISSION: BATTERY STATUS \"7 YRS.\" AP 23%  0%. ALL AVAILABLE LEAD PARAMETERS WITHIN NORMAL LIMITS. 3 AMS & 12 VHRS NOTED. 0% BURDEN; NO THERAPIES GIVEN. AVAIL EGRAMS PRESENT AS PAT & NSVT. PT ON ASA & ATENOLOL. EF 68% (ECHO/STS 2023). CORVUE IMPEDANCE MONITORING WITHIN NORMAL LIMITS. NORMAL DEVICE FUNCTION.     Physician Requesting Consult: Viral Blair DO   Reason for Consult / Principal Problem: HCM     HPI: Dany Bosch is a 73 y.o. year old male with history of Hyperparathyroidism, HTN, CAD s/p PCI to LAD, Hx of VTx s/p ICD (12/23/2022), DM2 on PO med, Anemia, and newly diagnosed HCM who is being referred by Dr. Blair for HCM. He has had several episodes of symptomatic NSVTs, for which he underwent primary prevention defibrillator implantation. As part of the work up, he had a CMR done which showed severely hypertrophied wall with significant amount of LGE. He is being seen today to establish care. He has remained asymptomatic aside from occasional palpitations, reports living an active lifestyle.  He denied SOB, chest pain, dizziness, bendopnea, PND, orthopnea, fainting, and lower extremity edema. He endorses compliance with medications.              Echocardiogram( 11/21/2022):       Left Ventricle: Wall thickness is increased. There is severe hypertrophy of the left ventricle with asymmetric component in the septum.  There is near complete obliteration of LV cavity during systole . No clear-cut ROCCO. There is LVOT gradient of approximately 120 mm Hg during Valsalva. The left ventricular ejection fraction is 70%. Systolic function is hyperdynamic. Wall motion is normal. Diastolic function is " mildly abnormal, consistent with grade I (abnormal) relaxation.    Left Atrium: The atrium is moderately dilated.    Aortic Valve: There is mild to moderate regurgitation. There is mild stenosis.    Mitral Valve: There is moderate annular calcification. There is mild to moderate regurgitation.     CMR(12/13/2022):     1. Severe asymmetric left ventricular wall thickening predominantly involving the interventricular septum, which measures up to 27 mm, with a spiral pattern progressing towards the apex.  Near cavity obliteration during systole.  Systolic anterior motion   of the anterior mitral valve leaflet with flow acceleration in the LVOT, suggesting a component of LVOT obstruction.  Apical insertion of the papillary muscles.  Hyperdynamic systolic function.  2. Normal right ventricular size and systolic function.  3.  The left atrium is normal in size.  Mildly reduced opening of the mitral valve.  Mitral regurgitation.  5. There is no evidence of myocardial edema.  6. Delayed post-gadolinium imaging demonstrates diffuse intramyocardial delayed gadolinium enhancement involving approximately 25% of the myocardium.     1-: Mr. Bosch was interviewed, examined and evaluated together with Dr Violetta Kennedy, cardiology fellow. Mr Bosch has carried a diagnosis of HOCM for more than 20 years but has been asymptomatic until recently. He also has coronary artery disease with a NSTEMI in 2018 in the setting of severe acute GI bleeding due to gastric ulcer for which he had stenting of proximal and mid-LAD. He is an active smoker and has diabetes, hypertension as well hyperlipidemia. Most recently he has had symptomatic NSVT and work up had shown severe LVH with LV wall thickness of 27 mm and extensive late gadolinium enhancement on cardiac MRI. He has undergone ICD implantation on 12/2022. He has a relatively active lifestyle, works at a [employer] and does yard work there. He has been a smoker for 40 years (>1 ppd).  He checks BP at home, numbers are usually between 140-160/80s at home. Endorses compliance with medications, takes Atenolol 50mg BID at 4am and 12 pm, 0.2 mg Clonidine patch every Friday, Lisinopril 40mg at 4am, Torsemide 10mg at 8pm.      Today his BP was 161/71, however, he is asymptomatic. His diet was reviewed and he verbalized understanding that he should abstain from fried and salty food. Fluid intake should be limited to 60 Oz/day and he should monitor his weight daily before breakfast. He also agreed to keep a log of BP and bring it with him during the next visit. We had a discussion about smoking cessation, however, patient is not motivated and has no intention to quit. No change to medications made. The priority at this time is to control his blood pressure effectively, improve daily physical activity and choice of food for meals and assess whether he would need to continue taking diuretic on a daily basis. High gradients have been confirmed on echocardiogram and, today, a faint murmur was heard with provocation. If he develops symptoms lisinopril can be discontinued and replaced by a non-hydropyridine calcium channel blocker. His most recent device interrogation did not show significant rhythm abnormality.     7-: Since his last office visit on 1- he has continued to follow with his primary cardiologist, Dr. Coleman and was last seen on 6- and no changes were made at that timet to the patient's medications/clinical plan. He has had an updated ICD device check from 6-: [facility] DUAL CHAMBER ICD/ACTIVE SYSTEM IS MRI CONDITIONAL   NON-BILLABLE MERLIN TRANSMISSION: BATTERY VOLTAGE ADEQUATE (7.6 YRS). AP: 32%. : <1%. ALL AVAILABLE LEAD PARAMETERS WITHIN NORMAL LIMITS. 1 NON-SUSTAINED EPISODE W/ EGM SHOWING NSVT 22 BEATS @ 165 BPM. PT TAKES ATENOLOL, ASA 81MG. EF: 70% (ECHO 11/21/22). CORVUE IMPEDANCE MONITORING WITHIN NORMAL LIMITS. APPROPRIATELY FUNCTIONING ICD     Today, the  "patient states he feels well.  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  He often mows the lawn, performs weed whacking will clean bathrooms and vacuum the floors.  He can perform all of the symptoms with no complaints of chest discomfort or dyspnea on exertion.  The patient states that in late June he was down in the [facility] and was walking on a flat surface with his wife when he began to notice an abrupt onset of left-sided arm \" heaviness\" that lasted roughly 25 to 30 minutes and eventually self resolved.  There were no associated symptoms with this discomfort and this was the first and only time this is happened.  It has not happened since that time.  He has performed very physical activities since that time and has not reproduced that discomfort.  His wife states that she thinks it could potentially have been musculoskeletal in nature as the weeks prior to this they were at their family farm in [place] and the patient was helping move equipment and chairs about the farm and perhaps this was a musculoskeletal event.  Nonetheless, given the fact that the patient did have an outflow tract gradient on his last echocardiogram from November 2022 it would be beneficial to obtain a stress echo to further evaluate outflow track gradient and also this will assist us to determine if there is underlying ischemia as a possible etiology to this event.  The patient does continue to get occasional palpitations but states they are short-lived.  He denies any lower extremity edema and does take torsemide 10 mg daily.  He has no orthopnea.  He will get some mild lightheadedness with position change but has not had any near syncope/syncope.  At last office visit in January 2023 the patient's blood pressure was noted to be elevated and we did ask him to monitor at home.  In April 2023 his atenolol was increased to 75 mg twice daily in the setting of NSVT noted on " ICD checks.  His wife states his blood pressure at home has been running systolically in the 130s and diastolic is typically 85 or less.  His blood pressure is acceptable today at 132/82 and I have continue to encourage him to follow a low-sodium diet.  The patient does continue to smoke about a pack to a pack and a half of cigarettes a day and is not motivated to quit.  I did  him on the pulmonary and cardiac effects of continued tobacco abuse.  He did have a lipid panel drawn in March of this year showing triglycerides of 52 HDL of 61 and LDL of 87 and he will continue atorvastatin 80 mg daily.  I have counseled him on following a low-fat/low-cholesterol diet.  His hemoglobin A1c is 5.9 down from 6.0 in June of last year. Overall, the patient is stable from a cardiac standpoint but we will obtain a stress echo to further investigate his single episode of left arm heaviness and also to further investigate for any left ventricular outflow tract obstruction.  For now he will continue his current medication regimen.      7-: Since his last office visit on 1- he has continued to follow with his primary cardiologist, Dr. Coleman and was last seen on 6- and no changes were made at that timet to the patient's medications/clinical plan. He has had an updated ICD device check from 6-: [facility] DUAL CHAMBER ICD/ACTIVE SYSTEM IS MRI CONDITIONAL   NON-BILLABLE MERLIN TRANSMISSION: BATTERY VOLTAGE ADEQUATE (7.6 YRS). AP: 32%. : <1%. ALL AVAILABLE LEAD PARAMETERS WITHIN NORMAL LIMITS. 1 NON-SUSTAINED EPISODE W/ EGM SHOWING NSVT 22 BEATS @ 165 BPM. PT TAKES ATENOLOL, ASA 81MG. EF: 70% (ECHO 11/21/22). CORVUE IMPEDANCE MONITORING WITHIN NORMAL LIMITS. APPROPRIATELY FUNCTIONING ICD     Today, the patient states he feels well.  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  He often mows the lawn, performs weed whacking will clean  "bathrooms and vacuum the floors.  He can perform all of the symptoms with no complaints of chest discomfort or dyspnea on exertion.  The patient states that in late June he was down in the [facility] and was walking on a flat surface with his wife when he began to notice an abrupt onset of left-sided arm \" heaviness\" that lasted roughly 25 to 30 minutes and eventually self resolved.  There were no associated symptoms with this discomfort and this was the first and only time this is happened.  It has not happened since that time.  He has performed very physical activities since that time and has not reproduced that discomfort.  His wife states that she thinks it could potentially have been musculoskeletal in nature as the weeks prior to this they were at their family farm in [place] and the patient was helping move equipment and chairs about the farm and perhaps this was a musculoskeletal event.  Nonetheless, given the fact that the patient did have an outflow tract gradient on his last echocardiogram from November 2022 it would be beneficial to obtain a stress echo to further evaluate outflow track gradient and also this will assist us to determine if there is underlying ischemia as a possible etiology to this event.  The patient does continue to get occasional palpitations but states they are short-lived.  He denies any lower extremity edema and does take torsemide 10 mg daily.  He has no orthopnea.  He will get some mild lightheadedness with position change but has not had any near syncope/syncope.  At last office visit in January 2023 the patient's blood pressure was noted to be elevated and we did ask him to monitor at home.  In April 2023 his atenolol was increased to 75 mg twice daily in the setting of NSVT noted on ICD checks.  His wife states his blood pressure at home has been running systolically in the 130s and diastolic is typically 85 or less.  His blood pressure is acceptable today at 132/82 and I have " continue to encourage him to follow a low-sodium diet.  The patient does continue to smoke about a pack to a pack and a half of cigarettes a day and is not motivated to quit.  I did  him on the pulmonary and cardiac effects of continued tobacco abuse.  He did have a lipid panel drawn in March of this year showing triglycerides of 52 HDL of 61 and LDL of 87 and he will continue atorvastatin 80 mg daily.  I have counseled him on following a low-fat/low-cholesterol diet.  His hemoglobin A1c is 5.9 down from 6.0 in June of last year. Overall, the patient is stable from a cardiac standpoint but we will obtain a stress echo to further investigate his single episode of left arm heaviness and also to further investigate for any left ventricular outflow tract obstruction.  For now he will continue his current medication regimen.     1-: Since his last office visit on 7- he had left-sided cataract surgery that overall went well. He did also have updated ICD device checks/diagnostic studies with the following results:      ICD interrogation 7-:     [facility] DUAL CHAMBER ICD/ACTIVE SYSTEM IS MRI CONDITIONAL MERLIN TRANSMISSION: BATTERY VOLTAGE ADEQUATE (7.5 YRS). AP: 32%. : <1%. ALL AVAILABLE LEAD PARAMETERS WITHIN NORMAL LIMITS. 10 NON-SUSTAINED EPISODES W/ AVAIL EGM (PDF#2) SHOWING PAT @ 176 BPM, 8 SECS. PT TAKES ATENOLOL, ASA 81MG. EF: 70% (ECHO 11/21/22). CORVUE IMPEDANCE MONITORING WITHIN NORMAL LIMITS. NORMAL DEVICE FUNCTION.      ICD interrogation 1-9-2024:     [facility] [place] ICD/ACTIVE SYSTEM IS MRI CONDITIONAL   MERLIN TRANSMISSION:  BATTERY VOLTAGE ADEQUATE (7.1-8.1 YR.).  AP 41%  <1%.  ALL LEAD PARAMETERS WITHIN NORMAL LIMITS.  5 NON-SUSTAINED EPISODES, NO EGMS.  CORVUE IMPEDANCE MONITORING WITHIN NORMAL LIMITS.  NORMAL DEVICE FUNCTION.         Exercise stress echocardiography 7-: A bicycle protocol stress test was performed. Overall, the patient's exercise capacity was mildly impaired  for their age. The patient reached stage 3.0 of the protocol after exercising for 6 min and 0 sec and had a maximal HR of 112 bpm (77 % of MPHR) and 3.9 METS. The patient experienced no angina during the test. The patient reached the end of the protocol. The patient reported dyspnea and fatigue during the stress test. Symptoms began during stress and ended during recovery. Blood pressure demonstrated a normal response and heart rate demonstrated a blunted response to stress.        Left Ventricle: Wall thickness is severely increased. There is severe asymmetric hypertrophy of the septal wall. The left ventricular ejection fraction is 68%. Systolic function is vigorous. Global longitudinal strain is reduced at -10%. Wall motion is normal. Diastolic function is mildly abnormal, consistent with grade I (abnormal) relaxation. There is no LV dynamic obstruction.    Left Atrium: The atrium is mildly dilated.    Aortic Valve: There is mild regurgitation. There is aortic valve sclerosis.    Mitral Valve: There is mild annular calcification. There is systolic anterior motion of the anterior leaflet without late peaking gradient.    Stress ECG: No ST deviation is noted. Arrhythmias during recovery: rare PVCs. The ECG was equivocal for ischemia. The stress ECG is negative for ischemia after submaximal exercise, without reproduction of symptoms.    Post Stress Echo: Left ventricle cavity has normal reduction in size post-stress. The left ventricle systolic function is hyperdynamic post-stress.           Today he states overall he feels well. He continues to do work several days a week at his local [employer] and has no cardiac complaints while doing these activities. He did feel a brief episode of palpitations while lying down on Sunday morning but he notes occurrence of palpitations is rare and this episode was very brief. Last BMP showed K of 4.6 and normal kidney function. He continues to take torsemide for water retention. His  blood pressure is mildly elevated today at 148/80 on his current regimen of clonidine 0.3 mg patch, torsemide 10 mg daily, lisinopril 40 mg daily and atenolol 75 mg BID but it is noted that he takes his medications at sporadic times through the day which is leading to 16 hrs in between doses. We have written him a schedule to take meds at 8 am and 8 pm. Low sodium diet had been reinforced as his wife notes he does salt his food. We have asked him to monitor his blood pressure twice daily for one week and call our office with results. His LDL from 2023 was 87 and he continues on atorvastatin 80 mg daily. His HgbA1c was 6.1 in 2023. He will follow up in our office in 6 months.     Device interrogation( 2023): [facility] DUAL ICD/ACTIVE SYSTEM IS MRI CONDITIONAL. DEVICE INTERROGATED IN THE [place] OFFICE: BATTERY VOLTAGE ADEQUATE (7.0-9.5 YRS). AP 7.4%  <1% ALL LEAD PARAMETERS WITHIN NORMAL LIMITS. NO NEW SIGNIFICANT HIGH RATE EPISODES. NO PROGRAMMING CHANGES MADE TO DEVICE PARAMETERS. WOUND CHECK: INCISION CLEAN AND DRY WITH EDGES APPROXIMATED; WOUND CARE AND RESTRICTIONS REVIEWED WITH PATIENT. NORMAL DEVICE FUNCTION. AM/RG.     Review of systems: Denies chest discomfort or dyspnea with exertion; notes occasional palpitations; denies lower extremity edema/orthopnea; some mild lightheadedness with standing; denies near syncope/syncope     Family History: Father  in his mid 50s likely from Cancer, he was a  and used chemicals to clean car parts. Mother passed away in 1972 at the age of 46yo at the hospital after a fibroid removal, as she was walking back to her room in the hospital she fell and passed away. Total of 10 Siblings( 6 boys, 4 girls)- one brother passed from EtOH intake, twin brother, Prabhu,  had two CVAs in the past 2 years ago, one sister is obese. Brother Rojas is 71 years old, has 5 stents and CVA. 1 brothers(one carries nitro) and 1 sister(flutter feeling) have  heart problems, unknown what the problem is.   Dany has a boy and a girl, his boy had a heart attack at age 56 and now has 4 stents. He suffers from anxiety, pastors a [employer] and works full time driving a forklift. His daughter has thyroid problems but no heart problems and is currently 59yo.      Genetic testing:           Devices: [facility] DC ICD placed 12/2022    Historical Information   Past Medical History:   Diagnosis Date    Anemia     Benign neoplasm of large intestine     Bilateral recurrent inguinal hernia without obstruction or gangrene     Bleeding gastric ulcer 2018    Cardiomyopathy (HCC)     Chronic kidney disease     Colon polyp     Coronary artery disease 01/2018    Diabetes mellitus (HCC)     Disease of thyroid gland     Diverticulitis of colon 2020    Heart murmur     History of aortic regurgitation     History of constipation     History of degenerative joint disease     History of nocturia     History of obesity     History of sebaceous cyst     History of shortness of breath     History of transfusion     History of urinary frequency     Hyperlipidemia     Hyperparathyroidism (HCC)     Hypertension     Iron deficiency anemia due to chronic blood loss 04/20/2018    Added automatically from request for surgery [id number]    Myocardial infarction (HCC) 2018 january, 3 stents    Polyposis coli     of the large intestine    Ulcer of esophagus      Past Surgical History:   Procedure Laterality Date    ACHILLES TENDON SURGERY Left 1973    CARDIAC ELECTROPHYSIOLOGY PROCEDURE Left 12/23/2022    Procedure: insertion dual chamber ICD;  Surgeon: Viral Blair DO;  Location: [facility];  Service: Cardiology    CATARACT EXTRACTION Right 02/18/2021    CATARACT EXTRACTION Left 12/18/2023    COLONOSCOPY      hyperplastic polyp    CORONARY ANGIOPLASTY WITH STENT PLACEMENT      3 stents: 2 placed on Jan 2018, 1 on July 2018     CYST REMOVAL  2019, 2020    left and right wrists    ESOPHAGOGASTRODUODENOSCOPY N/A  01/23/2018    Procedure: ESOPHAGOGASTRODUODENOSCOPY (EGD);  Surgeon: Ping Burgos MD;  Location: [facility];  Service: Gastroenterology    ESOPHAGOGASTRODUODENOSCOPY      EYE SURGERY  2/18/2021    Cataract removal    HERNIA REPAIR      HERNIA REPAIR Bilateral 08/18/2017    Procedure: LAPAROSCOPIC INGUINAL HERNIA REPAIR WITH MESH;  Surgeon: Keegan Loco MD;  Location: [facility];  Service: General    LARYNGOSCOPY N/A 05/12/2022    Procedure: LARYNGOSCOPY DIRECT;  Surgeon: Delvin Burch MD;  Location: [facility] MAIN OR;  Service: Surgical Oncology    NY ESOPHAGOGASTRODUODENOSCOPY TRANSORAL DIAGNOSTIC N/A 03/26/2018    Procedure: ESOPHAGOGASTRODUODENOSCOPY (EGD);  Surgeon: Breezy Wallace III, MD;  Location: [facility];  Service: Gastroenterology    NY ESOPHAGOGASTRODUODENOSCOPY TRANSORAL DIAGNOSTIC N/A 05/14/2018    Procedure: ESOPHAGOGASTRODUODENOSCOPY (EGD);  Surgeon: Breezy Wallace III, MD;  Location: [facility];  Service: Gastroenterology    NY PARATHYROIDECTOMY/EXPLORATION PARATHYROIDS Right 03/23/2021    Procedure: RIGHT PARATHYROIDECTOMY, MINIMALLY INVASIVE, POSSIBLE 4-GLAND EXPLORATION, WITH INTRA-OPERATIVE PTH MONITORING;  Surgeon: Delvin Burch MD;  Location: [facility];  Service: Surgical Oncology    NY PARATHYROIDECTOMY/EXPLORATION PARATHYROIDS Right 05/12/2022    Procedure: TWO GLAND PARATHYROIDECTOMY, 4 GLAND EXPLORATION, INTRAOPERATIVE PTH MONITORING, FLEXIBLE LARYNGOSCOPY;  Surgeon: Delvin Burch MD;  Location: [facility];  Service: Surgical Oncology    NY TRURL ELECTROSURG RESCJ PROSTATE BLEED COMPLETE N/A 09/02/2021    Procedure: TRANSURETHRAL RESECTION OF PROSTATE (TURP);  Surgeon: Robinson Cornell MD;  [facility]: [facility];  Service: Urology    PROSTATE SURGERY  10/2021    TIBIA FRACTURE SURGERY Left 1962     Family History   Problem Relation Age of Onset    Rheumatic fever Father     Other Father         accidental poisoning    Alcohol abuse Father     Heart disease Mother   "    Current Outpatient Medications on File Prior to Visit   Medication Sig Dispense Refill    aspirin (ECOTRIN LOW STRENGTH) 81 mg EC tablet Take 1 tablet (81 mg total) by mouth daily      atenolol (TENORMIN) 100 mg tablet Take 1 tablet (100 mg total) by mouth 2 (two) times a day 180 tablet 1    atorvastatin (LIPITOR) 80 mg tablet Take 1 tablet (80 mg total) by mouth daily 90 tablet 3    cholecalciferol (VITAMIN D3) 1,000 units tablet Take 1,000 Units by mouth daily      cloNIDine (CATAPRES-TTS-3) 0.3 mg/24 hr Place 1 patch (0.3 mg total) on the skin over 7 days once a week 12 patch 1    hydrALAZINE (APRESOLINE) 25 mg tablet Take 1 tablet (25 mg total) by mouth 2 (two) times a day 180 tablet 1    lisinopril (ZESTRIL) 40 mg tablet Take 1 tablet (40 mg total) by mouth daily 90 tablet 3    metFORMIN (GLUCOPHAGE-XR) 500 mg 24 hr tablet Take 1 tablet (500 mg total) by mouth 2 (two) times a day with meals 180 tablet 3    Omega-3 Fatty Acids (FISH OIL ADULT GUMMIES PO) Take by mouth      pantoprazole (PROTONIX) 40 mg tablet Take 1 tablet (40 mg total) by mouth daily 90 tablet 3    torsemide (DEMADEX) 10 mg tablet Take 1 tablet (10 mg total) by mouth daily 90 tablet 3     No current facility-administered medications on file prior to visit.     No Known Allergies  Social History     Substance and Sexual Activity   Alcohol Use Not Currently    Comment: rare social occasion, once a year,\" takes whiskey if feeling a cold coming\"     Social History     Substance and Sexual Activity   Drug Use No     Social History     Tobacco Use   Smoking Status Every Day    Current packs/day: 1.00    Average packs/day: 1 pack/day for 56.7 years (56.7 ttl pk-yrs)    Types: Cigarettes    Start date: 1968   Smokeless Tobacco Current     Objective   Vitals:   Vitals:    10/01/24 1108 10/01/24 1151   BP: 150/80 148/78   BP Location: Left arm Left arm   Patient Position: Sitting    Cuff Size: Large    Pulse: 60    SpO2: 97%    Weight: 83.1 kg (183 " "lb 4.8 oz)    Height: 5' 8\" (1.727 m)    Body surface area is 1.97 meters squared.  Body mass index is 27.87 kg/m².    Invasive Devices       None                 Physical Exam:  GEN: Dany Bosch appears well, alert and oriented x 3, pleasant and cooperative   HEENT: pupils equal, round, and reactive to light; extraocular muscles intact  NECK: supple, no carotid bruits   HEART: regular rhythm with occasional extrasystole, normal S1 and S2, 2/6 systolic murmur throughout the precordium, +S4 gallop, no clicks, or rubs   LUNGS: clear to auscultation bilaterally; no wheezes, rales, or rhonchi   ABDOMEN: normal bowel sounds, soft, no tenderness, no distention  EXTREMITIES: peripheral pulses normal; no clubbing, cyanosis, or edema  NEURO: no focal findings   SKIN: normal without suspicious lesions on exposed skin    Lab Results:   Lab Results   Component Value Date    WBC 5.82 02/20/2024    RBC 5.27 02/20/2024    HGB 14.1 02/20/2024    HCT 46.2 02/20/2024    MCV 88 02/20/2024     02/20/2024    RDW 17.6 (H) 02/20/2024     Lab Results   Component Value Date     12/14/2015    K 3.9 07/18/2024     07/18/2024    CO2 28 07/18/2024    ANIONGAP 5 12/14/2015    BUN 14 07/18/2024    CREATININE 0.68 07/18/2024    EGFR 92 07/18/2024    GLUCOSE 98 12/14/2015    CALCIUM 8.6 07/18/2024    AST 18 12/08/2023    ALT 15 12/08/2023    ALKPHOS 45 12/08/2023    PROT 7.1 06/12/2015    BILITOT 0.4 06/12/2015     Lab Results   Component Value Date    MG 1.9 04/06/2021     Lab Results   Component Value Date    CHOL 136 06/12/2015    HDL 47 07/18/2024    TRIG 64 07/18/2024    LDLCALC 75 07/18/2024     Lab Results   Component Value Date    FWH6OBUBYMYX 1.118 02/20/2024    FREET4 0.95 06/16/2014     Imaging:   I have personally reviewed pertinent films in PACS  No results found.    Cardiac testing:   Results for orders placed during the hospital encounter of 08/02/21    Echo complete with contrast if " [facility]  [address]  [place] [address]  [phone]    Transthoracic Echocardiogram  2D, M-mode, Doppler, and Color Doppler    Study date:  02-Aug-2021    Patient: KATHERIN SNOWDEN  MR number: [id number]  Account number: [id number]  : 1950  Age: 71 years  Gender: Male  Status: Outpatient  Location: [facility]  Height: 67 in  Weight: 188.5 lb  BP: 142/ 70 mmHg    Indications: CAD.    Diagnoses: I25.10 - Atherosclerotic heart disease of native coronary artery without angina pectoris    Sonographer:  MARIBELL Gregorio  Primary Physician:  David Emerson MD  Referring Physician:  Adina Soliz PA-C  Group:  [facility]  Interpreting Physician:  Anthony Garcia MD    SUMMARY    LEFT VENTRICLE:  The cavity was small.  Systolic function was normal. Ejection fraction was estimated to be 65 %.  There were no regional wall motion abnormalities.  Wall thickness was increased.  There was moderate assymetrical hypertrophy of the septum.  Features were consistent with a pseudonormal left ventricular filling pattern, with concomitant abnormal relaxation and increased filling pressure (grade 2 diastolic dysfunction).    RIGHT VENTRICLE:  The size was normal.  Systolic function was normal.    LEFT ATRIUM:  The atrium was mildly dilated.    MITRAL VALVE:  There was mild annular calcification.  There was mild regurgitation.    AORTIC VALVE:  There was mild stenosis.  There was moderate regurgitation.    TRICUSPID VALVE:  There was trace regurgitation.  Estimated peak PA pressure was 38 mmHg.    AORTA:  The root exhibited dilatation - 3.9 cm.    HISTORY: PRIOR HISTORY: Myocardial infarction. Hypertrophic cardiomyopathy. Risk factors: hypertension and oral hypoglycemic-treated diabetes. History of severe regurgitation of the aortic valve. PRIOR PROCEDURES: Stent.    PROCEDURE: The study was performed in the [facility]  [place]. This was a routine study. The transthoracic approach was used. The study included complete 2D imaging, M-mode, complete spectral Doppler, and color Doppler. Image  quality was adequate.    LEFT VENTRICLE: The cavity was small. Systolic function was normal. Ejection fraction was estimated to be 65 %. There were no regional wall motion abnormalities. Wall thickness was increased. There was moderate assymetrical hypertrophy of  the septum. No evidence of apical thrombus. DOPPLER: Features were consistent with a pseudonormal left ventricular filling pattern, with concomitant abnormal relaxation and increased filling pressure (grade 2 diastolic dysfunction).    RIGHT VENTRICLE: The size was normal. Systolic function was normal. Wall thickness was normal.    LEFT ATRIUM: The atrium was mildly dilated.    RIGHT ATRIUM: Size was normal.    MITRAL VALVE: There was mild annular calcification. There was normal leaflet separation. DOPPLER: The transmitral velocity was within the normal range. There was no evidence for stenosis. There was mild regurgitation.    AORTIC VALVE: The valve was trileaflet. Leaflets exhibited mildly increased thickness and normal cuspal separation. DOPPLER: There was mild stenosis. There was moderate regurgitation.    TRICUSPID VALVE: The valve structure was normal. There was normal leaflet separation. DOPPLER: The transtricuspid velocity was within the normal range. There was no evidence for stenosis. There was trace regurgitation. Estimated peak PA  pressure was 38 mmHg.    PULMONIC VALVE: Leaflets exhibited normal thickness, no calcification, and normal cuspal separation. DOPPLER: The transpulmonic velocity was within the normal range. There was no significant regurgitation.    PERICARDIUM: There was no pericardial effusion. The pericardium was normal in appearance.    AORTA: The root exhibited dilatation - 3.9 cm.    SYSTEMIC VEINS: IVC: The inferior vena cava was normal in  "size.    SYSTEM MEASUREMENT TABLES    2D  %FS: 33.99 %  Ao Diam: 3.89 cm  EDV(Teich): 105.57 ml  EF(Teich): 62.84 %  ESV(Teich): 39.23 ml  IVSd: 2.2 cm  LA Area: 21.8 cm2  LA Diam: 3.95 cm  LVEDV MOD A4C: 126.52 ml  LVEF MOD A4C: 64.55 %  LVESV MOD A4C: 44.85 ml  LVIDd: 4.76 cm  LVIDs: 3.14 cm  LVLd A4C: 7.9 cm  LVLs A4C: 6.49 cm  LVOT Diam: 2.27 cm  LVPWd: 1.41 cm  RA Area: 18.04 cm2  RVIDd: 3.51 cm  SV MOD A4C: 81.67 ml  SV(Teich): 66.34 ml    CW  AR Dec Ada: 3.12 m/s2  AR Dec Time: 1474.74 ms  AR PHT: 427.67 ms  AR Vmax: 4.39 m/s  AR maxP.22 mmHg  AV Env.Ti: 308.28 ms  AV MaxPG: 10.56 mmHg  AV VTI: 33.21 cm  AV Vmax: 1.62 m/s  AV Vmean: 1.08 m/s  AV meanP.33 mmHg  MR VTI: 204.76 cm  MR Vmax: 5.58 m/s  MR Vmean: 4.34 m/s  MR maxP.69 mmHg  MR meanP.58 mmHg  TR MaxP.77 mmHg  TR Vmax: 2.95 m/s    MM  TAPSE: 2.5 cm    PW  YOKO (VTI): 3.98 cm2  YOKO Vmax: 3.15 cm2  AVAI (VTI): 0 cm2/m2  AVAI Vmax: 0 cm2/m2  E' Sept: 0.06 m/s  E/E' Sept: 15.09  LVOT Env.Ti: 334.92 ms  LVOT VTI: 32.64 cm  LVOT Vmax: 1.26 m/s  LVOT Vmean: 0.97 m/s  LVOT maxP.4 mmHg  LVOT meanP.19 mmHg  LVSI Dopp: 67.12 ml/m2  LVSV Dopp: 132.23 ml  MV A Shadi: 0.92 m/s  MV Dec Ada: 2.72 m/s2  MV DecT: 358.4 ms  MV E Shadi: 0.98 m/s  MV E/A Ratio: 1.07  MV PHT: 103.94 ms  MVA By PHT: 2.12 cm2    [facility] Accredited Echocardiography Laboratory    Prepared and electronically signed by    Anthony Garcia MD  Signed 03-Aug-2021 14:53:36    Name: Dany Bosch                       : 1950  MRN: [id number]                       Age: 74 y.o.  Patient Status: Outpatient          Gender: male  Echo stress test, exercise w/ strain    Height: 5' 8\" (1.727 m)   Weight: 90.7 kg (200 lb)   BSA: 2.04 m²   Blood Pressure: 124/70    Date of Study: 23   Ordering Provider: SIGIFREDO Elder   Clinical Indications: Other, Please Specify in Comments - hypertrophic cardiomyopathy       [facility] Physicians  " "Performing Staff   Cardiology: Keyshawn Barba MD    Tech: Adina Vieyra RN   Support Staff: Quynh Dowd RN        Height & Weight    Height Weight BSA (Calculated - m2)   5' 8\" (1.727 m) 90.7 kg (200 lb) 2.04 sq meters     PACS Images     Show images for Echo stress test, exercise  Study Details    Study quality was adequate. The apical and parasternal views were obtained.     History    HOCM, CAD, PTCA, DM, NSVT, ICD, smoker     Interpretation Summary  Show Result Comparison     Left Ventricle: Wall thickness is severely increased. There is severe asymmetric hypertrophy of the septal wall. The left ventricular ejection fraction is 68%. Systolic function is vigorous. Global longitudinal strain is reduced at -10%. Wall motion is normal. Diastolic function is mildly abnormal, consistent with grade I (abnormal) relaxation. There is no LV dynamic obstruction.    Left Atrium: The atrium is mildly dilated.    Aortic Valve: There is mild regurgitation. There is aortic valve sclerosis.    Mitral Valve: There is mild annular calcification. There is systolic anterior motion of the anterior leaflet without late peaking gradient.    Stress ECG: No ST deviation is noted. Arrhythmias during recovery: rare PVCs. The ECG was equivocal for ischemia. The stress ECG is negative for ischemia after submaximal exercise, without reproduction of symptoms.    Post Stress Echo: Left ventricle cavity has normal reduction in size post-stress. The left ventricle systolic function is hyperdynamic post-stress.     Strain was performed to quantify interventricular dyssynchrony and evaluate components of myocardial function due to Hypertrophic Cardiomyopathy. Results from the utilization of Strain Analysis are listed in the report below.     Stress Findings    Stress Findings A bicycle protocol stress test was performed. Overall, the patient's exercise capacity was mildly impaired for their age. The patient reached stage 3.0 of the " protocol after exercising for 6 min and 0 sec and had a maximal HR of 112 bpm (77 % of MPHR) and 3.9 METS. The patient experienced no angina during the test. The patient reached the end of the protocol. The patient reported dyspnea and fatigue during the stress test. Symptoms began during stress and ended during recovery. Blood pressure demonstrated a normal response and heart rate demonstrated a blunted response to stress.     Findings    Left Ventricle Left ventricular cavity size is small. Wall thickness is severely increased. There is severe asymmetric hypertrophy of the septal wall. The left ventricular ejection fraction is 68%. Systolic function is vigorous. Global longitudinal strain is reduced at -10%.  Wall motion is normal. Diastolic function is mildly abnormal, consistent with grade I (abnormal) relaxation.  There is no LV dynamic obstruction.   Right Ventricle Right ventricular cavity size is normal. Systolic function is normal. Wall thickness is normal.   Left Atrium The atrium is mildly dilated.   Right Atrium The atrium is normal in size.   Aortic Valve The aortic valve is trileaflet. The leaflets are mildly thickened. The leaflets are not calcified. The leaflets exhibit normal mobility. There is mild regurgitation. There is aortic valve sclerosis.   Mitral Valve The leaflets are not thickened. The leaflets are not calcified. The leaflets exhibit normal mobility. There is mild annular calcification. There is trace regurgitation. There is no evidence of stenosis. There is systolic anterior motion of the anterior leaflet without late peaking gradient.   Tricuspid Valve Tricuspid valve structure is normal. There is no evidence of regurgitation. There is no evidence of stenosis.   Pulmonic Valve Pulmonic valve structure is normal. There is no evidence of regurgitation. There is no evidence of stenosis.   Ascending Aorta The aortic root is normal in size.   Pericardium There is no pericardial effusion. The  pericardium is normal in appearance.     Stress Measurements    Baseline Vitals   Baseline HR 60 bpm         Baseline /70 mmHg         O2 sat rest 97 %         Peak Stress Vitals   Stress peak  bpm         Post peak  mmHg         O2 sat peak 97 %         Max HR Percent 77 %         Max  bpm         Recovery Vitals   Recovery HR 70 bpm         Recovery /80 mmHg         O2 sat recovery 97 %          Exercise Data   Max  bpm         Exercise duration (min) 6 min         Exercise duration (sec) 0 sec         Estimated workload 3.9 METS         Stress Stage Reached 3         Angina Index 0         Rate Pressure Product 22,140              Stage Data 7/31/23 11:20 AM--7/31/23 12:10 PM    Date/Time BP HR O2 RPP Bicycle - Shin Symptoms   07/31/23 1135 124/70 60 bpm 97 % 7440 -- none   07/31/23 1141 134/80 78 bpm 96 % 91620 25 none   07/31/23 1143 152/90 88 bpm -- 29428 50 mild fatigue   07/31/23 1145 -- 112 bpm 97 % -- 75 fatigue   07/31/23 1146 180/94 112 bpm 97 % 02300 -- fatigue   07/31/23 1148 148/90 68 bpm 98 % 40986 -- subsiding   07/31/23 1151 170/74 77 bpm 97 % 93394 -- none   07/31/23 1154 140/80 70 bpm 97 % 9800 -- none     Left Ventricle Measurements    Strain   GLS -10 %               Aortic Valve Measurements    Regurgitation   AV peak gradient 71 mmHg         AV Deceleration Time 2,362 ms         AV regurgitation pressure 1/2 time 685 ms               Exam Details    Performed Procedure Technologist Supporting Staff Performing Physician   Echo stress test, exercise w/ strain Adina Vieyra, NAZIA Dowd, NAZIA Barba MD         Appointment Date/Status Modality Department    7/31/2023     Completed BE STRESS 1 BE CAR NON INV           Begin Exam End Exam Begin Exam Questionnaires End Exam Questionnaires   7/31/2023 11:20 AM 7/31/2023 12:10 PM CV STRESS QUESTIONNAIRE PATIENT EDUCATION            All Reviewers List    SIGIFREDO Elder on 8/1/2023  9:04 AM      Signed    Electronically signed by Keyshawn Barba MD on 8/1/23 at 0729 EDT     Counseling / Coordination of Care  Total time spent today 38 minutes.  Greater than 50% of total time was spent with the patient and / or family counseling and / or coordination of care.

## 2024-10-01 ENCOUNTER — OFFICE VISIT (OUTPATIENT)
Dept: CARDIOLOGY CLINIC | Facility: CLINIC | Age: 74
End: 2024-10-01
Payer: MEDICARE

## 2024-10-01 VITALS
HEIGHT: 68 IN | SYSTOLIC BLOOD PRESSURE: 148 MMHG | WEIGHT: 183.3 LBS | OXYGEN SATURATION: 97 % | BODY MASS INDEX: 27.78 KG/M2 | HEART RATE: 60 BPM | DIASTOLIC BLOOD PRESSURE: 78 MMHG

## 2024-10-01 DIAGNOSIS — E78.2 MIXED HYPERLIPIDEMIA DUE TO TYPE 2 DIABETES MELLITUS  (HCC): Primary | Chronic | ICD-10-CM

## 2024-10-01 DIAGNOSIS — Z95.810 AICD (AUTOMATIC CARDIOVERTER/DEFIBRILLATOR) PRESENT: ICD-10-CM

## 2024-10-01 DIAGNOSIS — E11.69 MIXED HYPERLIPIDEMIA DUE TO TYPE 2 DIABETES MELLITUS  (HCC): Primary | Chronic | ICD-10-CM

## 2024-10-01 DIAGNOSIS — Z95.810 PRESENCE OF AUTOMATIC CARDIOVERTER/DEFIBRILLATOR (AICD): ICD-10-CM

## 2024-10-01 DIAGNOSIS — I42.1 HOCM (HYPERTROPHIC OBSTRUCTIVE CARDIOMYOPATHY) (HCC): Chronic | ICD-10-CM

## 2024-10-01 DIAGNOSIS — Z95.810 S/P ICD (INTERNAL CARDIAC DEFIBRILLATOR) PROCEDURE: Chronic | ICD-10-CM

## 2024-10-01 DIAGNOSIS — I25.10 CORONARY ARTERY DISEASE INVOLVING NATIVE CORONARY ARTERY OF NATIVE HEART WITHOUT ANGINA PECTORIS: ICD-10-CM

## 2024-10-01 DIAGNOSIS — Z95.810 PRESENCE OF IMPLANTABLE CARDIOVERTER-DEFIBRILLATOR (ICD): ICD-10-CM

## 2024-10-01 DIAGNOSIS — I10 PRIMARY HYPERTENSION: Chronic | ICD-10-CM

## 2024-10-01 DIAGNOSIS — I47.20 VT (VENTRICULAR TACHYCARDIA) (HCC): Chronic | ICD-10-CM

## 2024-10-01 DIAGNOSIS — Z86.79 HISTORY OF SICK SINUS SYNDROME: Chronic | ICD-10-CM

## 2024-10-01 DIAGNOSIS — I10 HYPERTENSION, UNSPECIFIED TYPE: ICD-10-CM

## 2024-10-01 PROCEDURE — 99214 OFFICE O/P EST MOD 30 MIN: CPT | Performed by: INTERNAL MEDICINE

## 2024-10-14 ENCOUNTER — REMOTE DEVICE CLINIC VISIT (OUTPATIENT)
Dept: CARDIOLOGY CLINIC | Facility: CLINIC | Age: 74
End: 2024-10-14
Payer: MEDICARE

## 2024-10-14 DIAGNOSIS — Z95.810 AICD (AUTOMATIC CARDIOVERTER/DEFIBRILLATOR) PRESENT: Primary | ICD-10-CM

## 2024-10-14 PROCEDURE — 93296 REM INTERROG EVL PM/IDS: CPT | Performed by: INTERNAL MEDICINE

## 2024-10-14 PROCEDURE — 93295 DEV INTERROG REMOTE 1/2/MLT: CPT | Performed by: INTERNAL MEDICINE

## 2024-10-14 NOTE — PROGRESS NOTES
Results for orders placed or performed in visit on 10/14/24   Cardiac EP device report    Narrative    [facility] [place] ICD/ACTIVE SYSTEM IS MRI CONDITIONAL  MERLIN TRANSMISSION: BATTERY VOLTAGE ADEQUATE (6.5-7.6 YR). AP 27%  <1% (DDDR 60 PPM). ALL AVAILABLE LEAD PARAMETERS WITHIN NORMAL LIMITS. NO SIGNIFICANT HIGH RATE EPISODES SINCE LAST ALERT. PATIENT TAKING ASA 81 MG, ATENOLOL. CORVUE IMPEDANCE MONITORING WITHIN NORMAL LIMITS. NORMAL DEVICE FUNCTION.   ES

## 2024-10-22 DIAGNOSIS — E78.2 MIXED HYPERLIPIDEMIA DUE TO TYPE 2 DIABETES MELLITUS  (HCC): ICD-10-CM

## 2024-10-22 DIAGNOSIS — E11.69 MIXED HYPERLIPIDEMIA DUE TO TYPE 2 DIABETES MELLITUS  (HCC): ICD-10-CM

## 2024-10-22 RX ORDER — ATORVASTATIN CALCIUM 80 MG/1
80 TABLET, FILM COATED ORAL DAILY
Qty: 90 TABLET | Refills: 1 | Status: SHIPPED | OUTPATIENT
Start: 2024-10-22

## 2024-11-03 DIAGNOSIS — E11.69 TYPE 2 DIABETES MELLITUS WITH OTHER SPECIFIED COMPLICATION, WITHOUT LONG-TERM CURRENT USE OF INSULIN (HCC): ICD-10-CM

## 2024-11-04 RX ORDER — LISINOPRIL 40 MG/1
40 TABLET ORAL DAILY
Qty: 90 TABLET | Refills: 3 | Status: SHIPPED | OUTPATIENT
Start: 2024-11-04

## 2024-11-19 DIAGNOSIS — I10 HYPERTENSION, UNSPECIFIED TYPE: ICD-10-CM

## 2024-11-19 RX ORDER — CLONIDINE 0.3 MG/24H
1 PATCH, EXTENDED RELEASE TRANSDERMAL WEEKLY
Qty: 12 PATCH | Refills: 1 | Status: SHIPPED | OUTPATIENT
Start: 2024-11-19

## 2024-12-24 ENCOUNTER — OFFICE VISIT (OUTPATIENT)
Dept: GASTROENTEROLOGY | Facility: CLINIC | Age: 74
End: 2024-12-24
Payer: MEDICARE

## 2024-12-24 VITALS
BODY MASS INDEX: 26.07 KG/M2 | RESPIRATION RATE: 18 BRPM | HEART RATE: 84 BPM | HEIGHT: 68 IN | WEIGHT: 172 LBS | SYSTOLIC BLOOD PRESSURE: 124 MMHG | DIASTOLIC BLOOD PRESSURE: 82 MMHG | OXYGEN SATURATION: 98 % | TEMPERATURE: 97.6 F

## 2024-12-24 DIAGNOSIS — K29.70 GASTRITIS, PRESENCE OF BLEEDING UNSPECIFIED, UNSPECIFIED CHRONICITY, UNSPECIFIED GASTRITIS TYPE: ICD-10-CM

## 2024-12-24 DIAGNOSIS — D13.2 DUODENAL ADENOMA: ICD-10-CM

## 2024-12-24 DIAGNOSIS — K21.00 GASTROESOPHAGEAL REFLUX DISEASE WITH ESOPHAGITIS WITHOUT HEMORRHAGE: Primary | ICD-10-CM

## 2024-12-24 PROCEDURE — 99204 OFFICE O/P NEW MOD 45 MIN: CPT | Performed by: INTERNAL MEDICINE

## 2024-12-24 PROCEDURE — G2211 COMPLEX E/M VISIT ADD ON: HCPCS | Performed by: INTERNAL MEDICINE

## 2024-12-24 RX ORDER — SODIUM CHLORIDE, SODIUM LACTATE, POTASSIUM CHLORIDE, CALCIUM CHLORIDE 600; 310; 30; 20 MG/100ML; MG/100ML; MG/100ML; MG/100ML
125 INJECTION, SOLUTION INTRAVENOUS CONTINUOUS
OUTPATIENT
Start: 2024-12-24

## 2024-12-24 RX ORDER — PANTOPRAZOLE SODIUM 40 MG/1
40 TABLET, DELAYED RELEASE ORAL DAILY
Qty: 90 TABLET | Refills: 3 | Status: SHIPPED | OUTPATIENT
Start: 2024-12-24

## 2024-12-24 NOTE — ASSESSMENT & PLAN NOTE
He is a 74-year-old male with a history of peptic ulcer disease in 2021 with GI blood loss with chronic GERD that is well-managed and well-controlled at present with pantoprazole.  He had endoscopy with me in January 24, 2022 with LA grade B reflux esophagitis moderate erosive gastritis and small duodenal adenoma.  He is due for surveillance of this.    1 we will do an EGD to investigate.    2 he will continue on his pantoprazole.    3 reflux precautions were reviewed.    Orders:    EGD; Future

## 2024-12-24 NOTE — PROGRESS NOTES
Name: Dany Bosch      : 1950      MRN: [id number]  Encounter Provider: Breezy Wallace III, MD  Encounter Date: 2024   Encounter department: [facility] [place]  :  Assessment & Plan  Gastroesophageal reflux disease with esophagitis without hemorrhage  He is a 74-year-old male with a history of peptic ulcer disease in  with GI blood loss with chronic GERD that is well-managed and well-controlled at present with pantoprazole.  He had endoscopy with me in 2022 with LA grade B reflux esophagitis moderate erosive gastritis and small duodenal adenoma.  He is due for surveillance of this.    1 we will do an EGD to investigate.    2 he will continue on his pantoprazole.    3 reflux precautions were reviewed.    Orders:    EGD; Future    Gastritis, presence of bleeding unspecified, unspecified chronicity, unspecified gastritis type    Orders:    pantoprazole (PROTONIX) 40 mg tablet; Take 1 tablet (40 mg total) by mouth daily    EGD; Future    Duodenal adenoma    Orders:    pantoprazole (PROTONIX) 40 mg tablet; Take 1 tablet (40 mg total) by mouth daily    EGD; Future        History of Present Illness   HPI  Dany Bosch is a 74 y.o. male who presents for evaluation of GERD.  He is really here for med refill.  He is also here to schedule surveillance endoscopy.  He had peptic ulcer disease with GI blood loss in .  His follow-up endoscopy on 2022 showed LA grade B reflux esophagitis moderate erosive gastritis small fundic duodenal polyp adenoma.  He was told to have an EGD in 3 years.  He is on pantoprazole 40 mg in the morning.  He is compliant with his medication.  He takes it correctly.  He has no heartburn regurgitation dysphagia to solids or liquids no melena no abdominal pain.  He reports that he is doing well.  He has significant heart disease but it is under control and he is asymptomatic at present.  He had a colonoscopy with  me in December 2020 with no polyps diverticulosis internal hemorrhoids.    History obtained from: patient    Review of Systems  Past Medical History   Past Medical History:   Diagnosis Date    Anemia 2018    Benign neoplasm of large intestine     Bilateral recurrent inguinal hernia without obstruction or gangrene 08/01/2017    Bleeding gastric ulcer 2018    Cardiomyopathy (HCC)     Chronic kidney disease     Colon polyp 2020    Coronary artery disease 01/2018    Diabetes mellitus (HCC) 2017    Disease of thyroid gland     Diverticulitis of colon 2020    Heart murmur     History of aortic regurgitation     History of constipation     History of degenerative joint disease     History of nocturia     History of obesity     History of sebaceous cyst     History of shortness of breath     History of transfusion     History of urinary frequency     Hyperlipidemia 2017    Hyperparathyroidism (HCC)     Hypertension 2009    Iron deficiency anemia due to chronic blood loss 04/20/2018    Added automatically from request for surgery [id number]    Myocardial infarction (HCC) 2018 january, 3 stents    Polyposis coli     of the large intestine    Ulcer of esophagus      Past Surgical History:   Procedure Laterality Date    ACHILLES TENDON SURGERY Left 1973    CARDIAC ELECTROPHYSIOLOGY PROCEDURE Left 12/23/2022    Procedure: insertion dual chamber ICD;  Surgeon: Viral Blair DO;  [facility]: [facility];  Service: Cardiology    CATARACT EXTRACTION Right 02/18/2021    CATARACT EXTRACTION Left 12/18/2023    COLONOSCOPY  2020    hyperplastic polyp    CORONARY ANGIOPLASTY WITH STENT PLACEMENT      3 stents: 2 placed on Jan 2018, 1 on July 2018     CYST REMOVAL  2019, 2020    left and right wrists    ESOPHAGOGASTRODUODENOSCOPY N/A 01/23/2018    Procedure: ESOPHAGOGASTRODUODENOSCOPY (EGD);  Surgeon: Ping Burgos MD;  [facility];  Service: Gastroenterology    ESOPHAGOGASTRODUODENOSCOPY      EYE SURGERY  2/18/2021    Cataract  removal    HERNIA REPAIR      HERNIA REPAIR Bilateral 08/18/2017    Procedure: LAPAROSCOPIC INGUINAL HERNIA REPAIR WITH MESH;  Surgeon: Keegan Loco MD;  [facility];  Service: General    LARYNGOSCOPY N/A 05/12/2022    Procedure: LARYNGOSCOPY DIRECT;  Surgeon: Delvin Burch MD;  [facility];  Service: Surgical Oncology    MN ESOPHAGOGASTRODUODENOSCOPY TRANSORAL DIAGNOSTIC N/A 03/26/2018    Procedure: ESOPHAGOGASTRODUODENOSCOPY (EGD);  Surgeon: Breezy Wallace III, MD;  [facility];  Service: Gastroenterology    MN ESOPHAGOGASTRODUODENOSCOPY TRANSORAL DIAGNOSTIC N/A 05/14/2018    Procedure: ESOPHAGOGASTRODUODENOSCOPY (EGD);  Surgeon: Breezy Wallace III, MD;  [facility];  Service: Gastroenterology    MN PARATHYROIDECTOMY/EXPLORATION PARATHYROIDS Right 03/23/2021    Procedure: RIGHT PARATHYROIDECTOMY, MINIMALLY INVASIVE, POSSIBLE 4-GLAND EXPLORATION, WITH INTRA-OPERATIVE PTH MONITORING;  Surgeon: Delvin Burch MD;  [facility];  Service: Surgical Oncology    MN PARATHYROIDECTOMY/EXPLORATION PARATHYROIDS Right 05/12/2022    Procedure: TWO GLAND PARATHYROIDECTOMY, 4 GLAND EXPLORATION, INTRAOPERATIVE PTH MONITORING, FLEXIBLE LARYNGOSCOPY;  Surgeon: Delvin Burch MD;  [facility];  Service: Surgical Oncology    MN TRURL ELECTROSURG RESCJ PROSTATE BLEED COMPLETE N/A 09/02/2021    Procedure: TRANSURETHRAL RESECTION OF PROSTATE (TURP);  Surgeon: Robinson Cornell MD;  [facility];  Service: Urology    PROSTATE SURGERY  10/2021    TIBIA FRACTURE SURGERY Left 1962    UPPER GASTROINTESTINAL ENDOSCOPY  04/2021     Family History   Problem Relation Age of Onset    Rheumatic fever Father     Alcohol abuse Father     Heart disease Mother     Early death Mother         Early death age 45      reports that he has been smoking cigarettes. He started smoking about 57 years ago. He has a 57 pack-year smoking history. He uses smokeless tobacco. He reports that  "he does not drink alcohol and does not use drugs.  Current Outpatient Medications on File Prior to Visit   Medication Sig Dispense Refill    aspirin (ECOTRIN LOW STRENGTH) 81 mg EC tablet Take 1 tablet (81 mg total) by mouth daily      atenolol (TENORMIN) 100 mg tablet Take 1 tablet (100 mg total) by mouth 2 (two) times a day 180 tablet 1    atorvastatin (LIPITOR) 80 mg tablet Take 1 tablet (80 mg total) by mouth daily 90 tablet 1    cholecalciferol (VITAMIN D3) 1,000 units tablet Take 1,000 Units by mouth daily      cloNIDine (CATAPRES-TTS-3) 0.3 mg/24 hr Place 1 patch (0.3 mg total) on the skin over 7 days once a week 12 patch 1    hydrALAZINE (APRESOLINE) 25 mg tablet Take 1 tablet (25 mg total) by mouth 2 (two) times a day 180 tablet 1    lisinopril (ZESTRIL) 40 mg tablet Take 1 tablet (40 mg total) by mouth daily 90 tablet 3    metFORMIN (GLUCOPHAGE-XR) 500 mg 24 hr tablet Take 1 tablet (500 mg total) by mouth 2 (two) times a day with meals 180 tablet 3    Omega-3 Fatty Acids (FISH OIL ADULT GUMMIES PO) Take by mouth      torsemide (DEMADEX) 10 mg tablet Take 1 tablet (10 mg total) by mouth daily 90 tablet 3    [DISCONTINUED] pantoprazole (PROTONIX) 40 mg tablet Take 1 tablet (40 mg total) by mouth daily 90 tablet 3     No current facility-administered medications on file prior to visit.   No Known Allergies      Objective   /82 (BP Location: Left arm, Patient Position: Sitting, Cuff Size: Standard)   Pulse 84   Temp 97.6 °F (36.4 °C) (Tympanic)   Resp 18   Ht 5' 8\" (1.727 m)   Wt 78 kg (172 lb)   SpO2 98%   BMI 26.15 kg/m²      Physical Exam  Heart S1-S2  Lungs are clear to auscultation bilaterally    "

## 2024-12-24 NOTE — H&P (VIEW-ONLY)
Name: Dany Bosch      : 1950      MRN: [id number]  Encounter Provider: Breezy Wallace III, MD  Encounter Date: 2024   Encounter department: [facility] GASTROENTEROLOG[facility] SPECIAL[facility] [place]  :  Assessment & Plan  Gastroesophageal reflux disease with esophagitis without hemorrhage  He is a 74-year-old male with a history of peptic ulcer disease in  with GI blood loss with chronic GERD that is well-managed and well-controlled at present with pantoprazole.  He had endoscopy with me in 2022 with LA grade B reflux esophagitis moderate erosive gastritis and small duodenal adenoma.  He is due for surveillance of this.    1 we will do an EGD to investigate.    2 he will continue on his pantoprazole.    3 reflux precautions were reviewed.    Orders:    EGD; Future    Gastritis, presence of bleeding unspecified, unspecified chronicity, unspecified gastritis type    Orders:    pantoprazole (PROTONIX) 40 mg tablet; Take 1 tablet (40 mg total) by mouth daily    EGD; Future    Duodenal adenoma    Orders:    pantoprazole (PROTONIX) 40 mg tablet; Take 1 tablet (40 mg total) by mouth daily    EGD; Future        History of Present Illness   HPI  Dany Bosch is a 74 y.o. male who presents for evaluation of GERD.  He is really here for med refill.  He is also here to schedule surveillance endoscopy.  He had peptic ulcer disease with GI blood loss in .  His follow-up endoscopy on 2022 showed LA grade B reflux esophagitis moderate erosive gastritis small fundic duodenal polyp adenoma.  He was told to have an EGD in 3 years.  He is on pantoprazole 40 mg in the morning.  He is compliant with his medication.  He takes it correctly.  He has no heartburn regurgitation dysphagia to solids or liquids no melena no abdominal pain.  He reports that he is doing well.  He has significant heart disease but it is under control and he is asymptomatic at present.  He had a colonoscopy with  me in December 2020 with no polyps diverticulosis internal hemorrhoids.    History obtained from: patient    Review of Systems  Past Medical History   Past Medical History:   Diagnosis Date    Anemia 2018    Benign neoplasm of large intestine     Bilateral recurrent inguinal hernia without obstruction or gangrene 08/01/2017    Bleeding gastric ulcer 2018    Cardiomyopathy (HCC)     Chronic kidney disease     Colon polyp 2020    Coronary artery disease 01/2018    Diabetes mellitus (HCC) 2017    Disease of thyroid gland     Diverticulitis of colon 2020    Heart murmur     History of aortic regurgitation     History of constipation     History of degenerative joint disease     History of nocturia     History of obesity     History of sebaceous cyst     History of shortness of breath     History of transfusion     History of urinary frequency     Hyperlipidemia 2017    Hyperparathyroidism (HCC)     Hypertension 2009    Iron deficiency anemia due to chronic blood loss 04/20/2018    Added automatically from request for surgery [id number]    Myocardial infarction (HCC) 2018 january, 3 stents    Polyposis coli     of the large intestine    Ulcer of esophagus      Past Surgical History:   Procedure Laterality Date    ACHILLES TENDON SURGERY Left 1973    CARDIAC ELECTROPHYSIOLOGY PROCEDURE Left 12/23/2022    Procedure: insertion dual chamber ICD;  Surgeon: Viral Blair DO;  [facility];  Service: Cardiology    CATARACT EXTRACTION Right 02/18/2021    CATARACT EXTRACTION Left 12/18/2023    COLONOSCOPY  2020    hyperplastic polyp    CORONARY ANGIOPLASTY WITH STENT PLACEMENT      3 stents: 2 placed on Jan 2018, 1 on July 2018     CYST REMOVAL  2019, 2020    left and right wrists    ESOPHAGOGASTRODUODENOSCOPY N/A 01/23/2018    Procedure: ESOPHAGOGASTRODUODENOSCOPY (EGD);  Surgeon: Ping Burgos MD;  [facility];  Service: Gastroenterology    ESOPHAGOGASTRODUODENOSCOPY      EYE SURGERY  2/18/2021    Cataract  removal    HERNIA REPAIR      HERNIA REPAIR Bilateral 08/18/2017    Procedure: LAPAROSCOPIC INGUINAL HERNIA REPAIR WITH MESH;  Surgeon: Keegan Loco MD;  [facility];  Service: General    LARYNGOSCOPY N/A 05/12/2022    Procedure: LARYNGOSCOPY DIRECT;  Surgeon: Delvin Burch MD;  [facility];  Service: Surgical Oncology    MA ESOPHAGOGASTRODUODENOSCOPY TRANSORAL DIAGNOSTIC N/A 03/26/2018    Procedure: ESOPHAGOGASTRODUODENOSCOPY (EGD);  Surgeon: Breezy Wallace III, MD;  [facility];  Service: Gastroenterology    MA ESOPHAGOGASTRODUODENOSCOPY TRANSORAL DIAGNOSTIC N/A 05/14/2018    Procedure: ESOPHAGOGASTRODUODENOSCOPY (EGD);  Surgeon: Breezy Wallace III, MD;  [facility];  Service: Gastroenterology    MA PARATHYROIDECTOMY/EXPLORATION PARATHYROIDS Right 03/23/2021    Procedure: RIGHT PARATHYROIDECTOMY, MINIMALLY INVASIVE, POSSIBLE 4-GLAND EXPLORATION, WITH INTRA-OPERATIVE PTH MONITORING;  Surgeon: Delvin Burch MD;  [facility];  Service: Surgical Oncology    MA PARATHYROIDECTOMY/EXPLORATION PARATHYROIDS Right 05/12/2022    Procedure: TWO GLAND PARATHYROIDECTOMY, 4 GLAND EXPLORATION, INTRAOPERATIVE PTH MONITORING, FLEXIBLE LARYNGOSCOPY;  Surgeon: Delvin Burch MD;  [facility];  Service: Surgical Oncology    MA TRURL ELECTROSURG RESCJ PROSTATE BLEED COMPLETE N/A 09/02/2021    Procedure: TRANSURETHRAL RESECTION OF PROSTATE (TURP);  Surgeon: Robinson Cornell MD;  [facility];  Service: Urology    PROSTATE SURGERY  10/2021    TIBIA FRACTURE SURGERY Left 1962    UPPER GASTROINTESTINAL ENDOSCOPY  04/2021     Family History   Problem Relation Age of Onset    Rheumatic fever Father     Alcohol abuse Father     Heart disease Mother     Early death Mother         Early death age 45      reports that he has been smoking cigarettes. He started smoking about 57 years ago. He has a 57 pack-year smoking history. He uses smokeless tobacco. He reports that  "he does not drink alcohol and does not use drugs.  Current Outpatient Medications on File Prior to Visit   Medication Sig Dispense Refill    aspirin (ECOTRIN LOW STRENGTH) 81 mg EC tablet Take 1 tablet (81 mg total) by mouth daily      atenolol (TENORMIN) 100 mg tablet Take 1 tablet (100 mg total) by mouth 2 (two) times a day 180 tablet 1    atorvastatin (LIPITOR) 80 mg tablet Take 1 tablet (80 mg total) by mouth daily 90 tablet 1    cholecalciferol (VITAMIN D3) 1,000 units tablet Take 1,000 Units by mouth daily      cloNIDine (CATAPRES-TTS-3) 0.3 mg/24 hr Place 1 patch (0.3 mg total) on the skin over 7 days once a week 12 patch 1    hydrALAZINE (APRESOLINE) 25 mg tablet Take 1 tablet (25 mg total) by mouth 2 (two) times a day 180 tablet 1    lisinopril (ZESTRIL) 40 mg tablet Take 1 tablet (40 mg total) by mouth daily 90 tablet 3    metFORMIN (GLUCOPHAGE-XR) 500 mg 24 hr tablet Take 1 tablet (500 mg total) by mouth 2 (two) times a day with meals 180 tablet 3    Omega-3 Fatty Acids (FISH OIL ADULT GUMMIES PO) Take by mouth      torsemide (DEMADEX) 10 mg tablet Take 1 tablet (10 mg total) by mouth daily 90 tablet 3    [DISCONTINUED] pantoprazole (PROTONIX) 40 mg tablet Take 1 tablet (40 mg total) by mouth daily 90 tablet 3     No current facility-administered medications on file prior to visit.   No Known Allergies      Objective   /82 (BP Location: Left arm, Patient Position: Sitting, Cuff Size: Standard)   Pulse 84   Temp 97.6 °F (36.4 °C) (Tympanic)   Resp 18   Ht 5' 8\" (1.727 m)   Wt 78 kg (172 lb)   SpO2 98%   BMI 26.15 kg/m²      Physical Exam  Heart S1-S2  Lungs are clear to auscultation bilaterally    "

## 2024-12-24 NOTE — PATIENT INSTRUCTIONS
Scheduled date of EGD(as of today):1/8/25  Physician performing EGD:Rodrigo  Location of EGD:[place]  Instructions reviewed with patient by:Kellee PRETTY  Clearances:  none    Diabetic metformin

## 2025-01-07 RX ORDER — SODIUM CHLORIDE, SODIUM LACTATE, POTASSIUM CHLORIDE, CALCIUM CHLORIDE 600; 310; 30; 20 MG/100ML; MG/100ML; MG/100ML; MG/100ML
125 INJECTION, SOLUTION INTRAVENOUS CONTINUOUS
Status: CANCELLED | OUTPATIENT
Start: 2025-01-07

## 2025-01-07 RX ORDER — LIDOCAINE HYDROCHLORIDE 10 MG/ML
0.5 INJECTION, SOLUTION EPIDURAL; INFILTRATION; INTRACAUDAL; PERINEURAL ONCE AS NEEDED
Status: CANCELLED | OUTPATIENT
Start: 2025-01-07

## 2025-01-08 ENCOUNTER — ANESTHESIA (OUTPATIENT)
Dept: GASTROENTEROLOGY | Facility: HOSPITAL | Age: 75
End: 2025-01-08
Payer: MEDICARE

## 2025-01-08 ENCOUNTER — HOSPITAL ENCOUNTER (OUTPATIENT)
Dept: GASTROENTEROLOGY | Facility: HOSPITAL | Age: 75
Setting detail: OUTPATIENT SURGERY
Discharge: HOME | End: 2025-01-08
Attending: INTERNAL MEDICINE
Payer: MEDICARE

## 2025-01-08 ENCOUNTER — ANESTHESIA EVENT (OUTPATIENT)
Dept: GASTROENTEROLOGY | Facility: HOSPITAL | Age: 75
End: 2025-01-08
Payer: MEDICARE

## 2025-01-08 VITALS
WEIGHT: 179.9 LBS | HEART RATE: 66 BPM | RESPIRATION RATE: 18 BRPM | DIASTOLIC BLOOD PRESSURE: 72 MMHG | TEMPERATURE: 97.6 F | HEIGHT: 68 IN | OXYGEN SATURATION: 97 % | BODY MASS INDEX: 27.26 KG/M2 | SYSTOLIC BLOOD PRESSURE: 120 MMHG

## 2025-01-08 DIAGNOSIS — D13.2 DUODENAL ADENOMA: ICD-10-CM

## 2025-01-08 DIAGNOSIS — K29.70 GASTRITIS, PRESENCE OF BLEEDING UNSPECIFIED, UNSPECIFIED CHRONICITY, UNSPECIFIED GASTRITIS TYPE: ICD-10-CM

## 2025-01-08 DIAGNOSIS — K21.00 GASTROESOPHAGEAL REFLUX DISEASE WITH ESOPHAGITIS WITHOUT HEMORRHAGE: ICD-10-CM

## 2025-01-08 LAB — GLUCOSE SERPL-MCNC: 111 MG/DL (ref 65–140)

## 2025-01-08 PROCEDURE — 88305 TISSUE EXAM BY PATHOLOGIST: CPT | Performed by: PATHOLOGY

## 2025-01-08 PROCEDURE — 43239 EGD BIOPSY SINGLE/MULTIPLE: CPT | Performed by: INTERNAL MEDICINE

## 2025-01-08 PROCEDURE — 82948 REAGENT STRIP/BLOOD GLUCOSE: CPT

## 2025-01-08 PROCEDURE — 88342 IMHCHEM/IMCYTCHM 1ST ANTB: CPT | Performed by: PATHOLOGY

## 2025-01-08 RX ORDER — PROPOFOL 10 MG/ML
INJECTION, EMULSION INTRAVENOUS AS NEEDED
Status: DISCONTINUED | OUTPATIENT
Start: 2025-01-08 | End: 2025-01-08

## 2025-01-08 RX ORDER — SODIUM CHLORIDE, SODIUM LACTATE, POTASSIUM CHLORIDE, CALCIUM CHLORIDE 600; 310; 30; 20 MG/100ML; MG/100ML; MG/100ML; MG/100ML
125 INJECTION, SOLUTION INTRAVENOUS CONTINUOUS
Status: DISCONTINUED | OUTPATIENT
Start: 2025-01-08 | End: 2025-01-12 | Stop reason: HOSPADM

## 2025-01-08 RX ORDER — SODIUM CHLORIDE, SODIUM LACTATE, POTASSIUM CHLORIDE, CALCIUM CHLORIDE 600; 310; 30; 20 MG/100ML; MG/100ML; MG/100ML; MG/100ML
INJECTION, SOLUTION INTRAVENOUS CONTINUOUS PRN
Status: DISCONTINUED | OUTPATIENT
Start: 2025-01-08 | End: 2025-01-08

## 2025-01-08 RX ORDER — PHENYLEPHRINE HCL IN 0.9% NACL 1 MG/10 ML
SYRINGE (ML) INTRAVENOUS AS NEEDED
Status: DISCONTINUED | OUTPATIENT
Start: 2025-01-08 | End: 2025-01-08

## 2025-01-08 RX ORDER — SODIUM CHLORIDE, SODIUM LACTATE, POTASSIUM CHLORIDE, CALCIUM CHLORIDE 600; 310; 30; 20 MG/100ML; MG/100ML; MG/100ML; MG/100ML
125 INJECTION, SOLUTION INTRAVENOUS CONTINUOUS
Status: DISCONTINUED | OUTPATIENT
Start: 2025-01-08 | End: 2025-01-08

## 2025-01-08 RX ORDER — LIDOCAINE HYDROCHLORIDE 10 MG/ML
0.5 INJECTION, SOLUTION EPIDURAL; INFILTRATION; INTRACAUDAL; PERINEURAL ONCE AS NEEDED
Status: DISCONTINUED | OUTPATIENT
Start: 2025-01-08 | End: 2025-01-12 | Stop reason: HOSPADM

## 2025-01-08 RX ORDER — LIDOCAINE HYDROCHLORIDE 20 MG/ML
INJECTION, SOLUTION EPIDURAL; INFILTRATION; INTRACAUDAL; PERINEURAL AS NEEDED
Status: DISCONTINUED | OUTPATIENT
Start: 2025-01-08 | End: 2025-01-08

## 2025-01-08 RX ADMIN — PROPOFOL 50 MG: 10 INJECTION, EMULSION INTRAVENOUS at 08:53

## 2025-01-08 RX ADMIN — SODIUM CHLORIDE, SODIUM LACTATE, POTASSIUM CHLORIDE, AND CALCIUM CHLORIDE: .6; .31; .03; .02 INJECTION, SOLUTION INTRAVENOUS at 08:32

## 2025-01-08 RX ADMIN — Medication 100 MCG: at 08:49

## 2025-01-08 RX ADMIN — PROPOFOL 50 MG: 10 INJECTION, EMULSION INTRAVENOUS at 08:49

## 2025-01-08 RX ADMIN — LIDOCAINE HYDROCHLORIDE 50 MG: 20 INJECTION, SOLUTION EPIDURAL; INFILTRATION; INTRACAUDAL; PERINEURAL at 08:49

## 2025-01-08 RX ADMIN — PROPOFOL 50 MG: 10 INJECTION, EMULSION INTRAVENOUS at 08:51

## 2025-01-08 NOTE — ANESTHESIA PREPROCEDURE EVALUATION
Procedure:  EGD    Hx of HOCM following with cardiology  Episodes of VT s/p AICD in place, no recent activations. Asymptomatic at >4 METs. On Atenolol, did not take this AM.    Echo 2023: EF68%, DD1, mild MR    Relevant Problems   CARDIO   (+) Abdominal aortic aneurysm (AAA) without rupture (HCC)   (+) Coronary artery disease involving native coronary artery of native heart   (+) Mixed hyperlipidemia due to type 2 diabetes mellitus  (HCC)   (+) Primary hypertension   (+) Thoracic aortic ectasia (HCC)      ENDO   (+) Type 2 diabetes mellitus (HCC)      GI/HEPATIC   (+) Gastroesophageal reflux disease with esophagitis without hemorrhage      /RENAL   (+) BPH (benign prostatic hyperplasia) s/p TURP        Physical Exam    Airway    Mallampati score: III  TM Distance: >3 FB  Neck ROM: full     Dental   No notable dental hx     Cardiovascular  Rhythm: regular, Rate: normal, Cardiovascular exam normal    Pulmonary  Pulmonary exam normal Breath sounds clear to auscultation    Other Findings        Anesthesia Plan  ASA Score- 4     Anesthesia Type- IV sedation with anesthesia with ASA Monitors.         Additional Monitors:     Airway Plan:     Comment: Discussed risks/benefits, including medication reactions, awareness, aspiration, and serious/life threatening complications. Plan to maintain native airway with IVGA, monitored with EtCO2.       Plan Factors-Exercise tolerance (METS): >4 METS.    Chart reviewed.    Patient summary reviewed.      Patient instructed to abstain from smoking on day of procedure. Patient did not smoke on day of surgery.            Induction- intravenous.    Postoperative Plan-         Informed Consent- Anesthetic plan and risks discussed with patient.  I personally reviewed this patient with the CRNA. Discussed and agreed on the Anesthesia Plan with the CRNA..

## 2025-01-08 NOTE — INTERVAL H&P NOTE
H&P reviewed. After examining the patient I find no changes in the patients condition since the H&P had been written.    Vitals:    01/08/25 0740   BP: 143/74   Pulse: 68   Resp: 18   Temp: 97.6 °F (36.4 °C)   SpO2: 99%

## 2025-01-08 NOTE — ANESTHESIA POSTPROCEDURE EVALUATION
Post-Op Assessment Note    CV Status:  Stable  Pain Score: 0    Pain management: adequate       Mental Status:  Sleepy   Hydration Status:  Stable   PONV Controlled:  None     Post Op Vitals Reviewed: Yes    No anethesia notable event occurred.    Staff: CRNA           Last Filed PACU Vitals:  Vitals Value Taken Time   Temp 98    Pulse 67    /84    Resp 16    SpO2 98

## 2025-01-13 ENCOUNTER — REMOTE DEVICE CLINIC VISIT (OUTPATIENT)
Dept: CARDIOLOGY CLINIC | Facility: CLINIC | Age: 75
End: 2025-01-13
Payer: MEDICARE

## 2025-01-13 ENCOUNTER — RESULTS FOLLOW-UP (OUTPATIENT)
Dept: CARDIOLOGY CLINIC | Facility: CLINIC | Age: 75
End: 2025-01-13

## 2025-01-13 ENCOUNTER — RESULTS FOLLOW-UP (OUTPATIENT)
Dept: GASTROENTEROLOGY | Facility: CLINIC | Age: 75
End: 2025-01-13

## 2025-01-13 DIAGNOSIS — Z95.810 AICD (AUTOMATIC CARDIOVERTER/DEFIBRILLATOR) PRESENT: Primary | ICD-10-CM

## 2025-01-13 PROCEDURE — 93295 DEV INTERROG REMOTE 1/2/MLT: CPT | Performed by: INTERNAL MEDICINE

## 2025-01-13 PROCEDURE — 93296 REM INTERROG EVL PM/IDS: CPT | Performed by: INTERNAL MEDICINE

## 2025-01-13 PROCEDURE — 88342 IMHCHEM/IMCYTCHM 1ST ANTB: CPT | Performed by: PATHOLOGY

## 2025-01-13 PROCEDURE — 88305 TISSUE EXAM BY PATHOLOGIST: CPT | Performed by: PATHOLOGY

## 2025-01-13 NOTE — PROGRESS NOTES
Results for orders placed or performed in visit on 01/13/25   Cardiac EP device report    Narrative    [facility] DC ICD/ACTIVE SYSTEM IS MRI CONDITIONAL  MERLIN TRANSMISSION (3 PDF'S): BATTERY VOLTAGE ADEQUATE (6.3-7.3 YRS). AP-33%, <1%. ALL AVAILABLE LEAD PARAMETERS WITHIN NORMAL LIMITS. 1 VT-1 EPISODE @ 171 BPM LASTING 16 SEC (MONITOR ZONE/PDF2). 11 NSVT EPISODES, MOST RECENT FOR 11 BEATS, AVG CL~365MS (PDF3). PT ON ASA 81MG & ATENOLOL. CORVUE IMPEDANCE MONITORING WITHIN NORMAL LIMITS. NORMAL DEVICE FUNCTION. GV

## 2025-01-20 ENCOUNTER — APPOINTMENT (OUTPATIENT)
Age: 75
End: 2025-01-20
Payer: MEDICARE

## 2025-01-20 ENCOUNTER — OFFICE VISIT (OUTPATIENT)
Age: 75
End: 2025-01-20
Payer: MEDICARE

## 2025-01-20 VITALS
HEART RATE: 61 BPM | OXYGEN SATURATION: 98 % | TEMPERATURE: 97.2 F | SYSTOLIC BLOOD PRESSURE: 126 MMHG | DIASTOLIC BLOOD PRESSURE: 78 MMHG | RESPIRATION RATE: 18 BRPM | HEIGHT: 68 IN | WEIGHT: 179.6 LBS | BODY MASS INDEX: 27.22 KG/M2

## 2025-01-20 DIAGNOSIS — I10 PRIMARY HYPERTENSION: Chronic | ICD-10-CM

## 2025-01-20 DIAGNOSIS — E11.69 MIXED HYPERLIPIDEMIA DUE TO TYPE 2 DIABETES MELLITUS  (HCC): ICD-10-CM

## 2025-01-20 DIAGNOSIS — E11.69 TYPE 2 DIABETES MELLITUS WITH OTHER SPECIFIED COMPLICATION, WITHOUT LONG-TERM CURRENT USE OF INSULIN (HCC): ICD-10-CM

## 2025-01-20 DIAGNOSIS — I77.810 THORACIC AORTIC ECTASIA (HCC): Chronic | ICD-10-CM

## 2025-01-20 DIAGNOSIS — R12 HEARTBURN: ICD-10-CM

## 2025-01-20 DIAGNOSIS — E11.69 TYPE 2 DIABETES MELLITUS WITH OTHER SPECIFIED COMPLICATION, WITHOUT LONG-TERM CURRENT USE OF INSULIN (HCC): Primary | ICD-10-CM

## 2025-01-20 DIAGNOSIS — I42.1 HOCM (HYPERTROPHIC OBSTRUCTIVE CARDIOMYOPATHY) (HCC): ICD-10-CM

## 2025-01-20 DIAGNOSIS — E78.2 MIXED HYPERLIPIDEMIA DUE TO TYPE 2 DIABETES MELLITUS  (HCC): ICD-10-CM

## 2025-01-20 DIAGNOSIS — I71.40 ABDOMINAL AORTIC ANEURYSM (AAA) WITHOUT RUPTURE, UNSPECIFIED PART (HCC): ICD-10-CM

## 2025-01-20 PROBLEM — Z86.79 HISTORY OF VENTRICULAR TACHYCARDIA: Chronic | Status: ACTIVE | Noted: 2022-11-29

## 2025-01-20 LAB
ALBUMIN SERPL BCG-MCNC: 4.3 G/DL (ref 3.5–5)
ALP SERPL-CCNC: 42 U/L (ref 34–104)
ALT SERPL W P-5'-P-CCNC: 14 U/L (ref 7–52)
ANION GAP SERPL CALCULATED.3IONS-SCNC: 10 MMOL/L (ref 4–13)
AST SERPL W P-5'-P-CCNC: 14 U/L (ref 13–39)
BILIRUB SERPL-MCNC: 0.63 MG/DL (ref 0.2–1)
BUN SERPL-MCNC: 14 MG/DL (ref 5–25)
CALCIUM SERPL-MCNC: 9.1 MG/DL (ref 8.4–10.2)
CHLORIDE SERPL-SCNC: 101 MMOL/L (ref 96–108)
CHOLEST SERPL-MCNC: 134 MG/DL (ref ?–200)
CO2 SERPL-SCNC: 28 MMOL/L (ref 21–32)
CREAT SERPL-MCNC: 0.72 MG/DL (ref 0.6–1.3)
CREAT UR-MCNC: 79.2 MG/DL
ERYTHROCYTE [DISTWIDTH] IN BLOOD BY AUTOMATED COUNT: 18.1 % (ref 11.6–15.1)
EST. AVERAGE GLUCOSE BLD GHB EST-MCNC: 128 MG/DL
GFR SERPL CREATININE-BSD FRML MDRD: 90 ML/MIN/1.73SQ M
GLUCOSE SERPL-MCNC: 85 MG/DL (ref 65–140)
HBA1C MFR BLD: 6.1 %
HCT VFR BLD AUTO: 41.8 % (ref 36.5–49.3)
HDLC SERPL-MCNC: 51 MG/DL
HGB BLD-MCNC: 13.1 G/DL (ref 12–17)
LDLC SERPL CALC-MCNC: 69 MG/DL (ref 0–100)
MCH RBC QN AUTO: 26.7 PG (ref 26.8–34.3)
MCHC RBC AUTO-ENTMCNC: 31.3 G/DL (ref 31.4–37.4)
MCV RBC AUTO: 85 FL (ref 82–98)
MICROALBUMIN UR-MCNC: 178.3 MG/L
MICROALBUMIN/CREAT 24H UR: 225 MG/G CREATININE (ref 0–30)
PLATELET # BLD AUTO: 328 THOUSANDS/UL (ref 149–390)
PMV BLD AUTO: 9.3 FL (ref 8.9–12.7)
POTASSIUM SERPL-SCNC: 3.6 MMOL/L (ref 3.5–5.3)
PROT SERPL-MCNC: 7.3 G/DL (ref 6.4–8.4)
RBC # BLD AUTO: 4.9 MILLION/UL (ref 3.88–5.62)
SODIUM SERPL-SCNC: 139 MMOL/L (ref 135–147)
TRIGL SERPL-MCNC: 68 MG/DL (ref ?–150)
WBC # BLD AUTO: 6.08 THOUSAND/UL (ref 4.31–10.16)

## 2025-01-20 PROCEDURE — G2211 COMPLEX E/M VISIT ADD ON: HCPCS | Performed by: INTERNAL MEDICINE

## 2025-01-20 PROCEDURE — 99214 OFFICE O/P EST MOD 30 MIN: CPT | Performed by: INTERNAL MEDICINE

## 2025-01-20 PROCEDURE — 82570 ASSAY OF URINE CREATININE: CPT

## 2025-01-20 PROCEDURE — 83036 HEMOGLOBIN GLYCOSYLATED A1C: CPT

## 2025-01-20 PROCEDURE — 36415 COLL VENOUS BLD VENIPUNCTURE: CPT

## 2025-01-20 PROCEDURE — 82043 UR ALBUMIN QUANTITATIVE: CPT

## 2025-01-20 PROCEDURE — 80053 COMPREHEN METABOLIC PANEL: CPT

## 2025-01-20 PROCEDURE — 85027 COMPLETE CBC AUTOMATED: CPT

## 2025-01-20 PROCEDURE — 80061 LIPID PANEL: CPT

## 2025-01-20 RX ORDER — ATENOLOL 100 MG/1
100 TABLET ORAL 2 TIMES DAILY
Qty: 180 TABLET | Refills: 3 | Status: SHIPPED | OUTPATIENT
Start: 2025-01-20

## 2025-01-20 RX ORDER — TORSEMIDE 10 MG/1
10 TABLET ORAL DAILY
Qty: 90 TABLET | Refills: 3 | Status: SHIPPED | OUTPATIENT
Start: 2025-01-20

## 2025-01-20 RX ORDER — METFORMIN HYDROCHLORIDE 500 MG/1
500 TABLET, EXTENDED RELEASE ORAL 2 TIMES DAILY WITH MEALS
Qty: 180 TABLET | Refills: 3 | Status: SHIPPED | OUTPATIENT
Start: 2025-01-20

## 2025-01-20 RX ORDER — PANTOPRAZOLE SODIUM 40 MG/1
40 TABLET, DELAYED RELEASE ORAL DAILY
Qty: 90 TABLET | Refills: 3 | Status: SHIPPED | OUTPATIENT
Start: 2025-01-20

## 2025-01-20 NOTE — PROGRESS NOTES
Name: Dany Bosch      : 1950      MRN: [id number]  Encounter Provider: Shahram Flannery DO  Encounter Date: 2025   Encounter department: [facility] PRIMARY CARE [place]  :  Assessment & Plan  Type 2 diabetes mellitus with other specified complication, without long-term current use of insulin (HCC)    Lab Results   Component Value Date    HGBA1C 6.2 (H) 2024     Sugars have been controlled. Continue metformin. Check updated lab work.    Orders:  •  metFORMIN (GLUCOPHAGE-XR) 500 mg 24 hr tablet; Take 1 tablet (500 mg total) by mouth 2 (two) times a day with meals  •  CBC; Future  •  Basic metabolic panel; Future  •  Lipid Panel with Direct LDL reflex; Future  •  Hemoglobin A1C; Future  •  Albumin / creatinine urine ratio; Future    Abdominal aortic aneurysm (AAA) without rupture, unspecified part (HCC)    Will be due for surveillance in 2025. Continue to keep BP controlled. Tobacco cessation advised.    Orders:  •  VAS abdominal aorta/iliac duplex; Future    Thoracic aortic ectasia (HCC)    Last evaluated with CT lung screening program in 2025. Will continue to monitor.    HOCM (hypertrophic obstructive cardiomyopathy) (HCC)    Has ICD in place due to history of VT. His device has been functional normally. Continue regular follow up with cardiology. BP is well controlled.    Primary hypertension    Orders:  •  atenolol (TENORMIN) 100 mg tablet; Take 1 tablet (100 mg total) by mouth 2 (two) times a day  •  torsemide (DEMADEX) 10 mg tablet; Take 1 tablet (10 mg total) by mouth daily    Heartburn    Orders:  •  pantoprazole (PROTONIX) 40 mg tablet; Take 1 tablet (40 mg total) by mouth daily        Depression Screening and Follow-up Plan: Patient was screened for depression during today's encounter. They screened negative with a PHQ-2 score of 0.    Tobacco Cessation Counseling: Tobacco cessation counseling was provided. The patient is sincerely urged to quit consumption of  "tobacco. He is not ready to quit tobacco.       History of Present Illness   Dany presents for follow up. Health is stable. Recently had EGD where all biopsies came back negative. Denies worsening abdominal symptoms. Denies any recent falls. Denies hypoglycemic episodes. He is due for lab work.      Review of Systems   Constitutional: Negative.    Respiratory: Negative.     Cardiovascular: Negative.    Gastrointestinal: Negative.    Musculoskeletal: Negative.      Objective   /78 (BP Location: Left arm, Patient Position: Sitting, Cuff Size: Standard)   Pulse 61   Temp (!) 97.2 °F (36.2 °C) (Tympanic)   Resp 18   Ht 5' 8\" (1.727 m)   Wt 81.5 kg (179 lb 9.6 oz)   SpO2 98%   BMI 27.31 kg/m²      Physical Exam  Constitutional:       General: He is not in acute distress.     Appearance: He is not ill-appearing.   Cardiovascular:      Rate and Rhythm: Normal rate and regular rhythm.      Heart sounds: No murmur heard.  Pulmonary:      Effort: Pulmonary effort is normal. No respiratory distress.      Breath sounds: No wheezing.   Abdominal:      General: Bowel sounds are normal. There is no distension.      Tenderness: There is no abdominal tenderness.   Musculoskeletal:      Right lower leg: No edema.      Left lower leg: No edema.   Neurological:      Mental Status: He is alert.       Shahram Flannery, DO   "

## 2025-01-20 NOTE — ASSESSMENT & PLAN NOTE
Will be due for surveillance in April 2025. Continue to keep BP controlled. Tobacco cessation advised.    Orders:  •  VAS abdominal aorta/iliac duplex; Future

## 2025-01-20 NOTE — ASSESSMENT & PLAN NOTE
Orders:  •  atenolol (TENORMIN) 100 mg tablet; Take 1 tablet (100 mg total) by mouth 2 (two) times a day  •  torsemide (DEMADEX) 10 mg tablet; Take 1 tablet (10 mg total) by mouth daily

## 2025-01-20 NOTE — ASSESSMENT & PLAN NOTE
Has ICD in place due to history of VT. His device has been functional normally. Continue regular follow up with cardiology. BP is well controlled.

## 2025-01-20 NOTE — ASSESSMENT & PLAN NOTE
Lab Results   Component Value Date    HGBA1C 6.2 (H) 07/18/2024     Sugars have been controlled. Continue metformin. Check updated lab work.    Orders:  •  metFORMIN (GLUCOPHAGE-XR) 500 mg 24 hr tablet; Take 1 tablet (500 mg total) by mouth 2 (two) times a day with meals  •  CBC; Future  •  Basic metabolic panel; Future  •  Lipid Panel with Direct LDL reflex; Future  •  Hemoglobin A1C; Future  •  Albumin / creatinine urine ratio; Future

## 2025-01-21 ENCOUNTER — RESULTS FOLLOW-UP (OUTPATIENT)
Age: 75
End: 2025-01-21

## 2025-01-21 DIAGNOSIS — E11.69 MIXED HYPERLIPIDEMIA DUE TO TYPE 2 DIABETES MELLITUS  (HCC): Primary | ICD-10-CM

## 2025-01-21 DIAGNOSIS — E78.2 MIXED HYPERLIPIDEMIA DUE TO TYPE 2 DIABETES MELLITUS  (HCC): Primary | ICD-10-CM

## 2025-01-21 NOTE — TELEPHONE ENCOUNTER
----- Message from Shahram [facility], DO sent at 1/21/2025  6:52 AM EST -----  Lab work is stable from previous. A1c is 6.1%. I put in repeat labs for him to get done before July appointment with me

## 2025-01-27 ENCOUNTER — RESULTS FOLLOW-UP (OUTPATIENT)
Dept: CARDIOLOGY CLINIC | Facility: CLINIC | Age: 75
End: 2025-01-27

## 2025-03-03 DIAGNOSIS — I10 PRIMARY HYPERTENSION: Chronic | ICD-10-CM

## 2025-03-03 DIAGNOSIS — E11.69 TYPE 2 DIABETES MELLITUS WITH OTHER SPECIFIED COMPLICATION, WITHOUT LONG-TERM CURRENT USE OF INSULIN (HCC): ICD-10-CM

## 2025-03-04 RX ORDER — ATENOLOL 100 MG/1
100 TABLET ORAL 2 TIMES DAILY
Qty: 180 TABLET | Refills: 0 | OUTPATIENT
Start: 2025-03-04

## 2025-03-04 RX ORDER — METFORMIN HYDROCHLORIDE 500 MG/1
500 TABLET, EXTENDED RELEASE ORAL 2 TIMES DAILY WITH MEALS
Qty: 180 TABLET | Refills: 0 | OUTPATIENT
Start: 2025-03-04

## 2025-04-12 DIAGNOSIS — E11.69 MIXED HYPERLIPIDEMIA DUE TO TYPE 2 DIABETES MELLITUS  (HCC): ICD-10-CM

## 2025-04-12 DIAGNOSIS — E78.2 MIXED HYPERLIPIDEMIA DUE TO TYPE 2 DIABETES MELLITUS  (HCC): ICD-10-CM

## 2025-04-13 RX ORDER — ATORVASTATIN CALCIUM 80 MG/1
80 TABLET, FILM COATED ORAL DAILY
Qty: 90 TABLET | Refills: 0 | Status: SHIPPED | OUTPATIENT
Start: 2025-04-13

## 2025-04-22 ENCOUNTER — HOSPITAL ENCOUNTER (OUTPATIENT)
Dept: NON INVASIVE DIAGNOSTICS | Facility: CLINIC | Age: 75
Discharge: HOME | End: 2025-04-22
Payer: MEDICARE

## 2025-04-22 DIAGNOSIS — I71.40 ABDOMINAL AORTIC ANEURYSM (AAA) WITHOUT RUPTURE, UNSPECIFIED PART (HCC): ICD-10-CM

## 2025-04-22 DIAGNOSIS — I10 HYPERTENSION, UNSPECIFIED TYPE: ICD-10-CM

## 2025-04-22 PROCEDURE — 93978 VASCULAR STUDY: CPT | Performed by: SURGERY

## 2025-04-22 PROCEDURE — 93978 VASCULAR STUDY: CPT

## 2025-04-23 RX ORDER — CLONIDINE 0.3 MG/24H
1 PATCH, EXTENDED RELEASE TRANSDERMAL WEEKLY
Qty: 12 PATCH | Refills: 1 | Status: SHIPPED | OUTPATIENT
Start: 2025-04-23

## 2025-04-25 ENCOUNTER — RESULTS FOLLOW-UP (OUTPATIENT)
Dept: CARDIOLOGY CLINIC | Facility: CLINIC | Age: 75
End: 2025-04-25

## 2025-04-25 ENCOUNTER — IN-CLINIC DEVICE VISIT (OUTPATIENT)
Dept: CARDIOLOGY CLINIC | Facility: CLINIC | Age: 75
End: 2025-04-25
Payer: MEDICARE

## 2025-04-25 DIAGNOSIS — I47.20 VENTRICULAR TACHYCARDIA (HCC): Primary | ICD-10-CM

## 2025-04-25 DIAGNOSIS — I42.9 CARDIOMYOPATHY, UNSPECIFIED TYPE (HCC): ICD-10-CM

## 2025-04-25 PROCEDURE — 93283 PRGRMG EVAL IMPLANTABLE DFB: CPT | Performed by: INTERNAL MEDICINE

## 2025-04-25 NOTE — PROGRESS NOTES
Results for orders placed or performed in visit on 04/25/25   Cardiac EP device report    Narrative    [facility] DC ICD/ACTIVE SYSTEM IS MRI CONDITIONAL  DEVICE INTERROGATED IN THE [place] OFFICE: BATTERY VOLTAGE ADEQUATE (6 YRS). AP: 39%. : <1%. ALL LEAD PARAMETERS WITHIN NORMAL LIMITS. 2 NON-SUSTAINED EPISODES W/ EGMS SHOWING PAT 11 BEATS @ 158 BPM & 16 BEATS @ 161 BPM. PT TAKES ASA 81MG. EF: 68% (ST ECHO 7/31/23). NO PROGRAMMING CHANGES MADE TO DEVICE PARAMETERS. NORMAL DEVICE FUNCTION. CH

## 2025-06-23 NOTE — PROGRESS NOTES
[facility] Clinic Follow-up Visit - Cardiology   Dany Bosch 74 y.o. male MRN: [id number]  Unit/Bed#:  Encounter: [id number]    Patient Active Problem List    Diagnosis Date Noted    Gastroesophageal reflux disease with esophagitis without hemorrhage 12/24/2024    S/P ICD (internal cardiac defibrillator) procedure 02/20/2024    Abdominal aortic aneurysm (AAA) without rupture ([facility]) 02/20/2024    History of sick sinus syndrome 03/17/2023    History of ventricular tachycardia 11/29/2022    BPH (benign prostatic hyperplasia) s/p TURP 09/03/2021    S/P subtotal parathyroidectomy 04/12/2021    History of hyperparathyroidism 03/03/2021    Thoracic aortic ectasia (HCC) 06/18/2019    HOCM (hypertrophic obstructive cardiomyopathy) ([facility]) 07/16/2018    Coronary artery disease involving native coronary artery of native heart     Type 2 diabetes mellitus ([facility]) 02/08/2018    Mixed hyperlipidemia due to type 2 diabetes mellitus  ([facility]) 01/21/2018    Primary hypertension 01/21/2018     Plan: Mr Dany Bosch was last seen in the HCM Clinic on 10-1-2024. He has genotype negative non-obstructive HCM and is s/p secondary prevention ICD (12/23/2022, NSVT, extensive LGE of 25%). He has comorbid conditions of hyperparathyroidism (s/p partial resection), hypertension, CAD (s/p PCI to LAD), diabetes, smoking (active, 1 PPD) and GERD. Today, Mr Bosch states that he has no active cardiovascular symptom on limited daily physical activity. He specifically denies chest discomfort, exertional dyspnea, palpitations; lower extremity edema, orthopnea; lightheadedness with standing; or near syncope/syncope. He does not drink much water during the day, does not restrict salt in his diet and does not do much exercise. However, he has lost ~20 lbs of weight in the last 2 years intentionally by restricting caloric intake. He drinks one 8 oz cup of coffee daily and does not drink alcoholic beverages. Recent ICD interrogations on 1-, 1-, and  4- have shown:    SJM DC ICD/ACTIVE SYSTEM IS MRI CONDITIONAL. DEVICE INTERROGATED IN THE [place] OFFICE:     BATTERY VOLTAGE ADEQUATE (6.3-7.3 YRS). AP-33%, <1%. ALL AVAILABLE LEAD PARAMETERS WITHIN NORMAL LIMITS. 1 VT-1 EPISODE @ 171 BPM LASTING 16 SEC (MONITOR ZONE/PDF2). 11 NSVT EPISODES, MOST RECENT FOR 11 BEATS, AVG CL~365MS (PDF3). PT ON ASA 81MG & ATENOLOL. CORVUE IMPEDANCE MONITORING WITHIN NORMAL LIMITS. NORMAL DEVICE FUNCTION. GV     BATTERY VOLTAGE ADEQUATE (6.2 YRS). AP: 44%. : <1%. ALL AVAILABLE LEAD PARAMETERS WITHIN NORMAL LIMITS. 1 VT EPISODE W/ EGM SHOWING NSVT 16 BEATS @ 185 BPM. PT TAKES ATENOLOL, ASA 81MG. EF: 68% (ST ECHO 7/31/23). CORVUE IMPEDANCE MONITORING WITHIN NORMAL LIMITS. NORMAL DEVICE FUNCTION. CH     BATTERY VOLTAGE ADEQUATE (6 YRS). AP: 39%. : <1%. ALL LEAD PARAMETERS WITHIN NORMAL LIMITS. 2 NON-SUSTAINED EPISODES W/ EGMS SHOWING PAT 11 BEATS @ 158 BPM & 16 BEATS @ 161 BPM. PT TAKES ASA 81MG. EF: 68% (ST ECHO 7/31/23). NO PROGRAMMING CHANGES MADE TO DEVICE PARAMETERS. NORMAL DEVICE FUNCTION.      Blood work from 1- is reviewed. Hemoglobin A1C is 6.1%, kidney function is normal but he does have proteinuria (is on ACEI), lipid profile is at goal on atorvastatin and CBC is normal. On physical examination, BP and heart rate are well controlled (114/68 mm and 68 bpm, respectively), there is a murmur of dynamic LV obstruction and no cardiac extra sounds or evidence of fluid overload. Family history is updated. One younger brother has had heart attack and another has had stroke, one sister has suffered encephalitis, son has had heart attack and has received stents, daughter has hyperparathyroidism. Mr Bosch has 4 grandchildren, 2 from each of his children. An echocardiogram performed in the office today shows:      Left Ventricle: Wall thickness is severely increased. The left ventricular ejection fraction is 68%. Systolic function is vigorous. Global  longitudinal strain is reduced at -15%. Wall motion is normal. Diastolic function is mildly abnormal, consistent with grade I (abnormal) relaxation. There is  outflow tract dynamic obstruction at rest with a peak gradient of 13.0 mmHg. There is outflow tract dynamic obstruction with valsalva with a peak gradient of 41.0 mmHg.    Aortic Valve: There is mild to moderate regurgitation.    Mitral Valve: There is mild annular calcification. There is systolic anterior motion of the anterior leaflet with late peaking gradient.    ECG shows: Normal sinus rhythm, Left posterior fascicular block, Minimal voltage criteria for LVH, may be normal variant ( Sokolow-Vieyra ), Nonspecific T wave abnormality, Abnormal ECG        Physician Requesting Consult: Viral Blair DO   Reason for Consult / Principal Problem: HCM     HPI: Dany Bosch is a 73 y.o. year old male with history of Hyperparathyroidism, HTN, CAD s/p PCI to LAD, Hx of VTx s/p ICD (12/23/2022), DM2 on PO med, Anemia, and newly diagnosed HCM who is being referred by Dr. Blair for HCM. He has had several episodes of symptomatic NSVTs, for which he underwent primary prevention defibrillator implantation. As part of the work up, he had a CMR done which showed severely hypertrophied wall with significant amount of LGE. He is being seen today to establish care. He has remained asymptomatic aside from occasional palpitations, reports living an active lifestyle.  He denied SOB, chest pain, dizziness, bendopnea, PND, orthopnea, fainting, and lower extremity edema. He endorses compliance with medications.              Echocardiogram( 11/21/2022):       Left Ventricle: Wall thickness is increased. There is severe hypertrophy of the left ventricle with asymmetric component in the septum.  There is near complete obliteration of LV cavity during systole . No clear-cut ROCCO. There is LVOT gradient of approximately 120 mm Hg during Valsalva. The left ventricular ejection fraction  is 70%. Systolic function is hyperdynamic. Wall motion is normal. Diastolic function is mildly abnormal, consistent with grade I (abnormal) relaxation.    Left Atrium: The atrium is moderately dilated.    Aortic Valve: There is mild to moderate regurgitation. There is mild stenosis.    Mitral Valve: There is moderate annular calcification. There is mild to moderate regurgitation.     CMR(12/13/2022):     1. Severe asymmetric left ventricular wall thickening predominantly involving the interventricular septum, which measures up to 27 mm, with a spiral pattern progressing towards the apex.  Near cavity obliteration during systole.  Systolic anterior motion   of the anterior mitral valve leaflet with flow acceleration in the LVOT, suggesting a component of LVOT obstruction.  Apical insertion of the papillary muscles.  Hyperdynamic systolic function.  2. Normal right ventricular size and systolic function.  3.  The left atrium is normal in size.  Mildly reduced opening of the mitral valve.  Mitral regurgitation.  5. There is no evidence of myocardial edema.  6. Delayed post-gadolinium imaging demonstrates diffuse intramyocardial delayed gadolinium enhancement involving approximately 25% of the myocardium.     1-: Mr. Bosch was interviewed, examined and evaluated together with Dr Violetta Kennedy, cardiology fellow. Mr Bosch has carried a diagnosis of HOCM for more than 20 years but has been asymptomatic until recently. He also has coronary artery disease with a NSTEMI in 2018 in the setting of severe acute GI bleeding due to gastric ulcer for which he had stenting of proximal and mid-LAD. He is an active smoker and has diabetes, hypertension as well hyperlipidemia. Most recently he has had symptomatic NSVT and work up had shown severe LVH with LV wall thickness of 27 mm and extensive late gadolinium enhancement on cardiac MRI. He has undergone ICD implantation on 12/2022. He has a relatively active lifestyle,  works at a [employer] and does yard work there. He has been a smoker for 40 years (>1 ppd). He checks BP at home, numbers are usually between 140-160/80s at home. Endorses compliance with medications, takes Atenolol 50mg BID at 4am and 12 pm, 0.2 mg Clonidine patch every Friday, Lisinopril 40mg at 4am, Torsemide 10mg at 8pm.      Today his BP was 161/71, however, he is asymptomatic. His diet was reviewed and he verbalized understanding that he should abstain from fried and salty food. Fluid intake should be limited to 60 Oz/day and he should monitor his weight daily before breakfast. He also agreed to keep a log of BP and bring it with him during the next visit. We had a discussion about smoking cessation, however, patient is not motivated and has no intention to quit. No change to medications made. The priority at this time is to control his blood pressure effectively, improve daily physical activity and choice of food for meals and assess whether he would need to continue taking diuretic on a daily basis. High gradients have been confirmed on echocardiogram and, today, a faint murmur was heard with provocation. If he develops symptoms lisinopril can be discontinued and replaced by a non-hydropyridine calcium channel blocker. His most recent device interrogation did not show significant rhythm abnormality.     7-: Since his last office visit on 1- he has continued to follow with his primary cardiologist, Dr. Coleman and was last seen on 6- and no changes were made at that timet to the patient's medications/clinical plan. He has had an updated ICD device check from 6-: [facility] DUAL CHAMBER ICD/ACTIVE SYSTEM IS MRI CONDITIONAL   NON-BILLABLE MERLIN TRANSMISSION: BATTERY VOLTAGE ADEQUATE (7.6 YRS). AP: 32%. : <1%. ALL AVAILABLE LEAD PARAMETERS WITHIN NORMAL LIMITS. 1 NON-SUSTAINED EPISODE W/ EGM SHOWING NSVT 22 BEATS @ 165 BPM. PT TAKES ATENOLOL, ASA 81MG. EF: 70% (ECHO 11/21/22). [place]  "IMPEDANCE MONITORING WITHIN NORMAL LIMITS. APPROPRIATELY FUNCTIONING ICD     Today, the patient states he feels well.  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  He often mows the lawn, performs weed whacking will clean bathrooms and vacuum the floors.  He can perform all of the symptoms with no complaints of chest discomfort or dyspnea on exertion.  The patient states that in late June he was down in the [facility] and was walking on a flat surface with his wife when he began to notice an abrupt onset of left-sided arm \" heaviness\" that lasted roughly 25 to 30 minutes and eventually self resolved.  There were no associated symptoms with this discomfort and this was the first and only time this is happened.  It has not happened since that time.  He has performed very physical activities since that time and has not reproduced that discomfort.  His wife states that she thinks it could potentially have been musculoskeletal in nature as the weeks prior to this they were at their family farm in [place] and the patient was helping move equipment and chairs about the farm and perhaps this was a musculoskeletal event.  Nonetheless, given the fact that the patient did have an outflow tract gradient on his last echocardiogram from November 2022 it would be beneficial to obtain a stress echo to further evaluate outflow track gradient and also this will assist us to determine if there is underlying ischemia as a possible etiology to this event.  The patient does continue to get occasional palpitations but states they are short-lived.  He denies any lower extremity edema and does take torsemide 10 mg daily.  He has no orthopnea.  He will get some mild lightheadedness with position change but has not had any near syncope/syncope.  At last office visit in January 2023 the patient's blood pressure was noted to be elevated and we did ask him to monitor at home.  In " April 2023 his atenolol was increased to 75 mg twice daily in the setting of NSVT noted on ICD checks.  His wife states his blood pressure at home has been running systolically in the 130s and diastolic is typically 85 or less.  His blood pressure is acceptable today at 132/82 and I have continue to encourage him to follow a low-sodium diet.  The patient does continue to smoke about a pack to a pack and a half of cigarettes a day and is not motivated to quit.  I did  him on the pulmonary and cardiac effects of continued tobacco abuse.  He did have a lipid panel drawn in March of this year showing triglycerides of 52 HDL of 61 and LDL of 87 and he will continue atorvastatin 80 mg daily.  I have counseled him on following a low-fat/low-cholesterol diet.  His hemoglobin A1c is 5.9 down from 6.0 in June of last year. Overall, the patient is stable from a cardiac standpoint but we will obtain a stress echo to further investigate his single episode of left arm heaviness and also to further investigate for any left ventricular outflow tract obstruction.  For now he will continue his current medication regimen.      7-: Since his last office visit on 1- he has continued to follow with his primary cardiologist, Dr. Coleman and was last seen on 6- and no changes were made at that timet to the patient's medications/clinical plan. He has had an updated ICD device check from 6-: [facility] DUAL CHAMBER ICD/ACTIVE SYSTEM IS MRI CONDITIONAL   NON-BILLABLE MERLIN TRANSMISSION: BATTERY VOLTAGE ADEQUATE (7.6 YRS). AP: 32%. : <1%. ALL AVAILABLE LEAD PARAMETERS WITHIN NORMAL LIMITS. 1 NON-SUSTAINED EPISODE W/ EGM SHOWING NSVT 22 BEATS @ 165 BPM. PT TAKES ATENOLOL, ASA 81MG. EF: 70% (ECHO 11/21/22). CORVUE IMPEDANCE MONITORING WITHIN NORMAL LIMITS. APPROPRIATELY FUNCTIONING ICD     Today, the patient states he feels well.  He does not have a formal exercise routine but is very active as a volunteer  " at his [employer].  He often mows the lawn, performs weed whacking will clean bathrooms and vacuum the floors.  He can perform all of the symptoms with no complaints of chest discomfort or dyspnea on exertion.  The patient states that in late June he was down in the [facility] and was walking on a flat surface with his wife when he began to notice an abrupt onset of left-sided arm \" heaviness\" that lasted roughly 25 to 30 minutes and eventually self resolved.  There were no associated symptoms with this discomfort and this was the first and only time this is happened.  It has not happened since that time.  He has performed very physical activities since that time and has not reproduced that discomfort.  His wife states that she thinks it could potentially have been musculoskeletal in nature as the weeks prior to this they were at their family farm in [place] and the patient was helping move equipment and chairs about the farm and perhaps this was a musculoskeletal event.  Nonetheless, given the fact that the patient did have an outflow tract gradient on his last echocardiogram from November 2022 it would be beneficial to obtain a stress echo to further evaluate outflow track gradient and also this will assist us to determine if there is underlying ischemia as a possible etiology to this event.  The patient does continue to get occasional palpitations but states they are short-lived.  He denies any lower extremity edema and does take torsemide 10 mg daily.  He has no orthopnea.  He will get some mild lightheadedness with position change but has not had any near syncope/syncope.  At last office visit in January 2023 the patient's blood pressure was noted to be elevated and we did ask him to monitor at home.  In April 2023 his atenolol was increased to 75 mg twice daily in the setting of NSVT noted on ICD checks.  His wife states his blood pressure at home has been running systolically in the 130s and " diastolic is typically 85 or less.  His blood pressure is acceptable today at 132/82 and I have continue to encourage him to follow a low-sodium diet.  The patient does continue to smoke about a pack to a pack and a half of cigarettes a day and is not motivated to quit.  I did  him on the pulmonary and cardiac effects of continued tobacco abuse.  He did have a lipid panel drawn in March of this year showing triglycerides of 52 HDL of 61 and LDL of 87 and he will continue atorvastatin 80 mg daily.  I have counseled him on following a low-fat/low-cholesterol diet.  His hemoglobin A1c is 5.9 down from 6.0 in June of last year. Overall, the patient is stable from a cardiac standpoint but we will obtain a stress echo to further investigate his single episode of left arm heaviness and also to further investigate for any left ventricular outflow tract obstruction.  For now he will continue his current medication regimen.      1-: Since his last office visit on 7- he had left-sided cataract surgery that overall went well. He did also have updated ICD device checks/diagnostic studies with the following results:      ICD interrogation 7-:     [facility] DUAL CHAMBER ICD/ACTIVE SYSTEM IS MRI CONDITIONAL MERLIN TRANSMISSION: BATTERY VOLTAGE ADEQUATE (7.5 YRS). AP: 32%. : <1%. ALL AVAILABLE LEAD PARAMETERS WITHIN NORMAL LIMITS. 10 NON-SUSTAINED EPISODES W/ AVAIL EGM (PDF#2) SHOWING PAT @ 176 BPM, 8 SECS. PT TAKES ATENOLOL, ASA 81MG. EF: 70% (ECHO 11/21/22). CORVUE IMPEDANCE MONITORING WITHIN NORMAL LIMITS. NORMAL DEVICE FUNCTION.      ICD interrogation 1-9-2024:     SJM DC ICD/ACTIVE SYSTEM IS MRI CONDITIONAL   MERLIN TRANSMISSION:  BATTERY VOLTAGE ADEQUATE (7.1-8.1 YR.).  AP 41%  <1%.  ALL LEAD PARAMETERS WITHIN NORMAL LIMITS.  5 NON-SUSTAINED EPISODES, NO EGMS.  CORVUE IMPEDANCE MONITORING WITHIN NORMAL LIMITS.  NORMAL DEVICE FUNCTION.         Exercise stress echocardiography 7-: A bicycle  protocol stress test was performed. Overall, the patient's exercise capacity was mildly impaired for their age. The patient reached stage 3.0 of the protocol after exercising for 6 min and 0 sec and had a maximal HR of 112 bpm (77 % of MPHR) and 3.9 METS. The patient experienced no angina during the test. The patient reached the end of the protocol. The patient reported dyspnea and fatigue during the stress test. Symptoms began during stress and ended during recovery. Blood pressure demonstrated a normal response and heart rate demonstrated a blunted response to stress.        Left Ventricle: Wall thickness is severely increased. There is severe asymmetric hypertrophy of the septal wall. The left ventricular ejection fraction is 68%. Systolic function is vigorous. Global longitudinal strain is reduced at -10%. Wall motion is normal. Diastolic function is mildly abnormal, consistent with grade I (abnormal) relaxation. There is no LV dynamic obstruction.    Left Atrium: The atrium is mildly dilated.    Aortic Valve: There is mild regurgitation. There is aortic valve sclerosis.    Mitral Valve: There is mild annular calcification. There is systolic anterior motion of the anterior leaflet without late peaking gradient.    Stress ECG: No ST deviation is noted. Arrhythmias during recovery: rare PVCs. The ECG was equivocal for ischemia. The stress ECG is negative for ischemia after submaximal exercise, without reproduction of symptoms.    Post Stress Echo: Left ventricle cavity has normal reduction in size post-stress. The left ventricle systolic function is hyperdynamic post-stress.           Today he states overall he feels well. He continues to do work several days a week at his local [employer] and has no cardiac complaints while doing these activities. He did feel a brief episode of palpitations while lying down on Sunday morning but he notes occurrence of palpitations is rare and this episode was very brief. Last BMP  showed K of 4.6 and normal kidney function. He continues to take torsemide for water retention. His blood pressure is mildly elevated today at 148/80 on his current regimen of clonidine 0.3 mg patch, torsemide 10 mg daily, lisinopril 40 mg daily and atenolol 75 mg BID but it is noted that he takes his medications at sporadic times through the day which is leading to 16 hrs in between doses. We have written him a schedule to take meds at 8 am and 8 pm. Low sodium diet had been reinforced as his wife notes he does salt his food. We have asked him to monitor his blood pressure twice daily for one week and call our office with results. His LDL from March of 2023 was 87 and he continues on atorvastatin 80 mg daily. His HgbA1c was 6.1 in August 2023. He will follow up in our office in 6 months.     Device interrogation( 1/5/2023): [facility] DUAL ICD/ACTIVE SYSTEM IS MRI CONDITIONAL. DEVICE INTERROGATED IN THE [place] OFFICE: BATTERY VOLTAGE ADEQUATE (7.0-9.5 YRS). AP 7.4%  <1% ALL LEAD PARAMETERS WITHIN NORMAL LIMITS. NO NEW SIGNIFICANT HIGH RATE EPISODES. NO PROGRAMMING CHANGES MADE TO DEVICE PARAMETERS. WOUND CHECK: INCISION CLEAN AND DRY WITH EDGES APPROXIMATED; WOUND CARE AND RESTRICTIONS REVIEWED WITH PATIENT. NORMAL DEVICE FUNCTION. AM/RG.    10-1-2024: Patient recently had cataract surgery for bilateral eyes. He now has 20/20 vision and had no complications with the procedure. He has not noticed any heart palpitations. He is still working at the [employer] doing yard work and pushing the . He goes to the [employer] 3-4 days a week and spend 3-4 hours there. He also does some chores around the house. His blood pressure is elevated this morning at 150/80. He is due to change his clonidine patch tonight at 8 pm (changes it on Tuesday nights). He is currently on  atenolol 100 mg BID, hydralazine 25 mg BID, lisinopril 40 mg daily, clonidine 0.3 mg patch weekly and torsemide 10 mg daily. He did take the long way  in to the office and went up the step twice. We did some deep breathing in the office prior to rechecking the blood pressure. BP on recheck was 148/78. He does not routinely check his blood pressure at home. He checked his blood pressure earlier in September and systolic was in the 130s. This morning his BP at home was in the 150s at home. He has been adding salt to his food more recently. He eats lightly salted potato chips regularly. We discussed importance of low sodium diet for blood pressure control. His wife does not add salt to her cooking. We discussed reading labels and limiting sodium intake to 1500 mg daily. We recommended to watch strict low sodium diet for 1 week and check his blood pressure twice daily for a week to see if this will improve his blood pressure or if he needs medication adjustments. He has both a wrist cuff and an arm cuff for blood pressure. We have recommended to check blood pressure on both machines to see if there is agreement. If they do he can use either. If not, then he can use the arm cuff. We recommended resting for 5 mins while sitting in a chair. He should check morning blood pressure after washing up about 1 hour after medication. For the evening blood pressure he should take it before bed after taking evening meds. He will reach out to us with his numbers. Patient's LDL is 75 as of 7/2024 and he is on atorvastatin 80 mg daily and tolerating it well. He had a CT lung cancer screening that demonstrated stable ascending thoracic aortic ectasia at 4.3 cm. We will continue annual surveillance. He still smokes one pack per day. Counseling was provided for smoke cessation. He has extensive scarring in his heart and is at risk for rhythm abnormalities. In addition, he does have high NSVT load on his recent device interrogations. Last hemoglobin A1C is 6.2%. Patient will follow up in 6 months with an echo on the same day.      ICD interrogation 4-:     MICHEAL ARCE ICD/ACTIVE SYSTEM  "IS MRI CONDITIONAL DEVICE INTERROGATED IN THE [place] OFFICE:  BATTERY VOLTAGE ADEQUATE (7.9-8.0 YR.).  AP 38%  <1%.  ALL LEAD PARAMETERS WITHIN NORMAL LIMITS.  19 NS EPISODES; 2 EGMS SHOW PAT (#21-28 @ 157 BPM, #29 - 15 @ 169 BPM), AND ALL REMAINING EGMS SHOW NSVT (13 @ 158 BPM, 20 @ 158 BPM, 14 @ 161 BPM, 12 @ 174 BPM, 10 @ 167 BPM, 17 @ 170 BPM, 26 @ 160 BPM, 13 @ 158 BPM, 13 @ 172 BPM, 15 @ 170 BPM, 14 @ 172 BPM, 26 @ 160 BPM, 21 @ 155 BPM, 10 @ 169 BPM, 32 @ 157 BPM, 16 @ 197 BPM, 10 @ 167 BPM).  NO PROGRAMMING CHANGES MADE TO DEVICE PARAMETERS.  NORMAL DEVICE FUNCTION.        ICD interrogation 7-:     [facility] [facility] ICD/ACTIVE SYSTEM IS MRI CONDITIONAL MERLIN TRANSMISSION: BATTERY STATUS \"7 YRS.\" AP 23%  0%. ALL AVAILABLE LEAD PARAMETERS WITHIN NORMAL LIMITS. 3 AMS & 12 VHRS NOTED. 0% BURDEN; NO THERAPIES GIVEN. AVAIL EGRAMS PRESENT AS PAT & NSVT. PT ON ASA & ATENOLOL. EF 68% (ECHO/STS ). CORVUE IMPEDANCE MONITORING WITHIN NORMAL LIMITS. NORMAL DEVICE FUNCTION.      Review of systems: Denies chest discomfort or dyspnea with exertion; notes occasional palpitations; denies lower extremity edema/orthopnea; some mild lightheadedness with standing; denies near syncope/syncope     Family History: Father  in his mid 50s likely from Cancer, he was a  and used chemicals to clean car parts. Mother passed away in 1972 at the age of 44yo at the hospital after a fibroid removal, as she was walking back to her room in the hospital she fell and passed away. Total of 10 Siblings( 6 boys, 4 girls)- one brother passed from EtOH intake, twin brother, Prabhu,  had two CVAs in the past 2 years ago, one sister is obese and another has had encephalitis. Brother Rojas is 71 years old, has 5 stents and CVA. 1 brother carries nitro and 1 sister (flutter feeling) have heart problems, unknown what the problem is. Dany has a boy and a girl, his boy had a heart attack at age 56 and now has 4 stents. He " "suffers from anxiety, pastors a [employer] and works full time driving a forklift. His daughter has thyroid problems but no heart problems and is currently 57yo.      Genetic testing:           Devices: [facility] ICD placed 12/2022    Historical Information   Past Medical History[1]  Past Surgical History[2]  Family History[3]  Medications Ordered Prior to Encounter[4]  Allergies[5]  Social History     Substance and Sexual Activity   Alcohol Use Not Currently    Comment: rare social occasion, once a year,\" takes whiskey if feeling a cold coming\"     Social History     Substance and Sexual Activity   Drug Use No     Tobacco Use History[6]    Objective   Vitals:   Vitals:    06/24/25 1134   BP: 114/68   BP Location: Right arm   Patient Position: Sitting   Cuff Size: Standard   Pulse: 68   SpO2: 96%   Weight: 81 kg (178 lb 9.6 oz)   Height: 5' 8\" (1.727 m)   Body surface area is 1.95 meters squared.  Body mass index is 27.16 kg/m².    Invasive Devices       None                 Physical Exam:  GEN: Dany Bosch appears well, alert and oriented x 3, pleasant and cooperative   HEENT: pupils equal, round, and reactive to light; extraocular muscles intact  NECK: supple, no carotid bruits   HEART: regular rhythm, normal S1 and S2, 2/6 systolic murmur with accentuation upon standing, clicks, gallops or rubs   LUNGS: clear to auscultation bilaterally; no wheezes, rales, or rhonchi   ABDOMEN: normal bowel sounds, soft, no tenderness, no distention  EXTREMITIES: peripheral pulses normal; no clubbing, cyanosis, or edema  NEURO: no focal findings   SKIN: normal without suspicious lesions on exposed skin    Lab Results:   Lab Results   Component Value Date    WBC 6.08 01/20/2025    RBC 4.90 01/20/2025    HGB 13.1 01/20/2025    HCT 41.8 01/20/2025    MCV 85 01/20/2025     01/20/2025    RDW 18.1 (H) 01/20/2025     Lab Results   Component Value Date     12/14/2015    K 3.6 01/20/2025     01/20/2025    CO2 28 " 2025    ANIONGAP 5 2015    BUN 14 2025    CREATININE 0.72 2025    EGFR 90 2025    GLUCOSE 98 2015    CALCIUM 9.1 2025    AST 14 2025    ALT 14 2025    ALKPHOS 42 2025    PROT 7.1 2015    BILITOT 0.4 2015     Lab Results   Component Value Date    MG 1.9 2021     Lab Results   Component Value Date    CHOL 136 2015    HDL 51 2025    TRIG 68 2025    LDLCALC 69 2025     Lab Results   Component Value Date    ITK9EODORJWJ 1.118 2024    FREET4 0.95 2014     Imaging:   I have personally reviewed pertinent films in PACS    EKG: ECG shows: Normal sinus rhythm, Left posterior fascicular block, Minimal voltage criteria for LVH, may be normal variant ( Sokolow-Vieyra ), Nonspecific T wave abnormality, Abnormal ECG        Cardiac testing:   Results for orders placed during the hospital encounter of 21    Echo complete with contrast if indicated    [address] [place] [address] [phone]    Transthoracic Echocardiogram  2D, M-mode, Doppler, and Color Doppler    Study date:  02-Aug-2021    Patient: KATHERIN SNOWDEN  MR number: [id number]  Account number: [id number]  : 1950  Age: 71 years  Gender: Male  Status: Outpatient  Location: [facility]  Height: 67 in  Weight: 188.5 lb  BP: 142/ 70 mmHg    Indications: CAD.    Diagnoses: I25.10 - Atherosclerotic heart disease of native coronary artery without angina pectoris    Sonographer:  MARIBELL Gregorio  Primary Physician:  David Emerson MD  Referring Physician:  Adina Soliz PA-C  Group:  [facility]  Interpreting Physician:  Anthony Garcia MD    SUMMARY    LEFT VENTRICLE:  The cavity was small.  Systolic function was normal. Ejection fraction was estimated to be 65 %.  There were no regional wall motion abnormalities.  Wall thickness was increased.  There was  moderate assymetrical hypertrophy of the septum.  Features were consistent with a pseudonormal left ventricular filling pattern, with concomitant abnormal relaxation and increased filling pressure (grade 2 diastolic dysfunction).    RIGHT VENTRICLE:  The size was normal.  Systolic function was normal.    LEFT ATRIUM:  The atrium was mildly dilated.    MITRAL VALVE:  There was mild annular calcification.  There was mild regurgitation.    AORTIC VALVE:  There was mild stenosis.  There was moderate regurgitation.    TRICUSPID VALVE:  There was trace regurgitation.  Estimated peak PA pressure was 38 mmHg.    AORTA:  The root exhibited dilatation - 3.9 cm.    HISTORY: PRIOR HISTORY: Myocardial infarction. Hypertrophic cardiomyopathy. Risk factors: hypertension and oral hypoglycemic-treated diabetes. History of severe regurgitation of the aortic valve. PRIOR PROCEDURES: Stent.    PROCEDURE: The study was performed in the [facility]. This was a routine study. The transthoracic approach was used. The study included complete 2D imaging, M-mode, complete spectral Doppler, and color Doppler. Image  quality was adequate.    LEFT VENTRICLE: The cavity was small. Systolic function was normal. Ejection fraction was estimated to be 65 %. There were no regional wall motion abnormalities. Wall thickness was increased. There was moderate assymetrical hypertrophy of  the septum. No evidence of apical thrombus. DOPPLER: Features were consistent with a pseudonormal left ventricular filling pattern, with concomitant abnormal relaxation and increased filling pressure (grade 2 diastolic dysfunction).    RIGHT VENTRICLE: The size was normal. Systolic function was normal. Wall thickness was normal.    LEFT ATRIUM: The atrium was mildly dilated.    RIGHT ATRIUM: Size was normal.    MITRAL VALVE: There was mild annular calcification. There was normal leaflet separation. DOPPLER: The transmitral velocity was within the  normal range. There was no evidence for stenosis. There was mild regurgitation.    AORTIC VALVE: The valve was trileaflet. Leaflets exhibited mildly increased thickness and normal cuspal separation. DOPPLER: There was mild stenosis. There was moderate regurgitation.    TRICUSPID VALVE: The valve structure was normal. There was normal leaflet separation. DOPPLER: The transtricuspid velocity was within the normal range. There was no evidence for stenosis. There was trace regurgitation. Estimated peak PA  pressure was 38 mmHg.    PULMONIC VALVE: Leaflets exhibited normal thickness, no calcification, and normal cuspal separation. DOPPLER: The transpulmonic velocity was within the normal range. There was no significant regurgitation.    PERICARDIUM: There was no pericardial effusion. The pericardium was normal in appearance.    AORTA: The root exhibited dilatation - 3.9 cm.    SYSTEMIC VEINS: IVC: The inferior vena cava was normal in size.    SYSTEM MEASUREMENT TABLES    2D  %FS: 33.99 %  Ao Diam: 3.89 cm  EDV(Teich): 105.57 ml  EF(Teich): 62.84 %  ESV(Teich): 39.23 ml  IVSd: 2.2 cm  LA Area: 21.8 cm2  LA Diam: 3.95 cm  LVEDV MOD A4C: 126.52 ml  LVEF MOD A4C: 64.55 %  LVESV MOD A4C: 44.85 ml  LVIDd: 4.76 cm  LVIDs: 3.14 cm  LVLd A4C: 7.9 cm  LVLs A4C: 6.49 cm  LVOT Diam: 2.27 cm  LVPWd: 1.41 cm  RA Area: 18.04 cm2  RVIDd: 3.51 cm  SV MOD A4C: 81.67 ml  SV(Teich): 66.34 ml    CW  AR [place]: 3.12 m/s2  AR Dec Time: 1474.74 ms  AR PHT: 427.67 ms  AR Vmax: 4.39 m/s  AR maxP.22 mmHg  AV Env.Ti: 308.28 ms  AV MaxPG: 10.56 mmHg  AV VTI: 33.21 cm  AV Vmax: 1.62 m/s  AV Vmean: 1.08 m/s  AV meanP.33 mmHg  MR VTI: 204.76 cm  MR Vmax: 5.58 m/s  MR Vmean: 4.34 m/s  MR maxP.69 mmHg  MR meanP.58 mmHg  TR MaxP.77 mmHg  TR Vmax: 2.95 m/s    MM  TAPSE: 2.5 cm    PW  YOKO (VTI): 3.98 cm2  YOKO Vmax: 3.15 cm2  AVAI (VTI): 0 cm2/m2  AVAI Vmax: 0 cm2/m2  E' Sept: 0.06 m/s  E/E' Sept: 15.09  LVOT Env.Ti: 334.92  "ms  LVOT VTI: 32.64 cm  LVOT Vmax: 1.26 m/s  LVOT Vmean: 0.97 m/s  LVOT maxP.4 mmHg  LVOT meanP.19 mmHg  LVSI Dopp: 67.12 ml/m2  LVSV Dopp: 132.23 ml  MV A Shadi: 0.92 m/s  MV Dec [place]: 2.72 m/s2  MV DecT: 358.4 ms  MV E Shadi: 0.98 m/s  MV E/A Ratio: 1.07  MV PHT: 103.94 ms  MVA By PHT: 2.12 cm2    [facility]    Prepared and electronically signed by    Anthony Garcia MD  Signed 03-Aug-2021 14:53:36    Name: Dany Bosch                       : 1950  MRN: [id number]                       Age: 75 y.o.  Patient Status: Outpatient          Gender: male  Echo (TTE, complete, 2D, color doppler, spectral doppler, strain)    Height: 5' 8\" (1.727 m)   Weight: 81.5 kg (179 lb 10.8 oz)   BSA: 1.95 m²   Blood Pressure: 126/78    Date of Study: 25   Ordering Provider: Carmelita Reed DO    Clinical Indications: HOCM (hypertrophic obstructive cardiomyopathy) (HCC) [I42.1 (ICD-10-CM)]       Reading Physicians  Performing Staff   Cardiology: Keyshawn Barba MD    Tech: Soledad Hakan         Vitals    Height Weight BSA (Calculated - m2) BP Pulse   5' 8\" (1.727 m) 81.5 kg (179 lb 10.8 oz) 1.95 sq meters 126/78 61     PACS Images     Show images for Echo complete w/ contrast if indicated  PACS Images - Sectra     Show images for Echo complete w/ contrast if indicated  Study Details    Technical Details: This procedure was performed in the echo lab. This was a routine and outpatient study. A complete transthoracic echo (TTE) procedure was performed with 2D imaging, color flow Doppler, strain and complete spectral Doppler. Apical, parasternal, subcostal and suprasternal views obtained.     Clinical Details: Adequate image quality.     History    HLD, HTN, DM2, HOCM, CAD, Hx of ventricular tachycardia, S/P ICD, AAA, GERD     Interpretation Summary  Show Result Comparison     Left Ventricle: Wall thickness is severely increased. The left ventricular ejection " fraction is 68%. Systolic function is vigorous. Global longitudinal strain is reduced at -15%. Wall motion is normal. Diastolic function is mildly abnormal, consistent with grade I (abnormal) relaxation. There is  outflow tract dynamic obstruction at rest with a peak gradient of 13.0 mmHg. There is outflow tract dynamic obstruction with valsalva with a peak gradient of 41.0 mmHg.    Aortic Valve: There is mild to moderate regurgitation.    Mitral Valve: There is mild annular calcification. There is systolic anterior motion of the anterior leaflet with late peaking gradient.     Strain was performed to quantify interventricular dyssynchrony and evaluate components of myocardial function due to HCM. Results from the utilization of Strain Analysis are listed in the report below.     Findings    Left Ventricle Left ventricular cavity size is small. Wall thickness is severely increased. The left ventricular ejection fraction is 68%. Systolic function is vigorous. Global longitudinal strain is reduced at -15%.  Wall motion is normal. Diastolic function is mildly abnormal, consistent with grade I (abnormal) relaxation.  There is  outflow tract dynamic obstruction at rest with a peak gradient of 13.0 mmHg. There is outflow tract dynamic obstruction with valsalva with a peak gradient of 41.0 mmHg.   Right Ventricle Right ventricular cavity size is normal. Systolic function is normal. Wall thickness is normal.   Left Atrium The atrium is normal in size.   Right Atrium The atrium is normal in size. A pacer wire is present.   Aortic Valve The aortic valve is trileaflet. The leaflets are not thickened. The leaflets are not calcified. The leaflets exhibit normal mobility. There is mild to moderate regurgitation. The aortic valve has no significant stenosis.   Mitral Valve The leaflets are not thickened. The leaflets are not calcified. The leaflets exhibit normal mobility. There is mild annular calcification.  There is trace  regurgitation. There is no evidence of stenosis. There is systolic anterior motion of the anterior leaflet with late peaking gradient.   Tricuspid Valve Tricuspid valve structure is normal. There is trace regurgitation. There is no evidence of stenosis. The right ventricular systolic pressure is normal. The estimated right ventricular systolic pressure is 32.00 mmHg.   Pulmonic Valve Pulmonic valve structure is normal. There is no evidence of regurgitation. There is no evidence of stenosis.   Ascending Aorta The aortic root is normal in size.   IVC/SVC The right atrial pressure is estimated at 3.0 mmHg. The inferior vena cava is normal in size.   Pericardium There is no pericardial effusion. The pericardium is normal in appearance.     Left Ventricle Measurements    Function/Volumes   A4C EF 63 %         LVOT stroke volume 164.11         LVOT stroke volume index 84.6 ml/m2         Left ventricular stroke volume (2D) 58 mL         LVOT Cardiac Output 9.74 l/min         LVOT Cardiac Index 5 l/min/m2         Dimensions   LVIDd 4.4 cm         LVIDS 2.8 cm         IVSd 2.1 cm         LVPWd 1.4 cm         LVOT area 4.91 cm2         FS 36         Diastolic Filling   MV E' Tissue Velocity Septal 3 cm/s         MV E' Tissue Velocity Lateral 3 cm/s         LA Volume Index (BP) 27.7 mL/m2         E/A ratio 0.5         E wave deceleration time 287 ms         MV Peak E Shadi 52 cm/s         MV Peak A Shadi 1.03 m/s         Strain   GLS -15 %          Report Measurements   AV LVOT peak gradient 10 mmHg         Other Measurements   Peak gradient (Rest) 13 mmHg         Peak Gradient (Valsalva) 41 mmHg              Interventricular Septum Measurements    Shunt Ratio   LVOT peak VTI 33.45 cm         LVOT peak shadi 1.59 m/s              Right Ventricle Measurements    Dimensions   RVID d 3.6 cm         Tricuspid annular plane systolic excursion 2.9 cm               Left Atrium Measurements    Dimensions   LA size 3.5 cm         LA length  (A2C) 4.9 cm         Volumes   LA volume (BP) 54 mL         LA Volume Index (BP) 27.7 mL/m2               Right Atrium Measurements    Dimensions   RAA A4C 17 cm2               Atrial Septum Measurements    Shunt Ratio   LVOT peak VTI 33.45 cm         LVOT peak shadi 1.59 m/s               Aortic Valve Measurements    Stenosis   Aortic valve peak velocity 1.52 m/s         LVOT peak shadi 1.59 m/s         Ao VTI 32.91 cm         LVOT peak VTI 33.45 cm         AV mean gradient 6 mmHg         LVOT mn grad 5 mmHg         AV peak gradient 9 mmHg         AV LVOT peak gradient 10 mmHg         Regurgitation   AV peak gradient 53 mmHg         AV Deceleration Time 1,852 ms         AV regurgitation pressure 1/2 time 537 ms         Area/Dimensions   DVI 1.02         AV valve area 4.99 cm2         AV area by cont VTI 5 cm2         AV area peak shadi 5.1 cm2         LVOT diameter 2.5 cm         LVOT area 4.91 cm2               Mitral Valve Measurements    Stenosis   MV stenosis pressure 1/2 time 83 ms         MV valve area p 1/2 method 2.65               Tricuspid Valve Measurements    RVSP Parameters   TR Peak Shadi 2.7 m/s         Est. RA pres 3 mmHg         Triscuspid Valve Regurgitation Peak Gradient 29 mmHg         Right Ventricular Peak Systolic Pressure 32 mmHg               Aorta Measurements    Aortic Dimensions   Ao root 3.6 cm         Asc Ao 3.7 cm               IVC/SVC Measurements    IVC/SVC   Est. RA pres 3 mmHg              Exam Details    Performed Procedure Technologist Supporting Staff Performing Physician   Echo Soledad Mcclendon            Appointment Date/Status Modality Department    6/24/2025     Arrived [facility] ECHO 1 [facility] CAR NON INV           Begin Exam End Exam  End Exam Questionnaires   6/24/2025 10:12 AM 6/24/2025 11:12 AM  PATIENT EDUCATION            All Reviewers List    Carmelita Reed DO on 6/26/2025 10:49 AM     Signed    Electronically signed by Keyshawn Barba MD on 6/25/25 at 0728 EDT     Counseling /  Coordination of Care  Total time spent today 46 minutes.  Greater than 50% of total time was spent with the patient and / or family counseling and / or coordination of care.           [1]   Past Medical History:  Diagnosis Date    Anemia 2018    Benign neoplasm of large intestine     Bilateral recurrent inguinal hernia without obstruction or gangrene 08/01/2017    Bleeding gastric ulcer 2018    Cardiomyopathy (HCC)     Chronic kidney disease     Colon polyp 2020    Coronary artery disease 01/2018    Diabetes mellitus (HCC) 2017    Disease of thyroid gland     Diverticulitis of colon 2020    Heart murmur     History of aortic regurgitation     History of constipation     History of degenerative joint disease     History of nocturia     History of obesity     History of sebaceous cyst     History of shortness of breath     History of transfusion     History of urinary frequency     Hyperlipidemia 2017    Hyperparathyroidism (HCC)     Hypertension 2009    Iron deficiency anemia due to chronic blood loss 04/20/2018    Added automatically from request for surgery [id number]    Myocardial infarction (HCC) 2018 january, 3 stents    Polyposis coli     of the large intestine    Ulcer of esophagus    [2]   Past Surgical History:  Procedure Laterality Date    ACHILLES TENDON SURGERY Left 1973    CARDIAC ELECTROPHYSIOLOGY PROCEDURE Left 12/23/2022    Procedure: insertion dual chamber ICD;  Surgeon: Viral Blair DO;  Location: [facility];  Service: Cardiology    CATARACT EXTRACTION Right 02/18/2021    CATARACT EXTRACTION Left 12/18/2023    COLONOSCOPY  2020    hyperplastic polyp    CORONARY ANGIOPLASTY WITH STENT PLACEMENT      3 stents: 2 placed on Jan 2018, 1 on July 2018     CYST REMOVAL  2019, 2020    left and right wrists    ESOPHAGOGASTRODUODENOSCOPY N/A 01/23/2018    Procedure: ESOPHAGOGASTRODUODENOSCOPY (EGD);  Surgeon: Ping Burgos MD;  Location: [facility];  Service: Gastroenterology    ESOPHAGOGASTRODUODENOSCOPY       EYE SURGERY  2/18/2021    Cataract removal    HERNIA REPAIR      HERNIA REPAIR Bilateral 08/18/2017    Procedure: LAPAROSCOPIC INGUINAL HERNIA REPAIR WITH MESH;  Surgeon: Keegan Loco MD;  Location: [facility];  Service: General    LARYNGOSCOPY N/A 05/12/2022    Procedure: LARYNGOSCOPY DIRECT;  Surgeon: Delvin Burch MD;  [facility];  Service: Surgical Oncology    MS ESOPHAGOGASTRODUODENOSCOPY TRANSORAL DIAGNOSTIC N/A 03/26/2018    Procedure: ESOPHAGOGASTRODUODENOSCOPY (EGD);  Surgeon: Breezy Wallace III, MD;  Location: [facility];  Service: Gastroenterology    MS ESOPHAGOGASTRODUODENOSCOPY TRANSORAL DIAGNOSTIC N/A 05/14/2018    Procedure: ESOPHAGOGASTRODUODENOSCOPY (EGD);  Surgeon: Breezy Wallace III, MD;  [facility]: [facility];  Service: Gastroenterology    MS PARATHYROIDECTOMY/EXPLORATION PARATHYROIDS Right 03/23/2021    Procedure: RIGHT PARATHYROIDECTOMY, MINIMALLY INVASIVE, POSSIBLE 4-GLAND EXPLORATION, WITH INTRA-OPERATIVE PTH MONITORING;  Surgeon: Delvin Burch MD;  [facility];  Service: Surgical Oncology    MS PARATHYROIDECTOMY/EXPLORATION PARATHYROIDS Right 05/12/2022    Procedure: TWO GLAND PARATHYROIDECTOMY, 4 GLAND EXPLORATION, INTRAOPERATIVE PTH MONITORING, FLEXIBLE LARYNGOSCOPY;  Surgeon: Delvin Burch MD;  [facility];  Service: Surgical Oncology    MS TRURL ELECTROSURG RESCJ PROSTATE BLEED COMPLETE N/A 09/02/2021    Procedure: TRANSURETHRAL RESECTION OF PROSTATE (TURP);  Surgeon: Robinson Cornell MD;  [facility]: [facility];  Service: Urology    PROSTATE SURGERY  10/2021    TIBIA FRACTURE SURGERY Left 1962    UPPER GASTROINTESTINAL ENDOSCOPY  04/2021   [3]   Family History  Problem Relation Name Age of Onset    Rheumatic fever Father Dany Bosch     Alcohol abuse Father Dany Kellyx     Heart disease Mother Mrs Benites     Early death Mother Mrs Benites         Early death age 45   [4]   Current Outpatient Medications on File Prior to  Visit   Medication Sig Dispense Refill    aspirin (ECOTRIN LOW STRENGTH) 81 mg EC tablet Take 1 tablet (81 mg total) by mouth daily      atenolol (TENORMIN) 100 mg tablet Take 1 tablet (100 mg total) by mouth 2 (two) times a day 180 tablet 3    atorvastatin (LIPITOR) 80 mg tablet Take 1 tablet (80 mg total) by mouth daily 90 tablet 0    cholecalciferol (VITAMIN D3) 1,000 units tablet Take 1,000 Units by mouth daily      cloNIDine (CATAPRES-TTS-3) 0.3 mg/24 hr Place 1 patch (0.3 mg total) on the skin over 7 days once a week 12 patch 1    hydrALAZINE (APRESOLINE) 25 mg tablet Take 1 tablet (25 mg total) by mouth 2 (two) times a day 180 tablet 1    lisinopril (ZESTRIL) 40 mg tablet Take 1 tablet (40 mg total) by mouth daily 90 tablet 3    metFORMIN (GLUCOPHAGE-XR) 500 mg 24 hr tablet Take 1 tablet (500 mg total) by mouth 2 (two) times a day with meals 180 tablet 3    Omega-3 Fatty Acids (FISH OIL ADULT GUMMIES PO) Take by mouth      pantoprazole (PROTONIX) 40 mg tablet Take 1 tablet (40 mg total) by mouth daily 90 tablet 3    torsemide (DEMADEX) 10 mg tablet Take 1 tablet (10 mg total) by mouth daily 90 tablet 3     No current facility-administered medications on file prior to visit.   [5] No Known Allergies  [6]   Social History  Tobacco Use   Smoking Status Every Day    Current packs/day: 1.00    Average packs/day: 1 pack/day for 57.5 years (57.5 ttl pk-yrs)    Types: Cigarettes    Start date: 1968   Smokeless Tobacco Current

## 2025-06-24 ENCOUNTER — OFFICE VISIT (OUTPATIENT)
Dept: CARDIOLOGY CLINIC | Facility: CLINIC | Age: 75
End: 2025-06-24
Payer: MEDICARE

## 2025-06-24 ENCOUNTER — HOSPITAL ENCOUNTER (OUTPATIENT)
Dept: NON INVASIVE DIAGNOSTICS | Facility: CLINIC | Age: 75
Discharge: HOME | End: 2025-06-24
Payer: MEDICARE

## 2025-06-24 VITALS
HEIGHT: 68 IN | SYSTOLIC BLOOD PRESSURE: 114 MMHG | DIASTOLIC BLOOD PRESSURE: 68 MMHG | WEIGHT: 178.6 LBS | OXYGEN SATURATION: 96 % | BODY MASS INDEX: 27.07 KG/M2 | HEART RATE: 68 BPM

## 2025-06-24 VITALS
DIASTOLIC BLOOD PRESSURE: 78 MMHG | SYSTOLIC BLOOD PRESSURE: 126 MMHG | HEIGHT: 68 IN | HEART RATE: 61 BPM | BODY MASS INDEX: 27.23 KG/M2 | WEIGHT: 179.68 LBS

## 2025-06-24 DIAGNOSIS — I77.810 THORACIC AORTIC ECTASIA (HCC): Chronic | ICD-10-CM

## 2025-06-24 DIAGNOSIS — Z95.810 AICD (AUTOMATIC CARDIOVERTER/DEFIBRILLATOR) PRESENT: ICD-10-CM

## 2025-06-24 DIAGNOSIS — Z95.810 PRESENCE OF IMPLANTABLE CARDIOVERTER-DEFIBRILLATOR (ICD): ICD-10-CM

## 2025-06-24 DIAGNOSIS — E11.69 MIXED HYPERLIPIDEMIA DUE TO TYPE 2 DIABETES MELLITUS  (HCC): ICD-10-CM

## 2025-06-24 DIAGNOSIS — I10 PRIMARY HYPERTENSION: ICD-10-CM

## 2025-06-24 DIAGNOSIS — Z95.810 S/P ICD (INTERNAL CARDIAC DEFIBRILLATOR) PROCEDURE: ICD-10-CM

## 2025-06-24 DIAGNOSIS — I10 HYPERTENSION, UNSPECIFIED TYPE: ICD-10-CM

## 2025-06-24 DIAGNOSIS — Z86.79 HISTORY OF SICK SINUS SYNDROME: ICD-10-CM

## 2025-06-24 DIAGNOSIS — E78.2 MIXED HYPERLIPIDEMIA DUE TO TYPE 2 DIABETES MELLITUS  (HCC): ICD-10-CM

## 2025-06-24 DIAGNOSIS — I47.20 VENTRICULAR TACHYCARDIA (HCC): Primary | ICD-10-CM

## 2025-06-24 DIAGNOSIS — Z95.810 PRESENCE OF AUTOMATIC CARDIOVERTER/DEFIBRILLATOR (AICD): ICD-10-CM

## 2025-06-24 DIAGNOSIS — I42.1 HOCM (HYPERTROPHIC OBSTRUCTIVE CARDIOMYOPATHY) (HCC): Chronic | ICD-10-CM

## 2025-06-24 DIAGNOSIS — I47.20 VT (VENTRICULAR TACHYCARDIA) (HCC): ICD-10-CM

## 2025-06-24 DIAGNOSIS — I42.9 CARDIOMYOPATHY, UNSPECIFIED TYPE (HCC): ICD-10-CM

## 2025-06-24 DIAGNOSIS — I25.10 CORONARY ARTERY DISEASE INVOLVING NATIVE CORONARY ARTERY OF NATIVE HEART WITHOUT ANGINA PECTORIS: ICD-10-CM

## 2025-06-24 DIAGNOSIS — I42.1 HOCM (HYPERTROPHIC OBSTRUCTIVE CARDIOMYOPATHY) (HCC): ICD-10-CM

## 2025-06-24 PROCEDURE — 93306 TTE W/DOPPLER COMPLETE: CPT | Performed by: INTERNAL MEDICINE

## 2025-06-24 PROCEDURE — 93000 ELECTROCARDIOGRAM COMPLETE: CPT | Performed by: INTERNAL MEDICINE

## 2025-06-24 PROCEDURE — 99215 OFFICE O/P EST HI 40 MIN: CPT | Performed by: INTERNAL MEDICINE

## 2025-06-24 PROCEDURE — 93306 TTE W/DOPPLER COMPLETE: CPT

## 2025-06-24 PROCEDURE — 93356 MYOCRD STRAIN IMG SPCKL TRCK: CPT | Performed by: INTERNAL MEDICINE

## 2025-06-24 PROCEDURE — 93356 MYOCRD STRAIN IMG SPCKL TRCK: CPT

## 2025-06-25 LAB
AORTIC ROOT: 3.6 CM
AORTIC VALVE MEAN VELOCITY: 11.4 M/S
ASCENDING AORTA: 3.7 CM
AV AREA BY CONTINUOUS VTI: 5 CM2
AV AREA PEAK VELOCITY: 5.1 CM2
AV LVOT MEAN GRADIENT: 5 MMHG
AV LVOT PEAK GRADIENT: 10 MMHG
AV MEAN PRESS GRAD SYS DOP V1V2: 6 MMHG
AV ORIFICE AREA US: 4.99 CM2
AV PEAK GRADIENT: 9 MMHG
AV REGURGITATION PRESSURE HALF TIME: 537 MS
AV VELOCITY RATIO: 1.02
AV VMAX SYS DOP: 1.52 M/S
BSA FOR ECHO PROCEDURE: 1.95 M2
DOP CALC AO VTI: 32.91 CM
DOP CALC LVOT AREA: 4.91 CM2
DOP CALC LVOT CARDIAC INDEX: 5 L/MIN/M2
DOP CALC LVOT CARDIAC OUTPUT: 9.74 L/MIN
DOP CALC LVOT DIAMETER: 2.5 CM
DOP CALC LVOT PEAK VEL VTI: 33.45 CM
DOP CALC LVOT PEAK VEL: 1.59 M/S
DOP CALC LVOT STROKE INDEX: 84.6 ML/M2
DOP CALC LVOT STROKE VOLUME: 164.11
E WAVE DECELERATION TIME: 287 MS
E/A RATIO: 0.5
FRACTIONAL SHORTENING: 36 (ref 28–44)
GLOBAL LONGITUIDAL STRAIN: -15 %
INTERVENTRICULAR SEPTUM IN DIASTOLE (PARASTERNAL SHORT AXIS VIEW): 2.1 CM
INTERVENTRICULAR SEPTUM: 2.1 CM (ref 0.6–1.1)
LAAS-AP2: 17 CM2
LAAS-AP4: 19.7 CM2
LEFT ATRIUM SIZE: 3.5 CM
LEFT ATRIUM VOLUME (MOD BIPLANE): 54 ML
LEFT ATRIUM VOLUME INDEX (MOD BIPLANE): 27.7 ML/M2
LEFT INTERNAL DIMENSION IN SYSTOLE: 2.8 CM (ref 2.1–4)
LEFT VENTRICULAR INTERNAL DIMENSION IN DIASTOLE: 4.4 CM (ref 3.5–6)
LEFT VENTRICULAR POSTERIOR WALL IN END DIASTOLE: 1.4 CM
LEFT VENTRICULAR STROKE VOLUME: 58 ML
LV EF US.2D.A4C+ESTIMATED: 63 %
LVSV (TEICH): 58 ML
MV E'TISSUE VEL-LAT: 3 CM/S
MV E'TISSUE VEL-SEP: 3 CM/S
MV PEAK A VEL: 1.03 M/S
MV PEAK E VEL: 52 CM/S
MV STENOSIS PRESSURE HALF TIME: 83 MS
MV VALVE AREA P 1/2 METHOD: 2.65
RA PRESSURE ESTIMATED: 3 MMHG
RIGHT ATRIUM AREA SYSTOLE A4C: 17 CM2
RIGHT VENTRICLE ID DIMENSION: 3.6 CM
RV PSP: 32 MMHG
SL CV AV DECELERATION TIME RETROGRADE: 1852 MS
SL CV AV PEAK GRADIENT RETROGRADE: 53 MMHG
SL CV ECHO LV DYNAMIC OBSTRUCTION PEAK GRADIENT (REST): 13 MMHG
SL CV ECHO LV DYNAMIC OBSTRUCTION PEAK GRADIENT (VALSAL: 41 MMHG
SL CV LEFT ATRIUM LENGTH A2C: 4.9 CM
SL CV LV EF: 68
SL CV PED ECHO LEFT VENTRICLE DIASTOLIC VOLUME (MOD BIPLANE) 2D: 88 ML
SL CV PED ECHO LEFT VENTRICLE SYSTOLIC VOLUME (MOD BIPLANE) 2D: 30 ML
TR MAX PG: 29 MMHG
TR PEAK VELOCITY: 2.7 M/S
TRICUSPID ANNULAR PLANE SYSTOLIC EXCURSION: 2.9 CM
TRICUSPID VALVE PEAK REGURGITATION VELOCITY: 2.69 M/S

## 2025-06-27 LAB
ATRIAL RATE: 64 BPM
PR INTERVAL: 140 MS
QRS AXIS: 146 DEGREES
QRSD INTERVAL: 106 MS
QT INTERVAL: 446 MS
QTC INTERVAL: 460 MS
T WAVE AXIS: 261 DEGREES
VENTRICULAR RATE: 64 BPM

## 2025-07-11 DIAGNOSIS — E78.2 MIXED HYPERLIPIDEMIA DUE TO TYPE 2 DIABETES MELLITUS  (HCC): ICD-10-CM

## 2025-07-11 DIAGNOSIS — E11.69 MIXED HYPERLIPIDEMIA DUE TO TYPE 2 DIABETES MELLITUS  (HCC): ICD-10-CM

## 2025-07-12 RX ORDER — ATORVASTATIN CALCIUM 80 MG/1
80 TABLET, FILM COATED ORAL DAILY
Qty: 90 TABLET | Refills: 1 | Status: SHIPPED | OUTPATIENT
Start: 2025-07-12

## 2025-07-21 ENCOUNTER — OFFICE VISIT (OUTPATIENT)
Age: 75
End: 2025-07-21
Payer: MEDICARE

## 2025-07-21 ENCOUNTER — APPOINTMENT (OUTPATIENT)
Age: 75
End: 2025-07-21
Attending: INTERNAL MEDICINE
Payer: MEDICARE

## 2025-07-21 VITALS
HEART RATE: 61 BPM | RESPIRATION RATE: 18 BRPM | WEIGHT: 177.6 LBS | DIASTOLIC BLOOD PRESSURE: 72 MMHG | TEMPERATURE: 96.8 F | SYSTOLIC BLOOD PRESSURE: 128 MMHG | HEIGHT: 68 IN | BODY MASS INDEX: 26.92 KG/M2 | OXYGEN SATURATION: 97 %

## 2025-07-21 DIAGNOSIS — E78.2 MIXED HYPERLIPIDEMIA DUE TO TYPE 2 DIABETES MELLITUS  (HCC): ICD-10-CM

## 2025-07-21 DIAGNOSIS — I25.10 CORONARY ARTERY DISEASE INVOLVING NATIVE CORONARY ARTERY OF NATIVE HEART WITHOUT ANGINA PECTORIS: Chronic | ICD-10-CM

## 2025-07-21 DIAGNOSIS — E11.69 MIXED HYPERLIPIDEMIA DUE TO TYPE 2 DIABETES MELLITUS  (HCC): Primary | Chronic | ICD-10-CM

## 2025-07-21 DIAGNOSIS — E11.9 TYPE 2 DIABETES MELLITUS WITHOUT COMPLICATION, WITHOUT LONG-TERM CURRENT USE OF INSULIN (HCC): Chronic | ICD-10-CM

## 2025-07-21 DIAGNOSIS — E11.69 MIXED HYPERLIPIDEMIA DUE TO TYPE 2 DIABETES MELLITUS  (HCC): ICD-10-CM

## 2025-07-21 DIAGNOSIS — E78.2 MIXED HYPERLIPIDEMIA DUE TO TYPE 2 DIABETES MELLITUS  (HCC): Primary | Chronic | ICD-10-CM

## 2025-07-21 DIAGNOSIS — Z00.00 MEDICARE ANNUAL WELLNESS VISIT, SUBSEQUENT: ICD-10-CM

## 2025-07-21 LAB
ANION GAP SERPL CALCULATED.3IONS-SCNC: 11 MMOL/L (ref 4–13)
BUN SERPL-MCNC: 11 MG/DL (ref 5–25)
CALCIUM SERPL-MCNC: 8.4 MG/DL (ref 8.4–10.2)
CHLORIDE SERPL-SCNC: 101 MMOL/L (ref 96–108)
CHOLEST SERPL-MCNC: 131 MG/DL (ref ?–200)
CO2 SERPL-SCNC: 30 MMOL/L (ref 21–32)
CREAT SERPL-MCNC: 0.73 MG/DL (ref 0.6–1.3)
CREAT UR-MCNC: 106.6 MG/DL
ERYTHROCYTE [DISTWIDTH] IN BLOOD BY AUTOMATED COUNT: 17.8 % (ref 11.6–15.1)
EST. AVERAGE GLUCOSE BLD GHB EST-MCNC: 128 MG/DL
GFR SERPL CREATININE-BSD FRML MDRD: 89 ML/MIN/1.73SQ M
GLUCOSE P FAST SERPL-MCNC: 106 MG/DL (ref 65–99)
HBA1C MFR BLD: 6.1 %
HCT VFR BLD AUTO: 40.9 % (ref 36.5–49.3)
HDLC SERPL-MCNC: 50 MG/DL
HGB BLD-MCNC: 13 G/DL (ref 12–17)
LDLC SERPL CALC-MCNC: 71 MG/DL (ref 0–100)
MCH RBC QN AUTO: 26.7 PG (ref 26.8–34.3)
MCHC RBC AUTO-ENTMCNC: 31.8 G/DL (ref 31.4–37.4)
MCV RBC AUTO: 84 FL (ref 82–98)
MICROALBUMIN UR-MCNC: 124.5 MG/L
MICROALBUMIN/CREAT 24H UR: 117 MG/G CREATININE (ref 0–30)
PLATELET # BLD AUTO: 311 THOUSANDS/UL (ref 149–390)
PMV BLD AUTO: 9.2 FL (ref 8.9–12.7)
POTASSIUM SERPL-SCNC: 3.8 MMOL/L (ref 3.5–5.3)
RBC # BLD AUTO: 4.87 MILLION/UL (ref 3.88–5.62)
SODIUM SERPL-SCNC: 142 MMOL/L (ref 135–147)
TRIGL SERPL-MCNC: 50 MG/DL (ref ?–150)
WBC # BLD AUTO: 5.42 THOUSAND/UL (ref 4.31–10.16)

## 2025-07-21 PROCEDURE — 80048 BASIC METABOLIC PNL TOTAL CA: CPT

## 2025-07-21 PROCEDURE — 82043 UR ALBUMIN QUANTITATIVE: CPT

## 2025-07-21 PROCEDURE — 80061 LIPID PANEL: CPT

## 2025-07-21 PROCEDURE — G0439 PPPS, SUBSEQ VISIT: HCPCS | Performed by: INTERNAL MEDICINE

## 2025-07-21 PROCEDURE — 99214 OFFICE O/P EST MOD 30 MIN: CPT | Performed by: INTERNAL MEDICINE

## 2025-07-21 PROCEDURE — 85027 COMPLETE CBC AUTOMATED: CPT

## 2025-07-21 PROCEDURE — 82570 ASSAY OF URINE CREATININE: CPT

## 2025-07-21 PROCEDURE — 36415 COLL VENOUS BLD VENIPUNCTURE: CPT

## 2025-07-21 PROCEDURE — G2211 COMPLEX E/M VISIT ADD ON: HCPCS | Performed by: INTERNAL MEDICINE

## 2025-07-21 PROCEDURE — 83036 HEMOGLOBIN GLYCOSYLATED A1C: CPT

## 2025-07-21 NOTE — PROGRESS NOTES
Name: Dany Bosch      : 1950      MRN: [id number]  Encounter Provider: Shahram Flannery DO  Encounter Date: 2025   Encounter department: [facility] [place]  :  Assessment & Plan  Mixed hyperlipidemia due to type 2 diabetes mellitus  (HCC)  Type 2 diabetes mellitus without complication, without long-term current use of insulin (HCC)    Lab Results   Component Value Date    HGBA1C 6.1 (H) 2025     Diabetes is generally well controlled. Need to check updated lab work. Continue metformin and statin therapy.    Coronary artery disease involving native coronary artery of native heart without angina pectoris    Stable without angina. Continue current cardiac medications as prescribed. Follow-up with cardiology.    Medicare annual wellness visit, subsequent           Depression Screening and Follow-up Plan: Patient was screened for depression during today's encounter. They screened negative with a PHQ-2 score of 0.      Preventive health issues were discussed with patient, and age appropriate screening tests were ordered as noted in patient's After Visit Summary. Personalized health advice and appropriate referrals for health education or preventive services given if needed, as noted in patient's After Visit Summary.    History of Present Illness     Dany presents for follow up and medicare wellness visit. Feeling good overall. He has no complaints.       Patient Care Team:  Shahram Flannery DO as PCP - General (Internal Medicine)  Delvin Burch MD (Oncology)  Danay Coleman MD (Cardiology)    Review of Systems   Constitutional:  Negative for activity change, appetite change and fatigue.   Respiratory:  Negative for apnea, cough, chest tightness, shortness of breath and wheezing.    Cardiovascular:  Negative for chest pain, palpitations and leg swelling.   Gastrointestinal:  Negative for abdominal distention, abdominal pain, blood in stool, constipation, diarrhea, nausea  and vomiting.   Neurological:  Negative for dizziness, weakness, light-headedness, numbness and headaches.   Psychiatric/Behavioral:  Negative for behavioral problems, confusion, hallucinations, sleep disturbance and suicidal ideas. The patient is not nervous/anxious.      Medical History Reviewed by provider this encounter:  Tobacco  Allergies  Meds  Problems  Med Hx  Surg Hx  Fam Hx  Soc   Hx      Annual Wellness Visit Questionnaire   Dany is here for his Subsequent Wellness visit. Last Medicare Wellness visit information reviewed, patient interviewed and updates made to the record today.      Health Risk Assessment:   Patient rates overall health as good. Patient feels that their physical health rating is slightly better. Patient is satisfied with their life. Eyesight was rated as same. Hearing was rated as same. Patient feels that their emotional and mental health rating is slightly better. Patients states they are sometimes angry. Patient states they are sometimes unusually tired/fatigued. Pain experienced in the last 7 days has been none. Patient states that he has experienced no weight loss or gain in last 6 months.     Depression Screening:   PHQ-2 Score: 0      Fall Risk Screening:   In the past year, patient has experienced: no history of falling in past year      Home Safety:  Patient does not have trouble with stairs inside or outside of their home. Patient has working smoke alarms and has working carbon monoxide detector. Home safety hazards include: none.     Nutrition:   Current diet is Diabetic, Low Carb and Limited junk food.     Medications:   Patient is currently taking over-the-counter supplements. OTC medications include: see medication list. Patient is able to manage medications.     Activities of Daily Living (ADLs)/Instrumental Activities of Daily Living (IADLs):   Walk and transfer into and out of bed and chair?: Yes  Dress and groom yourself?: Yes    Bathe or shower yourself?: Yes     Feed yourself? Yes  Do your laundry/housekeeping?: Yes  Manage your money, pay your bills and track your expenses?: Yes  Make your own meals?: Yes    Do your own shopping?: Yes    Previous Hospitalizations:   Any hospitalizations or ED visits within the last 12 months?: No      Advance Care Planning:   Living will: Yes    Durable POA for healthcare: No    Advanced directive: Yes    Five wishes given: No      Cognitive Screening:   Provider or family/friend/caregiver concerned regarding cognition?: No    Preventive Screenings      Cardiovascular Screening:    General: Screening Not Indicated and History Lipid Disorder      Diabetes Screening:     General: Screening Not Indicated and History Diabetes      Colorectal Cancer Screening:     General: Screening Current      Prostate Cancer Screening:    General: Screening Not Indicated      Osteoporosis Screening:    General: Screening Not Indicated      Abdominal Aortic Aneurysm (AAA) Screening:    Risk factors include: tobacco use        General: Screening Not Indicated and History AAA      Lung Cancer Screening:     General: Screening Current      Hepatitis C Screening:    General: Screening Current    Immunizations:  - Immunizations due: Zoster (Shingrix)    Screening, Brief Intervention, and Referral to Treatment (SBIRT)     Screening  Typical number of drinks in a day: 0  Typical number of drinks in a week: 0  Interpretation: Low risk drinking behavior.    AUDIT-C Screenin) How often did you have a drink containing alcohol in the past year? never  2) How many drinks did you have on a typical day when you were drinking in the past year? 0  3) How often did you have 6 or more drinks on one occasion in the past year? never    AUDIT-C Score: 0  Interpretation: Score 0-3 (male): Negative screen for alcohol misuse    Single Item Drug Screening:  How often have you used an illegal drug (including marijuana) or a prescription medication for non-medical reasons in the  "past year? never    Single Item Drug Screen Score: 0  Interpretation: Negative screen for possible drug use disorder    Brief Intervention  Alcohol & drug use screenings were reviewed. No concerns regarding substance use disorder identified.     Other Counseling Topics:   Car/seat belt/driving safety, skin self-exam, sunscreen and regular weightbearing exercise.     Social Drivers of Health     Financial Resource Strain: Low Risk  (3/17/2023)    Overall Financial Resource Strain (CARDIA)    • Difficulty of Paying Living Expenses: Not hard at all   Food Insecurity: No Food Insecurity (7/21/2025)    Nursing - Inadequate Food Risk Classification    • Worried About Running Out of Food in the Last Year: Never true    • Ran Out of Food in the Last Year: Never true   Transportation Needs: No Transportation Needs (7/21/2025)    PRAPARE - Transportation    • Lack of Transportation (Medical): No    • Lack of Transportation (Non-Medical): No   Housing Stability: Low Risk  (7/21/2025)    Housing Stability Vital Sign    • Unable to Pay for Housing in the Last Year: No    • Number of Times Moved in the Last Year: 0    • Homeless in the Last Year: No   Utilities: Not At Risk (7/21/2025)    [facility] Utilities    • Threatened with loss of utilities: No     Objective   /72 (BP Location: Left arm, Patient Position: Sitting, Cuff Size: Large)   Pulse 61   Temp (!) 96.8 °F (36 °C) (Tympanic)   Resp 18   Ht 5' 8\" (1.727 m)   Wt 80.6 kg (177 lb 9.6 oz)   SpO2 97%   BMI 27.00 kg/m²     Physical Exam  Constitutional:       General: He is not in acute distress.     Appearance: He is not ill-appearing.     Cardiovascular:      Rate and Rhythm: Normal rate and regular rhythm.      Heart sounds: No murmur heard.  Pulmonary:      Effort: Pulmonary effort is normal. No respiratory distress.      Breath sounds: No wheezing.   Abdominal:      General: Bowel sounds are normal. There is no distension.      Tenderness: There is no abdominal " tenderness.     Musculoskeletal:      Right lower leg: No edema.      Left lower leg: No edema.     Neurological:      Mental Status: He is alert.       Shahram Flannery,

## 2025-07-21 NOTE — PATIENT INSTRUCTIONS
Medicare Preventive Visit Patient Instructions  Thank you for completing your Welcome to Medicare Visit or Medicare Annual Wellness Visit today. Your next wellness visit will be due in one year (7/22/2026).  The screening/preventive services that you may require over the next 5-10 years are detailed below. Some tests may not apply to you based off risk factors and/or age. Screening tests ordered at today's visit but not completed yet may show as past due. Also, please note that scanned in results may not display below.  Preventive Screenings:  Service Recommendations Previous Testing/Comments   Colorectal Cancer Screening  Colonoscopy    Fecal Occult Blood Test (FOBT)/Fecal Immunochemical Test (FIT)  Fecal DNA/Cologuard Test  Flexible Sigmoidoscopy Age: 45-75 years old   Colonoscopy: every 10 years (May be performed more frequently if at higher risk)  OR  FOBT/FIT: every 1 year  OR  Cologuard: every 3 years  OR  Sigmoidoscopy: every 5 years  Screening may be recommended earlier than age 45 if at higher risk for colorectal cancer. Also, an individualized decision between you and your healthcare provider will decide whether screening between the ages of 76-85 would be appropriate. Colonoscopy: 12/02/2020  FOBT/FIT: Not on file  Cologuard: Not on file  Sigmoidoscopy: Not on file    Screening Current     Prostate Cancer Screening Individualized decision between patient and health care provider in men between ages of 55-69   Medicare will cover every 12 months beginning on the day after your 50th birthday PSA: 2.0 ng/mL     Screening Not Indicated     Hepatitis C Screening Once for adults born between 1945 and 1965  More frequently in patients at high risk for Hepatitis C Hep C Antibody: 06/24/2020    Screening Current   Diabetes Screening 1-2 times per year if you're at risk for diabetes or have pre-diabetes Fasting glucose: 102 mg/dL (7/18/2024)  A1C: 6.1 % (1/20/2025)  Screening Not Indicated  History Diabetes    Cholesterol Screening Once every 5 years if you don't have a lipid disorder. May order more often based on risk factors. Lipid panel: 01/20/2025  Screening Not Indicated  History Lipid Disorder      Other Preventive Screenings Covered by Medicare:  Abdominal Aortic Aneurysm (AAA) Screening: covered once if your at risk. You're considered to be at risk if you have a family history of AAA or a male between the age of 65-75 who smoking at least 100 cigarettes in your lifetime.  Lung Cancer Screening: covers low dose CT scan once per year if you meet all of the following conditions: (1) Age 55-77; (2) No signs or symptoms of lung cancer; (3) Current smoker or have quit smoking within the last 15 years; (4) You have a tobacco smoking history of at least 20 pack years (packs per day x number of years you smoked); (5) You get a written order from a healthcare provider.  Glaucoma Screening: covered annually if you're considered high risk: (1) You have diabetes OR (2) Family history of glaucoma OR (3)  aged 50 and older OR (4)  American aged 65 and older  Osteoporosis Screening: covered every 2 years if you meet one of the following conditions: (1) Have a vertebral abnormality; (2) On glucocorticoid therapy for more than 3 months; (3) Have primary hyperparathyroidism; (4) On osteoporosis medications and need to assess response to drug therapy.  HIV Screening: covered annually if you're between the age of 15-65. Also covered annually if you are younger than 15 and older than 65 with risk factors for HIV infection. For pregnant patients, it is covered up to 3 times per pregnancy.    Immunizations:  Immunization Recommendations   Influenza Vaccine Annual influenza vaccination during flu season is recommended for all persons aged >= 6 months who do not have contraindications   Pneumococcal Vaccine   * Pneumococcal conjugate vaccine = PCV13 (Prevnar 13), PCV15 (Vaxneuvance), PCV20 (Prevnar 20)  *  Pneumococcal polysaccharide vaccine = PPSV23 (Pneumovax) Adults 19-63 yo with certain risk factors or if 65+ yo  If never received any pneumonia vaccine: recommend Prevnar 20 (PCV20)  Give PCV20 if previously received 1 dose of PCV13 or PPSV23   Hepatitis B Vaccine 3 dose series if at intermediate or high risk (ex: diabetes, end stage renal disease, liver disease)   Respiratory syncytial virus (RSV) Vaccine - COVERED BY MEDICARE PART D  * RSVPreF3 (Arexvy) CDC recommends that adults 60 years of age and older may receive a single dose of RSV vaccine using shared clinical decision-making (SCDM)   Tetanus (Td) Vaccine - COST NOT COVERED BY MEDICARE PART B Following completion of primary series, a booster dose should be given every 10 years to maintain immunity against tetanus. Td may also be given as tetanus wound prophylaxis.   Tdap Vaccine - COST NOT COVERED BY MEDICARE PART B Recommended at least once for all adults. For pregnant patients, recommended with each pregnancy.   Shingles Vaccine (Shingrix) - COST NOT COVERED BY MEDICARE PART B  2 shot series recommended in those 19 years and older who have or will have weakened immune systems or those 50 years and older     Health Maintenance Due:      Topic Date Due    Lung Cancer Screening  07/25/2025    Colorectal Cancer Screening  12/02/2025    Hepatitis C Screening  Completed     Immunizations Due:      Topic Date Due    Influenza Vaccine (1) 09/01/2025     Advance Directives   What are advance directives?  Advance directives are legal documents that state your wishes and plans for medical care. These plans are made ahead of time in case you lose your ability to make decisions for yourself. Advance directives can apply to any medical decision, such as the treatments you want, and if you want to donate organs.   What are the types of advance directives?  There are many types of advance directives, and each state has rules about how to use them. You may choose a  combination of any of the following:  Living will:  This is a written record of the treatment you want. You can also choose which treatments you do not want, which to limit, and which to stop at a certain time. This includes surgery, medicine, IV fluid, and tube feedings.   Durable power of  for healthcare (DPAHC):  This is a written record that states who you want to make healthcare choices for you when you are unable to make them for yourself. This person, called a proxy, is usually a family member or a friend. You may choose more than 1 proxy.  Do not resuscitate (DNR) order:  A DNR order is used in case your heart stops beating or you stop breathing. It is a request not to have certain forms of treatment, such as CPR. A DNR order may be included in other types of advance directives.  Medical directive:  This covers the care that you want if you are in a coma, near death, or unable to make decisions for yourself. You can list the treatments you want for each condition. Treatment may include pain medicine, surgery, blood transfusions, dialysis, IV or tube feedings, and a ventilator (breathing machine).  Values history:  This document has questions about your views, beliefs, and how you feel and think about life. This information can help others choose the care that you would choose.  Why are advance directives important?  An advance directive helps you control your care. Although spoken wishes may be used, it is better to have your wishes written down. Spoken wishes can be misunderstood, or not followed. Treatments may be given even if you do not want them. An advance directive may make it easier for your family to make difficult choices about your care.   Cigarette Smoking and Your Health   Risks to your health if you smoke:  Nicotine and other chemicals found in tobacco damage every cell in your body. Even if you are a light smoker, you have an increased risk for cancer, heart disease, and lung disease.  If you are pregnant or have diabetes, smoking increases your risk for complications.   Benefits to your health if you stop smoking:   You decrease respiratory symptoms such as coughing, wheezing, and shortness of breath.   You reduce your risk for cancers of the lung, mouth, throat, kidney, bladder, pancreas, stomach, and cervix. If you already have cancer, you increase the benefits of chemotherapy. You also reduce your risk for cancer returning or a second cancer from developing.   You reduce your risk for heart disease, blood clots, heart attack, and stroke.   You reduce your risk for lung infections, and diseases such as pneumonia, asthma, chronic bronchitis, and emphysema.  Your circulation improves. More oxygen can be delivered to your body. If you have diabetes, you lower your risk for complications, such as kidney, artery, and eye diseases. You also lower your risk for nerve damage. Nerve damage can lead to amputations, poor vision, and blindness.  You improve your body's ability to heal and to fight infections.  For more information and support to stop smoking:   [employer]  Phone: 7- 443 - 394-0606  Web Address: www.[employer]  How to Quit Using Smokeless Tobacco   Why it is important to stop using smokeless tobacco:  Smokeless tobacco comes in many forms. Examples include chew, snuff, dip, dissolvable tobacco, and snus. All smokeless tobacco products contain nicotine and may contain as much nicotine as 3 cigarettes. You may be physically dependent on nicotine. You may also be emotionally addicted to it. The cravings can be strong, but it is important to quit using smokeless tobacco. You will improve your health and decrease your cancer, stroke, and heart attack risk. Mouth sores and tooth problems will also improve when you quit. You can benefit from quitting no matter how long you have used smokeless tobacco.   Prepare to stop using smokeless tobacco:  Nicotine is a highly addictive drug. Withdrawal  symptoms can happen when you stop and make it hard to quit. The following can help keep you on track:  Set a quit date.    Tell friends, family, and coworkers that you plan to quit.    Remove all smokeless tobacco products from your home, car, and workplace.    Manage weight gain after you quit:  Nicotine can affect your metabolism. You may gain a few pounds after you quit. The following can help you control your weight:  Eat healthy foods.    Drink water before, during, and between meals.    Exercise as directed.      Weight Management   Why it is important to manage your weight:  Being overweight increases your risk of health conditions such as heart disease, high blood pressure, type 2 diabetes, and certain types of cancer. It can also increase your risk for osteoarthritis, sleep apnea, and other respiratory problems. Aim for a slow, steady weight loss. Even a small amount of weight loss can lower your risk of health problems.  How to lose weight safely:  A safe and healthy way to lose weight is to eat fewer calories and get regular exercise. You can lose up about 1 pound a week by decreasing the number of calories you eat by 500 calories each day.   Healthy meal plan for weight management:  A healthy meal plan includes a variety of foods, contains fewer calories, and helps you stay healthy. A healthy meal plan includes the following:  Eat whole-grain foods more often.  A healthy meal plan should contain fiber. Fiber is the part of grains, fruits, and vegetables that is not broken down by your body. Whole-grain foods are healthy and provide extra fiber in your diet. Some examples of whole-grain foods are whole-wheat breads and pastas, oatmeal, brown rice, and bulgur.  Eat a variety of vegetables every day.  Include dark, leafy greens such as spinach, kale, mukesh greens, and mustard greens. Eat yellow and orange vegetables such as carrots, sweet potatoes, and winter squash.   Eat a variety of fruits every day.   Choose fresh or canned fruit (canned in its own juice or light syrup) instead of juice. Fruit juice has very little or no fiber.  Eat low-fat dairy foods.  Drink fat-free (skim) milk or 1% milk. Eat fat-free yogurt and low-fat cottage cheese. Try low-fat cheeses such as mozzarella and other reduced-fat cheeses.  Choose meat and other protein foods that are low in fat.  Choose beans or other legumes such as split peas or lentils. Choose fish, skinless poultry (chicken or turkey), or lean cuts of red meat (beef or pork). Before you cook meat or poultry, cut off any visible fat.   Use less fat and oil.  Try baking foods instead of frying them. Add less fat, such as margarine, sour cream, regular salad dressing and mayonnaise to foods. Eat fewer high-fat foods. Some examples of high-fat foods include french fries, doughnuts, ice cream, and cakes.  Eat fewer sweets.  Limit foods and drinks that are high in sugar. This includes candy, cookies, regular soda, and sweetened drinks.  Exercise:  Exercise at least 30 minutes per day on most days of the week. Some examples of exercise include walking, biking, dancing, and swimming. You can also fit in more physical activity by taking the stairs instead of the elevator or parking farther away from stores. Ask your healthcare provider about the best exercise plan for you.    © Copyright [employer] 2018 Information is for End User's use only and may not be sold, redistributed or otherwise used for commercial purposes. All illustrations and images included in CareNotes® are the copyrighted property of [employer]M., Inc. or [employer]

## 2025-07-21 NOTE — ASSESSMENT & PLAN NOTE
Lab Results   Component Value Date    HGBA1C 6.1 (H) 01/20/2025     Diabetes is generally well controlled. Need to check updated lab work. Continue metformin and statin therapy.

## 2025-07-21 NOTE — ASSESSMENT & PLAN NOTE
Stable without angina. Continue current cardiac medications as prescribed. Follow-up with cardiology.

## 2025-07-22 ENCOUNTER — RESULTS FOLLOW-UP (OUTPATIENT)
Age: 75
End: 2025-07-22

## 2025-07-22 NOTE — TELEPHONE ENCOUNTER
----- Message from Shahram Flannery DO sent at 7/22/2025  6:37 AM EDT -----  Lab work is stable overall. Diabetes remains well controlled.  ----- Message -----  From: Lab, Background User  Sent: 7/21/2025   5:20 PM EDT  To: Shahram Flannery DO

## 2025-07-22 NOTE — TELEPHONE ENCOUNTER
Left detailed result message regarding stable labs and well controlled diabetes in patients voice mail.

## 2025-07-22 NOTE — TELEPHONE ENCOUNTER
Patient returned call, relayed results as per PCP message. Patient was very appreciative, expressed understanding and did not have further questions at this time.

## 2025-07-25 ENCOUNTER — REMOTE DEVICE CLINIC VISIT (OUTPATIENT)
Dept: CARDIOLOGY CLINIC | Facility: CLINIC | Age: 75
End: 2025-07-25
Payer: MEDICARE

## 2025-07-25 ENCOUNTER — TELEPHONE (OUTPATIENT)
Age: 75
End: 2025-07-25

## 2025-07-25 DIAGNOSIS — Z95.810 AICD (AUTOMATIC CARDIOVERTER/DEFIBRILLATOR) PRESENT: Primary | ICD-10-CM

## 2025-07-25 PROCEDURE — 93296 REM INTERROG EVL PM/IDS: CPT | Performed by: INTERNAL MEDICINE

## 2025-07-25 PROCEDURE — 93295 DEV INTERROG REMOTE 1/2/MLT: CPT | Performed by: INTERNAL MEDICINE

## 2025-07-25 NOTE — PROGRESS NOTES
Results for orders placed or performed in visit on 07/25/25   Cardiac EP device report    Narrative    [facility] DC ICD/ACTIVE SYSTEM IS MRI CONDITIONAL  MERLIN TRANSMISSION (3 PDF'S): BATTERY VOLTAGE ADEQUATE (5.8-6.8 YRS). AP-29%, <1%. ALL AVAILABLE LEAD PARAMETERS WITHIN NORMAL LIMITS. 8 DEVICE CLASSIFIED NSVT EPISODES, MOST RECENT FOR 22 BEAT NSVT, AVG CL~355MS (PDF3). 2 AMS EPISODES MAX DURATION 10 SEC- PAT ON EGM'S. PT ON ASA 81MG & ATENOLOL. CORVUE IMPEDANCE MONITORING WITHIN NORMAL LIMITS. NORMAL DEVICE FUNCTION. GV

## 2025-07-25 NOTE — TELEPHONE ENCOUNTER
Patient called regarding his remote device check done this am.  He has questions  Warmed transferred to [place] in Device clinic

## (undated) DEVICE — GLOVE SRG BIOGEL 7

## (undated) DEVICE — CARDINAL HEALTH LAP SPONGE  8 X 36 IN. PREWASHED X-RAY DETECTABLE SOFTPACK: Brand: CARDINAL HEALTH

## (undated) DEVICE — PACK UNIVERSAL NECK

## (undated) DEVICE — TROCAR HERNIA OVAL PERITONEAL DISTENTION BALLOON

## (undated) DEVICE — GLOVE INDICATOR PI UNDERGLOVE SZ 7 BLUE

## (undated) DEVICE — SNAP KOVER: Brand: UNBRANDED

## (undated) DEVICE — PAD GROUNDING ADULT

## (undated) DEVICE — 3M™ STERI-STRIP™ REINFORCED ADHESIVE SKIN CLOSURES, R1546, 1/4 IN X 4 IN (6 MM X 100 MM), 10 STRIPS/ENVELOPE: Brand: 3M™ STERI-STRIP™

## (undated) DEVICE — GAUZE SPONGES,8 PLY: Brand: CURITY

## (undated) DEVICE — PLUMEPEN PRO 10FT

## (undated) DEVICE — 3M™ TEGADERM™ TRANSPARENT FILM DRESSING FRAME STYLE, 1624W, 2-3/8 IN X 2-3/4 IN (6 CM X 7 CM), 100/CT 4CT/CASE: Brand: 3M™ TEGADERM™

## (undated) DEVICE — ALLENTOWN LAP CHOLE APP PACK: Brand: CARDINAL HEALTH

## (undated) DEVICE — GLOVE SRG BIOGEL ECLIPSE 7

## (undated) DEVICE — SORBAFIX ABSORBABLE FIXATION SYSTEM 30 ABSORBABLE FASTENERS: Brand: SORBAFIX ABSORBABLE FIXATION SYSTEM

## (undated) DEVICE — SUT VICRYL 4-0 PS-2 27 IN J426H

## (undated) DEVICE — MINOR PROCEDURE DRAPE: Brand: CONVERTORS

## (undated) DEVICE — CHLORAPREP HI-LITE 26ML ORANGE

## (undated) DEVICE — X-RAY DETECTABLE SPONGES,16 PLY: Brand: VISTEC

## (undated) DEVICE — GAUZE SPONGES,16 PLY: Brand: CURITY

## (undated) DEVICE — CATH FOLEY COUDE HEMATURIA 24FR 30ML 3 WAY LUBRICATH

## (undated) DEVICE — ELECTRODE BLADE MOD E-Z CLEAN 2.5IN 6.4CM -0012M

## (undated) DEVICE — INVIEW CLEAR LEGGINGS: Brand: CONVERTORS

## (undated) DEVICE — TROCAR HERNIA BALLON STRUCTURED 10 MM

## (undated) DEVICE — 3000CC GUARDIAN II: Brand: GUARDIAN

## (undated) DEVICE — DRAPE TOWEL: Brand: CONVERTORS

## (undated) DEVICE — LIGACLIP MCA MULTIPLE CLIP APPLIERS, 20 SMALL CLIPS: Brand: LIGACLIP

## (undated) DEVICE — VIAL DECANTER

## (undated) DEVICE — SUT VICRYL 3-0 SH 27 IN J416H

## (undated) DEVICE — LIGHT HANDLE COVER SLEEVE DISP BLUE STELLAR

## (undated) DEVICE — IRRIG ENDO FLO TUBING

## (undated) DEVICE — BETHLEHEM UNIVERSAL OUTPATIENT: Brand: CARDINAL HEALTH

## (undated) DEVICE — LIGHT HANDLE COVER CAMERA DISP

## (undated) DEVICE — NEEDLE 18 G X 1 1/2

## (undated) DEVICE — NEEDLE COUNTER LG W/RULER

## (undated) DEVICE — 3M™ STERI-STRIP™ COMPOUND BENZOIN TINCTURE 40 BAGS/CARTON 4 CARTONS/CASE C1544: Brand: 3M™ STERI-STRIP™

## (undated) DEVICE — SPONGE 4 X 4 XRAY 16 PLY STRL LF RFD

## (undated) DEVICE — HF-RESECTION ELECTRODE PLASMALOOP LOOP, MEDIUM, 24 FR., 12°-30°, ESG TURIS: Brand: OLYMPUS

## (undated) DEVICE — GLOVE INDICATOR PI UNDERGLOVE SZ 8 BLUE

## (undated) DEVICE — GLOVE SRG BIOGEL ECLIPSE 7.5

## (undated) DEVICE — 3M™ STERI-STRIP™ REINFORCED ADHESIVE SKIN CLOSURES, R1547, 1/2 IN X 4 IN (12 MM X 100 MM), 6 STRIPS/ENVELOPE: Brand: 3M™ STERI-STRIP™

## (undated) DEVICE — BULB SYRINGE,IRRIGATION WITH PROTECTIVE CAP: Brand: DOVER

## (undated) DEVICE — ENDOPATH XCEL UNIVERSAL TROCAR STABLILITY SLEEVES: Brand: ENDOPATH XCEL

## (undated) DEVICE — INTRO SHEATH PEEL AWAY 7FR

## (undated) DEVICE — EVACUATOR BLADDER ELLIK DISP STRL

## (undated) DEVICE — INTENDED FOR TISSUE SEPARATION, AND OTHER PROCEDURES THAT REQUIRE A SHARP SURGICAL BLADE TO PUNCTURE OR CUT.: Brand: BARD-PARKER ® CARBON RIB-BACK BLADES

## (undated) DEVICE — SUT VICRYL 0 UR-6 27 IN J603H

## (undated) DEVICE — DRESSING AQUACEL AG 3.5 X 6 IN

## (undated) DEVICE — SUT MONOCRYL 5-0 P-3 18 IN Y493G

## (undated) DEVICE — DRAPE SURGIKIT SADDLE BAG

## (undated) DEVICE — NEEDLE 25G X 1 1/2

## (undated) DEVICE — CYSTO TUBING TUR Y IRRIGATION

## (undated) DEVICE — SURGICEL 4 X 8

## (undated) DEVICE — TELFA NON-ADHERENT ABSORBENT DRESSING: Brand: TELFA

## (undated) DEVICE — CHLORHEXIDINE 4PCT 4 OZ

## (undated) DEVICE — REM POLYHESIVE ADULT PATIENT RETURN ELECTRODE: Brand: VALLEYLAB

## (undated) DEVICE — TUBING SUCTION 5MM X 12 FT

## (undated) DEVICE — SCD SEQUENTIAL COMPRESSION COMFORT SLEEVE MEDIUM KNEE LENGTH: Brand: KENDALL SCD

## (undated) DEVICE — 3M™ IOBAN™ 2 ANTIMICROBIAL INCISE DRAPE 6640EZ: Brand: IOBAN™ 2

## (undated) DEVICE — INTENDED FOR TISSUE SEPARATION, AND OTHER PROCEDURES THAT REQUIRE A SHARP SURGICAL BLADE TO PUNCTURE OR CUT.: Brand: BARD-PARKER SAFETY BLADES SIZE 15, STERILE

## (undated) DEVICE — INTRO SHEATH PEEL AWAY 9 FR

## (undated) DEVICE — [HIGH FLOW INSUFFLATOR,  DO NOT USE IF PACKAGE IS DAMAGED,  KEEP DRY,  KEEP AWAY FROM SUNLIGHT,  PROTECT FROM HEAT AND RADIOACTIVE SOURCES.]: Brand: PNEUMOSURE

## (undated) DEVICE — BAG URINE DRAINAGE 4000ML CONTINUOUS IRR

## (undated) DEVICE — STERILE ICS MINOR PACK: Brand: CARDINAL HEALTH

## (undated) DEVICE — GLOVE SRG BIOGEL 8

## (undated) DEVICE — BOWL: 16OZ PEELPOUCH 75/CS 16/PLT: Brand: MEDEGEN MEDICAL PRODUCTS, LLC

## (undated) DEVICE — VISUALIZATION SYSTEM: Brand: CLEARIFY

## (undated) DEVICE — BAG DECANTER

## (undated) DEVICE — INTENDED FOR TISSUE SEPARATION, AND OTHER PROCEDURES THAT REQUIRE A SHARP SURGICAL BLADE TO PUNCTURE OR CUT.: Brand: BARD-PARKER SAFETY BLADES SIZE 10, STERILE

## (undated) DEVICE — PLASMABLADE PS200-040 4.0: Brand: PLASMABLADE™

## (undated) DEVICE — 3M™ STERI-STRIP™ REINFORCED ADHESIVE SKIN CLOSURES, R1541, 1/4 IN X 3 IN (6 MM X 75 MM), 3 STRIPS/ENVELOPE: Brand: 3M™ STERI-STRIP™

## (undated) DEVICE — GLOVE INDICATOR PI UNDERGLOVE SZ 7.5 BLUE

## (undated) DEVICE — PACK TUR

## (undated) DEVICE — LIGHT GLOVE GREEN

## (undated) DEVICE — INSTRUMENT POUCH: Brand: CONVERTORS

## (undated) DEVICE — ADHESIVE SKIN HIGH VISCOSITY EXOFIN 1ML

## (undated) DEVICE — SUT SILK 0 CT-1 30 IN 424H

## (undated) DEVICE — ENDOPATH XCEL BLADELESS TROCARS WITH STABILITY SLEEVES: Brand: ENDOPATH XCEL

## (undated) DEVICE — SUT VICRYL 2-0 CT-1 36 IN J945H

## (undated) DEVICE — SPECIMEN CONTAINER STERILE PEEL PACK

## (undated) DEVICE — MICROPUNCTURE INTRODUCER SET SILHOUETTE TRANSITIONLESS PUSH-PLUS DESIGN - STIFFENED CANNULA WITH NITINOL WIRE GUIDE: Brand: MICROPUNCTURE

## (undated) DEVICE — UROCATCH BAG

## (undated) DEVICE — Device: Brand: OLYMPUS

## (undated) DEVICE — CHLORAPREP HI-LITE 10.5ML ORANGE